# Patient Record
Sex: FEMALE | Race: WHITE | NOT HISPANIC OR LATINO | Employment: OTHER | URBAN - METROPOLITAN AREA
[De-identification: names, ages, dates, MRNs, and addresses within clinical notes are randomized per-mention and may not be internally consistent; named-entity substitution may affect disease eponyms.]

---

## 2012-09-27 LAB — HBA1C MFR BLD: 5.7 %

## 2017-07-24 ENCOUNTER — TELEPHONE (OUTPATIENT)
Dept: PAIN MEDICINE | Facility: CLINIC | Age: 62
End: 2017-07-24

## 2017-07-24 NOTE — TELEPHONE ENCOUNTER
----- Message from Ryanne Tucker sent at 7/24/2017 11:38 AM CDT -----  _x  1st Request  _  2nd Request  _  3rd Request        Who: zafar    Why: pt. Would like to speak with  nurse regarding appt.    What Number to Call Back:[phone]    When to Expect a call back: (With in 24 hours)

## 2017-07-24 NOTE — TELEPHONE ENCOUNTER
Contacted and spoke to patient regarding message, she explained to staff that her pain started on Thursday and she required a appointment today.     She was informed that Dr. Leyva is not in clinic on Monday's and her pain is considered acute since this is the first time she has experienced it and haven't had any previous treatment and our providers treat chronic pain.     She should contact her pcp as she has already been to the ER with no relief in [place], [place]

## 2017-07-26 ENCOUNTER — TELEPHONE (OUTPATIENT)
Dept: PAIN MEDICINE | Facility: CLINIC | Age: 62
End: 2017-07-26

## 2017-07-26 NOTE — TELEPHONE ENCOUNTER
Patient was contacted and informed that no one form our office contacted her she spoke with Melia on Monday.

## 2017-07-26 NOTE — TELEPHONE ENCOUNTER
----- Message from Sandee Quiroz sent at 7/26/2017  2:15 PM CDT -----  _X  1st Request  _  2nd Request  _  3rd Request        Who: IVAN DINERO [[id number]]    Why: Pt believes she received a call from the office today. Please call back.    What Number to Call Back:[phone]  When to Expect a call back: (With in 24 hours)

## 2017-08-07 ENCOUNTER — TELEPHONE (OUTPATIENT)
Dept: PAIN MEDICINE | Facility: CLINIC | Age: 62
End: 2017-08-07

## 2017-08-07 NOTE — TELEPHONE ENCOUNTER
Returned call to [phone] to advise patient friend Latha that we can not release any of the patient's information without the patient's consent, no answer, left voicemail message

## 2017-08-08 ENCOUNTER — TELEPHONE (OUTPATIENT)
Dept: PAIN MEDICINE | Facility: CLINIC | Age: 62
End: 2017-08-08

## 2017-08-08 NOTE — TELEPHONE ENCOUNTER
----- Message from Carolee Melvin sent at 8/8/2017  9:36 AM CDT -----  x 1st Request  _ 2nd Request  _ 3rd Request    Who: Dorothy    Why: New pt requesting to speak to nurse in regards to her pain. Pt upset wants to see if she can be seen sooner. Place appt on waitlist. Please call and advise.    What Number to Call Back: [phone]     When to Expect a call back: (Before the end of the day)  -- if call after 3:00 call back will be tomorrow.       Spoke with patient regarding intense low back pain for days, patient stated she is having excruciating low back pain that is worsening,advise patient that Dr Stafford is not in the office at this time and that she should follow up with the urgent care or her PCP due to the nature of her pain

## 2017-08-08 NOTE — TELEPHONE ENCOUNTER
Called pt back to inform her that Dr. Mazariegos's next available is 8/15/17.  She currently has appt with Dr. Stafford on 8/18/17.  Pt stated she is hurting in her lower back area, went to ED, however, nothing has helped thus far.  Advised pt that she could be moved to the 15th, pt declined.  She will stay on the waiting list for the next cancellation. I advised her that if someone were to cancel, then she would be notified to RS the appt.  Pt verbalized understanding.

## 2017-08-13 ENCOUNTER — NURSE TRIAGE (OUTPATIENT)
Dept: ADMINISTRATIVE | Facility: CLINIC | Age: 62
End: 2017-08-13

## 2017-08-14 ENCOUNTER — TELEPHONE (OUTPATIENT)
Dept: PAIN MEDICINE | Facility: CLINIC | Age: 62
End: 2017-08-14

## 2017-08-14 NOTE — TELEPHONE ENCOUNTER
----- Message from Carolee Melvin sent at 8/14/2017  9:05 AM CDT -----  x 1st Request  _ 2nd Request  _ 3rd Request    Who: Dorothy     Why: New pt is requesting to speak to staff in regards to being seen sooner then 8/18/17.  Please call and advise.     What Number to Call Back: [phone]     When to Expect a call back: (Before the end of the day)  -- if call after 3:00 call back will be tomorrow.

## 2017-08-14 NOTE — TELEPHONE ENCOUNTER
Reason for Disposition   Question about upcoming scheduled test, no triage required and triager able to answer question    Protocols used: ST INFORMATION ONLY CALL-A-AH    Patient called to confirm the appointment on tomorrow. Information provided.

## 2017-08-18 ENCOUNTER — OFFICE VISIT (OUTPATIENT)
Dept: PAIN MEDICINE | Facility: CLINIC | Age: 62
End: 2017-08-18
Attending: ANESTHESIOLOGY
Payer: MEDICARE

## 2017-08-18 VITALS
TEMPERATURE: 97 F | HEART RATE: 98 BPM | BODY MASS INDEX: 34.89 KG/M2 | DIASTOLIC BLOOD PRESSURE: 79 MMHG | SYSTOLIC BLOOD PRESSURE: 110 MMHG | HEIGHT: 65 IN | WEIGHT: 209.44 LBS

## 2017-08-18 DIAGNOSIS — M53.3 SACROILIAC JOINT PAIN: ICD-10-CM

## 2017-08-18 PROCEDURE — 99213 OFFICE O/P EST LOW 20 MIN: CPT | Mod: PBBFAC | Performed by: ANESTHESIOLOGY

## 2017-08-18 PROCEDURE — 99999 PR PBB SHADOW E&M-EST. PATIENT-LVL III: CPT | Mod: PBBFAC,,, | Performed by: ANESTHESIOLOGY

## 2017-08-18 PROCEDURE — 3008F BODY MASS INDEX DOCD: CPT | Mod: S$GLB,,, | Performed by: ANESTHESIOLOGY

## 2017-08-18 PROCEDURE — 99204 OFFICE O/P NEW MOD 45 MIN: CPT | Mod: S$GLB,,, | Performed by: ANESTHESIOLOGY

## 2017-08-18 RX ORDER — OMEPRAZOLE 20 MG/1
20 CAPSULE, DELAYED RELEASE ORAL DAILY
Status: ON HOLD | COMMUNITY
Start: 2017-07-25 | End: 2020-04-21 | Stop reason: SDUPTHER

## 2017-08-18 RX ORDER — GABAPENTIN 300 MG/1
CAPSULE ORAL
COMMUNITY
Start: 2017-08-15 | End: 2017-12-04

## 2017-08-18 RX ORDER — BETAMETHASONE DIPROPIONATE 0.5 MG/G
CREAM TOPICAL
Status: ON HOLD | COMMUNITY
Start: 2017-06-26 | End: 2020-04-20

## 2017-08-18 RX ORDER — MELOXICAM 7.5 MG/1
TABLET ORAL
COMMUNITY
Start: 2017-06-29 | End: 2017-12-04

## 2017-08-18 RX ORDER — LEVOTHYROXINE SODIUM 25 UG/1
37.5 TABLET ORAL
COMMUNITY
Start: 2017-08-02 | End: 2021-03-24

## 2017-08-18 RX ORDER — METHOCARBAMOL 500 MG/1
TABLET, FILM COATED ORAL
Refills: 0 | Status: ON HOLD | COMMUNITY
Start: 2017-07-22 | End: 2020-04-20

## 2017-08-18 RX ORDER — ACYCLOVIR 400 MG/1
400 TABLET ORAL 3 TIMES DAILY PRN
COMMUNITY
Start: 2017-06-20 | End: 2020-06-25 | Stop reason: SDUPTHER

## 2017-08-18 RX ORDER — NYSTATIN 100000 U/G
CREAM TOPICAL
Status: ON HOLD | COMMUNITY
Start: 2017-05-23 | End: 2020-04-20

## 2017-08-18 RX ORDER — METHYLPREDNISOLONE 4 MG/1
TABLET ORAL
Refills: 0 | COMMUNITY
Start: 2017-07-25 | End: 2017-12-04 | Stop reason: ALTCHOICE

## 2017-08-18 RX ORDER — HYDROCODONE BITARTRATE AND ACETAMINOPHEN 5; 325 MG/1; MG/1
TABLET ORAL
COMMUNITY
Start: 2017-08-08 | End: 2017-12-04

## 2017-08-18 RX ORDER — MELOXICAM 15 MG/1
TABLET ORAL
Refills: 0 | COMMUNITY
Start: 2017-07-25 | End: 2017-12-04

## 2017-08-18 NOTE — PROGRESS NOTES
Chronic Pain - New Consult    Referring Physician: Praneeth, Lucianorefmiles    Chief Complaint:   Chief Complaint   Patient presents with    Hip Pain        SUBJECTIVE: Disclaimer: This note has been generated using voice-recognition software. There may be typographical errors that have been missed during proof-reading    Initial encounter:    Dorothy Burt presents to the clinic for the evaluation of left hip pain. The pain started 07/21/17 ago and symptoms have been worsening.    Brief history:    Pain Description:    The pain is located in the left hip area and radiates to the buttock and left thigh.      At BEST  6/10     At WORST  6/10 on the WORST day.      On average pain is rated as 6/10.     Today the pain is rated as 6/10    The pain is described as burning, sharp, shooting, stabbing, throbbing, tingling and aching      Symptoms interfere with daily activity, sleeping, work and .     Exacerbating factors: Sitting, Standing, Laying, Bending, Touching, Coughing/Sneezing, Walking, Extension, Flexing, Lifting, Getting out of bed/chair and.      Mitigating factors heat, ice, medications and .     Patient denies night fever/night sweats, urinary incontinence, bowel incontinence, significant weight loss, significant motor weakness, loss of sensations .  Patient denies any suicidal or homicidal ideations    Pain Medications:  Current:  meloxicam 15 mg without relief  Gabapentin 600mg BID  Flexeril 10mg -causes sedation without relief  duexis without relief    Tried in Past:  NSAIDs -yes  TCA -Never  SNRI -Never  Anti-convulsants -Never  Muscle Relaxants -robaxin  Opioids-Never    Physical Therapy/Home Exercise: no       report:  Reviewed and consistent with medication use as prescribed.    Pain Procedures: none    Chiropractor -never  Acupuncture - never  TENS unit -never  Spinal decompression -never  Joint replacement -never    Imaging:   MRI of lumbar spine DDD with stenosis at L3/4, L2/3    Past Medical History:    Diagnosis Date    Arthritis     Asthma     Back pain     Knee pain     Thyroid disease      Past Surgical History:   Procedure Laterality Date    CHOLECYSTECTOMY      VASCULAR SURGERY       Social History     Social History    Marital status:      Spouse name: N/A    Number of children: N/A    Years of education: N/A     Occupational History    Not on file.     Social History Main Topics    Smoking status: Never Smoker    Smokeless tobacco: Not on file    Alcohol use Yes    Drug use: No    Sexual activity: Not on file     Other Topics Concern    Not on file     Social History Narrative    No narrative on file     Family History   Problem Relation Age of Onset    Stroke Mother        Review of patient's allergies indicates:  No Known Allergies    Current Outpatient Prescriptions   Medication Sig    acyclovir (ZOVIRAX) 400 MG tablet     betamethasone dipropionate (DIPROLENE) 0.05 % cream     cyclobenzaprine (FLEXERIL) 10 MG tablet Take 1 tablet (10 mg total) by mouth 3 (three) times daily as needed.    esomeprazole (NEXIUM) 40 MG capsule Take 40 mg by mouth before breakfast.    gabapentin (NEURONTIN) 300 MG capsule     hydrocodone-acetaminophen 5-325mg (NORCO) 5-325 mg per tablet     levothyroxine (SYNTHROID) 125 MCG tablet Take 125 mcg by mouth once daily.    levothyroxine (SYNTHROID) 25 MCG tablet     meloxicam (MOBIC) 15 MG tablet TAKE 1 TABLET BY MOUTH DAILY (TO BE TAKEN WITH FOOD)    meloxicam (MOBIC) 7.5 MG tablet     methocarbamol (ROBAXIN) 500 MG Tab TAKE 2 TABLETS 4 TIMES DAILY    methylPREDNISolone (MEDROL DOSEPACK) 4 mg tablet TAKE 6 TABLETS ON DAY 1 AS DIRECTED ON PACKAGE AND DECREASE BY 1 TAB EACH DAY FOR A TOTAL OF 6 DAYS    naproxen (NAPROSYN) 500 MG tablet Take 1 tablet (500 mg total) by mouth 2 (two) times daily as needed.    nystatin (MYCOSTATIN) cream     omeprazole (PRILOSEC) 20 MG capsule      No current facility-administered medications for this visit.   "      REVIEW OF SYSTEMS:    GENERAL:  No weight loss, malaise or fevers.  HEENT:   No recent changes in vision or hearing  NECK:  Negative for lumps, no difficulty with swallowing.  RESPIRATORY:  Negative for cough, wheezing or shortness of breath, patient denies any recent URI.  CARDIOVASCULAR:  Negative for chest pain, leg swelling or palpitations.  GI:  Negative for abdominal discomfort, blood in stools or black stools or change in bowel habits. GERD controlled with omeprazole.  MUSCULOSKELETAL:  See HPI.  SKIN:  Negative for lesions, rash, and itching.  PSYCH:  No mood disorder or recent psychosocial stressors.  Patients sleep is  disturbed secondary to pain.  HEMATOLOGY/LYMPHOLOGY:  Negative for prolonged bleeding, bruising easily or swollen nodes.  Patient is not currently taking any anti-coagulants  ENDO: hypothyroidism on stable dose of synthroid  NEURO:   No history of headaches, syncope, paralysis, seizures or tremors.  All other reviewed and negative other than HPI.    OBJECTIVE:    /79   Pulse 98   Temp 97.4 °F (36.3 °C) (Oral)   Ht 5' 5" (1.651 m)   Wt 95 kg (209 lb 7 oz)   BMI 34.85 kg/m²     PHYSICAL EXAMINATION:    GENERAL: Well appearing, in no acute distress, alert and oriented x3.  PSYCH:  Mood and affect appropriate.  SKIN: Skin color, texture, turgor normal, no rashes or lesions.  HEAD/FACE:  Normocephalic, atraumatic. Cranial nerves grossly intact.  CV: RRR with palpation of the radial artery.  PULM: No evidence of respiratory difficulty, symmetric chest rise.  BACK: Straight leg raising in the sitting and supine positions is negative to radicular pain. There is pain with palpation over the facet joints of the lumbar spine bilaterally. There is decreased range of motion with extension to 15 degrees, and facet loading maneuvers cause reproducible pain.    EXTREMITIES: Peripheral joint ROM is full and pain free without obvious instability or laxity in all four extremities. No " deformities, edema, or skin discoloration. Good capillary refill.  MUSCULOSKELETAL: Hip, and knee provocative maneuvers are negative.  There is  pain with palpation over the sacroiliac joint on the left.  There is no pain to palpation over the greater trochanteric bursa bilaterally.  FABERs test is positive on the left.  FADIRs test is negative.   Bilateral lower extremity strength is normal and symmetric.  No atrophy or tone abnormalities are noted.  NEURO: Bilateral lower extremity coordination and muscle stretch reflexes are physiologic and symmetric.  Plantar response are downgoing. No clonus.  No loss of sensation is noted.  GAIT: Antalgic, ambulates without assistance         ASSESSMENT: 62 y.o. year old female with pain, consistent with     Encounter Diagnosis   Name Primary?    Sacroiliac joint pain        PLAN:     I will schedule patient for a left-sided sacroiliac joint injection    Follow-up 2 weeks after injection with APC    In the future the patient may benefit from PT, I provided the patient with a booklet of home exercises to perform.    The above plan and management options were discussed at length with patient. Patient is in agreement with the above and verbalized understanding. It will be communicated with the referring physician via electronic record, fax, or mail.    Gabriel Stafford  08/18/2017

## 2017-08-21 ENCOUNTER — TELEPHONE (OUTPATIENT)
Dept: PAIN MEDICINE | Facility: CLINIC | Age: 62
End: 2017-08-21

## 2017-08-21 NOTE — TELEPHONE ENCOUNTER
"----- Message from Aleisha Deluna sent at 8/21/2017 11:07 AM CDT -----  Contact: Patient herself  X 1st Request  _  2nd Request  _  3rd Request    Who: Dorothy Javed (mrn# [id number])    Why: Patient called and said, "her procedure has been approved and she would like to move the date -up."  Says, "she cannot wait until 08/29/2017 the pain is unbearable and she would kill herself!"  Please give a call back at your earliest convenience.       THANKS!    What Number to Call Back:  [phone]    When to Expect a call back: (With in 24 hours)      Spoke with patient stating to call center that she was going to kill herself if procedure was not moved up, patient stated that she did not mean that she would actually kill herself, stated 'go ahead and call the police Im not at home, I'm at my mom's house" patient refused to give me her mom's address when asked. Patient stated I will call the police myself and tell them "I am not suicidal" and hung up the phone                 "

## 2017-08-21 NOTE — TELEPHONE ENCOUNTER
----- Message from Crystal Vasiliy sent at 8/21/2017  9:07 AM CDT -----  Contact: IVAN DINERO [[id number]]  _x  1st Request  _  2nd Request  _  3rd Request        Who: IVAN DINERO [[id number]]    Why: Patient states she would like a call back in regards to her procedure that is scheduled for 8/29    What Number to Call Back: [phone]    When to Expect a call back: (Before the end of the day)   -- if call after 3:00 call back will be tomorrow.

## 2017-08-21 NOTE — TELEPHONE ENCOUNTER
----- Message from Carolee Melvin sent at 8/21/2017 10:07 AM CDT -----  x 1st Request  _ 2nd Request  _ 3rd Request    Who: Dorothy     Why: Dorothy states she is having intense lower back pain and is requesting to speak to nurse. Pt is also stating that procedure schedule doesn't need authorization. Please call and advise.     What Number to Call Back: [phone]     When to Expect a call back: (Before the end of the day)  -- if call after 3:00 call back will be tomorrow.       Spoke with pt regarding injection set for 08/29/2017, patient given number to [employer] regarding procedure authorization

## 2017-08-23 ENCOUNTER — SURGERY (OUTPATIENT)
Age: 62
End: 2017-08-23

## 2017-08-23 ENCOUNTER — HOSPITAL ENCOUNTER (OUTPATIENT)
Facility: OTHER | Age: 62
Discharge: HOME | End: 2017-08-23
Attending: ANESTHESIOLOGY | Admitting: ANESTHESIOLOGY
Payer: MEDICARE

## 2017-08-23 VITALS
HEIGHT: 65 IN | OXYGEN SATURATION: 100 % | WEIGHT: 209 LBS | DIASTOLIC BLOOD PRESSURE: 78 MMHG | TEMPERATURE: 99 F | BODY MASS INDEX: 34.82 KG/M2 | HEART RATE: 94 BPM | RESPIRATION RATE: 18 BRPM | SYSTOLIC BLOOD PRESSURE: 117 MMHG

## 2017-08-23 DIAGNOSIS — G89.29 CHRONIC PAIN: ICD-10-CM

## 2017-08-23 DIAGNOSIS — M53.3 SACROILIAC JOINT PAIN: ICD-10-CM

## 2017-08-23 DIAGNOSIS — M46.1 SACROILIITIS: Primary | ICD-10-CM

## 2017-08-23 PROCEDURE — 27096 INJECT SACROILIAC JOINT: CPT | Performed by: ANESTHESIOLOGY

## 2017-08-23 PROCEDURE — 63600175 PHARM REV CODE 636 W HCPCS: Performed by: ANESTHESIOLOGY

## 2017-08-23 PROCEDURE — 25000003 PHARM REV CODE 250: Performed by: ANESTHESIOLOGY

## 2017-08-23 PROCEDURE — 27096 INJECT SACROILIAC JOINT: CPT | Mod: LT,,, | Performed by: ANESTHESIOLOGY

## 2017-08-23 PROCEDURE — 25500020 PHARM REV CODE 255: Performed by: ANESTHESIOLOGY

## 2017-08-23 RX ORDER — METHYLPREDNISOLONE ACETATE 40 MG/ML
INJECTION, SUSPENSION INTRA-ARTICULAR; INTRALESIONAL; INTRAMUSCULAR; SOFT TISSUE
Status: DISCONTINUED | OUTPATIENT
Start: 2017-08-23 | End: 2017-08-23 | Stop reason: HOSPADM

## 2017-08-23 RX ORDER — ALPRAZOLAM 0.5 MG/1
0.5 TABLET, ORALLY DISINTEGRATING ORAL
Status: COMPLETED | OUTPATIENT
Start: 2017-08-23 | End: 2017-08-23

## 2017-08-23 RX ORDER — ALPRAZOLAM 0.5 MG/1
0.5 TABLET, ORALLY DISINTEGRATING ORAL
Status: DISCONTINUED | OUTPATIENT
Start: 2017-08-23 | End: 2017-08-23 | Stop reason: HOSPADM

## 2017-08-23 RX ORDER — BUPIVACAINE HYDROCHLORIDE 2.5 MG/ML
INJECTION, SOLUTION EPIDURAL; INFILTRATION; INTRACAUDAL
Status: DISCONTINUED | OUTPATIENT
Start: 2017-08-23 | End: 2017-08-23 | Stop reason: HOSPADM

## 2017-08-23 RX ORDER — LIDOCAINE HYDROCHLORIDE 10 MG/ML
INJECTION INFILTRATION; PERINEURAL
Status: DISCONTINUED | OUTPATIENT
Start: 2017-08-23 | End: 2017-08-23 | Stop reason: HOSPADM

## 2017-08-23 RX ADMIN — BUPIVACAINE HYDROCHLORIDE 10 ML: 2.5 INJECTION, SOLUTION EPIDURAL; INFILTRATION; INTRACAUDAL; PERINEURAL at 01:08

## 2017-08-23 RX ADMIN — METHYLPREDNISOLONE ACETATE 40 MG: 40 INJECTION, SUSPENSION INTRA-ARTICULAR; INTRALESIONAL; INTRAMUSCULAR; SOFT TISSUE at 01:08

## 2017-08-23 RX ADMIN — LIDOCAINE HYDROCHLORIDE 10 ML: 10 INJECTION, SOLUTION INFILTRATION; PERINEURAL at 01:08

## 2017-08-23 RX ADMIN — ALPRAZOLAM 0.5 MG: 0.5 TABLET, ORALLY DISINTEGRATING ORAL at 01:08

## 2017-08-23 RX ADMIN — IOHEXOL 3 ML: 300 INJECTION, SOLUTION INTRAVENOUS at 01:08

## 2017-08-23 NOTE — DISCHARGE INSTRUCTIONS

## 2017-08-23 NOTE — DISCHARGE SUMMARY
Discharge Note  Short Stay      SUMMARY     Admit Date: 8/23/2017    Attending Physician: Gabriel Stafford    Discharge Diagnosis: Sacroiliitis [M46.1]    Discharge Physician: Gabriel Stafford      Discharge Date: 8/23/2017 2:04 PM     PROCEDURE:  leftsacroiliac joint injection under fluoroscopy.    REASON FOR PROCEDURE: left Sacroiliitis [M46.1]    Disposition: Home or self care    Patient Instructions:   Current Discharge Medication List      CONTINUE these medications which have NOT CHANGED    Details   gabapentin (NEURONTIN) 300 MG capsule       !! levothyroxine (SYNTHROID) 125 MCG tablet Take 125 mcg by mouth once daily.      !! levothyroxine (SYNTHROID) 25 MCG tablet       acyclovir (ZOVIRAX) 400 MG tablet       betamethasone dipropionate (DIPROLENE) 0.05 % cream       cyclobenzaprine (FLEXERIL) 10 MG tablet Take 1 tablet (10 mg total) by mouth 3 (three) times daily as needed.  Qty: 90 tablet, Refills: 0      hydrocodone-acetaminophen 5-325mg (NORCO) 5-325 mg per tablet       !! meloxicam (MOBIC) 15 MG tablet TAKE 1 TABLET BY MOUTH DAILY (TO BE TAKEN WITH FOOD)  Refills: 0      !! meloxicam (MOBIC) 7.5 MG tablet       methocarbamol (ROBAXIN) 500 MG Tab TAKE 2 TABLETS 4 TIMES DAILY  Refills: 0      methylPREDNISolone (MEDROL DOSEPACK) 4 mg tablet TAKE 6 TABLETS ON DAY 1 AS DIRECTED ON PACKAGE AND DECREASE BY 1 TAB EACH DAY FOR A TOTAL OF 6 DAYS  Refills: 0      naproxen (NAPROSYN) 500 MG tablet Take 1 tablet (500 mg total) by mouth 2 (two) times daily as needed.  Qty: 60 tablet, Refills: 0      nystatin (MYCOSTATIN) cream       omeprazole (PRILOSEC) 20 MG capsule        !! - Potential duplicate medications found. Please discuss with provider.          Resume home diet and activity

## 2017-08-23 NOTE — OP NOTE
Sacroiliac Joint Injection under Fluoroscopy    Date of procedure 08/23/2017    Time-out taken to identify patient and procedure side prior to starting the procedure.                                                                 PROCEDURE:  leftsacroiliac joint injection under fluoroscopy.    REASON FOR PROCEDURE: left Sacroiliitis [M46.1]    PHYSICIAN: Gabriel Stafford MD    Assistants:  Juan Diego Crane MD PGY-3  I was present and supervising all critical portions of the procedure      MEDICATIONS INJECTED:  Depo-Medrol 80mg and 4 mL Bupivacaine 0.25%    LOCAL ANESTHETIC USED: Xylocaine 1% 5mL    SEDATION MEDICATIONS: None    ESTIMATED BLOOD LOSS:  None.    COMPLICATIONS:  None.    TECHNIQUE:   Laying in the prone position, the patient was prepped and draped in the usual sterile fashion using ChloraPrep and fenestrated drape.  The area was determined under fluoroscopy.  Local Xylocaine was injected by raising a wheel and going down to the periosteum using a 27-gauge hypodermic needle.  The 3.5 inch 22-gauge spinal needle was introduce into the left sacroiliac joint.  Negative pressure applied to confirm no intravascular placement.  Omnipaque was injected to confirm placement and to confirm that there was no vascular runoff.  The medication was then injected slowly.  The patient tolerated the procedure well.      The patient was monitored for approximately 30 minutes after the procedure.  Patient was given post procedure and discharge instructions to follow at home.  We will see the patient back in two weeks or the patient may call to inform of status. The patient was discharged in a stable condition

## 2017-08-29 ENCOUNTER — TELEPHONE (OUTPATIENT)
Dept: PAIN MEDICINE | Facility: CLINIC | Age: 62
End: 2017-08-29

## 2017-08-29 DIAGNOSIS — M46.1 SACROILIITIS: Primary | ICD-10-CM

## 2017-08-29 DIAGNOSIS — G89.4 CHRONIC PAIN SYNDROME: ICD-10-CM

## 2017-08-29 RX ORDER — IBUPROFEN AND FAMOTIDINE 26.6; 8 MG/1; MG/1
1 TABLET ORAL 3 TIMES DAILY
Qty: 90 TABLET | Refills: 1 | Status: SHIPPED | OUTPATIENT
Start: 2017-08-29 | End: 2017-09-28

## 2017-08-29 NOTE — TELEPHONE ENCOUNTER
"Spoke with pain about her pain. She continues to report low back pain. She reports some "electric" shock pain down her leg that is intermittent at this time.     We discussed that our injections can take 7-14 days to see the full effect. I encouraged her to continue taking her current medications and using heat and ice intermittently. Refill sent today of Duexis. She will keep her follow up to discuss future options.   "

## 2017-08-29 NOTE — TELEPHONE ENCOUNTER
"----- Message from Aleisha Deluna sent at 8/29/2017  8:09 AM CDT -----  Contact: Patient herself / Ph# [phone]  _  1st Request  _  2nd Request  X 3rd Request    Who: Dorothy Burt (mrn# [id number])    Why: Patient called requesting a call. Says, "she still experiencing extreme pain since her procedure."  Please give a call back at your earliest convenience.       THANKS!    What Number to Call Back:  [phone]    When to Expect a call back: (With in 24 hours)                      "

## 2017-09-14 ENCOUNTER — OFFICE VISIT (OUTPATIENT)
Dept: PAIN MEDICINE | Facility: CLINIC | Age: 62
End: 2017-09-14
Payer: MEDICARE

## 2017-09-14 VITALS
DIASTOLIC BLOOD PRESSURE: 86 MMHG | HEIGHT: 65 IN | BODY MASS INDEX: 34.16 KG/M2 | TEMPERATURE: 99 F | RESPIRATION RATE: 20 BRPM | SYSTOLIC BLOOD PRESSURE: 124 MMHG | HEART RATE: 97 BPM | WEIGHT: 205 LBS

## 2017-09-14 DIAGNOSIS — M53.3 SACROILIAC JOINT PAIN: ICD-10-CM

## 2017-09-14 DIAGNOSIS — M54.16 LUMBAR RADICULOPATHY: Primary | ICD-10-CM

## 2017-09-14 DIAGNOSIS — M51.36 DDD (DEGENERATIVE DISC DISEASE), LUMBAR: ICD-10-CM

## 2017-09-14 DIAGNOSIS — M70.62 GREATER TROCHANTERIC BURSITIS, LEFT: ICD-10-CM

## 2017-09-14 DIAGNOSIS — M79.18 MYOFASCIAL PAIN: ICD-10-CM

## 2017-09-14 DIAGNOSIS — M47.816 FACET ARTHRITIS OF LUMBAR REGION: ICD-10-CM

## 2017-09-14 PROCEDURE — 99999 PR PBB SHADOW E&M-EST. PATIENT-LVL III: CPT | Mod: PBBFAC,,, | Performed by: NURSE PRACTITIONER

## 2017-09-14 PROCEDURE — 3008F BODY MASS INDEX DOCD: CPT | Mod: S$GLB,,, | Performed by: NURSE PRACTITIONER

## 2017-09-14 PROCEDURE — 99213 OFFICE O/P EST LOW 20 MIN: CPT | Mod: S$GLB,,, | Performed by: NURSE PRACTITIONER

## 2017-09-14 NOTE — PROGRESS NOTES
Chronic Pain - Established Visit    Referring Physician: No ref. provider found    Chief Complaint:   Chief Complaint   Patient presents with    Hip Pain     Left hip pain         SUBJECTIVE: Disclaimer: This note has been generated using voice-recognition software. There may be typographical errors that have been missed during proof-reading    Interval History 9/14/2017:  The patient returns to clinic today for follow up. She is s/p left SI joint injection on 8/23/2017. She reports 95% relief of her left hip and buttock pain. She reports numbness to her groin and left upper thigh. She denies any radicular pain. She also reports some pain to her lateral left thigh. This pain is tolerable at this time. She has decreased her Gabapentin to 300 mg TID due to side effects. She continues to report benefit with Ibuprofen. She denies any other health changes. She denies any bowel or bladder incontinence. Her pain today is 1/10.    Initial encounter:    Dorothy Burt presents to the clinic for the evaluation of left hip pain. The pain started 07/21/17 ago and symptoms have been worsening.    Brief history:    Pain Description:    The pain is located in the left hip area and radiates to the buttock and left thigh.      At BEST  6/10     At WORST  6/10 on the WORST day.      On average pain is rated as 6/10.     Today the pain is rated as 6/10    The pain is described as burning, sharp, shooting, stabbing, throbbing, tingling and aching      Symptoms interfere with daily activity, sleeping, work and .     Exacerbating factors: Sitting, Standing, Laying, Bending, Touching, Coughing/Sneezing, Walking, Extension, Flexing, Lifting, Getting out of bed/chair and.      Mitigating factors heat, ice, medications and .     Patient denies night fever/night sweats, urinary incontinence, bowel incontinence, significant weight loss, significant motor weakness, loss of sensations .  Patient denies any suicidal or homicidal  ideations    Pain Medications:  Current:  Gabapentin 300mg TID  Flexeril 10mg -causes sedation without relief  duexis     Tried in Past:  NSAIDs -yes  TCA -Never  SNRI -Never  Anti-convulsants -Never  Muscle Relaxants -robaxin  Opioids-Never    Physical Therapy/Home Exercise: no       report:  Reviewed and consistent with medication use as prescribed.    Pain Procedures:   8/23/2017- Left SI joint injection    Chiropractor -never  Acupuncture - never  TENS unit -never  Spinal decompression -never  Joint replacement -never    Imaging:   MRI of lumbar spine DDD with stenosis at L3/4, L2/3    Past Medical History:   Diagnosis Date    Arthritis     Asthma     Back pain     Knee pain     Thyroid disease      Past Surgical History:   Procedure Laterality Date    CHOLECYSTECTOMY      VASCULAR SURGERY       Social History     Social History    Marital status:      Spouse name: N/A    Number of children: N/A    Years of education: N/A     Occupational History    Not on file.     Social History Main Topics    Smoking status: Never Smoker    Smokeless tobacco: Never Used    Alcohol use Yes    Drug use: No    Sexual activity: Not on file     Other Topics Concern    Not on file     Social History Narrative    No narrative on file     Family History   Problem Relation Age of Onset    Stroke Mother        Review of patient's allergies indicates:  No Known Allergies    Current Outpatient Prescriptions   Medication Sig    acyclovir (ZOVIRAX) 400 MG tablet     betamethasone dipropionate (DIPROLENE) 0.05 % cream     cyclobenzaprine (FLEXERIL) 10 MG tablet Take 1 tablet (10 mg total) by mouth 3 (three) times daily as needed.    gabapentin (NEURONTIN) 300 MG capsule     hydrocodone-acetaminophen 5-325mg (NORCO) 5-325 mg per tablet     ibuprofen-famotidine (DUEXIS) 800-26.6 mg Tab Take 1 tablet by mouth 3 (three) times daily.    levothyroxine (SYNTHROID) 125 MCG tablet Take 125 mcg by mouth once daily.  "   levothyroxine (SYNTHROID) 25 MCG tablet     meloxicam (MOBIC) 15 MG tablet TAKE 1 TABLET BY MOUTH DAILY (TO BE TAKEN WITH FOOD)    meloxicam (MOBIC) 7.5 MG tablet     methocarbamol (ROBAXIN) 500 MG Tab TAKE 2 TABLETS 4 TIMES DAILY    methylPREDNISolone (MEDROL DOSEPACK) 4 mg tablet TAKE 6 TABLETS ON DAY 1 AS DIRECTED ON PACKAGE AND DECREASE BY 1 TAB EACH DAY FOR A TOTAL OF 6 DAYS    naproxen (NAPROSYN) 500 MG tablet Take 1 tablet (500 mg total) by mouth 2 (two) times daily as needed.    nystatin (MYCOSTATIN) cream     omeprazole (PRILOSEC) 20 MG capsule      No current facility-administered medications for this visit.        REVIEW OF SYSTEMS:    GENERAL:  No weight loss, malaise or fevers.  HEENT:   No recent changes in vision or hearing  NECK:  Negative for lumps, no difficulty with swallowing.  RESPIRATORY:  Negative for cough, wheezing or shortness of breath, patient denies any recent URI.  CARDIOVASCULAR:  Negative for chest pain, leg swelling or palpitations.  GI:  Negative for abdominal discomfort, blood in stools or black stools or change in bowel habits. GERD controlled with omeprazole.  MUSCULOSKELETAL:  See HPI.  SKIN:  Negative for lesions, rash, and itching.  PSYCH:  No mood disorder or recent psychosocial stressors.  Patient's sleep is  disturbed secondary to pain.  HEMATOLOGY/LYMPHOLOGY:  Negative for prolonged bleeding, bruising easily or swollen nodes.  Patient is not currently taking any anti-coagulants  ENDO: hypothyroidism on stable dose of synthroid  NEURO:   No history of headaches, syncope, paralysis, seizures or tremors.  All other reviewed and negative other than HPI.    OBJECTIVE:    /86   Pulse 97   Temp 99.4 °F (37.4 °C) (Oral) Comment (Src): drank coffee  Resp 20   Ht 5' 5" (1.651 m)   Wt 93 kg (205 lb)   BMI 34.11 kg/m²     PHYSICAL EXAMINATION:    GENERAL: Well appearing, in no acute distress, alert and oriented x3.  PSYCH:  Mood and affect appropriate.  SKIN: " Skin color, texture, turgor normal, no rashes or lesions.  HEAD/FACE:  Normocephalic, atraumatic. Cranial nerves grossly intact.  CV: RRR with palpation of the radial artery.  PULM: No evidence of respiratory difficulty, symmetric chest rise.  BACK: Straight leg raising in the sitting and supine positions is negative to radicular pain. There is no pain with palpation over the facet joints of the lumbar spine bilaterally. Limited ROM with mild pain on extension. Positive facet loading bilaterally.    EXTREMITIES: Peripheral joint ROM is full and pain free without obvious instability or laxity in all four extremities. No deformities, edema, or skin discoloration. Good capillary refill.  MUSCULOSKELETAL: Hip, and knee provocative maneuvers are negative.  There is no pain with palpation over the sacroiliac joints bilaterally.  There is pain to palpation over the greater trochanteric bursa on the left.  FABERs test is negative. FADIRs test is negative.   Bilateral lower extremity strength is normal and symmetric.  No atrophy or tone abnormalities are noted.  NEURO: Bilateral lower extremity coordination and muscle stretch reflexes are physiologic and symmetric.  Plantar response are downgoing. No clonus.  No loss of sensation is noted.  GAIT: Antalgic, ambulates without assistance         ASSESSMENT: 62 y.o. year old female with pain, consistent with     Encounter Diagnoses   Name Primary?    Lumbar radiculopathy Yes    DDD (degenerative disc disease), lumbar     Sacroiliac joint pain     Facet arthritis of lumbar region     Greater trochanteric bursitis, left     Myofascial pain        PLAN:     - Previous imaging was reviewed and discussed with the patient today.    - She is s/p left SI joint injection with relief. We can repeat this in the future as needed.     - We can consider left GT bursa injection in the future. She would like to try conservative measures first.     - We can consider left L2 and L3 TF TRISTAN  in the future.     - Referral to physical therapy.     - Continue Gabapentin and Ibuprofen.     - RTC in 2 months or sooner if needed.     - Dr. Stafford was consulted on the patient and agrees with this plan.    The above plan and management options were discussed at length with patient. Patient is in agreement with the above and verbalized understanding.     Sylvia Beckwith NP  09/14/2017

## 2017-09-26 ENCOUNTER — CLINICAL SUPPORT (OUTPATIENT)
Dept: REHABILITATION | Facility: HOSPITAL | Age: 62
End: 2017-09-26
Attending: NURSE PRACTITIONER
Payer: MEDICARE

## 2017-09-26 DIAGNOSIS — Z78.9 DIFFICULTY NAVIGATING STAIRS: ICD-10-CM

## 2017-09-26 DIAGNOSIS — R29.898 WEAKNESS OF BOTH LOWER EXTREMITIES: ICD-10-CM

## 2017-09-26 PROCEDURE — 97110 THERAPEUTIC EXERCISES: CPT | Mod: PO

## 2017-09-26 PROCEDURE — 97162 PT EVAL MOD COMPLEX 30 MIN: CPT | Mod: PO

## 2017-09-26 PROCEDURE — G8979 MOBILITY GOAL STATUS: HCPCS | Mod: CK,PO

## 2017-09-26 PROCEDURE — G8978 MOBILITY CURRENT STATUS: HCPCS | Mod: CL,PO

## 2017-09-26 PROCEDURE — 97140 MANUAL THERAPY 1/> REGIONS: CPT | Mod: PO

## 2017-09-26 NOTE — PROGRESS NOTES
Name: Dorothy COOK [facility] Number: [id number]  09/26/2017.     Diagnosis: LBP   Physician: Sylvia Beckwith NP  Treatment Orders: PT Eval and Treat    Past Medical History:   Diagnosis Date    Arthritis     Asthma     Back pain     Knee pain     Thyroid disease      Current Outpatient Prescriptions   Medication Sig    acyclovir (ZOVIRAX) 400 MG tablet     betamethasone dipropionate (DIPROLENE) 0.05 % cream     cyclobenzaprine (FLEXERIL) 10 MG tablet Take 1 tablet (10 mg total) by mouth 3 (three) times daily as needed.    gabapentin (NEURONTIN) 300 MG capsule     hydrocodone-acetaminophen 5-325mg (NORCO) 5-325 mg per tablet     ibuprofen-famotidine (DUEXIS) 800-26.6 mg Tab Take 1 tablet by mouth 3 (three) times daily.    levothyroxine (SYNTHROID) 125 MCG tablet Take 125 mcg by mouth once daily.    levothyroxine (SYNTHROID) 25 MCG tablet     meloxicam (MOBIC) 15 MG tablet TAKE 1 TABLET BY MOUTH DAILY (TO BE TAKEN WITH FOOD)    meloxicam (MOBIC) 7.5 MG tablet     methocarbamol (ROBAXIN) 500 MG Tab TAKE 2 TABLETS 4 TIMES DAILY    methylPREDNISolone (MEDROL DOSEPACK) 4 mg tablet TAKE 6 TABLETS ON DAY 1 AS DIRECTED ON PACKAGE AND DECREASE BY 1 TAB EACH DAY FOR A TOTAL OF 6 DAYS    naproxen (NAPROSYN) 500 MG tablet Take 1 tablet (500 mg total) by mouth 2 (two) times daily as needed.    nystatin (MYCOSTATIN) cream     omeprazole (PRILOSEC) 20 MG capsule      No current facility-administered medications for this visit.      Review of patient's allergies indicates:  No Known Allergies  Precautions: universal       Subjective:    Previous PT: yes, a long time ago; for her knees, it did help her out when she was going   Work history: works in real estate;    Recreational activities/exercise program: use to do painting and working around the house; likes to go to ball games with grand kids, use to throw the ball with them;      Dorothy COOK is a 62 y.o. female referred to PT for back and hip pain. The severe pain was  from the hip. Sxs started in July 20th, 2017. The 19th, she noticed an ache in the low back/hip on the L side after a 4 day real estate class. Then she took a 9 hour drive to [facility]. She noticed pain that night, and the next day she couldn't walk. She went to the ED, was given injections for inflammation. Wasn't given any pain medications. Had follow ups with various providers, including ortho, neurosurgery, and pain management. She had an injection 8/23, and pain decreased after that. The last month, she has been alternating heat/cool at the gym. She hasn't been exercising for fear of what she can do and not having the pain come back. She has taken herself of the medication. Current location of sxs are the lateral hip, and will have a shock down the leg a couple times a day. She states she is numb in the inner thigh, and when she stands at [employer] for > 15' she has pain down her leg. The inner thigh feels like a bruise. She also has trouble with stairs, and has trouble getting out of the car.      Pain is rated on a 0-10 scale with 0 being no pain and 10 being pain requiring emergency room visit.    Diagnostic Tests: n/a    Patient Goals for PT: improve standing tolerance, improve performance on stairs, learn what exercises to do.       Objective:    FOTO Low back Survey 69% limitations  Current -EZ Goal -LB (49%)     Category: Mobility     G-Code Modifiers  CH  0% Impaired, limited, or restricted    CI  19% - 1%  Impaired, limited, or restricted    CJ  20% - 39% Impaired, limited, or restricted    CK  40% - 59% Impaired, limited, or restricted    CL  60% - 79% Impaired, limited, or restricted    CM  80% - 99% Impaired, limited, or restricted    CN  100% Impaired, limited, or restricted        Visit # 1  Time In: 1000  Time Out: 1100  Treatment time: 75  Date of eval/re-eval: 9/26/2017    DTR R/L: Biceps C5/6 NL / NL Patella L4 NL / NL Achilles S2 ABS/ ABS DF NL / NL   NL 1+, 2+, 3+; ABS 0; HYP 4+; ANGELINA  "5+    Light touch R/L: Ant thigh L2 NL / DIM Med knee L3 NL / NL Med malleolus L4 NL / NL Med heel L5 NL / NL Lat foot S1 NL / NL Med foot S2 NL / NL    NL intact; DIM diminished     Alignment/appearance:   Thoracic spine:  --> nl --> inc   Lumbar spine:  --> nl --> inc  Pelvic tilt:  --> nl --> ant    Pelvic rotation:  nl     Strength R/L 3+ to 4- throughout LE     AROM tests R/L    Multi-segmental flexion -    Return from flexion -  Multi-segmental extension -    Multi-segmental rotation - / +     Trunk side-bend - / -   Single leg balance 5" / 5"    Deep squat nt  SL squat nt / nt        Findings R/L    Slump test - / -   SLR test nt / nt   NWB toe touch nt   ASLR/ Hamstring mobility nt / nt    JAREK - / -   FADIR - / -   Modified Prashant Test nt / nt   Hip ER  Supine 90E/90E  AROM wfl / wfl     Prone    AROM wfl / wfl   Hip IR   Supine 90E/90E  AROM wfl / wfl    Prone    AROM wfl / wfl  Hip ext ROM wfl / wfl Prone hip extension test - / -     Stair climbing test (clinic steps x 2) 19.83, moderately unsteady     PT Evaluation Complete? YES  Discussed Plan of Care with patient: YES    [1] Manual therapy for improved ROM and/or pain relief x 15'   Long axis oscillations extended/supine  Hip 90/90 oscillations   ER/IR/abd/add ext isometrics 5" x 3 each      [1] Therapeutic exercise for strengthening and/or improving fitness x 15'   SL clamshell 2 x 8/ 8   Supine hooklying forced exhale/glute sets 12x    Quadruped rock back 10x   STS cues to move to edge of surface 5x       Written HEP Provided: yes  Patient education: HEP; stenosis like pain pattern; improving fitness level; standing with a step stool/foot elevated   Dorothy A demo good understanding of the education provided. Patient demo good return demo of skill of exercises.    Problem List: muscle length impairments, decreased motor control and mobility, impaired ADLs, activity and participation restrictions.     Personal factors: hip and back; reported FMS, " "anxiety    CPT Code reference        Hx  Exam  Presentation   Code     Low     0   1-2    Stable    11791     Mod     1-2   3    Evolving    43834     High     3+   4+     Unstable    10499       Assessment:    Physical therapy diagnosis: lumbar rotation syndrome  Encounter Diagnoses   Name Primary?    Weakness of both lower extremities     Difficulty navigating stairs      Rehab potential: good    Dorothy COOK presents with the above listed impairments/tolerated tx without increase in sxs .  The pt is more flexible into lumbar rotation than hip rotation. Poor muscle performance of hips/LE, causing increased movement in low back. Only mild provocation of sxs with testing.    Dorothy COOK will benefit from skilled PT services to address these impairments and progress towards the below listed functional goals.  Dorothy COOK is in agreement with the goals that will be addressed in the treatment plan.Dorothy COOK demonstrates no additional cultural, spiritual or educational needs and currently has no barriers to learning.      Medical necessity: see problem list -  indicates  / mod /  complexity based on clinical presentation that is  / evolving based on reported pain syndromes and long standing conditions      Functional Goals  Time frame     1.   The pt will perform stair climbing tests (2x in clinic, no UE support) in > 16" for improved functional mobility with ADLs.   6-8 weeks     2.   The pt will tolerate standing > 20' in [employer] without increase in sxs for improved activity tolerance with ADLs.   6-8 weeks     3.   The pt will demonstrate > 4-/5 hip and knee strength in BLE for improved functional strength with ADLs.   6-8 weeks     4.   The pt will be independent in performance and progression of HEP.     2-3 visits            Plan:      Proceed/Continue with POC.     Pt will be treated by physical therapy 1-2x/week during the certification period. Treatment will consist of therapeutic exercise, manual therapy, neuromuscular " re-education, heat and cold modalities, electrical stimulation for pain relief and/or muscle activation, and any other types of treatment hat may be deemed medically necessary. Dorothy COOK may at times be seen by a PTA as part of the Rehab Team.       Physical Therapist: SHELIA Mejía, DPT, OCS, CSCS    I certify the need for these services furnished under this plan of treatment and while under my care.       ___________________________________  Physician/Referring Practitioner        _________________  Date of Signature

## 2017-10-09 ENCOUNTER — CLINICAL SUPPORT (OUTPATIENT)
Dept: REHABILITATION | Facility: HOSPITAL | Age: 62
End: 2017-10-09
Attending: NURSE PRACTITIONER
Payer: MEDICARE

## 2017-10-09 DIAGNOSIS — R29.898 WEAKNESS OF BOTH LOWER EXTREMITIES: ICD-10-CM

## 2017-10-09 DIAGNOSIS — M54.50 LOW BACK PAIN POTENTIALLY ASSOCIATED WITH RADICULOPATHY: Primary | ICD-10-CM

## 2017-10-09 DIAGNOSIS — Z78.9 DIFFICULTY NAVIGATING STAIRS: ICD-10-CM

## 2017-10-09 DIAGNOSIS — M25.552 PAIN OF LEFT HIP JOINT: ICD-10-CM

## 2017-10-09 PROCEDURE — 97110 THERAPEUTIC EXERCISES: CPT

## 2017-10-09 PROCEDURE — 97140 MANUAL THERAPY 1/> REGIONS: CPT

## 2017-10-09 NOTE — PROGRESS NOTES
"                                                    Physical Therapy Daily Note     Date: 10/09/2017  Name: Dorothy Burt  [facility] Number: [id number]  Diagnosis:   Encounter Diagnoses   Name Primary?    Weakness of both lower extremities     Difficulty navigating stairs     Pain of left hip joint     Low back pain potentially associated with radiculopathy Yes     Physician: Sylvia Beckwith NP    Evaluation Date: 9/26/17  Visit #: 2 of 12  Start Time:  701  Stop Time:  800  Total Treatment Time: 59    Precautions: standard    Visit # authorized: 12  Authorization period: 9/26/17-11/26/17  Plan of care Expiration: 11/26/17      Subjective     Patient reports she is sick of taking pain medication and travelling from doctor to doctor; she is excited to try Physical Therapy and decrease pain. Pt reports 2/10 low back pain and L anterior thigh numbness pre-treatment. Patient was evaluated at [facility] and prefers to come to this clinic for treatment. Patient reports she was given DKC/LTR for HEP. Patient inquired about feelings of dizziness and walking sideways this weekend; she wonders if it had anything to do with PT treatment on Friday. Patient reports the feelings of dizziness and spinning came on when she stood up and lasted about a minute; she did not feel dizzy this morning coming to clinic.    Objective     R posterior rotated inominate pre-treatment. Performed MET and inominates aligned. Positive Beeson Hallpike for R posterior canal involvement.     Patient received individual therapy to increase strength, endurance, ROM, flexibility and core stabilization with activities as follows:     Dorothy COOK received therapeutic exercises to develop strength, endurance, ROM, flexibility and core stabilization for 40 minutes including:      10/09/2017   rojelio 1.5# 2x10   Bridge with adduction 2x10   bridge 1x10   Supine lat pulls green TB 2x10   Abdominal bracing 5" x10   Hamstring stretch 3x30" B   Piriformis stretch " "3x30" L   Pelvic tilts 10x5"       Dorothy COOK received the following manual therapy techniques for 15 minutes: L hip long axis distractions grade 3, soft tissue mobilizations to L piriformis, lumbar spine rotation mobilizations with L side up, and MET for L posteriorly rotated inominate.    Written Home Exercises Provided: pelvic tilts, supine lat pulls, hamstring stretch, piriformis stretch.  Pt demo good understanding of the education provided. Dorothy COOK demonstrated good return demonstration of activities.       HEP2Go Code: [id number]    Education provided: Patient educated on BPPV and how it can be treated with physical therapy. Patient informed her dizziness and feelings of the room spinning are likely secondary to BPPV and not muscular. Patient educated on the role of transversus abdominus in LBP and low back stability. Patient educated on all new exercises and provided with handout for new HEP exercises (pelvic tilts, hamstring and piriformis stretches, and supine lat pull). Messaged Sylvia Beckwith who referred the patient to PT to discuss POC and management of vertigo.  Dorothy COOK verbalized good understanding of education provided.   No spiritual or educational barriers to learning identified.    Assessment   Patient tolerated treatment well and verbalized understanding of BPPV and the role of the transversus abdominus in low back pain. Patient will continue to benefit from skilled PT in order to decrease pain, increase strength, and increase functional mobility.       Functional Goals  Time frame     1.   The pt will perform stair climbing tests (2x in clinic, no UE support) in > 16" for improved functional mobility with ADLs.   6-8 weeks     2.   The pt will tolerate standing > 20' in [employer] without increase in sxs for improved activity tolerance with ADLs.   6-8 weeks     3.   The pt will demonstrate > 4-/5 hip and knee strength in BLE for improved functional strength with ADLs.   6-8 weeks     4.   The pt will be " independent in performance and progression of HEP.     2-3 visits          FOTO Low back Survey 69% limitations as of 9/26/17  Current -JJ Goal -DI (49%)     Category: Mobility   Plan   Progress core/LE strength and ROM as tolerated. Monitor feelings of dizziness and balance.    Therapist: Michelle Waldrop, PT, DPT

## 2017-10-17 ENCOUNTER — CLINICAL SUPPORT (OUTPATIENT)
Dept: REHABILITATION | Facility: HOSPITAL | Age: 62
End: 2017-10-17
Attending: NURSE PRACTITIONER
Payer: MEDICARE

## 2017-10-17 DIAGNOSIS — M54.50 LOW BACK PAIN POTENTIALLY ASSOCIATED WITH RADICULOPATHY: Primary | ICD-10-CM

## 2017-10-17 DIAGNOSIS — R29.898 WEAKNESS OF BOTH LOWER EXTREMITIES: ICD-10-CM

## 2017-10-17 DIAGNOSIS — M25.552 PAIN OF LEFT HIP JOINT: ICD-10-CM

## 2017-10-17 DIAGNOSIS — Z78.9 DIFFICULTY NAVIGATING STAIRS: ICD-10-CM

## 2017-10-17 PROCEDURE — 97140 MANUAL THERAPY 1/> REGIONS: CPT

## 2017-10-17 PROCEDURE — 97110 THERAPEUTIC EXERCISES: CPT

## 2017-10-17 NOTE — PROGRESS NOTES
"                                                    Physical Therapy Daily Note     Date: 10/17/2017  Name: Dorothy Burt  [facility] Number: [id number]  Diagnosis:   Encounter Diagnoses   Name Primary?    Pain of left hip joint     Low back pain potentially associated with radiculopathy Yes    Weakness of both lower extremities     Difficulty navigating stairs      Physician: Sylvia Beckwith NP    Evaluation Date: 9/26/17  Visit #: 3 of 12  Start Time:  1000  Stop Time:  1104  Total Treatment Time: 64    Precautions: standard    Visit # authorized: 12  Authorization period: 9/26/17-11/26/17  Plan of care Expiration: 11/26/17      Subjective   Patient reports 3/10 back pain and 0/10 hip pain pre-treatment. She reports that her upper back has been sore since completing new exercises. Patient reports that since last treatment she has not had any instances of vertigo/dizziness.  Objective     Hips in alignment pre-treatment.     Patient received individual therapy to increase strength, endurance, ROM, flexibility and core stabilization with activities as follows:     Dorothy COOK received therapeutic exercises to develop strength, endurance, ROM, flexibility and core stabilization for 45 minutes including:      10/17/2017   rojelio NP   Bridge with adduction 3x10   Wall squats 2x10   pallof press 2x10 orange TB   Supine lat pulls  2x10 blue TB   Abdominal bracing 5" x10   Hamstring stretch 3x30" B   Piriformis stretch 3x30" L   Pelvic tilts 10x5"       Dorothy COOK received the following manual therapy techniques for 15 minutes:  hip long axis distractions grade 3 B, lumbar spine rotation mobilizations B    Written Home Exercises Provided: pelvic tilts, supine lat pulls, hamstring stretch, piriformis stretch, wall squats  Pt demo good understanding of the education provided. Dorothy COOK demonstrated good return demonstration of activities.       HEP2Go Code: [id number]    Education provided: Patient educated on all new exercises provided " "in clinic and given new handout for HEP. Patient educated on the role of the ENT doctor on treating vertigo; told patient I would refer her to an ENT if she has more instances of vertigo.  Dorothy COOK verbalized good understanding of education provided.   No spiritual or educational barriers to learning identified.    Assessment   Patient tolerated treatment well and comprehended all clinic exercises with no issues. Patient has not had any issues with vertigo since last treatment but I will continue to monitor feelings of vertigo/dizziness and refer out to an ENT if they return as Sylvia Beckwith recommended. Patient will continue to benefit from skilled PT in order to decrease pain, increase strength, and increase functional mobility.       Functional Goals  Time frame     1.   The pt will perform stair climbing tests (2x in clinic, no UE support) in > 16" for improved functional mobility with ADLs.   6-8 weeks     2.   The pt will tolerate standing > 20' in Amish without increase in sxs for improved activity tolerance with ADLs.   6-8 weeks     3.   The pt will demonstrate > 4-/5 hip and knee strength in BLE for improved functional strength with ADLs.   6-8 weeks     4.   The pt will be independent in performance and progression of HEP.     2-3 visits          FOTO Low back Survey 69% limitations as of 9/26/17  Current -RW Goal -LZ (49%)     Category: Mobility   Plan   Progress core/LE strength and ROM as tolerated. Monitor feelings of dizziness and sense of being off-balance.    Therapist: Michelle Waldrop, PT, DPT      "

## 2017-10-19 ENCOUNTER — CLINICAL SUPPORT (OUTPATIENT)
Dept: REHABILITATION | Facility: HOSPITAL | Age: 62
End: 2017-10-19
Attending: NURSE PRACTITIONER
Payer: MEDICARE

## 2017-10-19 DIAGNOSIS — M25.552 PAIN OF LEFT HIP JOINT: ICD-10-CM

## 2017-10-19 DIAGNOSIS — R29.898 WEAKNESS OF BOTH LOWER EXTREMITIES: ICD-10-CM

## 2017-10-19 DIAGNOSIS — M54.50 LOW BACK PAIN POTENTIALLY ASSOCIATED WITH RADICULOPATHY: ICD-10-CM

## 2017-10-19 DIAGNOSIS — Z78.9 DIFFICULTY NAVIGATING STAIRS: ICD-10-CM

## 2017-10-19 PROCEDURE — 97110 THERAPEUTIC EXERCISES: CPT

## 2017-10-24 NOTE — PROGRESS NOTES
"                                                    Physical Therapy Daily Note     Date: 10/24/2017  Name: Dorothy Burt  [facility] Number: [id number]  Diagnosis:   Encounter Diagnoses   Name Primary?    Pain of left hip joint     Low back pain potentially associated with radiculopathy     Weakness of both lower extremities     Difficulty navigating stairs      Physician: Sylvia Beckwith NP    Evaluation Date: 9/26/17  Visit #: 4 of 12    Precautions: standard  Visit # authorized: 12  Authorization period: 9/26/17-11/26/17  Plan of care Expiration: 11/26/17      Subjective   Patient reports "hurting all over." Pt states feeling very sore after last PT visit.     Objective     Patient received individual therapy to increase strength, endurance, ROM, flexibility and core stabilization with activities as follows:     Dorothy COOK received therapeutic exercises to develop strength, endurance, ROM, flexibility and core stabilization for 45 minutes including:   MH on lower/upper back 10 minutes  LTR using theraball 2 minutes   HS stretch 2 x 30   Bridges 20 reps using ball between knees core engaged   SL clam shells and reverse clam shells 2 x 10 reps   Pt sitting on chair performing piriforms stretch 2 x 30 sec     Written Home Exercises Provided: pelvic tilts, supine lat pulls, hamstring stretch, piriformis stretch, wall squats  Pt demo good understanding of the education provided. Dorothy COOK demonstrated good return demonstration of activities.       HEP2G Code: [id number]    Education provided: Patient educated on all new exercises provided in clinic and given new handout for HEP. Patient educated on the role of the ENT doctor on treating vertigo; told patient I would refer her to an ENT if she has more instances of vertigo.  Dorothy COOK verbalized good understanding of education provided.   No spiritual or educational barriers to learning identified.    Assessment   Patient with good tolerance to PT until almost the end of session when " "Pt became no verbal and presents seizure "looks" sings for about 10 -12 secs. PTA called Pt's PCP and advise Pt to go to ER; Pt verbally understood. Patient will continue to benefit from skilled PT in order to decrease pain, increase strength, and increase functional mobility after being clear from her doctor to come back to therapy.       Functional Goals  Time frame     1.   The pt will perform stair climbing tests (2x in clinic, no UE support) in > 16" for improved functional mobility with ADLs.   6-8 weeks     2.   The pt will tolerate standing > 20' in L[employer]-[place] without increase in sxs for improved activity tolerance with ADLs.   6-8 weeks     3.   The pt will demonstrate > 4-/5 hip and knee strength in BLE for improved functional strength with ADLs.   6-8 weeks     4.   The pt will be independent in performance and progression of HEP.     2-3 visits          FOTO Low back Survey 69% limitations as of 9/26/17  Current -RM Goal -UE (49%)     Category: Mobility   Plan   PT/PTA face-to-face;discussed Pt's POC and progress towards established PT goals and Pt going to ER. PTA will follow with Pt at the end of the day.   Yvonne Barker, PTA  Michelle Waldrop, PT, DPT  "

## 2017-11-01 ENCOUNTER — CLINICAL SUPPORT (OUTPATIENT)
Dept: REHABILITATION | Facility: HOSPITAL | Age: 62
End: 2017-11-01
Attending: NURSE PRACTITIONER
Payer: MEDICARE

## 2017-11-01 DIAGNOSIS — M54.50 LOW BACK PAIN POTENTIALLY ASSOCIATED WITH RADICULOPATHY: Primary | ICD-10-CM

## 2017-11-01 DIAGNOSIS — Z78.9 DIFFICULTY NAVIGATING STAIRS: ICD-10-CM

## 2017-11-01 DIAGNOSIS — R29.898 WEAKNESS OF BOTH LOWER EXTREMITIES: ICD-10-CM

## 2017-11-01 DIAGNOSIS — M25.552 PAIN OF LEFT HIP JOINT: ICD-10-CM

## 2017-11-01 PROCEDURE — 97110 THERAPEUTIC EXERCISES: CPT

## 2017-11-01 NOTE — PROGRESS NOTES
"                                                    Physical Therapy Daily Note     Date: 2017  Name: Dorothy Burt  [facility] Number: [id number]  Diagnosis:   Encounter Diagnoses   Name Primary?    Pain of left hip joint     Low back pain potentially associated with radiculopathy Yes    Weakness of both lower extremities     Difficulty navigating stairs      Physician: Sylvia Beckwith NP    Evaluation Date: 17  Visit #: 5 of 12  Start Time:  1201  Stop Time:  1300  Total Treatment Time: 59    Precautions: standard    Visit # authorized: 12  Authorization period: 17-17  Plan of care Expiration: 17      Subjective   Patient reports she visited the ED after her visit with Paloma Barker PTA on 10/19/17. She reports her blood, urine, chest X-Ray, and CT of her head all came back negative. She then went to her PCP who referred her to receive an EEG, which also came back negative. Patient reports she has been cleared to return to driving and PT by her doctors.  Patient reports 2-3/10 back pain going midline to the R and into R hip with anterior groin and lateral thigh numbness.  Objective   Reassessment after change in status    LE Strength   Hip flexion 4/5 B   Knee flexion/extension and ankle DF 5/5 B   Hip ROM WNL   Positive Scour R hip   Minimal relief with long axis distraction R hip   Piriformis not tender to palpation B   Neural tension R femoral nerve   Hamstring length 30 degrees from 90/90 B      Patient received individual therapy to increase strength, endurance, ROM, flexibility and core stabilization with activities as follows:     Dorothy COOK received therapeutic exercises to develop strength, endurance, ROM, flexibility and core stabilization for 55 minutes includin/01/2017   Pelvic tilts 10x5"   Bridge with adduction 3x10   pallof press 2x10 green TB   Supine lat pulls  NP   Abdominal bracing 5" x10   Hamstring stretch 3x30" B   Piriformis stretch 3x30" L   clamshells 3x10 B " "orange TB   DKTC 3x30"   Wall squats NP       Dorothy COOK received the following manual therapy techniques for 4 minutes: lumbar spine rotation mobilizations B    Written Home Exercises Provided: no new exercises provided this treatment  Pt demo good understanding of the education provided. Dorothy COOK demonstrated good return demonstration of activities.       HEP2Go Code: [id number]    Education provided: Patient educated to return to regular HEP since she has been cleared by doctors. Patient reeducated on the role of the transversus abdominus in low back pain and stabilization.  Dorothy COOK verbalized good understanding of education provided.   No spiritual or educational barriers to learning identified.    PT reviewed FOTO scores for Dorothy COOK Stant on 11/1/17  FOTO score: 52    Functional Limitations Reports - G Codes  Category: mobility  Tool: FOTO      Current ():  CK  Goal (): CJ      Assessment   During reassessment after change in status, patient presents with similar complaints and no real changes from a PT standpoint. Since all tests came back negative, we will resume treatment as previously planned with new goals as follows. Patient plans to return to pain doctor after she completes PT. Patient tolerated all treatment well this session and based on FOTO and clinical judgement, is between 40% and 60% limited in mobility at this time. Patient will continue to benefit from skilled PT in order to decrease pain, increase strength, and increase functional mobility.       New Goals  STGs to be met in the next 2 weeks.  1. Patient will be independent and compliant in HEP.  2. Decrease LBP at worst to </= 2/10.  3. Patient will be able to ascend/descend 4 steps without an increase in symptoms.    LTGs to be met in the next 4 weeks  1. Abolish all low back and hip pain.  2. Patient will tolerate standing >/= 20 minutes in Jewish without an increase in symptoms.  3. Patient will be able to climb a flight of stairs without an " increase in symptoms.  4. Patient will be between 20% and 40% (CJ) limited in mobility.    Plan   Progress core/LE strength and ROM as tolerated. Monitor feelings of dizziness and sense of being off-balance.    Therapist: Michelle Waldrop, PT, DPT

## 2017-11-06 ENCOUNTER — CLINICAL SUPPORT (OUTPATIENT)
Dept: REHABILITATION | Facility: HOSPITAL | Age: 62
End: 2017-11-06
Attending: NURSE PRACTITIONER
Payer: MEDICARE

## 2017-11-06 DIAGNOSIS — Z78.9 DIFFICULTY NAVIGATING STAIRS: ICD-10-CM

## 2017-11-06 DIAGNOSIS — M54.50 LOW BACK PAIN POTENTIALLY ASSOCIATED WITH RADICULOPATHY: Primary | ICD-10-CM

## 2017-11-06 DIAGNOSIS — R29.898 WEAKNESS OF BOTH LOWER EXTREMITIES: ICD-10-CM

## 2017-11-06 DIAGNOSIS — M25.552 PAIN OF LEFT HIP JOINT: ICD-10-CM

## 2017-11-06 PROCEDURE — 97110 THERAPEUTIC EXERCISES: CPT

## 2017-11-06 NOTE — PROGRESS NOTES
"                                                    Physical Therapy Daily Note     Date: 2017  Name: Dorothy Burt  Clinic Number: [id number]  Diagnosis:   Encounter Diagnoses   Name Primary?    Pain of left hip joint     Low back pain potentially associated with radiculopathy     Weakness of both lower extremities     Difficulty navigating stairs      Physician: Sylvia Beckwith NP    Evaluation Date: 17  Visit #: 6 of 12  Start Time:  900  Stop Time:  1000  Total Treatment Time: 60    Precautions: standard    Visit # authorized: 12  Authorization period: 17-17  Plan of care Expiration: 17      Subjective   Patient reports 2/10 low back pain pre-treatment. Patient reports that she would like me to message her referring pain doctor as she would like to delay her return appointment.     Objective     LE Strength (as of 17)   Hip flexion 4/5 B   Knee flexion/extension and ankle DF 5/5 B   Hip ROM WNL   Positive Scour L hip   Minimal relief with long axis distraction L hip   Piriformis not tender to palpation B   Neural tension L femoral nerve   Hamstring length 30 degrees from 90/90 B      Patient received individual therapy to increase strength, endurance, ROM, flexibility and core stabilization with activities as follows:     Dorothy COOK received therapeutic exercises to develop strength, endurance, ROM, flexibility and core stabilization for 34 minutes includin/06/2017   Supine TA marches 2x10   Pelvic tilts 15x5"   Bridge with adduction 3x10   pallof press 2x15 green TB   Calf raises 3x10   Supine lat pulls  NP   Abdominal bracing 5" x10   Hamstring stretch 3x30" B   Piriformis stretch 3x30" L   clamshells 3x10 B green TB   DKTC 3x30"   Wall squats 3x10       Dorothy COOK received the following manual therapy techniques for 4 minutes: LLE long axis hip distraction grade 3    Written Home Exercises Provided: no new exercises provided this treatment  Pt demo good understanding of the " education provided. Dorothy COOK demonstrated good return demonstration of activities.       HEP2Go Code: [id number]    Education provided: Patient educated to return to regular HEP since she has been cleared by doctors. Patient reeducated on the role of the transversus abdominus in low back pain and stabilization.  Dorothy COOK verbalized good understanding of education provided.   No spiritual or educational barriers to learning identified.    PT reviewed FOTO scores for Dorothy COOK Stant on 11/1/17  FOTO score: 52    Functional Limitations Reports - G Codes  Category: mobility  Tool: FOTO      Current ():  CK  Goal (): CJ      Assessment   Patient demos increased strength with ability to increase repetitions and progress core activation through TA marches. Patient demos improved ROM of hamstrings and piriformis- patient was able to change piriformis stretch modification to a figure-4 stretch. Patient tolerated all treatment well and is making fair progress toward goals. Progress was halted due to issues with seizure activity found during PTA treatment and resultant testing from various doctors. Patient will continue to benefit from skilled PT in order to decrease pain, increase strength, and increase functional mobility.       New Goals  STGs to be met in the next 2 weeks.  1. Patient will be independent and compliant in HEP. *GOAL MET 11/6/17  2. Decrease LBP at worst to </= 2/10.  3. Patient will be able to ascend/descend 4 steps without an increase in symptoms.    LTGs to be met in the next 4 weeks  1. Abolish all low back and hip pain.  2. Patient will tolerate standing >/= 20 minutes in Mandaeism without an increase in symptoms.  3. Patient will be able to climb a flight of stairs without an increase in symptoms.  4. Patient will be between 20% and 40% (CJ) limited in mobility.    Plan   Progress core/LE strength and ROM as tolerated. Monitor feelings of dizziness and sense of being off-balance.    Therapist: Michelle  Benjie, PT, DPT

## 2017-11-13 ENCOUNTER — CLINICAL SUPPORT (OUTPATIENT)
Dept: REHABILITATION | Facility: HOSPITAL | Age: 62
End: 2017-11-13
Attending: NURSE PRACTITIONER
Payer: MEDICARE

## 2017-11-13 DIAGNOSIS — Z78.9 DIFFICULTY NAVIGATING STAIRS: ICD-10-CM

## 2017-11-13 DIAGNOSIS — M54.50 LOW BACK PAIN POTENTIALLY ASSOCIATED WITH RADICULOPATHY: Primary | ICD-10-CM

## 2017-11-13 DIAGNOSIS — R29.898 WEAKNESS OF BOTH LOWER EXTREMITIES: ICD-10-CM

## 2017-11-13 DIAGNOSIS — M25.552 PAIN OF LEFT HIP JOINT: ICD-10-CM

## 2017-11-13 PROCEDURE — 97110 THERAPEUTIC EXERCISES: CPT

## 2017-11-13 PROCEDURE — 97140 MANUAL THERAPY 1/> REGIONS: CPT

## 2017-11-13 NOTE — PROGRESS NOTES
"                                                    Physical Therapy Daily Note     Date: 2017  Name: Dorothy Burt  Clinic Number: [id number]  Diagnosis:   Encounter Diagnoses   Name Primary?    Pain of left hip joint     Low back pain potentially associated with radiculopathy Yes    Weakness of both lower extremities     Difficulty navigating stairs      Physician: Sylvia Beckwith NP    Evaluation Date: 17  Visit #: 7 of 12  Start Time:  810  Stop Time:  903  Total Treatment Time: 60    Precautions: standard    Visit # authorized: 12  Authorization period: 17-17  Plan of care Expiration: 17      Subjective   Patient reports 3/10 L hip pain pre-treatment-notes no LBP. She reports increased L thoracic back pain today. Pt reports she is going to work out in the pool after tx.    Objective     LE Strength (as of 17)   Hip flexion 4/5 B   Knee flexion/extension and ankle DF 5/5 B   Hip ROM WNL   Positive Scour L hip   Minimal relief with long axis distraction L hip   Piriformis not tender to palpation B   Neural tension L femoral nerve   Hamstring length 30 degrees from 90/90 B      Patient received individual therapy to increase strength, endurance, ROM, flexibility and core stabilization with activities as follows:     Dorothy COOK received therapeutic exercises to develop strength, endurance, ROM, flexibility and core stabilization for 22 minutes includin/13/2017   Supine TA marches 2x20   Pelvic tilts 15x5"   Bridge with adduction 3x10   pallof press 2x15 blue TB   Calf raises NP   Supine lat pulls  NP   Abdominal bracing 5" x10   Hamstring stretch 3x30" B   Piriformis stretch 3x30" L   clamshells NP   DKTC NP   Wall squats 3x10 with abduction TB       Dorothy COOK received the following manual therapy techniques for 8 minutes: LLE lateral hip distraction grade 3, MET for L anterior rotated inominate and L outflare    Written Home Exercises Provided: no new exercises provided this " treatment  Pt demo good understanding of the education provided. Dorothy COOK demonstrated good return demonstration of activities.       HEP2Go Code: [id number]    Education provided: Patient educated to return to regular HEP since she has been cleared by doctors. Patient reeducated on the role of the transversus abdominus in low back pain and stabilization.  Dorothy COOK verbalized good understanding of education provided.   No spiritual or educational barriers to learning identified.    PT reviewed FOTO scores for Dorothy Boscht on 11/1/17  FOTO score: 52    Functional Limitations Reports - G Codes  Category: mobility  Tool: FOTO      Current ():  CK  Goal (): CJ      Assessment   Patient demos decreased LBP this session but still has L hip pain. Pt demos improved activation of transversus abdominus but still requires verbal and tactile cueing initially to perform pelvic tilt. Patient will continue to benefit from skilled PT in order to decrease pain, increase strength, and increase functional mobility.       New Goals  STGs to be met in the next 2 weeks.  1. Patient will be independent and compliant in HEP. *GOAL MET 11/6/17  2. Decrease LBP at worst to </= 2/10.  3. Patient will be able to ascend/descend 4 steps without an increase in symptoms.    LTGs to be met in the next 4 weeks  1. Abolish all low back and hip pain.  2. Patient will tolerate standing >/= 20 minutes in Restorationism without an increase in symptoms.  3. Patient will be able to climb a flight of stairs without an increase in symptoms.  4. Patient will be between 20% and 40% (CJ) limited in mobility.    Plan   Progress core/LE strength and ROM as tolerated. Monitor feelings of dizziness and sense of being off-balance.    Therapist: Michelle Waldrop, PT, DPT

## 2017-11-15 ENCOUNTER — CLINICAL SUPPORT (OUTPATIENT)
Dept: REHABILITATION | Facility: HOSPITAL | Age: 62
End: 2017-11-15
Attending: NURSE PRACTITIONER
Payer: MEDICARE

## 2017-11-15 DIAGNOSIS — M25.552 PAIN OF LEFT HIP JOINT: ICD-10-CM

## 2017-11-15 DIAGNOSIS — Z78.9 DIFFICULTY NAVIGATING STAIRS: ICD-10-CM

## 2017-11-15 DIAGNOSIS — M54.50 LOW BACK PAIN POTENTIALLY ASSOCIATED WITH RADICULOPATHY: Primary | ICD-10-CM

## 2017-11-15 DIAGNOSIS — R29.898 WEAKNESS OF BOTH LOWER EXTREMITIES: ICD-10-CM

## 2017-11-15 PROCEDURE — 97110 THERAPEUTIC EXERCISES: CPT

## 2017-11-15 PROCEDURE — 97140 MANUAL THERAPY 1/> REGIONS: CPT

## 2017-11-15 NOTE — PROGRESS NOTES
"                                                    Physical Therapy Daily Note     Date: 11/15/2017  Name: Dorothy Burt  [facility] Number: [id number]  Diagnosis:   Encounter Diagnoses   Name Primary?    Pain of left hip joint     Low back pain potentially associated with radiculopathy Yes    Weakness of both lower extremities     Difficulty navigating stairs      Physician: Sylvia Beckwith NP    Evaluation Date: 17  Visit #: 8 of 12  Start Time:  800  Stop Time:  900  Total Treatment Time: 60    Precautions: standard    Visit # authorized: 12  Authorization period: 17-17  Plan of care Expiration: 17      Subjective   Patient reports 7-8/10 R sided LBP and R thoracic extensor pain pre-treatment. Patient reports she was walking extremely hunched over last night due to pain- it was a 10/10. Pt reports she kept waking up last night due to pain; she could just not find a comfortable position.    Post-treatment: Patient reports 3-4/10 back pain.    Objective     LE Strength (as of 17)   Hip flexion 4/5 B   Knee flexion/extension and ankle DF 5/5 B   Hip ROM WNL   Positive Scour L hip   Minimal relief with long axis distraction L hip   Piriformis not tender to palpation B   Neural tension L femoral nerve   Hamstring length 30 degrees from 90/90 B    *noticed patient does not flex R knee in stance as much as she does on the L. Measured for leg length discrepancy   R ASIS to medial malleolus: 87.5 cm   L ASIS to medial malleolus: 90 cm    Patient received individual therapy to increase strength, endurance, ROM, flexibility and core stabilization with activities as follows:     Dorothy COOK received therapeutic exercises to develop strength, endurance, ROM, flexibility and core stabilization for 14 minutes includin/15/2017   Supine TA marches NP   90/90 TA holds 4x10"   Pelvic tilts 15x5"   Bridge with adduction 3x10   pallof press NP   Calf raises 3x10   Supine lat pulls  NP   Abdominal bracing " "10x5"   Hamstring stretch NP   Piriformis stretch    clamshells NP   DKTC w/SB x20   Wall squats 3x10        Dorothy COOK received the following manual therapy techniques for 20 minutes: soft tissue mobilization to R glut max and piriformis; cupping to R spinal extensors   Vacuum/cupping: STM was performed to R spinal extensors to decrease muscle tightness, increase circulation and promote healing process for 10 min. The pt's skin was monitored for redness adjusting pressure as needed. The pt was instructed in possible side effects of bruising and/or soreness.       Written Home Exercises Provided: no new exercises provided this treatment  Pt demo good understanding of the education provided. Dorothy COOK demonstrated good return demonstration of activities.       HEP2Go Code: [id number]    Education provided: Patient educated to return to regular HEP since she has been cleared by doctors. Patient reeducated on the role of the transversus abdominus in low back pain and stabilization.  Dorothy COOK verbalized good understanding of education provided.   No spiritual or educational barriers to learning identified.    PT reviewed FOTO scores for Dorothy Boscht on 11/1/17  FOTO score: 52    Functional Limitations Reports - G Codes  Category: mobility  Tool: FOTO      Current ():  CK  Goal (): CJ      Assessment   Patient came into treatment in greater than usual pain. After soft tissue mobilization and cupping, patient reports marked relief of pain and was able to tolerate core strengthening exercises. Measured leg length for potential discrepancy due to gait deviations and found 2.5 cm discrepancy, R shorter than the L. Will have to assess further options such as heel lift. Patient will continue to benefit from skilled PT in order to decrease pain, increase strength, and increase functional mobility.       New Goals  STGs to be met in the next 2 weeks.  1. Patient will be independent and compliant in HEP. *GOAL MET 11/6/17  2. " Decrease LBP at worst to </= 2/10.  3. Patient will be able to ascend/descend 4 steps without an increase in symptoms.    LTGs to be met in the next 4 weeks  1. Abolish all low back and hip pain.  2. Patient will tolerate standing >/= 20 minutes in Restoration without an increase in symptoms.  3. Patient will be able to climb a flight of stairs without an increase in symptoms.  4. Patient will be between 20% and 40% (CJ) limited in mobility.    Plan   Progress core/LE strength and ROM as tolerated. Assess need for heel lift/orthotic and refer pt appropriately.    Therapist: Michelle Waldrop, PT, DPT

## 2017-11-20 ENCOUNTER — CLINICAL SUPPORT (OUTPATIENT)
Dept: REHABILITATION | Facility: HOSPITAL | Age: 62
End: 2017-11-20
Attending: NURSE PRACTITIONER
Payer: MEDICARE

## 2017-11-20 DIAGNOSIS — R29.898 WEAKNESS OF BOTH LOWER EXTREMITIES: ICD-10-CM

## 2017-11-20 DIAGNOSIS — M54.50 LOW BACK PAIN POTENTIALLY ASSOCIATED WITH RADICULOPATHY: Primary | ICD-10-CM

## 2017-11-20 DIAGNOSIS — Z78.9 DIFFICULTY NAVIGATING STAIRS: ICD-10-CM

## 2017-11-20 DIAGNOSIS — M25.552 PAIN OF LEFT HIP JOINT: ICD-10-CM

## 2017-11-20 PROCEDURE — 97110 THERAPEUTIC EXERCISES: CPT

## 2017-11-20 NOTE — PROGRESS NOTES
"                                                    Physical Therapy Daily Note     Date: 2017  Name: Dorothy Burt  [facility] Number: [id number]  Diagnosis:   Encounter Diagnoses   Name Primary?    Pain of left hip joint     Low back pain potentially associated with radiculopathy Yes    Weakness of both lower extremities     Difficulty navigating stairs      Physician: Sylvia Beckwith NP    Evaluation Date: 17  Visit #:   Start Time:  815  Stop Time:  915  Total Treatment Time: 60    Precautions: standard    Visit # authorized: 12  Authorization period: 17-17  Plan of care Expiration: 17      Subjective   Patient reports 3-4/10 R sided LBP. Pt reports after last session she felt much better but her R side has begun to tighten up again; she thinks it could be due to how she is walking.    Post-treatment: Pt reports relief in mid back tightness      Objective     *noticed patient does not flex R knee in stance as much as she does on the L. Measured for leg length discrepancy 11/15/17   R ASIS to medial malleolus: 87.5 cm   L ASIS to medial malleolus: 90 cm    Patient received individual therapy to increase strength, endurance, ROM, flexibility and core stabilization with activities as follows:     Dorothy COOK received therapeutic exercises to develop strength, endurance, ROM, flexibility and core stabilization for 23 minutes includin/20/2017   90/90 TA holds 4x15"   Pelvic tilts 30x5"   Bridge with adduction 3x10   pallof press Blue TB 2x15   Calf raises 3x10   Abdominal bracing 10x5"   Hamstring stretch 3x30"   Piriformis stretch 3x30"   clamshells NP   DKTC w/SB NP   Wall squats 3x10        Dorothy COOK received the following manual therapy techniques for 7 minutes: soft tissue mobilization to R glut max and R spinal extensors with trigger point release in spinal extensors      Written Home Exercises Provided: no new exercises provided this treatment  Pt demo good understanding of " the education provided. Dorothy COOK demonstrated good return demonstration of activities.       HEP2Go Code: [id number]    Education provided: Patient educated to return to regular HEP since she has been cleared by doctors. Patient reeducated on the role of the transversus abdominus in low back pain and stabilization.  Dorothy COOK verbalized good understanding of education provided.   No spiritual or educational barriers to learning identified.    PT reviewed FOTO scores for Dorothy COOK Stant on 11/1/17  FOTO score: 52    Functional Limitations Reports - G Codes  Category: mobility  Tool: FOTO      Current ():  CK  Goal (): CJ      Assessment   Patient tolerated all treatment well with no increases in symptoms. Pt able to progress core stabilization exercises and duration of TA contraction. Added postural stabilization exercises to decrease midback pain and improve overall posture. Patient requires verbal cues for proper execution of pallof press, TA 90/90 holds, and piriformis stretch. Patient will continue to benefit from skilled PT in order to decrease pain, increase strength, and increase functional mobility.       New Goals  STGs to be met in the next 2 weeks.  1. Patient will be independent and compliant in HEP. *GOAL MET 11/6/17  2. Decrease LBP at worst to </= 2/10.  3. Patient will be able to ascend/descend 4 steps without an increase in symptoms.    LTGs to be met in the next 4 weeks  1. Abolish all low back and hip pain.  2. Patient will tolerate standing >/= 20 minutes in [employer] without an increase in symptoms.  3. Patient will be able to climb a flight of stairs without an increase in symptoms.  4. Patient will be between 20% and 40% (CJ) limited in mobility.    Plan   Progress core/LE strength and ROM as tolerated. Assess need for heel lift/orthotic and refer pt appropriately.    Therapist: Michelle Waldrop, PT, DPT

## 2017-11-22 ENCOUNTER — CLINICAL SUPPORT (OUTPATIENT)
Dept: REHABILITATION | Facility: HOSPITAL | Age: 62
End: 2017-11-22
Attending: NURSE PRACTITIONER
Payer: MEDICARE

## 2017-11-22 DIAGNOSIS — Z78.9 DIFFICULTY NAVIGATING STAIRS: ICD-10-CM

## 2017-11-22 DIAGNOSIS — R29.898 WEAKNESS OF BOTH LOWER EXTREMITIES: ICD-10-CM

## 2017-11-22 DIAGNOSIS — M54.50 LOW BACK PAIN POTENTIALLY ASSOCIATED WITH RADICULOPATHY: Primary | ICD-10-CM

## 2017-11-22 DIAGNOSIS — M25.552 PAIN OF LEFT HIP JOINT: ICD-10-CM

## 2017-11-22 PROCEDURE — G8979 MOBILITY GOAL STATUS: HCPCS | Mod: CK

## 2017-11-22 PROCEDURE — 97110 THERAPEUTIC EXERCISES: CPT

## 2017-11-22 PROCEDURE — G8978 MOBILITY CURRENT STATUS: HCPCS | Mod: CK

## 2017-11-22 NOTE — PROGRESS NOTES
"                                                    Physical Therapy Daily Note     Date: 2017  Name: Dorothy Burt  [facility] Number: [id number]  Diagnosis:   Encounter Diagnoses   Name Primary?    Pain of left hip joint     Low back pain potentially associated with radiculopathy Yes    Weakness of both lower extremities     Difficulty navigating stairs      Physician: Sylvia Beckwith NP    Evaluation Date: 17  Visit #: 10 of 12  Start Time:  705  Stop Time:  805  Total Treatment Time: 55    Precautions: standard    Visit # authorized: 12  Authorization period: 17-17  Plan of care Expiration: 17      Subjective   Patient reports 2/10 LBP pre-treatment. Pt reports she is "not sure what you did last time, but my back feels much better."      Objective     *noticed patient does not flex R knee in stance as much as she does on the L. Measured for leg length discrepancy 11/15/17   R ASIS to medial malleolus: 87.5 cm   L ASIS to medial malleolus: 90 cm    Assessment for Progress Note   MMT: 4+/5 B hip flexion, 3/5 R knee extension due to clicking and pain, 5/5 L knee extension/B knee flexion/B ankle dorsiflexion, 3/5 L plantarflexion, 4/5 R plantarflexion   Pt able to squat on wall with 70 degrees knee flexion   Positive R femoral nerve tension     Patient received individual therapy to increase strength, endurance, ROM, flexibility and core stabilization with activities as follows:     Dorothy COOK received therapeutic exercises to develop strength, endurance, ROM, flexibility and core stabilization for 25 minutes includin/22/2017   90/90 TA holds 3x15"   Supine TA marches 2x10   Pelvic tilts 30x5"   Bridge with adduction 3x10   pallof press NP   Calf raises 3x10   Abdominal bracing 10x5"   Hamstring stretch 3x30"   Piriformis stretch 3x30"   clamshells NP   DKTC w/SB NP   Step downs 4" lateral 2x10   Wall squats 3x10        Dorothy COOK received the following manual therapy techniques for 5 " "minutes: R femoral nerve glides, manual prone quad stretch 3x30" R,       Written Home Exercises Provided: no new exercises provided this treatment  Pt demo good understanding of the education provided. Dorothy COOK demonstrated good return demonstration of activities.       HEP2Go Code: [id number]    Education provided: Patient educated to return to regular HEP since she has been cleared by doctors. Patient reeducated on the role of the transversus abdominus in low back pain and stabilization.  Dorothy COOK verbalized good understanding of education provided.   No spiritual or educational barriers to learning identified.    PT reviewed FOTO scores for Dorothy COOK Stant on 11/22/17  FOTO score: 53    Functional Limitations Reports - G Codes  Category: mobility  Tool: FOTO      Current ():  CK  Goal (): CK      Assessment   Patient demos decreased pain and improved mobility through subjective reports and movement within the clinic. PT had increased back pain with TA 90/90 holds, so exercise was regressed to supine TA marches to ensure proper muscular activation. After verbal and tactile cues, patient able to perform exercises correctly for the first time. Pt had a major setback early in tx affecting plan of care, so plan of care must be addressed and extended to fully address low back and hip pain. Patient will continue to benefit from skilled PT in order to decrease pain, increase strength, and increase functional mobility.       New Goals  STGs to be met in the next 2 weeks.  1. Patient will be independent and compliant in HEP. *GOAL MET 11/6/17  2. Decrease LBP at worst to </= 2/10.  3. Patient will be able to ascend/descend 4 steps without an increase in symptoms.    LTGs to be met in the next 4 weeks  1. Abolish all low back and hip pain.  2. Patient will tolerate standing >/= 20 minutes in [employer] without an increase in symptoms.  3. Patient will be able to climb a flight of stairs without an increase in symptoms.  4. " Patient will be between 20% and 40% (CJ) limited in mobility.    Plan   Pt will be seen 2x/week for 6 weeks to progress core strengthening and address leg length discrepancy-give pt a heel lift to try.    Therapist: Michelle Waldrop, PT, DPT

## 2017-11-27 DIAGNOSIS — M79.18 MYOFASCIAL PAIN: ICD-10-CM

## 2017-11-27 DIAGNOSIS — M54.16 LUMBAR RADICULOPATHY: Primary | ICD-10-CM

## 2017-11-27 DIAGNOSIS — M53.3 SACROILIAC JOINT PAIN: ICD-10-CM

## 2017-11-27 DIAGNOSIS — G89.4 CHRONIC PAIN SYNDROME: ICD-10-CM

## 2017-11-27 DIAGNOSIS — M51.36 DDD (DEGENERATIVE DISC DISEASE), LUMBAR: ICD-10-CM

## 2017-11-27 DIAGNOSIS — M47.816 FACET ARTHRITIS OF LUMBAR REGION: ICD-10-CM

## 2017-11-29 NOTE — PLAN OF CARE
"                                                    Physical Therapy Daily Note     Date: 2017  Name: Dorothy Burt  [facility] Number: [id number]  Diagnosis:   Encounter Diagnoses   Name Primary?    Pain of left hip joint     Low back pain potentially associated with radiculopathy Yes    Weakness of both lower extremities     Difficulty navigating stairs      Physician: Sylvia Beckwith NP    Evaluation Date: 17  Visit #: 10 of 12  Start Time:  705  Stop Time:  805  Total Treatment Time: 55    Precautions: standard    Visit # authorized: 12  Authorization period: 17-17  Plan of care Expiration: 17      Subjective   Patient reports 2/10 LBP pre-treatment. Pt reports she is "not sure what you did last time, but my back feels much better."      Objective     *noticed patient does not flex R knee in stance as much as she does on the L. Measured for leg length discrepancy 11/15/17   R ASIS to medial malleolus: 87.5 cm   L ASIS to medial malleolus: 90 cm    Assessment for Progress Note   MMT: 4+/5 B hip flexion, 3/5 R knee extension due to clicking and pain, 5/5 L knee extension/B knee flexion/B ankle dorsiflexion, 3/5 L plantarflexion, 4/5 R plantarflexion   Pt able to squat on wall with 70 degrees knee flexion   Positive R femoral nerve tension     Patient received individual therapy to increase strength, endurance, ROM, flexibility and core stabilization with activities as follows:     Dorothy COOK received therapeutic exercises to develop strength, endurance, ROM, flexibility and core stabilization for 25 minutes includin/22/2017   90/90 TA holds 3x15"   Supine TA marches 2x10   Pelvic tilts 30x5"   Bridge with adduction 3x10   pallof press NP   Calf raises 3x10   Abdominal bracing 10x5"   Hamstring stretch 3x30"   Piriformis stretch 3x30"   clamshells NP   DKTC w/SB NP   Step downs 4" lateral 2x10   Wall squats 3x10        Dorothy COOK received the following manual therapy techniques for 5 " "minutes: R femoral nerve glides, manual prone quad stretch 3x30" R,       Written Home Exercises Provided: no new exercises provided this treatment  Pt demo good understanding of the education provided. Dorothy COOK demonstrated good return demonstration of activities.       HEP2Go Code: [id number]    Education provided: Patient educated to return to regular HEP since she has been cleared by doctors. Patient reeducated on the role of the transversus abdominus in low back pain and stabilization.  Dorothy COOK verbalized good understanding of education provided.   No spiritual or educational barriers to learning identified.    PT reviewed FOTO scores for Dorothy COOK Stant on 11/22/17  FOTO score: 53    Functional Limitations Reports - G Codes  Category: mobility  Tool: FOTO      Current ():  CK  Goal (): CK      Assessment   Patient demos decreased pain and improved mobility through subjective reports and movement within the clinic. PT had increased back pain with TA 90/90 holds, so exercise was regressed to supine TA marches to ensure proper muscular activation. After verbal and tactile cues, patient able to perform exercises correctly for the first time. Pt had a major setback early in tx affecting plan of care, so plan of care must be addressed and extended to fully address low back and hip pain. Patient will continue to benefit from skilled PT in order to decrease pain, increase strength, and increase functional mobility.       New Goals  STGs to be met in the next 2 weeks.  1. Patient will be independent and compliant in HEP. *GOAL MET 11/6/17  2. Decrease LBP at worst to </= 2/10.  3. Patient will be able to ascend/descend 4 steps without an increase in symptoms.    LTGs to be met in the next 4 weeks  1. Abolish all low back and hip pain.  2. Patient will tolerate standing >/= 20 minutes in [employer] without an increase in symptoms.  3. Patient will be able to climb a flight of stairs without an increase in symptoms.  4. " Patient will be between 20% and 40% (CJ) limited in mobility.    Plan   Pt will be seen 2x/week for 6 weeks to progress core strengthening and address leg length discrepancy-give pt a heel lift to try.    I agree with the above plan while the patient is under my care.    ____________________ Referring Physician's Signature    Therapist: Michelle Waldrop, PT, DPT

## 2017-12-04 ENCOUNTER — OFFICE VISIT (OUTPATIENT)
Dept: PAIN MEDICINE | Facility: CLINIC | Age: 62
End: 2017-12-04
Payer: MEDICARE

## 2017-12-04 VITALS
DIASTOLIC BLOOD PRESSURE: 67 MMHG | HEIGHT: 65 IN | WEIGHT: 212.94 LBS | BODY MASS INDEX: 35.48 KG/M2 | HEART RATE: 83 BPM | TEMPERATURE: 98 F | SYSTOLIC BLOOD PRESSURE: 106 MMHG

## 2017-12-04 DIAGNOSIS — M47.816 FACET ARTHRITIS OF LUMBAR REGION: ICD-10-CM

## 2017-12-04 DIAGNOSIS — M51.36 DDD (DEGENERATIVE DISC DISEASE), LUMBAR: ICD-10-CM

## 2017-12-04 DIAGNOSIS — M46.1 SACROILIITIS: ICD-10-CM

## 2017-12-04 DIAGNOSIS — M53.3 SACROILIAC JOINT PAIN: Primary | ICD-10-CM

## 2017-12-04 DIAGNOSIS — M47.816 LUMBAR SPONDYLOSIS: ICD-10-CM

## 2017-12-04 DIAGNOSIS — M79.18 MYOFASCIAL PAIN: ICD-10-CM

## 2017-12-04 PROCEDURE — 99999 PR PBB SHADOW E&M-EST. PATIENT-LVL III: CPT | Mod: PBBFAC,,, | Performed by: NURSE PRACTITIONER

## 2017-12-04 PROCEDURE — 99213 OFFICE O/P EST LOW 20 MIN: CPT | Mod: S$GLB,,, | Performed by: NURSE PRACTITIONER

## 2017-12-04 RX ORDER — CETIRIZINE HYDROCHLORIDE 10 MG/1
10 TABLET ORAL DAILY PRN
Refills: 1 | COMMUNITY
Start: 2017-09-29 | End: 2021-11-02

## 2017-12-04 RX ORDER — FAMOTIDINE 20 MG/1
20 TABLET, FILM COATED ORAL DAILY PRN
Status: ON HOLD | COMMUNITY
Start: 2017-08-29 | End: 2020-04-21 | Stop reason: HOSPADM

## 2017-12-04 RX ORDER — TIZANIDINE 2 MG/1
TABLET ORAL
COMMUNITY
Start: 2017-09-14 | End: 2018-02-01

## 2017-12-04 RX ORDER — CYCLOBENZAPRINE HCL 10 MG
10 TABLET ORAL 3 TIMES DAILY PRN
Qty: 90 TABLET | Refills: 2 | Status: SHIPPED | OUTPATIENT
Start: 2017-12-04 | End: 2018-01-03

## 2017-12-04 NOTE — PROGRESS NOTES
Chronic Pain - Established Visit    Referring Physician: No ref. provider found    Chief Complaint:   Chief Complaint   Patient presents with    Low-back Pain        SUBJECTIVE: Disclaimer: This note has been generated using voice-recognition software. There may be typographical errors that have been missed during proof-reading    Interval History 12/4/2017:  The patient returns to clinic today for follow up. She continues to report benefit of her left hip and buttock pain from previous injection. She reports low back pain that is tolerable at this time. She denies any radicular pain. She continues to participate in physical therapy with benefit. She reports intermittent muscle spasms, particularly after mopping at her home. She is no longer taking Gabapentin as she did not like the way it made her feel. She was previously given Tramadol which she takes sparingly. Her last prescription was in August. She denies any other health changes. She denies any bowel or bladder incontinence. Her pain today is 3/10.    Interval History 9/14/2017:  The patient returns to clinic today for follow up. She is s/p left SI joint injection on 8/23/2017. She reports 95% relief of her left hip and buttock pain. She reports numbness to her groin and left upper thigh. She denies any radicular pain. She also reports some pain to her lateral left thigh. This pain is tolerable at this time. She has decreased her Gabapentin to 300 mg TID due to side effects. She continues to report benefit with Ibuprofen. She denies any other health changes. She denies any bowel or bladder incontinence. Her pain today is 1/10.    Initial encounter:    Dorothy Burt presents to the clinic for the evaluation of left hip pain. The pain started 07/21/17 ago and symptoms have been worsening.    Brief history:    Pain Description:    The pain is located in the left hip area and radiates to the buttock and left thigh.      At BEST  6/10     At WORST  6/10 on the  WORST day.      On average pain is rated as 6/10.     Today the pain is rated as 6/10    The pain is described as burning, sharp, shooting, stabbing, throbbing, tingling and aching      Symptoms interfere with daily activity, sleeping, work and .     Exacerbating factors: Sitting, Standing, Laying, Bending, Touching, Coughing/Sneezing, Walking, Extension, Flexing, Lifting, Getting out of bed/chair and.      Mitigating factors heat, ice, medications.    Patient denies night fever/night sweats, urinary incontinence, bowel incontinence, significant weight loss, significant motor weakness, loss of sensations .  Patient denies any suicidal or homicidal ideations    Pain Medications:  Current:  Flexeril 10mg   duexis     Tried in Past:  NSAIDs -yes  TCA -Never  SNRI -Never  Anti-convulsants -gabapentin  Muscle Relaxants -robaxin  Opioids-Never    Physical Therapy/Home Exercise: no       report:  Reviewed and consistent with medication use as prescribed.    Pain Procedures:   8/23/2017- Left SI joint injection    Chiropractor -never  Acupuncture - never  TENS unit -never  Spinal decompression -never  Joint replacement -never    Imaging:   MRI of lumbar spine DDD with stenosis at L3/4, L2/3    Past Medical History:   Diagnosis Date    Arthritis     Asthma     Back pain     Knee pain     Thyroid disease      Past Surgical History:   Procedure Laterality Date    CHOLECYSTECTOMY      VASCULAR SURGERY       Social History     Social History    Marital status:      Spouse name: N/A    Number of children: N/A    Years of education: N/A     Occupational History    Not on file.     Social History Main Topics    Smoking status: Never Smoker    Smokeless tobacco: Never Used    Alcohol use Yes    Drug use: No    Sexual activity: Not on file     Other Topics Concern    Not on file     Social History Narrative    No narrative on file     Family History   Problem Relation Age of Onset    Stroke Mother         Review of patient's allergies indicates:  No Known Allergies    Current Outpatient Prescriptions   Medication Sig    acyclovir (ZOVIRAX) 400 MG tablet     betamethasone dipropionate (DIPROLENE) 0.05 % cream     cyclobenzaprine (FLEXERIL) 10 MG tablet Take 1 tablet (10 mg total) by mouth 3 (three) times daily as needed.    gabapentin (NEURONTIN) 300 MG capsule     hydrocodone-acetaminophen 5-325mg (NORCO) 5-325 mg per tablet     levothyroxine (SYNTHROID) 125 MCG tablet Take 125 mcg by mouth once daily.    levothyroxine (SYNTHROID) 25 MCG tablet     meloxicam (MOBIC) 15 MG tablet TAKE 1 TABLET BY MOUTH DAILY (TO BE TAKEN WITH FOOD)    meloxicam (MOBIC) 7.5 MG tablet     methocarbamol (ROBAXIN) 500 MG Tab TAKE 2 TABLETS 4 TIMES DAILY    methylPREDNISolone (MEDROL DOSEPACK) 4 mg tablet TAKE 6 TABLETS ON DAY 1 AS DIRECTED ON PACKAGE AND DECREASE BY 1 TAB EACH DAY FOR A TOTAL OF 6 DAYS    naproxen (NAPROSYN) 500 MG tablet Take 1 tablet (500 mg total) by mouth 2 (two) times daily as needed.    nystatin (MYCOSTATIN) cream     omeprazole (PRILOSEC) 20 MG capsule      No current facility-administered medications for this visit.        REVIEW OF SYSTEMS:    GENERAL:  No weight loss, malaise or fevers.  HEENT:   No recent changes in vision or hearing  NECK:  Negative for lumps, no difficulty with swallowing.  RESPIRATORY:  Negative for cough, wheezing or shortness of breath, patient denies any recent URI.  CARDIOVASCULAR:  Negative for chest pain, leg swelling or palpitations.  GI:  Negative for abdominal discomfort, blood in stools or black stools or change in bowel habits. GERD controlled with omeprazole.  MUSCULOSKELETAL:  See HPI.  SKIN:  Negative for lesions, rash, and itching.  PSYCH:  No mood disorder or recent psychosocial stressors.  Patient's sleep is  disturbed secondary to pain.  HEMATOLOGY/LYMPHOLOGY:  Negative for prolonged bleeding, bruising easily or swollen nodes.  Patient is not currently  "taking any anti-coagulants  ENDO: hypothyroidism on stable dose of synthroid  NEURO:   No history of headaches, syncope, paralysis, seizures or tremors.  All other reviewed and negative other than HPI.    OBJECTIVE:    /67   Pulse 83   Temp 98.3 °F (36.8 °C)   Ht 5' 5" (1.651 m)   Wt 96.6 kg (212 lb 15.4 oz)   BMI 35.44 kg/m²     PHYSICAL EXAMINATION:    GENERAL: Well appearing, in no acute distress, alert and oriented x3.  PSYCH:  Mood and affect appropriate.  SKIN: Skin color, texture, turgor normal, no rashes or lesions.  HEAD/FACE:  Normocephalic, atraumatic. Cranial nerves grossly intact.  CV: RRR with palpation of the radial artery.  PULM: No evidence of respiratory difficulty, symmetric chest rise.  BACK: Straight leg raising in the sitting and supine positions is negative to radicular pain. There is no pain with palpation over the facet joints of the lumbar spine bilaterally. Limited ROM with mild pain on extension. Positive facet loading bilaterally.    EXTREMITIES: Peripheral joint ROM is full and pain free without obvious instability or laxity in all four extremities. No deformities, edema, or skin discoloration. Good capillary refill.  MUSCULOSKELETAL: Hip, and knee provocative maneuvers are negative.  There is pain with palpation over the sacroiliac joints on the left. There is pain to palpation over the greater trochanteric bursa on the left.  FABERs test is negative. FADIRs test is negative.   Bilateral lower extremity strength is normal and symmetric.  No atrophy or tone abnormalities are noted.  NEURO: Bilateral lower extremity coordination and muscle stretch reflexes are physiologic and symmetric.  Plantar response are downgoing. No clonus.  No loss of sensation is noted.  GAIT: Antalgic, ambulates without assistance         ASSESSMENT: 62 y.o. year old female with pain, consistent with     Encounter Diagnoses   Name Primary?    Sacroiliac joint pain Yes    Sacroiliitis     Facet " arthritis of lumbar region     Lumbar spondylosis     DDD (degenerative disc disease), lumbar     Myofascial pain        PLAN:     - Previous imaging was reviewed and discussed with the patient today.    - She is s/p left SI joint injection with relief. We can repeat this in the future as needed.     - We can consider left GT bursa injection in the future.     - Continue physical therapy and home exercise program.     - Continue Ibuprofen.     - Trial Flexeril 10 mg TID PRN muscle pain.     - Tramadol 50 mg every 12 hours PRN pain, #60, 0 refills. Medication management provided by Dr. Stafford.     - RTC in 3 months or sooner if needed.     - Dr. Stafford was consulted on the patient and agrees with this plan.    The above plan and management options were discussed at length with patient. Patient is in agreement with the above and verbalized understanding.     Sylvia Beckwith NP  12/04/2017

## 2017-12-07 ENCOUNTER — CLINICAL SUPPORT (OUTPATIENT)
Dept: REHABILITATION | Facility: HOSPITAL | Age: 62
End: 2017-12-07
Attending: NURSE PRACTITIONER
Payer: MEDICARE

## 2017-12-07 DIAGNOSIS — M54.50 LOW BACK PAIN POTENTIALLY ASSOCIATED WITH RADICULOPATHY: Primary | ICD-10-CM

## 2017-12-07 DIAGNOSIS — R29.898 WEAKNESS OF BOTH LOWER EXTREMITIES: ICD-10-CM

## 2017-12-07 DIAGNOSIS — M25.552 PAIN OF LEFT HIP JOINT: ICD-10-CM

## 2017-12-07 DIAGNOSIS — Z78.9 DIFFICULTY NAVIGATING STAIRS: ICD-10-CM

## 2017-12-07 PROCEDURE — 97140 MANUAL THERAPY 1/> REGIONS: CPT

## 2017-12-07 NOTE — PROGRESS NOTES
"                                                    Physical Therapy Daily Note     Date: 12/07/2017  Name: Dorothy Burt  [facility] Number: [id number]  Diagnosis:   Encounter Diagnoses   Name Primary?    Pain of left hip joint     Low back pain potentially associated with radiculopathy Yes    Weakness of both lower extremities     Difficulty navigating stairs      Physician: Sylvia Beckwith NP    Evaluation Date: 9/26/17  Visit #: 2 of 12  Start Time:  1000  Stop Time:  1105  Total Treatment Time: 60    Precautions: standard    Visit # authorized: 12  Authorization period: 9/26/17-11/26/17  Plan of care Expiration: 12/31/17      Subjective   Pt reports she has a terrible night last night and feels terrible this morning-reports she would have gone to the hospital if she wasn't coming here this morning. Pt reports she feels like she cannot take full breaths and has to stay still to not feel excruciating pain. Pt wants to wait to try a heel lift until she is feeling better. Patient reports she was fine when she went to the doctor the other day, but she had to move her spine in all different directions and felt sore after.      Objective     *noticed patient does not flex R knee in stance as much as she does on the L. Measured for leg length discrepancy 11/15/17   R ASIS to medial malleolus: 87.5 cm   L ASIS to medial malleolus: 90 cm      Patient received individual therapy to increase strength, endurance, ROM, flexibility and core stabilization with activities as follows:     Dorothy COOK received therapeutic exercises to develop strength, endurance, ROM, flexibility and core stabilization for 0 minutes including: NP     12/07/2017   90/90 TA holds 3x15"   Supine TA marches 2x10   Pelvic tilts 30x5"   Bridge with adduction 3x10   pallof press NP   Calf raises 3x10   Abdominal bracing 10x5"   Hamstring stretch 3x30"   Piriformis stretch 3x30"   clamshells NP   DKTC w/SB NP   Step downs 4" lateral 2x10   Wall squats 3x10  "       Dorothy COOK received the following manual therapy techniques for 30 minutes:    Vacuum/cupping: STM was performed to B spinal extensors to decrease muscle tightness, increase circulation and promote healing process for 25 min. The pt's skin was monitored for redness adjusting pressure as needed. The pt was instructed in possible side effects of bruising and/or soreness.    Trigger point release over  Spinal extensors      Written Home Exercises Provided: no new exercises provided this treatment  Pt demo good understanding of the education provided. Dorothy COOK demonstrated good return demonstration of activities.       HEP2Go Code: [id number]    Education provided: Patient educated that is pain continues to increase and/or she feels like she cannot breathe, she should go to the doctor immediately. Pt educated on the importance of proper posture in preventing muscle imbalances  Dorothy COOK verbalized good understanding of education provided.   No spiritual or educational barriers to learning identified.    PT reviewed FOTO scores for Dorothy COOK [facility] on 11/22/17  FOTO score: 53    Functional Limitations Reports - G Codes  Category: mobility  Tool: FOTO      Current ():  CK  Goal (): CK      Assessment   Pt tolerated manual therapy fairly, but was unable to tolerate exercises due to pain and difficulty with transitional movements. Unable to assess progress towards goals this session as patient is much worse off today than in previous sessions- this could be because patient has not been in tx for a couple weeks and has not been performing exercises as regularly. Patient will continue to benefit from skilled PT in order to decrease pain, increase strength, and increase functional mobility.       New Goals  STGs to be met in the next 2 weeks.  1. Patient will be independent and compliant in HEP. *GOAL MET 11/6/17  2. Decrease LBP at worst to </= 2/10.  3. Patient will be able to ascend/descend 4 steps without an increase in  symptoms.    LTGs to be met in the next 4 weeks  1. Abolish all low back and hip pain.  2. Patient will tolerate standing >/= 20 minutes in Pentecostal without an increase in symptoms.  3. Patient will be able to climb a flight of stairs without an increase in symptoms.  4. Patient will be between 20% and 40% (CJ) limited in mobility.    Plan   Assess tolerance to exercise next session and try heel lift in shoe if patient is feeling better.    Therapist: Michelle Waldrop, PT, DPT

## 2017-12-11 ENCOUNTER — CLINICAL SUPPORT (OUTPATIENT)
Dept: REHABILITATION | Facility: HOSPITAL | Age: 62
End: 2017-12-11
Attending: NURSE PRACTITIONER
Payer: MEDICARE

## 2017-12-11 DIAGNOSIS — M54.50 LOW BACK PAIN POTENTIALLY ASSOCIATED WITH RADICULOPATHY: Primary | ICD-10-CM

## 2017-12-11 DIAGNOSIS — R29.898 WEAKNESS OF BOTH LOWER EXTREMITIES: ICD-10-CM

## 2017-12-11 DIAGNOSIS — M25.552 PAIN OF LEFT HIP JOINT: ICD-10-CM

## 2017-12-11 DIAGNOSIS — Z78.9 DIFFICULTY NAVIGATING STAIRS: ICD-10-CM

## 2017-12-11 PROCEDURE — 97140 MANUAL THERAPY 1/> REGIONS: CPT

## 2017-12-11 PROCEDURE — 97110 THERAPEUTIC EXERCISES: CPT

## 2017-12-11 NOTE — PROGRESS NOTES
"                                                    Physical Therapy Daily Note     Date: 2017  Name: Dorothy Burt  [facility] Number: [id number]  Diagnosis:   Encounter Diagnoses   Name Primary?    Pain of left hip joint     Low back pain potentially associated with radiculopathy Yes    Weakness of both lower extremities     Difficulty navigating stairs      Physician: Sylvia Beckwith NP    Evaluation Date: 17  Visit #: 3 of 12  Start Time:  700  Stop Time:  800  Total Treatment Time: 60    Precautions: standard    Visit # authorized: 12  Authorization period: 17-17  Plan of care Expiration: 17      Subjective   Pt reports she had "the worst weekend in her life." She reports she had to take her muscle relaxers and mostly slept all weekend. Pt reports improved sx this morning with increased R hip pain but decreased R thoracic spine pain since last session.    Objective     Patient received individual therapy to increase strength, endurance, ROM, flexibility and core stabilization with activities as follows:     Dorothy COOK received therapeutic exercises to develop strength, endurance, ROM, flexibility and core stabilization for 30 minutes includin/11/2017   Supine TA marches NP   Pelvic tilts 20x5"   Bridge with adduction 3x10   pallof press NP   Calf raises NP   Abdominal bracing NP   Hamstring stretch NP   Standing shoulder extension  Green x30   Prashant stretch x1 min B   Piriformis stretch NP   clamshells NP   DKTC w/SB NP   Step downs NP   Wall squats 3x10      Pt received gait training with heel lift in L shoe.    Dorothy COOK received the following manual therapy techniques for 25 minutes:    BLE manual traction in supine grade 3 with oscillations   B rotation mobilizations in s/l      Written Home Exercises Provided: no new exercises provided this treatment  Pt demo good understanding of the education provided. Dorothy COOK demonstrated good return demonstration of activities.       HEP2Go " Code: [id number]    Education provided: Patient educated on the use of the heel lift and the importance of sturdy sneakers in preventing instability. Pt encouraged to try out the heel lift, but to take it out should pain increase in her foot/ankle/hip or low back. Pt educated on log roll technique for getting into/out of bed to decrease pain.  Dorothy COOK verbalized good understanding of education provided.   No spiritual or educational barriers to learning identified.    PT reviewed FOTO scores for Dorothy Burt on 11/22/17  FOTO score: 53    Functional Limitations Reports - G Codes  Category: mobility  Tool: FOTO      Current ():  CK  Goal (): CK      Assessment   Pt tolerated manual therapy well with decreased low back/R hip symptoms after mobilizations. Pt able to tolerate some exercise this session and demos decreased spinal extensor tone. Pt continues to have thoracic and low back pain particularly with quick rotational movements. Pt demoed improve gait pattern with improved R knee flexion and decreased lateral trunk lean with heel lift in-will have to reassess pt response next session. Patient will continue to benefit from skilled PT in order to decrease pain, increase strength, and increase functional mobility.       New Goals  STGs to be met in the next 2 weeks.  1. Patient will be independent and compliant in HEP. *GOAL MET 11/6/17  2. Decrease LBP at worst to </= 2/10.  3. Patient will be able to ascend/descend 4 steps without an increase in symptoms.    LTGs to be met in the next 4 weeks  1. Abolish all low back and hip pain.  2. Patient will tolerate standing >/= 20 minutes in Rastafarian without an increase in symptoms.  3. Patient will be able to climb a flight of stairs without an increase in symptoms.  4. Patient will be between 20% and 40% (CJ) limited in mobility.    Plan   Assess tolerance to exercise and heel lift.    Therapist: Michelle Waldrop, PT, DPT

## 2017-12-13 ENCOUNTER — TELEPHONE (OUTPATIENT)
Dept: REHABILITATION | Facility: HOSPITAL | Age: 62
End: 2017-12-13

## 2017-12-13 NOTE — TELEPHONE ENCOUNTER
Spoke with pt regarding missed appointment this morning. Pt reports she had the appointment written down for tomorrow. Spoke with pt to reschedule appointment for 10am tomorrow, 12/14/17.    Michelle Waldrop, PT, DPT

## 2017-12-14 ENCOUNTER — CLINICAL SUPPORT (OUTPATIENT)
Dept: REHABILITATION | Facility: HOSPITAL | Age: 62
End: 2017-12-14
Attending: NURSE PRACTITIONER
Payer: MEDICARE

## 2017-12-14 DIAGNOSIS — R29.898 WEAKNESS OF BOTH LOWER EXTREMITIES: ICD-10-CM

## 2017-12-14 DIAGNOSIS — Z78.9 DIFFICULTY NAVIGATING STAIRS: ICD-10-CM

## 2017-12-14 DIAGNOSIS — M54.50 LOW BACK PAIN POTENTIALLY ASSOCIATED WITH RADICULOPATHY: ICD-10-CM

## 2017-12-14 DIAGNOSIS — M25.552 PAIN OF LEFT HIP JOINT: Primary | ICD-10-CM

## 2017-12-14 PROCEDURE — 97110 THERAPEUTIC EXERCISES: CPT

## 2017-12-14 NOTE — PROGRESS NOTES
"                                                    Physical Therapy Daily Note     Date: 2017  Name: Dorothy Burt  [facility] Number: [id number]  Diagnosis:   Encounter Diagnoses   Name Primary?    Pain of left hip joint Yes    Low back pain potentially associated with radiculopathy     Weakness of both lower extremities     Difficulty navigating stairs      Physician: Sylvia Beckwith NP    Evaluation Date: 17  Visit #:13 (3 of 12 new auth)  Start Time:  1000  Stop Time:  1120  Total Treatment Time: 60    Precautions: standard    Visit # authorized: 12  Authorization period: 18  Plan of care Expiration: 17      Subjective   Pt reports no LBP or hip pain pre-tx. Reports minimal pain over her R thoracic extensors but says it is much better than it has been. Pt reports wearing the heel lift since last tx and feels like it has been helping. Pt purchased new shoes she wants me to look at and approve of.    Objective     Patient received individual therapy to increase strength, endurance, ROM, flexibility and core stabilization with activities as follows:     Dorothy COOK received therapeutic exercises to develop strength, endurance, ROM, flexibility and core stabilization for 45 minutes includin/14/2017   Supine TA marches 2x10   Pelvic tilts 20x5"   Bridge with adduction 3x10   pallof press x20 green TB   gastroc stretch standing x1 min B   Calf raises 3x10   Hamstring stretch 3x30" B   Standing shoulder extension  Green x30   Prashant stretch x1.5 min B   Piriformis stretch x1min B   clamshells NP   Step downs NP   Wall squats 2x10 with ball squeeze       Dorothy COOK received the following manual therapy techniques for 5 minutes:    R thoracic extensor STM      Written Home Exercises Provided: no new exercises provided this treatment  Pt demo good understanding of the education provided. Dorothy COOK demonstrated good return demonstration of activities.       HEP2Go Code: [id number]    Education provided: " Patient educated on the use of the heel lift and the importance of sturdy sneakers in preventing instability. Pt encouraged to try out the heel lift, but to take it out should pain increase in her foot/ankle/hip or low back.   Dorothy COOK verbalized good understanding of education provided.   No spiritual or educational barriers to learning identified.    PT reviewed FOTO scores for Dorothy Burt on 11/22/17  FOTO score: 53    Functional Limitations Reports - G Codes  Category: mobility  Tool: FOTO      Current ():  CK  Goal (): CK      Assessment   Pt with much improved tolerance to exercise today. Pt with decreased tone over R thoracic extensors and decreased pain this session. Gait mechanics with new shoes and heel lift show no gross abnormalities; lateral trunk lean and decreased knee flexion during loading response have diminished. Pt demos improved form with wall squats when using ball to squeeze between her knees; with heel lift, pt has decreased L weight shift. Pt requires cueing for TA activation during supine TA marches on heel lowering phase of the exercise.  Patient will continue to benefit from skilled PT in order to decrease pain, increase strength, and increase functional mobility.       New Goals  STGs to be met in the next 2 weeks.  1. Patient will be independent and compliant in HEP. *GOAL MET 11/6/17  2. Decrease LBP at worst to </= 2/10.  3. Patient will be able to ascend/descend 4 steps without an increase in symptoms.    LTGs to be met in the next 4 weeks  1. Abolish all low back and hip pain.  2. Patient will tolerate standing >/= 20 minutes in Religious without an increase in symptoms.  3. Patient will be able to climb a flight of stairs without an increase in symptoms.  4. Patient will be between 20% and 40% (CJ) limited in mobility.    Plan   Progress core and LE strength/endurance as tolerated.    Therapist: Michelle Waldrop, PT, DPT

## 2017-12-18 ENCOUNTER — CLINICAL SUPPORT (OUTPATIENT)
Dept: REHABILITATION | Facility: HOSPITAL | Age: 62
End: 2017-12-18
Attending: NURSE PRACTITIONER
Payer: MEDICARE

## 2017-12-18 DIAGNOSIS — R29.898 WEAKNESS OF BOTH LOWER EXTREMITIES: ICD-10-CM

## 2017-12-18 DIAGNOSIS — Z78.9 DIFFICULTY NAVIGATING STAIRS: ICD-10-CM

## 2017-12-18 DIAGNOSIS — M25.552 PAIN OF LEFT HIP JOINT: ICD-10-CM

## 2017-12-18 DIAGNOSIS — M54.50 LOW BACK PAIN POTENTIALLY ASSOCIATED WITH RADICULOPATHY: Primary | ICD-10-CM

## 2017-12-18 PROCEDURE — 97110 THERAPEUTIC EXERCISES: CPT

## 2017-12-18 NOTE — PROGRESS NOTES
"                                                    Physical Therapy Daily Note     Date: 2017  Name: Dorothy COOK [facility] Number: [id number]  Diagnosis:   Encounter Diagnoses   Name Primary?    Pain of left hip joint     Low back pain potentially associated with radiculopathy Yes    Weakness of both lower extremities     Difficulty navigating stairs      Physician: Sylvia Beckwith NP    Evaluation Date: 17  Visit #:14 (4 of 12 new auth)  Start Time:  800  Stop Time:  900  Total Treatment Time: 60    Precautions: standard    Authorization period: 18  Plan of care Expiration: 17      Subjective   Pt minor low back and thoracic extensor soreness pre-tx. Pt reports her soreness is nothing like it was last week.    Objective     Patient received individual therapy to increase strength, endurance, ROM, flexibility and core stabilization with activities as follows:     Dorothy COOK received therapeutic exercises to develop strength, endurance, ROM, flexibility and core stabilization for 25 minutes includin/18/2017   Supine TA marches 2x10   Pelvic tilts 20x5"   Bridge with adduction 3x10   pallof press NP   gastroc stretch standing NP   Calf raises 3x10   Hamstring stretch 3x30" B   Standing shoulder extension  Green x30   Prashant stretch NP   Piriformis stretch x1min B   clamshells NP   Step downs NP   Hip abduction machine 3x10 50#   Hip adduction machine 3x10 50#   Wall squats 3x10 with ball squeeze       Dorothy COOK received the following manual therapy techniques for 5 minutes:    R thoracic extensor STM      Written Home Exercises Provided: no new exercises provided this treatment  Pt demo good understanding of the education provided. Dorothy COOK demonstrated good return demonstration of activities.     Pt received ice to low back for 10 minutes post tx.    HEP2Go Code: [id number]    Education provided: Patient educated on the use of the heel lift and the importance of sturdy sneakers in preventing " instability. Pt encouraged to try out the heel lift, but to take it out should pain increase in her foot/ankle/hip or low back.   Dorothy COOK verbalized good understanding of education provided.   No spiritual or educational barriers to learning identified.    PT reviewed FOTO scores for Dorothy Burt on 11/22/17  FOTO score: 53    Functional Limitations Reports - G Codes  Category: mobility  Tool: FOTO      Current ():  CK  Goal (): CK      Assessment   Pt tolerated all tx well with no reports of pain. Added hip abduction an hip adduction machines to program to increase hip strength and stability. Pt with decreased pain overall since beginning use of heel lift in L shoe; gait mechanics have improved with addition of heel lift as well. Patient will continue to benefit from skilled PT in order to decrease pain, increase strength, and increase functional mobility.       New Goals  STGs to be met in the next 2 weeks.  1. Patient will be independent and compliant in HEP. *GOAL MET 11/6/17  2. Decrease LBP at worst to </= 2/10.  3. Patient will be able to ascend/descend 4 steps without an increase in symptoms. *GOAL MET 12/18/17    LTGs to be met in the next 4 weeks  1. Abolish all low back and hip pain.  2. Patient will tolerate standing >/= 20 minutes in Hinduism without an increase in symptoms.  3. Patient will be able to climb a flight of stairs without an increase in symptoms.  4. Patient will be between 20% and 40% (CJ) limited in mobility.    Plan   Progress core and LE strength/endurance as tolerated.    Therapist: Michelle Waldrop, PT, DPT

## 2017-12-20 ENCOUNTER — CLINICAL SUPPORT (OUTPATIENT)
Dept: REHABILITATION | Facility: HOSPITAL | Age: 62
End: 2017-12-20
Attending: NURSE PRACTITIONER
Payer: MEDICARE

## 2017-12-20 DIAGNOSIS — R29.898 WEAKNESS OF BOTH LOWER EXTREMITIES: ICD-10-CM

## 2017-12-20 DIAGNOSIS — M25.552 PAIN OF LEFT HIP JOINT: ICD-10-CM

## 2017-12-20 DIAGNOSIS — M54.50 LOW BACK PAIN POTENTIALLY ASSOCIATED WITH RADICULOPATHY: Primary | ICD-10-CM

## 2017-12-20 DIAGNOSIS — Z78.9 DIFFICULTY NAVIGATING STAIRS: ICD-10-CM

## 2017-12-20 PROCEDURE — 97140 MANUAL THERAPY 1/> REGIONS: CPT
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
## 2017-12-27 ENCOUNTER — CLINICAL SUPPORT (OUTPATIENT)
Dept: REHABILITATION | Facility: HOSPITAL | Age: 62
End: 2017-12-27
Attending: NURSE PRACTITIONER
Payer: MEDICARE

## 2017-12-27 DIAGNOSIS — Z78.9 DIFFICULTY NAVIGATING STAIRS: ICD-10-CM

## 2017-12-27 DIAGNOSIS — M54.50 LOW BACK PAIN POTENTIALLY ASSOCIATED WITH RADICULOPATHY: Primary | ICD-10-CM

## 2017-12-27 DIAGNOSIS — M25.552 PAIN OF LEFT HIP JOINT: ICD-10-CM

## 2017-12-27 DIAGNOSIS — R29.898 WEAKNESS OF BOTH LOWER EXTREMITIES: ICD-10-CM

## 2017-12-27 PROCEDURE — 97140 MANUAL THERAPY 1/> REGIONS: CPT

## 2017-12-27 PROCEDURE — 97110 THERAPEUTIC EXERCISES: CPT

## 2017-12-27 NOTE — PROGRESS NOTES
"                                                    Physical Therapy Daily Note     Date: 2017  Name: Dorothy Burt  [facility] Number: [id number]  Diagnosis:   Encounter Diagnoses   Name Primary?    Pain of left hip joint     Low back pain potentially associated with radiculopathy Yes    Weakness of both lower extremities     Difficulty navigating stairs      Physician: Sylvia Beckwith NP    Evaluation Date: 17  Visit #:16 (6 of 12 new auth)  Start Time:  809  Stop Time:  900  Total Treatment Time: 60    Precautions: standard    Authorization period: 18  Plan of care Expiration: 17      Subjective   Pt reports increased upper trap tightness but no issues in her low back or hip pre-tx. She feels like the cold may be increasing tightness. Pt reports she has been working on her posture and trying to keep her shoulders down and back.    Objective   Patient received individual therapy to increase strength, endurance, ROM, flexibility and core stabilization with activities as follows:     Dorothy COOK received therapeutic exercises to develop strength, endurance, ROM, flexibility and core stabilization for 16 minutes includin/27/2017   Abdominal bracing 15x5"   Supine TA marches 2x10   Pelvic tilts 20x5"   Bridge with adduction 3x10   pallof press GTB 2x20 B   gastroc stretch standing x1 min   Calf raises 3x10   Hamstring stretch NP   Standing shoulder extension  NP   Prashant stretch NP   Piriformis stretch NP   clamshells NP   Step downs NP   Supine pec minor stretch x3 min   Hip abduction machine NP   Hip adduction machine NP   Wall squats NP       Dorothy COOK received the following manual therapy techniques for 10 minutes:    Vacuum/cupping: STM was performed to L upper trap to decrease muscle tightness, increase circulation and promote healing process. The pt's skin was monitored for redness adjusting pressure as needed. The pt was instructed in possible side effects of bruising and/or soreness. "       Written Home Exercises Provided: no new exercises provided this treatment  Pt demo good understanding of the education provided. Dorothy COOK demonstrated good return demonstration of activities.     Pt received ice to low back for 10 minutes post tx.    HEP2Go Code: [id number]    Education provided: Patient educated on the use of the heel lift and the importance of sturdy sneakers in preventing instability. Pt encouraged to try out the heel lift, but to take it out should pain increase in her foot/ankle/hip or low back.   Dorothy COOK verbalized good understanding of education provided.   No spiritual or educational barriers to learning identified.    PT reviewed FOTO scores for Dorothy Boscht on 11/22/17  FOTO score: 53    Functional Limitations Reports - G Codes  Category: mobility  Tool: FOTO      Current ():  CK  Goal (): CK      Assessment   Pt tolerated all tx well with no increase in symptoms. After cupping, tissue with increased extensibility and pt reports of decreased tightness. Pt requires cueing to keep shoulders down during pallof press. Pt demos improved comprehension of TA marches with decreased need for cueing this session. Patient will continue to benefit from skilled PT in order to decrease pain, increase strength, and increase functional mobility.       New Goals  STGs to be met in the next 2 weeks.  1. Patient will be independent and compliant in HEP. *GOAL MET 11/6/17  2. Decrease LBP at worst to </= 2/10. *GOAL MET 12/20/17  3. Patient will be able to ascend/descend 4 steps without an increase in symptoms. *GOAL MET 12/18/17    LTGs to be met in the next 4 weeks  1. Abolish all low back and hip pain.  2. Patient will tolerate standing >/= 20 minutes in Cheondoism without an increase in symptoms.  3. Patient will be able to climb a flight of stairs without an increase in symptoms.  4. Patient will be between 20% and 40% (CJ) limited in mobility.    Plan   Progress core and LE strength/endurance as  tolerated.    Therapist: Michelle Waldrop, PT, DPT

## 2018-01-03 ENCOUNTER — CLINICAL SUPPORT (OUTPATIENT)
Dept: REHABILITATION | Facility: HOSPITAL | Age: 63
End: 2018-01-03
Attending: NURSE PRACTITIONER
Payer: MEDICARE

## 2018-01-03 DIAGNOSIS — Z78.9 DIFFICULTY NAVIGATING STAIRS: ICD-10-CM

## 2018-01-03 DIAGNOSIS — R29.898 WEAKNESS OF BOTH LOWER EXTREMITIES: ICD-10-CM

## 2018-01-03 DIAGNOSIS — M54.50 LOW BACK PAIN POTENTIALLY ASSOCIATED WITH RADICULOPATHY: Primary | ICD-10-CM

## 2018-01-03 DIAGNOSIS — M25.552 PAIN OF LEFT HIP JOINT: ICD-10-CM

## 2018-01-03 PROCEDURE — 97110 THERAPEUTIC EXERCISES: CPT

## 2018-01-03 NOTE — PLAN OF CARE
"                                                    Physical Therapy Daily Note     Date: 2018  Name: Dorothy Burt  [facility] Number: [id number]  Diagnosis:   Encounter Diagnoses   Name Primary?    Pain of left hip joint     Low back pain potentially associated with radiculopathy Yes    Weakness of both lower extremities     Difficulty navigating stairs      Physician: Sylvia Beckwith NP    Evaluation Date: 17  Visit #:17 (7 of 12 new auth)  Start Time:  708  Stop Time:  810  Total Treatment Time: 62    Precautions: standard    Authorization period: 18  Plan of care Expiration: 2/3/18      Subjective   Pt reports some soreness by R posterior superior iliac spine. Reports her upper back feels okay. She thinks she has increased soreness due to all of the cold weather we have been having.    Objective   Patient received individual therapy to increase strength, endurance, ROM, flexibility and core stabilization with activities as follows:     Dorothy COOK received therapeutic exercises to develop strength, endurance, ROM, flexibility and core stabilization for 55 minutes includin/03/2018   Abdominal bracing 15x5" NP   Supine TA marches 1x10   Supine 90/90 heel taps 4x1B   Supine lat pull Maroon TB x30   Pelvic tilts 20x5"   Bridge with adduction 3x10   pallof press GTB 2x15 B   gastroc stretch standing x1 min   Calf raises 3x10   Hamstring stretch NP   Standing shoulder extension  OTB x30   Prashant stretch x1 min B   Piriformis stretch NP   clamshells NP   Prone quad stretch x1 min B   Supine pec minor stretch x3 min   Hip abduction machine NP   Hip adduction machine NP   Wall squats 3x10       Dorothy COOK received the following manual therapy techniques for 5 minutes:    S/l rotation mobilizations R side up grade 2       Written Home Exercises Provided: no new exercises provided this treatment  Pt demo good understanding of the education provided. Dorothy COOK demonstrated good return demonstration of activities. "     Pt received ice to low back for 10 minutes post tx.    HEP2Go Code: [id number]    Education provided: Patient educated on the use of the heel lift and the importance of sturdy sneakers in preventing instability. Pt encouraged to try out the heel lift, but to take it out should pain increase in her foot/ankle/hip or low back.   Dorothy COOK verbalized good understanding of education provided.   No spiritual or educational barriers to learning identified.    PT reviewed FOTO scores for Dorothy Burt on 11/22/17  FOTO score: 53    Functional Limitations Reports - G Codes  Category: mobility  Tool: FOTO      Current ():  CK  Goal (): CK      Assessment   Pt demos improved TA activation with addition of supine 90/90 heel taps, however still requires minimal cues during supine TA marches to keep TA activated upon lowering. Pt has been slow to progress toward long term goals because she has had many flare ups in her thoracic spine; however, with addition of postural stability exercises and heel lift, pt reports decreased pain. Pt requires max verbal and tactile cues to keep shoulders away from ears and has difficulty activating lats, lower traps, and rhomboids. Pt will continue to benefit from skilled PT in order to decrease pain, increase core and postural stability, increase LE strength, and improve functional mobility.      New Goals  STGs to be met in the next 2 weeks.  1. Patient will be independent and compliant in HEP. *GOAL MET 11/6/17  2. Decrease LBP at worst to </= 2/10. *GOAL MET 12/20/17  3. Patient will be able to ascend/descend 4 steps without an increase in symptoms. *GOAL MET 12/18/17    LTGs to be met in the next 4 weeks  1. Abolish all low back and hip pain.  2. Patient will tolerate standing >/= 20 minutes in Sikhism without an increase in symptoms.  3. Patient will be able to climb a flight of stairs without an increase in symptoms.  4. Patient will be between 20% and 40% (CJ) limited in  mobility.    Plan   Pt to be seen 1-2x/week for 4 more weeks to progress core and postural stability exercises.    I certify the need for these services furnished under this plan of treatment and while under my care.       ___________________________________  Physician/Referring Practitioner    Therapist: Michelle Waldrop, PT, DPT

## 2018-01-03 NOTE — PROGRESS NOTES
"                                                    Physical Therapy Daily Note     Date: 2018  Name: Dorothy Burt  [facility] Number: [id number]  Diagnosis:   Encounter Diagnoses   Name Primary?    Pain of left hip joint     Low back pain potentially associated with radiculopathy Yes    Weakness of both lower extremities     Difficulty navigating stairs      Physician: Sylvia Beckwith NP    Evaluation Date: 17  Visit #:17 (7 of 12 new auth)  Start Time:  708  Stop Time:  810  Total Treatment Time: 62    Precautions: standard    Authorization period: 18  Plan of care Expiration: 2/3/18      Subjective   Pt reports some soreness by R posterior superior iliac spine. Reports her upper back feels okay. She thinks she has increased soreness due to all of the cold weather we have been having.    Objective   Patient received individual therapy to increase strength, endurance, ROM, flexibility and core stabilization with activities as follows:     Dorothy COOK received therapeutic exercises to develop strength, endurance, ROM, flexibility and core stabilization for 55 minutes includin/03/2018   Abdominal bracing 15x5" NP   Supine TA marches 1x10   Supine 90/90 heel taps 4x1B   Supine lat pull Maroon TB x30   Pelvic tilts 20x5"   Bridge with adduction 3x10   pallof press GTB 2x15 B   gastroc stretch standing x1 min   Calf raises 3x10   Hamstring stretch NP   Standing shoulder extension  OTB x30   Prashant stretch x1 min B   Piriformis stretch NP   clamshells NP   Prone quad stretch x1 min B   Supine pec minor stretch x3 min   Hip abduction machine NP   Hip adduction machine NP   Wall squats 3x10       Dorothy COOK received the following manual therapy techniques for 5 minutes:    S/l rotation mobilizations R side up grade 2       Written Home Exercises Provided: no new exercises provided this treatment  Pt demo good understanding of the education provided. Dorothy COOK demonstrated good return demonstration of activities. "     Pt received ice to low back for 10 minutes post tx.    HEP2Go Code: [id number]    Education provided: Patient educated on the use of the heel lift and the importance of sturdy sneakers in preventing instability. Pt encouraged to try out the heel lift, but to take it out should pain increase in her foot/ankle/hip or low back.   Dorothy COOK verbalized good understanding of education provided.   No spiritual or educational barriers to learning identified.    PT reviewed FOTO scores for Dorothy Burt on 11/22/17  FOTO score: 53    Functional Limitations Reports - G Codes  Category: mobility  Tool: FOTO      Current ():  CK  Goal (): CK      Assessment   Pt demos improved TA activation with addition of supine 90/90 heel taps, however still requires minimal cues during supine TA marches to keep TA activated upon lowering. Pt has been slow to progress toward long term goals because she has had many flare ups in her thoracic spine; however, with addition of postural stability exercises and heel lift, pt reports decreased pain. Pt requires max verbal and tactile cues to keep shoulders away from ears and has difficulty activating lats, lower traps, and rhomboids. Pt will continue to benefit from skilled PT in order to decrease pain, increase core and postural stability, increase LE strength, and improve functional mobility.      New Goals  STGs to be met in the next 2 weeks.  1. Patient will be independent and compliant in HEP. *GOAL MET 11/6/17  2. Decrease LBP at worst to </= 2/10. *GOAL MET 12/20/17  3. Patient will be able to ascend/descend 4 steps without an increase in symptoms. *GOAL MET 12/18/17    LTGs to be met in the next 4 weeks  1. Abolish all low back and hip pain.  2. Patient will tolerate standing >/= 20 minutes in Oriental orthodox without an increase in symptoms.  3. Patient will be able to climb a flight of stairs without an increase in symptoms.  4. Patient will be between 20% and 40% (CJ) limited in  mobility.    Plan   Pt to be seen 1-2x/week for 4 more weeks to progress core and postural stability exercises.    I certify the need for these services furnished under this plan of treatment and while under my care.       ___________________________________  Physician/Referring Practitioner    Therapist: Michelle Waldrop, PT, DPT

## 2018-01-12 ENCOUNTER — CLINICAL SUPPORT (OUTPATIENT)
Dept: REHABILITATION | Facility: HOSPITAL | Age: 63
End: 2018-01-12
Attending: NURSE PRACTITIONER
Payer: MEDICARE

## 2018-01-12 DIAGNOSIS — R29.898 WEAKNESS OF BOTH LOWER EXTREMITIES: ICD-10-CM

## 2018-01-12 DIAGNOSIS — Z78.9 DIFFICULTY NAVIGATING STAIRS: ICD-10-CM

## 2018-01-12 DIAGNOSIS — M25.552 PAIN OF LEFT HIP JOINT: ICD-10-CM

## 2018-01-12 DIAGNOSIS — M54.50 LOW BACK PAIN POTENTIALLY ASSOCIATED WITH RADICULOPATHY: Primary | ICD-10-CM

## 2018-01-12 PROCEDURE — 97110 THERAPEUTIC EXERCISES: CPT

## 2018-01-12 NOTE — PROGRESS NOTES
"                                                    Physical Therapy Daily Note     Date: 2018  Name: Dorothy COOK [facility] Number: [id number]  Diagnosis:   Encounter Diagnoses   Name Primary?    Pain of left hip joint     Low back pain potentially associated with radiculopathy Yes    Weakness of both lower extremities     Difficulty navigating stairs      Physician: Sylvia Beckwith NP    Evaluation Date: 17  Visit #:18 (8 of 12 new auth)  Start Time:  700  Stop Time:  800  Total Treatment Time: 60    Precautions: standard    Authorization period: 18  Plan of care Expiration: 2/3/18      Subjective   Pt reports 0/10 LBP or hip pain pre treatment. Pt reports that the heel lift has been helping, but it has flattened out. Pt reports no instances of upper back pain in past couple weeks.     Objective   Patient received individual therapy to increase strength, endurance, ROM, flexibility and core stabilization with activities as follows:     Dorothy COOK received therapeutic exercises to develop strength, endurance, ROM, flexibility and core stabilization for 30 minutes includin/12/2018   Abdominal bracing 15x5" NP   Supine TA marches 2x10   Supine 90/90 heel taps 4x1B NP   Supine lat pull Maroon TB x30   Pelvic tilts 20x5"   Bridge with adduction 3x10   pallof press GTB 2x15 B   gastroc stretch standing x1 min   Calf raises 3x10   Hamstring stretch NP   Standing shoulder extension  OTB x30 np   Prashant stretch x1 min B np   Piriformis stretch NP   clamshells BTB x30 B   Prone quad stretch x1 min B np   Supine pec minor stretch x3 min np   Hip abduction machine NP   Hip adduction machine NP   Wall squats 3x10           Written Home Exercises Provided: no new exercises provided this treatment  Pt demo good understanding of the education provided. Dorothy COOK demonstrated good return demonstration of activities.       HEP2Go Code: [id number]    Education provided: Patient given name of a foot store to buy an " adjustable heel lift. Pt educated that she does not need a doctor's prescription for a heel lift <1inch.  Dorothy COOK verbalized good understanding of education provided.   No spiritual or educational barriers to learning identified.    PT reviewed FOTO scores for Dorothy Burt on 11/22/17  FOTO score: 53    Functional Limitations Reports - G Codes  Category: mobility  Tool: FOTO      Current ():  CK  Goal (): CK      Assessment   Patient tolerated all treatment well with no complaints of pain. Overall patient demos improved strength and mobility as well as decreased pain. Pt has been tolerating heel lift well and feels like her upper back pain has decreased since addressing her leg length discrepancy. Patient is to buy an adjustable heel lift over the weekend and bring it into treatment next session to have a more permanent lift in her shoe. Pt required verbal cueing during wall squats to press evenly into both feet and hold R thigh in. Pt will continue to benefit from skilled PT in order to decrease pain, increase core and postural stability, increase LE strength, and improve functional mobility.      New Goals  STGs to be met in the next 2 weeks.  1. Patient will be independent and compliant in HEP. *GOAL MET 11/6/17  2. Decrease LBP at worst to </= 2/10. *GOAL MET 12/20/17  3. Patient will be able to ascend/descend 4 steps without an increase in symptoms. *GOAL MET 12/18/17    LTGs to be met in the next 4 weeks  1. Abolish all low back and hip pain.  2. Patient will tolerate standing >/= 20 minutes in Evangel[employer] without an increase in symptoms.  3. Patient will be able to climb a flight of stairs without an increase in symptoms.  4. Patient will be between 20% and 40% (CJ) limited in mobility.    Plan   Progress core and LE strength as tolerated. Check and adjust patient's heel lift.    Therapist: Michelle Waldrop, PT, DPT

## 2018-01-26 ENCOUNTER — CLINICAL SUPPORT (OUTPATIENT)
Dept: REHABILITATION | Facility: HOSPITAL | Age: 63
End: 2018-01-26
Attending: NURSE PRACTITIONER
Payer: MEDICARE

## 2018-01-26 DIAGNOSIS — Z78.9 DIFFICULTY NAVIGATING STAIRS: ICD-10-CM

## 2018-01-26 DIAGNOSIS — R29.898 WEAKNESS OF BOTH LOWER EXTREMITIES: ICD-10-CM

## 2018-01-26 DIAGNOSIS — M54.50 LOW BACK PAIN POTENTIALLY ASSOCIATED WITH RADICULOPATHY: Primary | ICD-10-CM

## 2018-01-26 DIAGNOSIS — M25.552 PAIN OF LEFT HIP JOINT: ICD-10-CM

## 2018-01-26 PROCEDURE — 97110 THERAPEUTIC EXERCISES: CPT

## 2018-01-26 PROCEDURE — 97140 MANUAL THERAPY 1/> REGIONS: CPT

## 2018-01-26 NOTE — PROGRESS NOTES
"                                                    Physical Therapy Daily Note     Date: 2018  Name: Dorothy Burt  [facility] Number: [id number]  Diagnosis:   Encounter Diagnoses   Name Primary?    Pain of left hip joint     Low back pain potentially associated with radiculopathy Yes    Weakness of both lower extremities     Difficulty navigating stairs      Physician: Sylvia Beckwith NP    Evaluation Date: 17  Visit #:18 (8 of 12 new auth)  Start Time:  800  Stop Time:  900  Total Treatment Time: 60    Precautions: standard    Authorization period: 18  Plan of care Expiration: 2/3/18      Subjective   Pt reports minimal R back hip pain (pointed to R PSIS). Reports some increased tightness in her upper back and shoulders but that she has been really stressed with the cold weather freezing her pipes. Pt reports she is able to stand about 10 minutes before hip/low back pain, but she is unable to do stairs without pain.    Objective   Patient received individual therapy to increase strength, endurance, ROM, flexibility and core stabilization with activities as follows:     Dorothy COOK received therapeutic exercises to develop strength, endurance, ROM, flexibility and core stabilization for 45 minutes includin/26/2018   Abdominal bracing 15x5" NP   Supine TA marches 3x10   Supine 90/90 heel taps 3x10 NP   Supine lat pull Maroon TB x30 np   Pelvic tilts 20x5"   Bridge with adduction 3x10   pallof press GTB 2x20 B   gastroc stretch standing x1 min   Calf raises 3x10   Hamstring stretch NP   Standing shoulder extension  OTB x30 np   Prashant stretch x1 min B np   Piriformis stretch NP   clamshells BTB x30 B NP   Prone quad stretch x1 min B np   Supine pec minor stretch x3 min np   Hip abduction machine 50# 3x10   Hip adduction machine 50# 3x10   Step ups 4" 3x10   Wall squats 3x10       Pt received the following manual therapy techniques for 10 minutes   STM with trigger point release in L upper trap   L " "sidelying rotation mobilizations    Written Home Exercises Provided: no new exercises provided this treatment  Pt demo good understanding of the education provided. Dorothy COOK demonstrated good return demonstration of activities.       HEP2Go Code: [id number]    Education provided: Patient given name of a foot store to buy an adjustable heel lift. Pt educated that she does not need a doctor's prescription for a heel lift <1inch.  Dorothy COOK verbalized good understanding of education provided.   No spiritual or educational barriers to learning identified.    PT reviewed FOTO scores for Dorothy Burt on 11/22/17  FOTO score: 53    Functional Limitations Reports - G Codes  Category: mobility  Tool: FOTO      Current ():  CK  Goal (): CK      Assessment   Patient tolerated all treatment well with no complaints of pain. Added step ups to program because pt reports being unable to perform stairs without increased hip pain. During step ups, noticed L hip drop when patient going up 4" step with RLE. Patient demos decreased hip abductor strength during step ups; hip drop and shortening of trunk during this action could be contributing to R PSIS pain. Pt required verbal cueing during wall squats to press evenly into both feet and hold R thigh in. Pt will continue to benefit from skilled PT in order to decrease pain, increase core and postural stability, increase LE strength, and improve functional mobility.      New Goals  STGs to be met in the next 2 weeks.  1. Patient will be independent and compliant in HEP. *GOAL MET 11/6/17  2. Decrease LBP at worst to </= 2/10. *GOAL MET 12/20/17  3. Patient will be able to ascend/descend 4 steps without an increase in symptoms. *GOAL MET 12/18/17    LTGs to be met in the next 4 weeks  1. Abolish all low back and hip pain.  2. Patient will tolerate standing >/= 20 minutes in Jainism without an increase in symptoms.  3. Patient will be able to climb a flight of stairs without an increase in " symptoms.  4. Patient will be between 20% and 40% (CJ) limited in mobility.    Plan   Progress core and LE strength as tolerated. Check and adjust patient's heel lift.    Therapist: Michelle Waldrop, PT, DPT

## 2018-01-31 ENCOUNTER — CLINICAL SUPPORT (OUTPATIENT)
Dept: REHABILITATION | Facility: HOSPITAL | Age: 63
End: 2018-01-31
Attending: NURSE PRACTITIONER
Payer: MEDICARE

## 2018-01-31 ENCOUNTER — TELEPHONE (OUTPATIENT)
Dept: PAIN MEDICINE | Facility: CLINIC | Age: 63
End: 2018-01-31

## 2018-01-31 DIAGNOSIS — R29.898 WEAKNESS OF BOTH LOWER EXTREMITIES: ICD-10-CM

## 2018-01-31 DIAGNOSIS — Z78.9 DIFFICULTY NAVIGATING STAIRS: ICD-10-CM

## 2018-01-31 DIAGNOSIS — M25.552 PAIN OF LEFT HIP JOINT: ICD-10-CM

## 2018-01-31 DIAGNOSIS — M54.50 LOW BACK PAIN POTENTIALLY ASSOCIATED WITH RADICULOPATHY: Primary | ICD-10-CM

## 2018-01-31 PROCEDURE — 97110 THERAPEUTIC EXERCISES: CPT

## 2018-01-31 PROCEDURE — 97140 MANUAL THERAPY 1/> REGIONS: CPT

## 2018-01-31 NOTE — TELEPHONE ENCOUNTER
Patient had an hip break out  while she was in physical therapy and she has been in pain ever since. Patient would like to be given something stronger for pain or would like to come in to be seen for a possible injection.    Staff offered the patient an appointment to be seen tomorrow 2/1/18 with Sylvia Beckwith NP at 8:20 am.    Patient accepted the appointment and verbalized understanding and expressed thanks.
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
## 2018-01-31 NOTE — TELEPHONE ENCOUNTER
----- Message from Ryanne Tucker sent at 1/31/2018 12:24 PM CST -----  x_  1st Request  _  2nd Request  _  3rd Request        Who: zafar    Why: pt. Would like to speak with ori regarding outbreak. Please call to discuss    What Number to Call Back:[phone]    When to Expect a call back: (Before the end of the day)   -- if the call is after 12:00, the call back will be tomorrow.

## 2018-02-01 ENCOUNTER — OFFICE VISIT (OUTPATIENT)
Dept: PAIN MEDICINE | Facility: CLINIC | Age: 63
End: 2018-02-01
Payer: MEDICARE

## 2018-02-01 VITALS
WEIGHT: 217.81 LBS | DIASTOLIC BLOOD PRESSURE: 99 MMHG | SYSTOLIC BLOOD PRESSURE: 148 MMHG | HEART RATE: 91 BPM | HEIGHT: 65 IN | BODY MASS INDEX: 36.29 KG/M2 | TEMPERATURE: 97 F

## 2018-02-01 DIAGNOSIS — G89.4 CHRONIC PAIN SYNDROME: ICD-10-CM

## 2018-02-01 DIAGNOSIS — M70.62 GREATER TROCHANTERIC BURSITIS, LEFT: ICD-10-CM

## 2018-02-01 DIAGNOSIS — M79.18 MYOFASCIAL PAIN: Primary | ICD-10-CM

## 2018-02-01 DIAGNOSIS — M47.816 FACET ARTHRITIS OF LUMBAR REGION: ICD-10-CM

## 2018-02-01 DIAGNOSIS — M54.16 LUMBAR RADICULOPATHY: ICD-10-CM

## 2018-02-01 DIAGNOSIS — M53.3 SACROILIAC JOINT PAIN: ICD-10-CM

## 2018-02-01 DIAGNOSIS — M47.816 LUMBAR SPONDYLOSIS: ICD-10-CM

## 2018-02-01 DIAGNOSIS — M51.36 DDD (DEGENERATIVE DISC DISEASE), LUMBAR: ICD-10-CM

## 2018-02-01 PROCEDURE — 3008F BODY MASS INDEX DOCD: CPT | Mod: S$GLB,,, | Performed by: NURSE PRACTITIONER

## 2018-02-01 PROCEDURE — 99213 OFFICE O/P EST LOW 20 MIN: CPT | Mod: 25,S$GLB,, | Performed by: NURSE PRACTITIONER

## 2018-02-01 PROCEDURE — 99999 PR PBB SHADOW E&M-EST. PATIENT-LVL III: CPT | Mod: PBBFAC,,, | Performed by: NURSE PRACTITIONER

## 2018-02-01 PROCEDURE — 20553 NJX 1/MLT TRIGGER POINTS 3/>: CPT | Mod: S$GLB,,, | Performed by: NURSE PRACTITIONER

## 2018-02-01 RX ORDER — METHYLPREDNISOLONE ACETATE 40 MG/ML
40 INJECTION, SUSPENSION INTRA-ARTICULAR; INTRALESIONAL; INTRAMUSCULAR; SOFT TISSUE ONCE
Status: COMPLETED | OUTPATIENT
Start: 2018-02-01 | End: 2018-02-01

## 2018-02-01 RX ORDER — OXYCODONE AND ACETAMINOPHEN 5; 325 MG/1; MG/1
1 TABLET ORAL EVERY 12 HOURS PRN
Qty: 10 TABLET | Refills: 0 | Status: SHIPPED | OUTPATIENT
Start: 2018-02-01 | End: 2018-02-06

## 2018-02-01 RX ORDER — METHOCARBAMOL 500 MG/1
500 TABLET, FILM COATED ORAL 3 TIMES DAILY PRN
Qty: 30 TABLET | Refills: 2 | Status: SHIPPED | OUTPATIENT
Start: 2018-02-01 | End: 2018-02-11

## 2018-02-01 RX ORDER — OXYCODONE AND ACETAMINOPHEN 5; 325 MG/1; MG/1
1 TABLET ORAL EVERY 12 HOURS PRN
Qty: 10 TABLET | Refills: 0 | Status: SHIPPED | OUTPATIENT
Start: 2018-02-01 | End: 2018-02-01 | Stop reason: SDUPTHER

## 2018-02-01 RX ORDER — BUPIVACAINE HYDROCHLORIDE 5 MG/ML
9 INJECTION, SOLUTION EPIDURAL; INTRACAUDAL
Status: COMPLETED | OUTPATIENT
Start: 2018-02-01 | End: 2018-02-01

## 2018-02-01 RX ADMIN — BUPIVACAINE HYDROCHLORIDE 45 MG: 5 INJECTION, SOLUTION EPIDURAL; INTRACAUDAL at 09:02

## 2018-02-01 RX ADMIN — METHYLPREDNISOLONE ACETATE 40 MG: 40 INJECTION, SUSPENSION INTRA-ARTICULAR; INTRALESIONAL; INTRAMUSCULAR; SOFT TISSUE at 09:02

## 2018-02-01 NOTE — PROGRESS NOTES
Chronic Pain - Established Visit    Referring Physician: No ref. provider found    Chief Complaint:   Chief Complaint   Patient presents with    Hip Pain     Left side        SUBJECTIVE: Disclaimer: This note has been generated using voice-recognition software. There may be typographical errors that have been missed during proof-reading    Interval History 2/1/2018:  The patient returns to clinic today for follow up. She reports increased left hip pain for the last week after moving some boxes. She reports that the pain is in her left hip and buttock pain that is sharp, constant, aching, and stabbing in nature. She denies any radiating leg pain. She reports intermittent muscle spasms. She has tried Zanaflex with limited benefit. She did take Tramadol yesterday with no benefit. She continues to participate in physical therapy with benefit. She denies any other health changes. She denies any bowel or bladder incontinence. Her pain today is 8/10.    Interval History 12/4/2017:  The patient returns to clinic today for follow up. She continues to report benefit of her left hip and buttock pain from previous injection. She reports low back pain that is tolerable at this time. She denies any radicular pain. She continues to participate in physical therapy with benefit. She reports intermittent muscle spasms, particularly after mopping at her home. She is no longer taking Gabapentin as she did not like the way it made her feel. She was previously given Tramadol which she takes sparingly. Her last prescription was in August. She denies any other health changes. She denies any bowel or bladder incontinence. Her pain today is 3/10.    Interval History 9/14/2017:  The patient returns to clinic today for follow up. She is s/p left SI joint injection on 8/23/2017. She reports 95% relief of her left hip and buttock pain. She reports numbness to her groin and left upper thigh. She denies any radicular pain. She also reports some  pain to her lateral left thigh. This pain is tolerable at this time. She has decreased her Gabapentin to 300 mg TID due to side effects. She continues to report benefit with Ibuprofen. She denies any other health changes. She denies any bowel or bladder incontinence. Her pain today is 1/10.    Initial encounter:    Dorothy Burt presents to the clinic for the evaluation of left hip pain. The pain started 07/21/17 ago and symptoms have been worsening.    Brief history:    Pain Description:    The pain is located in the left hip area and radiates to the buttock and left thigh.      At BEST  6/10     At WORST  6/10 on the WORST day.      On average pain is rated as 6/10.     Today the pain is rated as 6/10    The pain is described as burning, sharp, shooting, stabbing, throbbing, tingling and aching      Symptoms interfere with daily activity, sleeping, work and .     Exacerbating factors: Sitting, Standing, Laying, Bending, Touching, Coughing/Sneezing, Walking, Extension, Flexing, Lifting, Getting out of bed/chair and.      Mitigating factors heat, ice, medications.    Patient denies night fever/night sweats, urinary incontinence, bowel incontinence, significant weight loss, significant motor weakness, loss of sensations .  Patient denies any suicidal or homicidal ideations    Pain Medications:  Current:  Zanaflex  Tramadol  duexis     Tried in Past:  NSAIDs -yes  TCA -Never  SNRI -Never  Anti-convulsants -gabapentin  Muscle Relaxants -robaxin  Opioids-Never    Physical Therapy/Home Exercise: no       report:  Reviewed and consistent with medication use as prescribed.    Pain Procedures:   8/23/2017- Left SI joint injection    Chiropractor -never  Acupuncture - never  TENS unit -never  Spinal decompression -never  Joint replacement -never    Imaging:   MRI of lumbar spine DDD with stenosis at L3/4, L2/3    Past Medical History:   Diagnosis Date    Arthritis     Asthma     Back pain     Knee pain     Thyroid  disease      Past Surgical History:   Procedure Laterality Date    CHOLECYSTECTOMY      VASCULAR SURGERY       Social History     Social History    Marital status:      Spouse name: N/A    Number of children: N/A    Years of education: N/A     Occupational History    Not on file.     Social History Main Topics    Smoking status: Never Smoker    Smokeless tobacco: Never Used    Alcohol use Yes    Drug use: No    Sexual activity: Not on file     Other Topics Concern    Not on file     Social History Narrative    No narrative on file     Family History   Problem Relation Age of Onset    Stroke Mother        Review of patient's allergies indicates:  No Known Allergies    Current Outpatient Prescriptions   Medication Sig    acyclovir (ZOVIRAX) 400 MG tablet     betamethasone dipropionate (DIPROLENE) 0.05 % cream     cetirizine (ZYRTEC) 10 MG tablet Take 10 mg by mouth once daily.    famotidine (PEPCID) 20 MG tablet     levothyroxine (SYNTHROID) 125 MCG tablet Take 125 mcg by mouth once daily.    levothyroxine (SYNTHROID) 25 MCG tablet     methocarbamol (ROBAXIN) 500 MG Tab TAKE 2 TABLETS 4 TIMES DAILY    naproxen (NAPROSYN) 500 MG tablet Take 1 tablet (500 mg total) by mouth 2 (two) times daily as needed.    nystatin (MYCOSTATIN) cream     omeprazole (PRILOSEC) 20 MG capsule     tiZANidine (ZANAFLEX) 2 MG tablet      No current facility-administered medications for this visit.        REVIEW OF SYSTEMS:    GENERAL:  No weight loss, malaise or fevers.  HEENT:   No recent changes in vision or hearing  NECK:  Negative for lumps, no difficulty with swallowing.  RESPIRATORY:  Negative for cough, wheezing or shortness of breath, patient denies any recent URI.  CARDIOVASCULAR:  Negative for chest pain, leg swelling or palpitations.  GI:  Negative for abdominal discomfort, blood in stools or black stools or change in bowel habits. GERD controlled with omeprazole.  MUSCULOSKELETAL:  See HPI.  SKIN:   "Negative for lesions, rash, and itching.  PSYCH:  No mood disorder or recent psychosocial stressors.  Patient's sleep is  disturbed secondary to pain.  HEMATOLOGY/LYMPHOLOGY:  Negative for prolonged bleeding, bruising easily or swollen nodes.  Patient is not currently taking any anti-coagulants  ENDO: hypothyroidism on stable dose of synthroid  NEURO:   No history of headaches, syncope, paralysis, seizures or tremors.  All other reviewed and negative other than HPI.    OBJECTIVE:    BP (!) 148/99   Pulse 91   Temp 97.1 °F (36.2 °C)   Ht 5' 5" (1.651 m)   Wt 98.8 kg (217 lb 13 oz)   BMI 36.25 kg/m²     PHYSICAL EXAMINATION:    GENERAL: Well appearing, in no acute distress, alert and oriented x3.  PSYCH:  Mood and affect appropriate.  SKIN: Skin color, texture, turgor normal, no rashes or lesions.  HEAD/FACE:  Normocephalic, atraumatic. Cranial nerves grossly intact.  CV: RRR with palpation of the radial artery.  PULM: No evidence of respiratory difficulty, symmetric chest rise.  BACK: Straight leg raising in the sitting and supine positions is negative to radicular pain. There is pain with palpation over left lumbar paraspinals and gluteus. There is mild pain with palpation over lumbar facet joints. Limited ROM with mild pain on extension. Positive facet loading bilaterally.    EXTREMITIES: Peripheral joint ROM is full and pain free without obvious instability or laxity in all four extremities. No deformities, edema, or skin discoloration. Good capillary refill.  MUSCULOSKELETAL: Hip, and knee provocative maneuvers are negative.  There is pain with palpation over the sacroiliac joints on the left. There is pain to palpation over the greater trochanteric bursa on the left.  FABERs test is negative. FADIRs test is negative.   Bilateral lower extremity strength is normal and symmetric.  No atrophy or tone abnormalities are noted.  NEURO: Bilateral lower extremity coordination and muscle stretch reflexes are " physiologic and symmetric.  Plantar response are downgoing. No clonus.  No loss of sensation is noted.  GAIT: Antalgic, ambulates without assistance         ASSESSMENT: 63 y.o. year old female with pain, consistent with     Encounter Diagnoses   Name Primary?    Myofascial pain Yes    Sacroiliac joint pain     Facet arthritis of lumbar region     Lumbar spondylosis     DDD (degenerative disc disease), lumbar     Lumbar radiculopathy     Greater trochanteric bursitis, left     Chronic pain syndrome        PLAN:     - Previous imaging was reviewed and discussed with the patient today.    - Trigger point injections as per below.     - If limited relief with above, we can consider left SI joint injection.     - Continue physical therapy and home exercise program.     - Continue Ibuprofen.     - Robaxin 500 mg TID PRN muscle pain.     - Percocet 5/325 mg every 12 hours PRN, #10, 0 refills. Medication management provided by Dr. Stafford.     - RTC in 2 weeks.     - Dr. Stafford was consulted on the patient and agrees with this plan.    The above plan and management options were discussed at length with patient. Patient is in agreement with the above and verbalized understanding.     Sylvia Beckwith NP  02/01/2018     Trigger Point Injection:   The procedure was discussed with the patient including complications of nerve damage,  bleeding, infection, and failure of pain relief.   Trigger points were identified by palpation and marked. Alcohol prep of sites done. A mixture of 9mL 0.5% bupivacaine +40mg Depo-Medrol was prepared (10 mL total).   A 27-gauge needle was advanced to the point of maximal tenderness, and  1 mL  was injected after negative aspiration. All sites done in the same manner. Patient tolerated the procedure well and without complications. Sites injected included: lumbar paraspinal x3 on the left, gluteus x3 on the left. (6 total)

## 2018-02-02 ENCOUNTER — CLINICAL SUPPORT (OUTPATIENT)
Dept: REHABILITATION | Facility: HOSPITAL | Age: 63
End: 2018-02-02
Attending: NURSE PRACTITIONER
Payer: MEDICARE

## 2018-02-02 DIAGNOSIS — M25.552 PAIN OF LEFT HIP JOINT: ICD-10-CM

## 2018-02-02 DIAGNOSIS — M54.50 LOW BACK PAIN POTENTIALLY ASSOCIATED WITH RADICULOPATHY: Primary | ICD-10-CM

## 2018-02-02 DIAGNOSIS — R29.898 WEAKNESS OF BOTH LOWER EXTREMITIES: ICD-10-CM

## 2018-02-02 DIAGNOSIS — Z78.9 DIFFICULTY NAVIGATING STAIRS: ICD-10-CM

## 2018-02-02 PROCEDURE — 97140 MANUAL THERAPY 1/> REGIONS: CPT

## 2018-02-02 PROCEDURE — G8979 MOBILITY GOAL STATUS: HCPCS | Mod: CK

## 2018-02-02 PROCEDURE — 97110 THERAPEUTIC EXERCISES: CPT

## 2018-02-02 PROCEDURE — G8978 MOBILITY CURRENT STATUS: HCPCS | Mod: CK

## 2018-02-02 NOTE — PLAN OF CARE
"                                                    Physical Therapy Daily Note     Date: 2018  Name: Dorothy Burt  [facility] Number: [id number]  Diagnosis:   Encounter Diagnoses   Name Primary?    Pain of left hip joint     Low back pain potentially associated with radiculopathy Yes    Weakness of both lower extremities     Difficulty navigating stairs      Physician: Sylvia Beckwith NP    Evaluation Date: 17  Visit #:20 (4 of 5 new auth)  Start Time:  810  Stop Time:  900  Total Treatment Time: 50    Precautions: standard    Authorization period: 18  Plan of care Expiration: 18      Subjective   Pt reports increased L sided low back pain (points to L piriformis). Pt does not verbalize a number for her pain. Pt reports that she saw her NP yesterday and she received various steroid shots in her painful area because she was feeling even worse than when she came in on Wednesday. Pt reports she is going to the [place] at the gym right after treatment.    Objective   L posteriorly rotated inominate pre-tx.    Patient received individual therapy to increase strength, endurance, ROM, flexibility and core stabilization with activities as follows:     Dorothy COOK received therapeutic exercises to develop strength, endurance, ROM, flexibility and core stabilization for 10 minutes includin/02/2018   Abdominal bracing 15x5" NP   Supine TA marches 1x10 *increased pain   Supine 90/90 heel taps 3x10 NP   Supine lat pull Maroon TB x30 np   Pelvic tilts 30x5"   Bridge with adduction 3x10    pallof press GTB 2x20 B NP   gastroc stretch standing x1 min NP   Calf raises 3x10 NP   Hamstring stretch 3x30" B   Standing shoulder extension  OTB x30 NP   Prashant stretch x1 min B    Piriformis stretch 3x30" B   clamshells BTB x30 B NP   Prone quad stretch x1 min B np   Supine pec minor stretch x3 min np   Hip abduction machine 55# 3x10 np   Hip adduction machine 50# 3x10 NP   Step ups 4" 3x10 B   Wall squats 3x10 NP "       Pt received the following manual therapy techniques for 15 minutes   L sidelying rotation mobilizations   MET for L posteriorly rotated inominate   STM to L piriformis      Written Home Exercises Provided: no new exercises provided this treatment  Pt demo good understanding of the education provided. Dorothy COOK demonstrated good return demonstration of activities.       HEP2Go Code: [id number]    Education provided: Patient educated on the importance of proper lifting mechanics. Pt educated to test out heel lift and if pain increases to lower the heel lift.  Dorothy COOK verbalized good understanding of education provided.   No spiritual or educational barriers to learning identified.    PT reviewed FOTO scores for Dorothy COOK Stant on 11/22/17  FOTO score: 53    Functional Limitations Reports - G Codes  Category: mobility  Tool: FOTO      Current ():  CK  Goal (): CK      Assessment   Patient with increased pain this session that was not relieved with manual therapy. Pt tolerated all manual therapy well with minimal decreases in pain, however, was aggravated by all strengthening exercises. Pt encouraged to keep stretching her piriformis throughout the day and to keep moving. Pt may be more aggravated today after receiving steroid injections yesterday. As patient has had a complicated path of therapy with multiple complications throughout including interruptions of therapy and medical complications, patient has not made the progress initially expected; I recommend continuation of therapy at this time. Pt will continue to benefit from skilled PT in order to decrease pain, increase core and postural stability, increase LE strength, and improve functional mobility.      New Goals  STGs to be met in the next 2 weeks.  1. Patient will be independent and compliant in HEP. *GOAL MET 11/6/17  2. Decrease LBP at worst to </= 2/10. *GOAL MET 12/20/17  3. Patient will be able to ascend/descend 4 steps without an increase in  symptoms. *GOAL MET 12/18/17    LTGs to be met in the next 4 weeks  1. Abolish all low back and hip pain.  2. Patient will tolerate standing >/= 20 minutes in Bahai without an increase in symptoms.  3. Patient will be able to climb a flight of stairs without an increase in symptoms.  4. Patient will be between 20% and 40% (CJ) limited in mobility.    Plan   Progress core and LE strength as tolerated. Ptt will be seen 2x/week for 8 weeks for therex, manual therapy, neuromuscular reeducation, HEP, and modalities as indicated.    Therapist: Michelle Waldrop, PT, DPT

## 2018-02-02 NOTE — PROGRESS NOTES
"                                                    Physical Therapy Daily Note     Date: 2018  Name: Dorothy Burt  [facility] Number: [id number]  Diagnosis:   Encounter Diagnoses   Name Primary?    Pain of left hip joint     Low back pain potentially associated with radiculopathy Yes    Weakness of both lower extremities     Difficulty navigating stairs      Physician: Syvlia Beckwith NP    Evaluation Date: 17  Visit #:20 (4 of 5 new auth)  Start Time:  810  Stop Time:  900  Total Treatment Time: 50    Precautions: standard    Authorization period: 18  Plan of care Expiration: 18      Subjective   Pt reports increased L sided low back pain (points to L piriformis). Pt does not verbalize a number for her pain. Pt reports that she saw her NP yesterday and she received various steroid shots in her painful area because she was feeling even worse than when she came in on Wednesday. Pt reports she is going to the [employer] at the gym right after treatment.    Objective   L posteriorly rotated inominate pre-tx.    Patient received individual therapy to increase strength, endurance, ROM, flexibility and core stabilization with activities as follows:     Dorothy COOK received therapeutic exercises to develop strength, endurance, ROM, flexibility and core stabilization for 10 minutes includin/02/2018   Abdominal bracing 15x5" NP   Supine TA marches 1x10 *increased pain   Supine 90/90 heel taps 3x10 NP   Supine lat pull Maroon TB x30 np   Pelvic tilts 30x5"   Bridge with adduction 3x10    pallof press GTB 2x20 B NP   gastroc stretch standing x1 min NP   Calf raises 3x10 NP   Hamstring stretch 3x30" B   Standing shoulder extension  OTB x30 NP   Prashant stretch x1 min B    Piriformis stretch 3x30" B   clamshells BTB x30 B NP   Prone quad stretch x1 min B np   Supine pec minor stretch x3 min np   Hip abduction machine 55# 3x10 np   Hip adduction machine 50# 3x10 NP   Step ups 4" 3x10 B   Wall squats 3x10 NP "       Pt received the following manual therapy techniques for 15 minutes   L sidelying rotation mobilizations   MET for L posteriorly rotated inominate   STM to L piriformis      Written Home Exercises Provided: no new exercises provided this treatment  Pt demo good understanding of the education provided. Dorothy COOK demonstrated good return demonstration of activities.       HEP2Go Code: [id number]    Education provided: Patient educated on the importance of proper lifting mechanics. Pt educated to test out heel lift and if pain increases to lower the heel lift.  Dorothy COOK verbalized good understanding of education provided.   No spiritual or educational barriers to learning identified.    PT reviewed FOTO scores for Dorothy COOK Stant on 11/22/17  FOTO score: 53    Functional Limitations Reports - G Codes  Category: mobility  Tool: FOTO      Current ():  CK  Goal (): CK      Assessment   Patient with increased pain this session that was not relieved with manual therapy. Pt tolerated all manual therapy well with minimal decreases in pain, however, was aggravated by all strengthening exercises. Pt encouraged to keep stretching her piriformis throughout the day and to keep moving. Pt may be more aggravated today after receiving steroid injections yesterday. As patient has had a complicated path of therapy with multiple complications throughout including interruptions of therapy and medical complications, patient has not made the progress initially expected; I recommend continuation of therapy at this time. Pt will continue to benefit from skilled PT in order to decrease pain, increase core and postural stability, increase LE strength, and improve functional mobility.      New Goals  STGs to be met in the next 2 weeks.  1. Patient will be independent and compliant in HEP. *GOAL MET 11/6/17  2. Decrease LBP at worst to </= 2/10. *GOAL MET 12/20/17  3. Patient will be able to ascend/descend 4 steps without an increase in  symptoms. *GOAL MET 12/18/17    LTGs to be met in the next 4 weeks  1. Abolish all low back and hip pain.  2. Patient will tolerate standing >/= 20 minutes in [place] without an increase in symptoms.  3. Patient will be able to climb a flight of stairs without an increase in symptoms.  4. Patient will be between 20% and 40% (CJ) limited in mobility.    Plan   Progress core and LE strength as tolerated. Ptt will be seen 2x/week for 8 weeks for therex, manual therapy, neuromuscular reeducation, HEP, and modalities as indicated.    Therapist: Michelle Waldrop, PT, DPT

## 2018-02-07 ENCOUNTER — CLINICAL SUPPORT (OUTPATIENT)
Dept: REHABILITATION | Facility: HOSPITAL | Age: 63
End: 2018-02-07
Attending: NURSE PRACTITIONER
Payer: MEDICARE

## 2018-02-07 DIAGNOSIS — Z78.9 DIFFICULTY NAVIGATING STAIRS: ICD-10-CM

## 2018-02-07 DIAGNOSIS — M25.552 PAIN OF LEFT HIP JOINT: ICD-10-CM

## 2018-02-07 DIAGNOSIS — R29.898 WEAKNESS OF BOTH LOWER EXTREMITIES: ICD-10-CM

## 2018-02-07 DIAGNOSIS — M54.50 LOW BACK PAIN POTENTIALLY ASSOCIATED WITH RADICULOPATHY: Primary | ICD-10-CM

## 2018-02-07 PROCEDURE — G8979 MOBILITY GOAL STATUS: HCPCS | Mod: CK

## 2018-02-07 PROCEDURE — G8978 MOBILITY CURRENT STATUS: HCPCS | Mod: CK

## 2018-02-07 PROCEDURE — 97110 THERAPEUTIC EXERCISES: CPT

## 2018-02-07 NOTE — PROGRESS NOTES
"                                                    Physical Therapy Daily Note     Date: 2018  Name: Dorothy Burt  [facility] Number: [id number]  Diagnosis:   Encounter Diagnoses   Name Primary?    Pain of left hip joint     Low back pain potentially associated with radiculopathy Yes    Weakness of both lower extremities     Difficulty navigating stairs      Physician: Sylvia Beckwith NP    Evaluation Date: 17  Visit #:20 (2 of 10)  Start Time:  808  Stop Time:  900  Total Treatment Time: 50    Precautions: standard    Authorization period: 3/5/18  Plan of care Expiration: 18      Subjective   Pt reports increased L sided low back pain (points to L sacral base). Pt does not verbalize a number for her pain. Pt reports she just ran out of Percocet and is in pain but she is feeling better than she was last session.    Post-tx: pt reports she no longer feels L low back pain and is feeling much better post tx.    Objective   Positive standing forward bend R  postive Gillets R    Patient received individual therapy to increase strength, endurance, ROM, flexibility and core stabilization with activities as follows:     Dorothy COOK received therapeutic exercises to develop strength, endurance, ROM, flexibility and core stabilization for 20 minutes includin/07/2018   Abdominal bracing 15x5" NP   Supine TA marches 2x10   Supine 90/90 heel taps 3x10 NP   Supine lat pull Maroon TB x30 np   Pelvic tilts 30x5"   Bridge with adduction 3x10    pallof press GTB 2x20 B    gastroc stretch standing x1 min NP   Calf raises 3x10 NP   Hamstring stretch 3x30" B np   Standing shoulder extension  GTB x30    Prashant stretch x1 min B np   Piriformis stretch 3x30" B   clamshells BTB x30 B NP   Prone quad stretch x1 min B np   Supine pec minor stretch x3 min np   Hip abduction machine 55# 3x10    Hip adduction machine 50# 3x10    Step ups 4" 3x10 B np   Wall squats 3x10 NP       Pt received the following manual therapy " techniques for 5 minutes   R sacral base PA mobilizations grade 2-3      Pt received ice to low back for 10 minutes post tx.    Written Home Exercises Provided: no new exercises provided this treatment  Pt demo good understanding of the education provided. Dorothy COOK demonstrated good return demonstration of activities.       HEP2Go Code: [id number]    Education provided: Patient educated on the importance of proper lifting mechanics. Pt educated to test out heel lift and if pain increases to lower the heel lift.  Dorothy COOK verbalized good understanding of education provided.   No spiritual or educational barriers to learning identified.    PT reviewed FOTO scores for Dorothy COOK St[place] on 2/7/18  FOTO score: 47    Functional Limitations Reports - G Codes  Category: mobility  Tool: FOTO      Current ():  CK  Goal (): CK        Assessment   Pt entered tx with increased pain in L sacral base again this session. Testing revealed decreased R sacral mobility, so R sacral base was mobilized; pt reported decreased pain after mobilizations. Pt tolerated the rest of treatment session well and left session without pain. According to FOTO, pt is between 40% and 60% limited in mobility and her score has not improved since initial evaluation. Pt answered the questionnaire prior to treatment when she had increased pain, so this may skew her answers. Pt will continue to benefit from skilled PT in order to decrease pain, increase core and postural stability, increase LE strength, and improve functional mobility.      New Goals  STGs to be met in the next 2 weeks.  1. Patient will be independent and compliant in HEP. *GOAL MET 11/6/17  2. Decrease LBP at worst to </= 2/10. *GOAL MET 12/20/17  3. Patient will be able to ascend/descend 4 steps without an increase in symptoms. *GOAL MET 12/18/17    LTGs to be met in the next 4 weeks  1. Abolish all low back and hip pain.  2. Patient will tolerate standing >/= 20 minutes in [employer] without an  increase in symptoms.  3. Patient will be able to climb a flight of stairs without an increase in symptoms.  4. Patient will be between 20% and 40% (CJ) limited in mobility.    Plan   Progress core and LE strength as tolerated. Ptt will be seen 2x/week for 8 weeks for therex, manual therapy, neuromuscular reeducation, HEP, and modalities as indicated.    Therapist: Michelle Waldrop, PT, DPT

## 2018-02-14 ENCOUNTER — CLINICAL SUPPORT (OUTPATIENT)
Dept: REHABILITATION | Facility: HOSPITAL | Age: 63
End: 2018-02-14
Attending: NURSE PRACTITIONER
Payer: MEDICARE

## 2018-02-14 DIAGNOSIS — Z78.9 DIFFICULTY NAVIGATING STAIRS: ICD-10-CM

## 2018-02-14 DIAGNOSIS — M54.50 LOW BACK PAIN POTENTIALLY ASSOCIATED WITH RADICULOPATHY: Primary | ICD-10-CM

## 2018-02-14 DIAGNOSIS — M25.552 PAIN OF LEFT HIP JOINT: ICD-10-CM

## 2018-02-14 DIAGNOSIS — R29.898 WEAKNESS OF BOTH LOWER EXTREMITIES: ICD-10-CM

## 2018-02-14 PROCEDURE — 97110 THERAPEUTIC EXERCISES: CPT

## 2018-02-14 NOTE — PROGRESS NOTES
"                                                    Physical Therapy Daily Note     Date: 2018  Name: Dorothy Burt  [facility] Number: [id number]  Diagnosis:   Encounter Diagnoses   Name Primary?    Pain of left hip joint     Low back pain potentially associated with radiculopathy Yes    Weakness of both lower extremities     Difficulty navigating stairs      Physician: Sylvia Beckwith NP    Evaluation Date: 17  Visit #:21 (3 of 10)  Start Time:  800  Stop Time:  900  Total Treatment Time: 60    Precautions: standard    Authorization period: 3/5/18  Plan of care Expiration: 18      Subjective   Pt reports minimal soreness across midline low back back. Did not verbalize number. Pt reports she felt so much better in L side of low back after mobilizations last treatment session.    Objective   Negative standing forward flexion test  postive Gillets R    Patient received individual therapy to increase strength, endurance, ROM, flexibility and core stabilization with activities as follows:     Dorothy COOK received therapeutic exercises to develop strength, endurance, ROM, flexibility and core stabilization for 25 minutes includin/14/2018   Abdominal bracing 15x5" NP   Supine TA marches 3x10   Supine 90/90 TA holds 5x10"   Supine lat pull blue TB x30    Pelvic tilts 20x5"   Bridge with adduction 3x10    pallof press GTB 2x20 B np    gastroc stretch standing x1 min NP   Calf raises 3x10 NP   Hamstring stretch 3x30" B    Standing shoulder extension  GTB x30 np   Prashant stretch x1 min B np   Piriformis stretch 3x30" B   clamshells BTB x30 B NP   Prone quad stretch x1 min B np   Supine pec minor stretch x3 min np   Hip abduction machine 55# 3x10 np    Hip adduction machine 50# 3x10 np   Step ups 4" 3x10 B np   Wall squats 3x10 with add squeeze       Pt received the following manual therapy techniques for 5 minutes   R sacral base PA mobilizations grade 2-3      Pt received ice to low back for 10 minutes post " tx.    Written Home Exercises Provided: no new exercises provided this treatment  Pt demo good understanding of the education provided. Dorothy COOK demonstrated good return demonstration of activities.       HEP2Go Code: [id number]    Education provided: Patient educated on the importance of proper lifting mechanics. Pt educated to test out heel lift and if pain increases to lower the heel lift.  Dorothy COOK verbalized good understanding of education provided.   No spiritual or educational barriers to learning identified.    PT reviewed FOTO scores for Dorothy Boscht on 2/7/18  FOTO score: 47    Functional Limitations Reports - G Codes  Category: mobility  Tool: FOTO      Current ():  CK  Goal (): CK        Assessment   Pt presented to treatment with decreased pain and negative standing forward flexion test. Pt with positive Gillet's marching test for R sacral involvement pre-tx. Pt with improved tolerance to exercise this session and able to perform all exercises without complaints of pain . Pt will continue to benefit from skilled PT in order to decrease pain, increase core and postural stability, increase LE strength, and improve functional mobility.      New Goals  STGs to be met in the next 2 weeks.  1. Patient will be independent and compliant in HEP. *GOAL MET 11/6/17  2. Decrease LBP at worst to </= 2/10. *GOAL MET 12/20/17  3. Patient will be able to ascend/descend 4 steps without an increase in symptoms. *GOAL MET 12/18/17    LTGs to be met in the next 4 weeks  1. Abolish all low back and hip pain.  2. Patient will tolerate standing >/= 20 minutes in [place]ars[place] without an increase in symptoms.  3. Patient will be able to climb a flight of stairs without an increase in symptoms.  4. Patient will be between 20% and 40% (CJ) limited in mobility.    Plan   Progress core and LE strength as tolerated. Ptt will be seen 2x/week for 8 weeks for therex, manual therapy, neuromuscular reeducation, HEP, and modalities as  indicated.    Therapist: Michelle Waldrop, PT, DPT

## 2018-02-21 ENCOUNTER — CLINICAL SUPPORT (OUTPATIENT)
Dept: REHABILITATION | Facility: HOSPITAL | Age: 63
End: 2018-02-21
Attending: NURSE PRACTITIONER
Payer: MEDICARE

## 2018-02-21 DIAGNOSIS — M25.552 PAIN OF LEFT HIP JOINT: ICD-10-CM

## 2018-02-21 DIAGNOSIS — M54.50 LOW BACK PAIN POTENTIALLY ASSOCIATED WITH RADICULOPATHY: Primary | ICD-10-CM

## 2018-02-21 DIAGNOSIS — R29.898 WEAKNESS OF BOTH LOWER EXTREMITIES: ICD-10-CM

## 2018-02-21 DIAGNOSIS — Z78.9 DIFFICULTY NAVIGATING STAIRS: ICD-10-CM

## 2018-02-21 PROCEDURE — 97110 THERAPEUTIC EXERCISES: CPT

## 2018-02-21 NOTE — PROGRESS NOTES
"                                                    Physical Therapy Daily Note     Date: 2018  Name: Dorothy Burt  [facility] Number: [id number]  Diagnosis:   Encounter Diagnoses   Name Primary?    Pain of left hip joint     Low back pain potentially associated with radiculopathy Yes    Weakness of both lower extremities     Difficulty navigating stairs      Physician: Sylvia Beckwith NP    Evaluation Date: 17  Visit #:22 (4 of 10)  Start Time:  700  Stop Time:  800  Total Treatment Time: 60    Precautions: standard    Authorization period: 3/5/18  Plan of care Expiration: 18      Subjective   Pt reports increased pain over L sided low back and at times a shooting pain down her buttock not past her knee (pt points to R sacrum). Pt does not verbalize number for pain.    Objective   Negative seated forward flexion test  Positive L posterior rotated inominate    Patient received individual therapy to increase strength, endurance, ROM, flexibility and core stabilization with activities as follows:     Dorothy COOK received therapeutic exercises to develop strength, endurance, ROM, flexibility and core stabilization for 25 minutes includin/21/2018   Abdominal bracing 15x5" NP   Supine TA marches 3x10   Supine 90/90 TA holds 5x10" np   Supine lat pull blue TB x30    Pelvic tilts 20x5"   Bridge with adduction 3x10    pallof press GTB 2x20 B np    gastroc stretch standing x1 min NP   Calf raises 3x10 NP   Hamstring stretch 3x30" B    Standing shoulder extension  GTB x30 np   Prashant stretch x1 min B np   Piriformis stretch 3x30" B   clamshells BTB x30 B NP   Prone quad stretch x1 min B np   Supine pec minor stretch x3 min np   Hip abduction machine 55# 3x10     Hip adduction machine 50# 3x10    Step ups 4" 3x10 B np   Sciatic nerve glides L 10x2"   Wall squats 3x10 with add squeeze       Pt received the following manual therapy techniques for 5 minutes   Correction for L posteriorly rotated inominate: " MET      Pt received ice to low back for 10 minutes post tx.    Written Home Exercises Provided: MET for L posteriorly rotated inominate, sciatic nerve glides  Pt demo good understanding of the education provided. Dorothy COOK demonstrated good return demonstration of activities.       Education provided: Patient educated on the importance of proper lifting mechanics. Pt educated to test out heel lift and if pain increases to lower the heel lift.  Dorothy JOYCE verbalized good understanding of education provided.   No spiritual or educational barriers to learning identified.    PT reviewed FOTO scores for Dorothy Burt on 2/7/18  FOTO score: 47    Functional Limitations Reports - G Codes  Category: mobility  Tool: FOTO      Current ():  CK  Goal (): CK        Assessment   Pt presented to treatment with L sacral pain, positive neural tension for L sciatic nerve, and L posteriorly rotated inominate. Added sciatic nerve glides and MET to program to improve sacral dysfunction and decrease neural tension. Pt required verbal cues for proper technique during supine TA marches. Pt will continue to benefit from skilled PT in order to decrease pain, increase core and postural stability, increase LE strength, and improve functional mobility.      New Goals  STGs to be met in the next 2 weeks.  1. Patient will be independent and compliant in HEP. *GOAL MET 11/6/17  2. Decrease LBP at worst to </= 2/10. *GOAL MET 12/20/17  3. Patient will be able to ascend/descend 4 steps without an increase in symptoms. *GOAL MET 12/18/17    LTGs to be met in the next 4 weeks  1. Abolish all low back and hip pain.  2. Patient will tolerate standing >/= 20 minutes in [place]oroast[employer] without an increase in symptoms.  3. Patient will be able to climb a flight of stairs without an increase in symptoms.  4. Patient will be between 20% and 40% (CJ) limited in mobility.    Plan   Progress core and LE strength as tolerated. Ptt will be seen 2x/week for 8 weeks for  therex, manual therapy, neuromuscular reeducation, HEP, and modalities as indicated.    Therapist: Michelle Waldrop, PT, DPT

## 2018-02-26 ENCOUNTER — CLINICAL SUPPORT (OUTPATIENT)
Dept: REHABILITATION | Facility: HOSPITAL | Age: 63
End: 2018-02-26
Attending: NURSE PRACTITIONER
Payer: MEDICARE

## 2018-02-26 DIAGNOSIS — Z78.9 DIFFICULTY NAVIGATING STAIRS: ICD-10-CM

## 2018-02-26 DIAGNOSIS — M54.50 LOW BACK PAIN POTENTIALLY ASSOCIATED WITH RADICULOPATHY: Primary | ICD-10-CM

## 2018-02-26 DIAGNOSIS — M25.552 PAIN OF LEFT HIP JOINT: ICD-10-CM

## 2018-02-26 DIAGNOSIS — R29.898 WEAKNESS OF BOTH LOWER EXTREMITIES: ICD-10-CM

## 2018-02-26 PROCEDURE — 97110 THERAPEUTIC EXERCISES: CPT

## 2018-02-28 ENCOUNTER — CLINICAL SUPPORT (OUTPATIENT)
Dept: REHABILITATION | Facility: HOSPITAL | Age: 63
End: 2018-02-28
Attending: NURSE PRACTITIONER
Payer: MEDICARE

## 2018-02-28 DIAGNOSIS — Z78.9 DIFFICULTY NAVIGATING STAIRS: ICD-10-CM

## 2018-02-28 DIAGNOSIS — R29.898 WEAKNESS OF BOTH LOWER EXTREMITIES: ICD-10-CM

## 2018-02-28 DIAGNOSIS — M54.50 LOW BACK PAIN POTENTIALLY ASSOCIATED WITH RADICULOPATHY: Primary | ICD-10-CM

## 2018-02-28 DIAGNOSIS — M25.552 PAIN OF LEFT HIP JOINT: ICD-10-CM

## 2018-02-28 PROCEDURE — 97140 MANUAL THERAPY 1/> REGIONS: CPT

## 2018-02-28 PROCEDURE — 97110 THERAPEUTIC EXERCISES: CPT

## 2018-02-28 NOTE — PROGRESS NOTES
"                                                    Physical Therapy Daily Note     Date: 2018  Name: Dorothy Burt  [facility] Number: [id number]  Diagnosis:   Encounter Diagnoses   Name Primary?    Pain of left hip joint     Low back pain potentially associated with radiculopathy Yes    Weakness of both lower extremities     Difficulty navigating stairs      Physician: Sylvia Beckwith NP    Evaluation Date: 17  Visit #:24 (6 of 10)  Start Time:  803  Stop Time:  900  Total Treatment Time: 60    Precautions: standard    Authorization period: 3/15/18  Plan of care Expiration: 18      Subjective   Pt reports her pain is slightly improved today but she is still in a lot of pain; did not verbalize a number. Pt reports yesterday all she did was sleep because she took Flexaril and a muscle relaxer. Pt reports that because of this fact she has not tried the MET at home.    Objective   Pre-tx :positive standing forward flexion test L   Negative Gillets R   L posteriorly rotated inominate   Increased tenderness to palpation L piriformis   Positive sciatic nerve neural tension test L    Patient received individual therapy to increase strength, endurance, ROM, flexibility and core stabilization with activities as follows:     Dorothy COOK received therapeutic exercises to develop strength, endurance, ROM, flexibility and core stabilization for 19 minutes includin/28/2018   Abdominal bracing 15x5" NP   Supine TA marches 3x10 np   Supine 90/90 TA holds 5x10" np   Supine lat pull maroon TB x30     Pelvic tilts 20x5"   Bridge with adduction 3x10    pallof press GTB 2x20 B    gastroc stretch standing x1 min NP   Calf raises 3x10 NP   Hamstring stretch 3x30" B     Standing shoulder extension  GTB x30    Prashant stretch x1 min B np   Piriformis stretch 3x30" L   clamshells BTB x30 B NP   Prone quad stretch x1 min B np   Supine pec minor stretch x3 min towel roll   Hip abduction machine 55# 3x10 np    Hip adduction " "machine 50# 3x10 np   Step ups 4" 3x10 B np   Wall squats 3x10 with add squeeze np   MET for L posteriorly rotated inominate 3x3" - performed 3x during treatment at various times    Pt received the following manual therapy techniques for 8 minutes   R piriformis STM      Pt received heat to low back for 10 minutes post tx.    Written Home Exercises Provided: no new exercises provided this treatment  Pt demo good understanding of the education provided. Dorothy COOK demonstrated good return demonstration of activities.       HEP2Go Code: [id number]    Education provided: Pt educated on sciatic nerve and piriformis anatomy and sciatica presentation.  Dorothy COOK verbalized good understanding of education provided.   No spiritual or educational barriers to learning identified.    PT reviewed FOTO scores for Dorothy Boscht on 2/7/18  FOTO score: 47    Functional Limitations Reports - G Codes  Category: mobility  Tool: FOTO      Current ():  CK  Goal (): CK        Assessment   Pt presented to treatment in sacral dysfunction. Pt realigned 3x during treatment and required verbal and tactile cues to perform self MET correctly. Pt hypersensitive to weight bearing on LLE as well as sitting on L buttocks. Treatment today focused on SIJ joint alignment and stabilization once in alignment. Pt will continue to benefit from skilled PT in order to decrease pain, increase core and postural stability, increase LE strength, and improve functional mobility.      New Goals  STGs to be met in the next 2 weeks.  1. Patient will be independent and compliant in HEP. *GOAL MET 11/6/17  2. Decrease LBP at worst to </= 2/10. *GOAL MET 12/20/17  3. Patient will be able to ascend/descend 4 steps without an increase in symptoms. *GOAL MET 12/18/17    LTGs to be met in the next 4 weeks  1. Abolish all low back and hip pain.  2. Patient will tolerate standing >/= 20 minutes in [employer] without an increase in symptoms.  3. Patient will be able to climb a " flight of stairs without an increase in symptoms.  4. Patient will be between 20% and 40% (CJ) limited in mobility.    Plan   Progress core and LE strength as tolerated. Ptt will be seen 2x/week for 8 weeks for therex, manual therapy, neuromuscular reeducation, HEP, and modalities as indicated.    Therapist: Michelle Waldrop, PT, DPT

## 2018-03-06 ENCOUNTER — TELEPHONE (OUTPATIENT)
Dept: PAIN MEDICINE | Facility: CLINIC | Age: 63
End: 2018-03-06

## 2018-03-22 ENCOUNTER — DOCUMENTATION ONLY (OUTPATIENT)
Dept: REHABILITATION | Facility: HOSPITAL | Age: 63
End: 2018-03-22

## 2018-03-22 PROBLEM — M25.552 PAIN OF LEFT HIP JOINT: Status: RESOLVED | Noted: 2017-10-09 | Resolved: 2018-03-22

## 2018-03-22 PROBLEM — R29.898 WEAKNESS OF BOTH LOWER EXTREMITIES: Status: RESOLVED | Noted: 2017-09-26 | Resolved: 2018-03-22

## 2018-03-22 PROBLEM — M54.50 LOW BACK PAIN POTENTIALLY ASSOCIATED WITH RADICULOPATHY: Status: RESOLVED | Noted: 2017-10-09 | Resolved: 2018-03-22

## 2018-03-22 PROBLEM — Z78.9 DIFFICULTY NAVIGATING STAIRS: Status: RESOLVED | Noted: 2017-09-26 | Resolved: 2018-03-22

## 2018-03-22 NOTE — PROGRESS NOTES
Name: Dorothy Burt  Referring Provider: Sylvia Beckwith NP  PT Order: PT evaluate and treat  Clinical #: [id number]  Discharge Summary Date: 3/22/2018  Diagnosis: LBP with radiculopathy    Patient was seen for 24 OP PT visits from 9/26/17 to 2/28/18.  Treatment included: evaluation, HEP, pt education, joint mobilizations, ther ex, and stretching. PT unable to fully assess goal achievement as pt did not return for follow up sessions due to insurance not authorizing more sessions. Pt had a long course of therapy due to unrelated medical issues, increased time between sessions due to these medical issues, and an evolving musculoskeletal presentation. Unable to assess pain and functional goals as symptoms have migrated from low back and hip to SIJ. This patient is discharged from [facility] for LBP and hip pain at this time.    Michelle Waldrop, PT, DPT

## 2019-03-11 ENCOUNTER — PATIENT OUTREACH (OUTPATIENT)
Dept: ADMINISTRATIVE | Facility: HOSPITAL | Age: 64
End: 2019-03-11

## 2019-03-21 ENCOUNTER — TELEPHONE (OUTPATIENT)
Dept: FAMILY MEDICINE | Facility: CLINIC | Age: 64
End: 2019-03-21

## 2019-03-21 NOTE — TELEPHONE ENCOUNTER
----- Message from Dana Melo sent at 3/20/2019  4:51 PM CDT -----  Contact: [phone]  Pt is calling to requesting registration information to be sent to her house to fill out for appt.      Please      Thank you!

## 2019-03-21 NOTE — TELEPHONE ENCOUNTER
Returned call, No answer. Mailbox full. Patient will have to wait to be seen to fill out registration papers at time of visit. This can not be mailed to her.

## 2019-04-05 ENCOUNTER — PATIENT OUTREACH (OUTPATIENT)
Dept: ADMINISTRATIVE | Facility: HOSPITAL | Age: 64
End: 2019-04-05

## 2019-04-05 ENCOUNTER — TELEPHONE (OUTPATIENT)
Dept: ADMINISTRATIVE | Facility: HOSPITAL | Age: 64
End: 2019-04-05

## 2019-04-05 DIAGNOSIS — Z12.31 ENCOUNTER FOR SCREENING MAMMOGRAM FOR MALIGNANT NEOPLASM OF BREAST: ICD-10-CM

## 2019-04-05 DIAGNOSIS — Z13.220 SCREENING FOR LIPOID DISORDERS: ICD-10-CM

## 2019-04-05 DIAGNOSIS — Z13.220 SCREENING FOR HYPERLIPIDEMIA: Primary | ICD-10-CM

## 2019-04-05 DIAGNOSIS — M89.9 DISORDER OF BONE AND CARTILAGE: ICD-10-CM

## 2019-04-05 DIAGNOSIS — M94.9 DISORDER OF BONE AND CARTILAGE: ICD-10-CM

## 2019-08-08 ENCOUNTER — OFFICE VISIT (OUTPATIENT)
Dept: URGENT CARE | Facility: CLINIC | Age: 64
End: 2019-08-08
Payer: MEDICARE

## 2019-08-08 VITALS
RESPIRATION RATE: 14 BRPM | WEIGHT: 217 LBS | HEART RATE: 100 BPM | OXYGEN SATURATION: 98 % | HEIGHT: 65 IN | SYSTOLIC BLOOD PRESSURE: 129 MMHG | BODY MASS INDEX: 36.15 KG/M2 | DIASTOLIC BLOOD PRESSURE: 83 MMHG | TEMPERATURE: 98 F

## 2019-08-08 DIAGNOSIS — S60.412A ABRASION OF RIGHT MIDDLE FINGER, INITIAL ENCOUNTER: Primary | ICD-10-CM

## 2019-08-08 DIAGNOSIS — M76.71 PERONEAL TENDINITIS OF RIGHT LOWER EXTREMITY: ICD-10-CM

## 2019-08-08 PROCEDURE — 99214 PR OFFICE/OUTPT VISIT, EST, LEVL IV, 30-39 MIN: ICD-10-PCS | Mod: S$GLB,,, | Performed by: PHYSICIAN ASSISTANT

## 2019-08-08 PROCEDURE — 99214 OFFICE O/P EST MOD 30 MIN: CPT | Mod: S$GLB,,, | Performed by: PHYSICIAN ASSISTANT

## 2019-08-08 RX ORDER — MUPIROCIN 20 MG/G
OINTMENT TOPICAL 3 TIMES DAILY
Qty: 22 G | Refills: 0 | Status: ON HOLD | OUTPATIENT
Start: 2019-08-08 | End: 2020-04-21 | Stop reason: HOSPADM

## 2019-08-08 RX ORDER — MELOXICAM 7.5 MG/1
7.5 TABLET ORAL DAILY
Qty: 15 TABLET | Refills: 0 | Status: SHIPPED | OUTPATIENT
Start: 2019-08-08 | End: 2019-08-23

## 2019-08-08 NOTE — PROGRESS NOTES
"Subjective:       Patient ID: Dorothy Burt is a 64 y.o. female.    Vitals:  height is 5' 5" (1.651 m) and weight is 98.4 kg (217 lb). Her oral temperature is 98.4 °F (36.9 °C). Her blood pressure is 129/83 and her pulse is 100. Her respiration is 14 and oxygen saturation is 98%.     Chief Complaint: Foot Pain (left foot ) and Hand Pain (right 3rd finger )    Pt c/o left foot pain; pt states no know injury, x 3 months. States that she's seen an orthopedist who told her she has tendonitis of her foot. States that she had a foot injection which provided 1 day of relief.  Admits to being on her feet for extended amounts of time because she's currently having construction done around the house.  Pt also c/o redness, swelling and pain in right 3rd finger x 3 wks. States that she hit her hand on something around the construction area and cut her finger. States that she has been constantly hitting her hand on things so the cut keeps opening up.    Foot Pain   This is a new problem. The current episode started more than 1 year ago. The problem occurs constantly. The problem has been gradually improving. Associated symptoms include arthralgias (left foot pain ). Pertinent negatives include no chest pain, chills, congestion, coughing, fatigue, fever, headaches, joint swelling, myalgias, nausea, rash, sore throat, vertigo, vomiting or weakness. Nothing aggravates the symptoms.       Constitution: Negative for chills, fatigue and fever.   HENT: Negative for congestion and sore throat.    Neck: Negative for painful lymph nodes.   Cardiovascular: Negative for chest pain and leg swelling.   Eyes: Negative for double vision and blurred vision.   Respiratory: Negative for cough and shortness of breath.    Gastrointestinal: Negative for nausea, vomiting and diarrhea.   Genitourinary: Negative for dysuria, frequency, urgency and history of kidney stones.   Musculoskeletal: Positive for joint pain (left foot pain ). Negative for joint " swelling, muscle cramps and muscle ache.   Skin: Positive for color change. Negative for pale, rash and bruising.   Allergic/Immunologic: Negative for seasonal allergies.   Neurological: Negative for dizziness, history of vertigo, light-headedness, passing out and headaches.   Hematologic/Lymphatic: Negative for swollen lymph nodes.   Psychiatric/Behavioral: Negative for nervous/anxious, sleep disturbance and depression. The patient is not nervous/anxious.        Objective:      Physical Exam   Constitutional: She is oriented to person, place, and time. She appears well-developed and well-nourished. She is cooperative.  Non-toxic appearance. She does not appear ill. No distress.   HENT:   Head: Normocephalic and atraumatic.   Right Ear: Hearing, tympanic membrane, external ear and ear canal normal.   Left Ear: Hearing, tympanic membrane, external ear and ear canal normal.   Nose: Nose normal. No mucosal edema, rhinorrhea or nasal deformity. No epistaxis. Right sinus exhibits no maxillary sinus tenderness and no frontal sinus tenderness. Left sinus exhibits no maxillary sinus tenderness and no frontal sinus tenderness.   Mouth/Throat: Uvula is midline, oropharynx is clear and moist and mucous membranes are normal. No trismus in the jaw. Normal dentition. No uvula swelling. No posterior oropharyngeal erythema.   Eyes: Conjunctivae and lids are normal. Right eye exhibits no discharge. Left eye exhibits no discharge. No scleral icterus.   Sclera clear bilat   Neck: Trachea normal, normal range of motion, full passive range of motion without pain and phonation normal. Neck supple.   Cardiovascular: Normal rate, regular rhythm, normal heart sounds, intact distal pulses and normal pulses.   Pulmonary/Chest: Effort normal and breath sounds normal. No respiratory distress.   Abdominal: Soft. Normal appearance and bowel sounds are normal. She exhibits no distension, no pulsatile midline mass and no mass. There is no  tenderness.   Musculoskeletal: Normal range of motion. She exhibits no edema or deformity.        Right hand: She exhibits laceration (tip of the 3rd digit with mild surrounding erythema). She exhibits normal range of motion, no tenderness and no swelling.        Hands:       Right foot: There is tenderness. There is normal range of motion, no bony tenderness and no swelling.        Feet:    Neurological: She is alert and oriented to person, place, and time. She exhibits normal muscle tone. Coordination normal.   Skin: Skin is warm, dry and intact. She is not diaphoretic. No pallor.   Psychiatric: She has a normal mood and affect. Her speech is normal and behavior is normal. Judgment and thought content normal. Cognition and memory are normal.   Nursing note and vitals reviewed.      Assessment:       1. Abrasion of right middle finger, initial encounter    2. Peroneal tendinitis of right lower extremity        Plan:         Abrasion of right middle finger, initial encounter  -     mupirocin (BACTROBAN) 2 % ointment; Apply topically 3 (three) times daily.  Dispense: 22 g; Refill: 0  -     SPLINT FOR HOME USE    Peroneal tendinitis of right lower extremity  -     meloxicam (MOBIC) 7.5 MG tablet; Take 1 tablet (7.5 mg total) by mouth once daily. for 15 days  Dispense: 15 tablet; Refill: 0      Patient Instructions     General Discharge Instructions   If you were prescribed a narcotic or controlled medication, do not drive or operate heavy equipment or machinery while taking these medications.  If you were prescribed antibiotics, please take them to completion.  You must understand that you've received an Urgent Care treatment only and that you may be released before all your medical problems are known or treated. You, the patient, will arrange for follow up care as instructed.  Follow up with your PCP or specialty clinic as directed in the next 1-2 weeks if not improved or as needed.  You can call [phone] to  schedule an appointment with the appropriate provider.  If your condition worsens we recommend that you receive another evaluation at the emergency room immediately or contact your primary medical clinics after hours call service to discuss your concerns.  Please return here or go to the Emergency Department for any concerns or worsening of condition.      Abrasions  Abrasions are skin scrapes. Their treatment depends on how large and deep the abrasion is.  Home care  You may be prescribed an antibiotic cream or ointment to apply to the wound. This helps prevent infection. Follow instructions when using this medicine.  General care  · To care for the abrasion, do the following each day for as long as directed by your healthcare provider.  ¨ If you were given a bandage, change it once a day. If your bandage sticks to the wound, soak it in warm water until it loosens.  ¨ Wash the area with soap and warm water. You may do this in a sink or under a tub faucet or shower. Rinse off the soap. Then pat the area dry with a clean towel.  ¨ If antibiotic ointment or cream was prescribed, reapply it to the wound as directed. Cover the wound with a fresh nonstick bandage. If the bandage becomes wet or dirty, change it as soon as possible.  ¨ Some antibiotic ointments or cream can cause an allergic reaction or dermatitis. This may cause redness, itching and or hives. If this occurs, stop using the ointment immediately and wash off any remaining ointment. You may need to take some allergy medicine to relieve symptoms.  · You may use acetaminophen or ibuprofen to control pain unless another pain medicine was prescribed. Talk with your healthcare provider before using these medicines if you have chronic liver or kidney disease or ever had a stomach ulcer or GI bleeding. Dont use ibuprofen in children younger than six months old.  · Most skin wounds heal within 10 days. But an infection may occur even with treatment. So its  important to watch the wound for signs of infection as listed below.  Follow-up care  Follow up with your healthcare provider, or as advised.  When to seek medical advice  Call your healthcare provider right away if any of these occur:  · Fever of 100.4ºF (38ºC) or higher, or as directed by your healthcare provider  · Increasing pain, redness, swelling, or drainage from the wound  · Bleeding from the wound that does not stop after a few minutes of steady, firm pressure  · Decreased ability to move any body part near the wound  Date Last Reviewed: 3/3/2017  © 8660-1491 [employer]. [address] [place] [address]. All rights reserved. This information is not intended as a substitute for professional medical care. Always follow your healthcare professional's instructions.            Treating Tendonitis of the Foot  Your healthcare provider's first concern is to reduce your symptoms. Using ice and heat, taking medicines, and limiting activity help control pain and swelling. Follow all of your healthcare provider's instructions. Returning to activity too soon may cause your symptoms to come back.    Ice and heat  Ice helps prevent swelling and reduce pain. Place ice on the painful area for 10 to 15 minutes. Repeat the icing several times a day. If ?you have had the problem for a while, using heat may help. Apply a heating pad or hot towels to the tendon for 20 to 30 minutes 2 or 3 times a day.  Medicines  Your healthcare provider may tell you to take ibuprofen or other anti-inflammatory medicines. These reduce pain and swelling. Take them as directed. Dont wait until you feel pain. In more severe cases, cortisone may be injected to relieve pain.  Limiting activities  Rest allows the tissues in your foot to heal. Stay off your feet for a few days, then slowly work back into activity. If you do high-impact activities, such as running or aerobics, try other activities that place less strain on your  foot. Cycling and swimming are good choices.  Date Last Reviewed: 9/21/2015  © 3941-9509 [employer]. [address] [place] [address], [place] [address]. All rights reserved. This information is not intended as a substitute for professional medical care. Always follow your healthcare professional's instructions.      What Is Tendonitis of the Foot?  When you use a set of muscles too much, youre likely to strain the tendons (soft tissues) that connect those muscles to your bones. At first, pain or swelling may come and go quickly. But if you do too much too soon, your muscles may overtire again. The strain may cause a tendons outer covering to swell or small fibers in a tendon to pull apart. If you keep pushing your muscles, damage to the tendons adds up, and tendonitis develops. Over time, pain and swelling may limit your activities. But with your doctors help, tendonitis can be controlled. Both your symptoms and your risk of future problems including tendon rupture can be reduced.       The back of your foot  The Achilles tendon connects the calf muscle to the heel bone. If tendonitis occurs here, you may feel pain when your foot touches down or when your heel lifts off the ground.   The front of your foot  The anterior tibial tendon helps control the front of your foot when it meets the ground. If this tendon is strained, you may feel pain when you go down stairs or walk or run on hills.     The inside of your foot  The posterior tibial tendon runs along the inside of the ankle and foot. If this tendon is strained, your foot may hurt when it moves forward to push off the ground. Or you may feel pain when your heel shifts from side to side.   The outside of your foot  The peroneal tendon wraps across the bottom of your foot, from the outside to the inside. Tendonitis here may cause pain when you stand or push off the ground and when walking on uneven surfaces.   Date Last Reviewed: 9/21/2015 © 2000-2017  [employer] [facility]. [address] [place] [address], [place] [address]. All rights reserved. This information is not intended as a substitute for professional medical care. Always follow your healthcare professional's instructions.

## 2019-08-08 NOTE — PATIENT INSTRUCTIONS
General Discharge Instructions   If you were prescribed a narcotic or controlled medication, do not drive or operate heavy equipment or machinery while taking these medications.  If you were prescribed antibiotics, please take them to completion.  You must understand that you've received an Urgent Care treatment only and that you may be released before all your medical problems are known or treated. You, the patient, will arrange for follow up care as instructed.  Follow up with your PCP or specialty clinic as directed in the next 1-2 weeks if not improved or as needed.  You can call [phone] to schedule an appointment with the appropriate provider.  If your condition worsens we recommend that you receive another evaluation at the emergency room immediately or contact your primary medical clinics after hours call service to discuss your concerns.  Please return here or go to the Emergency Department for any concerns or worsening of condition.      Abrasions  Abrasions are skin scrapes. Their treatment depends on how large and deep the abrasion is.  Home care  You may be prescribed an antibiotic cream or ointment to apply to the wound. This helps prevent infection. Follow instructions when using this medicine.  General care  · To care for the abrasion, do the following each day for as long as directed by your healthcare provider.  ¨ If you were given a bandage, change it once a day. If your bandage sticks to the wound, soak it in warm water until it loosens.  ¨ Wash the area with soap and warm water. You may do this in a sink or under a tub faucet or shower. Rinse off the soap. Then pat the area dry with a clean towel.  ¨ If antibiotic ointment or cream was prescribed, reapply it to the wound as directed. Cover the wound with a fresh nonstick bandage. If the bandage becomes wet or dirty, change it as soon as possible.  ¨ Some antibiotic ointments or cream can cause an allergic reaction or dermatitis. This may  cause redness, itching and or hives. If this occurs, stop using the ointment immediately and wash off any remaining ointment. You may need to take some allergy medicine to relieve symptoms.  · You may use acetaminophen or ibuprofen to control pain unless another pain medicine was prescribed. Talk with your healthcare provider before using these medicines if you have chronic liver or kidney disease or ever had a stomach ulcer or GI bleeding. Dont use ibuprofen in children younger than six months old.  · Most skin wounds heal within 10 days. But an infection may occur even with treatment. So its important to watch the wound for signs of infection as listed below.  Follow-up care  Follow up with your healthcare provider, or as advised.  When to seek medical advice  Call your healthcare provider right away if any of these occur:  · Fever of 100.4ºF (38ºC) or higher, or as directed by your healthcare provider  · Increasing pain, redness, swelling, or drainage from the wound  · Bleeding from the wound that does not stop after a few minutes of steady, firm pressure  · Decreased ability to move any body part near the wound  Date Last Reviewed: 3/3/2017  © 5136-6289 [employer]. [address] [place] [address]. All rights reserved. This information is not intended as a substitute for professional medical care. Always follow your healthcare professional's instructions.            Treating Tendonitis of the Foot  Your healthcare provider's first concern is to reduce your symptoms. Using ice and heat, taking medicines, and limiting activity help control pain and swelling. Follow all of your healthcare provider's instructions. Returning to activity too soon may cause your symptoms to come back.    Ice and heat  Ice helps prevent swelling and reduce pain. Place ice on the painful area for 10 to 15 minutes. Repeat the icing several times a day. If ?you have had the problem for a while, using heat may  help. Apply a heating pad or hot towels to the tendon for 20 to 30 minutes 2 or 3 times a day.  Medicines  Your healthcare provider may tell you to take ibuprofen or other anti-inflammatory medicines. These reduce pain and swelling. Take them as directed. Dont wait until you feel pain. In more severe cases, cortisone may be injected to relieve pain.  Limiting activities  Rest allows the tissues in your foot to heal. Stay off your feet for a few days, then slowly work back into activity. If you do high-impact activities, such as running or aerobics, try other activities that place less strain on your foot. Cycling and swimming are good choices.  Date Last Reviewed: 9/21/2015  © 6220-4745 [employer]. [address] [place] [address], [place] [address]. All rights reserved. This information is not intended as a substitute for professional medical care. Always follow your healthcare professional's instructions.      What Is Tendonitis of the Foot?  When you use a set of muscles too much, youre likely to strain the tendons (soft tissues) that connect those muscles to your bones. At first, pain or swelling may come and go quickly. But if you do too much too soon, your muscles may overtire again. The strain may cause a tendons outer covering to swell or small fibers in a tendon to pull apart. If you keep pushing your muscles, damage to the tendons adds up, and tendonitis develops. Over time, pain and swelling may limit your activities. But with your doctors help, tendonitis can be controlled. Both your symptoms and your risk of future problems including tendon rupture can be reduced.       The back of your foot  The Achilles tendon connects the calf muscle to the heel bone. If tendonitis occurs here, you may feel pain when your foot touches down or when your heel lifts off the ground.   The front of your foot  The anterior tibial tendon helps control the front of your foot when it meets the ground. If this tendon  is strained, you may feel pain when you go down stairs or walk or run on hills.     The inside of your foot  The posterior tibial tendon runs along the inside of the ankle and foot. If this tendon is strained, your foot may hurt when it moves forward to push off the ground. Or you may feel pain when your heel shifts from side to side.   The outside of your foot  The peroneal tendon wraps across the bottom of your foot, from the outside to the inside. Tendonitis here may cause pain when you stand or push off the ground and when walking on uneven surfaces.   Date Last Reviewed: 9/21/2015  © 2934-6341 [employer]. [address] [place] [address], [place] [address]. All rights reserved. This information is not intended as a substitute for professional medical care. Always follow your healthcare professional's instructions.

## 2019-08-27 DIAGNOSIS — M76.71 PERONEAL TENDINITIS OF RIGHT LOWER EXTREMITY: ICD-10-CM

## 2019-08-28 RX ORDER — MELOXICAM 7.5 MG/1
7.5 TABLET ORAL DAILY
Qty: 15 TABLET | Refills: 0 | OUTPATIENT
Start: 2019-08-28 | End: 2019-09-12

## 2019-09-22 ENCOUNTER — OFFICE VISIT (OUTPATIENT)
Dept: URGENT CARE | Facility: CLINIC | Age: 64
End: 2019-09-22
Payer: MEDICARE

## 2019-09-22 VITALS
TEMPERATURE: 98 F | BODY MASS INDEX: 33.32 KG/M2 | DIASTOLIC BLOOD PRESSURE: 76 MMHG | SYSTOLIC BLOOD PRESSURE: 114 MMHG | WEIGHT: 200 LBS | OXYGEN SATURATION: 98 % | HEIGHT: 65 IN | HEART RATE: 88 BPM

## 2019-09-22 DIAGNOSIS — M25.571 ACUTE RIGHT ANKLE PAIN: ICD-10-CM

## 2019-09-22 DIAGNOSIS — M79.671 RIGHT FOOT PAIN: Primary | ICD-10-CM

## 2019-09-22 PROCEDURE — 99214 OFFICE O/P EST MOD 30 MIN: CPT | Mod: S$GLB,,, | Performed by: PHYSICIAN ASSISTANT

## 2019-09-22 PROCEDURE — 73630 XR FOOT COMPLETE 3 VIEW RIGHT: ICD-10-PCS | Mod: FY,RT,S$GLB, | Performed by: RADIOLOGY

## 2019-09-22 PROCEDURE — 73630 X-RAY EXAM OF FOOT: CPT | Mod: FY,RT,S$GLB, | Performed by: RADIOLOGY

## 2019-09-22 PROCEDURE — 99214 PR OFFICE/OUTPT VISIT, EST, LEVL IV, 30-39 MIN: ICD-10-PCS | Mod: S$GLB,,, | Performed by: PHYSICIAN ASSISTANT

## 2019-09-22 PROCEDURE — 73610 XR ANKLE COMPLETE 3 VIEW RIGHT: ICD-10-PCS | Mod: FY,RT,S$GLB, | Performed by: RADIOLOGY

## 2019-09-22 PROCEDURE — 73610 X-RAY EXAM OF ANKLE: CPT | Mod: FY,RT,S$GLB, | Performed by: RADIOLOGY

## 2019-09-22 RX ORDER — HYDROCODONE BITARTRATE AND ACETAMINOPHEN 5; 325 MG/1; MG/1
1 TABLET ORAL EVERY 6 HOURS PRN
Qty: 12 TABLET | Refills: 0 | Status: SHIPPED | OUTPATIENT
Start: 2019-09-22 | End: 2020-05-19 | Stop reason: ALTCHOICE

## 2019-09-22 NOTE — PATIENT INSTRUCTIONS
If you were prescribed a narcotic or controlled medication, do not drive or operate heavy equipment or machinery while taking these medications.  You must understand that you've received an Urgent Care treatment only and that you may be released before all your medical problems are known or treated. You, the patient, will arrange for follow up care as instructed.  Follow up with your PCP or specialty clinic as directed if not improved or as needed. You can call [phone] to schedule an appointment with the appropriate provider.  If your condition worsens we recommend that you receive another evaluation at the Emergency Department for any concerns or worsening of condition.  Patient aware and verbalized understanding.    Discussed XRAY results with patient.  Patient aware and verbalized understanding.    Rest, Ice, Compression and Elevation as discussed.  ACE Wrap during the day for better support/comfort.  Stop wearing walking boot at this time.  OTC Compression Stockings/Socks as discussed to help reduce swelling/inflammation.  You may do gentle stretching as tolerable.  Wear supportive shoes such as tennis shoes for better support/comfort.  Follow-up with PCP for further evaluation as needed.  Follow-up with Ortho for further evaluation if still experiencing pain as discussed.  Strict ER precautions given to patient.  Patient aware and verbalized understanding.    R.I.C.E.    R.I.C.E. stands for Rest, Ice, Compression, and Elevation. Doing these things helps limit pain and swelling after an injury. R.I.C.E. also helps injuries heal faster. Use R.I.C.E. for sprains, strains, and severe bruises or bumps. Follow the tips on this handout and begin R.I.C.E. as soon as possible after an injury.  ? Rest  Pain is your bodys way of telling you to rest an injured area. Whether you have hurt an elbow, hand, foot, or knee, limiting its use will prevent further injury and help you heal.  ? Ice  Applying ice right after  an injury helps prevent swelling and reduce pain. Dont place ice directly on your skin.  · Wrap a cold pack or bag of ice in a thin cloth. Place it over the injured area.  · Ice for 10 minutes every 3 hours. Dont ice for more than 20 minutes at a time.  ? Compression  Putting pressure (compression) on an injury helps prevent swelling and provides support.  · Wrap the injured area firmly with an elastic bandage. If your hand or foot tingles, becomes discolored, or feels cold to the touch, the bandage may be too tight. Rewrap it more loosely.  · If your bandage becomes too loose, rewrap it.  · Do not wear an elastic bandage overnight.  ? Elevation  Keeping an injury elevated helps reduce swelling, pain, and throbbing. Elevation is most effective when the injury is kept elevated higher than the heart.     Call your healthcare provider if you notice any of the following:  · Fingers or toes feel numb, are cold to the touch, or change color  · Skin looks shiny or tight  · Pain, swelling, or bruising worsens and is not improved with elevation   Date Last Reviewed: 9/3/2015  © 3776-7051 [employer]. [address] [place] [address], [place] [address]. All rights reserved. This information is not intended as a substitute for professional medical care. Always follow your healthcare professional's instructions.

## 2019-09-22 NOTE — PROGRESS NOTES
"Subjective:       Patient ID: Dorothy Burt is a 64 y.o. female.    Vitals:  height is 5' 4.5" (1.638 m) and weight is 90.7 kg (200 lb). Her oral temperature is 98.2 °F (36.8 °C). Her blood pressure is 114/76 and her pulse is 88. Her oxygen saturation is 98%.     Chief Complaint: Foot Swelling (3 weeks)    Pain   This is a new problem. The current episode started 1 to 4 weeks ago. The problem occurs constantly. The problem has been unchanged. Associated symptoms include arthralgias and joint swelling. Pertinent negatives include no abdominal pain, anorexia, change in bowel habit, chest pain, chills, congestion, coughing, diaphoresis, fatigue, fever, headaches, myalgias, nausea, neck pain, numbness, rash, sore throat, swollen glands, urinary symptoms, visual change, vomiting or weakness. The symptoms are aggravated by walking and standing (walking boot). She has tried immobilization, NSAIDs and acetaminophen for the symptoms. The treatment provided moderate relief.       Constitution: Negative for chills, sweating, fatigue and fever.   HENT: Negative for ear pain, drooling, congestion, sore throat, trouble swallowing and voice change.    Neck: Negative for neck pain, neck stiffness, painful lymph nodes and neck swelling.   Cardiovascular: Negative for chest pain, leg swelling, palpitations, sob on exertion and passing out.   Eyes: Negative for eye pain, eye redness, photophobia, double vision and blurred vision.   Respiratory: Negative for chest tightness, cough, sputum production, bloody sputum, shortness of breath, stridor and wheezing.    Gastrointestinal: Negative for abdominal pain, abdominal bloating, nausea, vomiting, constipation, diarrhea and heartburn.   Genitourinary: Negative for dysuria, frequency, urgency, flank pain, bladder incontinence, hematuria and pelvic pain.   Musculoskeletal: Positive for pain, joint pain and joint swelling. Negative for trauma, abnormal ROM of joint, back pain, muscle cramps " and muscle ache.   Skin: Negative for color change, pale, rash, bruising and hives.   Allergic/Immunologic: Negative for seasonal allergies, hives, itching and sneezing.   Neurological: Negative for dizziness, light-headedness, passing out, loss of balance, headaches, altered mental status, loss of consciousness, numbness, tingling and seizures.   Hematologic/Lymphatic: Negative for swollen lymph nodes.   Psychiatric/Behavioral: Negative for altered mental status, nervous/anxious, sleep disturbance and depression. The patient is not nervous/anxious.        Objective:      Physical Exam   Constitutional: She is oriented to person, place, and time. Vital signs are normal. She appears well-developed and well-nourished. She is active and cooperative.  Non-toxic appearance. She does not appear ill. No distress.   HENT:   Head: Normocephalic and atraumatic.   Right Ear: External ear normal.   Left Ear: External ear normal.   Nose: Nose normal.   Mouth/Throat: Uvula is midline, oropharynx is clear and moist and mucous membranes are normal. No posterior oropharyngeal erythema.   Eyes: Pupils are equal, round, and reactive to light. Conjunctivae, EOM and lids are normal.   Neck: Trachea normal, normal range of motion, full passive range of motion without pain and phonation normal. Neck supple.   Cardiovascular: Normal rate, regular rhythm, normal heart sounds, intact distal pulses and normal pulses.   Pulmonary/Chest: Effort normal and breath sounds normal.   Abdominal: Soft. Normal appearance and bowel sounds are normal. She exhibits no abdominal bruit, no pulsatile midline mass and no mass.   Musculoskeletal: She exhibits no edema or deformity.        Right ankle: She exhibits swelling. She exhibits normal range of motion, no ecchymosis, no deformity, no laceration and normal pulse. Tenderness. Lateral malleolus and medial malleolus tenderness found. No AITFL, no CF ligament, no posterior TFL, no head of 5th metatarsal and  no proximal fibula tenderness found. Achilles tendon normal.        Left ankle: Normal.        Right foot: There is tenderness, bony tenderness and swelling. There is normal range of motion, normal capillary refill, no crepitus, no deformity and no laceration.        Left foot: Normal.   FROM to upper and lower extremities bilateral. 5/5 strength and full sensation bilateral. TTP with mild amount of swelling over dorsal aspect of R foot and lateral/medial malleolus of R ankle. 2+ DP pulses bilateral. No pitting edema to bilateral lower extremities. No numbness or tingling. Able to ambulate without difficulty.   Lymphadenopathy:     She has no cervical adenopathy.   Neurological: She is alert and oriented to person, place, and time. She has normal strength and normal reflexes. No cranial nerve deficit or sensory deficit. Gait normal.   Skin: Skin is warm, dry and intact. Capillary refill takes less than 2 seconds. No rash noted. She is not diaphoretic.   Psychiatric: She has a normal mood and affect. Her speech is normal and behavior is normal. Judgment and thought content normal. Cognition and memory are normal.   Nursing note and vitals reviewed.      X-ray Ankle Complete Right  Result Date: 9/22/2019  EXAMINATION: XR ANKLE COMPLETE 3 VIEW RIGHT CLINICAL HISTORY: Pain in right foot TECHNIQUE: AP, lateral, and oblique images of the right ankle were performed. COMPARISON: None FINDINGS: On frontal view only, there is a linear lucency traversing the medial malleolus which is most likely related to an overlying skin fold.  Nondisplaced fracture at this site is unlikely but not excluded if there is a history of trauma.  No significant soft tissue swelling.  The ankle mortise is intact.  The talar dome has a normal contour.   As above. Electronically signed by: Anila Kmaara MD Date:    09/22/2019 Time:    13:02    X-ray Foot Complete Right  Result Date: 9/22/2019  EXAMINATION: XR FOOT COMPLETE 3 VIEW RIGHT CLINICAL  HISTORY: . Pain in right foot TECHNIQUE: AP, lateral, and oblique views of the right foot were performed. COMPARISON: None. FINDINGS: Normal alignment.  Normal mineralization.  No fracture.  No osseous lesions.  Mild joint space narrowing at the 1st metatarsophalangeal joint.  The soft tissues appear normal.   No acute abnormality seen. Electronically signed by: Justina Dixon MD Date:    09/22/2019 Time:    13:09    Assessment:       1. Right foot pain    2. Acute right ankle pain        Plan:      reviewed and is appropriate.    Right foot pain  -     X-Ray Foot Complete Right; Future; Expected date: 09/22/2019    Acute right ankle pain  -     X-Ray Ankle Complete Right; Future; Expected date: 09/22/2019    Other orders  -     HYDROcodone-acetaminophen (NORCO) 5-325 mg per tablet; Take 1 tablet by mouth every 6 (six) hours as needed for Pain.  Dispense: 12 tablet; Refill: 0      Patient Instructions     If you were prescribed a narcotic or controlled medication, do not drive or operate heavy equipment or machinery while taking these medications.  You must understand that you've received an Urgent Care treatment only and that you may be released before all your medical problems are known or treated. You, the patient, will arrange for follow up care as instructed.  Follow up with your PCP or specialty clinic as directed if not improved or as needed. You can call [phone] to schedule an appointment with the appropriate provider.  If your condition worsens we recommend that you receive another evaluation at the Emergency Department for any concerns or worsening of condition.  Patient aware and verbalized understanding.    Discussed XRAY results with patient.  Patient aware and verbalized understanding.    Rest, Ice, Compression and Elevation as discussed.  ACE Wrap during the day for better support/comfort.  Stop wearing walking boot at this time.  OTC Compression Stockings/Socks as discussed to help  reduce swelling/inflammation.  You may do gentle stretching as tolerable.  Wear supportive shoes such as tennis shoes for better support/comfort.  Follow-up with PCP for further evaluation as needed.  Follow-up with Ortho for further evaluation if still experiencing pain as discussed.  Strict ER precautions given to patient.  Patient aware and verbalized understanding.    R.I.C.E.    R.I.C.E. stands for Rest, Ice, Compression, and Elevation. Doing these things helps limit pain and swelling after an injury. R.I.C.E. also helps injuries heal faster. Use R.I.C.E. for sprains, strains, and severe bruises or bumps. Follow the tips on this handout and begin R.I.C.E. as soon as possible after an injury.  ? Rest  Pain is your bodys way of telling you to rest an injured area. Whether you have hurt an elbow, hand, foot, or knee, limiting its use will prevent further injury and help you heal.  ? Ice  Applying ice right after an injury helps prevent swelling and reduce pain. Dont place ice directly on your skin.  · Wrap a cold pack or bag of ice in a thin cloth. Place it over the injured area.  · Ice for 10 minutes every 3 hours. Dont ice for more than 20 minutes at a time.  ? Compression  Putting pressure (compression) on an injury helps prevent swelling and provides support.  · Wrap the injured area firmly with an elastic bandage. If your hand or foot tingles, becomes discolored, or feels cold to the touch, the bandage may be too tight. Rewrap it more loosely.  · If your bandage becomes too loose, rewrap it.  · Do not wear an elastic bandage overnight.  ? Elevation  Keeping an injury elevated helps reduce swelling, pain, and throbbing. Elevation is most effective when the injury is kept elevated higher than the heart.     Call your healthcare provider if you notice any of the following:  · Fingers or toes feel numb, are cold to the touch, or change color  · Skin looks shiny or tight  · Pain, swelling, or bruising worsens  and is not improved with elevation   Date Last Reviewed: 9/3/2015  © 1226-2485 [employer]. [address] [place] [address], [place] [address]. All rights reserved. This information is not intended as a substitute for professional medical care. Always follow your healthcare professional's instructions.

## 2019-10-14 ENCOUNTER — OFFICE VISIT (OUTPATIENT)
Dept: PODIATRY | Facility: CLINIC | Age: 64
End: 2019-10-14
Payer: MEDICARE

## 2019-10-14 VITALS
SYSTOLIC BLOOD PRESSURE: 134 MMHG | WEIGHT: 200 LBS | DIASTOLIC BLOOD PRESSURE: 92 MMHG | HEIGHT: 64 IN | HEART RATE: 84 BPM | BODY MASS INDEX: 34.15 KG/M2

## 2019-10-14 DIAGNOSIS — M76.71 PERONEAL TENDONITIS, RIGHT: Primary | ICD-10-CM

## 2019-10-14 DIAGNOSIS — M79.671 CHRONIC FOOT PAIN, RIGHT: ICD-10-CM

## 2019-10-14 DIAGNOSIS — G89.29 CHRONIC FOOT PAIN, RIGHT: ICD-10-CM

## 2019-10-14 PROCEDURE — 1101F PT FALLS ASSESS-DOCD LE1/YR: CPT | Mod: CPTII,S$GLB,, | Performed by: PODIATRIST

## 2019-10-14 PROCEDURE — 99203 OFFICE O/P NEW LOW 30 MIN: CPT | Mod: S$GLB,,, | Performed by: PODIATRIST

## 2019-10-14 PROCEDURE — 99203 PR OFFICE/OUTPT VISIT, NEW, LEVL III, 30-44 MIN: ICD-10-PCS | Mod: S$GLB,,, | Performed by: PODIATRIST

## 2019-10-14 PROCEDURE — 1101F PR PT FALLS ASSESS DOC 0-1 FALLS W/OUT INJ PAST YR: ICD-10-PCS | Mod: CPTII,S$GLB,, | Performed by: PODIATRIST

## 2019-10-14 PROCEDURE — 99999 PR PBB SHADOW E&M-EST. PATIENT-LVL III: ICD-10-PCS | Mod: PBBFAC,,, | Performed by: PODIATRIST

## 2019-10-14 PROCEDURE — 99999 PR PBB SHADOW E&M-EST. PATIENT-LVL III: CPT | Mod: PBBFAC,,, | Performed by: PODIATRIST

## 2019-10-14 RX ORDER — MELOXICAM 15 MG/1
15 TABLET ORAL DAILY
Status: ON HOLD | COMMUNITY
End: 2020-04-21 | Stop reason: HOSPADM

## 2019-10-14 RX ORDER — DICLOFENAC SODIUM 10 MG/G
2 GEL TOPICAL 2 TIMES DAILY PRN
Refills: 0 | COMMUNITY
Start: 2019-09-19 | End: 2020-05-19 | Stop reason: SDUPTHER

## 2019-10-14 NOTE — PROGRESS NOTES
"Subjective:      Patient ID: Dorothy Burt is a 64 y.o. female.    Chief Complaint: Foot Pain (right foot 5th toe )    presents with complaint of chronic right foot pain since before July 2019.  Previously treated per  wound performed a series of 3 steroid injections around the lateral border of the right foot and some kind of laser procedure. He also placed the patient in a tall Cam boot however she did not tolerate it well and removed it.  She has also been very active then went on a cruise since the pain started and relates to a period of standing walking for greater than 1 hr.  Pain is alleviated by rest.  Pain is rated as 5/10 when standing or walking.  She purchased a new pair of new balance tennis shoes which has helped somewhat.  Relates previous x-ray taken did not show any broken bone or dislocation.  Also relates she took meloxicam of this gave her GI upset and so she was prescribed Voltaren which seems also help somewhat.    Vitals:    10/14/19 0808   BP: (!) 134/92   Pulse: 84   Weight: 90.7 kg (200 lb)   Height: 5' 4" (1.626 m)   PainSc:   5      Past Medical History:   Diagnosis Date    Arthritis     Asthma     Back pain     Knee pain     Thyroid disease        Past Surgical History:   Procedure Laterality Date    CHOLECYSTECTOMY      VASCULAR SURGERY         Family History   Problem Relation Age of Onset    Stroke Mother        Social History     Socioeconomic History    Marital status:      Spouse name: Not on file    Number of children: Not on file    Years of education: Not on file    Highest education level: Not on file   Occupational History    Not on file   Social Needs    Financial resource strain: Not on file    Food insecurity:     Worry: Not on file     Inability: Not on file    Transportation needs:     Medical: Not on file     Non-medical: Not on file   Tobacco Use    Smoking status: Never Smoker    Smokeless tobacco: Never Used   Substance and Sexual " Activity    Alcohol use: Yes    Drug use: No    Sexual activity: Not on file   Lifestyle    Physical activity:     Days per week: Not on file     Minutes per session: Not on file    Stress: Not on file   Relationships    Social connections:     Talks on phone: Not on file     Gets together: Not on file     Attends [employer] service: Not on file     Active member of club or organization: Not on file     Attends meetings of clubs or organizations: Not on file     Relationship status: Not on file   Other Topics Concern    Not on file   Social History Narrative    Not on file       Current Outpatient Medications   Medication Sig Dispense Refill    acyclovir (ZOVIRAX) 400 MG tablet       betamethasone dipropionate (DIPROLENE) 0.05 % cream       cetirizine (ZYRTEC) 10 MG tablet Take 10 mg by mouth once daily.  1    diclofenac sodium (VOLTAREN) 1 % Gel APPLY 2 GRAMS 4 TIMES A DAY  0    famotidine (PEPCID) 20 MG tablet       HYDROcodone-acetaminophen (NORCO) 5-325 mg per tablet Take 1 tablet by mouth every 6 (six) hours as needed for Pain. 12 tablet 0    levothyroxine (SYNTHROID) 25 MCG tablet       meloxicam (MOBIC) 15 MG tablet Take 15 mg by mouth once daily.      methocarbamol (ROBAXIN) 500 MG Tab TAKE 2 TABLETS 4 TIMES DAILY  0    nystatin (MYCOSTATIN) cream       omeprazole (PRILOSEC) 20 MG capsule       levothyroxine (SYNTHROID) 125 MCG tablet Take 125 mcg by mouth once daily.      mupirocin (BACTROBAN) 2 % ointment Apply topically 3 (three) times daily. (Patient not taking: Reported on 10/14/2019) 22 g 0    naproxen (NAPROSYN) 500 MG tablet Take 1 tablet (500 mg total) by mouth 2 (two) times daily as needed. (Patient not taking: Reported on 10/14/2019) 60 tablet 0     No current facility-administered medications for this visit.        Review of patient's allergies indicates:  No Known Allergies      Review of Systems   Constitution: Negative for chills, fever and malaise/fatigue.   HENT:  Negative for congestion and hearing loss.    Cardiovascular: Negative for chest pain, claudication and leg swelling.   Respiratory: Negative for cough and shortness of breath.    Skin: Negative for color change, itching and poor wound healing.   Musculoskeletal: Negative for back pain, joint pain, muscle cramps and muscle weakness.   Gastrointestinal: Negative for nausea and vomiting.   Neurological: Negative for numbness, paresthesias and weakness.   Psychiatric/Behavioral: Negative for altered mental status.           Objective:      Physical Exam   Constitutional: She is oriented to person, place, and time. She appears well-developed and well-nourished. No distress.   Cardiovascular: Intact distal pulses.   Pulses:       Dorsalis pedis pulses are 2+ on the right side, and 2+ on the left side.        Posterior tibial pulses are 2+ on the right side, and 2+ on the left side.   Musculoskeletal:   Moderate localized pain edema at the distal attachment of the peroneal tendons proximal to the styloid process of the 5th metatarsal right foot.  There is pain on palpation along the plantar lateral right midfoot overlying the course of the peroneus longus.  Moderate pain with active resistive eversion plantar flexion of the right foot. Mild pain with eversion of the right foot.    Semi rigid cavus foot structure right foot and left foot has mild depression of the medial longitudinal arch in comparison.   Neurological: She is alert and oriented to person, place, and time. She has normal strength. No sensory deficit. Gait normal.   Skin: Skin is warm, dry and intact. Capillary refill takes less than 2 seconds. No ecchymosis and no rash noted. She is not diaphoretic. No cyanosis or erythema. No pallor. Nails show no clubbing.             Assessment:       Encounter Diagnoses   Name Primary?    Peroneal tendonitis, right Yes    Chronic foot pain, right          Plan:       Dorothy COOK was seen today for foot pain.    Diagnoses  and all orders for this visit:    Peroneal tendonitis, right  -     MRI Foot (Midfoot) Right Without Contrast; Future    Chronic foot pain, right  -     MRI Foot (Midfoot) Right Without Contrast; Future      I counseled the patient on her conditions, their implications and medical management.    MRI to assess for peroneus longus/brevis tendinopathy or tear.  May require surgical intervention.    Advised the patient to resume wearing her tall orthopedic boot and discussed how to appropriately fitted.  She is to treat this as a cast and only remove to drive and or perform other ADLs at home.    Rest, ice and elevate p.r.n. as discussed.    Continue Voltaren gel as needed.    RTC 2 weeks or p.r.n. as discussed.    A portion of this note was generated by voice recognition software and may contain topical graphical errors.      .

## 2019-10-16 DIAGNOSIS — Z12.31 VISIT FOR SCREENING MAMMOGRAM: Primary | ICD-10-CM

## 2019-10-18 ENCOUNTER — HOSPITAL ENCOUNTER (OUTPATIENT)
Dept: RADIOLOGY | Facility: HOSPITAL | Age: 64
Discharge: HOME | End: 2019-10-18
Attending: PODIATRIST
Payer: MEDICARE

## 2019-10-18 DIAGNOSIS — G89.29 CHRONIC FOOT PAIN, RIGHT: ICD-10-CM

## 2019-10-18 DIAGNOSIS — M76.71 PERONEAL TENDONITIS, RIGHT: ICD-10-CM

## 2019-10-18 DIAGNOSIS — M79.671 CHRONIC FOOT PAIN, RIGHT: ICD-10-CM

## 2019-10-18 PROCEDURE — 73718 MRI FOOT (MIDFOOT) RIGHT WITHOUT CONTRAST: ICD-10-PCS | Mod: 26,RT,, | Performed by: RADIOLOGY

## 2019-10-18 PROCEDURE — 73718 MRI LOWER EXTREMITY W/O DYE: CPT | Mod: TC,RT

## 2019-10-18 PROCEDURE — 73718 MRI LOWER EXTREMITY W/O DYE: CPT | Mod: 26,RT,, | Performed by: RADIOLOGY

## 2019-10-25 ENCOUNTER — HOSPITAL ENCOUNTER (OUTPATIENT)
Dept: RADIOLOGY | Facility: HOSPITAL | Age: 64
Discharge: HOME | End: 2019-10-25
Attending: FAMILY MEDICINE
Payer: MEDICARE

## 2019-10-25 ENCOUNTER — TELEPHONE (OUTPATIENT)
Dept: PODIATRY | Facility: CLINIC | Age: 64
End: 2019-10-25

## 2019-10-25 DIAGNOSIS — Z12.31 VISIT FOR SCREENING MAMMOGRAM: ICD-10-CM

## 2019-10-25 PROCEDURE — 77063 MAMMO DIGITAL SCREENING BILAT WITH TOMOSYNTHESIS_CAD: ICD-10-PCS | Mod: 26,,, | Performed by: RADIOLOGY

## 2019-10-25 PROCEDURE — 77063 BREAST TOMOSYNTHESIS BI: CPT | Mod: 26,,, | Performed by: RADIOLOGY

## 2019-10-25 PROCEDURE — 77067 MAMMO DIGITAL SCREENING BILAT WITH TOMOSYNTHESIS_CAD: ICD-10-PCS | Mod: 26,,, | Performed by: RADIOLOGY

## 2019-10-25 PROCEDURE — 77067 SCR MAMMO BI INCL CAD: CPT | Mod: TC

## 2019-10-25 PROCEDURE — 77067 SCR MAMMO BI INCL CAD: CPT | Mod: 26,,, | Performed by: RADIOLOGY

## 2019-10-25 NOTE — TELEPHONE ENCOUNTER
----- Message from Jeanine Sommer sent at 10/25/2019 12:16 PM CDT -----  No. [phone]   Please call patient with MRI results from 10/18/19.

## 2019-10-28 ENCOUNTER — TELEPHONE (OUTPATIENT)
Dept: PODIATRY | Facility: CLINIC | Age: 64
End: 2019-10-28

## 2019-12-29 ENCOUNTER — OFFICE VISIT (OUTPATIENT)
Dept: URGENT CARE | Facility: CLINIC | Age: 64
End: 2019-12-29
Payer: MEDICARE

## 2019-12-29 VITALS
BODY MASS INDEX: 33.46 KG/M2 | SYSTOLIC BLOOD PRESSURE: 124 MMHG | TEMPERATURE: 98 F | OXYGEN SATURATION: 99 % | DIASTOLIC BLOOD PRESSURE: 82 MMHG | HEIGHT: 64 IN | RESPIRATION RATE: 18 BRPM | WEIGHT: 196 LBS | HEART RATE: 80 BPM

## 2019-12-29 DIAGNOSIS — S06.0X9A CONCUSSION WITH LOSS OF CONSCIOUSNESS, INITIAL ENCOUNTER: Primary | ICD-10-CM
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
## 2019-12-29 DIAGNOSIS — S16.1XXA STRAIN OF NECK MUSCLE, INITIAL ENCOUNTER: ICD-10-CM

## 2019-12-29 DIAGNOSIS — S09.90XA INJURY OF HEAD, INITIAL ENCOUNTER: ICD-10-CM

## 2019-12-29 PROCEDURE — 99214 PR OFFICE/OUTPT VISIT, EST, LEVL IV, 30-39 MIN: ICD-10-PCS | Mod: S$GLB,,, | Performed by: PHYSICIAN ASSISTANT

## 2019-12-29 PROCEDURE — 99214 OFFICE O/P EST MOD 30 MIN: CPT | Mod: S$GLB,,, | Performed by: PHYSICIAN ASSISTANT

## 2019-12-29 PROCEDURE — 70260 X-RAY EXAM OF SKULL: CPT | Mod: FY,S$GLB,, | Performed by: RADIOLOGY

## 2019-12-29 PROCEDURE — 70260 XR SKULL COMPLETE MIN 4 VIEWS: ICD-10-PCS | Mod: FY,S$GLB,, | Performed by: RADIOLOGY

## 2019-12-29 RX ORDER — MONTELUKAST SODIUM 10 MG/1
10 TABLET ORAL NIGHTLY
COMMUNITY
End: 2020-06-24 | Stop reason: SDUPTHER

## 2019-12-29 RX ORDER — ACETAMINOPHEN 325 MG/1
650 TABLET ORAL
Status: COMPLETED | OUTPATIENT
Start: 2019-12-29 | End: 2019-12-29

## 2019-12-29 RX ADMIN — ACETAMINOPHEN 650 MG: 325 TABLET ORAL at 01:12

## 2019-12-29 NOTE — PATIENT INSTRUCTIONS
Head Injury (Adult)    You have a head injury. It does not appear serious at this time. But symptoms of a more serious problem, such as a mild brain injury (concussion) or bruising or bleeding in the brain, may appear later. For this reason, you or someone caring for you will need to watch for the symptoms listed below. Once youre home, also be sure to follow any care instructions youre given.  Home care  Watch for the following symptoms  Seek emergency medical care if you have any of these symptoms over the next hours to days:   · Headache  · Nausea or vomiting  · Dizziness  · Sensitivity to light or noise  · Unusual sleepiness or grogginess  · Trouble falling asleep  · Personality changes  · Vision changes  · Memory loss  · Confusion  · Trouble walking or clumsiness  · Loss of consciousness (even for a short time)  · Inability to be awakened  · Stiff neck  · Weakness or numbness in any part of the body  · Seizures  General care  · If you were prescribed medicines for pain, use them as directed. Note: Dont take other medicines for pain without talking to your provider first.  · To help reduce swelling and pain, apply a cold source to the injured area for up to 20 minutes at a time. Do this as often as directed. Use a cold pack or bag of ice wrapped in a thin towel. Never apply a cold source directly to the skin.  · If you have cuts or scrapes as a result of your head injury, care for them as directed.  · For the next 24 hours (or longer, if instructed):  ¨ Dont drink alcohol or use sedatives or other medicines that make you sleepy.  ¨ Dont drive or operate machinery.  ¨ Dont do anything strenuous, such as heavy lifting or straining.  ¨ Limit tasks that require concentration. This includes reading, using a smartphone or computer, watching TV, and playing video games.  ¨ Dont return to sports or other activities that could result in another head injury.  Follow-up care  Follow up with your healthcare  "provider, or as directed. If imaging tests were done, they will be reviewed by a doctor. You will be told the results and any new findings that may affect your care.  When to seek medical advice  Call your healthcare provider right away if any of these occur:  · Pain doesnt get better or worsens  · New or increased swelling or bruising  · Fever of 100.4°F (38°C) or higher, or as directed by your provider  · Increased redness, warmth, drainage, or bleeding from the injured area  · Fluid drainage or bleeding from the nose or ears  · Any depression or bony abnormality in the injured area  Date Last Reviewed: 9/26/2015  © 7639-6510 [employer]. [address] [place] [address], [place] [address]. All rights reserved. This information is not intended as a substitute for professional medical care. Always follow your healthcare professional's instructions.        Concussion    A concussion can be caused by a direct blow to the head, neck, face, or somewhere else on the body with the force being transmitted to the head. This may cause you to lose consciousness - be "knocked out" - but not always. Depending on the severity of the blow, it will take from a few hours up to a few days to get better. Sometimes symptoms may last a few months or longer. This is called post-concussion syndrome.  At first, you may have a headache, nausea, vomiting, or dizziness. You may also have problems concentrating or remembering things. This is normal.  Symptoms should get better as the hours and days go by. Symptoms that get worse could be a sign of a more serious injury. This might be a bruise or bleeding in the brain. Thats why its important to watch for the warning signs listed below.  Home care  If your injury is mild and there are no serious signs or symptoms, your healthcare provider may recommend that you be monitored at home. If there is evidence that the injury is more serious, you will be monitored in the hospital. Follow " these tips to help care for yourself at home:  · After a concussion, your healthcare provider may recommend that a family member or friend monitor you for 12 to 24 hours. They may be told to wake you every few hours during sleep to check for the signs below.  · If your face or scalp swells, apply an ice pack for 20 minutes every 1 to 2 hours. Do this until the swelling starts to go down. You can make an ice pack by putting ice cubes in a plastic bag and wrapping the bag in a towel.  · You may use acetaminophen to control pain, unless another pain medicine was prescribed. Do not use aspirin or ibuprofen after a head injury. If you have chronic liver or kidney disease, talk with your doctor before using these medicines. Also talk with your doctor if you ever had a stomach ulcer or gastrointestinal bleeding.  · For the next 24 hours:  ¨ Dont drink alcohol or take sedatives or medicines that make you sleepy.  ¨ Dont drive or operate machinery.  ¨ Avoid doing anything strenuous. Dont lift or strain.  · Dont return to sports or any activity that could cause you to hit your head until all symptoms are gone and you have been cleared by your doctor. A second head injury before fully recovering from the first one can lead to serious brain injury.  · Avoid doing activities that require a lot of concentration or a lot of attention. This will allow your brain to rest and heal quicker.  Follow-up care  Follow up with your doctor in 1 week, or as directed.  Note: A radiologist will review any X-rays or CT scans that were taken. You will be told of any new findings that may affect your care.  When to seek medical advice  Call your healthcare provider right away if any of these occur:  · Repeated vomiting  · Headache or dizziness that is severe or gets worse  · Loss of consciousness  · Unusual drowsiness, or unable to wake up as usual  · Weakness or decreased ability to walk or move any limb  · Confusion, agitation, or change in  behavior or speech, or memory loss  · Blurred vision  · Convulsion (seizure)  · Swelling on the scalp or face that gets worse  · Changes in pupil size (the black part of the eye)  · Redness, warmth, or pus from the swollen area  · Fluid draining from or bleeding from the nose or ears     Date Last Reviewed: 8/14/2015 © 2000-2017 [employer]. [address] [place] [address]. All rights reserved. This information is not intended as a substitute for professional medical care. Always follow your healthcare professional's instructions.        Understanding Cervical Strain    There are 7 bones (vertebrae) in the neck that are part of the spine. These are called the cervical spine. Cervical strain is a medical term for neck pain. The neck has several layers of muscles. These are connected with tendons to the cervical spine and other bones. Neck pain is often the result of injury to these muscles and tendons.  Causes of cervical strain  Different types of stress on the neck can damage muscles and tendons (soft tissues) and cause cervical strain. Cervical tissues can be damaged by:  · The neck being forced past its normal range of motion, such as in a car accident or sports injury  · Constant, low-level stress, such as from poor posture or a poorly set-up workspace  Symptoms of cervical strain  These may include:  · Neck pain or stiffness  · Pain in the shoulders or upper back  · Muscle spasms  · Headache, often starting at the base of the neck  · Irritability, difficulty concentrating, or sleeplessness  Treatment for cervical strain  This problem often gets better on its own. Treatments aim to reduce pain and inflammation and increase the range of motion of the neck. Possible treatments include:  · Over-the-counter or prescription pain medicine. These help relieve pain and inflammation.  · Stretching exercises to decrease neck stiffness.  · Massage to decrease neck stiffness.  · Cold or heat pack.  These help reduce pain and swelling.  Call 911  Call emergency services right away if you have any of these:  · Face drooping or numbness  · Numbness or weakness, especially in the arms or on one side  · Slurred speech or difficulty speaking  · Blurred vision   When to call your healthcare provider  Call your healthcare provider right away if you have any of these:  · Fever of 100.4°F (38°C) or higher, or as directed  · Pain or stiffness that gets worse  · Symptoms that dont get better, or get worse  · Numbness, tingling, weakness or shooting pains into the arms or legs  · New symptoms  Date Last Reviewed: 3/10/2016  © 7314-6426 [employer]. [address] [place] [address], [place] [address]. All rights reserved. This information is not intended as a substitute for professional medical care. Always follow your healthcare professional's instructions.      Please follow up with your Primary care provider within 2-5 days if your signs and symptoms have not resolved or worsen.     If your condition worsens or fails to improve we recommend that you receive another evaluation at the emergency room immediately or contact your primary medical clinic to discuss your concerns.   You must understand that you have received an Urgent Care treatment only and that you may be released before all of your medical problems are known or treated. You, the patient, will arrange for follow up care as instructed.     RED FLAGS/WARNING SYMPTOMS DISCUSSED WITH PATIENT THAT WOULD WARRANT EMERGENT MEDICAL ATTENTION. PATIENT VERBALIZED UNDERSTANDING.

## 2019-12-29 NOTE — PROGRESS NOTES
"Subjective:       Patient ID: Dorothy Burt is a 64 y.o. female.    Vitals:  height is 5' 4" (1.626 m) and weight is 88.9 kg (196 lb). Her oral temperature is 97.8 °F (36.6 °C). Her blood pressure is 124/82 and her pulse is 80. Her respiration is 18 and oxygen saturation is 99%.     Chief Complaint: Fall (head pain, neck pain and lt shoulder pain)    On 12/27/2019 patient was out at the [employer].  On the morning of 12/28/2019 around 4:00 a.m. she is tripped in fell and hit her left shoulder and of her head on an elevator shaft fell back and hit right posterior occipital region of her head.  She states she loss consciousness and 9 nature of duration.  She states her friend that was with her stated the security Team came to pick her up immediately and she was alert.  She states she remember them eating immediately afterwards.  She denies any nausea or vomiting.  She states that after they ate her friends brought her home where she fell asleep the couch.  She denies any numbness tingling or weakness of her arms or legs.  She denies any bowel or bladder incontinence.  She denies saddle anesthesia.  She denies any visual disturbances.  She denies slurred speech or disorientation.  She does report headache with scalp tenderness and cervical pain.    Fall   The accident occurred 12 to 24 hours ago. The fall occurred while standing. The point of impact was the head and left shoulder. The pain is present in the head, neck and left shoulder. The pain is at a severity of 8/10. The pain is moderate. The symptoms are aggravated by movement. Associated symptoms include headaches and a loss of consciousness. Pertinent negatives include no abdominal pain or hematuria.       Constitution: Negative for fatigue.   HENT: Negative for facial swelling and facial trauma.    Neck: Positive for neck pain. Negative for neck stiffness.   Cardiovascular: Negative for chest trauma.   Eyes: Negative for eye trauma, photophobia, double " vision and blurred vision.   Gastrointestinal: Negative for abdominal trauma, abdominal pain and rectal bleeding.   Genitourinary: Negative for hematuria, missed menses, genital trauma and pelvic pain.   Musculoskeletal: Positive for pain, trauma and joint pain. Negative for joint swelling and abnormal ROM of joint.   Skin: Negative for color change, wound, abrasion, laceration and bruising.   Neurological: Positive for headaches and loss of consciousness. Negative for dizziness, history of vertigo, light-headedness, coordination disturbances and altered mental status.   Hematologic/Lymphatic: Negative for history of bleeding disorder.   Psychiatric/Behavioral: Negative for altered mental status.       Objective:      Physical Exam   Constitutional: She is oriented to person, place, and time. She appears well-developed and well-nourished. She is cooperative.  Non-toxic appearance. She does not appear ill. No distress.   HENT:   Head: Normocephalic. Head is with contusion. Head is without raccoon's eyes, without Matthew's sign, without abrasion and without laceration.       Right Ear: Hearing, tympanic membrane, external ear and ear canal normal. No hemotympanum.   Left Ear: Hearing, tympanic membrane, external ear and ear canal normal. No hemotympanum.   Nose: Nose normal. No mucosal edema, rhinorrhea or nasal deformity. No epistaxis. Right sinus exhibits no maxillary sinus tenderness and no frontal sinus tenderness. Left sinus exhibits no maxillary sinus tenderness and no frontal sinus tenderness.   Mouth/Throat: Uvula is midline, oropharynx is clear and moist and mucous membranes are normal. No trismus in the jaw. Normal dentition. No uvula swelling. No posterior oropharyngeal erythema.   Eyes: Pupils are equal, round, and reactive to light. Conjunctivae, EOM and lids are normal. Right eye exhibits no discharge. Left eye exhibits no discharge. No scleral icterus.   Neck: Trachea normal, normal range of motion, full  passive range of motion without pain and phonation normal. Neck supple. No spinous process tenderness and no muscular tenderness present. No neck rigidity. No tracheal deviation present.   Cardiovascular: Normal rate, regular rhythm, normal heart sounds, intact distal pulses and normal pulses.   Pulmonary/Chest: Effort normal and breath sounds normal. No respiratory distress.   Abdominal: Soft. Normal appearance and bowel sounds are normal. She exhibits no distension, no pulsatile midline mass and no mass. There is no tenderness.   Musculoskeletal: Normal range of motion. She exhibits no edema or deformity.   Neurological: She is alert and oriented to person, place, and time. She has normal strength. No cranial nerve deficit or sensory deficit. She exhibits normal muscle tone. She displays no seizure activity. Coordination normal. GCS eye subscore is 4. GCS verbal subscore is 5. GCS motor subscore is 6.   Reflex Scores:       Bicep reflexes are 2+ on the right side and 2+ on the left side.       Patellar reflexes are 2+ on the right side and 2+ on the left side.  Alert, oriented x 3. EOMI, PERRLA. Cranial nerves intact: facial expressions (smile, raising eyebrows, shutting eyes, pursed lips) symmetric. Shoulder shrug strength 5/5; sternocleidomastoid muscle strength 5/5 bilaterally. Jaw is midline without deviation. Tongue protrudes at midline without fasciculations. Sensation to face in distribution of CN V1, V2, and V3 intact. Sensation to upper and lower extremities intact. Finger to nose, rapid rhythmic alternating movements, and heel to shin test are intact and smooth bilaterally. Patient ambulates unassisted without rigidity or ataxia. Romberg negative. Voice quality, comprehension, articulation, coherence assessed as appropriate.      Skin: Skin is warm, dry, intact, not diaphoretic and not pale. Capillary refill takes less than 2 seconds. abrasion, burn, bruising and ecchymosis  Psychiatric: She has a normal  mood and affect. Her speech is normal and behavior is normal. Judgment and thought content normal. Cognition and memory are normal.   Nursing note and vitals reviewed.      XRAY skull: The osseous structures are normal without fracture or osseous destructive process.  The pneumatized aspects of the skull and skull base are well aerated.  The nasal septum is midline.  The visualized cervical spine is normal.    Assessment:       1. Concussion with loss of consciousness, initial encounter    2. Injury of head, initial encounter    3. Strain of neck muscle, initial encounter    4. Fall, initial encounter        Plan:         Concussion with loss of consciousness, initial encounter  -     Ambulatory referral to Neurology    Injury of head, initial encounter  -     X-Ray Skull Complete Min 4 Views; Future; Expected date: 12/29/2019  -     acetaminophen tablet 650 mg  -     Ambulatory referral to Neurology    Strain of neck muscle, initial encounter    Fall, initial encounter          Head Injury (Adult)    You have a head injury. It does not appear serious at this time. But symptoms of a more serious problem, such as a mild brain injury (concussion) or bruising or bleeding in the brain, may appear later. For this reason, you or someone caring for you will need to watch for the symptoms listed below. Once youre home, also be sure to follow any care instructions youre given.  Home care  Watch for the following symptoms  Seek emergency medical care if you have any of these symptoms over the next hours to days:   · Headache  · Nausea or vomiting  · Dizziness  · Sensitivity to light or noise  · Unusual sleepiness or grogginess  · Trouble falling asleep  · Personality changes  · Vision changes  · Memory loss  · Confusion  · Trouble walking or clumsiness  · Loss of consciousness (even for a short time)  · Inability to be awakened  · Stiff neck  · Weakness or numbness in any part of the body  · Seizures  General care  · If you  were prescribed medicines for pain, use them as directed. Note: Dont take other medicines for pain without talking to your provider first.  · To help reduce swelling and pain, apply a cold source to the injured area for up to 20 minutes at a time. Do this as often as directed. Use a cold pack or bag of ice wrapped in a thin towel. Never apply a cold source directly to the skin.  · If you have cuts or scrapes as a result of your head injury, care for them as directed.  · For the next 24 hours (or longer, if instructed):  ¨ Dont drink alcohol or use sedatives or other medicines that make you sleepy.  ¨ Dont drive or operate machinery.  ¨ Dont do anything strenuous, such as heavy lifting or straining.  ¨ Limit tasks that require concentration. This includes reading, using a smartphone or computer, watching TV, and playing video games.  ¨ Dont return to sports or other activities that could result in another head injury.  Follow-up care  Follow up with your healthcare provider, or as directed. If imaging tests were done, they will be reviewed by a doctor. You will be told the results and any new findings that may affect your care.  When to seek medical advice  Call your healthcare provider right away if any of these occur:  · Pain doesnt get better or worsens  · New or increased swelling or bruising  · Fever of 100.4°F (38°C) or higher, or as directed by your provider  · Increased redness, warmth, drainage, or bleeding from the injured area  · Fluid drainage or bleeding from the nose or ears  · Any depression or bony abnormality in the injured area  Date Last Reviewed: 9/26/2015 © 2000-2017 [employer]. [address] [place] [address], [place] [address]. All rights reserved. This information is not intended as a substitute for professional medical care. Always follow your healthcare professional's instructions.        Concussion    A concussion can be caused by a direct blow to the head, neck, face, or  "somewhere else on the body with the force being transmitted to the head. This may cause you to lose consciousness - be "knocked out" - but not always. Depending on the severity of the blow, it will take from a few hours up to a few days to get better. Sometimes symptoms may last a few months or longer. This is called post-concussion syndrome.  At first, you may have a headache, nausea, vomiting, or dizziness. You may also have problems concentrating or remembering things. This is normal.  Symptoms should get better as the hours and days go by. Symptoms that get worse could be a sign of a more serious injury. This might be a bruise or bleeding in the brain. Thats why its important to watch for the warning signs listed below.  Home care  If your injury is mild and there are no serious signs or symptoms, your healthcare provider may recommend that you be monitored at home. If there is evidence that the injury is more serious, you will be monitored in the hospital. Follow these tips to help care for yourself at home:  · After a concussion, your healthcare provider may recommend that a family member or friend monitor you for 12 to 24 hours. They may be told to wake you every few hours during sleep to check for the signs below.  · If your face or scalp swells, apply an ice pack for 20 minutes every 1 to 2 hours. Do this until the swelling starts to go down. You can make an ice pack by putting ice cubes in a plastic bag and wrapping the bag in a towel.  · You may use acetaminophen to control pain, unless another pain medicine was prescribed. Do not use aspirin or ibuprofen after a head injury. If you have chronic liver or kidney disease, talk with your doctor before using these medicines. Also talk with your doctor if you ever had a stomach ulcer or gastrointestinal bleeding.  · For the next 24 hours:  ¨ Dont drink alcohol or take sedatives or medicines that make you sleepy.  ¨ Dont drive or operate machinery.  ¨ Avoid " doing anything strenuous. Dont lift or strain.  · Dont return to sports or any activity that could cause you to hit your head until all symptoms are gone and you have been cleared by your doctor. A second head injury before fully recovering from the first one can lead to serious brain injury.  · Avoid doing activities that require a lot of concentration or a lot of attention. This will allow your brain to rest and heal quicker.  Follow-up care  Follow up with your doctor in 1 week, or as directed.  Note: A radiologist will review any X-rays or CT scans that were taken. You will be told of any new findings that may affect your care.  When to seek medical advice  Call your healthcare provider right away if any of these occur:  · Repeated vomiting  · Headache or dizziness that is severe or gets worse  · Loss of consciousness  · Unusual drowsiness, or unable to wake up as usual  · Weakness or decreased ability to walk or move any limb  · Confusion, agitation, or change in behavior or speech, or memory loss  · Blurred vision  · Convulsion (seizure)  · Swelling on the scalp or face that gets worse  · Changes in pupil size (the black part of the eye)  · Redness, warmth, or pus from the swollen area  · Fluid draining from or bleeding from the nose or ears     Date Last Reviewed: 8/14/2015  © 8563-7846 [employer]. [address] [place] [address]. All rights reserved. This information is not intended as a substitute for professional medical care. Always follow your healthcare professional's instructions.        Understanding Cervical Strain    There are 7 bones (vertebrae) in the neck that are part of the spine. These are called the cervical spine. Cervical strain is a medical term for neck pain. The neck has several layers of muscles. These are connected with tendons to the cervical spine and other bones. Neck pain is often the result of injury to these muscles and tendons.  Causes of cervical  strain  Different types of stress on the neck can damage muscles and tendons (soft tissues) and cause cervical strain. Cervical tissues can be damaged by:  · The neck being forced past its normal range of motion, such as in a car accident or sports injury  · Constant, low-level stress, such as from poor posture or a poorly set-up workspace  Symptoms of cervical strain  These may include:  · Neck pain or stiffness  · Pain in the shoulders or upper back  · Muscle spasms  · Headache, often starting at the base of the neck  · Irritability, difficulty concentrating, or sleeplessness  Treatment for cervical strain  This problem often gets better on its own. Treatments aim to reduce pain and inflammation and increase the range of motion of the neck. Possible treatments include:  · Over-the-counter or prescription pain medicine. These help relieve pain and inflammation.  · Stretching exercises to decrease neck stiffness.  · Massage to decrease neck stiffness.  · Cold or heat pack. These help reduce pain and swelling.  Call 911  Call emergency services right away if you have any of these:  · Face drooping or numbness  · Numbness or weakness, especially in the arms or on one side  · Slurred speech or difficulty speaking  · Blurred vision   When to call your healthcare provider  Call your healthcare provider right away if you have any of these:  · Fever of 100.4°F (38°C) or higher, or as directed  · Pain or stiffness that gets worse  · Symptoms that dont get better, or get worse  · Numbness, tingling, weakness or shooting pains into the arms or legs  · New symptoms  Date Last Reviewed: 3/10/2016  © 1296-6740 [employer]. [address] [place] [address], [place] [address]. All rights reserved. This information is not intended as a substitute for professional medical care. Always follow your healthcare professional's instructions.      Please follow up with your Primary care provider within 2-5 days if your signs and  symptoms have not resolved or worsen.     If your condition worsens or fails to improve we recommend that you receive another evaluation at the emergency room immediately or contact your primary medical clinic to discuss your concerns.   You must understand that you have received an Urgent Care treatment only and that you may be released before all of your medical problems are known or treated. You, the patient, will arrange for follow up care as instructed.     RED FLAGS/WARNING SYMPTOMS DISCUSSED WITH PATIENT THAT WOULD WARRANT EMERGENT MEDICAL ATTENTION. PATIENT VERBALIZED UNDERSTANDING.

## 2020-01-06 ENCOUNTER — HOSPITAL ENCOUNTER (OUTPATIENT)
Dept: RADIOLOGY | Facility: HOSPITAL | Age: 65
Discharge: HOME | End: 2020-01-06
Attending: FAMILY MEDICINE
Payer: MEDICARE

## 2020-01-06 DIAGNOSIS — S09.90XA HEAD TRAUMA: ICD-10-CM

## 2020-01-06 DIAGNOSIS — R40.20 LOSS OF CONSCIOUSNESS: ICD-10-CM

## 2020-01-06 PROCEDURE — 70450 CT HEAD WITHOUT CONTRAST: ICD-10-PCS | Mod: 26,,, | Performed by: RADIOLOGY

## 2020-01-06 PROCEDURE — 70450 CT HEAD/BRAIN W/O DYE: CPT | Mod: 26,,, | Performed by: RADIOLOGY

## 2020-01-06 PROCEDURE — 70450 CT HEAD/BRAIN W/O DYE: CPT | Mod: TC

## 2020-04-19 ENCOUNTER — HOSPITAL ENCOUNTER (INPATIENT)
Facility: HOSPITAL | Age: 65
LOS: 2 days | Discharge: HOME | DRG: 287 | End: 2020-04-21
Attending: EMERGENCY MEDICINE | Admitting: INTERNAL MEDICINE
Payer: MEDICARE

## 2020-04-19 DIAGNOSIS — I21.3 ST ELEVATION MYOCARDIAL INFARCTION (STEMI), UNSPECIFIED ARTERY: Primary | ICD-10-CM

## 2020-04-19 DIAGNOSIS — R07.9 CHEST PAIN: ICD-10-CM

## 2020-04-19 DIAGNOSIS — I30.0 ACUTE IDIOPATHIC PERICARDITIS: ICD-10-CM

## 2020-04-19 DIAGNOSIS — I31.9 PERICARDITIS: ICD-10-CM

## 2020-04-19 DIAGNOSIS — I31.39 PERICARDIAL EFFUSION: ICD-10-CM

## 2020-04-19 PROBLEM — E03.9 HYPOTHYROIDISM: Status: ACTIVE | Noted: 2020-04-19

## 2020-04-19 PROBLEM — R79.89 LFT ELEVATION: Status: ACTIVE | Noted: 2020-04-19

## 2020-04-19 PROBLEM — Z02.9 DISCHARGE PLANNING ISSUES: Status: ACTIVE | Noted: 2020-04-19

## 2020-04-19 PROBLEM — E66.9 OBESITY (BMI 30.0-34.9): Status: ACTIVE | Noted: 2020-04-19

## 2020-04-19 PROBLEM — I30.9 ACUTE PERICARDITIS: Status: ACTIVE | Noted: 2020-04-19

## 2020-04-19 PROBLEM — E66.811 OBESITY (BMI 30.0-34.9): Status: ACTIVE | Noted: 2020-04-19

## 2020-04-19 PROBLEM — Z75.8 DISCHARGE PLANNING ISSUES: Status: ACTIVE | Noted: 2020-04-19

## 2020-04-19 LAB
ABO + RH BLD: NORMAL
ALBUMIN SERPL BCP-MCNC: 3.6 G/DL (ref 3.5–5.2)
ALP SERPL-CCNC: 73 U/L (ref 55–135)
ALT SERPL W/O P-5'-P-CCNC: 57 U/L (ref 10–44)
AMMONIA PLAS-SCNC: 31 UMOL/L (ref 10–50)
ANION GAP SERPL CALC-SCNC: 12 MMOL/L (ref 8–16)
ASCENDING AORTA: 3.07 CM
AST SERPL-CCNC: 64 U/L (ref 10–40)
AV INDEX (PROSTH): 0.84
AV MEAN GRADIENT: 3 MMHG
AV PEAK GRADIENT: 4 MMHG
AV VALVE AREA: 2.9 CM2
AV VELOCITY RATIO: 0.73
BASOPHILS # BLD AUTO: 0.02 K/UL (ref 0–0.2)
BASOPHILS NFR BLD: 0.2 % (ref 0–1.9)
BILIRUB SERPL-MCNC: 0.7 MG/DL (ref 0.1–1)
BILIRUB UR QL STRIP: NEGATIVE
BLD GP AB SCN CELLS X3 SERPL QL: NORMAL
BNP SERPL-MCNC: 17 PG/ML (ref 0–99)
BSA FOR ECHO PROCEDURE: 1.99 M2
BUN SERPL-MCNC: 14 MG/DL (ref 8–23)
C3 SERPL-MCNC: 116 MG/DL (ref 50–180)
C4 SERPL-MCNC: 30 MG/DL (ref 11–44)
CALCIUM SERPL-MCNC: 9.1 MG/DL (ref 8.7–10.5)
CCP AB SER IA-ACNC: <0.5 U/ML
CHLORIDE SERPL-SCNC: 97 MMOL/L (ref 95–110)
CK MB SERPL-MCNC: 0.4 NG/ML (ref 0.1–6.5)
CK MB SERPL-RTO: 1 % (ref 0–5)
CK SERPL-CCNC: 42 U/L (ref 20–180)
CLARITY UR REFRACT.AUTO: CLEAR
CO2 SERPL-SCNC: 23 MMOL/L (ref 23–29)
COLOR UR AUTO: YELLOW
CREAT SERPL-MCNC: 0.9 MG/DL (ref 0.5–1.4)
CRP SERPL-MCNC: 95.1 MG/L (ref 0–8.2)
CV ECHO LV RWT: 0.52 CM
DIFFERENTIAL METHOD: ABNORMAL
DOP CALC AO PEAK VEL: 0.98 M/S
DOP CALC AO VTI: 13.22 CM
DOP CALC LVOT AREA: 3.5 CM2
DOP CALC LVOT DIAMETER: 2.1 CM
DOP CALC LVOT PEAK VEL: 0.72 M/S
DOP CALC LVOT STROKE VOLUME: 38.29 CM3
DOP CALCLVOT PEAK VEL VTI: 11.06 CM
E WAVE DECELERATION TIME: 179.5 MSEC
E/A RATIO: 1.02
E/E' RATIO: 7.29 M/S
ECHO LV POSTERIOR WALL: 0.83 CM (ref 0.6–1.1)
EOSINOPHIL # BLD AUTO: 0 K/UL (ref 0–0.5)
EOSINOPHIL NFR BLD: 0.2 % (ref 0–8)
ERYTHROCYTE [DISTWIDTH] IN BLOOD BY AUTOMATED COUNT: 11.9 % (ref 11.5–14.5)
ERYTHROCYTE [SEDIMENTATION RATE] IN BLOOD BY WESTERGREN METHOD: 41 MM/HR (ref 0–36)
EST. GFR  (AFRICAN AMERICAN): >60 ML/MIN/1.73 M^2
EST. GFR  (NON AFRICAN AMERICAN): >60 ML/MIN/1.73 M^2
ESTIMATED AVG GLUCOSE: 108 MG/DL (ref 68–131)
FERRITIN SERPL-MCNC: 308 NG/ML (ref 20–300)
FRACTIONAL SHORTENING: 44 % (ref 28–44)
GLUCOSE SERPL-MCNC: 132 MG/DL (ref 70–110)
GLUCOSE UR QL STRIP: NEGATIVE
HBA1C MFR BLD HPLC: 5.4 % (ref 4–5.6)
HCT VFR BLD AUTO: 37.3 % (ref 37–48.5)
HGB BLD-MCNC: 12 G/DL (ref 12–16)
HGB UR QL STRIP: ABNORMAL
IMM GRANULOCYTES # BLD AUTO: 0.04 K/UL (ref 0–0.04)
IMM GRANULOCYTES NFR BLD AUTO: 0.4 % (ref 0–0.5)
INR PPP: 1 (ref 0.8–1.2)
INTERVENTRICULAR SEPTUM: 0.85 CM (ref 0.6–1.1)
IVRT: 74.22 MSEC
KETONES UR QL STRIP: NEGATIVE
LA MAJOR: 4.77 CM
LA MINOR: 5.34 CM
LA WIDTH: 3.08 CM
LACTATE SERPL-SCNC: 2.2 MMOL/L (ref 0.5–2.2)
LEFT ATRIUM SIZE: 3.08 CM
LEFT ATRIUM VOLUME INDEX: 21.1 ML/M2
LEFT ATRIUM VOLUME: 40.63 CM3
LEFT INTERNAL DIMENSION IN SYSTOLE: 1.79 CM (ref 2.1–4)
LEFT VENTRICLE DIASTOLIC VOLUME INDEX: 21.6 ML/M2
LEFT VENTRICLE DIASTOLIC VOLUME: 41.62 ML
LEFT VENTRICLE MASS INDEX: 37 G/M2
LEFT VENTRICLE SYSTOLIC VOLUME INDEX: 5 ML/M2
LEFT VENTRICLE SYSTOLIC VOLUME: 9.61 ML
LEFT VENTRICULAR INTERNAL DIMENSION IN DIASTOLE: 3.22 CM (ref 3.5–6)
LEFT VENTRICULAR MASS: 70.71 G
LEUKOCYTE ESTERASE UR QL STRIP: NEGATIVE
LV LATERAL E/E' RATIO: 7.75 M/S
LV SEPTAL E/E' RATIO: 6.89 M/S
LYMPHOCYTES # BLD AUTO: 1.3 K/UL (ref 1–4.8)
LYMPHOCYTES NFR BLD: 13 % (ref 18–48)
MCH RBC QN AUTO: 29.9 PG (ref 27–31)
MCHC RBC AUTO-ENTMCNC: 32.2 G/DL (ref 32–36)
MCV RBC AUTO: 93 FL (ref 82–98)
MICROSCOPIC COMMENT: ABNORMAL
MONOCYTES # BLD AUTO: 1.1 K/UL (ref 0.3–1)
MONOCYTES NFR BLD: 10.4 % (ref 4–15)
MV PEAK A VEL: 0.61 M/S
MV PEAK E VEL: 0.62 M/S
NEUTROPHILS # BLD AUTO: 7.7 K/UL (ref 1.8–7.7)
NEUTROPHILS NFR BLD: 75.8 % (ref 38–73)
NITRITE UR QL STRIP: NEGATIVE
NRBC BLD-RTO: 0 /100 WBC
PH UR STRIP: 8 [PH] (ref 5–8)
PISA TR MAX VEL: 1.79 M/S
PLATELET # BLD AUTO: 232 K/UL (ref 150–350)
PMV BLD AUTO: 10.4 FL (ref 9.2–12.9)
POTASSIUM SERPL-SCNC: 4.7 MMOL/L (ref 3.5–5.1)
PROCALCITONIN SERPL IA-MCNC: 0.05 NG/ML
PROT SERPL-MCNC: 7.7 G/DL (ref 6–8.4)
PROT UR QL STRIP: NEGATIVE
PROTHROMBIN TIME: 10.7 SEC (ref 9–12.5)
RA MAJOR: 4.05 CM
RA PRESSURE: 15 MMHG
RA WIDTH: 3.76 CM
RBC # BLD AUTO: 4.02 M/UL (ref 4–5.4)
RBC #/AREA URNS AUTO: 8 /HPF (ref 0–4)
RIGHT VENTRICULAR END-DIASTOLIC DIMENSION: 3.13 CM
RV TISSUE DOPPLER FREE WALL SYSTOLIC VELOCITY 1 (APICAL 4 CHAMBER VIEW): 10.69 CM/S
SARS-COV-2 RDRP RESP QL NAA+PROBE: NEGATIVE
SINUS: 3.21 CM
SODIUM SERPL-SCNC: 132 MMOL/L (ref 136–145)
SP GR UR STRIP: 1.03 (ref 1–1.03)
SQUAMOUS #/AREA URNS AUTO: 1 /HPF
STJ: 2.62 CM
TDI LATERAL: 0.08 M/S
TDI SEPTAL: 0.09 M/S
TDI: 0.09 M/S
TR MAX PG: 13 MMHG
TRICUSPID ANNULAR PLANE SYSTOLIC EXCURSION: 1.9 CM
TROPONIN I SERPL DL<=0.01 NG/ML-MCNC: <0.006 NG/ML (ref 0–0.03)
TSH SERPL DL<=0.005 MIU/L-ACNC: 1.01 UIU/ML (ref 0.4–4)
TV REST PULMONARY ARTERY PRESSURE: 28 MMHG
URN SPEC COLLECT METH UR: ABNORMAL
WBC # BLD AUTO: 10.12 K/UL (ref 3.9–12.7)
WBC #/AREA URNS AUTO: 1 /HPF (ref 0–5)

## 2020-04-19 PROCEDURE — 85652 RBC SED RATE AUTOMATED: CPT

## 2020-04-19 PROCEDURE — 82728 ASSAY OF FERRITIN: CPT

## 2020-04-19 PROCEDURE — 99153 MOD SED SAME PHYS/QHP EA: CPT | Performed by: INTERNAL MEDICINE

## 2020-04-19 PROCEDURE — U0002 COVID-19 LAB TEST NON-CDC: HCPCS

## 2020-04-19 PROCEDURE — 92978 PR IVUS, CORONARY, 1ST VESSEL: ICD-10-PCS | Mod: 26,LD,, | Performed by: INTERNAL MEDICINE

## 2020-04-19 PROCEDURE — 87040 BLOOD CULTURE FOR BACTERIA: CPT

## 2020-04-19 PROCEDURE — 99291 CRITICAL CARE FIRST HOUR: CPT

## 2020-04-19 PROCEDURE — 63600175 PHARM REV CODE 636 W HCPCS: Performed by: INTERNAL MEDICINE

## 2020-04-19 PROCEDURE — 86617 LYME DISEASE ANTIBODY: CPT

## 2020-04-19 PROCEDURE — 83036 HEMOGLOBIN GLYCOSYLATED A1C: CPT

## 2020-04-19 PROCEDURE — 86160 COMPLEMENT ANTIGEN: CPT | Mod: 59

## 2020-04-19 PROCEDURE — 92978 ENDOLUMINL IVUS OCT C 1ST: CPT | Mod: LD | Performed by: INTERNAL MEDICINE

## 2020-04-19 PROCEDURE — 82140 ASSAY OF AMMONIA: CPT

## 2020-04-19 PROCEDURE — 86200 CCP ANTIBODY: CPT

## 2020-04-19 PROCEDURE — C1753 CATH, INTRAVAS ULTRASOUND: HCPCS | Performed by: INTERNAL MEDICINE

## 2020-04-19 PROCEDURE — 99223 1ST HOSP IP/OBS HIGH 75: CPT | Mod: AI,GC,, | Performed by: INTERNAL MEDICINE

## 2020-04-19 PROCEDURE — 25500020 PHARM REV CODE 255: Performed by: INTERNAL MEDICINE

## 2020-04-19 PROCEDURE — 82550 ASSAY OF CK (CPK): CPT

## 2020-04-19 PROCEDURE — 86638 Q FEVER ANTIBODY: CPT

## 2020-04-19 PROCEDURE — 86580 TB INTRADERMAL TEST: CPT | Performed by: STUDENT IN AN ORGANIZED HEALTH CARE EDUCATION/TRAINING PROGRAM

## 2020-04-19 PROCEDURE — 86038 ANTINUCLEAR ANTIBODIES: CPT

## 2020-04-19 PROCEDURE — C1894 INTRO/SHEATH, NON-LASER: HCPCS | Performed by: INTERNAL MEDICINE

## 2020-04-19 PROCEDURE — 93458 PR CATH PLACE/CORON ANGIO, IMG SUPER/INTERP,W LEFT HEART VENTRICULOGRAPHY: ICD-10-PCS | Mod: 26,,, | Performed by: INTERNAL MEDICINE

## 2020-04-19 PROCEDURE — 86901 BLOOD TYPING SEROLOGIC RH(D): CPT

## 2020-04-19 PROCEDURE — 86592 SYPHILIS TEST NON-TREP QUAL: CPT

## 2020-04-19 PROCEDURE — 25000003 PHARM REV CODE 250: Performed by: INTERNAL MEDICINE

## 2020-04-19 PROCEDURE — 25000003 PHARM REV CODE 250: Performed by: EMERGENCY MEDICINE

## 2020-04-19 PROCEDURE — 86225 DNA ANTIBODY NATIVE: CPT

## 2020-04-19 PROCEDURE — C1769 GUIDE WIRE: HCPCS | Performed by: INTERNAL MEDICINE

## 2020-04-19 PROCEDURE — 99291 PR CRITICAL CARE, E/M 30-74 MINUTES: ICD-10-PCS | Mod: ,,, | Performed by: EMERGENCY MEDICINE

## 2020-04-19 PROCEDURE — 92978 ENDOLUMINL IVUS OCT C 1ST: CPT | Mod: 26,LD,, | Performed by: INTERNAL MEDICINE

## 2020-04-19 PROCEDURE — 83880 ASSAY OF NATRIURETIC PEPTIDE: CPT

## 2020-04-19 PROCEDURE — 87798 DETECT AGENT NOS DNA AMP: CPT | Mod: 59

## 2020-04-19 PROCEDURE — 86255 FLUORESCENT ANTIBODY SCREEN: CPT

## 2020-04-19 PROCEDURE — 99152 MOD SED SAME PHYS/QHP 5/>YRS: CPT | Performed by: INTERNAL MEDICINE

## 2020-04-19 PROCEDURE — 93458 L HRT ARTERY/VENTRICLE ANGIO: CPT | Performed by: INTERNAL MEDICINE

## 2020-04-19 PROCEDURE — C1887 CATHETER, GUIDING: HCPCS | Performed by: INTERNAL MEDICINE

## 2020-04-19 PROCEDURE — 83605 ASSAY OF LACTIC ACID: CPT

## 2020-04-19 PROCEDURE — 86235 NUCLEAR ANTIGEN ANTIBODY: CPT

## 2020-04-19 PROCEDURE — 63700000 PHARM REV CODE 250 ALT 637 W/O HCPCS: Performed by: EMERGENCY MEDICINE

## 2020-04-19 PROCEDURE — 84484 ASSAY OF TROPONIN QUANT: CPT

## 2020-04-19 PROCEDURE — 86160 COMPLEMENT ANTIGEN: CPT

## 2020-04-19 PROCEDURE — 80053 COMPREHEN METABOLIC PANEL: CPT

## 2020-04-19 PROCEDURE — 84443 ASSAY THYROID STIM HORMONE: CPT

## 2020-04-19 PROCEDURE — 86140 C-REACTIVE PROTEIN: CPT

## 2020-04-19 PROCEDURE — 84145 PROCALCITONIN (PCT): CPT

## 2020-04-19 PROCEDURE — 30200315 PPD INTRADERMAL TEST REV CODE 302: Performed by: STUDENT IN AN ORGANIZED HEALTH CARE EDUCATION/TRAINING PROGRAM

## 2020-04-19 PROCEDURE — 80074 ACUTE HEPATITIS PANEL: CPT

## 2020-04-19 PROCEDURE — 25000003 PHARM REV CODE 250: Performed by: STUDENT IN AN ORGANIZED HEALTH CARE EDUCATION/TRAINING PROGRAM

## 2020-04-19 PROCEDURE — 93458 L HRT ARTERY/VENTRICLE ANGIO: CPT | Mod: 26,,, | Performed by: INTERNAL MEDICINE

## 2020-04-19 PROCEDURE — 99291 CRITICAL CARE FIRST HOUR: CPT | Mod: ,,, | Performed by: EMERGENCY MEDICINE

## 2020-04-19 PROCEDURE — 20000000 HC ICU ROOM

## 2020-04-19 PROCEDURE — 86618 LYME DISEASE ANTIBODY: CPT

## 2020-04-19 PROCEDURE — 81001 URINALYSIS AUTO W/SCOPE: CPT

## 2020-04-19 PROCEDURE — 86235 NUCLEAR ANTIGEN ANTIBODY: CPT | Mod: 59

## 2020-04-19 PROCEDURE — 99152 PR MOD CONSCIOUS SEDATION, SAME PHYS, 5+ YRS, FIRST 15 MIN: ICD-10-PCS | Mod: ,,, | Performed by: INTERNAL MEDICINE

## 2020-04-19 PROCEDURE — 99152 MOD SED SAME PHYS/QHP 5/>YRS: CPT | Mod: ,,, | Performed by: INTERNAL MEDICINE

## 2020-04-19 PROCEDURE — 82553 CREATINE MB FRACTION: CPT

## 2020-04-19 PROCEDURE — 85610 PROTHROMBIN TIME: CPT

## 2020-04-19 PROCEDURE — 85025 COMPLETE CBC W/AUTO DIFF WBC: CPT

## 2020-04-19 PROCEDURE — 99223 PR INITIAL HOSPITAL CARE,LEVL III: ICD-10-PCS | Mod: AI,GC,, | Performed by: INTERNAL MEDICINE

## 2020-04-19 PROCEDURE — 86703 HIV-1/HIV-2 1 RESULT ANTBDY: CPT

## 2020-04-19 RX ORDER — HYDROCODONE BITARTRATE AND ACETAMINOPHEN 5; 325 MG/1; MG/1
1 TABLET ORAL EVERY 8 HOURS PRN
Status: DISCONTINUED | OUTPATIENT
Start: 2020-04-19 | End: 2020-04-21 | Stop reason: HOSPADM

## 2020-04-19 RX ORDER — NITROGLYCERIN 5 MG/ML
INJECTION, SOLUTION INTRAVENOUS
Status: DISCONTINUED | OUTPATIENT
Start: 2020-04-19 | End: 2020-04-19 | Stop reason: HOSPADM

## 2020-04-19 RX ORDER — NITROGLYCERIN 0.4 MG/1
TABLET SUBLINGUAL
Status: DISPENSED
Start: 2020-04-19 | End: 2020-04-19

## 2020-04-19 RX ORDER — DIPHENHYDRAMINE HCL 50 MG
50 CAPSULE ORAL
Status: COMPLETED | OUTPATIENT
Start: 2020-04-19 | End: 2020-04-19

## 2020-04-19 RX ORDER — NITROGLYCERIN 0.4 MG/1
0.4 TABLET SUBLINGUAL
Status: COMPLETED | OUTPATIENT
Start: 2020-04-19 | End: 2020-04-19

## 2020-04-19 RX ORDER — PROMETHAZINE HYDROCHLORIDE 12.5 MG/1
25 TABLET ORAL EVERY 6 HOURS PRN
Status: DISCONTINUED | OUTPATIENT
Start: 2020-04-19 | End: 2020-04-21 | Stop reason: HOSPADM

## 2020-04-19 RX ORDER — IPRATROPIUM BROMIDE AND ALBUTEROL SULFATE 2.5; .5 MG/3ML; MG/3ML
3 SOLUTION RESPIRATORY (INHALATION) EVERY 4 HOURS PRN
Status: DISCONTINUED | OUTPATIENT
Start: 2020-04-19 | End: 2020-04-21 | Stop reason: HOSPADM

## 2020-04-19 RX ORDER — MONTELUKAST SODIUM 10 MG/1
10 TABLET ORAL NIGHTLY
Status: DISCONTINUED | OUTPATIENT
Start: 2020-04-19 | End: 2020-04-21 | Stop reason: HOSPADM

## 2020-04-19 RX ORDER — IBUPROFEN 200 MG
16 TABLET ORAL
Status: DISCONTINUED | OUTPATIENT
Start: 2020-04-19 | End: 2020-04-21 | Stop reason: HOSPADM

## 2020-04-19 RX ORDER — HEPARIN SODIUM 200 [USP'U]/100ML
INJECTION, SOLUTION INTRAVENOUS
Status: DISCONTINUED | OUTPATIENT
Start: 2020-04-19 | End: 2020-04-21 | Stop reason: HOSPADM

## 2020-04-19 RX ORDER — ACETAMINOPHEN 325 MG/1
650 TABLET ORAL EVERY 8 HOURS PRN
Status: DISCONTINUED | OUTPATIENT
Start: 2020-04-19 | End: 2020-04-21 | Stop reason: HOSPADM

## 2020-04-19 RX ORDER — ASPIRIN 325 MG
325 TABLET ORAL
Status: DISPENSED | OUTPATIENT
Start: 2020-04-19 | End: 2020-04-19

## 2020-04-19 RX ORDER — COLCHICINE 0.6 MG/1
0.6 TABLET, FILM COATED ORAL 2 TIMES DAILY
Status: DISCONTINUED | OUTPATIENT
Start: 2020-04-20 | End: 2020-04-21 | Stop reason: HOSPADM

## 2020-04-19 RX ORDER — ONDANSETRON 2 MG/ML
4 INJECTION INTRAMUSCULAR; INTRAVENOUS EVERY 8 HOURS PRN
Status: DISCONTINUED | OUTPATIENT
Start: 2020-04-19 | End: 2020-04-21 | Stop reason: HOSPADM

## 2020-04-19 RX ORDER — COLCHICINE 0.6 MG/1
1.2 TABLET, FILM COATED ORAL 2 TIMES DAILY
Status: COMPLETED | OUTPATIENT
Start: 2020-04-19 | End: 2020-04-19

## 2020-04-19 RX ORDER — DIPHENHYDRAMINE HCL 50 MG
50 CAPSULE ORAL ONCE
Status: CANCELLED | OUTPATIENT
Start: 2020-04-19 | End: 2020-04-19

## 2020-04-19 RX ORDER — GLUCAGON 1 MG
1 KIT INJECTION
Status: DISCONTINUED | OUTPATIENT
Start: 2020-04-19 | End: 2020-04-21 | Stop reason: HOSPADM

## 2020-04-19 RX ORDER — SODIUM CHLORIDE 0.9 % (FLUSH) 0.9 %
10 SYRINGE (ML) INJECTION
Status: DISCONTINUED | OUTPATIENT
Start: 2020-04-19 | End: 2020-04-21 | Stop reason: HOSPADM

## 2020-04-19 RX ORDER — AMOXICILLIN 250 MG
1 CAPSULE ORAL DAILY PRN
Status: DISCONTINUED | OUTPATIENT
Start: 2020-04-19 | End: 2020-04-21 | Stop reason: HOSPADM

## 2020-04-19 RX ORDER — IBUPROFEN 200 MG
24 TABLET ORAL
Status: DISCONTINUED | OUTPATIENT
Start: 2020-04-19 | End: 2020-04-21 | Stop reason: HOSPADM

## 2020-04-19 RX ORDER — SODIUM CHLORIDE 9 MG/ML
INJECTION, SOLUTION INTRAVENOUS
Status: DISCONTINUED | OUTPATIENT
Start: 2020-04-19 | End: 2020-04-21 | Stop reason: HOSPADM

## 2020-04-19 RX ORDER — PANTOPRAZOLE SODIUM 40 MG/1
40 TABLET, DELAYED RELEASE ORAL DAILY
Status: DISCONTINUED | OUTPATIENT
Start: 2020-04-19 | End: 2020-04-21 | Stop reason: HOSPADM

## 2020-04-19 RX ORDER — MIDAZOLAM HYDROCHLORIDE 1 MG/ML
INJECTION, SOLUTION INTRAMUSCULAR; INTRAVENOUS
Status: DISCONTINUED | OUTPATIENT
Start: 2020-04-19 | End: 2020-04-19 | Stop reason: HOSPADM

## 2020-04-19 RX ORDER — FENTANYL CITRATE 50 UG/ML
INJECTION, SOLUTION INTRAMUSCULAR; INTRAVENOUS
Status: DISCONTINUED | OUTPATIENT
Start: 2020-04-19 | End: 2020-04-19 | Stop reason: HOSPADM

## 2020-04-19 RX ORDER — HEPARIN SODIUM 1000 [USP'U]/ML
INJECTION, SOLUTION INTRAVENOUS; SUBCUTANEOUS
Status: DISCONTINUED | OUTPATIENT
Start: 2020-04-19 | End: 2020-04-19 | Stop reason: HOSPADM

## 2020-04-19 RX ORDER — LIDOCAINE HYDROCHLORIDE 20 MG/ML
INJECTION, SOLUTION INFILTRATION; PERINEURAL
Status: DISCONTINUED | OUTPATIENT
Start: 2020-04-19 | End: 2020-04-19 | Stop reason: HOSPADM

## 2020-04-19 RX ORDER — SODIUM CHLORIDE 9 MG/ML
INJECTION, SOLUTION INTRAVENOUS CONTINUOUS
Status: ACTIVE | OUTPATIENT
Start: 2020-04-19 | End: 2020-04-19

## 2020-04-19 RX ORDER — SODIUM CHLORIDE 9 MG/ML
3 INJECTION, SOLUTION INTRAVENOUS CONTINUOUS
Status: CANCELLED | OUTPATIENT
Start: 2020-04-19 | End: 2020-04-19

## 2020-04-19 RX ORDER — LEVOTHYROXINE SODIUM 125 UG/1
125 TABLET ORAL DAILY
Status: DISCONTINUED | OUTPATIENT
Start: 2020-04-19 | End: 2020-04-19

## 2020-04-19 RX ADMIN — COLCHICINE 1.2 MG: 0.6 TABLET, FILM COATED ORAL at 02:04

## 2020-04-19 RX ADMIN — MONTELUKAST 10 MG: 10 TABLET, FILM COATED ORAL at 08:04

## 2020-04-19 RX ADMIN — HYDROCODONE BITARTRATE AND ACETAMINOPHEN 1 TABLET: 5; 325 TABLET ORAL at 11:04

## 2020-04-19 RX ADMIN — COLCHICINE 1.2 MG: 0.6 TABLET, FILM COATED ORAL at 08:04

## 2020-04-19 RX ADMIN — DIPHENHYDRAMINE HYDROCHLORIDE 50 MG: 50 CAPSULE ORAL at 10:04

## 2020-04-19 RX ADMIN — PANTOPRAZOLE SODIUM 40 MG: 40 TABLET, DELAYED RELEASE ORAL at 01:04

## 2020-04-19 RX ADMIN — SODIUM CHLORIDE: 0.9 INJECTION, SOLUTION INTRAVENOUS at 02:04

## 2020-04-19 RX ADMIN — PROMETHAZINE HYDROCHLORIDE 25 MG: 12.5 TABLET ORAL at 08:04

## 2020-04-19 RX ADMIN — TUBERCULIN PURIFIED PROTEIN DERIVATIVE 5 UNITS: 5 INJECTION, SOLUTION INTRADERMAL at 02:04

## 2020-04-19 RX ADMIN — NITROGLYCERIN 0.4 MG: 0.4 TABLET SUBLINGUAL at 10:04

## 2020-04-19 RX ADMIN — TICAGRELOR 180 MG: 90 TABLET ORAL at 10:04

## 2020-04-19 NOTE — PLAN OF CARE
VSS. MIVF @ 250mL/hr infusing. Pt afebrile. NSR to ST throughout shift. RA. R radial cath site without hematoma. Pt able to void spontaneously per bedpan. Pt continues to complain of mild chest pain near her clavicle. No other events. Plan of care reviewed with patient. Questions and concerns addressed. Will continue to monitor.

## 2020-04-19 NOTE — BRIEF OP NOTE
Brief Operative Note:    : Zachariah Goldman MD     Referring Physician: Self,Aaareferral     All Operators: Surgeon(s):  MD Flaco Alegria MD Vinod A. Chainani, MD     Preoperative Diagnosis: Chest pain [R07.9]ST elevation myocardial infarction (STEMI), unspecified artery [I21.3]     Postop Diagnosis: Non-obstructive CAD    Treatments/Procedures: Procedure(s) (LRB):  Left heart cath (Right)  IVUS, Coronary  ANGIOGRAM, CORONARY ARTERY (N/A)  Ventriculogram, Left    Findings:Mild coronary disease is present. See catheterization report for full details.    Estimated Blood loss: 10 cc    Specimens removed: Belem Ferrer

## 2020-04-19 NOTE — ASSESSMENT & PLAN NOTE
66 yo female with asthma and hypothyroidism presents due to central chest pain started the night of presentation. substernal/bilateral shoulder chest pressure radiating to jaw, pleuritic,  increases by moving. Relieved by bending froward. Non tender, no similar previous episodes. Denies fever, headaches, cough abdominal pain and leg swelling.  Patient is non-smoker and drinks alcohol socially  Family history:   Mother: Multiple events of heart attacks started on her 60s dies from heart attach on her 80s  Father: ESRD  Grandmother and aunt: DM     EKG on presentation with IL st elevations. Troponin negative. Rapid Covid test negative. Pain improved with nitro.  Bedside echo shows trace pericardial effusion without WMA and preserved EF. Taken to cath lab for STEMI on 4/19; Patient has non-obstructive CAD angiography and IVUS with normal LVEF by LV gram.     Plan:   - Workup for pericarditis: Infectious and autoimmune workup  - Inflammatory markers: ESR and CRP  - Start Colchicine  - PRN pain medicines

## 2020-04-19 NOTE — PLAN OF CARE
Patient states that she is getting levothyroxine 37.5 mcg at home and she received her dose for today.     Michelle Laguna  PGY-2 Internal Medicine

## 2020-04-19 NOTE — ED TRIAGE NOTES
Doorthy Burt, a 65 y.o. female presents to the ED w/ complaint of chest heaviness that started yesterday at rest that has not gone away.  Patient states she has now a pain in her jaw and right under her collar bone.      Triage note:  No chief complaint on file.    Review of patient's allergies indicates:  No Known Allergies  Past Medical History:   Diagnosis Date    Arthritis     Asthma     Back pain     Knee pain     Thyroid disease      LOC: Patient name and date of birth verified. The patient is awake, alert and aware of environment with an appropriate affect, the patient is oriented x 3 and speaking appropriately.   APPEARANCE: Patient resting comfortably, patient is clean and well groomed, patient's clothing is properly fastened.  SKIN: The skin is warm and dry, color consistent with ethnicity, patient has normal skin turgor and moist mucus membranes, skin intact, no breakdown or bruising noted.  MUSCULOSKELETAL: Patient moving all extremities well, no obvious swelling or deformities noted.   RESPIRATORY: Respirations are spontaneous, patient has a normal effort and rate, no accessory muscle use noted.  CARDIAC: Patient has a normal rate and rhythm, no periphreal edema noted, capillary refill < 3 seconds. Reports chest heaviness with lying flat or movement with right sided jaw pain.  ABDOMEN: Soft and non tender to palpation, no distention noted. Bowel sounds present in all four quadrants.  NEUROLOGIC: Eyes open spontaneously, behavior appropriate to situation, follows commands, facial expression symmetrical, bilateral hand grasp equal and even, purposeful motor response noted, normal sensation in all extremities when touched with a finger.

## 2020-04-19 NOTE — ASSESSMENT & PLAN NOTE
[place] [facility] [place] lab delayed due to covid testing  R radial  6Fr slender  Asa/brilinta given

## 2020-04-19 NOTE — HPI
66 yo female with asthma and hypothyroidism presents due to central chest pain started the night of presentation. substernal/bilateral shoulder chest pressure radiating to jaw, pleuritic,  increases by moving. Relieved by bending froward. Non tender, no similar previous episodes. Denies fever, headaches, cough abdominal pain and leg swelling.  Patient is non-smoker and drinks alcohol socially  Family history:   Mother: Multiple events of heart attacks started on her 60s dies from heart attach on her 80s  Father: ESRD  Grandmother and aunt: DM     EKG on presentation with IL st elevations. Troponin negative. Rapid Covid test negative. Pain improved with nitro.  Bedside echo shows trace pericardial effusion without WMA and preserved EF. Taken to cath lab for STEMI on 4/19; Patient has non-obstructive CAD angiography and IVUS with normal LVEF by LV gram.

## 2020-04-19 NOTE — ED NOTES
Patient undressed and placed in hospital gown, ED cardiac monitor, O2 at 2L NC, pulse oximeter, and BP cuff. patient placed on CATH lab pads and wires. Patient ready for CATH lab, patient placed on transport monitor.

## 2020-04-19 NOTE — ED NOTES
Spoke to Dat in lab regarding covid test, states is negative and posting result now. Cardiology noted. Patient transferred to cath lab.

## 2020-04-19 NOTE — Clinical Note
Catheter is inserted into the left anterior descending.  IVUS PERFORMED OF DISTAL LAD, MID LAD, AND PROXIMAL LAD.

## 2020-04-19 NOTE — H&P
[facility]  Cardiology  History and Physical     Patient Name: Dorothy Shultz  MRN: [id number]  Admission Date: 4/19/2020  Code Status: Full Code   Attending Provider: Pedro Pablo Plaza MD   Primary Care Physician: Montserrat Roblero DO  Principal Problem:Acute pericarditis    Patient information was obtained from patient, past medical records and ER records.     Subjective:     Chief Complaint:  Chest Pain     HPI:  64 yo female with asthma and hypothyroidism presents due to central chest pain started the night of presentation. substernal/bilateral shoulder chest pressure radiating to jaw, pleuritic,  increases by moving. Relieved by bending froward. Non tender, no similar previous episodes. Denies fever, headaches, cough abdominal pain and leg swelling.  Patient is non-smoker and drinks alcohol socially  Family history:   Mother: Multiple events of heart attacks started on her 60s dies from heart attach on her 80s  Father: ESRD  Grandmother and aunt: DM     EKG on presentation with IL st elevations. Troponin negative. Rapid Covid test negative. Pain improved with nitro.  Bedside echo shows trace pericardial effusion without WMA and preserved EF. Taken to cath lab for STEMI on 4/19; Patient has non-obstructive CAD angiography and IVUS with normal LVEF by LV gram.      Past Medical History:   Diagnosis Date    Arthritis     Asthma     Back pain     Knee pain     Thyroid disease        Past Surgical History:   Procedure Laterality Date    CHOLECYSTECTOMY      VASCULAR SURGERY         Review of patient's allergies indicates:  No Known Allergies    No current facility-administered medications on file prior to encounter.      Current Outpatient Medications on File Prior to Encounter   Medication Sig    acyclovir (ZOVIRAX) 400 MG tablet     betamethasone dipropionate (DIPROLENE) 0.05 % cream     cetirizine (ZYRTEC) 10 MG tablet Take 10 mg by mouth once daily.    diclofenac sodium (VOLTAREN) 1 %  Gel APPLY 2 GRAMS 4 TIMES A DAY    famotidine (PEPCID) 20 MG tablet     HYDROcodone-acetaminophen (NORCO) 5-325 mg per tablet Take 1 tablet by mouth every 6 (six) hours as needed for Pain.    levothyroxine (SYNTHROID) 125 MCG tablet Take 125 mcg by mouth once daily.    levothyroxine (SYNTHROID) 25 MCG tablet     meloxicam (MOBIC) 15 MG tablet Take 15 mg by mouth once daily.    methocarbamol (ROBAXIN) 500 MG Tab TAKE 2 TABLETS 4 TIMES DAILY    montelukast (SINGULAIR) 10 mg tablet Take 10 mg by mouth every evening.    mupirocin (BACTROBAN) 2 % ointment Apply topically 3 (three) times daily. (Patient not taking: Reported on 10/14/2019)    naproxen (NAPROSYN) 500 MG tablet Take 1 tablet (500 mg total) by mouth 2 (two) times daily as needed.    nystatin (MYCOSTATIN) cream     omeprazole (PRILOSEC) 20 MG capsule      Family History     Problem Relation (Age of Onset)    Stroke Mother        Tobacco Use    Smoking status: Never Smoker    Smokeless tobacco: Never Used   Substance and Sexual Activity    Alcohol use: Yes    Drug use: No    Sexual activity: Not on file     Review of Systems   Constitution: Negative. Negative for fever.   HENT: Negative.    Eyes: Negative.    Cardiovascular: Positive for chest pain. Negative for leg swelling.   Respiratory: Negative.  Negative for cough.    Endocrine: Negative.    Skin: Negative.    Musculoskeletal: Negative.    Gastrointestinal: Negative.  Negative for abdominal pain.   Genitourinary: Negative.    Neurological: Positive for headaches (After taking nitro).     Objective:     Vital Signs (Most Recent):  Pulse: 97 (04/19/20 1154)  Resp: (!) 25 (04/19/20 1154)  BP: 102/65 (04/19/20 1154)  SpO2: 100 % (04/19/20 1154) Vital Signs (24h Range):  Pulse:  [] 97  Resp:  [18-25] 25  SpO2:  [96 %-100 %] 100 %  BP: (102-130)/(57-87) 102/65     Weight: 87.5 kg (193 lb)  Body mass index is 33.13 kg/m².    SpO2: 100 %  O2 Device (Oxygen Therapy): room air    No intake or  output data in the 24 hours ending 04/19/20 1254    Lines/Drains/Airways     Peripheral Intravenous Line                 Peripheral IV - Single Lumen 04/19/20 18 G Left Hand less than 1 day         Peripheral IV - Single Lumen 04/19/20 18 G Right Wrist less than 1 day                Physical Exam   Constitutional: She is oriented to person, place, and time. She appears well-developed and well-nourished.   HENT:   Head: Normocephalic and atraumatic.   Eyes: Pupils are equal, round, and reactive to light. Conjunctivae and EOM are normal.   Neck: Normal range of motion. Neck supple. No JVD present.   Cardiovascular: Normal rate, regular rhythm and normal heart sounds. Exam reveals no gallop and no friction rub.   No murmur heard.  Pulmonary/Chest: Effort normal and breath sounds normal. No respiratory distress. She has no wheezes. She has no rales. She exhibits no tenderness.   Abdominal: Soft. Bowel sounds are normal. She exhibits no distension. There is no tenderness.   Musculoskeletal: Normal range of motion. She exhibits no edema or tenderness.   Neurological: She is alert and oriented to person, place, and time.   Skin: Skin is warm and dry. No erythema. No pallor.       Significant Labs:   Recent Lab Results       04/19/20  1002   04/19/20  0955        Albumin 3.6       Alkaline Phosphatase 73       ALT 57       Anion Gap 12       AST 64  Comment:  *Result may be interfered by visible hemolysis       Baso # 0.02       Basophil% 0.2       BILIRUBIN TOTAL 0.7  Comment:  For infants and newborns, interpretation of results should be based  on gestational age, weight and in agreement with clinical  observations.  Premature Infant recommended reference ranges:  Up to 24 hours.............<8.0 mg/dL  Up to 48 hours............<12.0 mg/dL  3-5 days..................<15.0 mg/dL  6-29 days.................<15.0 mg/dL         BUN, Bld 14       Calcium 9.1       Chloride 97       CO2 23       CPK 42       CPK MB 0.4        "Creatinine 0.9       Differential Method Automated       eGFR if  >60.0       eGFR if non  >60.0  Comment:  Calculation used to obtain the estimated glomerular filtration  rate (eGFR) is the CKD-EPI equation.          Eos # 0.0       Eosinophil% 0.2       Glucose 132       Gran # (ANC) 7.7       Gran% 75.8       Group & Rh A NEG       Hematocrit 37.3       Hemoglobin 12.0       Immature Grans (Abs) 0.04  Comment:  Mild elevation in immature granulocytes is non specific and   can be seen in a variety of conditions including stress response,   acute inflammation, trauma and pregnancy. Correlation with other   laboratory and clinical findings is essential.         Immature Granulocytes 0.4       INDIRECT JET NEG       INR 1.0  Comment:  Coumadin Therapy:  2.0 - 3.0 for INR for all indicators except mechanical heart valves  and antiphospholipid syndromes which should use 2.5 - 3.5.         Lymph # 1.3       Lymph% 13.0       MB% 1.0  Comment:  To be positive, the MB% must be greater than 5% AND the CK-MB  greater than 6.5 ng/mL. Values not in the reference interval,   but not qualifying as positive, should be considered   "trace".         MCH 29.9       MCHC 32.2       MCV 93       Mono # 1.1       Mono% 10.4       MPV 10.4       nRBC 0       Platelets 232       Potassium 4.7  Comment:  *Result may be interfered by visible hemolysis       PROTEIN TOTAL 7.7  Comment:  *Result may be interfered by visible hemolysis       Protime 10.7       RBC 4.02       RDW 11.9       SARS-CoV-2 RNA, Amplification, Qual   Negative  Comment:  This test utilizes isothermal nucleic acid amplification   technology to detect the SARS-CoV-2 RdRp nucleic acid segment.   The analytical sensitivity (limit of detection) is 125 genome   equivalents/mL.   A POSITIVE result implies infection with the SARS-CoV-2 virus;  the patient is presumed to be contagious.    A NEGATIVE result means that SARS-CoV-2 nucleic acids " are not  present above the limit of detection. It does not rule out the   possibility of COVID-19 and should not be the sole basis for   treatment decisions. If COVID-19 is strongly suspected based on  clinical and exposure history, re-testing should be considered.   This test is only for use under the Food and Drug   Administration s Emergency Use Authorization (EUA).   Commercial kits are provided by [employer].   Performance characteristics of the EUA have been independently  verified by [facility] Department of  Pathology and Laboratory Medicine.   _________________________________________________________________  The ID NOW COVID-19 Letter of Authorization, along with the   authorized Fact Sheet for Healthcare Providers, the authorized Fact  Sheet for Patients, and authorized labeling are available on the FDA   website:  www.fda.gov/MedicalDevices/Safety/EmergencySituations/udz178188.htm       Sodium 132       Troponin I <0.006  Comment:  The reference interval for Troponin I represents the 99th percentile   cutoff   for our facility and is consistent with 3rd generation assay   performance.         WBC 10.12             Significant Imaging: EKG: sinus tach with IL st elevations    Assessment and Plan:     * Acute pericarditis  66 yo female with asthma and hypothyroidism presents due to central chest pain started the night of presentation. substernal/bilateral shoulder chest pressure radiating to jaw, pleuritic,  increases by moving. Relieved by bending froward. Non tender, no similar previous episodes. Denies fever, headaches, cough abdominal pain and leg swelling.  Patient is non-smoker and drinks alcohol socially  Family history:   Mother: Multiple events of heart attacks started on her 60s dies from heart attach on her 80s  Father: ESRD  Grandmother and aunt: DM     EKG on presentation with IL st elevations. Troponin negative. Rapid Covid test negative. Pain improved with nitro.  Bedside  echo shows trace pericardial effusion without WMA and preserved EF. Taken to cath lab for STEMI on 4/19; Patient has non-obstructive CAD angiography and IVUS with normal LVEF by LV gram.     Plan:   - Workup for pericarditis: Infectious and autoimmune workup  - Inflammatory markers: ESR and CRP  - Start Colchicine  - PRN pain medicines    Discharge planning issues  PT/OT    LFT elevation  Patient with normal liver functions previously. Her LFTs this admission: ALP: 73, AST: 64 and ALT: 57; a mixed pattern of liver injury.    Plan:  - Hepatitis workup  - Liver US    Obesity (BMI 30.0-34.9)  Encourage and  for weight reduction  - Metabolic syndrome workup    Hypothyroidism  Continue home medication Levothyroxine   Check TSH level    Asthma  Continue home medication: Montelukast.   PRN Duo neubs    Arthritis  PRN pain medicines  PT/OT        VTE Risk Mitigation (From admission, onward)         Ordered     heparin infusion 1,000 units/500 ml in 0.9% NaCl (pressure line flush)  Intra-op continuous PRN      04/19/20 1348                Michelle Laguna MD  Cardiology   [facility]

## 2020-04-19 NOTE — SUBJECTIVE & OBJECTIVE
Past Medical History:   Diagnosis Date    Arthritis     Asthma     Back pain     Knee pain     Thyroid disease        Past Surgical History:   Procedure Laterality Date    CHOLECYSTECTOMY      VASCULAR SURGERY         Review of patient's allergies indicates:  No Known Allergies    No current facility-administered medications on file prior to encounter.      Current Outpatient Medications on File Prior to Encounter   Medication Sig    acyclovir (ZOVIRAX) 400 MG tablet     betamethasone dipropionate (DIPROLENE) 0.05 % cream     cetirizine (ZYRTEC) 10 MG tablet Take 10 mg by mouth once daily.    diclofenac sodium (VOLTAREN) 1 % Gel APPLY 2 GRAMS 4 TIMES A DAY    famotidine (PEPCID) 20 MG tablet     HYDROcodone-acetaminophen (NORCO) 5-325 mg per tablet Take 1 tablet by mouth every 6 (six) hours as needed for Pain.    levothyroxine (SYNTHROID) 125 MCG tablet Take 125 mcg by mouth once daily.    levothyroxine (SYNTHROID) 25 MCG tablet     meloxicam (MOBIC) 15 MG tablet Take 15 mg by mouth once daily.    methocarbamol (ROBAXIN) 500 MG Tab TAKE 2 TABLETS 4 TIMES DAILY    montelukast (SINGULAIR) 10 mg tablet Take 10 mg by mouth every evening.    mupirocin (BACTROBAN) 2 % ointment Apply topically 3 (three) times daily. (Patient not taking: Reported on 10/14/2019)    naproxen (NAPROSYN) 500 MG tablet Take 1 tablet (500 mg total) by mouth 2 (two) times daily as needed.    nystatin (MYCOSTATIN) cream     omeprazole (PRILOSEC) 20 MG capsule      Family History     Problem Relation (Age of Onset)    Stroke Mother        Tobacco Use    Smoking status: Never Smoker    Smokeless tobacco: Never Used   Substance and Sexual Activity    Alcohol use: Yes    Drug use: No    Sexual activity: Not on file     Review of Systems   Constitution: Negative.   HENT: Negative.    Eyes: Negative.    Cardiovascular: Positive for chest pain.   Respiratory: Negative.    Endocrine: Negative.    Skin: Negative.     Musculoskeletal: Negative.    Gastrointestinal: Negative.    Genitourinary: Negative.    Neurological: Negative.      Objective:     Vital Signs (Most Recent):  Pulse: 101 (04/19/20 1027)  Resp: 18 (04/19/20 0957)  BP: 120/70 (04/19/20 1027)  SpO2: 98 % (04/19/20 1027) Vital Signs (24h Range):  Pulse:  [] 101  Resp:  [18] 18  SpO2:  [96 %-100 %] 98 %  BP: (106-130)/(57-87) 120/70     Weight: 87.5 kg (193 lb)  Body mass index is 33.13 kg/m².    SpO2: 98 %  O2 Device (Oxygen Therapy): room air    No intake or output data in the 24 hours ending 04/19/20 1042    Lines/Drains/Airways     Peripheral Intravenous Line                 Peripheral IV - Single Lumen 04/19/20 18 G Left Hand less than 1 day         Peripheral IV - Single Lumen 04/19/20 18 G Right Wrist less than 1 day                Physical Exam   Constitutional: She is oriented to person, place, and time. She appears well-developed and well-nourished.   HENT:   Head: Normocephalic and atraumatic.   Eyes: Pupils are equal, round, and reactive to light. Conjunctivae and EOM are normal.   Neck: Normal range of motion. Neck supple. No JVD present.   Cardiovascular: Normal rate, regular rhythm and normal heart sounds. Exam reveals no gallop and no friction rub.   No murmur heard.  Pulmonary/Chest: Effort normal and breath sounds normal. No respiratory distress. She has no wheezes. She has no rales. She exhibits no tenderness.   Abdominal: Soft. Bowel sounds are normal. She exhibits no distension. There is no tenderness.   Musculoskeletal: Normal range of motion. She exhibits no edema or tenderness.   Neurological: She is alert and oriented to person, place, and time.   Skin: Skin is warm and dry. No erythema. No pallor.       Significant Labs:   Recent Lab Results       04/19/20  1002   04/19/20  0955        Baso # 0.02       Basophil% 0.2       Differential Method Automated       Eos # 0.0       Eosinophil% 0.2       Gran # (ANC) 7.7       Gran% 75.8        Hematocrit 37.3       Hemoglobin 12.0       Immature Grans (Abs) 0.04  Comment:  Mild elevation in immature granulocytes is non specific and   can be seen in a variety of conditions including stress response,   acute inflammation, trauma and pregnancy. Correlation with other   laboratory and clinical findings is essential.         Immature Granulocytes 0.4       INR 1.0  Comment:  Coumadin Therapy:  2.0 - 3.0 for INR for all indicators except mechanical heart valves  and antiphospholipid syndromes which should use 2.5 - 3.5.         Lymph # 1.3       Lymph% 13.0       MCH 29.9       MCHC 32.2       MCV 93       Mono # 1.1       Mono% 10.4       MPV 10.4       nRBC 0       Platelets 232       Protime 10.7       RBC 4.02       RDW 11.9       SARS-CoV-2 RNA, Amplification, Qual   Negative  Comment:  This test utilizes isothermal nucleic acid amplification   technology to detect the SARS-CoV-2 RdRp nucleic acid segment.   The analytical sensitivity (limit of detection) is 125 genome   equivalents/mL.   A POSITIVE result implies infection with the SARS-CoV-2 virus;  the patient is presumed to be contagious.    A NEGATIVE result means that SARS-CoV-2 nucleic acids are not  present above the limit of detection. It does not rule out the   possibility of COVID-19 and should not be the sole basis for   treatment decisions. If COVID-19 is strongly suspected based on  clinical and exposure history, re-testing should be considered.   This test is only for use under the Food and Drug   Administration s Emergency Use Authorization (EUA).   Commercial kits are provided by [employer].   Performance characteristics of the EUA have been independently  verified by [facility] Department of  Pathology and Laboratory Medicine.   _________________________________________________________________  The ID NOW COVID-19 Letter of Authorization, along with the   authorized Fact Sheet for Healthcare Providers, the authorized  Fact  Sheet for Patients, and authorized labeling are available on the FDA   website:  www.fda.gov/MedicalDevices/Safety/EmergencySituations/wkg701994.htm       WBC 10.12             Significant Imaging: EKG: sinus tach with IL st elevations

## 2020-04-19 NOTE — ASSESSMENT & PLAN NOTE
Patient with normal liver functions previously. Her LFTs this admission: ALP: 73, AST: 64 and ALT: 57; a mixed pattern of liver injury.    Plan:  - Hepatitis workup  - Liver US

## 2020-04-19 NOTE — CONSULTS
[facility]  Cardiology  Consult Note    Patient Name: Dorothy Shultz  MRN: [id number]  Admission Date: 4/19/2020  Hospital Length of Stay: 0 days  Code Status: No Order   Attending Provider: No att. providers found   Consulting Provider: Petr Ferrer MD  Primary Care Physician: Montserrat Roblero DO  Principal Problem:<principal problem not specified>    Patient information was obtained from patient and ER records.     Consults  Subjective:     Chief Complaint:  Chest pain       HPI:   64 yo female presenting with IL st elevations and substernal/bilateral shoulder chest pressure radiating to jaw.  No trops or other bloodwork has resulted.  Pain improved with nitro.  Bedside echo shows trace pericardial effusion without WMA and preserved EF.  Taken to [facility] lab for STEMI.  Raoid Covid test negative.    Past Medical History:   Diagnosis Date    Arthritis     Asthma     Back pain     Knee pain     Thyroid disease        Past Surgical History:   Procedure Laterality Date    CHOLECYSTECTOMY      VASCULAR SURGERY         Review of patient's allergies indicates:  No Known Allergies    No current facility-administered medications on file prior to encounter.      Current Outpatient Medications on File Prior to Encounter   Medication Sig    acyclovir (ZOVIRAX) 400 MG tablet     betamethasone dipropionate (DIPROLENE) 0.05 % cream     cetirizine (ZYRTEC) 10 MG tablet Take 10 mg by mouth once daily.    diclofenac sodium (VOLTAREN) 1 % Gel APPLY 2 GRAMS 4 TIMES A DAY    famotidine (PEPCID) 20 MG tablet     HYDROcodone-acetaminophen (NORCO) 5-325 mg per tablet Take 1 tablet by mouth every 6 (six) hours as needed for Pain.    levothyroxine (SYNTHROID) 125 MCG tablet Take 125 mcg by mouth once daily.    levothyroxine (SYNTHROID) 25 MCG tablet     meloxicam (MOBIC) 15 MG tablet Take 15 mg by mouth once daily.    methocarbamol (ROBAXIN) 500 MG Tab TAKE 2 TABLETS 4 TIMES DAILY    montelukast  (SINGULAIR) 10 mg tablet Take 10 mg by mouth every evening.    mupirocin (BACTROBAN) 2 % ointment Apply topically 3 (three) times daily. (Patient not taking: Reported on 10/14/2019)    naproxen (NAPROSYN) 500 MG tablet Take 1 tablet (500 mg total) by mouth 2 (two) times daily as needed.    nystatin (MYCOSTATIN) cream     omeprazole (PRILOSEC) 20 MG capsule      Family History     Problem Relation (Age of Onset)    Stroke Mother        Tobacco Use    Smoking status: Never Smoker    Smokeless tobacco: Never Used   Substance and Sexual Activity    Alcohol use: Yes    Drug use: No    Sexual activity: Not on file     Review of Systems   Constitution: Negative.   HENT: Negative.    Eyes: Negative.    Cardiovascular: Positive for chest pain.   Respiratory: Negative.    Endocrine: Negative.    Skin: Negative.    Musculoskeletal: Negative.    Gastrointestinal: Negative.    Genitourinary: Negative.    Neurological: Negative.      Objective:     Vital Signs (Most Recent):  Pulse: 101 (04/19/20 1027)  Resp: 18 (04/19/20 0957)  BP: 120/70 (04/19/20 1027)  SpO2: 98 % (04/19/20 1027) Vital Signs (24h Range):  Pulse:  [] 101  Resp:  [18] 18  SpO2:  [96 %-100 %] 98 %  BP: (106-130)/(57-87) 120/70     Weight: 87.5 kg (193 lb)  Body mass index is 33.13 kg/m².    SpO2: 98 %  O2 Device (Oxygen Therapy): room air    No intake or output data in the 24 hours ending 04/19/20 1042    Lines/Drains/Airways     Peripheral Intravenous Line                 Peripheral IV - Single Lumen 04/19/20 18 G Left Hand less than 1 day         Peripheral IV - Single Lumen 04/19/20 18 G Right Wrist less than 1 day                Physical Exam   Constitutional: She is oriented to person, place, and time. She appears well-developed and well-nourished.   HENT:   Head: Normocephalic and atraumatic.   Eyes: Pupils are equal, round, and reactive to light. Conjunctivae and EOM are normal.   Neck: Normal range of motion. Neck supple. No JVD present.    Cardiovascular: Normal rate, regular rhythm and normal heart sounds. Exam reveals no gallop and no friction rub.   No murmur heard.  Pulmonary/Chest: Effort normal and breath sounds normal. No respiratory distress. She has no wheezes. She has no rales. She exhibits no tenderness.   Abdominal: Soft. Bowel sounds are normal. She exhibits no distension. There is no tenderness.   Musculoskeletal: Normal range of motion. She exhibits no edema or tenderness.   Neurological: She is alert and oriented to person, place, and time.   Skin: Skin is warm and dry. No erythema. No pallor.       Significant Labs:   Recent Lab Results       04/19/20  1002   04/19/20  0955        Baso # 0.02       Basophil% 0.2       Differential Method Automated       Eos # 0.0       Eosinophil% 0.2       Gran # (ANC) 7.7       Gran% 75.8       Hematocrit 37.3       Hemoglobin 12.0       Immature Grans (Abs) 0.04  Comment:  Mild elevation in immature granulocytes is non specific and   can be seen in a variety of conditions including stress response,   acute inflammation, trauma and pregnancy. Correlation with other   laboratory and clinical findings is essential.         Immature Granulocytes 0.4       INR 1.0  Comment:  Coumadin Therapy:  2.0 - 3.0 for INR for all indicators except mechanical heart valves  and antiphospholipid syndromes which should use 2.5 - 3.5.         Lymph # 1.3       Lymph% 13.0       MCH 29.9       MCHC 32.2       MCV 93       Mono # 1.1       Mono% 10.4       MPV 10.4       nRBC 0       Platelets 232       Protime 10.7       RBC 4.02       RDW 11.9       SARS-CoV-2 RNA, Amplification, Qual   Negative  Comment:  This test utilizes isothermal nucleic acid amplification   technology to detect the SARS-CoV-2 RdRp nucleic acid segment.   The analytical sensitivity (limit of detection) is 125 genome   equivalents/mL.   A POSITIVE result implies infection with the SARS-CoV-2 virus;  the patient is presumed to be contagious.    A  NEGATIVE result means that SARS-CoV-2 nucleic acids are not  present above the limit of detection. It does not rule out the   possibility of COVID-19 and should not be the sole basis for   treatment decisions. If COVID-19 is strongly suspected based on  clinical and exposure history, re-testing should be considered.   This test is only for use under the Food and Drug   Administration s Emergency Use Authorization (EUA).   Commercial kits are provided by [employer].   Performance characteristics of the EUA have been independently  verified by [facility] Department of  Pathology and Laboratory Medicine.   _________________________________________________________________  The ID NOW COVID-19 Letter of Authorization, along with the   authorized Fact Sheet for Healthcare Providers, the authorized Fact  Sheet for Patients, and authorized labeling are available on the FDA   website:  www.fda.gov/MedicalDevices/Safety/EmergencySituations/wcd143981.htm       WBC 10.12             Significant Imaging: EKG: sinus tach with IL st elevations    Assessment and Plan:     STEMI (ST elevation myocardial infarction)  Per current [facility] policy, transfer to cath lab delayed due to covid testing  R radial  6Fr slender  Asa/brilinta given        VTE Risk Mitigation (From admission, onward)    None          Thank you for your consult. I will follow-up with patient. Please contact us if you have any additional questions.    Petr Ferrer MD  Cardiology   [facility]

## 2020-04-19 NOTE — SUBJECTIVE & OBJECTIVE
Past Medical History:   Diagnosis Date    Arthritis     Asthma     Back pain     Knee pain     Thyroid disease        Past Surgical History:   Procedure Laterality Date    CHOLECYSTECTOMY      VASCULAR SURGERY         Review of patient's allergies indicates:  No Known Allergies    No current facility-administered medications on file prior to encounter.      Current Outpatient Medications on File Prior to Encounter   Medication Sig    acyclovir (ZOVIRAX) 400 MG tablet     betamethasone dipropionate (DIPROLENE) 0.05 % cream     cetirizine (ZYRTEC) 10 MG tablet Take 10 mg by mouth once daily.    diclofenac sodium (VOLTAREN) 1 % Gel APPLY 2 GRAMS 4 TIMES A DAY    famotidine (PEPCID) 20 MG tablet     HYDROcodone-acetaminophen (NORCO) 5-325 mg per tablet Take 1 tablet by mouth every 6 (six) hours as needed for Pain.    levothyroxine (SYNTHROID) 125 MCG tablet Take 125 mcg by mouth once daily.    levothyroxine (SYNTHROID) 25 MCG tablet     meloxicam (MOBIC) 15 MG tablet Take 15 mg by mouth once daily.    methocarbamol (ROBAXIN) 500 MG Tab TAKE 2 TABLETS 4 TIMES DAILY    montelukast (SINGULAIR) 10 mg tablet Take 10 mg by mouth every evening.    mupirocin (BACTROBAN) 2 % ointment Apply topically 3 (three) times daily. (Patient not taking: Reported on 10/14/2019)    naproxen (NAPROSYN) 500 MG tablet Take 1 tablet (500 mg total) by mouth 2 (two) times daily as needed.    nystatin (MYCOSTATIN) cream     omeprazole (PRILOSEC) 20 MG capsule      Family History     Problem Relation (Age of Onset)    Stroke Mother        Tobacco Use    Smoking status: Never Smoker    Smokeless tobacco: Never Used   Substance and Sexual Activity    Alcohol use: Yes    Drug use: No    Sexual activity: Not on file     Review of Systems   Constitution: Negative. Negative for fever.   HENT: Negative.    Eyes: Negative.    Cardiovascular: Positive for chest pain. Negative for leg swelling.   Respiratory: Negative.  Negative for  cough.    Endocrine: Negative.    Skin: Negative.    Musculoskeletal: Negative.    Gastrointestinal: Negative.  Negative for abdominal pain.   Genitourinary: Negative.    Neurological: Positive for headaches (After taking nitro).     Objective:     Vital Signs (Most Recent):  Pulse: 97 (04/19/20 1154)  Resp: (!) 25 (04/19/20 1154)  BP: 102/65 (04/19/20 1154)  SpO2: 100 % (04/19/20 1154) Vital Signs (24h Range):  Pulse:  [] 97  Resp:  [18-25] 25  SpO2:  [96 %-100 %] 100 %  BP: (102-130)/(57-87) 102/65     Weight: 87.5 kg (193 lb)  Body mass index is 33.13 kg/m².    SpO2: 100 %  O2 Device (Oxygen Therapy): room air    No intake or output data in the 24 hours ending 04/19/20 1254    Lines/Drains/Airways     Peripheral Intravenous Line                 Peripheral IV - Single Lumen 04/19/20 18 G Left Hand less than 1 day         Peripheral IV - Single Lumen 04/19/20 18 G Right Wrist less than 1 day                Physical Exam   Constitutional: She is oriented to person, place, and time. She appears well-developed and well-nourished.   HENT:   Head: Normocephalic and atraumatic.   Eyes: Pupils are equal, round, and reactive to light. Conjunctivae and EOM are normal.   Neck: Normal range of motion. Neck supple. No JVD present.   Cardiovascular: Normal rate, regular rhythm and normal heart sounds. Exam reveals no gallop and no friction rub.   No murmur heard.  Pulmonary/Chest: Effort normal and breath sounds normal. No respiratory distress. She has no wheezes. She has no rales. She exhibits no tenderness.   Abdominal: Soft. Bowel sounds are normal. She exhibits no distension. There is no tenderness.   Musculoskeletal: Normal range of motion. She exhibits no edema or tenderness.   Neurological: She is alert and oriented to person, place, and time.   Skin: Skin is warm and dry. No erythema. No pallor.       Significant Labs:   Recent Lab Results       04/19/20  1002   04/19/20  0955        Albumin 3.6       Alkaline  "Phosphatase 73       ALT 57       Anion Gap 12       AST 64  Comment:  *Result may be interfered by visible hemolysis       Baso # 0.02       Basophil% 0.2       BILIRUBIN TOTAL 0.7  Comment:  For infants and newborns, interpretation of results should be based  on gestational age, weight and in agreement with clinical  observations.  Premature Infant recommended reference ranges:  Up to 24 hours.............<8.0 mg/dL  Up to 48 hours............<12.0 mg/dL  3-5 days..................<15.0 mg/dL  6-29 days.................<15.0 mg/dL         BUN, Bld 14       Calcium 9.1       Chloride 97       CO2 23       CPK 42       CPK MB 0.4       Creatinine 0.9       Differential Method Automated       eGFR if  >60.0       eGFR if non  >60.0  Comment:  Calculation used to obtain the estimated glomerular filtration  rate (eGFR) is the CKD-EPI equation.          Eos # 0.0       Eosinophil% 0.2       Glucose 132       Gran # (ANC) 7.7       Gran% 75.8       Group & Rh A NEG       Hematocrit 37.3       Hemoglobin 12.0       Immature Grans (Abs) 0.04  Comment:  Mild elevation in immature granulocytes is non specific and   can be seen in a variety of conditions including stress response,   acute inflammation, trauma and pregnancy. Correlation with other   laboratory and clinical findings is essential.         Immature Granulocytes 0.4       INDIRECT JET NEG       INR 1.0  Comment:  Coumadin Therapy:  2.0 - 3.0 for INR for all indicators except mechanical heart valves  and antiphospholipid syndromes which should use 2.5 - 3.5.         Lymph # 1.3       Lymph% 13.0       MB% 1.0  Comment:  To be positive, the MB% must be greater than 5% AND the CK-MB  greater than 6.5 ng/mL. Values not in the reference interval,   but not qualifying as positive, should be considered   "trace".         MCH 29.9       MCHC 32.2       MCV 93       Mono # 1.1       Mono% 10.4       MPV 10.4       nRBC 0       Platelets " 232       Potassium 4.7  Comment:  *Result may be interfered by visible hemolysis       PROTEIN TOTAL 7.7  Comment:  *Result may be interfered by visible hemolysis       Protime 10.7       RBC 4.02       RDW 11.9       SARS-CoV-2 RNA, Amplification, Qual   Negative  Comment:  This test utilizes isothermal nucleic acid amplification   technology to detect the SARS-CoV-2 RdRp nucleic acid segment.   The analytical sensitivity (limit of detection) is 125 genome   equivalents/mL.   A POSITIVE result implies infection with the SARS-CoV-2 virus;  the patient is presumed to be contagious.    A NEGATIVE result means that SARS-CoV-2 nucleic acids are not  present above the limit of detection. It does not rule out the   possibility of COVID-19 and should not be the sole basis for   treatment decisions. If COVID-19 is strongly suspected based on  clinical and exposure history, re-testing should be considered.   This test is only for use under the Food and Drug   Administration s Emergency Use Authorization (EUA).   Commercial kits are provided by [employer].   Performance characteristics of the EUA have been independently  verified by [facility] Department of  Pathology and Laboratory Medicine.   _________________________________________________________________  The ID NOW COVID-19 Letter of Authorization, along with the   authorized Fact Sheet for Healthcare Providers, the authorized Fact  Sheet for Patients, and authorized labeling are available on the FDA   website:  www.fda.gov/MedicalDevices/Safety/EmergencySituations/prr599189.htm       Sodium 132       Troponin I <0.006  Comment:  The reference interval for Troponin I represents the 99th percentile   cutoff   for our facility and is consistent with 3rd generation assay   performance.         WBC 10.12             Significant Imaging: EKG: sinus tach with IL st elevations

## 2020-04-20 LAB
ALBUMIN SERPL BCP-MCNC: 3.2 G/DL (ref 3.5–5.2)
ALP SERPL-CCNC: 119 U/L (ref 55–135)
ALT SERPL W/O P-5'-P-CCNC: 200 U/L (ref 10–44)
ANION GAP SERPL CALC-SCNC: 9 MMOL/L (ref 8–16)
AST SERPL-CCNC: 161 U/L (ref 10–40)
BASOPHILS # BLD AUTO: 0.05 K/UL (ref 0–0.2)
BASOPHILS NFR BLD: 0.6 % (ref 0–1.9)
BILIRUB SERPL-MCNC: 1.2 MG/DL (ref 0.1–1)
BUN SERPL-MCNC: 10 MG/DL (ref 8–23)
CALCIUM SERPL-MCNC: 9 MG/DL (ref 8.7–10.5)
CHLORIDE SERPL-SCNC: 103 MMOL/L (ref 95–110)
CHOLEST SERPL-MCNC: 177 MG/DL (ref 120–199)
CHOLEST/HDLC SERPL: 3.2 {RATIO} (ref 2–5)
CO2 SERPL-SCNC: 23 MMOL/L (ref 23–29)
CREAT SERPL-MCNC: 0.8 MG/DL (ref 0.5–1.4)
DIFFERENTIAL METHOD: ABNORMAL
DSDNA AB SER-ACNC: NORMAL [IU]/ML
EOSINOPHIL # BLD AUTO: 0.1 K/UL (ref 0–0.5)
EOSINOPHIL NFR BLD: 1.3 % (ref 0–8)
ERYTHROCYTE [DISTWIDTH] IN BLOOD BY AUTOMATED COUNT: 12.1 % (ref 11.5–14.5)
EST. GFR  (AFRICAN AMERICAN): >60 ML/MIN/1.73 M^2
EST. GFR  (NON AFRICAN AMERICAN): >60 ML/MIN/1.73 M^2
GLUCOSE SERPL-MCNC: 110 MG/DL (ref 70–110)
HAV IGM SERPL QL IA: NEGATIVE
HBV CORE IGM SERPL QL IA: NEGATIVE
HBV SURFACE AG SERPL QL IA: NEGATIVE
HCT VFR BLD AUTO: 37.1 % (ref 37–48.5)
HCV AB SERPL QL IA: NEGATIVE
HDLC SERPL-MCNC: 56 MG/DL (ref 40–75)
HDLC SERPL: 31.6 % (ref 20–50)
HGB BLD-MCNC: 11.5 G/DL (ref 12–16)
HIV 1+2 AB+HIV1 P24 AG SERPL QL IA: NEGATIVE
IMM GRANULOCYTES # BLD AUTO: 0.03 K/UL (ref 0–0.04)
IMM GRANULOCYTES NFR BLD AUTO: 0.4 % (ref 0–0.5)
LDLC SERPL CALC-MCNC: 105.2 MG/DL (ref 63–159)
LYMPHOCYTES # BLD AUTO: 2.3 K/UL (ref 1–4.8)
LYMPHOCYTES NFR BLD: 27.1 % (ref 18–48)
MAGNESIUM SERPL-MCNC: 2 MG/DL (ref 1.6–2.6)
MCH RBC QN AUTO: 29.3 PG (ref 27–31)
MCHC RBC AUTO-ENTMCNC: 31 G/DL (ref 32–36)
MCV RBC AUTO: 94 FL (ref 82–98)
MONOCYTES # BLD AUTO: 1.2 K/UL (ref 0.3–1)
MONOCYTES NFR BLD: 14.8 % (ref 4–15)
NEUTROPHILS # BLD AUTO: 4.6 K/UL (ref 1.8–7.7)
NEUTROPHILS NFR BLD: 55.8 % (ref 38–73)
NONHDLC SERPL-MCNC: 121 MG/DL
NRBC BLD-RTO: 0 /100 WBC
PHOSPHATE SERPL-MCNC: 3.6 MG/DL (ref 2.7–4.5)
PLATELET # BLD AUTO: 213 K/UL (ref 150–350)
PMV BLD AUTO: 10.4 FL (ref 9.2–12.9)
POTASSIUM SERPL-SCNC: 3.9 MMOL/L (ref 3.5–5.1)
PROT SERPL-MCNC: 7.3 G/DL (ref 6–8.4)
RBC # BLD AUTO: 3.93 M/UL (ref 4–5.4)
RPR SER QL: NORMAL
SODIUM SERPL-SCNC: 135 MMOL/L (ref 136–145)
TRIGL SERPL-MCNC: 79 MG/DL (ref 30–150)
WBC # BLD AUTO: 8.31 K/UL (ref 3.9–12.7)

## 2020-04-20 PROCEDURE — 80053 COMPREHEN METABOLIC PANEL: CPT

## 2020-04-20 PROCEDURE — 85025 COMPLETE CBC W/AUTO DIFF WBC: CPT

## 2020-04-20 PROCEDURE — 25000003 PHARM REV CODE 250: Performed by: INTERNAL MEDICINE

## 2020-04-20 PROCEDURE — 94761 N-INVAS EAR/PLS OXIMETRY MLT: CPT

## 2020-04-20 PROCEDURE — 99231 SBSQ HOSP IP/OBS SF/LOW 25: CPT | Mod: GC,,, | Performed by: INTERNAL MEDICINE

## 2020-04-20 PROCEDURE — 25000003 PHARM REV CODE 250: Performed by: STUDENT IN AN ORGANIZED HEALTH CARE EDUCATION/TRAINING PROGRAM

## 2020-04-20 PROCEDURE — 99231 PR SUBSEQUENT HOSPITAL CARE,LEVL I: ICD-10-PCS | Mod: GC,,, | Performed by: INTERNAL MEDICINE

## 2020-04-20 PROCEDURE — 80061 LIPID PANEL: CPT

## 2020-04-20 PROCEDURE — 84100 ASSAY OF PHOSPHORUS: CPT

## 2020-04-20 PROCEDURE — 83735 ASSAY OF MAGNESIUM: CPT

## 2020-04-20 PROCEDURE — 20000000 HC ICU ROOM

## 2020-04-20 PROCEDURE — 97165 OT EVAL LOW COMPLEX 30 MIN: CPT

## 2020-04-20 PROCEDURE — 97161 PT EVAL LOW COMPLEX 20 MIN: CPT

## 2020-04-20 PROCEDURE — 97116 GAIT TRAINING THERAPY: CPT

## 2020-04-20 PROCEDURE — 97530 THERAPEUTIC ACTIVITIES: CPT

## 2020-04-20 RX ORDER — IBUPROFEN 400 MG/1
800 TABLET ORAL 3 TIMES DAILY PRN
Status: DISCONTINUED | OUTPATIENT
Start: 2020-04-20 | End: 2020-04-21

## 2020-04-20 RX ORDER — BUDESONIDE AND FORMOTEROL FUMARATE DIHYDRATE 160; 4.5 UG/1; UG/1
2 AEROSOL RESPIRATORY (INHALATION) EVERY 12 HOURS
COMMUNITY
End: 2020-08-07

## 2020-04-20 RX ORDER — IBUPROFEN 400 MG/1
800 TABLET ORAL 3 TIMES DAILY
Status: DISCONTINUED | OUTPATIENT
Start: 2020-04-20 | End: 2020-04-20

## 2020-04-20 RX ADMIN — COLCHICINE 0.6 MG: 0.6 TABLET, FILM COATED ORAL at 09:04

## 2020-04-20 RX ADMIN — IBUPROFEN 400 MG: 400 TABLET ORAL at 09:04

## 2020-04-20 RX ADMIN — MONTELUKAST 10 MG: 10 TABLET, FILM COATED ORAL at 09:04

## 2020-04-20 RX ADMIN — IBUPROFEN 800 MG: 400 TABLET ORAL at 03:04

## 2020-04-20 RX ADMIN — HYDROCODONE BITARTRATE AND ACETAMINOPHEN 1 TABLET: 5; 325 TABLET ORAL at 06:04

## 2020-04-20 RX ADMIN — HYDROCODONE BITARTRATE AND ACETAMINOPHEN 1 TABLET: 5; 325 TABLET ORAL at 01:04

## 2020-04-20 RX ADMIN — HYDROCODONE BITARTRATE AND ACETAMINOPHEN 1 TABLET: 5; 325 TABLET ORAL at 11:04

## 2020-04-20 RX ADMIN — SENNOSIDES AND DOCUSATE SODIUM 1 TABLET: 8.6; 5 TABLET ORAL at 11:04

## 2020-04-20 RX ADMIN — PANTOPRAZOLE SODIUM 40 MG: 40 TABLET, DELAYED RELEASE ORAL at 09:04

## 2020-04-20 RX ADMIN — PROMETHAZINE HYDROCHLORIDE 25 MG: 12.5 TABLET ORAL at 11:04

## 2020-04-20 RX ADMIN — LEVOTHYROXINE SODIUM 37.5 MCG: 25 TABLET ORAL at 05:04

## 2020-04-20 NOTE — PT/OT/SLP EVAL
Physical Therapy Evaluation and treatment    Patient Name:  Dorothy Shultz   MRN:  [id number]    Recommendations:     Discharge Recommendations:  (home no needs)   Discharge Equipment Recommendations: none   Barriers to discharge: None    Assessment:     Dorothy Shultz is a 65 y.o. female admitted with a medical diagnosis of Acute pericarditis.  She presents with the following impairments/functional limitations:  impaired functional mobilty, gait instability, impaired endurance. pt yocasta treatment well and will benefit from a few more sessions of PT , if not discharged home, to cont gait training. Pt will be able to discharge home with no needs when medically stable. Pt came to ED with c/o chest pain.     Rehab Prognosis: Good; patient would benefit from acute skilled PT services to address these deficits and reach maximum level of function.    Recent Surgery: Procedure(s) (LRB):  Left heart cath (Right)  IVUS, Coronary  ANGIOGRAM, CORONARY ARTERY (N/A)  Ventriculogram, Left 1 Day Post-Op    Plan:     During this hospitalization, patient to be seen 2 x/week to address the identified rehab impairments via gait training, therapeutic activities and progress toward the following goals:    · Plan of Care Expires:  05/18/20    Subjective     Chief Complaint: pt c/o pain during treatment.   Patient/Family Comments/goals:  To get better and go home.   Pain/Comfort:  · Pain Rating 1: 6/10(R shoulder)  · Pain Rating Post-Intervention 1: 6/10    Patients cultural, spiritual, Anabaptism conflicts given the current situation: no    Living Environment:  Pt lives alone in 1 story [facility]. Pt is retired.   Prior to admission, patients level of function was Indepedent.  Equipment used at home: none.  DME owned (not currently used): none.  Upon discharge, patient will have assistance from unknown. .    Objective:     Communicated with nurse prior to session.  Patient found supine with telemetry, pulse ox (continuous), blood pressure cuff(hep  lock IV)  upon PT entry to room.    General Precautions: Standard, fall   Orthopedic Precautions:    Braces:       Exams:  · Cognitive Exam:  Patient is oriented to Person, Place, Time and Situation  · RLE ROM: WFL  · RLE Strength: WFL  · LLE ROM: WFL  · LLE Strength: WFL    Functional Mobility:  · Bed Mobility:   Pt needed verbal cues for hand placement and sequencing for functional mobility.   · Rolling Right: stand by assistance  · Supine to Sit: stand by assistance  ·   · Transfers:     · Sit to Stand:  stand by assistance with hand-held assist. Pt was SBA standing at sink performing ADLS with OT.   ·   · Gait: pt received gait training ~ 374 ft with SBA. pt had 1 standing rest period 2nd to SOB. RN was present with portable monitor      Therapeutic Activities and Exercises:   pt received verbal instruction in role of PT and POC. Pt verbally expressed understanding of such.     AM-PAC 6 CLICK MOBILITY  Total Score:18     Patient left up in chair with all lines intact and call button in reach.    GOALS:   Multidisciplinary Problems     Physical Therapy Goals        Problem: Physical Therapy Goal    Goal Priority Disciplines Outcome Goal Variances Interventions   Physical Therapy Goal     PT, PT/OT Ongoing, Progressing     Description:  Goals to be met by: 20    Patient will increase functional independence with mobility by performin. Supine to sit with Modified [place] -not met  2. Sit to stand transfer with Modified [place] -not met  3. Gait  x 350 feet with [place]  -not met                    History:     Past Medical History:   Diagnosis Date    Arthritis     Asthma     Back pain     Knee pain     Thyroid disease        Past Surgical History:   Procedure Laterality Date    CHOLECYSTECTOMY      CORONARY ANGIOGRAPHY N/A 2020    Procedure: ANGIOGRAM, CORONARY ARTERY;  Surgeon: Zachariah Goldman MD;  Location: [facility] LAB;  Service: Cardiology;  Laterality: N/A;     LEFT HEART CATHETERIZATION Right 4/19/2020    Procedure: Left heart cath;  Surgeon: Zachariah Goldman MD;  Location[facility] LAB;  Service: Cardiology;  Laterality: Right;    VASCULAR SURGERY         Time Tracking:     PT Received On: 04/20/20  PT Start Time: 0951     PT Stop Time: 1018  PT Total Time (min): 27 min     Billable Minutes: Evaluation 8 min and Gait Training 19 min      Delfina Romo, PT  04/20/2020

## 2020-04-20 NOTE — PLAN OF CARE
A&Ox4. VSS. Pt ambulated in [place] and to bathroom multiple times today; voids spontaneously with out difficulty; no BM but passing flatus. No gtts infusing. Cardiac diet in place.  Pain becomes intense with movement; hydrocodone-acetaminophen PRN for pain/ibuprofen scheduled for pain/inflammation. Nausea present once with pain today, PRN phenergan administered. POC reviewed with pt and daughter via phone. WCTM.     Skin note: Skin remains intact, no breakdown noted. Pt ambulates and moves well. Waffle mattress inflated, bed plugged in and working properly. WCTM.       Problem: Adult Inpatient Plan of Care  Goal: Patient-Specific Goal (Individualization)  Description  Dx: NSTEMI workup; pericarditis  PMH: asthma, thyroid disease, arthritis, ellie    4/19: Presented to the ED with chest pain that radiated to her right jaw, L heart cath found nothing significant (CAD but no blockages), possible pericarditis  4/20: OOBTC     Nursing:   HR < 120  SBP < 160  Ambulates to bathroom with standby assist   Outcome: Ongoing, Progressing

## 2020-04-20 NOTE — PLAN OF CARE
SW is following this Pt for DC planning needs. There are no identified needs at this time.    CM communicated that patient is expected to discharge tomorrow, 4/21/20. SW will follow-up to ensure patient has transportation at the time of discharge.        04/20/20 7718   Post-Acute Status   Post-Acute Authorization Other   Other Status No Post-Acute Service Needs   Discharge Plan   Discharge Plan A Home       Heather Calhoun LMSW   - Case Management

## 2020-04-20 NOTE — PLAN OF CARE
Evaluation completed, plan of care established.   Patient participated well with therapy and is safe to discharge home with no needs when deemed medically appropriate.    Problem: Occupational Therapy Goal  Goal: Occupational Therapy Goal  Description  Pt will complete UB dressing independently.  Pt will complete LB dressing independently.  Pt will complete GH tasks standing at sink independently.  Pt will complete toilet transfer independently.  Pt will complete toileting independently.  Pt will complete ADL's and t/f's while self-pacing independently.     Outcome: Ongoing, Progressing

## 2020-04-20 NOTE — ASSESSMENT & PLAN NOTE
Patient with normal liver functions previously. Her LFTs this admission: ALP: 73, AST: 64 and ALT: 57; a mixed pattern of liver injury.    Plan:  - Hepatitis workup pending  - Liver US shown hepatomegaly.

## 2020-04-20 NOTE — PT/OT/SLP EVAL
"Occupational Therapy   Evaluation    Name: Dorothy Shultz  MRN: [id number]  Admitting Diagnosis:  Acute pericarditis 1 Day Post-Op    Recommendations:     Discharge Recommendations: home  Discharge Equipment Recommendations:  none  Barriers to discharge:  None    Assessment:     Dorothy Shultz is a 65 y.o. female with a medical diagnosis of Acute pericarditis.  She presents with the following performance deficits affecting function: impaired functional mobilty, impaired endurance, pain.      Rehab Prognosis: Good; patient would benefit from acute skilled OT services to address these deficits and reach maximum level of function.       Plan:     Patient to be seen 3 x/week to address the above listed problems via self-care/home management, therapeutic activities, therapeutic exercises  · Plan of Care Expires: 05/20/20  · Plan of Care Reviewed with: patient    Subjective     Chief Complaint: "Well I am having some pain in this spot still."  Patient/Family Comments/goals: Return home    Occupational Profile:  Living Environment: Pt lives alone in [facility], [facility], t/s combo.  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  Patient found supine with telemetry, pulse ox (continuous), blood pressure cuff upon OT entry to room.    General Precautions: Standard, fall   Orthopedic Precautions:N/A   Braces: N/A     Occupational Performance:      Functional Mobility/Transfers:  · Patient completed Sit <> Stand " Transfer with stand by assistance  with  no assistive device   · Patient completed Bed <> Chair Transfer using Step Transfer technique with stand by assistance with no assistive device  · Functional Mobility: >HH distance x 2 with stand by assistance with no assistive device    Activities of Daily Living:  · Grooming: stand by assistance standing at sink  · Upper Body Dressing: stand by assistance seated edge of bed  · Lower Body Dressing: stand by assistance seated edge of bed    Cognitive/Visual Perceptual:  Cognitive/Psychosocial Skills:     -       Oriented to: Person, Place, Time and Situation   -       Safety awareness/insight to disability: intact   -       Mood/Affect/Coping skills/emotional control: Appropriate to situation and Cooperative    Physical Exam:  Postural examination/scapula alignment:    -       No postural abnormalities identified  Upper Extremity Range of Motion:     -       Right Upper Extremity: WFL  -       Left Upper Extremity: WFL  Upper Extremity Strength:    -       Right Upper Extremity: WFL  -       Left Upper Extremity: WFL   Strength:    -       Right Upper Extremity: WFL  -       Left Upper Extremity: WFL    AMPAC 6 Click ADL:  AMPAC Total Score: 22    Treatment & Education:  -Evaluation completed, plan of care established.  -Patient and family educated on roles/goals of OT and POC.  -White board updated.  -Therapist provided time for questions and answered within scope of practice.  -Patient educated on importance of EOB/OOB activity to maximize recovery.  Education:    Patient left supine with all lines intact and call button in reach    GOALS:   Multidisciplinary Problems     Occupational Therapy Goals        Problem: Occupational Therapy Goal    Goal Priority Disciplines Outcome Interventions   Occupational Therapy Goal     OT, PT/OT Ongoing, Progressing    Description:  Pt will complete UB dressing independently.  Pt will complete LB dressing independently.  Pt will  complete GH tasks standing at sink independently.  Pt will complete toilet transfer independently.  Pt will complete toileting independently.  Pt will complete ADL's and t/f's while self-pacing independently.                    History:     Past Medical History:   Diagnosis Date    Arthritis     Asthma     Back pain     Knee pain     Thyroid disease        Past Surgical History:   Procedure Laterality Date    CHOLECYSTECTOMY      CORONARY ANGIOGRAPHY N/A 4/19/2020    Procedure: ANGIOGRAM, CORONARY ARTERY;  Surgeon: Zachariah Goldman MD;  Location: [facility] CATH LAB;  Service: Cardiology;  Laterality: N/A;    LEFT HEART CATHETERIZATION Right 4/19/2020    Procedure: Left heart cath;  Surgeon: Zachariah Goldman MD;  Location: [facility] CATH LAB;  Service: Cardiology;  Laterality: Right;    VASCULAR SURGERY         Time Tracking:     OT Date of Treatment: 04/20/20  OT Start Time: 0951  OT Stop Time: 1018  OT Total Time (min): 27 min    Billable Minutes:Evaluation 10  Therapeutic Activity 17    Yesenia Perez OT  4/20/2020

## 2020-04-20 NOTE — PROGRESS NOTES
[facility]  Cardiology  Progress Note    Patient Name: Dorothy Shultz  MRN: [id number]  Admission Date: 4/19/2020  Hospital Length of Stay: 1 days  Code Status: Full Code   Attending Physician: Pedro Pablo Plaaz MD   Primary Care Physician: Montserrat Roblero DO  Expected Discharge Date: 4/21/2020  Principal Problem:Acute pericarditis    Subjective:     Hospital Course:   Ms Shultz admitted for unknown etiology for pericarditis. Patient started on Colchicine which will continue for 3 months. 800mg taper Motrin for two weeks. Patient will need to meet with cardiology in two weeks for follow up of pericarditis and repeat ECHO.      Interval History: Patient feeling better today but continue to experience chest pain that is about 5/10. Patient started on Colchicine and Motrin tapering schedule. Patient feeling pain with movement the most. Likely discharge tomorrow.       Review of Systems   Constitution: Positive for chills. Negative for fever and malaise/fatigue.   HENT: Negative for congestion.    Eyes: Negative for double vision and visual disturbance.   Cardiovascular: Positive for chest pain. Negative for leg swelling.   Respiratory: Negative.  Negative for cough and shortness of breath.    Skin: Negative.    Musculoskeletal: Negative.    Gastrointestinal: Negative for abdominal pain, diarrhea, nausea and vomiting.   Neurological: Positive for headaches (After taking nitro).     Objective:     Vital Signs (Most Recent):  Temp: 98.6 °F (37 °C) (04/20/20 1500)  Pulse: 99 (04/20/20 1600)  Resp: (!) 33 (04/20/20 1600)  BP: 109/68 (04/20/20 1600)  SpO2: 97 % (04/20/20 1600) Vital Signs (24h Range):  Temp:  [97.9 °F (36.6 °C)-99.1 °F (37.3 °C)] 98.6 °F (37 °C)  Pulse:  [] 99  Resp:  [14-41] 33  SpO2:  [94 %-100 %] 97 %  BP: ()/(56-88) 109/68     Weight: 87.5 kg (193 lb)  Body mass index is 33.13 kg/m².     SpO2: 97 %  O2 Device (Oxygen Therapy): room air      Intake/Output Summary (Last 24  hours) at 4/20/2020 1618  Last data filed at 4/19/2020 2100  Gross per 24 hour   Intake 45 ml   Output --   Net 45 ml       Lines/Drains/Airways     Peripheral Intravenous Line                 Peripheral IV - Single Lumen 04/19/20 18 G Left Hand 1 day         Peripheral IV - Single Lumen 04/19/20 18 G Right Wrist 1 day                Physical Exam   Constitutional: She is oriented to person, place, and time. She appears well-developed and well-nourished.   HENT:   Head: Normocephalic and atraumatic.   Eyes: Pupils are equal, round, and reactive to light. Conjunctivae and EOM are normal.   Neck: Normal range of motion. Neck supple. No JVD present.   Cardiovascular: Normal rate, regular rhythm and normal heart sounds. Exam reveals no gallop and no friction rub.   No murmur heard.  Pulmonary/Chest: Effort normal and breath sounds normal. No respiratory distress. She has no wheezes. She has no rales. She exhibits no tenderness.   Abdominal: Soft. Bowel sounds are normal. She exhibits no distension. There is no tenderness.   Musculoskeletal: Normal range of motion. She exhibits no edema or tenderness.   Neurological: She is alert and oriented to person, place, and time.   Skin: Skin is warm and dry. No erythema. No pallor.       Significant Labs:   CMP   Recent Labs   Lab 04/19/20  1002 04/20/20  0616   * 135*   K 4.7 3.9   CL 97 103   CO2 23 23   * 110   BUN 14 10   CREATININE 0.9 0.8   CALCIUM 9.1 9.0   PROT 7.7 7.3   ALBUMIN 3.6 3.2*   BILITOT 0.7 1.2*   ALKPHOS 73 119   AST 64* 161*   ALT 57* 200*   ANIONGAP 12 9   ESTGFRAFRICA >60.0 >60.0   EGFRNONAA >60.0 >60.0   , CBC   Recent Labs   Lab 04/19/20  1002 04/20/20  0616   WBC 10.12 8.31   HGB 12.0 11.5*   HCT 37.3 37.1    213    and All pertinent lab results from the last 24 hours have been reviewed.    Significant Imaging: Echocardiogram:   Transthoracic echo (TTE) complete (Cupid Only):   Results for orders placed or performed during the  hospital encounter of 04/19/20   Echo Color Flow Doppler? Yes   Result Value Ref Range    Ascending aorta 3.07 cm    STJ 2.62 cm    AV mean gradient 3 mmHg    Ao peak shemar 0.98 m/s    Ao VTI 13.22 cm    IVRT 74.22 msec    IVS 0.85 0.6 - 1.1 cm    LA size 3.08 cm    Left Atrium Major Axis 4.77 cm    Left Atrium Minor Axis 5.34 cm    LVIDD 3.22 (A) 3.5 - 6.0 cm    LVIDS 1.79 (A) 2.1 - 4.0 cm    LVOT diameter 2.10 cm    LVOT peak VTI 11.06 cm    PW 0.83 0.6 - 1.1 cm    MV Peak A Shemar 0.61 m/s    E wave decelartion time 179.50 msec    MV Peak E Shemar 0.62 m/s    RA Major Axis 4.05 cm    RA Width 3.76 cm    RVDD 3.13 cm    Sinus 3.21 cm    TAPSE 1.90 cm    TR Max Shemar 1.79 m/s    TDI LATERAL 0.08 m/s    TDI SEPTAL 0.09 m/s    LA WIDTH 3.08 cm    LV Diastolic Volume 41.62 mL    LV Systolic Volume 9.61 mL    RV S' 10.69 cm/s    LVOT peak shemar 0.72 m/s    LV LATERAL E/E' RATIO 7.75 m/s    LV SEPTAL E/E' RATIO 6.89 m/s    FS 44 %    LA volume 40.63 cm3    LV mass 70.71 g    Left Ventricle Relative Wall Thickness 0.52 cm    AV valve area 2.90 cm2    AV Velocity Ratio 0.73     AV index (prosthetic) 0.84     E/A ratio 1.02     Mean e' 0.09 m/s    LVOT area 3.5 cm2    LVOT stroke volume 38.29 cm3    AV peak gradient 4 mmHg    E/E' ratio 7.29 m/s    LV Systolic Volume Index 5.0 mL/m2    LV Diastolic Volume Index 21.60 mL/m2    LA Volume Index 21.1 mL/m2    LV Mass Index 37 g/m2    Triscuspid Valve Regurgitation Peak Gradient 13 mmHg    BSA 1.99 m2    Right Atrial Pressure (from IVC) 15 mmHg    TV rest pulmonary artery pressure 28 mmHg    Narrative    · Normal left ventricular systolic function. The estimated ejection   fraction is 55%.  · Concentric left ventricular remodeling.  · Normal LV diastolic function.  · Normal right ventricular systolic function.  · Mild right ventricular enlargement.  · Elevated central venous pressure (15 mmHg).  · The estimated PA systolic pressure is 28 mmHg.  · Small anterior, inferior, and apical  pericardial effusion, measuring 8mm   in diastole in parasternal long axis. Possible fibrinous material along   epicardium.        Assessment and Plan:     Brief HPI:  64 yo female with asthma and hypothyroidism presents due to central chest pain started the night of presentation. substernal/bilateral shoulder chest pressure radiating to jaw, pleuritic,  increases by moving. Relieved by bending froward. Non tender, no similar previous episodes. Denies fever, headaches, cough abdominal pain and leg swelling.  EKG on presentation with IL st elevations. Troponin negative. Rapid Covid test negative. Pain improved with nitro.  Bedside echo shows trace pericardial effusion without WMA and preserved EF. Taken to cath lab for STEMI on 4/19; Patient has non-obstructive CAD angiography and IVUS with normal LVEF by LV gram.      * Acute pericarditis  64 yo female with asthma and hypothyroidism presents due to central chest pain started the night of presentation. substernal/bilateral shoulder chest pressure radiating to jaw, pleuritic,  increases by moving. Relieved by bending froward. Non tender, no similar previous episodes. Denies fever, headaches, cough abdominal pain and leg swelling.  Patient is non-smoker and drinks alcohol socially  Family history:   Mother: Multiple events of heart attacks started on her 60s dies from heart attach on her 80s  Father: ESRD  Grandmother and aunt: DM     EKG on presentation with IL st elevations. Troponin negative. Rapid Covid test negative. Pain improved with nitro.  Bedside echo shows trace pericardial effusion without WMA and preserved EF. Taken to cath lab for STEMI on 4/19; Patient has non-obstructive CAD angiography and IVUS with normal LVEF by LV gram.     Plan:   - Workup for pericarditis: Infectious and autoimmune workup follow up.   - Inflammatory markers: ESR and CRP  - Start Colchicine and will need for 3 months  - Start Motrin 800 TID for 1-2 weeks and taper  - Up on  discharge patient will need appointment with cardiology and repeat ECHO. CRP lab need to be drawn to see trending down of CRP to wean down the Ibuprofien   - PRN pain medicines    Discharge planning issues  PT/OT - home with no need    LFT elevation  Patient with normal liver functions previously. Her LFTs this admission: ALP: 73, AST: 64 and ALT: 57; a mixed pattern of liver injury.    Plan:  - Hepatitis workup pending  - Liver US shown hepatomegaly.     Obesity (BMI 30.0-34.9)  Encourage and  for weight reduction      Hypothyroidism  Continue home medication Levothyroxine   Check TSH level     Asthma  Continue home medication: Montelukast.   PRN Duo neubs    Arthritis  PRN pain medicines  PT/OT  - Scheduled 800MG TID Motrin         VTE Risk Mitigation (From admission, onward)         Ordered     heparin infusion 1,000 units/500 ml in 0.9% NaCl (pressure line flush)  Intra-op continuous PRN      04/19/20 1048              C. Harman Echevarria MD  Cardiology  [facility]

## 2020-04-20 NOTE — PLAN OF CARE
CM placed call to daughterJody, to obtain discharge planning information.  No discharge needs are noted at this time.  CM will continue to monitor for needs.      Montserrat Roblero DO  [address] [facility] / *      [id number] - [place] - [address]  [address]  [place] [address]  Phone: [phone] Fax: [phone]      Payor: [employer] MEDICARE / Plan: [facility] 63 / Product Type: Medicare Advantage /     Extended Emergency Contact Information  Primary Emergency Contact: Jody Cueto   [facility]  Home Phone: [phone]  Relation: Daughter    No future appointments.       04/20/20 0736   Discharge Assessment   Assessment Type Discharge Planning Assessment   Assessment information obtained from? Caregiver;Medical Record   Communicated expected length of stay with patient/caregiver yes   Prior to hospitilization cognitive status: Alert/Oriented   Prior to hospitalization functional status: Independent   Current cognitive status:   (unable to assess)   Current Functional Status:   (unable to assess)   Lives With alone   Able to Return to Prior Arrangements other (see comments)  (TBD)   Is patient able to care for self after discharge? Unable to determine at this time (comments)   Who are your caregiver(s) and their phone number(s)? Jody Cueto (daughter)- [phone]   Readmission Within the Last 30 Days no previous admission in last 30 days   Patient currently being followed by outpatient case management? No   Patient currently receives any other outside agency services? No   Equipment Currently Used at Home none   Do you have any problems affording any of your prescribed medications? No   Is the patient taking medications as prescribed? yes   Does the patient have transportation home? Yes   Transportation Anticipated family or friend will provide   Dialysis Name and Scheduled days N/A   Does  the patient receive services at the [facility]? No   Discharge Plan A Home Health   Discharge Plan B Home with family   DME Needed Upon Discharge  other (see comments)  (TBD)   Patient/Family in Agreement with Plan yes       Lora Nolasco MPH, RN, CM  Ext. [phone]

## 2020-04-20 NOTE — PLAN OF CARE
Problem: Physical Therapy Goal  Goal: Physical Therapy Goal  Description  Goals to be met by: 20    Patient will increase functional independence with mobility by performin. Supine to sit with Modified [place] -not met  2. Sit to stand transfer with Modified [place] -not met  3. Gait  x 350 feet with [place]  -not met   Outcome: Ongoing, Progressing   Evaluation completed and goals appropriate. Delfina Romo, PT  2020

## 2020-04-20 NOTE — ASSESSMENT & PLAN NOTE
64 yo female with asthma and hypothyroidism presents due to central chest pain started the night of presentation. substernal/bilateral shoulder chest pressure radiating to jaw, pleuritic,  increases by moving. Relieved by bending froward. Non tender, no similar previous episodes. Denies fever, headaches, cough abdominal pain and leg swelling.  Patient is non-smoker and drinks alcohol socially  Family history:   Mother: Multiple events of heart attacks started on her 60s dies from heart attach on her 80s  Father: ESRD  Grandmother and aunt: DM     EKG on presentation with IL st elevations. Troponin negative. Rapid Covid test negative. Pain improved with nitro.  Bedside echo shows trace pericardial effusion without WMA and preserved EF. Taken to cath lab for STEMI on 4/19; Patient has non-obstructive CAD angiography and IVUS with normal LVEF by LV gram.     Plan:   - Workup for pericarditis: Infectious and autoimmune workup follow up.   - Inflammatory markers: ESR and CRP  - Start Colchicine and will need for 3 months  - Start Motrin 800 TID for 1-2 weeks and taper  - Up on discharge patient will need appointment with cardiology and repeat ECHO. CRP lab need to be drawn to see trending down of CRP to wean down the Ibuprofien   - PRN pain medicines

## 2020-04-20 NOTE — SUBJECTIVE & OBJECTIVE
Interval History: Patient feeling better today but continue to experience chest pain that is about 5/10. Patient started on Colchicine and Motrin tapering schedule. Patient feeling pain with movement the most. Likely discharge tomorrow.       Review of Systems   Constitution: Positive for chills. Negative for fever and malaise/fatigue.   HENT: Negative for congestion.    Eyes: Negative for double vision and visual disturbance.   Cardiovascular: Positive for chest pain. Negative for leg swelling.   Respiratory: Negative.  Negative for cough and shortness of breath.    Skin: Negative.    Musculoskeletal: Negative.    Gastrointestinal: Negative for abdominal pain, diarrhea, nausea and vomiting.   Neurological: Positive for headaches (After taking nitro).     Objective:     Vital Signs (Most Recent):  Temp: 98.6 °F (37 °C) (04/20/20 1500)  Pulse: 99 (04/20/20 1600)  Resp: (!) 33 (04/20/20 1600)  BP: 109/68 (04/20/20 1600)  SpO2: 97 % (04/20/20 1600) Vital Signs (24h Range):  Temp:  [97.9 °F (36.6 °C)-99.1 °F (37.3 °C)] 98.6 °F (37 °C)  Pulse:  [] 99  Resp:  [14-41] 33  SpO2:  [94 %-100 %] 97 %  BP: ()/(56-88) 109/68     Weight: 87.5 kg (193 lb)  Body mass index is 33.13 kg/m².     SpO2: 97 %  O2 Device (Oxygen Therapy): room air      Intake/Output Summary (Last 24 hours) at 4/20/2020 1618  Last data filed at 4/19/2020 2100  Gross per 24 hour   Intake 45 ml   Output --   Net 45 ml       Lines/Drains/Airways     Peripheral Intravenous Line                 Peripheral IV - Single Lumen 04/19/20 18 G Left Hand 1 day         Peripheral IV - Single Lumen 04/19/20 18 G Right Wrist 1 day                Physical Exam   Constitutional: She is oriented to person, place, and time. She appears well-developed and well-nourished.   HENT:   Head: Normocephalic and atraumatic.   Eyes: Pupils are equal, round, and reactive to light. Conjunctivae and EOM are normal.   Neck: Normal range of motion. Neck supple. No JVD present.    Cardiovascular: Normal rate, regular rhythm and normal heart sounds. Exam reveals no gallop and no friction rub.   No murmur heard.  Pulmonary/Chest: Effort normal and breath sounds normal. No respiratory distress. She has no wheezes. She has no rales. She exhibits no tenderness.   Abdominal: Soft. Bowel sounds are normal. She exhibits no distension. There is no tenderness.   Musculoskeletal: Normal range of motion. She exhibits no edema or tenderness.   Neurological: She is alert and oriented to person, place, and time.   Skin: Skin is warm and dry. No erythema. No pallor.       Significant Labs:   CMP   Recent Labs   Lab 04/19/20  1002 04/20/20  0616   * 135*   K 4.7 3.9   CL 97 103   CO2 23 23   * 110   BUN 14 10   CREATININE 0.9 0.8   CALCIUM 9.1 9.0   PROT 7.7 7.3   ALBUMIN 3.6 3.2*   BILITOT 0.7 1.2*   ALKPHOS 73 119   AST 64* 161*   ALT 57* 200*   ANIONGAP 12 9   ESTGFRAFRICA >60.0 >60.0   EGFRNONAA >60.0 >60.0   , CBC   Recent Labs   Lab 04/19/20  1002 04/20/20  0616   WBC 10.12 8.31   HGB 12.0 11.5*   HCT 37.3 37.1    213    and All pertinent lab results from the last 24 hours have been reviewed.    Significant Imaging: Echocardiogram:   Transthoracic echo (TTE) complete (Cupid Only):   Results for orders placed or performed during the hospital encounter of 04/19/20   Echo Color Flow Doppler? Yes   Result Value Ref Range    Ascending aorta 3.07 cm    STJ 2.62 cm    AV mean gradient 3 mmHg    Ao peak shemar 0.98 m/s    Ao VTI 13.22 cm    IVRT 74.22 msec    IVS 0.85 0.6 - 1.1 cm    LA size 3.08 cm    Left Atrium Major Axis 4.77 cm    Left Atrium Minor Axis 5.34 cm    LVIDD 3.22 (A) 3.5 - 6.0 cm    LVIDS 1.79 (A) 2.1 - 4.0 cm    LVOT diameter 2.10 cm    LVOT peak VTI 11.06 cm    PW 0.83 0.6 - 1.1 cm    MV Peak A Shemar 0.61 m/s    E wave decelartion time 179.50 msec    MV Peak E Shemar 0.62 m/s    RA Major Axis 4.05 cm    RA Width 3.76 cm    RVDD 3.13 cm    Sinus 3.21 cm    TAPSE 1.90 cm    TR Max  Shemar 1.79 m/s    TDI LATERAL 0.08 m/s    TDI SEPTAL 0.09 m/s    LA WIDTH 3.08 cm    LV Diastolic Volume 41.62 mL    LV Systolic Volume 9.61 mL    RV S' 10.69 cm/s    LVOT peak shemar 0.72 m/s    LV LATERAL E/E' RATIO 7.75 m/s    LV SEPTAL E/E' RATIO 6.89 m/s    FS 44 %    LA volume 40.63 cm3    LV mass 70.71 g    Left Ventricle Relative Wall Thickness 0.52 cm    AV valve area 2.90 cm2    AV Velocity Ratio 0.73     AV index (prosthetic) 0.84     E/A ratio 1.02     Mean e' 0.09 m/s    LVOT area 3.5 cm2    LVOT stroke volume 38.29 cm3    AV peak gradient 4 mmHg    E/E' ratio 7.29 m/s    LV Systolic Volume Index 5.0 mL/m2    LV Diastolic Volume Index 21.60 mL/m2    LA Volume Index 21.1 mL/m2    LV Mass Index 37 g/m2    Triscuspid Valve Regurgitation Peak Gradient 13 mmHg    BSA 1.99 m2    Right Atrial Pressure (from IVC) 15 mmHg    TV rest pulmonary artery pressure 28 mmHg    Narrative    · Normal left ventricular systolic function. The estimated ejection   fraction is 55%.  · Concentric left ventricular remodeling.  · Normal LV diastolic function.  · Normal right ventricular systolic function.  · Mild right ventricular enlargement.  · Elevated central venous pressure (15 mmHg).  · The estimated PA systolic pressure is 28 mmHg.  · Small anterior, inferior, and apical pericardial effusion, measuring 8mm   in diastole in parasternal long axis. Possible fibrinous material along   epicardium.

## 2020-04-21 VITALS
TEMPERATURE: 98 F | RESPIRATION RATE: 13 BRPM | DIASTOLIC BLOOD PRESSURE: 76 MMHG | WEIGHT: 193 LBS | HEART RATE: 88 BPM | BODY MASS INDEX: 32.95 KG/M2 | SYSTOLIC BLOOD PRESSURE: 111 MMHG | HEIGHT: 64 IN | OXYGEN SATURATION: 98 %

## 2020-04-21 LAB
ALBUMIN SERPL BCP-MCNC: 2.8 G/DL (ref 3.5–5.2)
ALP SERPL-CCNC: 188 U/L (ref 55–135)
ALT SERPL W/O P-5'-P-CCNC: 231 U/L (ref 10–44)
ANA SER QL IF: NORMAL
ANION GAP SERPL CALC-SCNC: 8 MMOL/L (ref 8–16)
ANTI SM ANTIBODY: 0.1 RATIO (ref 0–0.99)
ANTI-SM INTERPRETATION: NEGATIVE
ANTI-SSB ANTIBODY: 0.07 RATIO (ref 0–0.99)
ANTI-SSB INTERPRETATION: NEGATIVE
AST SERPL-CCNC: 143 U/L (ref 10–40)
BASOPHILS # BLD AUTO: 0.03 K/UL (ref 0–0.2)
BASOPHILS NFR BLD: 0.5 % (ref 0–1.9)
BILIRUB SERPL-MCNC: 0.7 MG/DL (ref 0.1–1)
BUN SERPL-MCNC: 12 MG/DL (ref 8–23)
CALCIUM SERPL-MCNC: 8.3 MG/DL (ref 8.7–10.5)
CHLORIDE SERPL-SCNC: 104 MMOL/L (ref 95–110)
CO2 SERPL-SCNC: 23 MMOL/L (ref 23–29)
CREAT SERPL-MCNC: 0.8 MG/DL (ref 0.5–1.4)
DIFFERENTIAL METHOD: ABNORMAL
EOSINOPHIL # BLD AUTO: 0.2 K/UL (ref 0–0.5)
EOSINOPHIL NFR BLD: 2.8 % (ref 0–8)
ERYTHROCYTE [DISTWIDTH] IN BLOOD BY AUTOMATED COUNT: 12.2 % (ref 11.5–14.5)
EST. GFR  (AFRICAN AMERICAN): >60 ML/MIN/1.73 M^2
EST. GFR  (NON AFRICAN AMERICAN): >60 ML/MIN/1.73 M^2
GLUCOSE SERPL-MCNC: 112 MG/DL (ref 70–110)
HCT VFR BLD AUTO: 31.5 % (ref 37–48.5)
HGB BLD-MCNC: 9.8 G/DL (ref 12–16)
IMM GRANULOCYTES # BLD AUTO: 0.02 K/UL (ref 0–0.04)
IMM GRANULOCYTES NFR BLD AUTO: 0.3 % (ref 0–0.5)
LYMPHOCYTES # BLD AUTO: 1.7 K/UL (ref 1–4.8)
LYMPHOCYTES NFR BLD: 26.1 % (ref 18–48)
MAGNESIUM SERPL-MCNC: 1.9 MG/DL (ref 1.6–2.6)
MCH RBC QN AUTO: 29.4 PG (ref 27–31)
MCHC RBC AUTO-ENTMCNC: 31.1 G/DL (ref 32–36)
MCV RBC AUTO: 95 FL (ref 82–98)
MONOCYTES # BLD AUTO: 1 K/UL (ref 0.3–1)
MONOCYTES NFR BLD: 15.5 % (ref 4–15)
NEUTROPHILS # BLD AUTO: 3.6 K/UL (ref 1.8–7.7)
NEUTROPHILS NFR BLD: 54.8 % (ref 38–73)
NRBC BLD-RTO: 0 /100 WBC
PHOSPHATE SERPL-MCNC: 3.6 MG/DL (ref 2.7–4.5)
PLATELET # BLD AUTO: 190 K/UL (ref 150–350)
PMV BLD AUTO: 10.3 FL (ref 9.2–12.9)
POTASSIUM SERPL-SCNC: 3.7 MMOL/L (ref 3.5–5.1)
PROT SERPL-MCNC: 6.4 G/DL (ref 6–8.4)
RBC # BLD AUTO: 3.33 M/UL (ref 4–5.4)
SODIUM SERPL-SCNC: 135 MMOL/L (ref 136–145)
WBC # BLD AUTO: 6.52 K/UL (ref 3.9–12.7)

## 2020-04-21 PROCEDURE — 25000003 PHARM REV CODE 250: Performed by: STUDENT IN AN ORGANIZED HEALTH CARE EDUCATION/TRAINING PROGRAM

## 2020-04-21 PROCEDURE — 27000190 HC CPAP FULL FACE MASK W/VALVE

## 2020-04-21 PROCEDURE — 83735 ASSAY OF MAGNESIUM: CPT

## 2020-04-21 PROCEDURE — 80053 COMPREHEN METABOLIC PANEL: CPT

## 2020-04-21 PROCEDURE — 99900035 HC TECH TIME PER 15 MIN (STAT)

## 2020-04-21 PROCEDURE — 94761 N-INVAS EAR/PLS OXIMETRY MLT: CPT

## 2020-04-21 PROCEDURE — 94660 CPAP INITIATION&MGMT: CPT

## 2020-04-21 PROCEDURE — 25000003 PHARM REV CODE 250: Performed by: INTERNAL MEDICINE

## 2020-04-21 PROCEDURE — 99239 PR HOSPITAL DISCHARGE DAY,>30 MIN: ICD-10-PCS | Mod: GC,,, | Performed by: INTERNAL MEDICINE

## 2020-04-21 PROCEDURE — 84100 ASSAY OF PHOSPHORUS: CPT

## 2020-04-21 PROCEDURE — 99239 HOSP IP/OBS DSCHRG MGMT >30: CPT | Mod: GC,,, | Performed by: INTERNAL MEDICINE

## 2020-04-21 PROCEDURE — 85025 COMPLETE CBC W/AUTO DIFF WBC: CPT

## 2020-04-21 RX ORDER — IBUPROFEN 800 MG/1
800 TABLET ORAL 3 TIMES DAILY
Qty: 42 TABLET | Refills: 0 | Status: SHIPPED | OUTPATIENT
Start: 2020-04-21 | End: 2020-04-21

## 2020-04-21 RX ORDER — IBUPROFEN 800 MG/1
800 TABLET ORAL 3 TIMES DAILY
Qty: 60 TABLET | Refills: 0 | Status: SHIPPED | OUTPATIENT
Start: 2020-04-21 | End: 2020-04-21

## 2020-04-21 RX ORDER — IBUPROFEN 400 MG/1
800 TABLET ORAL 3 TIMES DAILY
Status: DISCONTINUED | OUTPATIENT
Start: 2020-04-21 | End: 2020-04-21 | Stop reason: HOSPADM

## 2020-04-21 RX ORDER — COLCHICINE 0.6 MG/1
0.6 TABLET ORAL 2 TIMES DAILY
Qty: 180 TABLET | Refills: 0 | Status: SHIPPED | OUTPATIENT
Start: 2020-04-21 | End: 2020-05-07

## 2020-04-21 RX ORDER — OMEPRAZOLE 40 MG/1
40 CAPSULE, DELAYED RELEASE ORAL
Qty: 30 CAPSULE | Refills: 3 | Status: SHIPPED | OUTPATIENT
Start: 2020-04-21 | End: 2020-06-24 | Stop reason: SDUPTHER

## 2020-04-21 RX ORDER — IBUPROFEN 600 MG/1
600 TABLET ORAL EVERY 8 HOURS
Qty: 60 TABLET | Refills: 0 | Status: CANCELLED | OUTPATIENT
Start: 2020-04-21

## 2020-04-21 RX ORDER — IBUPROFEN 600 MG/1
600 TABLET ORAL 3 TIMES DAILY
Qty: 54 TABLET | Refills: 0 | Status: SHIPPED | OUTPATIENT
Start: 2020-04-21 | End: 2020-05-09

## 2020-04-21 RX ADMIN — PANTOPRAZOLE SODIUM 40 MG: 40 TABLET, DELAYED RELEASE ORAL at 09:04

## 2020-04-21 RX ADMIN — COLCHICINE 0.6 MG: 0.6 TABLET, FILM COATED ORAL at 09:04

## 2020-04-21 RX ADMIN — IBUPROFEN 800 MG: 400 TABLET ORAL at 09:04

## 2020-04-21 RX ADMIN — LEVOTHYROXINE SODIUM 37.5 MCG: 25 TABLET ORAL at 07:04

## 2020-04-21 NOTE — ASSESSMENT & PLAN NOTE
66 yo female with asthma and hypothyroidism presents due to central chest pain started the night of presentation. substernal/bilateral shoulder chest pressure radiating to jaw, pleuritic,  increases by moving. Relieved by bending froward. Non tender, no similar previous episodes. Denies fever, headaches, cough abdominal pain and leg swelling.  Patient is non-smoker and drinks alcohol socially  Family history:   Mother: Multiple events of heart attacks started on her 60s dies from heart attach on her 80s  Father: ESRD  Grandmother and aunt: DM     EKG on presentation with IL st elevations. Troponin negative. Rapid Covid test negative. Pain improved with nitro.  Bedside echo shows trace pericardial effusion without WMA and preserved EF. Taken to cath lab for STEMI on 4/19; Patient has non-obstructive CAD angiography and IVUS with normal LVEF by LV gram.     Plan:   - Workup for pericarditis: Infectious and autoimmune workup follow up.   - Inflammatory markers: ESR and CRP  - Start Colchicine and will need for 3 months  - Start Motrin 600 TID for 1-2 weeks and taper when CRP down to WNL  - Up on discharge patient will need appointment with cardiology and repeat ECHO. CRP lab need to be drawn to see trending down of CRP to wean down the Ibuprofien   - PRN pain medicines

## 2020-04-21 NOTE — PLAN OF CARE
CM notified by SICU team that patient will discharge home today; no needs.  Appointment scheduled for ECHO 2/2/20 at 1:00PM.  CM remains available for questions/concerns.      Montserrat Roblero,   [address] [facility] / *      [id number] - [place] - [address]  [address]  [place] [address]  Phone: [phone] Fax: [phone]      Payor: [employer] MEDICARE / Plan: [employer] 65 / Product Type: Medicare Advantage /     Extended Emergency Contact Information  Primary Emergency Contact: Jody Cueto   [facility]  Home Phone: [phone]  Relation: Daughter    Future Appointments   Date Time Provider Department Center   5/5/2020  1:00 PM [facility]          04/21/20 1457   Final Note   Assessment Type Final Discharge Note   Anticipated Discharge Disposition Home   Right Care Referral Info   Post Acute Recommendation No Care   Post-Acute Status   Discharge Delays None known at this time       Lora Nolasco MPH, RN, CM  Ext. [phone]

## 2020-04-21 NOTE — PLAN OF CARE
SW ensured patient had transportation home. Patient was able to secure a ride through family and left around 13:30.     Heather Calhoun LMSW   - Case Management

## 2020-04-21 NOTE — DISCHARGE INSTRUCTIONS
- Cardiology hospital followup- call [phone] to set up online portal; then [phone] to schedule cardiology appointment;  - Obtain blood work CRP labs prior to seeing Cardiology telemedicine                               - May need to continue ibuprofen for more than 2 weeks based on pain level, please discuss weaning ibuprofen with your cardiologist

## 2020-04-21 NOTE — NURSING
Pt seen by attending MD Mela. Discharge to home orders in place. Pt disconnected from wall monitor, peripheral IV x 2 removed with catheter intact. Discharge instructions reviewed with pt, verbalized understanding. Pt transferred to vehicle via wheelchair, all personal belongings taken with patient, tolerated transfer well.

## 2020-04-21 NOTE — DISCHARGE SUMMARY
[facility]  Cardiology  Discharge Summary      Patient Name: Dorothy Shultz  MRN: [id number]  Admission Date: 4/19/2020  Hospital Length of Stay: 2 days  Discharge Date and Time:  04/21/2020 1:21 PM  Attending Physician: Pedro Pablo Plaza MD    Discharging Provider: Sabina Echevarria MD  Primary Care Physician: Montserrat Roblero DO    HPI:   66 yo female with asthma and hypothyroidism presents due to central chest pain started the night of presentation. substernal/bilateral shoulder chest pressure radiating to jaw, pleuritic,  increases by moving. Relieved by bending froward. Non tender, no similar previous episodes. Denies fever, headaches, cough abdominal pain and leg swelling.  Patient is non-smoker and drinks alcohol socially  Family history:   Mother: Multiple events of heart attacks started on her 60s dies from heart attach on her 80s  Father: ESRD  Grandmother and aunt: DM     EKG on presentation with IL st elevations. Troponin negative. Rapid Covid test negative. Pain improved with nitro.  Bedside echo shows trace pericardial effusion without WMA and preserved EF. Taken to cath lab for STEMI on 4/19; Patient has non-obstructive CAD angiography and IVUS with normal LVEF by LV gram.      Procedure(s) (LRB):  Left heart cath (Right)  IVUS, Coronary  ANGIOGRAM, CORONARY ARTERY (N/A)  Ventriculogram, Left     Indwelling Lines/Drains at time of discharge:  Lines/Drains/Airways     None                 Hospital Course:  Ms Shultz admitted for unknown etiology for pericarditis. Patient started on Colchicine which will continue for 3 months. 600mg taper Motrin for two weeks. Patient will need to meet with cardiology in two weeks via telemedicine for follow up of pericarditis and repeat ECHO in two weeks.       Review of Systems   Constitution: Positive for chills. Negative for fever and malaise/fatigue.   HENT: Negative for congestion.    Eyes: Negative for double vision and visual disturbance.    Cardiovascular: Positive for chest pain. Negative for leg swelling.   Respiratory: Negative.  Negative for cough and shortness of breath.    Skin: Negative.    Musculoskeletal: Negative.    Gastrointestinal: Negative for abdominal pain, diarrhea, nausea and vomiting.   Neurological: Positive for headaches.     Vitals:    04/21/20 0900 04/21/20 1000 04/21/20 1100 04/21/20 1200   BP: (!) 152/79 (!) 145/79 121/68 111/76   BP Location: Right arm Right arm Right arm Right arm   Patient Position: Lying Lying Lying Lying   Pulse: 93 89 89 88   Resp:       Temp:       TempSrc:       SpO2: 99% 98% 99% 98%   Weight:       Height:         Physical Exam   Constitutional: She is oriented to person, place, and time. She appears well-developed and well-nourished.   HENT:   Head: Normocephalic and atraumatic.   Eyes: Pupils are equal, round, and reactive to light. Conjunctivae and EOM are normal.   Neck: Normal range of motion. Neck supple. No JVD present.   Cardiovascular: Normal rate, regular rhythm and normal heart sounds. Exam reveals no gallop and no friction rub.   No murmur heard.  Pulmonary/Chest: Effort normal and breath sounds normal. No respiratory distress. She has no wheezes. She has no rales. She exhibits no tenderness.   Abdominal: Soft. Bowel sounds are normal. She exhibits no distension. There is no tenderness.   Musculoskeletal: Normal range of motion. She exhibits no edema or tenderness.   Neurological: She is alert and oriented to person, place, and time.   Skin: Skin is warm and dry. No erythema. No pallor.     Consults:     Significant Diagnostic Studies: Labs:   CMP   Recent Labs   Lab 04/20/20  0616 04/21/20  0400   * 135*   K 3.9 3.7    104   CO2 23 23    112*   BUN 10 12   CREATININE 0.8 0.8   CALCIUM 9.0 8.3*   PROT 7.3 6.4   ALBUMIN 3.2* 2.8*   BILITOT 1.2* 0.7   ALKPHOS 119 188*   * 143*   * 231*   ANIONGAP 9 8   ESTGFRAFRICA >60.0 >60.0   EGFRNONAA >60.0 >60.0   , CBC    Recent Labs   Lab 04/20/20  0616 04/21/20  0400   WBC 8.31 6.52   HGB 11.5* 9.8*   HCT 37.1 31.5*    190    and All labs within the past 24 hours have been reviewed  Cardiac Graphics: Echocardiogram:   Transthoracic echo (TTE) complete (Cupid Only):   Results for orders placed or performed during the hospital encounter of 04/19/20   Echo Color Flow Doppler? Yes   Result Value Ref Range    Ascending aorta 3.07 cm    STJ 2.62 cm    AV mean gradient 3 mmHg    Ao peak shemar 0.98 m/s    Ao VTI 13.22 cm    IVRT 74.22 msec    IVS 0.85 0.6 - 1.1 cm    LA size 3.08 cm    Left Atrium Major Axis 4.77 cm    Left Atrium Minor Axis 5.34 cm    LVIDD 3.22 (A) 3.5 - 6.0 cm    LVIDS 1.79 (A) 2.1 - 4.0 cm    LVOT diameter 2.10 cm    LVOT peak VTI 11.06 cm    PW 0.83 0.6 - 1.1 cm    MV Peak A Shemar 0.61 m/s    E wave decelartion time 179.50 msec    MV Peak E Shemar 0.62 m/s    RA Major Axis 4.05 cm    RA Width 3.76 cm    RVDD 3.13 cm    Sinus 3.21 cm    TAPSE 1.90 cm    TR Max Shemar 1.79 m/s    TDI LATERAL 0.08 m/s    TDI SEPTAL 0.09 m/s    LA WIDTH 3.08 cm    LV Diastolic Volume 41.62 mL    LV Systolic Volume 9.61 mL    RV S' 10.69 cm/s    LVOT peak shemar 0.72 m/s    LV LATERAL E/E' RATIO 7.75 m/s    LV SEPTAL E/E' RATIO 6.89 m/s    FS 44 %    LA volume 40.63 cm3    LV mass 70.71 g    Left Ventricle Relative Wall Thickness 0.52 cm    AV valve area 2.90 cm2    AV Velocity Ratio 0.73     AV index (prosthetic) 0.84     E/A ratio 1.02     Mean e' 0.09 m/s    LVOT area 3.5 cm2    LVOT stroke volume 38.29 cm3    AV peak gradient 4 mmHg    E/E' ratio 7.29 m/s    LV Systolic Volume Index 5.0 mL/m2    LV Diastolic Volume Index 21.60 mL/m2    LA Volume Index 21.1 mL/m2    LV Mass Index 37 g/m2    Triscuspid Valve Regurgitation Peak Gradient 13 mmHg    BSA 1.99 m2    Right Atrial Pressure (from IVC) 15 mmHg    TV rest pulmonary artery pressure 28 mmHg    Narrative    · Normal left ventricular systolic function. The estimated ejection   fraction is  55%.  · Concentric left ventricular remodeling.  · Normal LV diastolic function.  · Normal right ventricular systolic function.  · Mild right ventricular enlargement.  · Elevated central venous pressure (15 mmHg).  · The estimated PA systolic pressure is 28 mmHg.  · Small anterior, inferior, and apical pericardial effusion, measuring 8mm   in diastole in parasternal long axis. Possible fibrinous material along   epicardium.          Pending Diagnostic Studies:     Procedure Component Value Units Date/Time    Anti-Palomino antibody [[id number]] Collected:  04/19/20 1400    Order Status:  Sent Lab Status:  In process Updated:  04/19/20 1436    Specimen:  Blood     Anti-neutrophilic cytoplasmic antibody [[id number]] Collected:  04/19/20 1400    Order Status:  Sent Lab Status:  In process Updated:  04/19/20 1524    Specimen:  Blood     B. burgdorferi Abs (Lyme Disease) [[id number]] Collected:  04/19/20 1400    Order Status:  Sent Lab Status:  In process Updated:  04/19/20 1524    Specimen:  Blood     Lyme Disease Western Blot [[id number]] Collected:  04/19/20 1400    Order Status:  Sent Lab Status:  In process Updated:  04/19/20 1524    Specimen:  Blood     Lyme disease DNA probe, direct [[id number]] Collected:  04/19/20 1400    Order Status:  Sent Lab Status:  In process Updated:  04/19/20 1455    Specimen:  Blood     Q fever antibody [[id number]] Collected:  04/19/20 1400    Order Status:  Sent Lab Status:  In process Updated:  04/19/20 1447    Specimen:  Blood     Sjogrens syndrome-B extractable nuclear antibody [[id number]] Collected:  04/19/20 1400    Order Status:  Sent Lab Status:  In process Updated:  04/19/20 1436    Specimen:  Blood           Final Active Diagnoses:    Diagnosis Date Noted POA    PRINCIPAL PROBLEM:  Acute pericarditis [I30.9] 04/19/2020 Yes    Hypothyroidism [E03.9] 04/19/2020 Yes    Obesity (BMI 30.0-34.9) [E66.9] 04/19/2020 Yes    LFT elevation [R94.5] 04/19/2020 Yes    Discharge planning issues  [Z02.9] 04/19/2020 Not Applicable    Arthritis [M19.90]  Yes      Problems Resolved During this Admission:     * Acute pericarditis  64 yo female with asthma and hypothyroidism presents due to central chest pain started the night of presentation. substernal/bilateral shoulder chest pressure radiating to jaw, pleuritic,  increases by moving. Relieved by bending froward. Non tender, no similar previous episodes. Denies fever, headaches, cough abdominal pain and leg swelling.  Patient is non-smoker and drinks alcohol socially  Family history:   Mother: Multiple events of heart attacks started on her 60s dies from heart attach on her 80s  Father: ESRD  Grandmother and aunt: DM     EKG on presentation with IL st elevations. Troponin negative. Rapid Covid test negative. Pain improved with nitro.  Bedside echo shows trace pericardial effusion without WMA and preserved EF. Taken to cath lab for STEMI on 4/19; Patient has non-obstructive CAD angiography and IVUS with normal LVEF by LV gram.     Plan:   - Workup for pericarditis: Infectious and autoimmune workup follow up.   - Inflammatory markers: ESR and CRP  - Start Colchicine and will need for 3 months  - Start Motrin 600 TID for 1-2 weeks and taper when CRP down to WNL  - Up on discharge patient will need appointment with cardiology and repeat ECHO. CRP lab need to be drawn to see trending down of CRP to wean down the Ibuprofien   - PRN pain medicines    Hypothyroidism  Continue home medication Levothyroxine   Check TSH level      Arthritis  PRN pain medicines  PT/OT  - Scheduled 600MG TID Motrin         Discharged Condition: good    Disposition: Home or Self Care    Follow Up:  Follow-up Information     [facility]. Call in 2 weeks.    Why:  hospital followup- call [phone] to set up online portal; then [phone] to schedule cardiology appointment; labs prior.                                     Contact information:  [address]  [place]  [place] [address]               Patient Instructions:      C-REACTIVE PROTEIN   Standing Status: Future Standing Exp. Date: 06/19/21     Echo Color Flow Doppler? Yes   Standing Status: Future Standing Exp. Date: 04/21/21   Order Comments: Repeat ECHO for pericardial fluid check     Order Specific Question Answer Comments   Color Flow Doppler? Yes      Medications:  Reconciled Home Medications:      Medication List      START taking these medications    colchicine 0.6 mg tablet  Commonly known as:  COLCRYS  Take 1 tablet (0.6 mg total) by mouth 2 (two) times daily.     ibuprofen 600 MG tablet  Commonly known as:  ADVIL,MOTRIN  Take 1 tablet (600 mg total) by mouth 3 (three) times daily. Discuss with cardiology clinic visit in 2 weeks whether need to continue at this dose for 18 days        CHANGE how you take these medications    omeprazole 40 MG capsule  Commonly known as:  PRILOSEC  Take 1 capsule (40 mg total) by mouth before breakfast.  What changed:    · medication strength  · how much to take  · when to take this        CONTINUE taking these medications    acyclovir 400 MG tablet  Commonly known as:  ZOVIRAX  Take 400 mg by mouth 3 (three) times daily as needed (for fever blisters).     budesonide-formoterol 160-4.5 mcg 160-4.5 mcg/actuation Hfaa  Commonly known as:  SYMBICORT  Inhale 2 puffs into the lungs every 12 (twelve) hours. Controller     cetirizine 10 MG tablet  Commonly known as:  ZYRTEC  Take 10 mg by mouth daily as needed.     diclofenac sodium 1 % Gel  Commonly known as:  VOLTAREN  Apply 2 g topically 2 (two) times daily as needed (to knees).     HYDROcodone-acetaminophen 5-325 mg per tablet  Commonly known as:  NORCO  Take 1 tablet by mouth every 6 (six) hours as needed for Pain.     levothyroxine 25 MCG tablet  Commonly known as:  SYNTHROID  Take 37.5 mcg by mouth before breakfast.     montelukast 10 mg tablet  Commonly known as:  SINGULAIR  Take 10 mg by mouth every evening.        STOP taking  these medications    famotidine 20 MG tablet  Commonly known as:  PEPCID     meloxicam 15 MG tablet  Commonly known as:  MOBIC     mupirocin 2 % ointment  Commonly known as:  BACTROBAN     naproxen 500 MG tablet  Commonly known as:  NAPROSYN            Time spent on the discharge of patient: 35 minutes    KENYA Echevarria MD  Cardiology  [facility]

## 2020-04-22 LAB
ANCA AB TITR SER IF: NORMAL TITER
B BURGDOR AB SER IA-ACNC: 0.19 INDEX VALUE
B BURGDOR DNA BLD QL NAA+PROBE: NEGATIVE
B. GARINII/B. AFZELII PCR, BLOOD: NEGATIVE
B. MAYONII PCR, BLOOD: NEGATIVE
MICROBIOLOGIST REVIEW: NORMAL
P-ANCA TITR SER IF: NORMAL TITER

## 2020-04-23 ENCOUNTER — PATIENT OUTREACH (OUTPATIENT)
Dept: ADMINISTRATIVE | Facility: CLINIC | Age: 65
End: 2020-04-23

## 2020-04-23 DIAGNOSIS — I30.9 ACUTE PERICARDITIS, UNSPECIFIED TYPE: Primary | ICD-10-CM

## 2020-04-23 LAB — BACTERIA BLD CULT: NORMAL

## 2020-04-23 NOTE — PATIENT INSTRUCTIONS
Pericarditis     With pericarditis, the inflamed layers of the pericardium rub against the heart. This results in pain and other symptoms.     The pericardium is the thin, sac-like tissue that surrounds the heart. Inflammation of this tissue is called pericarditis. The condition may be mild and last only a few days. Or it may be more severe and lead to serious complications.  What is pericarditis?  The pericardium helps protect the heart from infection. It consists of 2 thin layers of tissue with a small amount of fluid between them. When these layers become inflamed, they can rub against the heart. This causes chest pain. Pericarditis most often happens after a respiratory infection. It occurs in people of all ages, but is more common in men aged 20 to 50 years.  What are the symptoms of pericarditis?  Pericarditis can be acute or chronic. The acute type occurs suddenly and typically lasts days or up to 3 weeks. The chronic type develops over time and lasts for more than 3 months. Symptoms can vary between the types.  · Symptoms of acute pericarditis may include:  ¨ Sharp pain in the center or left side of the chest  ¨ Fever  ¨ Weakness  ¨ Trouble breathing  ¨ Palpitations  ¨ Coughing  · Symptoms of chronic pericarditis may include:  ¨ Tiredness  ¨ Coughing  ¨ Shortness of breath  ¨ Swelling of the stomach and legs (in severe cases)  ¨ Low blood pressure (in severe cases  ¨ Chest pain may worsen by lying down or with deep breathing and improved sitting up and leaning forward  What causes pericarditis?  In many cases, the cause of this condition is unknown (called idiopathic pericarditis).The following are possible causes:  · Viral infection (common, particularly after a viral respiratory infection)  · Bacterial or fungal infection  · Autoimmune diseases, such as scleroderma and lupus  · Recovery from heart attack  · Injury or surgery to the chest, esophagus, or heart  · Radiation treatment to the chest  · Certain  types of cancer  · HIV/AIDS  · Medicines that suppress the immune system  · Kidney failure  How is pericarditis diagnosed?  The doctor will take your medical history and ask you to describe your symptoms. Youll have a physical exam. Your doctor will listen to your heart to see if it makes certain sounds (pericardial rub) and certain tests may be done. These include:  · Electrocardiogram (ECG). This test records the electrical activity of your heart. During an ECG, small sticky pads (electrodes) are placed on your chest, arms, and legs. Wires connect the pads to a machine, which records your heart's electrical signals.  · Lab tests. Samples of blood or pericardial fluid may be taken and tested in a lab. These tests can help determine the cause of pericarditis.  · Imaging tests of the heart or chest. These may include X-ray, MRI, and CT. They create pictures of the heart or the inside of the chest. An MRI (magnetic resonance imaging) scan uses magnets and radio waves. A CT (computed tomography) scan uses X-rays and a computer.  · Echocardiogram (echo). This is usually the main imaging test used for diagnosis. This test creates a moving picture of the heart. During an echo, a probe moved over the chest sends out harmless sound waves. These create a picture that shows the size and shape of the heart. It shows how well the heart is working. It also shows whether fluid has built up around the heart.  · Radionuclide scanning (also called nuclear medicine scanning). This test creates a picture showing the structure of the heart. It also shows how well the heart is functioning. A low-level radioactive substance is injected into a vein or taken by mouth. The substance collects in the heart. There, it gives off gamma rays (similar to X-rays). A special camera takes pictures of the gamma rays.  How is pericarditis treated?  Treatment depends on how severe the condition is. Treatment can address the symptoms or the cause of  pericarditis. Or it can address complications the condition may cause.  · Treatment of symptoms. For minor symptoms, rest may be the only treatment needed. To relieve pain and inflammation, medicine may be prescribed. These include aspirin and ibuprofen. If pain is severe, a strong anti-inflammatory called colchicine may be prescribed.  · Treatment of the cause, if known. For instance, antibiotics may be prescribed. This is done if the cause is a bacterial infection.  · Treatment of complications. Serious but uncommon complications can result from both acute and chronic pericarditis. These include:  ¨ Cardiac tamponade. Fluid builds up within the layers of the pericardium. This can keep the heart from working properly. To treat this condition, a needle is inserted into the chest wall and between the layers of the pericardium. It removes the excess fluid. Sometimes surgery may be done to remove a portion of the pericardium.  ¨ Constrictive pericarditis. Over time, scar-like tissue forms in the pericardium. The tissue prevents the heart from expanding enough when it beats. This keeps the heart from working right. Surgery to cut or remove the pericardium is the only treatment.     When should I call my healthcare provider?  Be sure to call your healthcare provider right away if you have any symptoms of pericarditis. This is especially important if you have chest pain. Without treatment, this condition can be life-threatening.   Date Last Reviewed: 5/1/2016 © 2000-2020 [employer]. [address] [place] [address]. All rights reserved. This information is not intended as a substitute for professional medical care. Always follow your healthcare professional's instructions.        Pericarditis    Pericarditis is inflammation of the pericardium, the thin sac (membrane) that surrounds the heart.  The pericardium holds the heart in place and helps it work properly. There is a small amount of fluid  between the inner and outer layers of the pericardium. This fluid keeps the layers from rubbing as the heart moves to pump blood. Pericarditis may last for 2 to 6 weeks.  Home care  Follow these guidelines when caring for yourself at home:  · Its important to rest until you feel better. Dont do any strenuous activity during this time.  · You may use ibuprofen or naproxen to control pain, unless another medicine was prescribed. If you have chronic liver or kidney disease, talk with your healthcare provider before using these medicines. Also talk with your provider if youve had a stomach ulcer or gastrointestinal bleeding. Acetaminophen may not help this type of pain as much as ibuprofen or naproxen.  Follow-up care  Follow up with your healthcare provider, or as advised. Also call your provider if your symptoms dont get better in 1 week, or if they last more than 2 weeks.  When to seek medical advice  Call your healthcare provider right away if any of these occur:  · A change in the type of pain. This means if it feels different, becomes more severe, lasts longer, or begins to spread into your shoulder, arm, neck, jaw, or back.  · Shortness of breath or more pain with breathing  · Weakness, dizziness, or fainting  · Cough with dark-colored phlegm (sputum) or blood  · Fever of 100.4°F (38ºC) or higher, or as directed by your healthcare provider  · Swelling in a leg  · Rapid pulse rate that does not go away with rest  Date Last Reviewed: 6/1/2016  © 9334-4473 [employer]. [address] [place] [address], [place] [address]. All rights reserved. This information is not intended as a substitute for professional medical care. Always follow your healthcare professional's instructions.

## 2020-04-26 ENCOUNTER — NURSE TRIAGE (OUTPATIENT)
Dept: ADMINISTRATIVE | Facility: CLINIC | Age: 65
End: 2020-04-26

## 2020-04-27 LAB
C BURNET PH1 IGG SER QL: NEGATIVE TITER
C BURNET PH1 IGM SER QL: NEGATIVE TITER
C BURNET PH2 IGG SER QL: NEGATIVE TITER
C BURNET PH2 IGM SER QL: NEGATIVE TITER

## 2020-04-27 NOTE — TELEPHONE ENCOUNTER
Reason for Disposition   General information question, no triage required and triager able to answer question    Protocols used: INFORMATION ONLY CALL-A-AH    Patient asked questions about weight gain and retaining fluid. Explained to patient that she should weigh herself in the morning after she urinates and she should record the weight daily. She should report weight gain of 5lbs for the week to her cardiologist. Also, patient encouraged to indicate in her daily wt records if she has a bowel movement or has been constipated because impacted stool can increase her body weight. Patient verbalized understanding.

## 2020-04-28 ENCOUNTER — CARE AT HOME (OUTPATIENT)
Dept: HOME HEALTH SERVICES | Facility: CLINIC | Age: 65
End: 2020-04-28
Payer: MEDICARE

## 2020-04-28 VITALS
BODY MASS INDEX: 33.01 KG/M2 | RESPIRATION RATE: 18 BRPM | HEIGHT: 65 IN | OXYGEN SATURATION: 97 % | DIASTOLIC BLOOD PRESSURE: 63 MMHG | HEART RATE: 74 BPM | SYSTOLIC BLOOD PRESSURE: 132 MMHG | WEIGHT: 198.13 LBS | TEMPERATURE: 97 F

## 2020-04-28 DIAGNOSIS — Z09 FOLLOW UP: Primary | ICD-10-CM

## 2020-04-28 DIAGNOSIS — I30.9 ACUTE PERICARDITIS, UNSPECIFIED TYPE: ICD-10-CM

## 2020-04-28 LAB
B BURGDOR IGG SER QL IB: NEGATIVE
B BURGDOR IGM SER QL IB: NEGATIVE

## 2020-04-28 PROCEDURE — 99496 TRANSJ CARE MGMT HIGH F2F 7D: CPT | Mod: S$GLB,,, | Performed by: NURSE PRACTITIONER

## 2020-04-28 PROCEDURE — 99496 TRANSITIONAL CARE MANAGE SERVICE 7 DAY DISCHARGE: ICD-10-PCS | Mod: S$GLB,,, | Performed by: NURSE PRACTITIONER

## 2020-04-28 NOTE — PROGRESS NOTES
[facility]  Transition of Care Home Visit    Visit Date: 4/28/2020  Encounter Provider: Thaddeus Cabrera NP  PCP:  Montserrat Roblero DO    PRESENTING HISTORY      Patient ID: Dorothy Shultz 65 y.o. female.    Consult Requested By:  Dr. Montserrat Roblero  Reason for Consult:  Transitional Care Coordination    Chief Complaint: Transitional Care     HPI:   64 yo female with asthma and hypothyroidism presents due to central chest pain started the night of presentation. substernal/bilateral shoulder chest pressure radiating to jaw, pleuritic,  increases by moving. Relieved by bending froward. Non tender, no similar previous episodes. Denies fever, headaches, cough abdominal pain and leg swelling.  Patient is non-smoker and drinks alcohol socially  Family history:   Mother: Multiple events of heart attacks started on her 60s dies from heart attach on her 80s  Father: ESRD  Grandmother and aunt: DM     EKG on presentation with IL st elevations. Troponin negative. Rapid Covid test negative. Pain improved with nitro.  Bedside echo shows trace pericardial effusion without WMA and preserved EF. Taken to cath lab for STEMI on 4/19; Patient has non-obstructive CAD angiography and IVUS with normal LVEF by LV gram.       Procedure(s) (LRB):  Left heart cath (Right)  IVUS, Coronary  ANGIOGRAM, CORONARY ARTERY (N/A)  Ventriculogram, Left      Indwelling Lines/Drains at time of discharge:      Lines/Drains/Airways      None                      Hospital Course:  Ms Shultz admitted for unknown etiology for pericarditis. Patient started on Colchicine which will continue for 3 months. 600mg taper Motrin for two weeks. Patient will need to meet with cardiology in two weeks via telemedicine for follow up of pericarditis and repeat ECHO in two weeks.     Admit Date: 04/19/20  Discharge Date: 04/21/20  TCC Referral Date: 04/23/20  _________________________________________________________________    Today:  Ms. Dorothy Shultz is a 65  y.o. female is being seen and examined at home today for transitional care visit to the home environment post-discharge from inpatient hospitalization encounter described above. Dorothy COOK presents at baseline state of health as reported by patient. VSS. Denies any acute issues, concerns or complaints to address on today's visit. Denies CP/SOB, N/V/D, no palipitations,  Reports taking all medications as prescribed. Explained mechanism of action of cochicine and ibuprofen. Explained EKG changes related to her diagnosis and differences betwen pericarditis and myocardial infarction and also echocardiography.  Is aware of her upcoming appointments. No other needs identified at this time. Risks of environmental exposure to coronavirus discussed including: social distancing, hand hygiene, and limiting departures from the home for necessities only.  Reports understanding and willingness to comply.      ROS:  Review of Systems   Constitutional: Negative for chills and fever.   HENT: Negative.    Eyes: Negative.    Respiratory: Negative.  Negative for apnea, cough, chest tightness and shortness of breath.    Cardiovascular: Negative.  Negative for chest pain and palpitations.   Gastrointestinal: Negative.    Endocrine: Negative.    Genitourinary: Negative.    Musculoskeletal: Negative.    Skin: Negative.    Neurological: Negative.    Hematological: Negative.    Psychiatric/Behavioral: Negative.      Assessments:  · Environmental: single story apartment in the rear of her mother's home, no steps to enter, adequate lighting and temeprature control  · Functional Status: Independent with ADL's/IADL's, ambulates independently, continent of bowel and bladder  · Safety: Fall Precautions, COVID Precautions  · Nutritional: Adequate  · Home Health: NA  · DME/Supplies: none    PAST HISTORY:     Past Medical History:   Diagnosis Date    Arthritis     Asthma     Back pain     Knee pain     Thyroid disease        Past Surgical History:    Procedure Laterality Date    CHOLECYSTECTOMY      CORONARY ANGIOGRAPHY N/A 4/19/2020    Procedure: ANGIOGRAM, CORONARY ARTERY;  Surgeon: Zachariah Goldman MD;  Location: [facility] CATH LAB;  Service: Cardiology;  Laterality: N/A;    LEFT HEART CATHETERIZATION Right 4/19/2020    Procedure: Left heart cath;  Surgeon: Zachariah Goldman MD;  Location: [facility] CATH LAB;  Service: Cardiology;  Laterality: Right;    VASCULAR SURGERY         Family History   Problem Relation Age of Onset    Stroke Mother        Social History     Socioeconomic History    Marital status:      Spouse name: Not on file    Number of children: Not on file    Years of education: Not on file    Highest education level: Not on file   Occupational History    Not on file   Social Needs    Financial resource strain: Not on file    Food insecurity:     Worry: Not on file     Inability: Not on file    Transportation needs:     Medical: Not on file     Non-medical: Not on file   Tobacco Use    Smoking status: Never Smoker    Smokeless tobacco: Never Used   Substance and Sexual Activity    Alcohol use: Yes    Drug use: No    Sexual activity: Not on file   Lifestyle    Physical activity:     Days per week: Not on file     Minutes per session: Not on file    Stress: Not on file   Relationships    Social connections:     Talks on phone: Not on file     Gets together: Not on file     Attends Evangelical service: Not on file     Active member of club or organization: Not on file     Attends meetings of clubs or organizations: Not on file     Relationship status: Not on file   Other Topics Concern    Not on file   Social History Narrative    Not on file       MEDICATIONS & ALLERGIES:     Current Outpatient Medications on File Prior to Visit   Medication Sig Dispense Refill    acyclovir (ZOVIRAX) 400 MG tablet Take 400 mg by mouth 3 (three) times daily as needed (for fever blisters).       budesonide-formoterol 160-4.5 mcg  (SYMBICORT) 160-4.5 mcg/actuation HFAA Inhale 2 puffs into the lungs every 12 (twelve) hours. Controller      cetirizine (ZYRTEC) 10 MG tablet Take 10 mg by mouth daily as needed.   1    diclofenac sodium (VOLTAREN) 1 % Gel Apply 2 g topically 2 (two) times daily as needed (to knees).   0    HYDROcodone-acetaminophen (NORCO) 5-325 mg per tablet Take 1 tablet by mouth every 6 (six) hours as needed for Pain. 12 tablet 0    ibuprofen (ADVIL,MOTRIN) 600 MG tablet Take 1 tablet (600 mg total) by mouth 3 (three) times daily. Discuss with cardiology clinic visit in 2 weeks whether need to continue at this dose for 18 days 54 tablet 0    levothyroxine (SYNTHROID) 25 MCG tablet Take 37.5 mcg by mouth before breakfast.       montelukast (SINGULAIR) 10 mg tablet Take 10 mg by mouth every evening.      omeprazole (PRILOSEC) 40 MG capsule Take 1 capsule (40 mg total) by mouth before breakfast. 30 capsule 3    colchicine (COLCRYS) 0.6 mg tablet Take 1 tablet (0.6 mg total) by mouth 2 (two) times daily. 180 tablet 0     No current facility-administered medications on file prior to visit.         Review of patient's allergies indicates:  No Known Allergies    OBJECTIVE:     Vital Signs:  Vitals:    04/28/20 1120   BP: 132/63   Pulse: 74   Resp: 18   Temp: 97.4 °F (36.3 °C)     Body mass index is 33.48 kg/m².     Physical Exam:  Physical Exam   Constitutional: She is oriented to person, place, and time. She appears well-developed and well-nourished.   HENT:   Head: Normocephalic and atraumatic.   Eyes: Pupils are equal, round, and reactive to light. EOM are normal.   Neck: Normal range of motion. Neck supple.   Cardiovascular: Normal rate and regular rhythm.   Pulmonary/Chest: Effort normal and breath sounds normal.   Abdominal: Soft. Bowel sounds are normal.   Neurological: She is alert and oriented to person, place, and time.   Skin: Skin is warm and dry.   Psychiatric: She has a normal mood and affect. Her behavior is  normal. Thought content normal.   Vitals reviewed.      Laboratory  Lab Results   Component Value Date    WBC 6.52 04/21/2020    HGB 9.8 (L) 04/21/2020    HCT 31.5 (L) 04/21/2020    MCV 95 04/21/2020     04/21/2020     Lab Results   Component Value Date    INR 1.0 04/19/2020     Lab Results   Component Value Date    HGBA1C 5.4 04/19/2020     No results for input(s): POCTGLUCOSE in the last 72 hours.      TRANSITION OF CARE:     Family and/or Caretaker present at visit?  No.  Diagnostic tests reviewed/disposition: Echocardiogram ordered in 3 weeks.  Disease/illness education: Importance of Compliance with all prescribed medications and treatments, follow-up appointments  Home health/community services discussion/referrals: Patient does not have home health established from hospital visit.  They do not need home health.  If needed, we will set up home health for the patient.   Establishment or re-establishment of referral orders for community resources: No other necessary community resources.   Discussion with other health care providers: No discussion with other health care providers necessary.     Transition of Care Visit:  I have reviewed and updated the history and problem list. I have reconciled the medication list. I have discussed the hospitalization and current medical issues, prognosis and plans with the patient/family.     I spent more than 50% of time discussing the care with the patient/family. Total Face-to-Face Encounter: 60 minutes.    Medications Reconciliation:   I have reconciled the patient's home medications and discharge medications with the patient/family. I have updated all changes. Refer to After-Visit Medication List.    Discharge plans, follow-up instructions, future appointments, provider contact information, indicators to seek emergency treatment and encouragement to call for any questions, concerns or clarification of the patient's plan of care explained to patient and/or  caregiver(s), whom confirm understanding of provided information and endorse willingness to comply.     ASSESSMENT & PLAN:     HIGH RISK CONDITION(S):  KYLER COOK was seen today for transitional care.  Diagnoses and all orders for this visit:    Acute pericarditis, unspecified type  - colcihcine and ibuprofen as prescribed by cardiology  - Echocardiogram in 3 weeks  - f/u with cardiology    Encounter for Medical Follow-Up and Medication Review   - [facility] Nurse Practitioner to schedule home visit with patient PRN    Were controlled substances prescribed?  No    Instructions for the patient:  Scheduled Follow-up :  Future Appointments   Date Time Provider Department Center   4/30/2020 10:00 AM LAB, [place] [facility] [place]   5/5/2020  1:00 PM ECHO, [facility] [place]   5/6/2020  8:00 AM Christophe Schmidt MD [facility] CARD[facility] [place]       After Visit Medication List :     Medication List           Accurate as of April 28, 2020 11:29 AM. If you have any questions, ask your nurse or doctor.               CONTINUE taking these medications    acyclovir 400 MG tablet  Commonly known as:  ZOVIRAX     budesonide-formoterol 160-4.5 mcg 160-4.5 mcg/actuation Hfaa  Commonly known as:  SYMBICORT     cetirizine 10 MG tablet  Commonly known as:  ZYRTEC     colchicine 0.6 mg tablet  Commonly known as:  COLCRYS  Take 1 tablet (0.6 mg total) by mouth 2 (two) times daily.     diclofenac sodium 1 % Gel  Commonly known as:  VOLTAREN     HYDROcodone-acetaminophen 5-325 mg per tablet  Commonly known as:  NORCO  Take 1 tablet by mouth every 6 (six) hours as needed for Pain.     ibuprofen 600 MG tablet  Commonly known as:  ADVIL,MOTRIN  Take 1 tablet (600 mg total) by mouth 3 (three) times daily. Discuss with cardiology clinic visit in 2 weeks whether need to continue at this dose for 18 days     levothyroxine 25 MCG tablet  Commonly known as:  SYNTHROID     montelukast 10 mg tablet  Commonly known as:   SINGULAIR     omeprazole 40 MG capsule  Commonly known as:  PRILOSEC  Take 1 capsule (40 mg total) by mouth before breakfast.            Patient consent was obtained prior to treatment on this visit.    Signature:      Thaddeus Cabrera, MSN, APRN, FNP-C  [facility]    Attestation: Screening criteria to assess the level of the patient's risk for infection with COVID-19 as recommended by the CDC at the time of the above documented home visit concluded appropriateness to proceed. Universal precautions were maintained at all times, including provider use of >60% alcohol gel hand  immediately prior to entry and upon departing the patient's home as well as cleaning of equipment used in home visit with antibacterial/germicidal disposable wipes.

## 2020-05-03 NOTE — PATIENT INSTRUCTIONS
- [facility] Nurse Practitioner to schedule home follow-up visit with as needed.  - Continue all medications, treatments and therapies as ordered.   - Follow all instructions, recommendations as discussed.  - Maintain Safety Precautions at all times.  - Attend all medical appointments as scheduled.  - For worsening symptoms: call Primary Care Physician or Nurse Practitioner.  - For emergencies, call 911 or immediately report to the nearest emergency room.  - Limit Risks of environmental exposure to coronavirus as discussed including: social distancing, hand hygiene, and limiting departures from the home for necessities only.

## 2020-05-04 ENCOUNTER — LAB VISIT (OUTPATIENT)
Dept: LAB | Facility: HOSPITAL | Age: 65
End: 2020-05-04
Attending: STUDENT IN AN ORGANIZED HEALTH CARE EDUCATION/TRAINING PROGRAM
Payer: MEDICARE

## 2020-05-04 DIAGNOSIS — I30.0 ACUTE IDIOPATHIC PERICARDITIS: ICD-10-CM

## 2020-05-04 LAB — CRP SERPL-MCNC: 122.3 MG/L (ref 0–8.2)

## 2020-05-04 PROCEDURE — 36415 COLL VENOUS BLD VENIPUNCTURE: CPT | Mod: PO

## 2020-05-04 PROCEDURE — 86140 C-REACTIVE PROTEIN: CPT

## 2020-05-05 ENCOUNTER — HOSPITAL ENCOUNTER (OUTPATIENT)
Dept: CARDIOLOGY | Facility: CLINIC | Age: 65
Discharge: HOME | End: 2020-05-05
Payer: MEDICARE

## 2020-05-05 VITALS
HEART RATE: 70 BPM | HEIGHT: 65 IN | BODY MASS INDEX: 32.99 KG/M2 | SYSTOLIC BLOOD PRESSURE: 124 MMHG | DIASTOLIC BLOOD PRESSURE: 80 MMHG | WEIGHT: 198 LBS

## 2020-05-05 DIAGNOSIS — I30.0 ACUTE IDIOPATHIC PERICARDITIS: ICD-10-CM

## 2020-05-05 LAB
ASCENDING AORTA: 3.3 CM
AV INDEX (PROSTH): 0.92
AV MEAN GRADIENT: 2 MMHG
AV PEAK GRADIENT: 5 MMHG
AV VELOCITY RATIO: 0.87
BSA FOR ECHO PROCEDURE: 2.02 M2
CV ECHO LV RWT: 0.47 CM
DOP CALC AO PEAK VEL: 1.1 M/S
DOP CALC AO VTI: 20.1 CM
DOP CALC LVOT PEAK VEL: 0.96 M/S
DOP CALCLVOT PEAK VEL VTI: 18.55 CM
ECHO LV POSTERIOR WALL: 0.89 CM (ref 0.6–1.1)
FRACTIONAL SHORTENING: 21 % (ref 28–44)
INTERVENTRICULAR SEPTUM: 0.86 CM (ref 0.6–1.1)
LEFT INTERNAL DIMENSION IN SYSTOLE: 3 CM (ref 2.1–4)
LEFT VENTRICLE DIASTOLIC VOLUME INDEX: 29.27 ML/M2
LEFT VENTRICLE DIASTOLIC VOLUME: 57.33 ML
LEFT VENTRICLE MASS INDEX: 50 G/M2
LEFT VENTRICLE SYSTOLIC VOLUME INDEX: 11.9 ML/M2
LEFT VENTRICLE SYSTOLIC VOLUME: 23.27 ML
LEFT VENTRICULAR INTERNAL DIMENSION IN DIASTOLE: 3.8 CM (ref 3.5–6)
LEFT VENTRICULAR MASS: 97.18 G
RA PRESSURE: 8 MMHG
SINUS: 3.3 CM
STJ: 3.2 CM

## 2020-05-05 PROCEDURE — 93306 TTE W/DOPPLER COMPLETE: CPT | Mod: S$GLB,,, | Performed by: INTERNAL MEDICINE

## 2020-05-05 PROCEDURE — 93306 ECHO (CUPID ONLY): ICD-10-PCS | Mod: S$GLB,,, | Performed by: INTERNAL MEDICINE

## 2020-05-07 ENCOUNTER — OFFICE VISIT (OUTPATIENT)
Dept: CARDIOLOGY | Facility: CLINIC | Age: 65
End: 2020-05-07
Payer: MEDICARE

## 2020-05-07 ENCOUNTER — TELEPHONE (OUTPATIENT)
Dept: CARDIOLOGY | Facility: CLINIC | Age: 65
End: 2020-05-07

## 2020-05-07 DIAGNOSIS — I30.0 ACUTE IDIOPATHIC PERICARDITIS: Primary | ICD-10-CM

## 2020-05-07 DIAGNOSIS — E66.9 OBESITY (BMI 30.0-34.9): ICD-10-CM

## 2020-05-07 DIAGNOSIS — K29.60 GASTRITIS DUE TO NONSTEROIDAL ANTI-INFLAMMATORY DRUG: ICD-10-CM

## 2020-05-07 DIAGNOSIS — T39.395A GASTRITIS DUE TO NONSTEROIDAL ANTI-INFLAMMATORY DRUG: ICD-10-CM

## 2020-05-07 PROBLEM — I30.9 ACUTE PERICARDITIS: Status: RESOLVED | Noted: 2020-04-19 | Resolved: 2020-05-07

## 2020-05-07 PROCEDURE — 1101F PR PT FALLS ASSESS DOC 0-1 FALLS W/OUT INJ PAST YR: ICD-10-PCS | Mod: CPTII,95,, | Performed by: INTERNAL MEDICINE

## 2020-05-07 PROCEDURE — 99442 PR PHYSICIAN TELEPHONE EVALUATION 11-20 MIN: ICD-10-PCS | Mod: 95,,, | Performed by: INTERNAL MEDICINE

## 2020-05-07 PROCEDURE — 1101F PT FALLS ASSESS-DOCD LE1/YR: CPT | Mod: CPTII,95,, | Performed by: INTERNAL MEDICINE

## 2020-05-07 PROCEDURE — 1159F PR MEDICATION LIST DOCUMENTED IN MEDICAL RECORD: ICD-10-PCS | Mod: 95,,, | Performed by: INTERNAL MEDICINE

## 2020-05-07 PROCEDURE — 99442 PR PHYSICIAN TELEPHONE EVALUATION 11-20 MIN: CPT | Mod: 95,,, | Performed by: INTERNAL MEDICINE

## 2020-05-07 PROCEDURE — 1159F MED LIST DOCD IN RCRD: CPT | Mod: 95,,, | Performed by: INTERNAL MEDICINE

## 2020-05-07 RX ORDER — COLCHICINE 0.6 MG/1
0.6 TABLET ORAL DAILY
Qty: 90 TABLET | Refills: 0 | Status: SHIPPED | OUTPATIENT
Start: 2020-05-07 | End: 2020-05-22

## 2020-05-07 NOTE — TELEPHONE ENCOUNTER
----- Message from Edwina Echavarria MA sent at 5/7/2020 12:25 PM CDT -----  Contact: Pt  Please call pt she would like to talk to you regarding heart medication to [facility] .Thank you [phone]

## 2020-05-07 NOTE — PROGRESS NOTES
Established Patient - Audio Only Telehealth Visit     The patient location is: home  The chief complaint leading to consultation is: pericarditis hospital follow up  Visit type: Virtual visit with audio only (telephone)  Total time spent with patient: 20 minutes       The reason for the audio only service rather than synchronous audio and video virtual visit was related to technical difficulties or patient preference/necessity.     Each patient to whom I provide medical services by telemedicine is:  (1) informed of the relationship between the physician and patient and the respective role of any other health care provider with respect to management of the patient; and (2) notified that they may decline to receive medical services by telemedicine and may withdraw from such care at any time. Patient verbally consented to receive this service via voice-only telephone call.       Subjective:    Patient ID:  Dorothy Shultz is a 65 y.o. female who presents for follow-up of pericarditis    HPIAudio encounter 20 minutes    The patient is a 65 year old female admitted 4/19/20 with central chest pain started the night of presentation. substernal/bilateral shoulder chest pressure radiating to jaw, pleuritic,  increases by moving. Relieved by bending froward. .EKG revealed pericarditis and echo a small pericardial effusion. Sed rate and C reactive protein were elevated. LHC revealed non obstructive disease with minimal plaque in LAD. She was discharged on motrin and colchicine. Follow up echo 5/4 revealed a small pericardial effusion persist.She is now having epigastric discomfort different than the pericarditis. She continues taking ibuprofen. She is taking prilosec and she was advised to stop ibuprofen and reduce colchicine to once daily for 6 months to prevent recurrence.  Lab Results   Component Value Date     (L) 04/21/2020    K 3.7 04/21/2020     04/21/2020    CO2 23 04/21/2020    BUN 12 04/21/2020    CREATININE  0.8 04/21/2020     (H) 04/21/2020    HGBA1C 5.4 04/19/2020    HGBA1C 5.7 (H) 09/27/2012    MG 1.9 04/21/2020     (H) 04/21/2020     (H) 04/21/2020    ALBUMIN 2.8 (L) 04/21/2020    PROT 6.4 04/21/2020    BILITOT 0.7 04/21/2020    WBC 6.52 04/21/2020    HGB 9.8 (L) 04/21/2020    HCT 31.5 (L) 04/21/2020    MCV 95 04/21/2020     04/21/2020    INR 1.0 04/19/2020    TSH 1.010 04/19/2020         Lab Results   Component Value Date    CHOL 177 04/20/2020    HDL 56 04/20/2020    TRIG 79 04/20/2020       Lab Results   Component Value Date    LDLCALC 105.2 04/20/2020       Past Medical History:   Diagnosis Date    Arthritis     Asthma     Back pain     Knee pain     Thyroid disease        Current Outpatient Medications:     acyclovir (ZOVIRAX) 400 MG tablet, Take 400 mg by mouth 3 (three) times daily as needed (for fever blisters). , Disp: , Rfl:     budesonide-formoterol 160-4.5 mcg (SYMBICORT) 160-4.5 mcg/actuation HFAA, Inhale 2 puffs into the lungs every 12 (twelve) hours. Controller, Disp: , Rfl:     cetirizine (ZYRTEC) 10 MG tablet, Take 10 mg by mouth daily as needed. , Disp: , Rfl: 1    colchicine (COLCRYS) 0.6 mg tablet, Take 1 tablet (0.6 mg total) by mouth once daily., Disp: 90 tablet, Rfl: 0    diclofenac sodium (VOLTAREN) 1 % Gel, Apply 2 g topically 2 (two) times daily as needed (to knees). , Disp: , Rfl: 0    HYDROcodone-acetaminophen (NORCO) 5-325 mg per tablet, Take 1 tablet by mouth every 6 (six) hours as needed for Pain., Disp: 12 tablet, Rfl: 0    ibuprofen (ADVIL,MOTRIN) 600 MG tablet, Take 1 tablet (600 mg total) by mouth 3 (three) times daily. Discuss with cardiology clinic visit in 2 weeks whether need to continue at this dose for 18 days, Disp: 54 tablet, Rfl: 0    levothyroxine (SYNTHROID) 25 MCG tablet, Take 37.5 mcg by mouth before breakfast. , Disp: , Rfl:     montelukast (SINGULAIR) 10 mg tablet, Take 10 mg by mouth every evening., Disp: , Rfl:      omeprazole (PRILOSEC) 40 MG capsule, Take 1 capsule (40 mg total) by mouth before breakfast., Disp: 30 capsule, Rfl: 3          Review of Systems   Constitution: Negative for decreased appetite, diaphoresis, fever, malaise/fatigue, weight gain and weight loss.   HENT: Negative for congestion, ear discharge, ear pain and nosebleeds.    Eyes: Negative for blurred vision, double vision and visual disturbance.   Cardiovascular: Negative for chest pain, claudication, cyanosis, dyspnea on exertion, irregular heartbeat, leg swelling, near-syncope, orthopnea, palpitations, paroxysmal nocturnal dyspnea and syncope.   Respiratory: Negative for cough, hemoptysis, shortness of breath, sleep disturbances due to breathing, snoring, sputum production and wheezing.    Endocrine: Negative for polydipsia, polyphagia and polyuria.   Hematologic/Lymphatic: Negative for adenopathy and bleeding problem. Does not bruise/bleed easily.   Skin: Negative for color change, nail changes, poor wound healing and rash.   Musculoskeletal: Negative for muscle cramps and muscle weakness.   Gastrointestinal: Positive for abdominal pain and heartburn. Negative for anorexia, change in bowel habit, hematochezia, nausea and vomiting.   Genitourinary: Negative for dysuria, frequency and hematuria.   Neurological: Positive for headaches. Negative for brief paralysis, difficulty with concentration, excessive daytime sleepiness, dizziness, focal weakness, light-headedness, seizures, vertigo and weakness.   Psychiatric/Behavioral: Negative for altered mental status and depression. The patient has insomnia.    Allergic/Immunologic: Negative for persistent infections.        Objective:There were no vitals taken for this visit.            Physical Exam      Assessment:       1. Acute idiopathic pericarditis    2. Obesity (BMI 30.0-34.9)    3. Gastritis due to nonsteroidal anti-inflammatory drug         Plan:       Diagnoses and all orders for this visit:    Acute  idiopathic pericarditis    Obesity (BMI 30.0-34.9)    Gastritis due to nonsteroidal anti-inflammatory drug    Other orders  -     colchicine (COLCRYS) 0.6 mg tablet; Take 1 tablet (0.6 mg total) by mouth once daily.                                      This service was not originating from a related E/M service provided within the previous 7 days nor will  to an E/M service or procedure within the next 24 hours or my soonest available appointment.  Prevailing standard of care was able to be met in this audio-only visit.

## 2020-05-07 NOTE — TELEPHONE ENCOUNTER
Patient states is going to get assistance with medication from  for colchicine and is going to drop of paper work for MD to sign and for office to fax.

## 2020-05-19 ENCOUNTER — CARE AT HOME (OUTPATIENT)
Dept: HOME HEALTH SERVICES | Facility: CLINIC | Age: 65
End: 2020-05-19
Payer: MEDICARE

## 2020-05-19 VITALS
TEMPERATURE: 97 F | SYSTOLIC BLOOD PRESSURE: 136 MMHG | DIASTOLIC BLOOD PRESSURE: 84 MMHG | RESPIRATION RATE: 16 BRPM | OXYGEN SATURATION: 99 % | HEART RATE: 97 BPM

## 2020-05-19 DIAGNOSIS — Z09 FOLLOW UP: Primary | ICD-10-CM

## 2020-05-19 PROCEDURE — 99348 PR HOME VISIT,ESTAB PATIENT,LEVEL II: ICD-10-PCS | Mod: ,,, | Performed by: NURSE PRACTITIONER

## 2020-05-19 PROCEDURE — 1159F PR MEDICATION LIST DOCUMENTED IN MEDICAL RECORD: ICD-10-PCS | Mod: S$GLB,,, | Performed by: NURSE PRACTITIONER

## 2020-05-19 PROCEDURE — 1125F PR PAIN SEVERITY QUANTIFIED, PAIN PRESENT: ICD-10-PCS | Mod: S$GLB,,, | Performed by: NURSE PRACTITIONER

## 2020-05-19 PROCEDURE — 1101F PR PT FALLS ASSESS DOC 0-1 FALLS W/OUT INJ PAST YR: ICD-10-PCS | Mod: CPTII,S$GLB,, | Performed by: NURSE PRACTITIONER

## 2020-05-19 PROCEDURE — 1125F AMNT PAIN NOTED PAIN PRSNT: CPT | Mod: S$GLB,,, | Performed by: NURSE PRACTITIONER

## 2020-05-19 PROCEDURE — 99348 HOME/RES VST EST LOW MDM 30: CPT | Mod: ,,, | Performed by: NURSE PRACTITIONER

## 2020-05-19 PROCEDURE — 1101F PT FALLS ASSESS-DOCD LE1/YR: CPT | Mod: CPTII,S$GLB,, | Performed by: NURSE PRACTITIONER

## 2020-05-19 PROCEDURE — 1159F MED LIST DOCD IN RCRD: CPT | Mod: S$GLB,,, | Performed by: NURSE PRACTITIONER

## 2020-05-19 RX ORDER — DICLOFENAC SODIUM 10 MG/G
2 GEL TOPICAL 2 TIMES DAILY PRN
Qty: 100 G | Refills: 2 | Status: SHIPPED | OUTPATIENT
Start: 2020-05-19

## 2020-05-22 ENCOUNTER — OFFICE VISIT (OUTPATIENT)
Dept: CARDIOLOGY | Facility: CLINIC | Age: 65
End: 2020-05-22
Payer: MEDICARE

## 2020-05-22 VITALS
BODY MASS INDEX: 30.75 KG/M2 | SYSTOLIC BLOOD PRESSURE: 123 MMHG | HEART RATE: 105 BPM | DIASTOLIC BLOOD PRESSURE: 72 MMHG | WEIGHT: 180.13 LBS | HEIGHT: 64 IN

## 2020-05-22 DIAGNOSIS — I30.0 ACUTE IDIOPATHIC PERICARDITIS: Primary | ICD-10-CM

## 2020-05-22 PROCEDURE — 99214 OFFICE O/P EST MOD 30 MIN: CPT | Mod: S$GLB,,, | Performed by: NURSE PRACTITIONER

## 2020-05-22 PROCEDURE — 1159F MED LIST DOCD IN RCRD: CPT | Mod: S$GLB,,, | Performed by: NURSE PRACTITIONER

## 2020-05-22 PROCEDURE — 99999 PR PBB SHADOW E&M-EST. PATIENT-LVL III: CPT | Mod: PBBFAC,,, | Performed by: NURSE PRACTITIONER

## 2020-05-22 PROCEDURE — 1126F AMNT PAIN NOTED NONE PRSNT: CPT | Mod: S$GLB,,, | Performed by: NURSE PRACTITIONER

## 2020-05-22 PROCEDURE — 1159F PR MEDICATION LIST DOCUMENTED IN MEDICAL RECORD: ICD-10-PCS | Mod: S$GLB,,, | Performed by: NURSE PRACTITIONER

## 2020-05-22 PROCEDURE — 99999 PR PBB SHADOW E&M-EST. PATIENT-LVL III: ICD-10-PCS | Mod: PBBFAC,,, | Performed by: NURSE PRACTITIONER

## 2020-05-22 PROCEDURE — 1126F PR PAIN SEVERITY QUANTIFIED, NO PAIN PRESENT: ICD-10-PCS | Mod: S$GLB,,, | Performed by: NURSE PRACTITIONER

## 2020-05-22 PROCEDURE — 99214 PR OFFICE/OUTPT VISIT, EST, LEVL IV, 30-39 MIN: ICD-10-PCS | Mod: S$GLB,,, | Performed by: NURSE PRACTITIONER

## 2020-05-22 PROCEDURE — 1101F PT FALLS ASSESS-DOCD LE1/YR: CPT | Mod: CPTII,S$GLB,, | Performed by: NURSE PRACTITIONER

## 2020-05-22 PROCEDURE — 1101F PR PT FALLS ASSESS DOC 0-1 FALLS W/OUT INJ PAST YR: ICD-10-PCS | Mod: CPTII,S$GLB,, | Performed by: NURSE PRACTITIONER

## 2020-05-22 RX ORDER — COLCHICINE 0.6 MG/1
0.6 TABLET ORAL 2 TIMES DAILY
Qty: 180 TABLET | Refills: 0 | Status: SHIPPED | OUTPATIENT
Start: 2020-05-22 | End: 2020-08-07

## 2020-05-22 NOTE — PROGRESS NOTES
Cardiology Consults Clinic Note    Subjective:   Chief Complaint: Hospital Follow Up  Last Clinic Visit: 05/07/2020 with Dr. Schmidt    Problems:  Acute idiopathic pericarditis   Non-obstructive CAD  Gastritis  Obesity   Asthma   Hypothyroidism     History of Present Illness: Dorothy Shultz is a 65 y.o. female who presents for hospital follow up following admission for pericarditis on 4/19-4/21/20.  EKG with IL lead ST elevations, echo with small pericardial effusion without WMA and preserved EF.  Cath lab for STEMI with findings consistent with non-obstructive CAD. Viral and rheum work-up negative. She was discharged with instructions to continue colchicine for 3 months and Motrin for 2 weeks.   She subsequently developed gastritis, likely due to NSAIDs and this was stopped in early May.  She was also instructed to decrease colchicine to 0.6 mg once daily due to GI symptoms, although she denies any diarrhea. Repeat  Echo in May with stable small pericardial effusion, LVEF normal. CRP remains elevated at 122.     Today, Ms. Shultz reports feeling better. She denies any chest pressure, heaviness, or pain. She denies SOB, BOWLING, palpitations, presyncope, or syncope.  She denies indigestion or abdominal pain.   Review of recent labs revealing of normal kidney function. Lipids normal without statin treatment.     Review of Systems   Constitution: Negative for decreased appetite, fever, malaise/fatigue and weight gain.   HENT: Negative for congestion, hearing loss and nosebleeds.    Eyes: Negative for visual disturbance.   Cardiovascular: Negative for chest pain, dyspnea on exertion, irregular heartbeat, leg swelling, palpitations and syncope.   Respiratory: Negative for cough, shortness of breath and sleep disturbances due to breathing.    Endocrine: Negative for cold intolerance and heat intolerance.   Hematologic/Lymphatic: Negative for bleeding problem. Does not bruise/bleed easily.   Gastrointestinal: Negative for  "bloating, abdominal pain, change in bowel habit and heartburn.   Neurological: Negative for dizziness, loss of balance and numbness.   Psychiatric/Behavioral: Negative for altered mental status. The patient is not nervous/anxious.        Medications:  Patient's Medications   New Prescriptions    No medications on file   Previous Medications    ACYCLOVIR (ZOVIRAX) 400 MG TABLET    Take 400 mg by mouth 3 (three) times daily as needed (for fever blisters).     BUDESONIDE-FORMOTEROL 160-4.5 MCG (SYMBICORT) 160-4.5 MCG/ACTUATION HFAA    Inhale 2 puffs into the lungs every 12 (twelve) hours. Controller    CETIRIZINE (ZYRTEC) 10 MG TABLET    Take 10 mg by mouth daily as needed.     DICLOFENAC SODIUM (VOLTAREN) 1 % GEL    Apply 2 g topically 2 (two) times daily as needed (to knees).    LEVOTHYROXINE (SYNTHROID) 25 MCG TABLET    Take 37.5 mcg by mouth before breakfast.     MONTELUKAST (SINGULAIR) 10 MG TABLET    Take 10 mg by mouth every evening.    OMEPRAZOLE (PRILOSEC) 40 MG CAPSULE    Take 1 capsule (40 mg total) by mouth before breakfast.   Modified Medications    Modified Medication Previous Medication    COLCHICINE (COLCRYS) 0.6 MG TABLET colchicine (COLCRYS) 0.6 mg tablet       Take 1 tablet (0.6 mg total) by mouth 2 (two) times daily.    Take 1 tablet (0.6 mg total) by mouth once daily.   Discontinued Medications    No medications on file       Family History:  Dorothy COOK's family history includes Stroke in her mother.    Social History:  Dorothy COOK reports that she has never smoked. She has never used smokeless tobacco. She reports that she drinks alcohol. She reports that she does not use drugs.    Objective:   /72   Pulse 105   Ht 5' 4" (1.626 m)   Wt 81.7 kg (180 lb 1.9 oz)   BMI 30.92 kg/m²     Physical Exam   Constitutional: She is oriented to person, place, and time. Vital signs are normal. She appears well-developed and well-nourished.   HENT:   Head: Normocephalic.   Eyes: Pupils are equal, round, and " reactive to light.   Neck: Normal range of motion. Neck supple. No JVD present. Carotid bruit is not present.   Cardiovascular: Normal rate, regular rhythm, S1 normal, S2 normal, normal heart sounds and intact distal pulses. PMI is not displaced. Exam reveals no gallop and no friction rub.   No murmur heard.  Pulses:       Carotid pulses are 2+ on the right side, and 2+ on the left side.       Radial pulses are 2+ on the right side, and 2+ on the left side.        Dorsalis pedis pulses are 2+ on the right side, and 2+ on the left side.        Posterior tibial pulses are 1+ on the right side, and 1+ on the left side.   No pericardial friction rub    Pulmonary/Chest: Effort normal and breath sounds normal. No accessory muscle usage. She has no decreased breath sounds. She has no wheezes.   Abdominal: Soft. Normal appearance and bowel sounds are normal. She exhibits no distension, no fluid wave and no ascites. There is no hepatosplenomegaly. There is no tenderness.   Musculoskeletal: Normal range of motion. She exhibits no edema.   Neurological: She is alert and oriented to person, place, and time. She has normal strength.   Skin: Skin is warm, dry and intact. No ecchymosis and no rash noted. No erythema.   Psychiatric: She has a normal mood and affect. Her speech is normal and behavior is normal. Judgment and thought content normal. Cognition and memory are normal.   Vitals reviewed.    Lipids:  Recent Labs   Lab 04/20/20  0616   LDL Cholesterol 105.2   HDL 56   Cholesterol 177      Renal:  Recent Labs   Lab 04/21/20  0400   Creatinine 0.8   Potassium 3.7   CO2 23   BUN, Bld 12     Liver:  Recent Labs   Lab 04/21/20  0400    H    H       Assessment:     1. Acute idiopathic pericarditis; symptoms improving      Plan:     Dorothy Shultz was seen in clinic today for pericarditis, hospital follow up    Diagnoses and associated orders include:      Acute idiopathic pericarditis  Comments:  Resume colchicine 0.6  mg twice daily   Plan for repeat CRP and echo in 3 months    Orders:  -     colchicine (COLCRYS) 0.6 mg tablet; Take 1 tablet (0.6 mg total) by mouth 2 (two) times daily.  Dispense: 180 tablet; Refill: 0  -     C-REACTIVE PROTEIN; Future; Expected date: 08/03/2020  -     Echo Color Flow Doppler? Yes; Future; Expected date: 08/03/2020  -     Sedimentation rate; Future; Expected date: 08/03/2020      Follow up in 3 months    This patient was seen and discussed with Dr. Eulalia HONEYCUTT, EUGENE  05/22/2020  4:08 PM      Follow-up:     No future appointments.

## 2020-05-22 NOTE — PATIENT INSTRUCTIONS
Thank you for coming to your cardiology appointment today.     Points of care discussed at today's appointment:   Start taking colchicine 0.6 twice daily for 3 months    Repeat labs and echo (heart Ultrasound) on or around 8/3/2020   Follow up with Dr. Esteban or myself after tests on or around 8/10/2020    If you have any questions or concerns related to your cardiology care, please call [phone].      Thank you,  Randi HONEYCUTT, YANG  05/22/2020          Pericarditis     With pericarditis, the inflamed layers of the pericardium rub against the heart. This results in pain and other symptoms.     The pericardium is the thin, sac-like tissue that surrounds the heart. Inflammation of this tissue is called pericarditis. The condition may be mild and last only a few days. Or it may be more severe and lead to serious complications.  What is pericarditis?  The pericardium helps protect the heart from infection. It consists of 2 thin layers of tissue with a small amount of fluid between them. When these layers become inflamed, they can rub against the heart. This causes chest pain. Pericarditis most often happens after a respiratory infection. It occurs in people of all ages, but is more common in men aged 20 to 50 years.  What are the symptoms of pericarditis?  Pericarditis can be acute or chronic. The acute type occurs suddenly and typically lasts days or up to 3 weeks. The chronic type develops over time and lasts for more than 3 months. Symptoms can vary between the types.  · Symptoms of acute pericarditis may include:  ¨ Sharp pain in the center or left side of the chest  ¨ Fever  ¨ Weakness  ¨ Trouble breathing  ¨ Palpitations  ¨ Coughing  · Symptoms of chronic pericarditis may include:  ¨ Tiredness  ¨ Coughing  ¨ Shortness of breath  ¨ Swelling of the stomach and legs (in severe cases)  ¨ Low blood pressure (in severe cases  ¨ Chest pain may worsen by lying down or with deep breathing and improved sitting up  and leaning forward  What causes pericarditis?  In many cases, the cause of this condition is unknown (called idiopathic pericarditis).The following are possible causes:  · Viral infection (common, particularly after a viral respiratory infection)  · Bacterial or fungal infection  · Autoimmune diseases, such as scleroderma and lupus  · Recovery from heart attack  · Injury or surgery to the chest, esophagus, or heart  · Radiation treatment to the chest  · Certain types of cancer  · HIV/AIDS  · Medicines that suppress the immune system  · Kidney failure  How is pericarditis diagnosed?  The doctor will take your medical history and ask you to describe your symptoms. Youll have a physical exam. Your doctor will listen to your heart to see if it makes certain sounds (pericardial rub) and certain tests may be done. These include:  · Electrocardiogram (ECG). This test records the electrical activity of your heart. During an ECG, small sticky pads (electrodes) are placed on your chest, arms, and legs. Wires connect the pads to a machine, which records your heart's electrical signals.  · Lab tests. Samples of blood or pericardial fluid may be taken and tested in a lab. These tests can help determine the cause of pericarditis.  · Imaging tests of the heart or chest. These may include X-ray, MRI, and CT. They create pictures of the heart or the inside of the chest. An MRI (magnetic resonance imaging) scan uses magnets and radio waves. A CT (computed tomography) scan uses X-rays and a computer.  · Echocardiogram (echo). This is usually the main imaging test used for diagnosis. This test creates a moving picture of the heart. During an echo, a probe moved over the chest sends out harmless sound waves. These create a picture that shows the size and shape of the heart. It shows how well the heart is working. It also shows whether fluid has built up around the heart.  · Radionuclide scanning (also called nuclear medicine  scanning). This test creates a picture showing the structure of the heart. It also shows how well the heart is functioning. A low-level radioactive substance is injected into a vein or taken by mouth. The substance collects in the heart. There, it gives off gamma rays (similar to X-rays). A special camera takes pictures of the gamma rays.  How is pericarditis treated?  Treatment depends on how severe the condition is. Treatment can address the symptoms or the cause of pericarditis. Or it can address complications the condition may cause.  · Treatment of symptoms. For minor symptoms, rest may be the only treatment needed. To relieve pain and inflammation, medicine may be prescribed. These include aspirin and ibuprofen. If pain is severe, a strong anti-inflammatory called colchicine may be prescribed.  · Treatment of the cause, if known. For instance, antibiotics may be prescribed. This is done if the cause is a bacterial infection.  · Treatment of complications. Serious but uncommon complications can result from both acute and chronic pericarditis. These include:  ¨ Cardiac tamponade. Fluid builds up within the layers of the pericardium. This can keep the heart from working properly. To treat this condition, a needle is inserted into the chest wall and between the layers of the pericardium. It removes the excess fluid. Sometimes surgery may be done to remove a portion of the pericardium.  ¨ Constrictive pericarditis. Over time, scar-like tissue forms in the pericardium. The tissue prevents the heart from expanding enough when it beats. This keeps the heart from working right. Surgery to cut or remove the pericardium is the only treatment.     When should I call my healthcare provider?  Be sure to call your healthcare provider right away if you have any symptoms of pericarditis. This is especially important if you have chest pain. Without treatment, this condition can be life-threatening.   Date Last Reviewed:  5/1/2016  © 0926-9116 [employer]. [address] [place] [address], [place] [address]. All rights reserved. This information is not intended as a substitute for professional medical care. Always follow your healthcare professional's instructions.

## 2020-05-23 NOTE — PATIENT INSTRUCTIONS
- [facility] Nurse Practitioner to schedule home follow-up visit with patient as needed.  - Continue all medications, treatments and therapies as ordered.   - Follow all instructions, recommendations as discussed.  - Maintain Safety Precautions at all times.  - Attend all medical appointments as scheduled.  - For worsening symptoms: call Primary Care Physician or Nurse Practitioner.  - For emergencies, call 911 or immediately report to the nearest emergency room.  - Limit Risks of environmental exposure to coronavirus as discussed including: social distancing, hand hygiene, and limiting departures from the home for necessities only.

## 2020-05-23 NOTE — PROGRESS NOTES
[facility]  Transition of Care Home Visit    Visit Date: 5/23/2020  Encounter Provider: Thaddeus Cabrera NP  PCP:  Justen Jernigan MD    PRESENTING HISTORY      Patient ID: Dorothy Shultz 65 y.o. female.    Consult Requested By:  No ref. provider found  Reason for Consult:  Transitional Care Coordination    Chief Complaint: Transitional Care     HPI:   64 yo female with asthma and hypothyroidism presents due to central chest pain started the night of presentation. substernal/bilateral shoulder chest pressure radiating to jaw, pleuritic,  increases by moving. Relieved by bending froward. Non tender, no similar previous episodes. Denies fever, headaches, cough abdominal pain and leg swelling.  Patient is non-smoker and drinks alcohol socially  Family history:   Mother: Multiple events of heart attacks started on her 60s dies from heart attach on her 80s  Father: ESRD  Grandmother and aunt: DM     EKG on presentation with IL st elevations. Troponin negative. Rapid Covid test negative. Pain improved with nitro.  Bedside echo shows trace pericardial effusion without WMA and preserved EF. Taken to cath lab for STEMI on 4/19; Patient has non-obstructive CAD angiography and IVUS with normal LVEF by LV gram.       Procedure(s) (LRB):  Left heart cath (Right)  IVUS, Coronary  ANGIOGRAM, CORONARY ARTERY (N/A)  Ventriculogram, Left      Indwelling Lines/Drains at time of discharge:      Lines/Drains/Airways      None                      Hospital Course:  Ms Shultz admitted for unknown etiology for pericarditis. Patient started on Colchicine which will continue for 3 months. 600mg taper Motrin for two weeks. Patient will need to meet with cardiology in two weeks via telemedicine for follow up of pericarditis and repeat ECHO in two weeks.     Admit Date: 04/19/20  Discharge Date: 04/21/20  TCC Referral Date: 04/23/20  _________________________________________________________________    Today:  Ms. Dorothy Shultz  is a 65 y.o. female is being seen and examined at home today for follow up to her transitional care visit to the home environment post-discharge from inpatient hospitalization encounter described above. Dorothy COOK presents at baseline state of health as reported by patient. VSS. Denies any acute issues, concerns or complaints to address on today's visit. Denies CP/SOB, N/V/D, no papitations. She reports pain to her shoulder for which she is being seen by orthopedics. She attributes her shoulder pain to her inpatient stay f, surmising it was possibly injured during her procedure in the cath lab. Reports taking all medications as prescribed. She has esatblished care with an alternate cardologist, Dr. Cyndie Esteban and is aware of her upcoming appointments. No other needs identified at this time. Risks of environmental exposure to coronavirus discussed including: social distancing, hand hygiene, and limiting departures from the home for necessities only.  Reports understanding and willingness to comply.      ROS:  Review of Systems   Constitutional: Negative for chills and fever.   HENT: Negative.    Eyes: Negative.    Respiratory: Negative.  Negative for apnea, cough, chest tightness and shortness of breath.    Cardiovascular: Negative.  Negative for chest pain and palpitations.   Gastrointestinal: Negative.    Endocrine: Negative.    Genitourinary: Negative.    Musculoskeletal: Positive for arthralgias.        Right shoulder   Skin: Negative.    Neurological: Negative.    Hematological: Negative.    Psychiatric/Behavioral: Negative.      Assessments:  · Environmental: single story apartment in the rear of her mother's home, no steps to enter, adequate lighting and temeprature control  · Functional Status: Independent with ADL's/IADL's, ambulates independently, continent of bowel and bladder  · Safety: Fall Precautions, COVID Precautions  · Nutritional: Adequate  · Home Health: NA  · DME/Supplies: none    PAST HISTORY:      Past Medical History:   Diagnosis Date    Arthritis     Asthma     Back pain     Knee pain     Thyroid disease        Past Surgical History:   Procedure Laterality Date    CHOLECYSTECTOMY      CORONARY ANGIOGRAPHY N/A 4/19/2020    Procedure: ANGIOGRAM, CORONARY ARTERY;  Surgeon: Zachariah Goldman MD;  Location: [facility] CATH LAB;  Service: Cardiology;  Laterality: N/A;    LEFT HEART CATHETERIZATION Right 4/19/2020    Procedure: Left heart cath;  Surgeon: Zachariah Goldman MD;  Location: [facility] CATH LAB;  Service: Cardiology;  Laterality: Right;    VASCULAR SURGERY         Family History   Problem Relation Age of Onset    Stroke Mother        Social History     Socioeconomic History    Marital status:      Spouse name: Not on file    Number of children: Not on file    Years of education: Not on file    Highest education level: Not on file   Occupational History    Not on file   Social Needs    Financial resource strain: Not on file    Food insecurity:     Worry: Not on file     Inability: Not on file    Transportation needs:     Medical: Not on file     Non-medical: Not on file   Tobacco Use    Smoking status: Never Smoker    Smokeless tobacco: Never Used   Substance and Sexual Activity    Alcohol use: Yes    Drug use: No    Sexual activity: Not on file   Lifestyle    Physical activity:     Days per week: Not on file     Minutes per session: Not on file    Stress: Not on file   Relationships    Social connections:     Talks on phone: Not on file     Gets together: Not on file     Attends Taoism service: Not on file     Active member of club or organization: Not on file     Attends meetings of clubs or organizations: Not on file     Relationship status: Not on file   Other Topics Concern    Not on file   Social History Narrative    Not on file       MEDICATIONS & ALLERGIES:     Current Outpatient Medications on File Prior to Visit   Medication Sig Dispense Refill    acyclovir  (ZOVIRAX) 400 MG tablet Take 400 mg by mouth 3 (three) times daily as needed (for fever blisters).       cetirizine (ZYRTEC) 10 MG tablet Take 10 mg by mouth daily as needed.   1    levothyroxine (SYNTHROID) 25 MCG tablet Take 37.5 mcg by mouth before breakfast.       montelukast (SINGULAIR) 10 mg tablet Take 10 mg by mouth every evening.      omeprazole (PRILOSEC) 40 MG capsule Take 1 capsule (40 mg total) by mouth before breakfast. 30 capsule 3    budesonide-formoterol 160-4.5 mcg (SYMBICORT) 160-4.5 mcg/actuation HFAA Inhale 2 puffs into the lungs every 12 (twelve) hours. Controller       No current facility-administered medications on file prior to visit.         Review of patient's allergies indicates:  No Known Allergies    OBJECTIVE:     Vital Signs:  Vitals:    05/19/20 1521   BP: 136/84   Pulse: 97   Resp: 16   Temp: 96.5 °F (35.8 °C)     There is no height or weight on file to calculate BMI.     Physical Exam:  Physical Exam   Constitutional: She is oriented to person, place, and time. She appears well-developed and well-nourished.   HENT:   Head: Normocephalic and atraumatic.   Eyes: Pupils are equal, round, and reactive to light. EOM are normal.   Neck: Normal range of motion. Neck supple.   Cardiovascular: Normal rate and regular rhythm.   Pulmonary/Chest: Effort normal and breath sounds normal.   Abdominal: Soft. Bowel sounds are normal.   Musculoskeletal:        Right shoulder: She exhibits decreased range of motion, swelling and pain.   Neurological: She is alert and oriented to person, place, and time.   Skin: Skin is warm and dry.   Psychiatric: She has a normal mood and affect. Her behavior is normal. Thought content normal.   Vitals reviewed.      Laboratory  Lab Results   Component Value Date    WBC 6.52 04/21/2020    HGB 9.8 (L) 04/21/2020    HCT 31.5 (L) 04/21/2020    MCV 95 04/21/2020     04/21/2020     Lab Results   Component Value Date    INR 1.0 04/19/2020     Lab Results    Component Value Date    HGBA1C 5.4 04/19/2020     No results for input(s): POCTGLUCOSE in the last 72 hours.           ASSESSMENT & PLAN:     HIGH RISK CONDITION(S):  KYLER COOK was seen today for follow-up transitional care.  Diagnoses and all orders for this visit:    Encounter for Medical Follow-Up and Medication Review   - [facility] Nurse Practitioner to schedule home visit with patient PRN  - patient to follow up with cardiology and ortho as scheduled    Were controlled substances prescribed?  No    Instructions for the patient:  Scheduled Follow-up :  No future appointments.    After Visit Medication List :     Medication List           Accurate as of May 19, 2020 11:59 PM. If you have any questions, ask your nurse or doctor.               CONTINUE taking these medications    acyclovir 400 MG tablet  Commonly known as:  ZOVIRAX     budesonide-formoterol 160-4.5 mcg 160-4.5 mcg/actuation Hfaa  Commonly known as:  SYMBICORT     cetirizine 10 MG tablet  Commonly known as:  ZYRTEC     colchicine 0.6 mg tablet  Commonly known as:  COLCRYS  Take 1 tablet (0.6 mg total) by mouth once daily.     diclofenac sodium 1 % Gel  Commonly known as:  VOLTAREN  Apply 2 g topically 2 (two) times daily as needed (to knees).     levothyroxine 25 MCG tablet  Commonly known as:  SYNTHROID     montelukast 10 mg tablet  Commonly known as:  SINGULAIR     omeprazole 40 MG capsule  Commonly known as:  PRILOSEC  Take 1 capsule (40 mg total) by mouth before breakfast.        STOP taking these medications    HYDROcodone-acetaminophen 5-325 mg per tablet  Commonly known as:  NORCO  Stopped by:  Thaddeus Rodriguez NP           Where to Get Your Medications      These medications were sent to [facility]/pharmacy #[id number] - [place] - [address]  [address] [facility], [place] [address]    Phone:  [phone]   · diclofenac sodium 1 % Gel         Patient consent was obtained prior to treatment on this visit.    Signature:       Thaddeus Cabrera, MSN, APRN, FNP-C  [facility]    Attestation: Screening criteria to assess the level of the patient's risk for infection with COVID-19 as recommended by the CDC at the time of the above documented home visit concluded appropriateness to proceed. Universal precautions were maintained at all times, including provider use of >60% alcohol gel hand  immediately prior to entry and upon departing the patient's home as well as cleaning of equipment used in home visit with antibacterial/germicidal disposable wipes.

## 2020-06-25 PROBLEM — B00.1 RECURRENT COLD SORES: Status: ACTIVE | Noted: 2020-06-25

## 2020-07-29 ENCOUNTER — HOSPITAL ENCOUNTER (OUTPATIENT)
Dept: CARDIOLOGY | Facility: HOSPITAL | Age: 65
Discharge: HOME | End: 2020-07-29
Attending: NURSE PRACTITIONER
Payer: MEDICARE

## 2020-07-29 VITALS
SYSTOLIC BLOOD PRESSURE: 118 MMHG | HEART RATE: 94 BPM | BODY MASS INDEX: 30.73 KG/M2 | WEIGHT: 180 LBS | HEIGHT: 64 IN | DIASTOLIC BLOOD PRESSURE: 68 MMHG

## 2020-07-29 DIAGNOSIS — I30.0 ACUTE IDIOPATHIC PERICARDITIS: ICD-10-CM

## 2020-07-29 LAB
ASCENDING AORTA: 3.24 CM
AV INDEX (PROSTH): 0.92
AV MEAN GRADIENT: 3 MMHG
AV PEAK GRADIENT: 4 MMHG
AV VALVE AREA: 2.64 CM2
AV VELOCITY RATIO: 0.93
BSA FOR ECHO PROCEDURE: 1.92 M2
CV ECHO LV RWT: 0.37 CM
DOP CALC AO PEAK VEL: 1.05 M/S
DOP CALC AO VTI: 21.23 CM
DOP CALC LVOT AREA: 2.9 CM2
DOP CALC LVOT DIAMETER: 1.91 CM
DOP CALC LVOT PEAK VEL: 0.98 M/S
DOP CALC LVOT STROKE VOLUME: 55.99 CM3
DOP CALCLVOT PEAK VEL VTI: 19.55 CM
E/E' RATIO: 10.21 M/S
ECHO LV POSTERIOR WALL: 0.8 CM (ref 0.6–1.1)
FRACTIONAL SHORTENING: 47 % (ref 28–44)
INTERVENTRICULAR SEPTUM: 0.83 CM (ref 0.6–1.1)
IVRT: 68.51 MSEC
LA MAJOR: 4.2 CM
LA MINOR: 4.42 CM
LA WIDTH: 3.57 CM
LEFT ATRIUM SIZE: 3.52 CM
LEFT ATRIUM VOLUME INDEX: 24.6 ML/M2
LEFT ATRIUM VOLUME: 46.01 CM3
LEFT INTERNAL DIMENSION IN SYSTOLE: 2.3 CM (ref 2.1–4)
LEFT VENTRICLE DIASTOLIC VOLUME INDEX: 30.45 ML/M2
LEFT VENTRICLE DIASTOLIC VOLUME: 56.96 ML
LEFT VENTRICLE MASS INDEX: 58 G/M2
LEFT VENTRICLE SYSTOLIC VOLUME INDEX: 4.3 ML/M2
LEFT VENTRICLE SYSTOLIC VOLUME: 8.08 ML
LEFT VENTRICULAR INTERNAL DIMENSION IN DIASTOLE: 4.3 CM (ref 3.5–6)
LEFT VENTRICULAR MASS: 107.95 G
LV LATERAL E/E' RATIO: 8.08 M/S
LV SEPTAL E/E' RATIO: 13.86 M/S
MV PEAK E VEL: 0.97 M/S
PISA TR MAX VEL: 1.97 M/S
RA MAJOR: 4.14 CM
RA PRESSURE: 3 MMHG
RA WIDTH: 3.85 CM
RIGHT VENTRICULAR END-DIASTOLIC DIMENSION: 3.85 CM
RV TISSUE DOPPLER FREE WALL SYSTOLIC VELOCITY 1 (APICAL 4 CHAMBER VIEW): 9.26 CM/S
SINUS: 3.28 CM
STJ: 2.7 CM
TDI LATERAL: 0.12 M/S
TDI SEPTAL: 0.07 M/S
TDI: 0.1 M/S
TR MAX PG: 16 MMHG
TRICUSPID ANNULAR PLANE SYSTOLIC EXCURSION: 2.03 CM
TV REST PULMONARY ARTERY PRESSURE: 19 MMHG

## 2020-07-29 PROCEDURE — 93306 TTE W/DOPPLER COMPLETE: CPT | Mod: 26,,, | Performed by: INTERNAL MEDICINE

## 2020-07-29 PROCEDURE — 93306 ECHO (CUPID ONLY): ICD-10-PCS | Mod: 26,,, | Performed by: INTERNAL MEDICINE

## 2020-07-29 PROCEDURE — 93306 TTE W/DOPPLER COMPLETE: CPT

## 2020-08-06 PROBLEM — Z86.79 HISTORY OF PERICARDITIS: Status: ACTIVE | Noted: 2020-08-06

## 2020-08-06 NOTE — PROGRESS NOTES
General Cardiology Clinic Note  Reason for Visit: Three month follow up for pericarditis  Last Clinic Visit: Randi Sanchez/Dr. Renetta Esteban on 5/22/20    HPI:   Dorothy Shultz([facility]) is a 65 y.o. female with PMHx of idiopathic pericarditis and hypothyroidism who presents for three month follow up.     History of Present Illness:   Ms. Shultz was initially referred after an admission for pericarditis on 4/19-4/21/20.  EKG with IL lead ST elevations, echo with small pericardial effusion without WMA and preserved EF.  Cath lab for STEMI with findings consistent with non-obstructive CAD. Viral and rheum work-up negative. She was discharged with instructions to continue colchicine for 3 months and Motrin for 2 weeks.   She subsequently developed gastritis, likely due to NSAIDs and this was stopped in early May.  She was also instructed to decrease colchicine to 0.6 mg once daily due to GI symptoms, although she denies any diarrhea. Repeat  Echo in May with stable small pericardial effusion, LVEF normal. CRP remains elevated at 122.     Interim History:  At her last clinic visit, she was instructed to resume colchicine at 0.6 mg bid. She underwent TTE last week which showed a trivial anterior pericardial effusion, otherwise it was normal. Her CRP and ESR are now within normal range. She denies any recurrence of her initial symptoms, which was a severe diffuse chest heaviness radiating to her jaw. She has some chest wall tenderness today after doing a lot of house chores yesterday. She denies shortness of breath, PND, and orthopnea. She denies palpitations, lightheadedness, and syncope.     On a different note, she complains of chronic lower extremity varicose veins. They are associated with swelling and an aching pain in her ankles and behind her knees that worsens over the course of the day. She had a procedure in the distant past on her left leg and had a vein removed. She has tried compression socks before, but not  recently. She states her mother also had varicose veins.     ROS:    Constitution: Negative for decreased appetite, diaphoresis, fever, malaise/fatigue, weight gain and weight loss.   HENT: Negative for congestion, nosebleeds and sore throat.    Eyes: Negative for blurred vision, vision loss in left eye, vision loss in right eye and visual disturbance.  Cardiovascular: Negative for chest pain, claudication, dyspnea on exertion, near-syncope, orthopnea, palpitations, paroxysmal nocturnal dyspnea and syncope. Positive for leg swelling and varicose veins.   Respiratory: Negative for cough, hemoptysis, snoring, shortness of breath and wheezing..    Hematologic/Lymphatic: Negative for bleeding problem. Does not bruise/bleed easily.   Endocrine: Negative for polyuria  Musculoskeletal: Negative for muscle cramps and myalgias.   Gastrointestinal: Negative for abdominal pain, change in bowel habit, diarrhea, heartburn, hematemesis, hematochezia, melena, nausea and vomiting.   Neurological: Negative for extremity weakness or numbness, dizziness, headaches and light-headedness.   Psychiatric/Behavioral: Negative for depression.   Allergic/Immunologic: Negative for hives.   PMH:     Past Medical History:   Diagnosis Date    Arthritis     Asthma     Back pain     Knee pain     Thyroid disease      Past Surgical History:   Procedure Laterality Date    CHOLECYSTECTOMY      CORONARY ANGIOGRAPHY N/A 4/19/2020    Procedure: ANGIOGRAM, CORONARY ARTERY;  Surgeon: Zachariah Goldman MD;  Location: [facility] CATH LAB;  Service: Cardiology;  Laterality: N/A;    LEFT HEART CATHETERIZATION Right 4/19/2020    Procedure: Left heart cath;  Surgeon: Zachariah Goldman MD;  Location: [facility] CATH LAB;  Service: Cardiology;  Laterality: Right;    VASCULAR SURGERY       Allergies:   Review of patient's allergies indicates:  No Known Allergies  Medications:     Current Outpatient Medications on File Prior to Visit   Medication Sig Dispense  "Refill    acyclovir (ZOVIRAX) 400 MG tablet Take 1 tablet (400 mg total) by mouth 2 (two) times daily. 180 tablet 3    budesonide-formoterol 160-4.5 mcg (SYMBICORT) 160-4.5 mcg/actuation HFAA Inhale 2 puffs into the lungs every 12 (twelve) hours. Controller      cetirizine (ZYRTEC) 10 MG tablet Take 10 mg by mouth daily as needed.   1    colchicine (COLCRYS) 0.6 mg tablet Take 1 tablet (0.6 mg total) by mouth 2 (two) times daily. 180 tablet 0    diclofenac sodium (VOLTAREN) 1 % Gel Apply 2 g topically 2 (two) times daily as needed (to knees). 100 g 2    levothyroxine (SYNTHROID) 25 MCG tablet Take 37.5 mcg by mouth before breakfast.       montelukast (SINGULAIR) 10 mg tablet Take 1 tablet (10 mg total) by mouth every evening. 90 tablet 3    omeprazole (PRILOSEC) 40 MG capsule Take 1 capsule (40 mg total) by mouth before breakfast. 30 capsule 3     No current facility-administered medications on file prior to visit.      Social History:     Social History     Tobacco Use    Smoking status: Never Smoker    Smokeless tobacco: Never Used   Substance Use Topics    Alcohol use: Yes     Family History:     Family History   Problem Relation Age of Onset    Stroke Mother      Physical Exam:   BP (!) 129/59 (BP Location: Left arm, Patient Position: Sitting, BP Method: X-Large (Automatic))   Pulse 80   Ht 5' 4.5" (1.638 m)   Wt 83.7 kg (184 lb 8.4 oz)   BMI 31.18 kg/m²      Physical Exam   Constitutional: She is oriented to person, place, and time. Vital signs are normal. She appears well-developed and well-nourished.   HENT:   Head: Normocephalic.   Eyes: Pupils are equal, round, and reactive to light.   Neck: Normal range of motion. Neck supple. No JVD present. Carotid bruit is not present.   Cardiovascular: Normal rate, regular rhythm, S1 normal, S2 normal, normal heart sounds and intact distal pulses. PMI is not displaced. Exam reveals no gallop and no friction rub. Lower extremity varicosities bilaterally "   No murmur heard.  Pulses:       Carotid pulses are 2+ on the right side, and 2+ on the left side.       Radial pulses are 2+ on the right side, and 2+ on the left side.        Dorsalis pedis pulses are 2+ on the right side, and 2+ on the left side.        Posterior tibial pulses are 2+ on the right side, and 2+ on the left side.   No pericardial friction rub    Pulmonary/Chest: Effort normal and breath sounds normal. No accessory muscle usage. She has no decreased breath sounds. She has no wheezes.   Abdominal: Soft. Normal appearance and bowel sounds are normal. She exhibits no distension, no fluid wave and no ascites. There is no hepatosplenomegaly. There is no tenderness.   Musculoskeletal: Normal range of motion. Trace lower extremity edema bilaterally.   Neurological: She is alert and oriented to person, place, and time. She has normal strength.   Skin: Skin is warm, dry and intact. No ecchymosis and no rash noted. No erythema.   Psychiatric: She has a normal mood and affect. Her speech is normal and behavior is normal. Judgment and thought content normal. Cognition and memory are normal.   Vitals reviewed.    Labs:     Lab Results   Component Value Date     (L) 04/21/2020    K 3.7 04/21/2020     04/21/2020    CO2 23 04/21/2020    BUN 12 04/21/2020    CREATININE 0.8 04/21/2020    ANIONGAP 8 04/21/2020     Lab Results   Component Value Date    HGBA1C 5.4 04/19/2020    HGBA1C 5.7 (H) 09/27/2012     Lab Results   Component Value Date    BNP 17 04/19/2020    Lab Results   Component Value Date    WBC 6.52 04/21/2020    HGB 9.8 (L) 04/21/2020    HCT 31.5 (L) 04/21/2020     04/21/2020    GRAN 3.6 04/21/2020    GRAN 54.8 04/21/2020     Lab Results   Component Value Date    CHOL 177 04/20/2020    HDL 56 04/20/2020    LDLCALC 105.2 04/20/2020    TRIG 79 04/20/2020          Imaging:    TTE 7/29/20  · Normal left ventricular systolic function. The estimated ejection fraction is 65%.  · Normal LV  diastolic function.  · No wall motion abnormalities.  · Normal right ventricular systolic function.  · Mild tricuspid regurgitation.  · The estimated PA systolic pressure is 19 mmHg.  · Normal central venous pressure (3 mmHg).  · Trivial anterior pericardial effusion.    Coronary angiogram 4/19/20  · LVEDP (Pre): 17  · LVEF=65%  · Non-obstructive CAD. Minimal plaque in LAD by angiography and IVUS  · Estimated blood loss: <50 mL      Assessment:     1. History of pericarditis    2. Obesity (BMI 30.0-34.9)    3. Hypothyroidism, unspecified type    4. Varicose veins of both lower extremities with pain    5. Skin lesion    6. LFT elevation      Plan:     History of acute idiopathic pericarditis  - Recent TTE shows trivial anterior pericardial effusion, improved from prior, and inflammatory markers are now within normal range.   - Discontinue Colchicine     Symptomatic varicose veins  - Multiple obvious varicosities on exam throughout bilateral lower extremities with trace edema   - Wear compressions stockings daily   - Elevated legs when at rest and increase physical activity   - Low sodium diet   - Check venous insufficiency ultrasound.   - If ultrasound shows significant lower extremity venous reflux, and if still symptomatic after three months of medical therapy, will refer to Dr. Guillory in interventional cardiology for possible sclerotherapy/radiofrequency ablation.     Skin lesions  - Patient requesting referral to derm for evaluation of multiple red lesions on arms     LFT Elevation  - Follow up with PCP. Had liver US as inpatient showing fatty liver.     Follow up PRN. This patient was discussed with Dr. Esteban.    Signed:  Racheal Beasley PA-C  General Cardiology   [id number]  8/6/2020 2:03 PM

## 2020-08-07 ENCOUNTER — OFFICE VISIT (OUTPATIENT)
Dept: CARDIOLOGY | Facility: CLINIC | Age: 65
End: 2020-08-07
Payer: MEDICARE

## 2020-08-07 VITALS
HEART RATE: 80 BPM | SYSTOLIC BLOOD PRESSURE: 129 MMHG | BODY MASS INDEX: 30.74 KG/M2 | DIASTOLIC BLOOD PRESSURE: 59 MMHG | WEIGHT: 184.5 LBS | HEIGHT: 65 IN

## 2020-08-07 DIAGNOSIS — Z86.79 HISTORY OF PERICARDITIS: Primary | ICD-10-CM

## 2020-08-07 DIAGNOSIS — I83.813 VARICOSE VEINS OF BOTH LOWER EXTREMITIES WITH PAIN: ICD-10-CM

## 2020-08-07 DIAGNOSIS — R79.89 LFT ELEVATION: ICD-10-CM

## 2020-08-07 DIAGNOSIS — L98.9 SKIN LESION: ICD-10-CM

## 2020-08-07 DIAGNOSIS — E03.9 HYPOTHYROIDISM, UNSPECIFIED TYPE: ICD-10-CM

## 2020-08-07 DIAGNOSIS — E66.9 OBESITY (BMI 30.0-34.9): ICD-10-CM

## 2020-08-07 PROCEDURE — 1159F MED LIST DOCD IN RCRD: CPT | Mod: S$GLB,,, | Performed by: PHYSICIAN ASSISTANT

## 2020-08-07 PROCEDURE — 99999 PR PBB SHADOW E&M-EST. PATIENT-LVL IV: CPT | Mod: PBBFAC,,, | Performed by: PHYSICIAN ASSISTANT

## 2020-08-07 PROCEDURE — 99213 PR OFFICE/OUTPT VISIT, EST, LEVL III, 20-29 MIN: ICD-10-PCS | Mod: S$GLB,,, | Performed by: PHYSICIAN ASSISTANT

## 2020-08-07 PROCEDURE — 99999 PR PBB SHADOW E&M-EST. PATIENT-LVL IV: ICD-10-PCS | Mod: PBBFAC,,, | Performed by: PHYSICIAN ASSISTANT

## 2020-08-07 PROCEDURE — 99213 OFFICE O/P EST LOW 20 MIN: CPT | Mod: S$GLB,,, | Performed by: PHYSICIAN ASSISTANT

## 2020-08-07 PROCEDURE — 1159F PR MEDICATION LIST DOCUMENTED IN MEDICAL RECORD: ICD-10-PCS | Mod: S$GLB,,, | Performed by: PHYSICIAN ASSISTANT

## 2020-08-07 PROCEDURE — 1125F PR PAIN SEVERITY QUANTIFIED, PAIN PRESENT: ICD-10-PCS | Mod: S$GLB,,, | Performed by: PHYSICIAN ASSISTANT

## 2020-08-07 PROCEDURE — 3008F BODY MASS INDEX DOCD: CPT | Mod: CPTII,S$GLB,, | Performed by: PHYSICIAN ASSISTANT

## 2020-08-07 PROCEDURE — 1101F PT FALLS ASSESS-DOCD LE1/YR: CPT | Mod: CPTII,S$GLB,, | Performed by: PHYSICIAN ASSISTANT

## 2020-08-07 PROCEDURE — 1101F PR PT FALLS ASSESS DOC 0-1 FALLS W/OUT INJ PAST YR: ICD-10-PCS | Mod: CPTII,S$GLB,, | Performed by: PHYSICIAN ASSISTANT

## 2020-08-07 PROCEDURE — 3008F PR BODY MASS INDEX (BMI) DOCUMENTED: ICD-10-PCS | Mod: CPTII,S$GLB,, | Performed by: PHYSICIAN ASSISTANT

## 2020-08-07 PROCEDURE — 1125F AMNT PAIN NOTED PAIN PRSNT: CPT | Mod: S$GLB,,, | Performed by: PHYSICIAN ASSISTANT

## 2020-08-13 ENCOUNTER — HOSPITAL ENCOUNTER (OUTPATIENT)
Dept: CARDIOLOGY | Facility: HOSPITAL | Age: 65
Discharge: HOME | End: 2020-08-13
Attending: PHYSICIAN ASSISTANT
Payer: MEDICARE

## 2020-08-13 DIAGNOSIS — I83.813 VARICOSE VEINS OF BOTH LOWER EXTREMITIES WITH PAIN: ICD-10-CM

## 2020-08-13 DIAGNOSIS — I83.813 VARICOSE VEINS OF BOTH LOWER EXTREMITIES WITH PAIN: Primary | ICD-10-CM

## 2020-08-13 LAB
LEFT GIAC DIA: 0.16 MM
LEFT GREAT SAPHENOUS DISTAL THIGH DIA: 0.6 CM
LEFT GREAT SAPHENOUS DISTAL THIGH REFLUX: 2243 MS
LEFT GREAT SAPHENOUS JUNCTION DIA: 0.71 CM
LEFT GREAT SAPHENOUS JUNCTION REFLUX: 1056 MS
LEFT GREAT SAPHENOUS KNEE DIA: 0.53 CM
LEFT GREAT SAPHENOUS MIDDLE THIGH DIA: 0.43 CM
LEFT GREAT SAPHENOUS PROXIMAL CALF DIA: 0.27 CM
RIGHT GIAC DIA: 0.33 MM
RIGHT GREAT SAPHENOUS DISTAL THIGH DIA: 0.34 CM
RIGHT GREAT SAPHENOUS JUNCTION DIA: 0.65 CM
RIGHT GREAT SAPHENOUS KNEE DIA: 0.47 CM
RIGHT GREAT SAPHENOUS KNEE REFLUX: 1261 MS
RIGHT GREAT SAPHENOUS MIDDLE THIGH DIA: 0.52 CM
RIGHT GREAT SAPHENOUS MIDDLE THIGH REFLUX: 2637 MS
RIGHT GREAT SAPHENOUS PROXIMAL CALF DIA: 0.21 CM

## 2020-08-13 PROCEDURE — 93970 CV US LOWER VENOUS INSUFFICIENCY BILATERAL (CUPID ONLY): ICD-10-PCS | Mod: 26,,, | Performed by: INTERNAL MEDICINE

## 2020-08-13 PROCEDURE — 93970 EXTREMITY STUDY: CPT | Mod: TC

## 2020-08-13 PROCEDURE — 93970 EXTREMITY STUDY: CPT | Mod: 26,,, | Performed by: INTERNAL MEDICINE

## 2020-08-24 NOTE — PROGRESS NOTES
Interventional Cardiology Clinic Note  Reason for Visit: Varicose veins  Referring physician:Racheal Beasley/ Cyndie Esteban    HPI:   Ms. Dorothy Burt is a 65 y.o. woman with history of  idiopathic pericarditis and hypothyroidism complains of chronic lower extremity varicose veins. They are associated with swelling and an aching pain in her ankles and behind her knees that worsens over the course of the day. She had a procedure in the distant past (she was in her 20s) on her left leg and had a vein removed. She has tried compression socks daily for 3 months with no relief. She also elevates her legs at rest and abides by low sodium diet. She states her mother also had varicose veins.     ROS:    Review of Systems   Constitution: Negative for diaphoresis and malaise/fatigue.   HENT: Negative for nosebleeds.    Eyes: Negative for double vision and visual disturbance.   Cardiovascular: Positive for leg swelling. Negative for chest pain, claudication, dyspnea on exertion, irregular heartbeat, near-syncope, orthopnea, palpitations, paroxysmal nocturnal dyspnea and syncope.   Respiratory: Negative for shortness of breath, snoring and wheezing.    Hematologic/Lymphatic: Negative for bleeding problem. Does not bruise/bleed easily.   Skin: Negative for poor wound healing and rash.   Musculoskeletal: Negative for muscle cramps and myalgias.   Gastrointestinal: Negative for bloating, abdominal pain, nausea and vomiting.   Genitourinary: Negative for hematuria.   Neurological: Negative for dizziness, headaches and light-headedness.   Psychiatric/Behavioral: Negative for altered mental status.     PMH:     Past Medical History:   Diagnosis Date    Arthritis     Asthma     Back pain     Knee pain     Thyroid disease      Past Surgical History:   Procedure Laterality Date    CHOLECYSTECTOMY      CORONARY ANGIOGRAPHY N/A 4/19/2020    Procedure: ANGIOGRAM, CORONARY ARTERY;  Surgeon: Zachariah Goldman MD;   "Location: [facility] CATH LAB;  Service: Cardiology;  Laterality: N/A;    LEFT HEART CATHETERIZATION Right 4/19/2020    Procedure: Left heart cath;  Surgeon: Zachariah Goldman MD;  Location: [facility] CATH LAB;  Service: Cardiology;  Laterality: Right;    VASCULAR SURGERY       Allergies:   Review of patient's allergies indicates:  No Known Allergies  Medications:     Current Outpatient Medications on File Prior to Visit   Medication Sig Dispense Refill    acyclovir (ZOVIRAX) 400 MG tablet Take 1 tablet (400 mg total) by mouth 2 (two) times daily. (Patient taking differently: Take 400 mg by mouth 2 (two) times daily as needed. ) 180 tablet 3    cetirizine (ZYRTEC) 10 MG tablet Take 10 mg by mouth daily as needed.   1    diclofenac sodium (VOLTAREN) 1 % Gel Apply 2 g topically 2 (two) times daily as needed (to knees). 100 g 2    levothyroxine (SYNTHROID) 25 MCG tablet Take 37.5 mcg by mouth before breakfast.       montelukast (SINGULAIR) 10 mg tablet Take 1 tablet (10 mg total) by mouth every evening. 90 tablet 3    omeprazole (PRILOSEC) 40 MG capsule Take 1 capsule (40 mg total) by mouth before breakfast. 30 capsule 3     No current facility-administered medications on file prior to visit.      Social History:     Social History     Tobacco Use    Smoking status: Never Smoker    Smokeless tobacco: Never Used   Substance Use Topics    Alcohol use: Yes     Family History:     Family History   Problem Relation Age of Onset    Stroke Mother      Physical Exam:   BP 90/61 (BP Location: Right arm, Patient Position: Sitting, BP Method: Large (Automatic))   Pulse 101   Ht 5' 4.5" (1.638 m)   Wt 80 kg (176 lb 5.9 oz)   SpO2 98%   BMI 29.81 kg/m²      Constitutional: She is oriented to person, place, and time. Vital signs are normal. She appears well-developed and well-nourished.   HENT:   Head: Normocephalic.   Eyes: Pupils are equal, round, and reactive to light.   Neck: Normal range of motion. Neck supple. No " JVD present. Carotid bruit is not present.   Cardiovascular: Normal rate, regular rhythm, S1 normal, S2 normal, normal heart sounds and intact distal pulses. PMI is not displaced. Exam reveals no gallop and no friction rub. Lower extremity varicosities bilaterally   No murmur heard.  Pulses:       Carotid pulses are 2+ on the right side, and 2+ on the left side.       Radial pulses are 2+ on the right side, and 2+ on the left side.        Dorsalis pedis pulses are 2+ on the right side, and 2+ on the left side.        Posterior tibial pulses are 2+ on the right side, and 2+ on the left side.   No pericardial friction rub    Pulmonary/Chest: Effort normal and breath sounds normal. No accessory muscle usage. She has no decreased breath sounds. She has no wheezes.   Abdominal: Soft. Normal appearance and bowel sounds are normal. She exhibits no distension, no fluid wave and no ascites. There is no hepatosplenomegaly. There is no tenderness.   Musculoskeletal: Normal range of motion. Trace lower extremity edema bilaterally.   Neurological: She is alert and oriented to person, place, and time. She has normal strength.   Skin: Skin is warm, dry and intact. No ecchymosis and no rash noted. No erythema.   Psychiatric: She has a normal mood and affect. Her speech is normal and behavior is normal. Judgment and thought content normal. Cognition and memory are normal.   Vitals reviewed.    Labs:     Lab Results   Component Value Date     (L) 04/21/2020    K 3.7 04/21/2020     04/21/2020    CO2 23 04/21/2020    BUN 12 04/21/2020    CREATININE 0.8 04/21/2020    ANIONGAP 8 04/21/2020     Lab Results   Component Value Date    HGBA1C 5.4 04/19/2020    HGBA1C 5.7 (H) 09/27/2012     Lab Results   Component Value Date    BNP 17 04/19/2020    Lab Results   Component Value Date    WBC 6.52 04/21/2020    HGB 9.8 (L) 04/21/2020    HCT 31.5 (L) 04/21/2020     04/21/2020    GRAN 3.6 04/21/2020    GRAN 54.8 04/21/2020      Lab Results   Component Value Date    CHOL 177 04/20/2020    HDL 56 04/20/2020    LDLCALC 105.2 04/20/2020    TRIG 79 04/20/2020          Imaging:     Right Venous Insufficiency Duplex The right common femoral vein has no reflux.   The right great saphenous vein has reflux.   The right giacomini vein has no reflux.   Rt PTV accessory/ vein at the distal leg measures 0.5cm with reflux >500ms.  Rt GSV superficial accessory veins just proximal to the knee measure 0.75cm and 0.40cm with reflux >500ms.  Right mid calf superficial varices measuring up to 0.60cm with reflux >500ms.     Left Venous Insufficiency Duplex The left common femoral vein has reflux.   The left great saphenous vein has reflux.   The left giacomini vein has no reflux.   Lt GSV anterior accessory vein is dilated and tortuous as it courses anterior and distally from proximal thigh to knee. It demonstrates reflux >500ms and measures as follows: prox thigh=1.13cm, mid thigh = 0.65cm then becomes tortuous.  Lt medial knee superficial varices with reflux >500ms and measure up to 0.82cm.         Assessment:     1. Varicose veins of both lower extremities with pain    2. History of pericarditis    3. Hypothyroidism, unspecified type    4. Obesity (BMI 30.0-34.9)    5. Gastritis due to nonsteroidal anti-inflammatory drug        Plan:     Varicose veins of both lower extremities with pain  - Symptomatic varicose veins bilaterally despite compliance with daily compression stockings > 3 months. Venous insufficiency ultrasound shows significant b/l GSV reflux.   - Will schedule for GSV laser ablation   - The risk, benefits, and alternatives of laser ablation/sclerotherapy have been discussed in detail with the patient. All questions have been answered, the patient understands, and informed consent has been obtained and signed.      Discussed with Dr. Guillory.    Signed:  Racheal Beasley PA-C  Interventional Cardiology   8/25/2020 9:11  AM    Interventional Cardiology Staff  I have personally taken the history and examined this patient. I have discussed and agree with the resident's findings and plan as documented in the resident's note.  Plan EVLT bilateral LE's.   Abhishek Guillory

## 2020-08-25 ENCOUNTER — INITIAL CONSULT (OUTPATIENT)
Dept: CARDIOLOGY | Facility: CLINIC | Age: 65
End: 2020-08-25
Payer: MEDICARE

## 2020-08-25 VITALS
SYSTOLIC BLOOD PRESSURE: 90 MMHG | BODY MASS INDEX: 29.38 KG/M2 | HEIGHT: 65 IN | DIASTOLIC BLOOD PRESSURE: 61 MMHG | WEIGHT: 176.38 LBS | HEART RATE: 101 BPM | OXYGEN SATURATION: 98 %

## 2020-08-25 DIAGNOSIS — I83.893 VARICOSE VEINS OF BILATERAL LOWER EXTREMITIES WITH OTHER COMPLICATIONS: ICD-10-CM

## 2020-08-25 DIAGNOSIS — T39.395A GASTRITIS DUE TO NONSTEROIDAL ANTI-INFLAMMATORY DRUG: ICD-10-CM

## 2020-08-25 DIAGNOSIS — I83.813 VARICOSE VEINS OF BOTH LOWER EXTREMITIES WITH PAIN: Primary | ICD-10-CM

## 2020-08-25 DIAGNOSIS — Z86.79 HISTORY OF PERICARDITIS: ICD-10-CM

## 2020-08-25 DIAGNOSIS — E66.9 OBESITY (BMI 30.0-34.9): ICD-10-CM

## 2020-08-25 DIAGNOSIS — E03.9 HYPOTHYROIDISM, UNSPECIFIED TYPE: ICD-10-CM

## 2020-08-25 DIAGNOSIS — K29.60 GASTRITIS DUE TO NONSTEROIDAL ANTI-INFLAMMATORY DRUG: ICD-10-CM

## 2020-08-25 PROCEDURE — 99999 PR PBB SHADOW E&M-EST. PATIENT-LVL III: CPT | Mod: PBBFAC,,, | Performed by: INTERNAL MEDICINE

## 2020-08-25 PROCEDURE — 99999 PR PBB SHADOW E&M-EST. PATIENT-LVL III: ICD-10-PCS | Mod: PBBFAC,,, | Performed by: INTERNAL MEDICINE

## 2020-08-25 PROCEDURE — 1126F PR PAIN SEVERITY QUANTIFIED, NO PAIN PRESENT: ICD-10-PCS | Mod: S$GLB,,, | Performed by: INTERNAL MEDICINE

## 2020-08-25 PROCEDURE — 3008F BODY MASS INDEX DOCD: CPT | Mod: CPTII,S$GLB,, | Performed by: INTERNAL MEDICINE

## 2020-08-25 PROCEDURE — 1159F MED LIST DOCD IN RCRD: CPT | Mod: S$GLB,,, | Performed by: INTERNAL MEDICINE

## 2020-08-25 PROCEDURE — 1159F PR MEDICATION LIST DOCUMENTED IN MEDICAL RECORD: ICD-10-PCS | Mod: S$GLB,,, | Performed by: INTERNAL MEDICINE

## 2020-08-25 PROCEDURE — 99205 OFFICE O/P NEW HI 60 MIN: CPT | Mod: S$GLB,,, | Performed by: INTERNAL MEDICINE

## 2020-08-25 PROCEDURE — 1101F PR PT FALLS ASSESS DOC 0-1 FALLS W/OUT INJ PAST YR: ICD-10-PCS | Mod: CPTII,S$GLB,, | Performed by: INTERNAL MEDICINE

## 2020-08-25 PROCEDURE — 1101F PT FALLS ASSESS-DOCD LE1/YR: CPT | Mod: CPTII,S$GLB,, | Performed by: INTERNAL MEDICINE

## 2020-08-25 PROCEDURE — 3008F PR BODY MASS INDEX (BMI) DOCUMENTED: ICD-10-PCS | Mod: CPTII,S$GLB,, | Performed by: INTERNAL MEDICINE

## 2020-08-25 PROCEDURE — 99205 PR OFFICE/OUTPT VISIT, NEW, LEVL V, 60-74 MIN: ICD-10-PCS | Mod: S$GLB,,, | Performed by: INTERNAL MEDICINE

## 2020-08-25 PROCEDURE — 1126F AMNT PAIN NOTED NONE PRSNT: CPT | Mod: S$GLB,,, | Performed by: INTERNAL MEDICINE

## 2020-08-25 NOTE — LETTER
August 25, 2020      Racheal Beasley PA-C  [address]  [facility] [address]           [facility] [address]  [address]  [facility] [address]  Phone: [phone]          Patient: Dorothy Burt   MR Number: [id number]   YOB: 1955   Date of Visit: 8/25/2020       Dear Racheal Beasley:    Thank you for referring Dorothy Burt to me for evaluation. Attached you will find relevant portions of my assessment and plan of care.    If you have questions, please do not hesitate to call me. I look forward to following Dorothy Burt along with you.    Sincerely,    Abhishek Guillory MD    Enclosure  CC:  No Recipients    If you would like to receive this communication electronically, please contact [email or web address] or [phone] to request more information on [employer] Link access.    For providers and/or their staff who would like to refer a patient to [facility], please contact us through our one-stop-shop provider referral line, [facility], at [phone].    If you feel you have received this communication in error or would no longer like to receive these types of communications, please e-mail [email or web address]

## 2020-09-18 ENCOUNTER — OFFICE VISIT (OUTPATIENT)
Dept: DERMATOLOGY | Facility: CLINIC | Age: 65
End: 2020-09-18
Payer: MEDICARE

## 2020-09-18 VITALS — BODY MASS INDEX: 29.74 KG/M2 | WEIGHT: 176 LBS

## 2020-09-18 DIAGNOSIS — L81.4 LENTIGINES: ICD-10-CM

## 2020-09-18 DIAGNOSIS — D18.01 CHERRY ANGIOMA: ICD-10-CM

## 2020-09-18 DIAGNOSIS — Z12.83 SKIN EXAM, SCREENING FOR CANCER: ICD-10-CM

## 2020-09-18 DIAGNOSIS — L98.9 SKIN LESION: ICD-10-CM

## 2020-09-18 DIAGNOSIS — L82.1 SEBORRHEIC KERATOSES: Primary | ICD-10-CM

## 2020-09-18 PROCEDURE — 1101F PT FALLS ASSESS-DOCD LE1/YR: CPT | Mod: CPTII,S$GLB,, | Performed by: DERMATOLOGY

## 2020-09-18 PROCEDURE — 99999 PR PBB SHADOW E&M-EST. PATIENT-LVL III: CPT | Mod: PBBFAC,,, | Performed by: DERMATOLOGY

## 2020-09-18 PROCEDURE — 3008F PR BODY MASS INDEX (BMI) DOCUMENTED: ICD-10-PCS | Mod: CPTII,S$GLB,, | Performed by: DERMATOLOGY

## 2020-09-18 PROCEDURE — 1159F MED LIST DOCD IN RCRD: CPT | Mod: S$GLB,,, | Performed by: DERMATOLOGY

## 2020-09-18 PROCEDURE — 1101F PR PT FALLS ASSESS DOC 0-1 FALLS W/OUT INJ PAST YR: ICD-10-PCS | Mod: CPTII,S$GLB,, | Performed by: DERMATOLOGY

## 2020-09-18 PROCEDURE — 1126F AMNT PAIN NOTED NONE PRSNT: CPT | Mod: S$GLB,,, | Performed by: DERMATOLOGY

## 2020-09-18 PROCEDURE — 1126F PR PAIN SEVERITY QUANTIFIED, NO PAIN PRESENT: ICD-10-PCS | Mod: S$GLB,,, | Performed by: DERMATOLOGY

## 2020-09-18 PROCEDURE — 99999 PR PBB SHADOW E&M-EST. PATIENT-LVL III: ICD-10-PCS | Mod: PBBFAC,,, | Performed by: DERMATOLOGY

## 2020-09-18 PROCEDURE — 1159F PR MEDICATION LIST DOCUMENTED IN MEDICAL RECORD: ICD-10-PCS | Mod: S$GLB,,, | Performed by: DERMATOLOGY

## 2020-09-18 PROCEDURE — 99202 PR OFFICE/OUTPT VISIT, NEW, LEVL II, 15-29 MIN: ICD-10-PCS | Mod: S$GLB,,, | Performed by: DERMATOLOGY

## 2020-09-18 PROCEDURE — 3008F BODY MASS INDEX DOCD: CPT | Mod: CPTII,S$GLB,, | Performed by: DERMATOLOGY

## 2020-09-18 PROCEDURE — 99202 OFFICE O/P NEW SF 15 MIN: CPT | Mod: S$GLB,,, | Performed by: DERMATOLOGY

## 2020-09-18 NOTE — PROGRESS NOTES
Subjective:       Patient ID:  Dorothy Burt is a 65 y.o. female who presents for   Chief Complaint   Patient presents with    Skin Check     UBSE     Would like skin check, has few new lesions on arms and back not painful no tx.     Mole - Initial  Affected locations: face, right arm, left arm, chest, torso, back and abdomen  Signs / symptoms: asymptomatic  Aggravated by: nothing        Review of Systems   Constitutional: Negative for fever, chills, weight loss, weight gain, fatigue, night sweats and malaise.   Skin: Negative for daily sunscreen use, activity-related sunscreen use and wears hat.   Hematologic/Lymphatic: Bruises/bleeds easily.        Objective:    Physical Exam   Constitutional: She appears well-developed and well-nourished. No distress.   Neurological: She is alert and oriented to person, place, and time. She is not disoriented.   Psychiatric: She has a normal mood and affect.   Skin:   Areas Examined (abnormalities noted in diagram):   Head / Face Inspection Performed  Neck Inspection Performed  Chest / Axilla Inspection Performed  Abdomen Inspection Performed  Back Inspection Performed  RUE Inspected  LUE Inspection Performed  RLE Inspected  LLE Inspection Performed  Nails and Digits Inspection Performed              Diagram Legend     Erythematous scaling macule/papule c/w actinic keratosis       Vascular papule c/w angioma      Pigmented verrucoid papule/plaque c/w seborrheic keratosis      Yellow umbilicated papule c/w sebaceous hyperplasia      Irregularly shaped tan macule c/w lentigo     1-2 mm smooth white papules consistent with Milia      Movable subcutaneous cyst with punctum c/w epidermal inclusion cyst      Subcutaneous movable cyst c/w pilar cyst      Firm pink to brown papule c/w dermatofibroma      Pedunculated fleshy papule(s) c/w skin tag(s)      Evenly pigmented macule c/w junctional nevus     Mildly variegated pigmented, slightly irregular-bordered macule c/w mildly  "atypical nevus      Flesh colored to evenly pigmented papule c/w intradermal nevus       Pink pearly papule/plaque c/w basal cell carcinoma      Erythematous hyperkeratotic cursted plaque c/w SCC      Surgical scar with no sign of skin cancer recurrence      Open and closed comedones      Inflammatory papules and pustules      Verrucoid papule consistent consistent with wart     Erythematous eczematous patches and plaques     Dystrophic onycholytic nail with subungual debris c/w onychomycosis     Umbilicated papule    Erythematous-base heme-crusted tan verrucoid plaque consistent with inflamed seborrheic keratosis     Erythematous Silvery Scaling Plaque c/w Psoriasis     See annotation      Assessment / Plan:        Seborrheic keratoses  reassurance  Brochure provided      Skin lesion  -     Ambulatory referral/consult to Dermatology    Lentigines  The "ABCD" rules to observe pigmented lesions were reviewed.  suncreen  Cherry angiomas  reassurance      Skin exam, screening for cancer    Upper body skin examination performed today including at least 6 points as noted in physical examination. No lesions suspicious for malignancy noted.               Follow up in about 1 year (around 9/18/2021).  "

## 2020-09-18 NOTE — LETTER
September 18, 2020      Racheal Beasley PA-C  [address] [facility] [address]           [place] [facility]  [address] [facility]  [place] [address]  Phone: [phone]  Fax: [phone]          Patient: Dorothy Burt   MR Number: [id number]   YOB: 1955   Date of Visit: 9/18/2020       Dear Racheal Beasley:    Thank you for referring Dorothy Burt to me for evaluation. Attached you will find relevant portions of my assessment and plan of care.    If you have questions, please do not hesitate to call me. I look forward to following Dorothy Burt along with you.    Sincerely,    Rosa Lu MD    Enclosure  CC:  No Recipients    If you would like to receive this communication electronically, please contact [email or web address] or [phone] to request more information on [employer] Link access.    For providers and/or their staff who would like to refer a patient to [facility], please contact us through our one-stop-shop provider referral line, [facility], at [phone].    If you feel you have received this communication in error or would no longer like to receive these types of communications, please e-mail [email or web address]

## 2020-09-21 ENCOUNTER — PATIENT MESSAGE (OUTPATIENT)
Dept: CARDIOLOGY | Facility: CLINIC | Age: 65
End: 2020-09-21

## 2020-10-07 ENCOUNTER — PATIENT MESSAGE (OUTPATIENT)
Dept: CARDIOLOGY | Facility: CLINIC | Age: 65
End: 2020-10-07

## 2020-10-20 PROBLEM — M17.9 OSTEOARTHRITIS OF KNEE: Status: ACTIVE | Noted: 2020-10-20

## 2020-10-20 PROBLEM — E03.9 HYPOTHYROIDISM: Chronic | Status: ACTIVE | Noted: 2020-04-19

## 2020-10-20 PROBLEM — S83.249A TEAR OF MEDIAL MENISCUS OF KNEE: Status: ACTIVE | Noted: 2020-10-20

## 2020-10-20 PROBLEM — B00.1 RECURRENT COLD SORES: Chronic | Status: ACTIVE | Noted: 2020-06-25

## 2020-10-20 PROBLEM — E66.9 OBESITY (BMI 30.0-34.9): Chronic | Status: ACTIVE | Noted: 2020-04-19

## 2020-10-20 PROBLEM — M22.40 CHONDROMALACIA PATELLAE: Status: ACTIVE | Noted: 2020-10-20

## 2020-10-20 PROBLEM — E66.811 OBESITY (BMI 30.0-34.9): Chronic | Status: ACTIVE | Noted: 2020-04-19

## 2020-10-24 PROBLEM — M46.96 UNSPECIFIED INFLAMMATORY SPONDYLOPATHY, LUMBAR REGION: Status: ACTIVE | Noted: 2020-10-24

## 2020-11-19 ENCOUNTER — HOSPITAL ENCOUNTER (OUTPATIENT)
Dept: CARDIOLOGY | Facility: HOSPITAL | Age: 65
Discharge: HOME | End: 2020-11-19
Attending: PHYSICIAN ASSISTANT
Payer: MEDICARE

## 2020-11-19 DIAGNOSIS — I83.893 VARICOSE VEINS OF BILATERAL LOWER EXTREMITIES WITH OTHER COMPLICATIONS: ICD-10-CM

## 2020-11-19 DIAGNOSIS — I83.813 VARICOSE VEINS OF BOTH LOWER EXTREMITIES WITH PAIN: Primary | ICD-10-CM

## 2020-11-19 DIAGNOSIS — I83.813 VARICOSE VEINS OF BOTH LOWER EXTREMITIES WITH PAIN: ICD-10-CM

## 2020-11-19 PROCEDURE — 76970 CV US LASER VEIN ABLATION 1ST VEIN WITH IMAGING: ICD-10-PCS | Mod: 26,,, | Performed by: INTERNAL MEDICINE

## 2020-11-19 PROCEDURE — 76970 CV US LASER VEIN ABLATION 1ST VEIN WITH IMAGING: CPT | Mod: 26,,, | Performed by: INTERNAL MEDICINE

## 2020-11-19 PROCEDURE — 36479 ENDOVENOUS LASER VEIN ADDON: CPT

## 2020-11-19 PROCEDURE — C1885 CATH, TRANSLUMIN ANGIO LASER: HCPCS

## 2020-11-19 PROCEDURE — 36478 ENDOVENOUS LASER 1ST VEIN: CPT

## 2020-12-03 ENCOUNTER — HOSPITAL ENCOUNTER (EMERGENCY)
Facility: HOSPITAL | Age: 65
Discharge: HOME | End: 2020-12-03
Attending: EMERGENCY MEDICINE
Payer: MEDICARE

## 2020-12-03 VITALS
TEMPERATURE: 98 F | BODY MASS INDEX: 30.9 KG/M2 | RESPIRATION RATE: 16 BRPM | OXYGEN SATURATION: 99 % | SYSTOLIC BLOOD PRESSURE: 123 MMHG | HEIGHT: 64 IN | WEIGHT: 181 LBS | DIASTOLIC BLOOD PRESSURE: 58 MMHG | HEART RATE: 102 BPM

## 2020-12-03 DIAGNOSIS — S20.211A RIB CONTUSION, RIGHT, INITIAL ENCOUNTER: Primary | ICD-10-CM

## 2020-12-03 DIAGNOSIS — S09.90XA CLOSED HEAD INJURY, INITIAL ENCOUNTER: ICD-10-CM
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
## 2020-12-03 PROCEDURE — 93010 EKG 12-LEAD: ICD-10-PCS | Mod: ,,, | Performed by: INTERNAL MEDICINE

## 2020-12-03 PROCEDURE — 99285 EMERGENCY DEPT VISIT HI MDM: CPT | Mod: 25

## 2020-12-03 PROCEDURE — 63600175 PHARM REV CODE 636 W HCPCS: Performed by: EMERGENCY MEDICINE

## 2020-12-03 PROCEDURE — 99284 PR EMERGENCY DEPT VISIT,LEVEL IV: ICD-10-PCS | Mod: ,,, | Performed by: EMERGENCY MEDICINE

## 2020-12-03 PROCEDURE — 99284 EMERGENCY DEPT VISIT MOD MDM: CPT | Mod: ,,, | Performed by: EMERGENCY MEDICINE

## 2020-12-03 PROCEDURE — 93005 ELECTROCARDIOGRAM TRACING: CPT

## 2020-12-03 PROCEDURE — 25000003 PHARM REV CODE 250: Performed by: EMERGENCY MEDICINE

## 2020-12-03 PROCEDURE — 93010 ELECTROCARDIOGRAM REPORT: CPT | Mod: ,,, | Performed by: INTERNAL MEDICINE

## 2020-12-03 RX ORDER — ONDANSETRON 2 MG/ML
4 INJECTION INTRAMUSCULAR; INTRAVENOUS
Status: DISCONTINUED | OUTPATIENT
Start: 2020-12-03 | End: 2020-12-03

## 2020-12-03 RX ORDER — MORPHINE SULFATE 15 MG/1
15 TABLET ORAL EVERY 4 HOURS PRN
Qty: 18 TABLET | Refills: 0 | Status: SHIPPED | OUTPATIENT
Start: 2020-12-03 | End: 2020-12-04

## 2020-12-03 RX ORDER — HYDROCODONE BITARTRATE AND ACETAMINOPHEN 5; 325 MG/1; MG/1
1 TABLET ORAL
Status: COMPLETED | OUTPATIENT
Start: 2020-12-03 | End: 2020-12-03

## 2020-12-03 RX ORDER — NAPROXEN 500 MG/1
500 TABLET ORAL 2 TIMES DAILY WITH MEALS
Qty: 14 TABLET | Refills: 0 | Status: SHIPPED | OUTPATIENT
Start: 2020-12-03 | End: 2020-12-04 | Stop reason: SDUPTHER

## 2020-12-03 RX ORDER — ONDANSETRON 4 MG/1
4 TABLET, ORALLY DISINTEGRATING ORAL
Status: COMPLETED | OUTPATIENT
Start: 2020-12-03 | End: 2020-12-03

## 2020-12-03 RX ORDER — METHOCARBAMOL 750 MG/1
1500 TABLET, FILM COATED ORAL 3 TIMES DAILY
Qty: 30 TABLET | Refills: 0 | Status: SHIPPED | OUTPATIENT
Start: 2020-12-03 | End: 2020-12-04

## 2020-12-03 RX ADMIN — ONDANSETRON 4 MG: 4 TABLET, ORALLY DISINTEGRATING ORAL at 05:12

## 2020-12-03 RX ADMIN — HYDROCODONE BITARTRATE AND ACETAMINOPHEN 1 TABLET: 5; 325 TABLET ORAL at 03:12

## 2020-12-03 NOTE — ED PROVIDER NOTES
Encounter Date: 12/3/2020    SCRIBE #1 NOTE: INayely am scribing for, and in the presence of,  Carolann Moya MD. I have scribed the following portions of the note - the EKG reading. Other sections scribed: HPI ROS PE.       History     Chief Complaint   Patient presents with    Fall     Pt states she fell this am-hit right rib area on bar stool.   Pt states she hit her head-unknown what she hit it on.       Time patient was seen by the provider: 2:40 PM      The patient is a 65 y.o. female with past medical history of asthma and hypothyroid who presents to the ED with a complaint of head injury and right rib pain s/p trip and fall this morning. The patient reports of waking up around 5:30 am and walking to her kitchen when she fell on her side hitting a bar stool. Positive loss of consciousness, unsure for how long. When she woke up she was staring at the ceiling. She noticed have some pain to the right side of her abdomen but went to sleep on the couch. She is unsure if she felt light-headed or dizzy prior to the fall. She reports at around 8:00 am she was on the phone with her friend when she had a second syncopal episode and fell on the ground hitting her head. She woke up a few minutes later and went to use the restroom. She developed nausea soon after and did spit out some mucus but no vomiting. States when she touched the left side of her head near the left ear she had significant pain. No bleeding present. Pt is not on any blood thinners. Denies abdominal pain, chest pain, dysuria. She has not taken anything for the pain.     The history is provided by the patient and medical records. No  was used.     Review of patient's allergies indicates:  No Known Allergies  Past Medical History:   Diagnosis Date    Arthritis     Asthma     Back pain     Knee pain     Thyroid disease      Past Surgical History:   Procedure Laterality Date    CHOLECYSTECTOMY      CORONARY  ANGIOGRAPHY N/A 4/19/2020    Procedure: ANGIOGRAM, CORONARY ARTERY;  Surgeon: Zachariah Goldman MD;  Location: [facility] CATH [facility];  Service: Cardiology;  Laterality: N/A;    LEFT HEART CATHETERIZATION Right 4/19/2020    Procedure: Left heart cath;  Surgeon: Zachariah Goldman MD;  Location: [facility] CATH LAB;  Service: Cardiology;  Laterality: Right;    VASCULAR SURGERY       Family History   Problem Relation Age of Onset    Stroke Mother      Social History     Tobacco Use    Smoking status: Never Smoker    Smokeless tobacco: Never Used   Substance Use Topics    Alcohol use: Yes     Alcohol/week: 1.0 standard drinks     Types: 1 Shots of liquor per week    Drug use: No     Review of Systems   Constitutional: Negative for fever.   HENT: Negative for sore throat.    Respiratory: Negative for shortness of breath.    Cardiovascular: Negative for chest pain and leg swelling.   Gastrointestinal: Positive for nausea. Negative for abdominal pain and vomiting.   Genitourinary: Negative for dysuria.   Musculoskeletal: Negative for back pain.        +Right sided rib pain   Skin: Negative for rash.   Neurological: Positive for headaches. Negative for dizziness and light-headedness.   Hematological: Does not bruise/bleed easily.       Physical Exam     Initial Vitals [12/03/20 1332]   BP Pulse Resp Temp SpO2   (!) 138/95 (!) 117 20 98 °F (36.7 °C) 99 %      MAP       --         Physical Exam    Nursing note and vitals reviewed.  Constitutional: She appears well-developed and well-nourished. No distress.   HENT:   Head: Normocephalic.   Nose: Nose normal.   Tenderness to palpation to the left lateral scalp. No visible hematoma, no laceration   Eyes: EOM are normal. Pupils are equal, round, and reactive to light.   Neck: Normal range of motion. Neck supple.   Cardiovascular: Normal rate, regular rhythm, normal heart sounds and intact distal pulses.   Pulmonary/Chest: Breath sounds normal. No respiratory distress. She has no  wheezes. She has no rhonchi. She has no rales. She exhibits tenderness.   Tenderness to palpation over the right anterior ribs (ribs 7,8,9) no ecchymosis, no deformity   Abdominal: Soft. Bowel sounds are normal. She exhibits no distension. There is no abdominal tenderness. There is no rebound and no guarding.   Musculoskeletal: Normal range of motion. No tenderness or edema.      Comments: No midline cervical, thoracic and lumbar ttp   Neurological: She is alert and oriented to person, place, and time. She has normal strength. No cranial nerve deficit. GCS score is 15. GCS eye subscore is 4. GCS verbal subscore is 5. GCS motor subscore is 6.   Skin: Skin is warm and dry.         ED Course   Procedures  Labs Reviewed - No data to display  EKG Readings: (Independently Interpreted)   Initial Reading: No STEMI. Rhythm: Normal Sinus Rhythm. Heart Rate: 99.   No acute ischemic changes     ECG Results          EKG 12-lead (In process)  Result time 12/03/20 16:39:05    In process by Interface, Lab In [facility] (12/03/20 16:39:05)                 Narrative:    Test Reason : W19.XXXA,    Vent. Rate : 099 BPM     Atrial Rate : 099 BPM     P-R Int : 182 ms          QRS Dur : 088 ms      QT Int : 378 ms       P-R-T Axes : 073 038 064 degrees     QTc Int : 485 ms    Normal sinus rhythm  Possible Left atrial enlargement  Anterior infarct (cited on or before 03-DEC-2020)  Abnormal ECG  When compared with ECG of 19-APR-2020 09:57,  Serial changes of evolving Anterior infarct Present    Referred By: [email or web address]   SELF           Confirmed By:                             Imaging Results          X-Ray Chest PA And Lateral (Final result)  Result time 12/03/20 15:18:56    Final result by Fidel Louise III, MD (12/03/20 15:18:56)                 Impression:      No acute process seen.      Electronically signed by: Fidel Louise MD  Date:    12/03/2020  Time:    15:18             Narrative:    EXAMINATION:  XR CHEST PA AND  LATERAL    CLINICAL HISTORY:  Unspecified fall, initial encounter    FINDINGS:  Two views: Heart size is normal.  Lungs are clear.  The bones showed DJD.                              X-Rays:   Independently Interpreted Readings:   Chest X-Ray: No pneumothorax. No hemothorax     Medical Decision Making:   History:   Old Medical Records: I decided to obtain old medical records.  Initial Assessment:   64 y/o F known asthma and hypothyroid presents after mechanical fall this morning subsequently significant pain to rt ribs and brief syncopal episode. ecg without ischemia, normal intervals.  Ddx: ptx, hemothorax, possible rib fracture although no gross deformity on exam  Will order CXR  POC fast- no free fluid  Ddx: ICH, SDH  CTc    Independently Interpreted Test(s):   I have ordered and independently interpreted X-rays - see prior notes.  I have ordered and independently interpreted EKG Reading(s) - see prior notes  Clinical Tests:   Radiological Study: Ordered and Reviewed  Medical Tests: Ordered and Reviewed  ED Management:  5:20 PM  CXR no e/o hemothorax or ptx. Informed pt high likelihood of rib fracture. Will treat pain and provide with IS. Informed of risk of subsequent pneumonia. Pt signed out to on-coming staff at shift change. At the time of sign out awaiting CT head. Anticipate discharge home.            Scribe Attestation:   Scribe #1: I performed the above scribed service and the documentation accurately describes the services I performed. I attest to the accuracy of the note.                      Clinical Impression:     ICD-10-CM ICD-9-CM   1. Fall  W19.XXXA E888.9   2. Fall  W19.XXXA E888.9                                               Carolann Moya MD  12/03/20 5555

## 2020-12-03 NOTE — ED NOTES
"Patient identifiers verified and correct for Ms Burt  C/C: fall with possible head injury SEE NN  APPEARANCE: awake and alert in NAD.  SKIN: warm, dry small hematoma to above left earlobe,   MUSCULOSKELETAL: Patient moving all extremities spontaneously, no obvious swelling or deformities noted. Ambulates independently.  RESPIRATORY: Denies shortness of breath.Respirations unlabored.   CARDIAC: Denies CP, 2+ distal pulses; no peripheral edema  ABDOMEN:ABdominal pain to right upper abdomen, positive nausea, no emesis  : voids spontaneously, denies difficulty  Neurologic: AAO x 4; follows commands equal strength in all extremities; denies numbness/tingling. Denies dizziness Positive gen weakness, all over pain to head, states "spit up" reports some blurred vision,. BUE strength equal, smile symmetrical, negative drift, BLE strength equal, facial sensation equal    "

## 2020-12-03 NOTE — PROVIDER PROGRESS NOTES - EMERGENCY DEPT.
Encounter Date: 12/3/2020    ED Physician Progress Notes           ED Attending Sign-out Progress Note:  Patient signed out to me at shift change by Dr. Moya to f/u CT head and d/c home.    CT head negative for ICH or other emergent findings.  Patient with mild improvement of pain after meds in ED.  However she does have some pleuritic pains on the right.    Discussed with RT who came and talked patient how to use incentive spirometer.    Clinically with nondisplaced rib fracture versus rib contusion.  CXR without evidence of effusion, infiltrate/contusion, or pneumothorax on my read.    Will discharge on scheduled naproxen, p.r.n. MSIR, and Robaxin.    Stable for discharge at this time.  Return precautions discussed.      ED Clinical Impression:    ICD-10-CM ICD-9-CM   1. Rib contusion, right, initial encounter  S20.211A 922.1   2. Fall  W19.XXXA E888.9   3. Closed head injury, initial encounter  S09.90XA 959.01

## 2020-12-03 NOTE — ED TRIAGE NOTES
"Patient state  she tripped and fell this am at 0500, states  she hit a barstool, states she "must have " positive LOC. States when she opened her eyes she was looking at the ceiling. Currently with concerns of pain to right abdomen, states she had a second syncopal episode when she woke up looking at the ceiling in the chair, per friend on phone, patient went 5 minutes without answering.  States nausea and feeling cold, hematoma to left head.   "

## 2020-12-07 ENCOUNTER — LAB VISIT (OUTPATIENT)
Dept: LAB | Facility: HOSPITAL | Age: 65
End: 2020-12-07
Attending: FAMILY MEDICINE
Payer: MEDICARE

## 2020-12-07 DIAGNOSIS — R55 SYNCOPE, UNSPECIFIED SYNCOPE TYPE: ICD-10-CM

## 2020-12-07 LAB
ANION GAP SERPL CALC-SCNC: 10 MMOL/L (ref 8–16)
BASOPHILS # BLD AUTO: 0.06 K/UL (ref 0–0.2)
BASOPHILS NFR BLD: 1 % (ref 0–1.9)
BUN SERPL-MCNC: 11 MG/DL (ref 8–23)
CALCIUM SERPL-MCNC: 9.1 MG/DL (ref 8.7–10.5)
CHLORIDE SERPL-SCNC: 103 MMOL/L (ref 95–110)
CO2 SERPL-SCNC: 26 MMOL/L (ref 23–29)
CREAT SERPL-MCNC: 0.8 MG/DL (ref 0.5–1.4)
DIFFERENTIAL METHOD: NORMAL
EOSINOPHIL # BLD AUTO: 0.3 K/UL (ref 0–0.5)
EOSINOPHIL NFR BLD: 4.7 % (ref 0–8)
ERYTHROCYTE [DISTWIDTH] IN BLOOD BY AUTOMATED COUNT: 12 % (ref 11.5–14.5)
EST. GFR  (AFRICAN AMERICAN): >60 ML/MIN/1.73 M^2
EST. GFR  (NON AFRICAN AMERICAN): >60 ML/MIN/1.73 M^2
GLUCOSE SERPL-MCNC: 96 MG/DL (ref 70–110)
HCT VFR BLD AUTO: 37.8 % (ref 37–48.5)
HGB BLD-MCNC: 12.4 G/DL (ref 12–16)
IMM GRANULOCYTES # BLD AUTO: 0.01 K/UL (ref 0–0.04)
IMM GRANULOCYTES NFR BLD AUTO: 0.2 % (ref 0–0.5)
LYMPHOCYTES # BLD AUTO: 1.6 K/UL (ref 1–4.8)
LYMPHOCYTES NFR BLD: 27.1 % (ref 18–48)
MCH RBC QN AUTO: 30.4 PG (ref 27–31)
MCHC RBC AUTO-ENTMCNC: 32.8 G/DL (ref 32–36)
MCV RBC AUTO: 93 FL (ref 82–98)
MONOCYTES # BLD AUTO: 0.6 K/UL (ref 0.3–1)
MONOCYTES NFR BLD: 9.6 % (ref 4–15)
NEUTROPHILS # BLD AUTO: 3.4 K/UL (ref 1.8–7.7)
NEUTROPHILS NFR BLD: 57.4 % (ref 38–73)
NRBC BLD-RTO: 0 /100 WBC
PLATELET # BLD AUTO: 285 K/UL (ref 150–350)
PMV BLD AUTO: 10 FL (ref 9.2–12.9)
POTASSIUM SERPL-SCNC: 4.7 MMOL/L (ref 3.5–5.1)
RBC # BLD AUTO: 4.08 M/UL (ref 4–5.4)
SODIUM SERPL-SCNC: 139 MMOL/L (ref 136–145)
WBC # BLD AUTO: 5.91 K/UL (ref 3.9–12.7)

## 2020-12-07 PROCEDURE — 36415 COLL VENOUS BLD VENIPUNCTURE: CPT

## 2020-12-07 PROCEDURE — 85025 COMPLETE CBC W/AUTO DIFF WBC: CPT

## 2020-12-07 PROCEDURE — 80048 BASIC METABOLIC PNL TOTAL CA: CPT

## 2020-12-14 NOTE — ED PROVIDER NOTES
Encounter Date: 4/19/2020       History     Chief Complaint   Patient presents with    Chest Pain     CP x 1 day radiating to jaw and clavicle. +SOB. Denies N/V.      65-year-old female with history asthma and hypothyroidism presents with chest pain.  Reports onset of symptoms yesterday while sitting at the table.  She describes it as a heaviness to her chest.  She reports that since yesterday it has progressed to severe pain.  She currently describes the pain as 8/10.  She says it radiates into her jaw.  It is worse when she lays flat.  It is not associated with shortness of breath or cough.  Not associated with fever.  She states that she tried her albuterol inhaler, but that this did not feel like her usual asthma symptoms and that the inhaler did not help.  She states that today the pain in her jaw area was severe.  She contacted a friend who is a paramedic who prompted her to call 911.  She states that she has never had pain like this before.  She denies any recent sick symptoms.  She has been self isolating for the last 3 weeks.  No contact with anyone with known Coronavirus.  EMS was concerned for posterior MI, 325 mg of aspirin given prior to arrival.    The history is provided by the patient and the EMS personnel. The history is limited by the condition of the patient.     Review of patient's allergies indicates:  No Known Allergies  Past Medical History:   Diagnosis Date    Arthritis     Asthma     Back pain     Knee pain     Thyroid disease      Past Surgical History:   Procedure Laterality Date    CHOLECYSTECTOMY      VASCULAR SURGERY       Family History   Problem Relation Age of Onset    Stroke Mother      Social History     Tobacco Use    Smoking status: Never Smoker    Smokeless tobacco: Never Used   Substance Use Topics    Alcohol use: Yes    Drug use: No     Review of Systems   Constitutional: Negative for fever.   HENT: Negative for sore throat.    Respiratory: Negative for shortness  of breath.    Cardiovascular: Positive for chest pain.   Gastrointestinal: Negative for nausea.   Genitourinary: Negative for dysuria.   Musculoskeletal: Negative for back pain.   Skin: Negative for rash.   Neurological: Negative for weakness.   Hematological: Does not bruise/bleed easily.   All other systems reviewed and are negative.      Physical Exam     Initial Vitals [04/19/20 0957]   BP Pulse Resp Temp SpO2   130/80 92 18 -- 100 %      MAP       --         Physical Exam    Nursing note and vitals reviewed.  Constitutional: Vital signs are normal. She appears well-developed and well-nourished.   HENT:   Head: Normocephalic and atraumatic.   Eyes: Conjunctivae are normal.   Neck: Trachea normal. Neck supple.   Cardiovascular: Normal rate and normal pulses.   Pulmonary/Chest: No tachypnea. No respiratory distress.   Abdominal: Normal appearance.   Musculoskeletal: Normal range of motion. She exhibits no edema.   Neurological: She is alert and oriented to person, place, and time.   Skin: Skin is warm and dry.         ED Course   Procedures  Labs Reviewed   CBC W/ AUTO DIFFERENTIAL - Abnormal; Notable for the following components:       Result Value    Mono # 1.1 (*)     Gran% 75.8 (*)     Lymph% 13.0 (*)     All other components within normal limits   SARS-COV-2 RNA AMPLIFICATION, QUAL   PROTIME-INR   CK-MB   COMPREHENSIVE METABOLIC PANEL   TROPONIN I   URINALYSIS, REFLEX TO URINE CULTURE   TYPE & SCREEN     EKG Readings: (Independently Interpreted)   Initial Reading: STEMI. Ectopy: No Ectopy. ST Segment Elevation: III and AVF.       Imaging Results    None          Medical Decision Making:   History:   I obtained history from: EMS provider.  Old Medical Records: I decided to obtain old medical records.  Initial Assessment:   Emergent evaluation of chest pain  Differential Diagnosis:   STEMI, dissection, pericarditis  Independently Interpreted Test(s):   I have ordered and independently interpreted X-rays - see  prior notes.  I have ordered and independently interpreted EKG Reading(s) - see prior notes  Clinical Tests:   Lab Tests: Ordered and Reviewed  Radiological Study: Ordered and Reviewed  Medical Tests: Ordered and Reviewed  ED Management:  All current hospital protocols and procedures are being followed and updated daily to minimize spread of the infection. My personal examination limited due to patient's stability to date, risk of virus transmission, and critical PPE shortage.    Patient presented with nonexertional chest pain.  No significant risk factors for coronary artery disease.  EKG concerning for inferior ST elevation MI.  Code STEMI called on patient's arrival.  COVID test sent for screening for possible hospital admission.  Patient loaded with Brilinta.  Prepared for the cath lab.  She was given nitroglycerin which improved her chest pain.  She did not have significant drop in her blood pressure.  Pericarditis considered with her positional symptoms, ultrasound performed by Cardiology fellow, no significant pericardial effusion.    Other:   I have discussed this case with another health care provider.              Attending Attestation:         Attending Critical Care:   Critical Care Times:   Direct Patient Care (initial evaluation, reassessments, and time considering the case)................................................................15 minutes.   Additional History from reviewing old medical records or taking additional history from the family, EMS, PCP, etc.......................8 minutes.   Ordering, Reviewing, and Interpreting Diagnostic Studies...............................................................................................................5 minutes.   Documentation..................................................................................................................................................................................5 minutes.   Consultation with other  Physicians. .................................................................................................................................................8 minutes.   Consultation with the patient's family directly relating to the patient's condition, care, and DNR status (when patient unable)......5 minutes.   ==============================================================  · Total Critical Care Time - exclusive of procedural time: 46 minutes.  ==============================================================  Critical care was necessary to treat or prevent imminent or life-threatening deterioration of the following conditions: cardiac arrhythmia and myocardial infarction.       Attending ED Notes:   10:34 AM  Patient being taking emergently to cath lab.                         Clinical Impression:       ICD-10-CM ICD-9-CM   1. ST elevation myocardial infarction (STEMI), unspecified artery I21.3 410.90   2. Chest pain R07.9 786.50         Disposition:   Disposition: Admitted  Condition: Serious     ED Disposition Condition    Admit                           Ambrosio Gresham MD  04/19/20 9799     No

## 2020-12-22 ENCOUNTER — OFFICE VISIT (OUTPATIENT)
Dept: CARDIOLOGY | Facility: CLINIC | Age: 65
End: 2020-12-22
Payer: MEDICARE

## 2020-12-22 ENCOUNTER — HOSPITAL ENCOUNTER (OUTPATIENT)
Dept: CARDIOLOGY | Facility: HOSPITAL | Age: 65
Discharge: HOME | End: 2020-12-22
Attending: INTERNAL MEDICINE
Payer: MEDICARE

## 2020-12-22 VITALS
HEART RATE: 75 BPM | DIASTOLIC BLOOD PRESSURE: 71 MMHG | OXYGEN SATURATION: 98 % | WEIGHT: 186.06 LBS | HEIGHT: 64 IN | SYSTOLIC BLOOD PRESSURE: 117 MMHG | BODY MASS INDEX: 31.77 KG/M2

## 2020-12-22 DIAGNOSIS — E66.9 OBESITY (BMI 30.0-34.9): Chronic | ICD-10-CM

## 2020-12-22 DIAGNOSIS — Z86.79 HISTORY OF PERICARDITIS: ICD-10-CM

## 2020-12-22 DIAGNOSIS — I83.813 VARICOSE VEINS OF BOTH LOWER EXTREMITIES WITH PAIN: ICD-10-CM

## 2020-12-22 DIAGNOSIS — E03.9 HYPOTHYROIDISM, UNSPECIFIED TYPE: Chronic | ICD-10-CM

## 2020-12-22 DIAGNOSIS — I83.892 VARICOSE VEINS OF LEFT LOWER EXTREMITY WITH OTHER COMPLICATIONS: ICD-10-CM

## 2020-12-22 DIAGNOSIS — I83.813 VARICOSE VEINS OF BOTH LOWER EXTREMITIES WITH PAIN: Primary | ICD-10-CM

## 2020-12-22 LAB
LEFT GIAC DIA: 0.14 MM
LEFT GREAT SAPHENOUS DISTAL THIGH DIA: 0.42 CM
LEFT GREAT SAPHENOUS JUNCTION DIA: 0.62 CM
LEFT GREAT SAPHENOUS JUNCTION REFLUX: 1044 MS
LEFT GREAT SAPHENOUS KNEE DIA: 0.46 CM
LEFT GREAT SAPHENOUS MIDDLE THIGH DIA: 0.39 CM
LEFT GREAT SAPHENOUS MIDDLE THIGH REFLUX: 1838 MS
LEFT GREAT SAPHENOUS PROXIMAL CALF DIA: 0.42 CM

## 2020-12-22 PROCEDURE — 99214 OFFICE O/P EST MOD 30 MIN: CPT | Mod: S$GLB,,, | Performed by: PHYSICIAN ASSISTANT

## 2020-12-22 PROCEDURE — 93971 EXTREMITY STUDY: CPT | Mod: 26,LT,, | Performed by: INTERNAL MEDICINE

## 2020-12-22 PROCEDURE — 93971 CV US LOWER VENOUS INSUFFICIENCY LEFT (CUPID ONLY): ICD-10-PCS | Mod: 26,LT,, | Performed by: INTERNAL MEDICINE

## 2020-12-22 PROCEDURE — 3008F BODY MASS INDEX DOCD: CPT | Mod: CPTII,S$GLB,, | Performed by: PHYSICIAN ASSISTANT

## 2020-12-22 PROCEDURE — 93971 EXTREMITY STUDY: CPT | Mod: TC,LT

## 2020-12-22 PROCEDURE — 1159F PR MEDICATION LIST DOCUMENTED IN MEDICAL RECORD: ICD-10-PCS | Mod: S$GLB,,, | Performed by: PHYSICIAN ASSISTANT

## 2020-12-22 PROCEDURE — 99214 PR OFFICE/OUTPT VISIT, EST, LEVL IV, 30-39 MIN: ICD-10-PCS | Mod: S$GLB,,, | Performed by: PHYSICIAN ASSISTANT

## 2020-12-22 PROCEDURE — 1125F AMNT PAIN NOTED PAIN PRSNT: CPT | Mod: S$GLB,,, | Performed by: PHYSICIAN ASSISTANT

## 2020-12-22 PROCEDURE — 3008F PR BODY MASS INDEX (BMI) DOCUMENTED: ICD-10-PCS | Mod: CPTII,S$GLB,, | Performed by: PHYSICIAN ASSISTANT

## 2020-12-22 PROCEDURE — 99999 PR PBB SHADOW E&M-EST. PATIENT-LVL III: ICD-10-PCS | Mod: PBBFAC,,, | Performed by: PHYSICIAN ASSISTANT

## 2020-12-22 PROCEDURE — 1125F PR PAIN SEVERITY QUANTIFIED, PAIN PRESENT: ICD-10-PCS | Mod: S$GLB,,, | Performed by: PHYSICIAN ASSISTANT

## 2020-12-22 PROCEDURE — 1159F MED LIST DOCD IN RCRD: CPT | Mod: S$GLB,,, | Performed by: PHYSICIAN ASSISTANT

## 2020-12-22 PROCEDURE — 99999 PR PBB SHADOW E&M-EST. PATIENT-LVL III: CPT | Mod: PBBFAC,,, | Performed by: PHYSICIAN ASSISTANT

## 2020-12-22 NOTE — PROGRESS NOTES
Interventional Cardiology Clinic Note  Reason for Visit: Venous insufficiency   Last Clinic Visit: 8/25/2020    HPI:   Dorothy Burt is a 65 y.o. female who presents for venous insufficiency s/p .    Ms. Dorothy Burt is a 65 y.o. woman with history of  idiopathic pericarditis and hypothyroidism complains of chronic lower extremity varicose veins. They are associated with swelling and an aching pain in her ankles and behind her knees that worsens over the course of the day. She had a procedure in the distant past (she was in her 20s) on her left leg and had a vein removed. She has tried compression socks daily for 3 months with no relief. She also elevates her legs at rest and abides by low sodium diet. She states her mother also had varicose veins.     On 12/22/20 she underwent EVLT of her L saphenous vein. She wore her compression stockings as instructed after the procedure. She states her L calf is less tender to the touch but still has obvious varicosities and pain around her ankles. She is not sure if she is having swelling because she rarely takes her compression socks off.     Her ultrasound this morning shows the left anterior accessory saphenous vein is thrombosed from prox to distal thigh. She still has significant L GSV reflux.       ROS:      Review of Systems   Constitution: Negative for diaphoresis, malaise/fatigue, weight gain and weight loss.   HENT: Negative for nosebleeds.    Eyes: Negative for vision loss in left eye, vision loss in right eye and visual disturbance.   Cardiovascular: Positive for leg swelling. Negative for chest pain, claudication, dyspnea on exertion, irregular heartbeat, near-syncope, orthopnea, palpitations, paroxysmal nocturnal dyspnea and syncope.   Respiratory: Negative for cough, shortness of breath, sleep disturbances due to breathing, snoring and wheezing.    Hematologic/Lymphatic: Negative for bleeding problem. Does not bruise/bleed easily.   Skin: Negative  for poor wound healing and rash.   Musculoskeletal: Negative for muscle cramps and myalgias.   Gastrointestinal: Negative for bloating, abdominal pain, diarrhea, heartburn, melena, nausea and vomiting.   Genitourinary: Negative for hematuria and nocturia.   Neurological: Negative for brief paralysis, dizziness, headaches, light-headedness, numbness and weakness.   Psychiatric/Behavioral: Negative for depression.   Allergic/Immunologic: Negative for hives.       PMH:     Past Medical History:   Diagnosis Date    Arthritis     Asthma     Back pain     Knee pain     Thyroid disease      Past Surgical History:   Procedure Laterality Date    CHOLECYSTECTOMY      CORONARY ANGIOGRAPHY N/A 4/19/2020    Procedure: ANGIOGRAM, CORONARY ARTERY;  Surgeon: Zachariah Goldman MD;  Location: [facility] CATH LAB;  Service: Cardiology;  Laterality: N/A;    LEFT HEART CATHETERIZATION Right 4/19/2020    Procedure: Left heart cath;  Surgeon: Zachariah Goldman MD;  Location: [facility] CATH LAB;  Service: Cardiology;  Laterality: Right;    VASCULAR SURGERY       Allergies:   Review of patient's allergies indicates:  No Known Allergies  Medications:     Current Outpatient Medications on File Prior to Visit   Medication Sig Dispense Refill    acyclovir (ZOVIRAX) 400 MG tablet Take 1 tablet (400 mg total) by mouth 2 (two) times daily. 180 tablet 3    albuterol (PROAIR HFA) 90 mcg/actuation inhaler Inhale 2 puffs into the lungs every 6 (six) hours as needed for Wheezing or Shortness of Breath. Rescue 18 g 11    cetirizine (ZYRTEC) 10 MG tablet Take 10 mg by mouth daily as needed.   1    diclofenac sodium (VOLTAREN) 1 % Gel Apply 2 g topically 2 (two) times daily as needed (to knees). 100 g 2    levothyroxine (SYNTHROID) 25 MCG tablet Take 37.5 mcg by mouth before breakfast.       naproxen (NAPROSYN) 500 MG tablet Take 1 tablet (500 mg total) by mouth 2 (two) times daily as needed. 60 tablet 0    omeprazole (PRILOSEC) 20 MG capsule  "Take 20 mg by mouth once daily.       No current facility-administered medications on file prior to visit.      Social History:     Social History     Tobacco Use    Smoking status: Never Smoker    Smokeless tobacco: Never Used   Substance Use Topics    Alcohol use: Yes     Alcohol/week: 1.0 standard drinks     Types: 1 Shots of liquor per week     Comment: occ     Family History:     Family History   Problem Relation Age of Onset    Stroke Mother      Physical Exam:   /71 (BP Location: Right arm, Patient Position: Sitting, BP Method: Medium (Automatic))   Pulse 75   Ht 5' 4" (1.626 m)   Wt 84.4 kg (186 lb 1.1 oz)   SpO2 98%   BMI 31.94 kg/m²      Constitutional: She is oriented to person, place, and time. Vital signs are normal. She appears well-developed and well-nourished.   HENT:   Head: Normocephalic.   Eyes: Pupils are equal, round, and reactive to light.   Neck: Normal range of motion. Neck supple. No JVD present. Carotid bruit is not present.   Cardiovascular: Normal rate, regular rhythm, S1 normal, S2 normal, normal heart sounds and intact distal pulses. PMI is not displaced. Exam reveals no gallop and no friction rub. Lower extremity varicosities bilaterally   No murmur heard.  Pulses:       Carotid pulses are 2+ on the right side, and 2+ on the left side.       Radial pulses are 2+ on the right side, and 2+ on the left side.        Dorsalis pedis pulses are 2+ on the right side, and 2+ on the left side.        Posterior tibial pulses are 2+ on the right side, and 2+ on the left side.   No pericardial friction rub    Pulmonary/Chest: Effort normal and breath sounds normal. No accessory muscle usage. She has no decreased breath sounds. She has no wheezes.   Abdominal: Soft. Normal appearance and bowel sounds are normal. She exhibits no distension, no fluid wave and no ascites. There is no hepatosplenomegaly. There is no tenderness.   Musculoskeletal: Normal range of motion. Trace lower " extremity edema bilaterally.   Neurological: She is alert and oriented to person, place, and time. She has normal strength.   Skin: Skin is warm, dry and intact. No ecchymosis and no rash noted. No erythema.   Psychiatric: She has a normal mood and affect. Her speech is normal and behavior is normal. Judgment and thought content normal. Cognition and memory are normal.   Vitals reviewed.    Labs:     Lab Results   Component Value Date     12/07/2020    K 4.7 12/07/2020     12/07/2020    CO2 26 12/07/2020    BUN 11 12/07/2020    CREATININE 0.8 12/07/2020    ANIONGAP 10 12/07/2020     Lab Results   Component Value Date    HGBA1C 5.4 04/19/2020    HGBA1C 5.7 (H) 09/27/2012     Lab Results   Component Value Date    BNP 17 04/19/2020    Lab Results   Component Value Date    WBC 5.91 12/07/2020    HGB 12.4 12/07/2020    HCT 37.8 12/07/2020     12/07/2020    GRAN 3.4 12/07/2020    GRAN 57.4 12/07/2020     Lab Results   Component Value Date    CHOL 177 04/20/2020    HDL 56 04/20/2020    LDLCALC 105.2 04/20/2020    TRIG 79 04/20/2020          Imaging:   Venous insufficiency ultrasound 12/22/20 - Images reviewed by Dr Guillory  · The contralateral (right) common femoral vein is patent.  · No evidence of left lower extremity DVT.  · Left external femoral vein reflux is present.  · Left greater saphenous vein reflux is present.  · Left anterior accessory saphenous vein appears thrombosed s/p EVLT from prox to distal thigh.    Assessment:     1. Varicose veins of both lower extremities with pain    2. Varicose veins of left lower extremity with other complications     3. Obesity (BMI 30.0-34.9)    4. Hypothyroidism, unspecified type    5. History of pericarditis      Plan:     Varicose veins of both lower extremities with pain  S/p EVLT of left anterior accessory saphenous vein  - Pt still with symptomatic LLE varicosities and significant GSV reflux on ultrasound. She will be scheduled for GSV laser ablation of  MAURY.   - The risk, benefits, and alternatives of laser ablation/sclerotherapy have been discussed in detail with the patient. All questions have been answered, the patient understands, and informed consent has been obtained and signed.    Obesity   Increase aerobic exercise with a goal of at least 30 minutes, 5 days a week.    This patient was discussed with Dr. Guillory.    Signed:  Racheal Beasley PA-C  Cardiology   [phone] - Interventional  [phone] - [facility]  12/22/2020 10:29 AM

## 2021-03-04 ENCOUNTER — IMMUNIZATION (OUTPATIENT)
Dept: PHARMACY | Facility: CLINIC | Age: 66
End: 2021-03-04
Payer: MEDICARE

## 2021-03-04 DIAGNOSIS — Z23 NEED FOR VACCINATION: Primary | ICD-10-CM

## 2021-04-01 ENCOUNTER — PATIENT MESSAGE (OUTPATIENT)
Dept: CARDIOLOGY | Facility: CLINIC | Age: 66
End: 2021-04-01

## 2021-04-01 ENCOUNTER — IMMUNIZATION (OUTPATIENT)
Dept: PHARMACY | Facility: CLINIC | Age: 66
End: 2021-04-01
Payer: MEDICARE

## 2021-04-01 DIAGNOSIS — Z23 NEED FOR VACCINATION: Primary | ICD-10-CM

## 2021-04-01 DIAGNOSIS — I83.893 VARICOSE VEINS OF BILATERAL LOWER EXTREMITIES WITH OTHER COMPLICATIONS: ICD-10-CM

## 2021-04-01 DIAGNOSIS — I83.892 VARICOSE VEINS OF LEFT LOWER EXTREMITY WITH OTHER COMPLICATIONS: Primary | ICD-10-CM

## 2021-04-14 ENCOUNTER — TELEPHONE (OUTPATIENT)
Dept: CARDIOLOGY | Facility: CLINIC | Age: 66
End: 2021-04-14

## 2021-04-15 ENCOUNTER — OFFICE VISIT (OUTPATIENT)
Dept: PODIATRY | Facility: CLINIC | Age: 66
End: 2021-04-15
Payer: MEDICARE

## 2021-04-15 VITALS
HEIGHT: 64 IN | HEART RATE: 76 BPM | BODY MASS INDEX: 32.6 KG/M2 | DIASTOLIC BLOOD PRESSURE: 79 MMHG | WEIGHT: 190.94 LBS | SYSTOLIC BLOOD PRESSURE: 127 MMHG

## 2021-04-15 DIAGNOSIS — M76.72 PERONEAL TENDONITIS, LEFT: ICD-10-CM

## 2021-04-15 DIAGNOSIS — M76.822 POSTERIOR TIBIAL TENDONITIS, LEFT: Primary | ICD-10-CM

## 2021-04-15 PROCEDURE — 3288F FALL RISK ASSESSMENT DOCD: CPT | Mod: CPTII,S$GLB,, | Performed by: PODIATRIST

## 2021-04-15 PROCEDURE — 1125F AMNT PAIN NOTED PAIN PRSNT: CPT | Mod: S$GLB,,, | Performed by: PODIATRIST

## 2021-04-15 PROCEDURE — 99999 PR PBB SHADOW E&M-EST. PATIENT-LVL III: ICD-10-PCS | Mod: PBBFAC,,, | Performed by: PODIATRIST

## 2021-04-15 PROCEDURE — 3008F BODY MASS INDEX DOCD: CPT | Mod: CPTII,S$GLB,, | Performed by: PODIATRIST

## 2021-04-15 PROCEDURE — 3008F PR BODY MASS INDEX (BMI) DOCUMENTED: ICD-10-PCS | Mod: CPTII,S$GLB,, | Performed by: PODIATRIST

## 2021-04-15 PROCEDURE — 1101F PR PT FALLS ASSESS DOC 0-1 FALLS W/OUT INJ PAST YR: ICD-10-PCS | Mod: CPTII,S$GLB,, | Performed by: PODIATRIST

## 2021-04-15 PROCEDURE — 99999 PR PBB SHADOW E&M-EST. PATIENT-LVL III: CPT | Mod: PBBFAC,,, | Performed by: PODIATRIST

## 2021-04-15 PROCEDURE — 99213 PR OFFICE/OUTPT VISIT, EST, LEVL III, 20-29 MIN: ICD-10-PCS | Mod: S$GLB,,, | Performed by: PODIATRIST

## 2021-04-15 PROCEDURE — 1101F PT FALLS ASSESS-DOCD LE1/YR: CPT | Mod: CPTII,S$GLB,, | Performed by: PODIATRIST

## 2021-04-15 PROCEDURE — 99213 OFFICE O/P EST LOW 20 MIN: CPT | Mod: S$GLB,,, | Performed by: PODIATRIST

## 2021-04-15 PROCEDURE — 1125F PR PAIN SEVERITY QUANTIFIED, PAIN PRESENT: ICD-10-PCS | Mod: S$GLB,,, | Performed by: PODIATRIST

## 2021-04-15 PROCEDURE — 3288F PR FALLS RISK ASSESSMENT DOCUMENTED: ICD-10-PCS | Mod: CPTII,S$GLB,, | Performed by: PODIATRIST

## 2021-04-15 PROCEDURE — 1159F PR MEDICATION LIST DOCUMENTED IN MEDICAL RECORD: ICD-10-PCS | Mod: S$GLB,,, | Performed by: PODIATRIST

## 2021-04-15 PROCEDURE — 1159F MED LIST DOCD IN RCRD: CPT | Mod: S$GLB,,, | Performed by: PODIATRIST

## 2021-04-20 ENCOUNTER — TELEPHONE (OUTPATIENT)
Dept: CARDIOLOGY | Facility: CLINIC | Age: 66
End: 2021-04-20

## 2021-04-20 PROBLEM — I87.2 VENOUS INSUFFICIENCY: Status: ACTIVE | Noted: 2021-04-20

## 2021-04-29 PROBLEM — E66.811 OBESITY (BMI 30.0-34.9): Chronic | Status: RESOLVED | Noted: 2020-04-19 | Resolved: 2021-04-29

## 2021-04-29 PROBLEM — E66.9 OBESITY (BMI 30.0-34.9): Chronic | Status: RESOLVED | Noted: 2020-04-19 | Resolved: 2021-04-29

## 2021-04-29 PROBLEM — R79.89 LFT ELEVATION: Status: RESOLVED | Noted: 2020-04-19 | Resolved: 2021-04-29

## 2021-05-18 ENCOUNTER — TELEPHONE (OUTPATIENT)
Dept: CARDIOLOGY | Facility: CLINIC | Age: 66
End: 2021-05-18

## 2021-09-10 ENCOUNTER — OFFICE VISIT (OUTPATIENT)
Dept: URGENT CARE | Facility: CLINIC | Age: 66
End: 2021-09-10
Payer: MEDICARE

## 2021-09-10 VITALS
RESPIRATION RATE: 18 BRPM | TEMPERATURE: 98 F | WEIGHT: 196 LBS | HEART RATE: 88 BPM | DIASTOLIC BLOOD PRESSURE: 84 MMHG | OXYGEN SATURATION: 98 % | HEIGHT: 64 IN | BODY MASS INDEX: 33.46 KG/M2 | SYSTOLIC BLOOD PRESSURE: 136 MMHG

## 2021-09-10 DIAGNOSIS — L03.316 CELLULITIS OF UMBILICUS: Primary | ICD-10-CM

## 2021-09-10 DIAGNOSIS — R06.2 WHEEZING: ICD-10-CM

## 2021-09-10 DIAGNOSIS — M54.50 ACUTE LOW BACK PAIN WITHOUT SCIATICA, UNSPECIFIED BACK PAIN LATERALITY: ICD-10-CM

## 2021-09-10 DIAGNOSIS — J02.9 SORE THROAT: ICD-10-CM

## 2021-09-10 DIAGNOSIS — R05.9 COUGH: ICD-10-CM

## 2021-09-10 LAB
CTP QC/QA: YES
SARS-COV-2 RDRP RESP QL NAA+PROBE: NEGATIVE

## 2021-09-10 PROCEDURE — 99214 OFFICE O/P EST MOD 30 MIN: CPT | Mod: S$GLB,CS,, | Performed by: PHYSICIAN ASSISTANT

## 2021-09-10 PROCEDURE — U0002 COVID-19 LAB TEST NON-CDC: HCPCS | Mod: QW,S$GLB,, | Performed by: PHYSICIAN ASSISTANT

## 2021-09-10 PROCEDURE — 3008F BODY MASS INDEX DOCD: CPT | Mod: CPTII,S$GLB,, | Performed by: PHYSICIAN ASSISTANT

## 2021-09-10 PROCEDURE — 1160F PR REVIEW ALL MEDS BY PRESCRIBER/CLIN PHARMACIST DOCUMENTED: ICD-10-PCS | Mod: CPTII,S$GLB,, | Performed by: PHYSICIAN ASSISTANT

## 2021-09-10 PROCEDURE — 3075F PR MOST RECENT SYSTOLIC BLOOD PRESS GE 130-139MM HG: ICD-10-PCS | Mod: CPTII,S$GLB,, | Performed by: PHYSICIAN ASSISTANT

## 2021-09-10 PROCEDURE — 3079F DIAST BP 80-89 MM HG: CPT | Mod: CPTII,S$GLB,, | Performed by: PHYSICIAN ASSISTANT

## 2021-09-10 PROCEDURE — 99214 PR OFFICE/OUTPT VISIT, EST, LEVL IV, 30-39 MIN: ICD-10-PCS | Mod: S$GLB,CS,, | Performed by: PHYSICIAN ASSISTANT

## 2021-09-10 PROCEDURE — 3079F PR MOST RECENT DIASTOLIC BLOOD PRESSURE 80-89 MM HG: ICD-10-PCS | Mod: CPTII,S$GLB,, | Performed by: PHYSICIAN ASSISTANT

## 2021-09-10 PROCEDURE — U0002: ICD-10-PCS | Mod: QW,S$GLB,, | Performed by: PHYSICIAN ASSISTANT

## 2021-09-10 PROCEDURE — 1159F PR MEDICATION LIST DOCUMENTED IN MEDICAL RECORD: ICD-10-PCS | Mod: CPTII,S$GLB,, | Performed by: PHYSICIAN ASSISTANT

## 2021-09-10 PROCEDURE — 1159F MED LIST DOCD IN RCRD: CPT | Mod: CPTII,S$GLB,, | Performed by: PHYSICIAN ASSISTANT

## 2021-09-10 PROCEDURE — 1160F RVW MEDS BY RX/DR IN RCRD: CPT | Mod: CPTII,S$GLB,, | Performed by: PHYSICIAN ASSISTANT

## 2021-09-10 PROCEDURE — 3008F PR BODY MASS INDEX (BMI) DOCUMENTED: ICD-10-PCS | Mod: CPTII,S$GLB,, | Performed by: PHYSICIAN ASSISTANT

## 2021-09-10 PROCEDURE — 3075F SYST BP GE 130 - 139MM HG: CPT | Mod: CPTII,S$GLB,, | Performed by: PHYSICIAN ASSISTANT

## 2021-09-10 RX ORDER — ALBUTEROL SULFATE 90 UG/1
2 AEROSOL, METERED RESPIRATORY (INHALATION) EVERY 6 HOURS PRN
Qty: 18 G | Refills: 1 | Status: SHIPPED | OUTPATIENT
Start: 2021-09-10 | End: 2021-11-02 | Stop reason: SDUPTHER

## 2021-09-10 RX ORDER — FAMOTIDINE 20 MG/1
20 TABLET, FILM COATED ORAL 2 TIMES DAILY
Qty: 30 TABLET | Refills: 0 | Status: SHIPPED | OUTPATIENT
Start: 2021-09-10 | End: 2022-03-21

## 2021-09-10 RX ORDER — IBUPROFEN 800 MG/1
800 TABLET ORAL EVERY 6 HOURS PRN
COMMUNITY
End: 2021-09-14 | Stop reason: SDUPTHER

## 2021-09-10 RX ORDER — BENZONATATE 200 MG/1
200 CAPSULE ORAL 3 TIMES DAILY PRN
Qty: 30 CAPSULE | Refills: 0 | Status: SHIPPED | OUTPATIENT
Start: 2021-09-10 | End: 2021-09-20

## 2021-09-10 RX ORDER — FAMOTIDINE 20 MG/1
20 TABLET, FILM COATED ORAL 2 TIMES DAILY
COMMUNITY
End: 2021-09-14

## 2021-09-10 RX ORDER — SULFAMETHOXAZOLE AND TRIMETHOPRIM 800; 160 MG/1; MG/1
1 TABLET ORAL 2 TIMES DAILY
Qty: 14 TABLET | Refills: 0 | Status: SHIPPED | OUTPATIENT
Start: 2021-09-10 | End: 2021-09-17

## 2021-09-10 RX ORDER — IBUPROFEN 800 MG/1
800 TABLET ORAL 3 TIMES DAILY PRN
Qty: 30 TABLET | Refills: 0 | Status: SHIPPED | OUTPATIENT
Start: 2021-09-10 | End: 2021-09-14 | Stop reason: SINTOL

## 2021-09-10 RX ORDER — MUPIROCIN 20 MG/G
OINTMENT TOPICAL 3 TIMES DAILY
Qty: 22 G | Refills: 0 | Status: SHIPPED | OUTPATIENT
Start: 2021-09-10 | End: 2021-09-14 | Stop reason: ALTCHOICE

## 2021-10-12 ENCOUNTER — TELEPHONE (OUTPATIENT)
Dept: PODIATRY | Facility: CLINIC | Age: 66
End: 2021-10-12

## 2021-10-19 ENCOUNTER — OFFICE VISIT (OUTPATIENT)
Dept: PODIATRY | Facility: CLINIC | Age: 66
End: 2021-10-19
Payer: MEDICARE

## 2021-10-19 ENCOUNTER — HOSPITAL ENCOUNTER (OUTPATIENT)
Dept: RADIOLOGY | Facility: HOSPITAL | Age: 66
Discharge: HOME | End: 2021-10-19
Attending: STUDENT IN AN ORGANIZED HEALTH CARE EDUCATION/TRAINING PROGRAM
Payer: MEDICARE

## 2021-10-19 VITALS — WEIGHT: 199.06 LBS | HEIGHT: 64 IN | BODY MASS INDEX: 33.98 KG/M2

## 2021-10-19 DIAGNOSIS — M79.672 ACUTE PAIN OF LEFT FOOT: ICD-10-CM

## 2021-10-19 DIAGNOSIS — M79.672 ACUTE PAIN OF LEFT FOOT: Primary | ICD-10-CM

## 2021-10-19 DIAGNOSIS — S93.409A SPRAIN OF ANKLE, UNSPECIFIED LATERALITY, UNSPECIFIED LIGAMENT, INITIAL ENCOUNTER: ICD-10-CM

## 2021-10-19 DIAGNOSIS — M76.72 PERONEAL TENDONITIS, LEFT: ICD-10-CM

## 2021-10-19 PROCEDURE — 73610 X-RAY EXAM OF ANKLE: CPT | Mod: TC,PN,LT

## 2021-10-19 PROCEDURE — 99213 OFFICE O/P EST LOW 20 MIN: CPT | Mod: S$GLB,,, | Performed by: STUDENT IN AN ORGANIZED HEALTH CARE EDUCATION/TRAINING PROGRAM

## 2021-10-19 PROCEDURE — 1125F PR PAIN SEVERITY QUANTIFIED, PAIN PRESENT: ICD-10-PCS | Mod: CPTII,S$GLB,, | Performed by: STUDENT IN AN ORGANIZED HEALTH CARE EDUCATION/TRAINING PROGRAM

## 2021-10-19 PROCEDURE — 73610 X-RAY EXAM OF ANKLE: CPT | Mod: 26,LT,, | Performed by: RADIOLOGY

## 2021-10-19 PROCEDURE — 73630 X-RAY EXAM OF FOOT: CPT | Mod: 26,LT,, | Performed by: RADIOLOGY

## 2021-10-19 PROCEDURE — 1159F MED LIST DOCD IN RCRD: CPT | Mod: CPTII,S$GLB,, | Performed by: STUDENT IN AN ORGANIZED HEALTH CARE EDUCATION/TRAINING PROGRAM

## 2021-10-19 PROCEDURE — 1101F PT FALLS ASSESS-DOCD LE1/YR: CPT | Mod: CPTII,S$GLB,, | Performed by: STUDENT IN AN ORGANIZED HEALTH CARE EDUCATION/TRAINING PROGRAM

## 2021-10-19 PROCEDURE — 3008F BODY MASS INDEX DOCD: CPT | Mod: CPTII,S$GLB,, | Performed by: STUDENT IN AN ORGANIZED HEALTH CARE EDUCATION/TRAINING PROGRAM

## 2021-10-19 PROCEDURE — 73630 X-RAY EXAM OF FOOT: CPT | Mod: TC,PN,LT

## 2021-10-19 PROCEDURE — 1125F AMNT PAIN NOTED PAIN PRSNT: CPT | Mod: CPTII,S$GLB,, | Performed by: STUDENT IN AN ORGANIZED HEALTH CARE EDUCATION/TRAINING PROGRAM

## 2021-10-19 PROCEDURE — 73630 XR FOOT COMPLETE 3 VIEW LEFT: ICD-10-PCS | Mod: 26,LT,, | Performed by: RADIOLOGY

## 2021-10-19 PROCEDURE — 1101F PR PT FALLS ASSESS DOC 0-1 FALLS W/OUT INJ PAST YR: ICD-10-PCS | Mod: CPTII,S$GLB,, | Performed by: STUDENT IN AN ORGANIZED HEALTH CARE EDUCATION/TRAINING PROGRAM

## 2021-10-19 PROCEDURE — 73610 XR ANKLE COMPLETE 3 VIEW LEFT: ICD-10-PCS | Mod: 26,LT,, | Performed by: RADIOLOGY

## 2021-10-19 PROCEDURE — 1159F PR MEDICATION LIST DOCUMENTED IN MEDICAL RECORD: ICD-10-PCS | Mod: CPTII,S$GLB,, | Performed by: STUDENT IN AN ORGANIZED HEALTH CARE EDUCATION/TRAINING PROGRAM

## 2021-10-19 PROCEDURE — 3008F PR BODY MASS INDEX (BMI) DOCUMENTED: ICD-10-PCS | Mod: CPTII,S$GLB,, | Performed by: STUDENT IN AN ORGANIZED HEALTH CARE EDUCATION/TRAINING PROGRAM

## 2021-10-19 PROCEDURE — 3288F PR FALLS RISK ASSESSMENT DOCUMENTED: ICD-10-PCS | Mod: CPTII,S$GLB,, | Performed by: STUDENT IN AN ORGANIZED HEALTH CARE EDUCATION/TRAINING PROGRAM

## 2021-10-19 PROCEDURE — 99999 PR PBB SHADOW E&M-EST. PATIENT-LVL III: CPT | Mod: PBBFAC,,, | Performed by: STUDENT IN AN ORGANIZED HEALTH CARE EDUCATION/TRAINING PROGRAM

## 2021-10-19 PROCEDURE — 99999 PR PBB SHADOW E&M-EST. PATIENT-LVL III: ICD-10-PCS | Mod: PBBFAC,,, | Performed by: STUDENT IN AN ORGANIZED HEALTH CARE EDUCATION/TRAINING PROGRAM

## 2021-10-19 PROCEDURE — 99213 PR OFFICE/OUTPT VISIT, EST, LEVL III, 20-29 MIN: ICD-10-PCS | Mod: S$GLB,,, | Performed by: STUDENT IN AN ORGANIZED HEALTH CARE EDUCATION/TRAINING PROGRAM

## 2021-10-19 PROCEDURE — 3288F FALL RISK ASSESSMENT DOCD: CPT | Mod: CPTII,S$GLB,, | Performed by: STUDENT IN AN ORGANIZED HEALTH CARE EDUCATION/TRAINING PROGRAM

## 2021-10-19 RX ORDER — METHYLPREDNISOLONE 4 MG/1
TABLET ORAL
Qty: 21 PACKAGE | Refills: 0 | Status: SHIPPED | OUTPATIENT
Start: 2021-10-19 | End: 2021-11-02 | Stop reason: ALTCHOICE

## 2021-10-27 ENCOUNTER — PATIENT MESSAGE (OUTPATIENT)
Dept: PODIATRY | Facility: CLINIC | Age: 66
End: 2021-10-27
Payer: MEDICARE

## 2021-11-11 ENCOUNTER — PATIENT MESSAGE (OUTPATIENT)
Dept: PODIATRY | Facility: CLINIC | Age: 66
End: 2021-11-11
Payer: MEDICARE

## 2021-11-12 ENCOUNTER — TELEPHONE (OUTPATIENT)
Dept: PODIATRY | Facility: CLINIC | Age: 66
End: 2021-11-12
Payer: MEDICARE

## 2021-11-13 ENCOUNTER — OFFICE VISIT (OUTPATIENT)
Dept: URGENT CARE | Facility: CLINIC | Age: 66
End: 2021-11-13
Payer: MEDICARE

## 2021-11-13 VITALS
OXYGEN SATURATION: 98 % | RESPIRATION RATE: 19 BRPM | TEMPERATURE: 99 F | SYSTOLIC BLOOD PRESSURE: 134 MMHG | HEART RATE: 84 BPM | HEIGHT: 64 IN | BODY MASS INDEX: 28.51 KG/M2 | WEIGHT: 167 LBS | DIASTOLIC BLOOD PRESSURE: 86 MMHG

## 2021-11-13 DIAGNOSIS — J02.9 SORE THROAT: Primary | ICD-10-CM

## 2021-11-13 DIAGNOSIS — S93.602A SPRAIN OF LEFT FOOT, INITIAL ENCOUNTER: ICD-10-CM

## 2021-11-13 DIAGNOSIS — J06.9 URI, ACUTE: ICD-10-CM

## 2021-11-13 LAB
CTP QC/QA: YES
CTP QC/QA: YES
MOLECULAR STREP A: NEGATIVE
SARS-COV-2 RDRP RESP QL NAA+PROBE: NEGATIVE

## 2021-11-13 PROCEDURE — 3075F SYST BP GE 130 - 139MM HG: CPT | Mod: CPTII,S$GLB,, | Performed by: FAMILY MEDICINE

## 2021-11-13 PROCEDURE — 1159F PR MEDICATION LIST DOCUMENTED IN MEDICAL RECORD: ICD-10-PCS | Mod: CPTII,S$GLB,, | Performed by: FAMILY MEDICINE

## 2021-11-13 PROCEDURE — 1125F AMNT PAIN NOTED PAIN PRSNT: CPT | Mod: CPTII,S$GLB,, | Performed by: FAMILY MEDICINE

## 2021-11-13 PROCEDURE — U0002 COVID-19 LAB TEST NON-CDC: HCPCS | Mod: QW,S$GLB,, | Performed by: FAMILY MEDICINE

## 2021-11-13 PROCEDURE — 1125F PR PAIN SEVERITY QUANTIFIED, PAIN PRESENT: ICD-10-PCS | Mod: CPTII,S$GLB,, | Performed by: FAMILY MEDICINE

## 2021-11-13 PROCEDURE — 3075F PR MOST RECENT SYSTOLIC BLOOD PRESS GE 130-139MM HG: ICD-10-PCS | Mod: CPTII,S$GLB,, | Performed by: FAMILY MEDICINE

## 2021-11-13 PROCEDURE — 87651 POCT STREP A MOLECULAR: ICD-10-PCS | Mod: QW,S$GLB,, | Performed by: FAMILY MEDICINE

## 2021-11-13 PROCEDURE — 99214 PR OFFICE/OUTPT VISIT, EST, LEVL IV, 30-39 MIN: ICD-10-PCS | Mod: S$GLB,,, | Performed by: FAMILY MEDICINE

## 2021-11-13 PROCEDURE — 3008F BODY MASS INDEX DOCD: CPT | Mod: CPTII,S$GLB,, | Performed by: FAMILY MEDICINE

## 2021-11-13 PROCEDURE — 1159F MED LIST DOCD IN RCRD: CPT | Mod: CPTII,S$GLB,, | Performed by: FAMILY MEDICINE

## 2021-11-13 PROCEDURE — 3079F DIAST BP 80-89 MM HG: CPT | Mod: CPTII,S$GLB,, | Performed by: FAMILY MEDICINE

## 2021-11-13 PROCEDURE — 3008F PR BODY MASS INDEX (BMI) DOCUMENTED: ICD-10-PCS | Mod: CPTII,S$GLB,, | Performed by: FAMILY MEDICINE

## 2021-11-13 PROCEDURE — 99214 OFFICE O/P EST MOD 30 MIN: CPT | Mod: S$GLB,,, | Performed by: FAMILY MEDICINE

## 2021-11-13 PROCEDURE — U0002: ICD-10-PCS | Mod: QW,S$GLB,, | Performed by: FAMILY MEDICINE

## 2021-11-13 PROCEDURE — 87651 STREP A DNA AMP PROBE: CPT | Mod: QW,S$GLB,, | Performed by: FAMILY MEDICINE

## 2021-11-13 PROCEDURE — 3079F PR MOST RECENT DIASTOLIC BLOOD PRESSURE 80-89 MM HG: ICD-10-PCS | Mod: CPTII,S$GLB,, | Performed by: FAMILY MEDICINE

## 2021-11-13 RX ORDER — LORATADINE 10 MG/1
10 TABLET ORAL DAILY
Qty: 30 TABLET | Refills: 2 | Status: SHIPPED | OUTPATIENT
Start: 2021-11-13 | End: 2022-02-02

## 2021-11-13 RX ORDER — PROMETHAZINE HYDROCHLORIDE AND DEXTROMETHORPHAN HYDROBROMIDE 6.25; 15 MG/5ML; MG/5ML
SYRUP ORAL
Qty: 180 ML | Refills: 0 | Status: SHIPPED | OUTPATIENT
Start: 2021-11-13 | End: 2022-05-03

## 2021-11-13 RX ORDER — IBUPROFEN 600 MG/1
600 TABLET ORAL EVERY 6 HOURS PRN
Qty: 30 TABLET | Refills: 1 | Status: SHIPPED | OUTPATIENT
Start: 2021-11-13 | End: 2023-01-17 | Stop reason: ALTCHOICE

## 2021-11-15 ENCOUNTER — OFFICE VISIT (OUTPATIENT)
Dept: PODIATRY | Facility: CLINIC | Age: 66
End: 2021-11-15
Payer: MEDICARE

## 2021-11-15 VITALS
BODY MASS INDEX: 28.67 KG/M2 | HEIGHT: 64 IN | SYSTOLIC BLOOD PRESSURE: 122 MMHG | HEART RATE: 87 BPM | DIASTOLIC BLOOD PRESSURE: 79 MMHG

## 2021-11-15 DIAGNOSIS — M79.672 ACUTE PAIN OF LEFT FOOT: ICD-10-CM

## 2021-11-15 DIAGNOSIS — M76.72 PERONEAL TENDONITIS, LEFT: Primary | ICD-10-CM

## 2021-11-15 PROCEDURE — 99214 PR OFFICE/OUTPT VISIT, EST, LEVL IV, 30-39 MIN: ICD-10-PCS | Mod: S$GLB,,, | Performed by: STUDENT IN AN ORGANIZED HEALTH CARE EDUCATION/TRAINING PROGRAM

## 2021-11-15 PROCEDURE — 99999 PR PBB SHADOW E&M-EST. PATIENT-LVL III: CPT | Mod: PBBFAC,,, | Performed by: STUDENT IN AN ORGANIZED HEALTH CARE EDUCATION/TRAINING PROGRAM

## 2021-11-15 PROCEDURE — 99999 PR PBB SHADOW E&M-EST. PATIENT-LVL III: ICD-10-PCS | Mod: PBBFAC,,, | Performed by: STUDENT IN AN ORGANIZED HEALTH CARE EDUCATION/TRAINING PROGRAM

## 2021-11-15 PROCEDURE — 99214 OFFICE O/P EST MOD 30 MIN: CPT | Mod: S$GLB,,, | Performed by: STUDENT IN AN ORGANIZED HEALTH CARE EDUCATION/TRAINING PROGRAM

## 2021-11-16 ENCOUNTER — HOSPITAL ENCOUNTER (OUTPATIENT)
Dept: RADIOLOGY | Facility: HOSPITAL | Age: 66
Discharge: HOME | End: 2021-11-16
Attending: STUDENT IN AN ORGANIZED HEALTH CARE EDUCATION/TRAINING PROGRAM
Payer: MEDICARE

## 2021-11-16 DIAGNOSIS — M76.72 PERONEAL TENDONITIS, LEFT: ICD-10-CM

## 2021-11-16 DIAGNOSIS — M79.672 ACUTE PAIN OF LEFT FOOT: ICD-10-CM

## 2021-11-16 PROCEDURE — 25500020 PHARM REV CODE 255: Performed by: STUDENT IN AN ORGANIZED HEALTH CARE EDUCATION/TRAINING PROGRAM

## 2021-11-16 PROCEDURE — A9585 GADOBUTROL INJECTION: HCPCS | Performed by: STUDENT IN AN ORGANIZED HEALTH CARE EDUCATION/TRAINING PROGRAM

## 2021-11-16 PROCEDURE — 73720 MRI FOOT (HINDFOOT) LEFT W W/O CONTRAST: ICD-10-PCS | Mod: 26,LT,, | Performed by: RADIOLOGY

## 2021-11-16 PROCEDURE — 73720 MRI LWR EXTREMITY W/O&W/DYE: CPT | Mod: 26,LT,, | Performed by: RADIOLOGY

## 2021-11-16 PROCEDURE — 73720 MRI LWR EXTREMITY W/O&W/DYE: CPT | Mod: TC,LT

## 2021-11-16 RX ORDER — GADOBUTROL 604.72 MG/ML
10 INJECTION INTRAVENOUS
Status: COMPLETED | OUTPATIENT
Start: 2021-11-16 | End: 2021-11-16

## 2021-11-16 RX ADMIN — GADOBUTROL 10 ML: 604.72 INJECTION INTRAVENOUS at 03:11

## 2021-11-17 ENCOUNTER — PATIENT MESSAGE (OUTPATIENT)
Dept: PODIATRY | Facility: CLINIC | Age: 66
End: 2021-11-17
Payer: MEDICARE

## 2021-11-17 ENCOUNTER — HOSPITAL ENCOUNTER (OUTPATIENT)
Dept: RADIOLOGY | Facility: HOSPITAL | Age: 66
Discharge: HOME | End: 2021-11-17
Attending: FAMILY MEDICINE
Payer: MEDICARE

## 2021-11-17 DIAGNOSIS — M76.72 PERONEAL TENDONITIS, LEFT: Primary | ICD-10-CM

## 2021-11-17 DIAGNOSIS — Z12.31 ENCOUNTER FOR SCREENING MAMMOGRAM FOR BREAST CANCER: ICD-10-CM

## 2021-11-17 PROCEDURE — 77067 MAMMO DIGITAL SCREENING BILAT WITH TOMO: ICD-10-PCS | Mod: 26,,, | Performed by: RADIOLOGY

## 2021-11-17 PROCEDURE — 77063 MAMMO DIGITAL SCREENING BILAT WITH TOMO: ICD-10-PCS | Mod: 26,,, | Performed by: RADIOLOGY

## 2021-11-17 PROCEDURE — 77067 SCR MAMMO BI INCL CAD: CPT | Mod: TC

## 2021-11-17 PROCEDURE — 77063 BREAST TOMOSYNTHESIS BI: CPT | Mod: 26,,, | Performed by: RADIOLOGY

## 2021-11-17 PROCEDURE — 77067 SCR MAMMO BI INCL CAD: CPT | Mod: 26,,, | Performed by: RADIOLOGY

## 2021-11-19 ENCOUNTER — TELEPHONE (OUTPATIENT)
Dept: PODIATRY | Facility: CLINIC | Age: 66
End: 2021-11-19
Payer: MEDICARE

## 2021-11-19 ENCOUNTER — PATIENT MESSAGE (OUTPATIENT)
Dept: PODIATRY | Facility: CLINIC | Age: 66
End: 2021-11-19
Payer: MEDICARE

## 2021-11-23 ENCOUNTER — TELEPHONE (OUTPATIENT)
Dept: PODIATRY | Facility: CLINIC | Age: 66
End: 2021-11-23
Payer: MEDICARE

## 2021-12-10 ENCOUNTER — CLINICAL SUPPORT (OUTPATIENT)
Dept: REHABILITATION | Facility: HOSPITAL | Age: 66
End: 2021-12-10
Attending: STUDENT IN AN ORGANIZED HEALTH CARE EDUCATION/TRAINING PROGRAM
Payer: MEDICARE

## 2021-12-10 DIAGNOSIS — M76.72 PERONEAL TENDONITIS, LEFT: ICD-10-CM

## 2021-12-10 DIAGNOSIS — M25.572 CHRONIC PAIN OF LEFT ANKLE: ICD-10-CM

## 2021-12-10 DIAGNOSIS — G89.29 CHRONIC PAIN OF LEFT ANKLE: ICD-10-CM

## 2021-12-10 DIAGNOSIS — M25.672 DECREASED RANGE OF MOTION OF LEFT ANKLE: ICD-10-CM

## 2021-12-10 DIAGNOSIS — R53.1 DECREASED STRENGTH: ICD-10-CM

## 2021-12-10 PROCEDURE — 97110 THERAPEUTIC EXERCISES: CPT | Mod: PN

## 2021-12-10 PROCEDURE — 97161 PT EVAL LOW COMPLEX 20 MIN: CPT | Mod: PN

## 2021-12-15 ENCOUNTER — TELEPHONE (OUTPATIENT)
Dept: REHABILITATION | Facility: HOSPITAL | Age: 66
End: 2021-12-15
Payer: MEDICARE

## 2021-12-15 PROBLEM — M25.572 CHRONIC PAIN OF LEFT ANKLE: Status: ACTIVE | Noted: 2021-12-15

## 2021-12-15 PROBLEM — M25.672 DECREASED RANGE OF MOTION OF LEFT ANKLE: Status: ACTIVE | Noted: 2021-12-15

## 2021-12-15 PROBLEM — R53.1 DECREASED STRENGTH: Status: ACTIVE | Noted: 2021-12-15

## 2021-12-15 PROBLEM — M76.72 PERONEAL TENDONITIS, LEFT: Status: ACTIVE | Noted: 2021-12-15

## 2021-12-15 PROBLEM — G89.29 CHRONIC PAIN OF LEFT ANKLE: Status: ACTIVE | Noted: 2021-12-15

## 2021-12-17 ENCOUNTER — CLINICAL SUPPORT (OUTPATIENT)
Dept: REHABILITATION | Facility: HOSPITAL | Age: 66
End: 2021-12-17
Payer: MEDICARE

## 2021-12-17 DIAGNOSIS — G89.29 CHRONIC PAIN OF LEFT ANKLE: ICD-10-CM

## 2021-12-17 DIAGNOSIS — M25.572 CHRONIC PAIN OF LEFT ANKLE: ICD-10-CM

## 2021-12-17 DIAGNOSIS — M25.672 DECREASED RANGE OF MOTION OF LEFT ANKLE: ICD-10-CM

## 2021-12-17 DIAGNOSIS — R53.1 DECREASED STRENGTH: ICD-10-CM

## 2021-12-17 PROCEDURE — 97110 THERAPEUTIC EXERCISES: CPT | Mod: PN,CQ

## 2021-12-17 PROCEDURE — 97140 MANUAL THERAPY 1/> REGIONS: CPT | Mod: PN,CQ

## 2021-12-21 ENCOUNTER — CLINICAL SUPPORT (OUTPATIENT)
Dept: REHABILITATION | Facility: HOSPITAL | Age: 66
End: 2021-12-21
Payer: MEDICARE

## 2021-12-21 DIAGNOSIS — G89.29 CHRONIC PAIN OF LEFT ANKLE: ICD-10-CM

## 2021-12-21 DIAGNOSIS — M25.672 DECREASED RANGE OF MOTION OF LEFT ANKLE: ICD-10-CM

## 2021-12-21 DIAGNOSIS — R53.1 DECREASED STRENGTH: ICD-10-CM

## 2021-12-21 DIAGNOSIS — M25.572 CHRONIC PAIN OF LEFT ANKLE: ICD-10-CM

## 2021-12-21 PROCEDURE — 97140 MANUAL THERAPY 1/> REGIONS: CPT | Mod: PN | Performed by: PHYSICAL THERAPIST

## 2021-12-21 PROCEDURE — 97110 THERAPEUTIC EXERCISES: CPT | Mod: PN | Performed by: PHYSICAL THERAPIST

## 2021-12-28 ENCOUNTER — CLINICAL SUPPORT (OUTPATIENT)
Dept: REHABILITATION | Facility: HOSPITAL | Age: 66
End: 2021-12-28
Payer: MEDICARE

## 2021-12-28 DIAGNOSIS — M25.672 DECREASED RANGE OF MOTION OF LEFT ANKLE: ICD-10-CM

## 2021-12-28 DIAGNOSIS — G89.29 CHRONIC PAIN OF LEFT ANKLE: ICD-10-CM

## 2021-12-28 DIAGNOSIS — M25.572 CHRONIC PAIN OF LEFT ANKLE: ICD-10-CM

## 2021-12-28 DIAGNOSIS — R53.1 DECREASED STRENGTH: ICD-10-CM

## 2021-12-28 PROCEDURE — 97110 THERAPEUTIC EXERCISES: CPT | Mod: PN | Performed by: PHYSICAL THERAPIST

## 2021-12-28 PROCEDURE — 97140 MANUAL THERAPY 1/> REGIONS: CPT | Mod: PN | Performed by: PHYSICAL THERAPIST

## 2022-01-03 ENCOUNTER — CLINICAL SUPPORT (OUTPATIENT)
Dept: REHABILITATION | Facility: HOSPITAL | Age: 67
End: 2022-01-03
Payer: MEDICARE

## 2022-01-03 DIAGNOSIS — M25.572 CHRONIC PAIN OF LEFT ANKLE: ICD-10-CM

## 2022-01-03 DIAGNOSIS — R53.1 DECREASED STRENGTH: ICD-10-CM

## 2022-01-03 DIAGNOSIS — G89.29 CHRONIC PAIN OF LEFT ANKLE: ICD-10-CM

## 2022-01-03 DIAGNOSIS — M25.672 DECREASED RANGE OF MOTION OF LEFT ANKLE: ICD-10-CM

## 2022-01-03 PROCEDURE — 97110 THERAPEUTIC EXERCISES: CPT | Mod: PN | Performed by: PHYSICAL THERAPIST

## 2022-01-03 PROCEDURE — 97140 MANUAL THERAPY 1/> REGIONS: CPT | Mod: PN | Performed by: PHYSICAL THERAPIST

## 2022-01-03 NOTE — PROGRESS NOTES
"[facility]   Physical Therapy Treatment Note     Name: Dorothy Burt  Clinic Number: [id number]    Therapy Diagnosis:   Encounter Diagnoses   Name Primary?    Decreased strength     Decreased range of motion of left ankle     Chronic pain of left ankle      Physician: Self, Aaarefmiles    Visit Date: 1/3/2022    Reason for Visit:: left ankle pain and limited mobility/ADLs  Evaluation Date: 12/10/2021  Authorization Period Expiration: 01/19/2022  Plan of Care Expiration: 2/4/2022  Visit # / Visits authorized: 5/12  FOTO Visit #:  5/5 Performed 1/3/22  PTA Visit #: 0/5      Time In: 2:15 PM  Time Out: 3:10 PM  Total Billable Time: 54 minutes (TE-3, MT-1)     Precautions: Standard and asthma   MD orders on 11/15/2021: Recommend CAM boot or brace for 4-6 weeks    SUBJECTIVE     Pt reports: the bottom of her foot (tendinous insertion) has been hurting more, but she hasn't been on it too much.    She was compliant with home exercise program.  Response to previous treatment: EV isometric causes pain.  Functional change: no real back pain over the weekend    Pain: 7/10  Location: left ankle/foot and right hip/glut     OBJECTIVE     Objective Measures updated at progress report unless specified.     Treatment     Dorothy received the treatments listed below:      therapeutic exercises to develop strength, endurance, ROM, flexibility and posture for 32 minutes including FOTO:    Ankle circles: 1 min each direction L  Ankle alphabet: x1  Isometric ankle inversion: 12x5'' L  Isometric ankle eversion: 12x5'' L  Isometric ankle PF: 12x5" L  Isometric ankle DF: 12x5" L  Seated toe splaying: x15 L  Seated towel crunches:  2 min L  Foot arching: x15 L    SLR: 3x10 L  Side lying hip abd: 2x10 L  Clamshells: 20x5" L    Standing exercises performed in shoe: - not today d/t pt forgetting shoes  Weight shifts (frontal and sagital planes): 1 min each (3 directions)       Not today:  Piriformis stretch: " "5x10"  TA's: 5"x10  TA with marches: 2x30"   Bridges w/TA's: 10x DL    manual therapy techniques: Joint mobilizations, Manual traction and Soft tissue Mobilization were applied for 18 minutes, including:    IASTM to L peroneals to improve blood flow, soft tissue mobility and pain  PROM to L foot/ankle  L grade I-II subtalar and talocrural mobs  LAD to R      Patient Education and Home Exercises     Home Exercises Provided and Patient Education Provided     Education provided:   - keep all exercises in a pain free range as much as possible.     Written Home Exercises Provided: yes. Exercises were reviewed and Dorothy was able to demonstrate them prior to the end of the session.  Dorothy demonstrated good  understanding of the education provided. See EMR under Patient Instructions for exercises provided during therapy sessions    ASSESSMENT     More pain present at osteo tendinous junction of peroneus brevis today. L hip flexor weakness noted w/ inc'd fatigue with SLR addition today. She forgot her new shoes, so none of her WB exercises were able to be performed.     Dorothy Is progressing well towards her goals.   Pt prognosis is Good.     Pt will continue to benefit from skilled outpatient physical therapy to address the deficits listed in the problem list box on initial evaluation, provide pt/family education and to maximize pt's level of independence in the home and community environment.     Pt's spiritual, cultural and educational needs considered and pt agreeable to plan of care and goals.     Anticipated barriers to physical therapy: co-morbidities.    Goals:   Short Term Goals:  4 weeks  1. Report decreased L ankle pain </= 2/10 with non-weight bearing mobility to increase tolerance for ADLs. PROGRESSING; NOT MET  2. Increase L ankle AROM by 5 degrees for inversion and eversion in order to walk with min to no compensation. PROGRESSING; NOT MET  3. Pt will demo good sitting and standing posture for improved spine and " joint alignment for improved biomechanics. PROGRESSING; NOT MET  4. Pt to tolerate HEP to improve ROM and independence with ADL's. PROGRESSING; NOT MET     Long Term Goals: 8 weeks  1. Report decreased L ankle pain </= 2/10 in standing/walking for short distances without CAM boot donned to increase tolerance for increased QoL and improved ADLs. PROGRESSING; NOT MET  2. Patient goal: to have less pain and avoid surgery. PROGRESSING; NOT MET  3. Increase strength to >/= 4+/5 for BLE to increase tolerance for ADL and work activities. PROGRESSING; NOT MET  4. Pt will report at </= 45% impaired on ANKLE FOTO score to demo increased functional mobility.  PROGRESSING; NOT MET  5. Pt will be able to ambulate community distances and negotiate stairs with minimal ankle pain for increased functional mobility and QoL. PROGRESSING; NOT MET    PLAN     Continue with Plan Of Care and progress toward PT goals.    Louie Tobar, PT

## 2022-01-07 ENCOUNTER — IMMUNIZATION (OUTPATIENT)
Dept: INTERNAL MEDICINE | Facility: CLINIC | Age: 67
End: 2022-01-07
Payer: MEDICARE

## 2022-01-07 DIAGNOSIS — Z23 NEED FOR VACCINATION: Primary | ICD-10-CM

## 2022-01-07 PROCEDURE — 0064A COVID-19, MRNA, LNP-S, PF, 100 MCG/0.25 ML DOSE VACCINE (MODERNA BOOSTER): CPT | Mod: CV19,PBBFAC | Performed by: INTERNAL MEDICINE

## 2022-01-07 PROCEDURE — 91306 COVID-19, MRNA, LNP-S, PF, 100 MCG/0.25 ML DOSE VACCINE (MODERNA BOOSTER): CPT | Mod: PBBFAC | Performed by: INTERNAL MEDICINE

## 2022-01-10 ENCOUNTER — CLINICAL SUPPORT (OUTPATIENT)
Dept: REHABILITATION | Facility: HOSPITAL | Age: 67
End: 2022-01-10
Payer: MEDICARE

## 2022-01-10 DIAGNOSIS — M25.572 CHRONIC PAIN OF LEFT ANKLE: ICD-10-CM

## 2022-01-10 DIAGNOSIS — G89.29 CHRONIC PAIN OF LEFT ANKLE: ICD-10-CM

## 2022-01-10 DIAGNOSIS — M25.672 DECREASED RANGE OF MOTION OF LEFT ANKLE: ICD-10-CM

## 2022-01-10 DIAGNOSIS — R53.1 DECREASED STRENGTH: ICD-10-CM

## 2022-01-10 PROCEDURE — 97110 THERAPEUTIC EXERCISES: CPT | Mod: PN | Performed by: PHYSICAL THERAPIST

## 2022-01-10 PROCEDURE — 97140 MANUAL THERAPY 1/> REGIONS: CPT | Mod: PN | Performed by: PHYSICAL THERAPIST
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
## 2022-01-12 ENCOUNTER — CLINICAL SUPPORT (OUTPATIENT)
Dept: REHABILITATION | Facility: HOSPITAL | Age: 67
End: 2022-01-12
Payer: MEDICARE

## 2022-01-12 DIAGNOSIS — G89.29 CHRONIC PAIN OF LEFT ANKLE: ICD-10-CM

## 2022-01-12 DIAGNOSIS — M25.672 DECREASED RANGE OF MOTION OF LEFT ANKLE: ICD-10-CM

## 2022-01-12 DIAGNOSIS — M25.572 CHRONIC PAIN OF LEFT ANKLE: ICD-10-CM

## 2022-01-12 DIAGNOSIS — R53.1 DECREASED STRENGTH: ICD-10-CM

## 2022-01-12 PROCEDURE — 97140 MANUAL THERAPY 1/> REGIONS: CPT | Mod: PN,CQ

## 2022-01-12 PROCEDURE — 97110 THERAPEUTIC EXERCISES: CPT | Mod: PN,CQ

## 2022-01-12 NOTE — PROGRESS NOTES
"[facility]   Physical Therapy Treatment Note     Name: Dorothy Burt  Clinic Number: [id number]    Therapy Diagnosis:   Encounter Diagnoses   Name Primary?    Decreased strength     Decreased range of motion of left ankle     Chronic pain of left ankle      Physician: Self, Aaarefmiles    Visit Date: 1/12/2022    Reason for Visit:: left ankle pain and limited mobility/ADLs  Evaluation Date: 12/10/2021  Authorization Period Expiration: 01/19/2022  Plan of Care Expiration: 2/4/2022  Visit # / Visits authorized: 7/12  FOTO Visit #:  2/5 Performed 1/3/22  PTA Visit #: 1/5      Time In: 10:15 AM  Time Out: 11:25 AM  Total Billable Time: 70 minutes (TE-4, MT-1)     Precautions: Standard and asthma   MD orders on 11/15/2021: Recommend CAM boot or brace for 4-6 weeks    SUBJECTIVE     Pt reports: her foot hurts on the medial side but her hips are hurting more than usual today.     She was compliant with home exercise program.  Response to previous treatment: feels better  Functional change: no real back pain over the weekend    Pain: 5/10  Location: left ankle/foot and right hip/glut     OBJECTIVE     Objective Measures updated at progress report unless specified.     Treatment     Dorothy received the treatments listed below:      therapeutic exercises to develop strength, endurance, ROM, flexibility and posture for 50 minutes including:    Ankle circles: 1 min each direction L  Ankle alphabet: x1  Seated toe splaying: x15 L  Seated towel crunches:  2 min L  Foot arching: x15 L    SLR: 3x10 L  Piriformis stretch: 10x10"  TA's: 10"x10  TA with marches: 1 min  Bridges w/TA's: 10x DL  Side lying hip abd: 2x10 L  Clamshells: 30x5" L  Side lying eversion: 15x L    Standing exercises performed in shoe:   Weight shifts (frontal and sagital planes): 1 min each (3 directions)  HR: 2x10  Alt DF: 20x each  Fitter gastroc stretch: 3x20" DL    Not today:  Isometric ankle inversion: 12x5'' L  Isometric ankle " "eversion: 12x5'' L  Isometric ankle PF: 12x5" L  Isometric ankle DF: 12x5" L    manual therapy techniques: Joint mobilizations, Manual traction and Soft tissue Mobilization were applied for 20 minutes, including:    IASTM to L peroneals and ant tib to improve blood flow, soft tissue mobility and pain  PROM to L foot/ankle  L grade I-II subtalar and talocrural mobs  LAD to R    Patient Education and Home Exercises     Home Exercises Provided and Patient Education Provided     Education provided:   - keep all exercises in a pain free range as much as possible.     Written Home Exercises Provided: yes. Exercises were reviewed and Dorothy was able to demonstrate them prior to the end of the session.  Dorothy demonstrated good  understanding of the education provided. See EMR under Patient Instructions for exercises provided during therapy sessions    ASSESSMENT     Patient required two very short rest breaks today due to fatigue and pain in her anterior hips.  Continued hip weakness present.  Patient completed her therapy along with today's progressions, noted in bold, with no increase in symptoms prior to leaving the clinic.     Dorothy Is progressing well towards her goals.   Pt prognosis is Good.     Pt will continue to benefit from skilled outpatient physical therapy to address the deficits listed in the problem list box on initial evaluation, provide pt/family education and to maximize pt's level of independence in the home and community environment.     Pt's spiritual, cultural and educational needs considered and pt agreeable to plan of care and goals.     Anticipated barriers to physical therapy: co-morbidities.    Goals:   Short Term Goals:  4 weeks  1. Report decreased L ankle pain </= 2/10 with non-weight bearing mobility to increase tolerance for ADLs. PROGRESSING; NOT MET  2. Increase L ankle AROM by 5 degrees for inversion and eversion in order to walk with min to no compensation. PROGRESSING; NOT MET  3. Pt will " demo good sitting and standing posture for improved spine and joint alignment for improved biomechanics. PROGRESSING; NOT MET  4. Pt to tolerate HEP to improve ROM and independence with ADL's. MET     Long Term Goals: 8 weeks  1. Report decreased L ankle pain </= 2/10 in standing/walking for short distances without CAM boot donned to increase tolerance for increased QoL and improved ADLs. PROGRESSING; NOT MET  2. Patient goal: to have less pain and avoid surgery. PROGRESSING; NOT MET  3. Increase strength to >/= 4+/5 for BLE to increase tolerance for ADL and work activities. PROGRESSING; NOT MET  4. Pt will report at </= 45% impaired on ANKLE FOTO score to demo increased functional mobility.  PROGRESSING; NOT MET  5. Pt will be able to ambulate community distances and negotiate stairs with minimal ankle pain for increased functional mobility and QoL. PROGRESSING; NOT MET    PLAN     Continue with Plan Of Care and progress toward PT goals.  Assess response to inc'd WB and progress as able    Ant Cohen, PTA

## 2022-01-19 ENCOUNTER — CLINICAL SUPPORT (OUTPATIENT)
Dept: REHABILITATION | Facility: HOSPITAL | Age: 67
End: 2022-01-19
Payer: MEDICARE

## 2022-01-19 DIAGNOSIS — G89.29 CHRONIC PAIN OF LEFT ANKLE: ICD-10-CM

## 2022-01-19 DIAGNOSIS — R53.1 DECREASED STRENGTH: ICD-10-CM

## 2022-01-19 DIAGNOSIS — M25.572 CHRONIC PAIN OF LEFT ANKLE: ICD-10-CM

## 2022-01-19 DIAGNOSIS — M25.672 DECREASED RANGE OF MOTION OF LEFT ANKLE: ICD-10-CM

## 2022-01-19 PROCEDURE — 97140 MANUAL THERAPY 1/> REGIONS: CPT | Mod: PN,CQ

## 2022-01-19 PROCEDURE — 97110 THERAPEUTIC EXERCISES: CPT | Mod: PN,CQ

## 2022-01-19 NOTE — PROGRESS NOTES
"[facility]   Physical Therapy Treatment Note     Name: Dorothy Burt  Clinic Number: [id number]    Therapy Diagnosis:   Encounter Diagnoses   Name Primary?    Decreased strength     Decreased range of motion of left ankle     Chronic pain of left ankle      Physician: Self, Aaareferral    Visit Date: 1/19/2022    Reason for Visit:: left ankle pain and limited mobility/ADLs  Evaluation Date: 12/10/2021  Authorization Period Expiration: 01/19/2022  Plan of Care Expiration: 2/4/2022  Visit # / Visits authorized: 8/12  FOTO Visit #:  3/5 Performed 1/3/22  PTA Visit #: 2/5      Time In: 8:15 AM  Time Out: 9:12 AM  Total Billable Time: 57 minutes (TE-3, MT-1)      Precautions: Standard and asthma   MD orders on 11/15/2021: Recommend CAM boot or brace for 4-6 weeks    SUBJECTIVE     Pt reports: her foot and back pain was severe yesterday and feels it was possibly from standing a lot and the cold weather. Patient reports today her pain is better, 5/10.    She was compliant with home exercise program.  Response to previous treatment: feels better  Functional change: no real back pain over the weekend    Pain: 5/10  Location: left ankle/foot and right hip/glut     OBJECTIVE     Objective Measures updated at progress report unless specified.     Treatment     Dorothy received the treatments listed below:      therapeutic exercises to develop strength, endurance, ROM, flexibility and posture for 47 minutes including:    Ankle circles: 1 min each direction L  Ankle alphabet: x1  SLR: 3x10 L  Piriformis stretch: 3x20"  TA's: 10"x10  TA with marches: 1 min  Bridges w/TA's: 15x DL  Side lying hip abd: 2x10 L  Clamshells: 30x5" L  Side lying eversion: 2x10 L    Seated toe splaying: x15 L  Seated towel crunches:  2 min L  Foot arching: x15 L    Standing exercises performed in shoe:   Weight shifts (frontal and sagital planes): 1 min each (3 directions)  HR: 2x10  Alt DF: 20x each  Fitter gastroc stretch: " "3x20" DL    Not today:  Isometric ankle inversion: 12x5'' L  Isometric ankle eversion: 12x5'' L  Isometric ankle PF: 12x5" L  Isometric ankle DF: 12x5" L    manual therapy techniques: Joint mobilizations, Manual traction and Soft tissue Mobilization were applied for 10 minutes, including:    IASTM to L peroneals and ant tib to improve blood flow, soft tissue mobility and pain - NT  PROM to L foot/ankle  L grade I-II subtalar and talocrural mobs - NT  LAD to R  MET for L anterior rotation     Patient Education and Home Exercises     Home Exercises Provided and Patient Education Provided     Education provided:   - keep all exercises in a pain free range as much as possible.     Written Home Exercises Provided: yes. Exercises were reviewed and Dorothy was able to demonstrate them prior to the end of the session.  Dorothy demonstrated good  understanding of the education provided. See EMR under Patient Instructions for exercises provided during therapy sessions    ASSESSMENT     Patient presented with a L anterior innominate, performed MET to correct.  Patient was able to complete her therapy with no rest breaks today.  Continued hip weakness present.  Patient completed her therapy along with today's progressions, noted in bold, with no increase in symptoms prior to leaving the clinic.     Dorothy Is progressing well towards her goals.   Pt prognosis is Good.     Pt will continue to benefit from skilled outpatient physical therapy to address the deficits listed in the problem list box on initial evaluation, provide pt/family education and to maximize pt's level of independence in the home and community environment.     Pt's spiritual, cultural and educational needs considered and pt agreeable to plan of care and goals.     Anticipated barriers to physical therapy: co-morbidities.    Goals:   Short Term Goals:  4 weeks  1. Report decreased L ankle pain </= 2/10 with non-weight bearing mobility to increase tolerance for ADLs. " PROGRESSING; NOT MET  2. Increase L ankle AROM by 5 degrees for inversion and eversion in order to walk with min to no compensation. PROGRESSING; NOT MET  3. Pt will demo good sitting and standing posture for improved spine and joint alignment for improved biomechanics. PROGRESSING; NOT MET  4. Pt to tolerate HEP to improve ROM and independence with ADL's. MET     Long Term Goals: 8 weeks  1. Report decreased L ankle pain </= 2/10 in standing/walking for short distances without CAM boot donned to increase tolerance for increased QoL and improved ADLs. PROGRESSING; NOT MET  2. Patient goal: to have less pain and avoid surgery. PROGRESSING; NOT MET  3. Increase strength to >/= 4+/5 for BLE to increase tolerance for ADL and work activities. PROGRESSING; NOT MET  4. Pt will report at </= 45% impaired on ANKLE FOTO score to demo increased functional mobility.  PROGRESSING; NOT MET  5. Pt will be able to ambulate community distances and negotiate stairs with minimal ankle pain for increased functional mobility and QoL. PROGRESSING; NOT MET    PLAN     Continue with Plan Of Care and progress toward PT goals.  Assess response to inc'd WB and progress as able    Ant Cohen, PTA

## 2022-01-21 ENCOUNTER — CLINICAL SUPPORT (OUTPATIENT)
Dept: REHABILITATION | Facility: HOSPITAL | Age: 67
End: 2022-01-21
Payer: MEDICARE

## 2022-01-21 DIAGNOSIS — M25.672 DECREASED RANGE OF MOTION OF LEFT ANKLE: ICD-10-CM

## 2022-01-21 DIAGNOSIS — R53.1 DECREASED STRENGTH: ICD-10-CM

## 2022-01-21 DIAGNOSIS — M25.572 CHRONIC PAIN OF LEFT ANKLE: ICD-10-CM

## 2022-01-21 DIAGNOSIS — G89.29 CHRONIC PAIN OF LEFT ANKLE: ICD-10-CM

## 2022-01-21 PROCEDURE — 97110 THERAPEUTIC EXERCISES: CPT | Mod: PN,CQ

## 2022-01-21 PROCEDURE — 97140 MANUAL THERAPY 1/> REGIONS: CPT | Mod: PN,CQ

## 2022-01-21 NOTE — PROGRESS NOTES
"[facility]   Physical Therapy Treatment Note     Name: Dorothy Burt  Clinic Number: [id number]    Therapy Diagnosis:   Encounter Diagnoses   Name Primary?    Decreased strength     Decreased range of motion of left ankle     Chronic pain of left ankle      Physician: No ref. provider found    Visit Date: 1/21/2022    Reason for Visit:: left ankle pain and limited mobility/ADLs  Evaluation Date: 12/10/2021  Authorization Period Expiration: 12/13/2022  Plan of Care Expiration: 2/4/2022  Visit # / Visits authorized: 9/12  FOTO Visit #:  4/5 Performed 1/3/22  PTA Visit #: 3/5      Time In: 8:05 AM  Time Out: 9:05 AM  Total Billable Time: 60 minutes (TE-3, MT-1)      Precautions: Standard and asthma   MD orders on 11/15/2021: Recommend CAM boot or brace for 4-6 weeks    SUBJECTIVE     Pt reports: her foot is feeling better but her back pain is severe today. She thinks the cold weather is making it worse.     She was compliant with home exercise program.  Response to previous treatment: feels better  Functional change: no real back pain over the weekend    Pain: 3/10, L ankle, 8/10 B low back and right hip/glut  Location: left ankle/foot and right hip/glut     OBJECTIVE     Objective Measures updated at progress report unless specified.     Treatment     Dorothy received the treatments listed below:      therapeutic exercises to develop strength, endurance, ROM, flexibility and posture for 50 minutes including:    Ankle circles: 1 min each direction L  Ankle alphabet: x1  SLR: 3x10 B  Piriformis stretch: 3x30"  TA's: 10"x10  TA with marches: 1 min  Bridges w/TA's: 2x10 DL  Side lying hip abd: 3x10 L  Clamshells: 30x5" L  Side lying eversion: 2x10 L    Seated toe splaying: x15 L  Seated towel crunches:  2 min L  Foot arching: x15 L    Standing exercises performed in shoe:   Weight shifts (frontal and sagital planes): 1 min each (3 directions)  HR: 2x10 DL tennis ball between  Alt DF: 20x " "each  Fitter gastroc stretch: 3x30" DL    Not today:  Isometric ankle inversion: 12x5'' L  Isometric ankle eversion: 12x5'' L  Isometric ankle PF: 12x5" L  Isometric ankle DF: 12x5" L    manual therapy techniques: Joint mobilizations, Manual traction and Soft tissue Mobilization were applied for 10 minutes, including:    IASTM to L peroneals and ant tib to improve blood flow, soft tissue mobility and pain - NT  PROM to L foot/ankle  L grade I-II subtalar and talocrural mobs - NT  LAD to R  MET for L anterior rotation     Patient Education and Home Exercises     Home Exercises Provided and Patient Education Provided     Education provided:   - keep all exercises in a pain free range as much as possible.   - Pt educated on wearing schedule to begin weaning out of boot    Written Home Exercises Provided: yes. Exercises were reviewed and Dorothy was able to demonstrate them prior to the end of the session.  Dorothy demonstrated good  understanding of the education provided. See EMR under Patient Instructions for exercises provided during therapy sessions    ASSESSMENT     Patient presented with a L anterior innominate, performed MET to correct.  Patient was able to complete her therapy with no rest breaks today.  Able to tolerate increased activity as bolded with reports of decreased pain after treatment. Rates back pain at "3-4/10" at end of session.  States "left ankle "just a little sore" Demonstrated good technique with gait in tennis shoe today.  Patient educated to begin weaning out of boot and into tennis shoe to tolerance.  Dorothy Is progressing well towards her goals.   Pt prognosis is Good.     Pt will continue to benefit from skilled outpatient physical therapy to address the deficits listed in the problem list box on initial evaluation, provide pt/family education and to maximize pt's level of independence in the home and community environment.     Pt's spiritual, cultural and educational needs considered and pt " agreeable to plan of care and goals.     Anticipated barriers to physical therapy: co-morbidities.    Goals:   Short Term Goals:  4 weeks  1. Report decreased L ankle pain </= 2/10 with non-weight bearing mobility to increase tolerance for ADLs. PROGRESSING; NOT MET  2. Increase L ankle AROM by 5 degrees for inversion and eversion in order to walk with min to no compensation. PROGRESSING; NOT MET  3. Pt will demo good sitting and standing posture for improved spine and joint alignment for improved biomechanics. PROGRESSING; NOT MET  4. Pt to tolerate HEP to improve ROM and independence with ADL's. MET     Long Term Goals: 8 weeks  1. Report decreased L ankle pain </= 2/10 in standing/walking for short distances without CAM boot donned to increase tolerance for increased QoL and improved ADLs. PROGRESSING; NOT MET  2. Patient goal: to have less pain and avoid surgery. PROGRESSING; NOT MET  3. Increase strength to >/= 4+/5 for BLE to increase tolerance for ADL and work activities. PROGRESSING; NOT MET  4. Pt will report at </= 45% impaired on ANKLE FOTO score to demo increased functional mobility.  PROGRESSING; NOT MET  5. Pt will be able to ambulate community distances and negotiate stairs with minimal ankle pain for increased functional mobility and QoL. PROGRESSING; NOT MET    PLAN     Continue with Plan Of Care and progress toward PT goals.  Assess response to inc'd WB and progress as able  Monitor response to weaning out of boot.    Nicolle Chakraborty, PTA

## 2022-01-24 ENCOUNTER — CLINICAL SUPPORT (OUTPATIENT)
Dept: REHABILITATION | Facility: HOSPITAL | Age: 67
End: 2022-01-24
Payer: MEDICARE

## 2022-01-24 DIAGNOSIS — M25.672 DECREASED RANGE OF MOTION OF LEFT ANKLE: ICD-10-CM

## 2022-01-24 DIAGNOSIS — M25.572 CHRONIC PAIN OF LEFT ANKLE: ICD-10-CM

## 2022-01-24 DIAGNOSIS — R53.1 DECREASED STRENGTH: ICD-10-CM

## 2022-01-24 DIAGNOSIS — G89.29 CHRONIC PAIN OF LEFT ANKLE: ICD-10-CM

## 2022-01-24 PROCEDURE — 97110 THERAPEUTIC EXERCISES: CPT | Mod: PN,CQ

## 2022-01-24 PROCEDURE — 97140 MANUAL THERAPY 1/> REGIONS: CPT | Mod: PN,CQ

## 2022-01-24 NOTE — PROGRESS NOTES
"[facility]   Physical Therapy Treatment Note     Name: Dorothy Burt  Clinic Number: [id number]    Therapy Diagnosis:   Encounter Diagnoses   Name Primary?    Decreased strength     Decreased range of motion of left ankle     Chronic pain of left ankle      Physician: Self, Aaareferral    Visit Date: 1/24/2022    Reason for Visit:: left ankle pain and limited mobility/ADLs  Evaluation Date: 12/10/2021  Authorization Period Expiration: 12/13/2022  Plan of Care Expiration: 2/4/2022  Visit # / Visits authorized: 10/12  FOTO Visit #:  5/5 Performed 1/3/22  PTA Visit #: 4/5      Time In: 11:15 AM  Time Out: 12:10 AM  Total Billable Time: 55 minutes (TE-3, MT-1)      Precautions: Standard and asthma   MD orders on 11/15/2021: Recommend CAM boot or brace for 4-6 weeks    SUBJECTIVE     Pt reports: she continues with more back pain than foot pain.  Patient reports she is weaning out of her boot, did not wear it a lot over the weekend.     She was compliant with home exercise program.  Response to previous treatment: feels better  Functional change: no real back pain over the weekend    Pain: 3/10, L ankle, 8/10 B low back and right hip/glut  Location: left ankle/foot and right hip/glut     OBJECTIVE     Objective Measures updated at progress report unless specified.     Treatment     Dorothy received the treatments listed below:      therapeutic exercises to develop strength, endurance, ROM, flexibility and posture for 45 minutes including:    Ankle circles: 1 min each direction L  Ankle alphabet: x1 set  TA's: 10"x10  TA with marches: 1 min  Piriformis stretch: 3x30"  Bridges w/TA's: 2x10 DL  SLR: 3x10 B   Side lying hip abd: 3x10 L   Clamshells: 30x5" L  Side lying eversion: 2x10 L    Seated toe splaying: 2x10 L  Seated towel crunches:  2 min L  Foot arching: x15 L    Standing exercises performed in shoe:   Weight shifts (frontal and sagital planes): 1 min each (3 directions)  HR: 2x10 DL " "tennis ball between  Alt DF: 20x each  Fitter gastroc stretch: 3x30" DL    Not today:  Isometric ankle inversion: 12x5'' L  Isometric ankle eversion: 12x5'' L  Isometric ankle PF: 12x5" L  Isometric ankle DF: 12x5" L    manual therapy techniques: Joint mobilizations, Manual traction and Soft tissue Mobilization were applied for 10 minutes, including:    IASTM to L peroneals and ant tib to improve blood flow, soft tissue mobility and pain - NT  PROM to L foot/ankle  L grade I-II subtalar and talocrural mobs - NT  LAD to R  MET for L anterior rotation x3    Patient Education and Home Exercises     Home Exercises Provided and Patient Education Provided     Education provided:   - keep all exercises in a pain free range as much as possible.   - Pt educated on wearing schedule to begin weaning out of boot    Written Home Exercises Provided: yes. Exercises were reviewed and Dorothy was able to demonstrate them prior to the end of the session.  Dorothy demonstrated good  understanding of the education provided. See EMR under Patient Instructions for exercises provided during therapy sessions    ASSESSMENT     Patient presented with a L anterior innominate, performed MET 2 rounds and the beginning and 2 rounds at the end of today's treatment to correct.  Demonstrated good technique with gait in tennis shoe today.  Patient instructed to continue weaning out of boot and into tennis shoe to tolerance.  Patient completed her therapy with no increase in symptoms prior to leaving the clinic.   Dorothy Is progressing well towards her goals.   Pt prognosis is Good.     Pt will continue to benefit from skilled outpatient physical therapy to address the deficits listed in the problem list box on initial evaluation, provide pt/family education and to maximize pt's level of independence in the home and community environment.     Pt's spiritual, cultural and educational needs considered and pt agreeable to plan of care and goals.     Anticipated " barriers to physical therapy: co-morbidities.    Goals:   Short Term Goals:  4 weeks  1. Report decreased L ankle pain </= 2/10 with non-weight bearing mobility to increase tolerance for ADLs. PROGRESSING; NOT MET  2. Increase L ankle AROM by 5 degrees for inversion and eversion in order to walk with min to no compensation. PROGRESSING; NOT MET  3. Pt will demo good sitting and standing posture for improved spine and joint alignment for improved biomechanics. PROGRESSING; NOT MET  4. Pt to tolerate HEP to improve ROM and independence with ADL's. MET     Long Term Goals: 8 weeks  1. Report decreased L ankle pain </= 2/10 in standing/walking for short distances without CAM boot donned to increase tolerance for increased QoL and improved ADLs. PROGRESSING; NOT MET  2. Patient goal: to have less pain and avoid surgery. PROGRESSING; NOT MET  3. Increase strength to >/= 4+/5 for BLE to increase tolerance for ADL and work activities. PROGRESSING; NOT MET  4. Pt will report at </= 45% impaired on ANKLE FOTO score to demo increased functional mobility.  PROGRESSING; NOT MET  5. Pt will be able to ambulate community distances and negotiate stairs with minimal ankle pain for increased functional mobility and QoL. PROGRESSING; NOT MET    PLAN     Continue with Plan Of Care and progress toward PT goals.  Assess response to inc'd WB and progress as able  Monitor response to weaning out of boot.    Ant Cohen, PTA

## 2022-01-28 ENCOUNTER — CLINICAL SUPPORT (OUTPATIENT)
Dept: REHABILITATION | Facility: HOSPITAL | Age: 67
End: 2022-01-28
Payer: MEDICARE

## 2022-01-28 DIAGNOSIS — M25.672 DECREASED RANGE OF MOTION OF LEFT ANKLE: ICD-10-CM

## 2022-01-28 DIAGNOSIS — M25.572 CHRONIC PAIN OF LEFT ANKLE: ICD-10-CM

## 2022-01-28 DIAGNOSIS — G89.29 CHRONIC PAIN OF LEFT ANKLE: ICD-10-CM

## 2022-01-28 DIAGNOSIS — R53.1 DECREASED STRENGTH: ICD-10-CM

## 2022-01-28 PROCEDURE — 97140 MANUAL THERAPY 1/> REGIONS: CPT | Mod: PN,CQ

## 2022-01-28 PROCEDURE — 97110 THERAPEUTIC EXERCISES: CPT | Mod: PN,CQ

## 2022-01-28 NOTE — PROGRESS NOTES
"[facility]   Physical Therapy Treatment Note     Name: Dorothy Burt  Clinic Number: [id number]    Therapy Diagnosis:   Encounter Diagnoses   Name Primary?    Decreased strength     Decreased range of motion of left ankle     Chronic pain of left ankle      Physician: Self, Aaarefmiles    Visit Date: 1/28/2022    Reason for Visit:: left ankle pain and limited mobility/ADLs  Evaluation Date: 12/10/2021  Authorization Period Expiration: 12/13/2022  Plan of Care Expiration: 2/4/2022  Visit # / Visits authorized: 11/12  FOTO Visit #:  1/5 Performed 1/3/22, 1/28/22  PTA Visit #: 5/5      Time In: 8:00 AM  Time Out: 8:58 AM  Total Billable Time: 58 minutes (TE-3, MT-1)      Precautions: Standard and asthma   MD orders on 11/15/2021: Recommend CAM boot or brace for 4-6 weeks    SUBJECTIVE     Pt reports: she continues with more back pain than foot pain.     She was compliant with home exercise program.  Response to previous treatment: feels better  Functional change: no real back pain over the weekend    Pain: 3/10, L ankle, 4/10 B low back and right hip/glut  Location: left ankle/foot and right hip/glut     OBJECTIVE     Objective Measures updated at progress report unless specified.     Treatment     Dorothy received the treatments listed below:      therapeutic exercises to develop strength, endurance, ROM, flexibility and posture for 50 minutes including:    Ankle circles: 1 min each direction L  Ankle alphabet: x1 set  TA's: 10"x10  TA with marches: 1 min  Piriformis stretch: 3x30"  Bridges w/TA's: 2x10 DL  SLR: 2x10, 1# B   Side lying hip abd: 3x10 L   Clamshells: 30x5" L  Side lying eversion: 2x10 L    Seated toe splaying: 3x10 L  Seated towel crunches:  2 min L  Foot arching: 2x10 L    Standing exercises performed in shoe:   Weight shifts (frontal and sagital planes): 1 min each (3 directions)  HR: 2x10 DL tennis ball between  Alt DF: 30x each  Fitter gastroc stretch: 3x30" DL    Not " "today:  Isometric ankle inversion: 12x5'' L  Isometric ankle eversion: 12x5'' L  Isometric ankle PF: 12x5" L  Isometric ankle DF: 12x5" L    manual therapy techniques: Joint mobilizations, Manual traction and Soft tissue Mobilization were applied for 8 minutes, including:    IASTM to L peroneals and ant tib to improve blood flow, soft tissue mobility and pain - NT  PROM to L foot/ankle  L grade I-II subtalar and talocrural mobs - NT  LAD to R  MET for L anterior rotation     Patient Education and Home Exercises     Home Exercises Provided and Patient Education Provided     Education provided:   - keep all exercises in a pain free range as much as possible.   - Pt educated on wearing schedule to begin weaning out of boot    Written Home Exercises Provided: yes. Exercises were reviewed and Dorothy was able to demonstrate them prior to the end of the session.  Dorothy demonstrated good  understanding of the education provided. See EMR under Patient Instructions for exercises provided during therapy sessions    ASSESSMENT     Patient presented with a L anterior innominate, performed MET to correct.  Demonstrated good technique with gait in tennis shoe today.  Patient was reminded to continue weaning out of boot and into tennis shoe to tolerance.  Patient completed her therapy along with today's progressions, noted in bold, with no increase in symptoms prior to leaving the clinic.   Dorothy Is progressing well towards her goals.   Pt prognosis is Good.     Pt will continue to benefit from skilled outpatient physical therapy to address the deficits listed in the problem list box on initial evaluation, provide pt/family education and to maximize pt's level of independence in the home and community environment.     Pt's spiritual, cultural and educational needs considered and pt agreeable to plan of care and goals.     Anticipated barriers to physical therapy: co-morbidities.    Goals:   Short Term Goals:  4 weeks  1. Report " decreased L ankle pain </= 2/10 with non-weight bearing mobility to increase tolerance for ADLs. PROGRESSING; NOT MET  2. Increase L ankle AROM by 5 degrees for inversion and eversion in order to walk with min to no compensation. PROGRESSING; NOT MET  3. Pt will demo good sitting and standing posture for improved spine and joint alignment for improved biomechanics. PROGRESSING; NOT MET  4. Pt to tolerate HEP to improve ROM and independence with ADL's. MET     Long Term Goals: 8 weeks  1. Report decreased L ankle pain </= 2/10 in standing/walking for short distances without CAM boot donned to increase tolerance for increased QoL and improved ADLs. PROGRESSING; NOT MET  2. Patient goal: to have less pain and avoid surgery. PROGRESSING; NOT MET  3. Increase strength to >/= 4+/5 for BLE to increase tolerance for ADL and work activities. PROGRESSING; NOT MET  4. Pt will report at </= 45% impaired on ANKLE FOTO score to demo increased functional mobility.  PROGRESSING; NOT MET  5. Pt will be able to ambulate community distances and negotiate stairs with minimal ankle pain for increased functional mobility and QoL. PROGRESSING; NOT MET    PLAN     Continue with Plan Of Care and progress toward PT goals.  Assess response to inc'd WB and progress as able  Monitor response to weaning out of boot.    Ant Cohen, PTA

## 2022-02-01 ENCOUNTER — CLINICAL SUPPORT (OUTPATIENT)
Dept: REHABILITATION | Facility: HOSPITAL | Age: 67
End: 2022-02-01
Payer: MEDICARE

## 2022-02-01 DIAGNOSIS — M25.672 DECREASED RANGE OF MOTION OF LEFT ANKLE: ICD-10-CM

## 2022-02-01 DIAGNOSIS — R53.1 DECREASED STRENGTH: ICD-10-CM

## 2022-02-01 DIAGNOSIS — M25.572 CHRONIC PAIN OF LEFT ANKLE: ICD-10-CM

## 2022-02-01 DIAGNOSIS — G89.29 CHRONIC PAIN OF LEFT ANKLE: ICD-10-CM

## 2022-02-01 PROCEDURE — 97110 THERAPEUTIC EXERCISES: CPT | Mod: PN

## 2022-02-01 PROCEDURE — 97140 MANUAL THERAPY 1/> REGIONS: CPT | Mod: PN

## 2022-02-01 NOTE — PROGRESS NOTES
"[facility]   Physical Therapy Treatment Note     Name: Dorothy Burt  Clinic Number: [id number]    Therapy Diagnosis:   Encounter Diagnoses   Name Primary?    Decreased strength     Decreased range of motion of left ankle     Chronic pain of left ankle      Physician: Self, Aaareferral    Visit Date: 2/1/2022    Reason for Visit:: left ankle pain and limited mobility/ADLs  Evaluation Date: 12/10/2021  Authorization Period Expiration: 12/13/2022  Plan of Care Expiration: 2/4/2022, 4/1/2022  Visit # / Visits authorized: 12/12  FOTO Visit #:  2/10  PTA Visit #: 0/5      Time In: 9:05 AM  Time Out: 10:00 AM  Total Billable Time: 55 minutes (TE-3, MT-1)      Precautions: Standard and asthma   MD orders on 11/15/2021: Recommend CAM boot or brace for 4-6 weeks    SUBJECTIVE     Pt reports: she continues with more back pain than foot pain.     She was compliant with home exercise program.  Response to previous treatment: feels better  Functional change: no real back pain over the weekend    Pain: 3/10, L ankle, 4/10 B low back and right hip/glut  Location: left ankle/foot and right hip/glut     OBJECTIVE     Objective Measures updated at progress report unless specified.     Treatment     Dorothy received the treatments listed below:      therapeutic exercises to develop strength, endurance, ROM, flexibility and posture for 45 minutes including:    Ankle circles: 1 min each direction L  Bridges w/TA's: 3x10 DL  SLR: 2x10, 1# B   Open/closed books: 8x3'' B  Clamshells: 30x5" L - not done today  Side lying eversion: 2x20 L    Lateral step downs: 2x10 B, 4 inch step  Heel raises: 2x12 DL  SLS: 4x15'' L  Standing hip abduction: 2x12 B  Leg press: next    Standing exercises performed in shoe:   Weight shifts (frontal and sagital planes): 1 min each (3 directions)  HR: 2x10 DL tennis ball between  Alt DF: 30x each  Fitter gastroc stretch: 3x30" DL    Not today:  Isometric ankle inversion: 12x5'' " "L  Isometric ankle eversion: 12x5'' L  Isometric ankle PF: 12x5" L  Isometric ankle DF: 12x5" L    manual therapy techniques: Joint mobilizations, Manual traction and Soft tissue Mobilization were applied for 10 minutes, including:  PROM to L foot/ankle  L grade I-II subtalar and talocrural mobs  LAD to R  MET for L anterior rotation     Patient Education and Home Exercises     Home Exercises Provided and Patient Education Provided   Education provided:   - keep all exercises in a pain free range as much as possible.   - Pt educated on wearing schedule to begin weaning out of boot    Written Home Exercises Provided: yes. Exercises were reviewed and Dorothy was able to demonstrate them prior to the end of the session.  Dorothy demonstrated good  understanding of the education provided. See EMR under Patient Instructions for exercises provided during therapy sessions    ASSESSMENT     Left sided low back pain is worse than her left ankle pain at this time. Increased left ankle weight bearing activities in tennis shoes. Goal to discharge CAM boot in 2 weeks, and progress single leg balance and tolerance of LLE. Pt did well today and good tolerance with initiation of these activities.   Dorothy Is progressing well towards her goals.   Pt prognosis is Good.     Pt will continue to benefit from skilled outpatient physical therapy to address the deficits listed in the problem list box on initial evaluation, provide pt/family education and to maximize pt's level of independence in the home and community environment.     Pt's spiritual, cultural and educational needs considered and pt agreeable to plan of care and goals.     Anticipated barriers to physical therapy: co-morbidities.    Goals:   Short Term Goals:  4 weeks  1. Report decreased L ankle pain </= 2/10 with non-weight bearing mobility to increase tolerance for ADLs. PROGRESSING; NOT MET  2. Increase L ankle AROM by 5 degrees for inversion and eversion in order to walk with " min to no compensation. PROGRESSING; NOT MET  3. Pt will demo good sitting and standing posture for improved spine and joint alignment for improved biomechanics. PROGRESSING; NOT MET  4. Pt to tolerate HEP to improve ROM and independence with ADL's. MET     Long Term Goals: 8 weeks  1. Report decreased L ankle pain </= 2/10 in standing/walking for short distances without CAM boot donned to increase tolerance for increased QoL and improved ADLs. PROGRESSING; NOT MET  2. Patient goal: to have less pain and avoid surgery. PROGRESSING; NOT MET  3. Increase strength to >/= 4+/5 for BLE to increase tolerance for ADL and work activities. PROGRESSING; NOT MET  4. Pt will report at </= 45% impaired on ANKLE FOTO score to demo increased functional mobility.  PROGRESSING; NOT MET  5. Pt will be able to ambulate community distances and negotiate stairs with minimal ankle pain for increased functional mobility and QoL. PROGRESSING; NOT MET    PLAN     Continue with Plan Of Care and progress toward PT goals.  Assess response to inc'd WB and progress as able  Monitor response to weaning out of boot.    Jose C Lutz, PT

## 2022-02-02 NOTE — PLAN OF CARE
Outpatient Therapy Updated Plan of Care     Visit Date: 2/1/2022  Name: Dorothy Burt  Clinic Number: [id number]    Therapy Diagnosis:   Encounter Diagnoses   Name Primary?    Decreased strength     Decreased range of motion of left ankle     Chronic pain of left ankle      Physician: Self, Aaareferral    Reason for Visit:: left ankle pain and limited mobility/ADLs  Evaluation Date: 12/10/2021  Authorization Period Expiration: 12/13/2022  Total Visits Received: 12    Current Certification Period:  12/10/2021 to 1/28/2022  Precautions:  Standard and asthma   MD orders on 11/15/2021: Recommend CAM boot or brace for 4-6 weeks  Visits from Evaluation Date:  11  Functional Level Prior to Evaluation:  Left ankle pain with A/PROM, WBAT in CAM boot    Subjective     Update: she continues with more back pain than foot pain. wearing CAM boot when leaving the house, but wearing her tennis shoes for a couple of hours at home now. Pain continues to decrease for her left ankle    Objective     Update:   Gait: walking with left foot CAM boot donned, mild limp with tennis shoes donned  Posture: increased forward lean and decreased LLE WB with CAM boot donned on L  Sensation: WNL  Palpation: +TTP distal to L ankle lateral malleolus along peroneus longus tendon and at peroneus brevis tendon insertion  Flexibility: limited by L ankle pain  Overhead Squat: not tested    A/PROM and MMT  * = L ankle pain with testing  NT = Not tested  Hip  Right   Left  Pain/Dysfunction with Movement    AROM PROM MMT AROM PROM MMT    Flexion WFL WFL 5/5 WFL WFL 5/5    Extension WFL WFL NT WFL WFL NT    Abduction WFL WFL 4/5 WFL WFL 4/5       Knee  Right   Left  Pain/Dysfunction with Movement    AROM PROM MMT AROM PROM MMT    Flexion (140 deg) WFL WFL 5/5 WFL WFL 5/5    Extension (0-5 deg) WFL WFL 5/5 WFL WFL 5/5      Ankle  Right   Left  Pain/Dysfunction with Movement    AROM PROM MMT AROM PROM MMT    Great toe (45/70 deg) -- -- 5/5 -- -- 5/5  Decreased L toes flexion/extension   Plantarflexion (50 deg 40 WNL 5/5 32 38* 4+/5    Dorsiflexion (20 deg) 5 WNL 5/5 2 4* 5/5    Inversion (20-30 deg) 25 WNL 5/5 21 22* 4/5    Eversion (5-10 deg) 20 WNL 5/5 18 19* 4/5      DL heel raise assessment = able to perform equally B  SL heel raise assessment = decreased height on L compared to R    Special tests:  Anterior Drawer Test = decreased mobility on L compared to R  Anterior Talofibular Ligament Stress test = soft end feel on L compared to firm end feel on R  Calcaneofibular ligament stress test = negative for L ankle pain  Interdigital Neuroma Test = negative B  Licona test = no tested   Talar Tilit Test = negative for pain on L  Navicular drop test = not tested   (Difference of >10 mm is considered significant excessive foot pronation.)  Tinel's Sign Test (tarsal tunnel syndrome) = not tested    Assessment     Update: Pt improving weight bearing tolerance outside of CAM boot, improving pain free left ankle A/PROM, decreased L ankle tenderness - only point tender at peroneals at base of left lateral malleolus, improving left calf strength, and fair tolerance with beginner single leg activities. Pt has been dealing with low back pain and priority will be discharging CAM boot as soon as possible, and ramp up process has already started. Pt progressing well and moderate caution with high level activities overall. Pt would benefit form continued skilled PT to improve balance, decrease fall risk, increase quality of life, and increase functional mobility.     Previous Short Term Goals Status:   Short Term Goals:  4 weeks  1. Report decreased L ankle pain </= 2/10 with non-weight bearing mobility to increase tolerance for ADLs. PROGRESSING; NOT MET  2. Increase L ankle AROM by 5 degrees for inversion and eversion in order to walk with min to no compensation. PROGRESSING; NOT MET  3. Pt will demo good sitting and standing posture for improved spine and joint alignment  for improved biomechanics. PROGRESSING; NOT MET  4. Pt to tolerate HEP to improve ROM and independence with ADL's. MET     Long Term Goals: 8 weeks  1. Report decreased L ankle pain </= 2/10 in standing/walking for short distances without CAM boot donned to increase tolerance for increased QoL and improved ADLs. PROGRESSING; NOT MET  2. Patient goal: to have less pain and avoid surgery. PROGRESSING; NOT MET  3. Increase strength to >/= 4+/5 for BLE to increase tolerance for ADL and work activities. PROGRESSING; NOT MET  4. Pt will report at </= 45% impaired on ANKLE FOTO score to demo increased functional mobility.  PROGRESSING; NOT MET  5. Pt will be able to ambulate community distances and negotiate stairs with minimal ankle pain for increased functional mobility and QoL. PROGRESSING; NOT MET  Long Term Goal Status:   continue per initial plan of care.  Reasons for Recertification of Therapy:   UPOC    Plan     Updated Certification Period: 2/1/2022 to 4/1/2022  Recommended Treatment Plan: 2 times per week for 8 weeks: Aquatic Therapy, Cervical/Lumbar Traction, Electrical Stimulation IFC/Russian, Gait Training, Manual Therapy, Moist Heat/ Ice, Neuromuscular Re-ed, Orthotic Management and Training, Patient Education, Self Care, Therapeutic Activities and Therapeutic Exercise  Other Recommendations: None    Jose C Lutz, PT  2/1/2022      I CERTIFY THE NEED FOR THESE SERVICES FURNISHED UNDER THIS PLAN OF TREATMENT AND WHILE UNDER MY CARE    Physician's comments:        Physician's Signature: ___________________________________________________

## 2022-02-03 ENCOUNTER — CLINICAL SUPPORT (OUTPATIENT)
Dept: REHABILITATION | Facility: HOSPITAL | Age: 67
End: 2022-02-03
Payer: MEDICARE

## 2022-02-03 DIAGNOSIS — R53.1 DECREASED STRENGTH: ICD-10-CM

## 2022-02-03 DIAGNOSIS — M25.572 CHRONIC PAIN OF LEFT ANKLE: ICD-10-CM

## 2022-02-03 DIAGNOSIS — G89.29 CHRONIC PAIN OF LEFT ANKLE: ICD-10-CM

## 2022-02-03 DIAGNOSIS — M25.672 DECREASED RANGE OF MOTION OF LEFT ANKLE: ICD-10-CM

## 2022-02-03 PROCEDURE — 97110 THERAPEUTIC EXERCISES: CPT | Mod: PN | Performed by: PHYSICAL THERAPIST

## 2022-02-03 PROCEDURE — 97140 MANUAL THERAPY 1/> REGIONS: CPT | Mod: PN | Performed by: PHYSICAL THERAPIST

## 2022-02-03 NOTE — PROGRESS NOTES
"[facility]   Physical Therapy Treatment Note     Name: Dorothy Burt  Clinic Number: [id number]    Therapy Diagnosis:   Encounter Diagnoses   Name Primary?    Decreased strength     Decreased range of motion of left ankle     Chronic pain of left ankle      Physician: Self, Aaareferral    Visit Date: 2/3/2022    Reason for Visit:: left ankle pain and limited mobility/ADLs  Evaluation Date: 12/10/2021  Authorization Period Expiration: 12/13/2022  Plan of Care Expiration: 4/1/2022  Visit # / Visits authorized: 13/24  FOTO Visit #:  2/10  PTA Visit #: 0/5      Time In: 10:00 AM  Time Out: 10:57 AM  Total Billable Time: 57 minutes (TE-3, MT-1)      Precautions: Standard and asthma   MD orders on 11/15/2021: Recommend CAM boot or brace for 4-6 weeks    SUBJECTIVE     Pt reports: yesterday morning, she felt really good. By noon, pain increased walking around. She put the boot on for 2-3 hrs and her leg was still hurting. She woke up feeling better. Her (medial) foot has started hurting now too. Her hip feels good and only a little LBP.    She was compliant with home exercise program.  Response to previous treatment: feels better  Functional change: out of shoe about 2 hrs morning and afternoon    Pain: 4/10, L ankle, 2/10 B low back and right hip/glut  Location: left ankle/foot and right hip/glut     OBJECTIVE     Objective Measures updated at progress report unless specified.     Treatment     Dorothy received the treatments listed below:      therapeutic exercises to develop strength, endurance, ROM, flexibility and posture for 49 minutes including:    Ankle circles: 1 min each direction L  Bridges w/TA's: 3x10 DL  SLR: 3x10, 1# B   Open/closed books: 8x3'' B  Clamshells: 30x5" L  Side lying eversion: 2x20 L  Isometric ankle eversion: 12x5'' L with manual resistance      Standing exercises performed in shoe:   Lateral step downs: 2x10 B, 4 inch step  Heel raises: 2x12 DL  SLS: 1x15'', 3x20" " "L  Standing hip abduction: 2x12 B  Leg press: seat 4, 5 plates 2x10 DL  Alt DF: 30x each  Fitter gastroc stretch: 3x30" DL    Not today:  Isometric ankle inversion: 12x5'' L  Isometric ankle PF: 12x5" L  Isometric ankle DF: 12x5" L    manual therapy techniques: Joint mobilizations, Manual traction and Soft tissue Mobilization were applied for 8 minutes, including:  PROM to L foot/ankle  L grade I-II subtalar and talocrural mobs    Not today  LAD to R  MET for L anterior rotation     Patient Education and Home Exercises     Home Exercises Provided and Patient Education Provided   Education provided:   - keep all exercises in a pain free range as much as possible.   - Pt educated on wearing schedule to begin weaning out of boot    Written Home Exercises Provided: yes. Exercises were reviewed and Dorothy was able to demonstrate them prior to the end of the session.  Dorothy demonstrated good  understanding of the education provided. See EMR under Patient Instructions for exercises provided during therapy sessions    ASSESSMENT     Pt reports inc'd LBP and L ankle pain yesterday, but pain improved today. Pain today more in region of ATFL and worse with inversion PROM and active eversion. She arrived today wearing her shoe and continues to progress weight bearing tolerance in the shoe. She reported dec'd pain upon completion of session.     Dorothy Is progressing well towards her goals.   Pt prognosis is Good.     Pt will continue to benefit from skilled outpatient physical therapy to address the deficits listed in the problem list box on initial evaluation, provide pt/family education and to maximize pt's level of independence in the home and community environment.     Pt's spiritual, cultural and educational needs considered and pt agreeable to plan of care and goals.     Anticipated barriers to physical therapy: co-morbidities.    Goals:   Short Term Goals:  4 weeks  1. Report decreased L ankle pain </= 2/10 with non-weight " bearing mobility to increase tolerance for ADLs. PROGRESSING; NOT MET  2. Increase L ankle AROM by 5 degrees for inversion and eversion in order to walk with min to no compensation. PROGRESSING; NOT MET  3. Pt will demo good sitting and standing posture for improved spine and joint alignment for improved biomechanics. PROGRESSING; NOT MET  4. Pt to tolerate HEP to improve ROM and independence with ADL's. MET     Long Term Goals: 8 weeks  1. Report decreased L ankle pain </= 2/10 in standing/walking for short distances without CAM boot donned to increase tolerance for increased QoL and improved ADLs. PROGRESSING; NOT MET  2. Patient goal: to have less pain and avoid surgery. PROGRESSING; NOT MET  3. Increase strength to >/= 4+/5 for BLE to increase tolerance for ADL and work activities. PROGRESSING; NOT MET  4. Pt will report at </= 45% impaired on ANKLE FOTO score to demo increased functional mobility.  PROGRESSING; NOT MET  5. Pt will be able to ambulate community distances and negotiate stairs with minimal ankle pain for increased functional mobility and QoL. PROGRESSING; NOT MET    PLAN     Continue with Plan Of Care and progress toward PT goals.  Assess response to inc'd WB and progress as able  Monitor response to weaning out of boot.    Louie Tobar, PT

## 2022-02-08 ENCOUNTER — CLINICAL SUPPORT (OUTPATIENT)
Dept: REHABILITATION | Facility: HOSPITAL | Age: 67
End: 2022-02-08
Payer: MEDICARE

## 2022-02-08 DIAGNOSIS — G89.29 CHRONIC PAIN OF LEFT ANKLE: ICD-10-CM

## 2022-02-08 DIAGNOSIS — R53.1 DECREASED STRENGTH: ICD-10-CM

## 2022-02-08 DIAGNOSIS — M25.672 DECREASED RANGE OF MOTION OF LEFT ANKLE: ICD-10-CM

## 2022-02-08 DIAGNOSIS — M25.572 CHRONIC PAIN OF LEFT ANKLE: ICD-10-CM

## 2022-02-08 PROCEDURE — 97110 THERAPEUTIC EXERCISES: CPT | Mod: PN,CQ

## 2022-02-08 NOTE — PROGRESS NOTES
"[facility]   Physical Therapy Treatment Note     Name: Dorothy Burt  Clinic Number: [id number]    Therapy Diagnosis:   Encounter Diagnoses   Name Primary?    Decreased strength     Decreased range of motion of left ankle     Chronic pain of left ankle      Physician: Self, Aaareferral    Visit Date: 2/8/2022    Reason for Visit:: left ankle pain and limited mobility/ADLs  Evaluation Date: 12/10/2021  Authorization Period Expiration: 12/13/2022  Plan of Care Expiration: 4/1/2022  Visit # / Visits authorized: 14/24  FOTO Visit #:  3/10  PTA Visit #: 1/5      Time In: 10:05 AM  Time Out: 11:00 AM  Total Billable Time: 55 minutes (TE-4)      Precautions: Standard and asthma   MD orders on 11/15/2021: Recommend CAM boot or brace for 4-6 weeks    SUBJECTIVE     Pt reports: having as much pain in her R hip as she is in her L ankle.    She was compliant with home exercise program.  Response to previous treatment: feels better  Functional change: out of shoe about 2 hrs morning and afternoon    Pain: 4/10  Location: left ankle/foot and right hip/glut     OBJECTIVE     Objective Measures updated at progress report unless specified.     Treatment     Dorothy received the treatments listed below:      therapeutic exercises to develop strength, endurance, ROM, flexibility and posture for 50 minutes including:    Ankle circles: 1 min each direction L  Bridges w/TA's: 3x10 DL  SLR: 3x10, 1# B   Open/closed books: 10x3'' B  Clamshells: 30x5" L  Side lying eversion: 2x20 L  Isometric ankle eversion: 15x5'' L with manual resistance    Standing exercises performed in shoe:   Lateral step downs: 2x10 B, 4 inch step  Heel raises: 2x12 DL  SLS: 4x20" L  Standing hip abduction: 2x12 B  Leg press: seat 4, 5 plates 2x10 DL  Alt DF: 30x each  Fitter gastroc stretch: 3x30" DL    Not today:  Isometric ankle inversion: 12x5'' L  Isometric ankle PF: 12x5" L  Isometric ankle DF: 12x5" L    manual therapy techniques: " Joint mobilizations, Manual traction and Soft tissue Mobilization were applied for 5 minutes, including:    MET for L anterior innominate.  PROM to L foot/ankle - NT  L grade I-II subtalar and talocrural mobs - NT    Not today  LAD to R    Patient Education and Home Exercises     Home Exercises Provided and Patient Education Provided   Education provided:   - keep all exercises in a pain free range as much as possible.   - Pt educated on wearing schedule to begin weaning out of boot    Written Home Exercises Provided: yes. Exercises were reviewed and Dorothy was able to demonstrate them prior to the end of the session.  Dorothy demonstrated good  understanding of the education provided. See EMR under Patient Instructions for exercises provided during therapy sessions    ASSESSMENT     Pt arrived today wearing her shoe and continues to progress weight bearing tolerance in the shoe.  Pt did not receive MT to her L ankle/foot due to extra time needed for today's progressions and performing MET.        Dorothy Is progressing well towards her goals.   Pt prognosis is Good.     Pt will continue to benefit from skilled outpatient physical therapy to address the deficits listed in the problem list box on initial evaluation, provide pt/family education and to maximize pt's level of independence in the home and community environment.     Pt's spiritual, cultural and educational needs considered and pt agreeable to plan of care and goals.     Anticipated barriers to physical therapy: co-morbidities.    Goals:   Short Term Goals:  4 weeks  1. Report decreased L ankle pain </= 2/10 with non-weight bearing mobility to increase tolerance for ADLs. PROGRESSING; NOT MET  2. Increase L ankle AROM by 5 degrees for inversion and eversion in order to walk with min to no compensation. PROGRESSING; NOT MET  3. Pt will demo good sitting and standing posture for improved spine and joint alignment for improved biomechanics. PROGRESSING; NOT MET  4.  Pt to tolerate HEP to improve ROM and independence with ADL's. MET     Long Term Goals: 8 weeks  1. Report decreased L ankle pain </= 2/10 in standing/walking for short distances without CAM boot donned to increase tolerance for increased QoL and improved ADLs. PROGRESSING; NOT MET  2. Patient goal: to have less pain and avoid surgery. PROGRESSING; NOT MET  3. Increase strength to >/= 4+/5 for BLE to increase tolerance for ADL and work activities. PROGRESSING; NOT MET  4. Pt will report at </= 45% impaired on ANKLE FOTO score to demo increased functional mobility.  PROGRESSING; NOT MET  5. Pt will be able to ambulate community distances and negotiate stairs with minimal ankle pain for increased functional mobility and QoL. PROGRESSING; NOT MET    PLAN     Continue with Plan Of Care and progress toward PT goals.  Assess response to inc'd WB and progress as able  Monitor response to weaning out of boot.    Ant Cohen, PTA

## 2022-02-14 ENCOUNTER — CLINICAL SUPPORT (OUTPATIENT)
Dept: REHABILITATION | Facility: HOSPITAL | Age: 67
End: 2022-02-14
Payer: MEDICARE

## 2022-02-14 DIAGNOSIS — M25.672 DECREASED RANGE OF MOTION OF LEFT ANKLE: ICD-10-CM

## 2022-02-14 DIAGNOSIS — M25.572 CHRONIC PAIN OF LEFT ANKLE: ICD-10-CM

## 2022-02-14 DIAGNOSIS — G89.29 CHRONIC PAIN OF LEFT ANKLE: ICD-10-CM

## 2022-02-14 DIAGNOSIS — R53.1 DECREASED STRENGTH: ICD-10-CM

## 2022-02-14 PROCEDURE — 97110 THERAPEUTIC EXERCISES: CPT | Mod: PN,CQ

## 2022-02-14 NOTE — PROGRESS NOTES
"[facility]   Physical Therapy Treatment Note     Name: Dorothy Burt  Clinic Number: [id number]    Therapy Diagnosis:   Encounter Diagnoses   Name Primary?    Decreased strength     Decreased range of motion of left ankle     Chronic pain of left ankle      Physician: Self, Aaareferral    Visit Date: 2/14/2022    Reason for Visit:: left ankle pain and limited mobility/ADLs  Evaluation Date: 12/10/2021  Authorization Period Expiration: 12/13/2022  Plan of Care Expiration: 4/1/2022  Visit # / Visits authorized: 15/24  FOTO Visit #:  4/10  PTA Visit #: 2/5      Time In: 9:20 AM  Time Out: 10:14 AM  Total Billable Time: 54 minutes (TE-4)      Precautions: Standard and asthma   MD orders on 11/15/2021: Recommend CAM boot or brace for 4-6 weeks    SUBJECTIVE     Pt reports: her R hip and ankle pain isn't too bad today but her L shoulder is hurting a lot.    She was compliant with home exercise program.  Response to previous treatment: feels better  Functional change: out of shoe about 2 hrs morning and afternoon    Pain: 3/10  Location: left ankle/foot and right hip/glut     OBJECTIVE     Objective Measures updated at progress report unless specified.     Treatment     Dorothy received the treatments listed below:      therapeutic exercises to develop strength, endurance, ROM, flexibility and posture for 54 minutes including:    Bridges w/TA's: 3x10 DL  SLR: 3x10, 1# B   Ankle circles: 1 min each direction L  Isometric ankle eversion: 15x5'' L with manual resistance  Open/closed books: 10x3'' B  Clamshells: 30x5" L  Side lying eversion: 2x20 L    Standing exercises performed in shoe:   Lateral step downs: 2x10 B, 4 inch step  Heel raises: 2x15 DL  SLS: 4x20" L  Standing hip abduction: 2x15 B   Leg press: seat 4, 5 plates 3x10 DL  Alt DF: 30x each  Fitter gastroc stretch: 3x30" DL    Not today:  Isometric ankle inversion: 12x5'' L  Isometric ankle PF: 12x5" L  Isometric ankle DF: 12x5" " L    manual therapy techniques: Joint mobilizations, Manual traction and Soft tissue Mobilization were applied for 0 minutes, including:    MET for L anterior innominate.  PROM to L foot/ankle - NT  L grade I-II subtalar and talocrural mobs - NT    Not today  LAD to R    Patient Education and Home Exercises     Home Exercises Provided and Patient Education Provided   Education provided:   - keep all exercises in a pain free range as much as possible.   - Pt educated on wearing schedule to begin weaning out of boot    Written Home Exercises Provided: yes. Exercises were reviewed and Dorothy was able to demonstrate them prior to the end of the session.  Dorothy demonstrated good  understanding of the education provided. See EMR under Patient Instructions for exercises provided during therapy sessions    ASSESSMENT     Pt arrived today wearing her shoe and continues to progress weight bearing tolerance in the shoe.  Patient competed her therapy and had no difficulty with today's progressions, noted in bold, with no increase in symptoms prior to leaving the clinic.         Dorothy Is progressing well towards her goals.   Pt prognosis is Good.     Pt will continue to benefit from skilled outpatient physical therapy to address the deficits listed in the problem list box on initial evaluation, provide pt/family education and to maximize pt's level of independence in the home and community environment.     Pt's spiritual, cultural and educational needs considered and pt agreeable to plan of care and goals.     Anticipated barriers to physical therapy: co-morbidities.    Goals:   Short Term Goals:  4 weeks  1. Report decreased L ankle pain </= 2/10 with non-weight bearing mobility to increase tolerance for ADLs. PROGRESSING; NOT MET  2. Increase L ankle AROM by 5 degrees for inversion and eversion in order to walk with min to no compensation. PROGRESSING; NOT MET  3. Pt will demo good sitting and standing posture for improved spine  and joint alignment for improved biomechanics. PROGRESSING; NOT MET  4. Pt to tolerate HEP to improve ROM and independence with ADL's. MET     Long Term Goals: 8 weeks  1. Report decreased L ankle pain </= 2/10 in standing/walking for short distances without CAM boot donned to increase tolerance for increased QoL and improved ADLs. PROGRESSING; NOT MET  2. Patient goal: to have less pain and avoid surgery. PROGRESSING; NOT MET  3. Increase strength to >/= 4+/5 for BLE to increase tolerance for ADL and work activities. PROGRESSING; NOT MET  4. Pt will report at </= 45% impaired on ANKLE FOTO score to demo increased functional mobility.  PROGRESSING; NOT MET  5. Pt will be able to ambulate community distances and negotiate stairs with minimal ankle pain for increased functional mobility and QoL. PROGRESSING; NOT MET    PLAN     Continue with Plan Of Care and progress toward PT goals.  Assess response to inc'd WB and progress as able  Monitor response to weaning out of boot.    Ant Cohen, PTA

## 2022-02-17 ENCOUNTER — CLINICAL SUPPORT (OUTPATIENT)
Dept: REHABILITATION | Facility: HOSPITAL | Age: 67
End: 2022-02-17
Payer: MEDICARE

## 2022-02-17 DIAGNOSIS — M25.572 CHRONIC PAIN OF LEFT ANKLE: ICD-10-CM

## 2022-02-17 DIAGNOSIS — R53.1 DECREASED STRENGTH: ICD-10-CM

## 2022-02-17 DIAGNOSIS — M25.672 DECREASED RANGE OF MOTION OF LEFT ANKLE: ICD-10-CM

## 2022-02-17 DIAGNOSIS — G89.29 CHRONIC PAIN OF LEFT ANKLE: ICD-10-CM

## 2022-02-17 PROCEDURE — 97140 MANUAL THERAPY 1/> REGIONS: CPT | Mod: PN

## 2022-02-17 PROCEDURE — 97110 THERAPEUTIC EXERCISES: CPT | Mod: PN

## 2022-02-17 NOTE — PROGRESS NOTES
"[facility]   Physical Therapy Treatment Note     Name: Dorothy Burt  Clinic Number: [id number]    Therapy Diagnosis:   Encounter Diagnoses   Name Primary?    Decreased strength     Decreased range of motion of left ankle     Chronic pain of left ankle      Physician: Self, Aaareferral    Visit Date: 2/17/2022    Reason for Visit:: left ankle pain and limited mobility/ADLs  Evaluation Date: 12/10/2021  Authorization Period Expiration: 12/13/2022  Plan of Care Expiration: 4/1/2022  Visit # / Visits authorized: 16/24  FOTO Visit #:  5/10  PTA Visit #: 2/5      Time In: 9:25 AM  Time Out: 10:05 AM  Total Billable Time: 40 minutes (TE-3)      Precautions: Standard and asthma   MD orders on 11/15/2021: Recommend CAM boot or brace for 4-6 weeks    SUBJECTIVE     Pt reports: her R hip and buttock pain is bad today with pain radiating down leg . Ankle doing fairly well today  She was compliant with home exercise program.  Response to previous treatment: feels better  Functional change: out of shoe about 2 hrs morning and afternoon    Pain: 10/10 back/buttock, 2-3/10  Location: left ankle/foot and right hip/glut     OBJECTIVE     Objective Measures updated at progress report unless specified.     Treatment     Dorothy received the treatments listed below:      therapeutic exercises to develop strength, endurance, ROM, flexibility and posture for 30 minutes including:  Sciatic nerve glides 15x  Bridges w/TA's: 3x10 DL- reduced to 2x10 today  SLR: 3x10, 1# B -no resistance today   Ankle circles: 1 min each direction L  Isometric ankle eversion: 15x5'' L with manual resistance  Open/closed books: 10x3'' B  Clamshells: 30x5" B  Side lying eversion: 2x20 L    Standing exercises performed in shoe:   Lateral step downs: 2x10 B, 4 inch step NT  Heel raises: 2x15 DL  SLS: 4x20" L NT  Standing hip abduction: 2x15 B   Leg press: seat 4, 5 plates 3x10 DL  Alt DF: 30x  NT  Fitter gastroc stretch: 3x30" DL " "NT  Sidestepping band at forefoot 15x RTB B    Not today:  Isometric ankle inversion: 12x5'' L  Isometric ankle PF: 12x5" L  Isometric ankle DF: 12x5" L    manual therapy techniques: Joint mobilizations, Manual traction and Soft tissue Mobilization were applied for 10 minutes, including:    MET for L anterior innominate.  PROM to L foot/ankle - NT  L grade I-II subtalar and talocrural mobs - NT  LAD to R    Patient Education and Home Exercises     Home Exercises Provided and Patient Education Provided   Education provided:   - keep all exercises in a pain free range as much as possible.   - Pt educated on wearing schedule to begin weaning out of boot    Written Home Exercises Provided: yes. Exercises were reviewed and Dorothy was able to demonstrate them prior to the end of the session.  Dorothy demonstrated good  understanding of the education provided. See EMR under Patient Instructions for exercises provided during therapy sessions    ASSESSMENT     Pt presented today in tennis shoe with antalgic gait pattern and increased R side low back pain. Upon pelvic assessment, noted anterior pelvic tilt. Performed pelvic correction with slight reduction in initial pain but with return of pain once standing with radiating pain in R LE. Added sciatic nerve glides into treatment plan. Continued with hip and ankle strengthening with modifications as needed today. Resume/progress as tolerated based on patient presentation.   Dorothy Is progressing well towards her goals.   Pt prognosis is Good.     Pt will continue to benefit from skilled outpatient physical therapy to address the deficits listed in the problem list box on initial evaluation, provide pt/family education and to maximize pt's level of independence in the home and community environment.     Pt's spiritual, cultural and educational needs considered and pt agreeable to plan of care and goals.     Anticipated barriers to physical therapy: co-morbidities.    Goals:   Short " Term Goals:  4 weeks  1. Report decreased L ankle pain </= 2/10 with non-weight bearing mobility to increase tolerance for ADLs. PROGRESSING; NOT MET  2. Increase L ankle AROM by 5 degrees for inversion and eversion in order to walk with min to no compensation. PROGRESSING; NOT MET  3. Pt will demo good sitting and standing posture for improved spine and joint alignment for improved biomechanics. PROGRESSING; NOT MET  4. Pt to tolerate HEP to improve ROM and independence with ADL's. MET     Long Term Goals: 8 weeks  1. Report decreased L ankle pain </= 2/10 in standing/walking for short distances without CAM boot donned to increase tolerance for increased QoL and improved ADLs. PROGRESSING; NOT MET  2. Patient goal: to have less pain and avoid surgery. PROGRESSING; NOT MET  3. Increase strength to >/= 4+/5 for BLE to increase tolerance for ADL and work activities. PROGRESSING; NOT MET  4. Pt will report at </= 45% impaired on ANKLE FOTO score to demo increased functional mobility.  PROGRESSING; NOT MET  5. Pt will be able to ambulate community distances and negotiate stairs with minimal ankle pain for increased functional mobility and QoL. PROGRESSING; NOT MET    PLAN     Continue with Plan Of Care and progress toward PT goals.  Assess response to inc'd WB and progress as able  Monitor response to weaning out of boot and back pain.    LAKEISHA GONZALEZ, PT

## 2022-02-22 ENCOUNTER — CLINICAL SUPPORT (OUTPATIENT)
Dept: REHABILITATION | Facility: HOSPITAL | Age: 67
End: 2022-02-22
Payer: MEDICARE

## 2022-02-22 DIAGNOSIS — G89.29 CHRONIC PAIN OF LEFT ANKLE: ICD-10-CM

## 2022-02-22 DIAGNOSIS — M25.572 CHRONIC PAIN OF LEFT ANKLE: ICD-10-CM

## 2022-02-22 DIAGNOSIS — M25.672 DECREASED RANGE OF MOTION OF LEFT ANKLE: ICD-10-CM

## 2022-02-22 DIAGNOSIS — R53.1 DECREASED STRENGTH: Primary | ICD-10-CM

## 2022-02-22 PROCEDURE — 97110 THERAPEUTIC EXERCISES: CPT | Mod: PN

## 2022-02-22 NOTE — PROGRESS NOTES
"[facility]   Physical Therapy Treatment Note     Name: Dorothy Burt  Clinic Number: [id number]    Therapy Diagnosis:   Encounter Diagnoses   Name Primary?    Decreased strength Yes    Decreased range of motion of left ankle     Chronic pain of left ankle      Physician: Self, Aaareferral    Visit Date: 2/22/2022    Reason for Visit:: left ankle pain and limited mobility/ADLs  Evaluation Date: 12/10/2021  Authorization Period Expiration: 12/13/2022  Plan of Care Expiration: 4/1/2022  Visit # / Visits authorized: 17/24  FOTO Visit #:  7/10  PTA Visit #: 0/5      Time In: 10:00 AM  Time Out: 11:00 AM  Total Billable Time: 30 minutes (TE-2)      Precautions: Standard and asthma   MD orders on 11/15/2021: Recommend CAM boot or brace for 4-6 weeks    SUBJECTIVE     Pt reports:hip and back pain not too bad today. Feels like her left foot is getting better overall.   She was compliant with home exercise program.  Response to previous treatment: decreased pain  Functional change: walking in tennis shoes with no brace    Pain: 2-3/10  Location: left ankle/foot    OBJECTIVE     Objective Measures updated at progress report unless specified.     Treatment     Dorothy received the treatments listed below:      therapeutic exercises to develop strength, endurance, ROM, flexibility and posture for 60 minutes including:  Side lying eversion: 2x20 left in plantarflexion  Ankle circles: 1 min each direction L  BAPs board: 30x CW/CCW, level 1    Standing exercises performed in shoe:   Lateral step downs: 2x10 B, 4 inch step  Heel raises: 2x15 DL  SLS: 4x20" left  Tandem walking: next  Feet apart on airex: next  Standing hip abduction: 2x15 B   Leg press: seat 4, 5 plates 3x10 DL  Fitter gastroc stretch: 3x30" DL  Sidestepping band at forefoot 15x RTB B  Leg press: next    manual therapy techniques: Joint mobilizations, Manual traction and Soft tissue Mobilization were applied for 00 minutes, " including:    MET for L anterior innominate.  PROM to L foot/ankle - NT  L grade I-II subtalar and talocrural mobs - NT  LAD to R    Patient Education and Home Exercises     Home Exercises Provided and Patient Education Provided   Education provided:   - keep all exercises in a pain free range as much as possible.   - Pt educated on wearing schedule to begin weaning out of boot    Written Home Exercises Provided: yes. Exercises were reviewed and Dorothy was able to demonstrate them prior to the end of the session.  Dorothy demonstrated good  understanding of the education provided. See EMR under Patient Instructions for exercises provided during therapy sessions    ASSESSMENT     Pt continues to progressing weight bearing tolerance and improving her dynamic balance as well. She is having minimal to no left ankle pain only at isolated end ranges. No sharp pain and she is walking outside her boot full time now. Will progress single leg balance activities and higher resistive activities. Caution with low back pain.   Dorothy Is progressing well towards her goals.   Pt prognosis is Good.     Pt will continue to benefit from skilled outpatient physical therapy to address the deficits listed in the problem list box on initial evaluation, provide pt/family education and to maximize pt's level of independence in the home and community environment.     Pt's spiritual, cultural and educational needs considered and pt agreeable to plan of care and goals.     Anticipated barriers to physical therapy: co-morbidities.    Goals:   Short Term Goals:  4 weeks  1. Report decreased L ankle pain </= 2/10 with non-weight bearing mobility to increase tolerance for ADLs. PROGRESSING; NOT MET  2. Increase L ankle AROM by 5 degrees for inversion and eversion in order to walk with min to no compensation. PROGRESSING; NOT MET  3. Pt will demo good sitting and standing posture for improved spine and joint alignment for improved biomechanics.  PROGRESSING; NOT MET  4. Pt to tolerate HEP to improve ROM and independence with ADL's. MET     Long Term Goals: 8 weeks  1. Report decreased L ankle pain </= 2/10 in standing/walking for short distances without CAM boot donned to increase tolerance for increased QoL and improved ADLs. PROGRESSING; NOT MET  2. Patient goal: to have less pain and avoid surgery. PROGRESSING; NOT MET  3. Increase strength to >/= 4+/5 for BLE to increase tolerance for ADL and work activities. PROGRESSING; NOT MET  4. Pt will report at </= 45% impaired on ANKLE FOTO score to demo increased functional mobility.  PROGRESSING; NOT MET  5. Pt will be able to ambulate community distances and negotiate stairs with minimal ankle pain for increased functional mobility and QoL. PROGRESSING; NOT MET    PLAN     Continue with Plan Of Care and progress toward PT goals.    Jose C Lutz, PT

## 2022-02-25 ENCOUNTER — TELEPHONE (OUTPATIENT)
Dept: REHABILITATION | Facility: HOSPITAL | Age: 67
End: 2022-02-25
Payer: MEDICARE

## 2022-02-25 NOTE — TELEPHONE ENCOUNTER
"Called regarding today's No Show, when patient was told that she missed her appointment today she said "no, my appointment is on Friday".  I let her know today was Friday and she was very apologetic.  I asked her if she wanted to come in later today and she said that she had workers at her house trying to finish up some repairs.  I reminded her that her next visit in on 3/2 @ 8:15.  Patient verbally acknowledged.    "

## 2022-03-02 ENCOUNTER — CLINICAL SUPPORT (OUTPATIENT)
Dept: REHABILITATION | Facility: HOSPITAL | Age: 67
End: 2022-03-02
Payer: MEDICARE

## 2022-03-02 DIAGNOSIS — R53.1 DECREASED STRENGTH: Primary | ICD-10-CM

## 2022-03-02 DIAGNOSIS — M25.672 DECREASED RANGE OF MOTION OF LEFT ANKLE: ICD-10-CM

## 2022-03-02 DIAGNOSIS — G89.29 CHRONIC PAIN OF LEFT ANKLE: ICD-10-CM

## 2022-03-02 DIAGNOSIS — M25.572 CHRONIC PAIN OF LEFT ANKLE: ICD-10-CM

## 2022-03-02 PROCEDURE — 97110 THERAPEUTIC EXERCISES: CPT | Mod: PN,CQ

## 2022-03-02 NOTE — PROGRESS NOTES
"[facility]   Physical Therapy Treatment Note     Name: Dorothy Burt  Clinic Number: [id number]    Therapy Diagnosis:   Encounter Diagnoses   Name Primary?    Decreased strength Yes    Decreased range of motion of left ankle     Chronic pain of left ankle      Physician: Self, Aaareferral    Visit Date: 3/2/2022    Reason for Visit:: left ankle pain and limited mobility/ADLs  Evaluation Date: 12/10/2021  Authorization Period Expiration: 2022  Plan of Care Expiration: 2022  Visit # / Visits authorized:   FOTO Visit #:  8/10  PTA Visit #:       Time In: 8:15 AM  Time Out: 9:10 AM  Total Billable Time: 55 minutes (TE-4)      Precautions: Standard and asthma   MD orders on 11/15/2021: Recommend CAM boot or brace for 4-6 weeks    SUBJECTIVE     Pt reports:hip and back are hurting a little more today.   She was compliant with home exercise program.  Response to previous treatment: decreased pain  Functional change: walking in tennis shoes with no brace    Pain: 4/10  Location: left ankle/foot    OBJECTIVE     Objective Measures updated at progress report unless specified.     Treatment     Dorothy received the treatments listed below:      therapeutic exercises to develop strength, endurance, ROM, flexibility and posture for 50 minutes including:  Side lying eversion: 2x20 left in plantarflexion - NOT TODAY  Ankle circles: 1 min each direction L  BAPs board: 30x CW/CCW, level 1    Standing exercises performed in shoe:   Lateral step downs: 2x10 B, 4 inch step  Heel raises: 2x15 DL  SLS: 4x20" left  Tandem walkin laps in //  Feet apart on airex: 3x20"  Feet together on airex: 3x20"   Standing hip abduction: 2x15 B   Leg press: seat 4, 5 plates 3x10 double leg  Leg press: seat 4, 3 plates 2x10 single leg left   Fitter gastroc stretch: 3x30" DL  Sidestepping band at forefoot 15 ft x2 RTB B    manual therapy techniques: Joint mobilizations, Manual traction and Soft tissue " Mobilization were applied for 5 minutes, including:    MET for L anterior innominate.  PROM to L foot/ankle - NT  L grade I-II subtalar and talocrural mobs - NT  LAD to R    Patient Education and Home Exercises     Home Exercises Provided and Patient Education Provided   Education provided:   - keep all exercises in a pain free range as much as possible.   - Pt educated on wearing schedule to begin weaning out of boot    Written Home Exercises Provided: yes. Exercises were reviewed and Dorothy was able to demonstrate them prior to the end of the session.  Dorothy demonstrated good  understanding of the education provided. See EMR under Patient Instructions for exercises provided during therapy sessions    ASSESSMENT     Pt continues to progressing weight bearing tolerance and improving her dynamic balance as well. She is having minimal to no left ankle pain only at isolated end ranges. No sharp pain and she is walking outside her boot full time now.  Patient presented to therapy with a left anterior innominate, MET and LAD was sued to level her pelvis.  Patient completed her therapy along with new exercises added with no increase in symptoms prior to leaving the clinic.  Will continue to progress single leg balance activities and higher resistive activities. Caution with low back pain.   Dorothy Is progressing well towards her goals.   Pt prognosis is Good.     Pt will continue to benefit from skilled outpatient physical therapy to address the deficits listed in the problem list box on initial evaluation, provide pt/family education and to maximize pt's level of independence in the home and community environment.     Pt's spiritual, cultural and educational needs considered and pt agreeable to plan of care and goals.     Anticipated barriers to physical therapy: co-morbidities.    Goals:   Short Term Goals:  4 weeks  1. Report decreased L ankle pain </= 2/10 with non-weight bearing mobility to increase tolerance for ADLs.  PROGRESSING; NOT MET  2. Increase L ankle AROM by 5 degrees for inversion and eversion in order to walk with min to no compensation. PROGRESSING; NOT MET  3. Pt will demo good sitting and standing posture for improved spine and joint alignment for improved biomechanics. PROGRESSING; NOT MET  4. Pt to tolerate HEP to improve ROM and independence with ADL's. MET     Long Term Goals: 8 weeks  1. Report decreased L ankle pain </= 2/10 in standing/walking for short distances without CAM boot donned to increase tolerance for increased QoL and improved ADLs. PROGRESSING; NOT MET  2. Patient goal: to have less pain and avoid surgery. PROGRESSING; NOT MET  3. Increase strength to >/= 4+/5 for BLE to increase tolerance for ADL and work activities. PROGRESSING; NOT MET  4. Pt will report at </= 45% impaired on ANKLE FOTO score to demo increased functional mobility.  PROGRESSING; NOT MET  5. Pt will be able to ambulate community distances and negotiate stairs with minimal ankle pain for increased functional mobility and QoL. PROGRESSING; NOT MET    PLAN     Continue with Plan Of Care and progress toward PT goals.    Ant Cohen, PTA

## 2022-03-04 ENCOUNTER — CLINICAL SUPPORT (OUTPATIENT)
Dept: REHABILITATION | Facility: HOSPITAL | Age: 67
End: 2022-03-04
Payer: MEDICARE

## 2022-03-04 DIAGNOSIS — M25.672 DECREASED RANGE OF MOTION OF LEFT ANKLE: ICD-10-CM

## 2022-03-04 DIAGNOSIS — R53.1 DECREASED STRENGTH: Primary | ICD-10-CM

## 2022-03-04 DIAGNOSIS — G89.29 CHRONIC PAIN OF LEFT ANKLE: ICD-10-CM

## 2022-03-04 DIAGNOSIS — M25.572 CHRONIC PAIN OF LEFT ANKLE: ICD-10-CM

## 2022-03-04 PROCEDURE — 97140 MANUAL THERAPY 1/> REGIONS: CPT | Mod: PN,CQ

## 2022-03-04 PROCEDURE — 97110 THERAPEUTIC EXERCISES: CPT | Mod: PN,CQ

## 2022-03-04 NOTE — PROGRESS NOTES
"[facility]   Physical Therapy Treatment Note     Name: Dorothy Burt  Clinic Number: [id number]    Therapy Diagnosis:   Encounter Diagnoses   Name Primary?    Decreased strength Yes    Decreased range of motion of left ankle     Chronic pain of left ankle      Physician: Self, Aaareferral    Visit Date: 3/4/2022    Reason for Visit:: left ankle pain and limited mobility/ADLs  Evaluation Date: 12/10/2021  Authorization Period Expiration: 12/13/2022  Plan of Care Expiration: 4/1/2022  Visit # / Visits authorized: 19/24  FOTO Visit #:  9/10  PTA Visit #: 2/5      Time In: 11:00 AM  Time Out: 11:55 AM  Total Billable Time: 55 minutes (TE-3, MT-1)      Precautions: Standard and asthma   MD orders on 11/15/2021: Recommend CAM boot or brace for 4-6 weeks    SUBJECTIVE     Pt reports: her ankle pain is not too bad, 2/10, her back pain is about 3/10.  Patient reports having a lot of pain and limited range of motion in her left shoulder and is going to talk to her doctor soon.  She was compliant with home exercise program.  Response to previous treatment: decreased pain  Functional change: walking in tennis shoes with no brace    Pain: 2/10  Location: left ankle/foot    OBJECTIVE     Objective Measures updated at progress report unless specified.     Treatment     Dorothy received the treatments listed below:      therapeutic exercises to develop strength, endurance, ROM, flexibility and posture for 47 minutes including:  Side lying eversion: 2x20 left in plantarflexion   Ankle circles: 1 min each direction L  BAPs board: 30x CW/CCW, level 1    Standing exercises performed in shoe:   Lateral step downs: 2x10 B, 4 inch step  Heel raises: 2x15 DL   SLS: 3x30" left  Tandem walking: 3 laps in //  Feet apart EC on airex: 3x20"  Feet together EO on airex: 3x20"   Standing hip abduction: 2x15 B   Leg press: seat 4, 5 plates 3x10 double leg  Leg press: seat 4, 3 plates 3x10 single leg left   Fitter " "gastroc stretch: 3x30" DL  Sidestepping band at forefoot 15 ft x3 RTB B    manual therapy techniques: Joint mobilizations, Manual traction and Soft tissue Mobilization were applied for 8 minutes, including:    MET for L anterior innominate - NT   PROM to L foot/ankle   L grade I-II subtalar and talocrural mobs   LAD to R    Patient Education and Home Exercises     Home Exercises Provided and Patient Education Provided   Education provided:   - keep all exercises in a pain free range as much as possible.   - Pt educated on wearing schedule to begin weaning out of boot    Written Home Exercises Provided: yes. Exercises were reviewed and Dorothy was able to demonstrate them prior to the end of the session.  Dorothy demonstrated good  understanding of the education provided. See EMR under Patient Instructions for exercises provided during therapy sessions    ASSESSMENT     Pt continues to progress with weight bearing tolerance and improving her dynamic balance as well. She is having minimal to no left ankle pain only at isolated end ranges. No sharp pain and she is walking outside her boot full time now.  Patient presented to therapy with no innominate today.  Patient completed her therapy and had no difficulty with today's progressions, noted in bold, with no appropriate fatigue reported.  Will continue to progress single leg balance activities and higher resistive activities. Caution with low back pain.   Dorothy Is progressing well towards her goals.   Pt prognosis is Good.     Pt will continue to benefit from skilled outpatient physical therapy to address the deficits listed in the problem list box on initial evaluation, provide pt/family education and to maximize pt's level of independence in the home and community environment.     Pt's spiritual, cultural and educational needs considered and pt agreeable to plan of care and goals.     Anticipated barriers to physical therapy: co-morbidities.    Goals:   Short Term Goals:  " 4 weeks  1. Report decreased L ankle pain </= 2/10 with non-weight bearing mobility to increase tolerance for ADLs. PROGRESSING; NOT MET  2. Increase L ankle AROM by 5 degrees for inversion and eversion in order to walk with min to no compensation. PROGRESSING; NOT MET  3. Pt will demo good sitting and standing posture for improved spine and joint alignment for improved biomechanics. PROGRESSING; NOT MET  4. Pt to tolerate HEP to improve ROM and independence with ADL's. MET     Long Term Goals: 8 weeks  1. Report decreased L ankle pain </= 2/10 in standing/walking for short distances without CAM boot donned to increase tolerance for increased QoL and improved ADLs. PROGRESSING; NOT MET  2. Patient goal: to have less pain and avoid surgery. PROGRESSING; NOT MET  3. Increase strength to >/= 4+/5 for BLE to increase tolerance for ADL and work activities. PROGRESSING; NOT MET  4. Pt will report at </= 45% impaired on ANKLE FOTO score to demo increased functional mobility.  PROGRESSING; NOT MET  5. Pt will be able to ambulate community distances and negotiate stairs with minimal ankle pain for increased functional mobility and QoL. PROGRESSING; NOT MET    PLAN     Continue with Plan Of Care and progress toward PT goals.    Ant Cohen, PTA

## 2022-03-04 NOTE — PATIENT INSTRUCTIONS
trengthening: Resisted External Rotation    Hold Red elastic band in left hand, elbow at side and hand in front of belly button. Rotate forearm out, keeping elbow close to side.  Repeat 10 times left side per set. Do 1 sets per session. Do 2 sessions per day.    Strengthening: Resisted Row    Face anchor at waist height, medium to wide stance. Thumbs up, pull arms back, squeezing shoulder blades together. Do not shrug up. Slowly return to start.  Repeat 10 times each side per set. Do 1 sets per session. Do 2 sessions per day.

## 2022-03-07 ENCOUNTER — DOCUMENTATION ONLY (OUTPATIENT)
Dept: REHABILITATION | Facility: HOSPITAL | Age: 67
End: 2022-03-07
Payer: MEDICARE

## 2022-03-07 NOTE — PROGRESS NOTES
Physical therapist and physical therapy assistant Christophe Cohen discussed patient's treatment plan and goals face to face. Pt will be seen by a physical therapist minimally every 6th visit or every 30 days.    Jose C Lutz, PT, DPT

## 2022-03-15 ENCOUNTER — CLINICAL SUPPORT (OUTPATIENT)
Dept: REHABILITATION | Facility: HOSPITAL | Age: 67
End: 2022-03-15
Payer: MEDICARE

## 2022-03-15 DIAGNOSIS — M25.572 CHRONIC PAIN OF LEFT ANKLE: ICD-10-CM

## 2022-03-15 DIAGNOSIS — R53.1 DECREASED STRENGTH: Primary | ICD-10-CM

## 2022-03-15 DIAGNOSIS — M25.672 DECREASED RANGE OF MOTION OF LEFT ANKLE: ICD-10-CM

## 2022-03-15 DIAGNOSIS — G89.29 CHRONIC PAIN OF LEFT ANKLE: ICD-10-CM

## 2022-03-15 PROCEDURE — 97110 THERAPEUTIC EXERCISES: CPT | Mod: PN,CQ

## 2022-03-15 PROCEDURE — 97140 MANUAL THERAPY 1/> REGIONS: CPT | Mod: PN,CQ

## 2022-03-15 NOTE — PROGRESS NOTES
"[facility]   Physical Therapy Treatment Note     Name: Dorothy Burt  Clinic Number: [id number]    Therapy Diagnosis:   Encounter Diagnoses   Name Primary?    Decreased strength Yes    Decreased range of motion of left ankle     Chronic pain of left ankle      Physician: Self, Aaarefmiles    Visit Date: 3/15/2022    Reason for Visit:: left ankle pain and limited mobility/ADLs  Evaluation Date: 12/10/2021  Authorization Period Expiration: 2022  Plan of Care Expiration: 2022  Visit # / Visits authorized:   FOTO Visit #:  10/10 NEXT VISIT  PTA Visit #: 3/5      Time In: 8:55 AM  Time Out: 9:55 AM  Total Billable Time: 60 minutes (TE-3, MT-1)      Precautions: Standard and asthma   MD orders on 11/15/2021: Recommend CAM boot or brace for 4-6 weeks    SUBJECTIVE     Pt reports: her ankle pain is 2/10, her back pain is still about 3/10.  Patient reports her left shoulder is hurting a lot today and plans on going to see a doctor for it.     She was compliant with home exercise program.  Response to previous treatment: decreased pain  Functional change: walking in tennis shoes with no brace    Pain: 2/10  Location: left ankle/foot    OBJECTIVE     Objective Measures updated at progress report unless specified.     Treatment     Dorothy received the treatments listed below:      therapeutic exercises to develop strength, endurance, ROM, flexibility and posture for 52 minutes including:  Side lying eversion: 2x20 left in plantarflexion   Ankle circles: 1 min each direction L  BAPs board: 30x CW/CCW, level 1    Standing exercises performed in shoe:   Lateral step downs: 2x10 B, 4 inch step  Heel raises: 2x15 DL   SLS: 3x30" left  Tandem walkin laps in //  Feet apart EC on airex: 3x30"  Feet together EO on airex: 3x30"   Standing hip abduction: 2x10 Bilateral yellow theraband     Leg press: seat 4, 5 plates 3x10 double leg  Leg press: seat 4, 3 plates 3x10 single leg left   Fitter " "gastroc stretch: 3x30" DL  Sidestepping band at forefoot 15 ft x3 RTB B    manual therapy techniques: Joint mobilizations, Manual traction and Soft tissue Mobilization were applied for 8 minutes, including:    MET for L anterior innominate - NT   PROM to L foot/ankle   L grade I-II subtalar and talocrural mobs   LAD to R    Patient Education and Home Exercises     Home Exercises Provided and Patient Education Provided   Education provided:   - keep all exercises in a pain free range as much as possible.   - Pt educated on wearing schedule to begin weaning out of boot    Written Home Exercises Provided: yes. Exercises were reviewed and Dorothy was able to demonstrate them prior to the end of the session.  Dorothy demonstrated good  understanding of the education provided. See EMR under Patient Instructions for exercises provided during therapy sessions    ASSESSMENT     Pt continues to progress with weight bearing tolerance and improving her dynamic balance as well. She is having minimal to no left ankle pain only at isolated end ranges. No sharp pain and she is walking outside her boot full time now.  Patient presented to therapy with no innominate today.  Patient completed her therapy and had no difficulty with today's progressions, noted in bold, with no appropriate fatigue reported.  Will continue to progress single leg balance activities and higher resistive activities. Patient continues with low back pain.  Patient had some difficulty transferring today due to left shoulder pain.  Dorothy Is progressing well towards her goals.   Pt prognosis is Good.     Pt will continue to benefit from skilled outpatient physical therapy to address the deficits listed in the problem list box on initial evaluation, provide pt/family education and to maximize pt's level of independence in the home and community environment.     Pt's spiritual, cultural and educational needs considered and pt agreeable to plan of care and goals.   "   Anticipated barriers to physical therapy: co-morbidities.    Goals:   Short Term Goals:  4 weeks  1. Report decreased L ankle pain </= 2/10 with non-weight bearing mobility to increase tolerance for ADLs. PROGRESSING; NOT MET  2. Increase L ankle AROM by 5 degrees for inversion and eversion in order to walk with min to no compensation. PROGRESSING; NOT MET  3. Pt will demo good sitting and standing posture for improved spine and joint alignment for improved biomechanics. PROGRESSING; NOT MET  4. Pt to tolerate HEP to improve ROM and independence with ADL's. MET     Long Term Goals: 8 weeks  1. Report decreased L ankle pain </= 2/10 in standing/walking for short distances without CAM boot donned to increase tolerance for increased QoL and improved ADLs. PROGRESSING; NOT MET  2. Patient goal: to have less pain and avoid surgery. PROGRESSING; NOT MET  3. Increase strength to >/= 4+/5 for BLE to increase tolerance for ADL and work activities. PROGRESSING; NOT MET  4. Pt will report at </= 45% impaired on ANKLE FOTO score to demo increased functional mobility.  PROGRESSING; NOT MET  5. Pt will be able to ambulate community distances and negotiate stairs with minimal ankle pain for increased functional mobility and QoL. PROGRESSING; NOT MET    PLAN     Continue with Plan Of Care and progress toward PT goals.    Ant Cohen, PTA

## 2022-03-18 ENCOUNTER — CLINICAL SUPPORT (OUTPATIENT)
Dept: REHABILITATION | Facility: HOSPITAL | Age: 67
End: 2022-03-18
Payer: MEDICARE

## 2022-03-18 DIAGNOSIS — M25.672 DECREASED RANGE OF MOTION OF LEFT ANKLE: ICD-10-CM

## 2022-03-18 DIAGNOSIS — R53.1 DECREASED STRENGTH: Primary | ICD-10-CM

## 2022-03-18 DIAGNOSIS — G89.29 CHRONIC PAIN OF LEFT ANKLE: ICD-10-CM

## 2022-03-18 DIAGNOSIS — M25.572 CHRONIC PAIN OF LEFT ANKLE: ICD-10-CM

## 2022-03-18 PROCEDURE — 97110 THERAPEUTIC EXERCISES: CPT | Mod: PN | Performed by: PHYSICAL THERAPIST

## 2022-03-18 PROCEDURE — 97112 NEUROMUSCULAR REEDUCATION: CPT | Mod: PN | Performed by: PHYSICAL THERAPIST

## 2022-03-18 NOTE — PROGRESS NOTES
"[facility]   Physical Therapy Treatment Note     Name: Dorothy Burt  Clinic Number: [id number]    Therapy Diagnosis:   Encounter Diagnoses   Name Primary?    Decreased strength Yes    Decreased range of motion of left ankle     Chronic pain of left ankle      Physician: Self, Aaarefmiles    Visit Date: 3/18/2022    Reason for Visit:: left ankle pain and limited mobility/ADLs  Evaluation Date: 12/10/2021  Authorization Period Expiration: 2022  Plan of Care Expiration: 2022  Visit # / Visits authorized:   FOTO Visit #:  10/10 Performed 3/18/22  PTA Visit #: 0/5      Time In: 10:30 AM  Time Out: 11:28 AM  Total Billable Time: 56 minutes (TE-2, NMR-2)      Precautions: Standard and asthma   MD orders on 11/15/2021: Recommend CAM boot or brace for 4-6 weeks    SUBJECTIVE     Pt reports: she's scheduled to see an ortho doctor Tuesday for her shoulder. Her foot and ankle are doing "pretty good" but she feels a little with eversion. She does have some pain if she stands too long  She was compliant with home exercise program.  Response to previous treatment: decreased pain  Functional change: walking in tennis shoes with no brace    Pain: 2/10  Location: left ankle/foot    OBJECTIVE     Objective Measures updated at progress report unless specified.     Treatment     Dorothy received the treatments listed below:      therapeutic exercises to develop strength, endurance, ROM, flexibility and posture for 30 minutes including FOTO:  Side lying eversion: 2x20 left in plantarflexion   Ankle circles: 1 min each direction L      Standing exercises performed in shoe:   Lateral step downs: 3x10 B, 4 inch step  Tandem walkin laps at edge of mat table  Standing hip abduction: 2x10 Bilateral yellow theraband     Leg press: seat 5, 5 plates 3x10 double leg  Leg press: seat 5, 3 plates 3x10 single leg left   Fitter gastroc stretch: 3x30" DL  Sidestepping band at forefoot 15 ft x3 RTB " "Bilateral - not today      neuromuscular re-education activities to improve: Balance, Coordination and Proprioception for 26 minutes. The following activities were included:  SLS: 3x30" left (max of 5" without touching with upper extremity)  Foam marches (slow motion): 2 minutes (mostly no upper extremity)  Heel raises: 30x double leg on foam   Feet together EC on airex: 3x30"  BOSU stepovers (round side up): 20x left   Side step to BOSU with pause (round side up): 20x to left         manual therapy techniques: Joint mobilizations, Manual traction and Soft tissue Mobilization were applied for 0 minutes, including:    MET for L anterior innominate - NT   PROM to L foot/ankle   L grade I-II subtalar and talocrural mobs   LAD to R    Patient Education and Home Exercises     Home Exercises Provided and Patient Education Provided   Education provided:   - keep all exercises in a pain free range as much as possible.   - Pt educated on wearing schedule to begin weaning out of boot    Written Home Exercises Provided: yes. Exercises were reviewed and Dorothy was able to demonstrate them prior to the end of the session.  Dorothy demonstrated good  understanding of the education provided. See EMR under Patient Instructions for exercises provided during therapy sessions    ASSESSMENT     Pt continues to report minimal pain. Progressed balance and stability exercises today. Improvements in particular with stability with eyes closed stance with feet together on foam. Unable to perform single leg stance without upper extremity balance support > 5 seconds due to loss of balance.      Dorothy Is progressing well towards her goals.   Pt prognosis is Good.     Pt will continue to benefit from skilled outpatient physical therapy to address the deficits listed in the problem list box on initial evaluation, provide pt/family education and to maximize pt's level of independence in the home and community environment.     Pt's spiritual, cultural " and educational needs considered and pt agreeable to plan of care and goals.     Anticipated barriers to physical therapy: co-morbidities.    Goals:   Short Term Goals:  4 weeks  1. Report decreased L ankle pain </= 2/10 with non-weight bearing mobility to increase tolerance for ADLs. MET  2. Increase L ankle AROM by 5 degrees for inversion and eversion in order to walk with min to no compensation. PROGRESSING; NOT MET  3. Pt will demo good sitting and standing posture for improved spine and joint alignment for improved biomechanics.  MET  4. Pt to tolerate HEP to improve ROM and independence with ADL's. MET     Long Term Goals: 8 weeks  1. Report decreased L ankle pain </= 2/10 in standing/walking for short distances without CAM boot donned to increase tolerance for increased QoL and improved ADLs. PROGRESSING; NOT MET  2. Patient goal: to have less pain and avoid surgery.  MET  3. Increase strength to >/= 4+/5 for BLE to increase tolerance for ADL and work activities. PROGRESSING; NOT MET  4. Pt will report at </= 45% impaired on ANKLE FOTO score to demo increased functional mobility.   MET  5. Pt will be able to ambulate community distances and negotiate stairs with minimal ankle pain for increased functional mobility and QoL. PROGRESSING; NOT MET    PLAN     Continue with Plan Of Care and progress toward PT goals.  Plan discharge on 3/28/22    Louie Tobar, PT

## 2022-03-22 ENCOUNTER — CLINICAL SUPPORT (OUTPATIENT)
Dept: REHABILITATION | Facility: HOSPITAL | Age: 67
End: 2022-03-22
Payer: MEDICARE

## 2022-03-22 DIAGNOSIS — M25.672 DECREASED RANGE OF MOTION OF LEFT ANKLE: ICD-10-CM

## 2022-03-22 DIAGNOSIS — M25.512 LEFT SHOULDER PAIN, UNSPECIFIED CHRONICITY: Primary | ICD-10-CM

## 2022-03-22 DIAGNOSIS — G89.29 CHRONIC PAIN OF LEFT ANKLE: ICD-10-CM

## 2022-03-22 DIAGNOSIS — M25.572 CHRONIC PAIN OF LEFT ANKLE: ICD-10-CM

## 2022-03-22 DIAGNOSIS — R53.1 DECREASED STRENGTH: Primary | ICD-10-CM

## 2022-03-22 PROCEDURE — 97112 NEUROMUSCULAR REEDUCATION: CPT | Mod: PN,CQ

## 2022-03-22 PROCEDURE — 97110 THERAPEUTIC EXERCISES: CPT | Mod: PN,CQ

## 2022-03-22 NOTE — PROGRESS NOTES
"[facility]   Physical Therapy Treatment Note     Name: Dorothy Burt  Clinic Number: [id number]    Therapy Diagnosis:   Encounter Diagnoses   Name Primary?    Decreased strength Yes    Decreased range of motion of left ankle     Chronic pain of left ankle      Physician: Self, Aaareferral    Visit Date: 3/22/2022    Reason for Visit:: left ankle pain and limited mobility/ADLs  Evaluation Date: 12/10/2021  Authorization Period Expiration: 2022  Plan of Care Expiration: 2022  Visit # / Visits authorized:   FOTO Visit #:  1/10 Performed 3/18/22  PTA Visit #:       Time In: 9:10 AM  Time Out: 9:59 AM  Total Billable Time: 49 minutes (TE-2, NMR-1)      Precautions: Standard and asthma   MD orders on 11/15/2021: Recommend CAM boot or brace for 4-6 weeks    SUBJECTIVE     Pt reports: her foot was hurting a lot last night, 4/10, after walking a lot yesterday but today her foot pain is a little better, 3/10.  She was compliant with home exercise program.  Response to previous treatment: decreased pain  Functional change: walking in tennis shoes with no brace    Pain: 3/10  Location: left ankle/foot    OBJECTIVE     Objective Measures updated at progress report unless specified.     Treatment     Dorothy received the treatments listed below:      therapeutic exercises to develop strength, endurance, ROM, flexibility and posture for 30 minutes including:  Side lying eversion: 2x20 left in plantarflexion   Ankle circles: 1 min each direction L    Standing exercises performed in shoe:   Lateral step downs: 3x10 B, 4 inch step  Tandem walkin laps at edge of mat table  Standing hip abduction: 3x10 Bilateral yellow theraband     Leg press: seat 5, 5 plates 3x10 double leg  Leg press: seat 5, 3 plates 3x10 single leg left   Fitter gastroc stretch: 3x30" DL  Sidestepping band at forefoot 15 ft x3 RTB Bilateral - not today    neuromuscular re-education activities to improve: Balance, " "Coordination and Proprioception for 19 minutes. The following activities were included:  SLS: 3x30" left (max of 5" without touching with upper extremity)  Foam marches (slow motion): 2 minutes (mostly no upper extremity)  Heel raises: 30x double leg on foam   Feet together EC on airex: 3x30"   BOSU stepovers (round side up): 20x left   Side step to BOSU with pause (round side up): 20x to left     manual therapy techniques: Joint mobilizations, Manual traction and Soft tissue Mobilization were applied for 0 minutes, including:    MET for L anterior innominate - NT   PROM to L foot/ankle   L grade I-II subtalar and talocrural mobs   LAD to R    Patient Education and Home Exercises     Home Exercises Provided and Patient Education Provided   Education provided:   - keep all exercises in a pain free range as much as possible.   - Pt educated on wearing schedule to begin weaning out of boot    Written Home Exercises Provided: yes. Exercises were reviewed and Dorothy was able to demonstrate them prior to the end of the session.  Dorothy demonstrated good  understanding of the education provided. See EMR under Patient Instructions for exercises provided during therapy sessions    ASSESSMENT     Pt continues to report minimal pain. Progressed balance and stability exercises today. Improvements in particular with stability with eyes closed stance with feet together on foam. Unable to perform single leg stance without upper extremity balance support > 5 seconds due to loss of balance.    Dorothy Is progressing well towards her goals.   Pt prognosis is Good.     Pt will continue to benefit from skilled outpatient physical therapy to address the deficits listed in the problem list box on initial evaluation, provide pt/family education and to maximize pt's level of independence in the home and community environment.     Pt's spiritual, cultural and educational needs considered and pt agreeable to plan of care and goals.     Anticipated " barriers to physical therapy: co-morbidities.    Goals:   Short Term Goals:  4 weeks  1. Report decreased L ankle pain </= 2/10 with non-weight bearing mobility to increase tolerance for ADLs. MET  2. Increase L ankle AROM by 5 degrees for inversion and eversion in order to walk with min to no compensation. PROGRESSING; NOT MET  3. Pt will demo good sitting and standing posture for improved spine and joint alignment for improved biomechanics.  MET  4. Pt to tolerate HEP to improve ROM and independence with ADL's. MET     Long Term Goals: 8 weeks  1. Report decreased L ankle pain </= 2/10 in standing/walking for short distances without CAM boot donned to increase tolerance for increased QoL and improved ADLs. PROGRESSING; NOT MET  2. Patient goal: to have less pain and avoid surgery.  MET  3. Increase strength to >/= 4+/5 for BLE to increase tolerance for ADL and work activities. PROGRESSING; NOT MET  4. Pt will report at </= 45% impaired on ANKLE FOTO score to demo increased functional mobility.   MET  5. Pt will be able to ambulate community distances and negotiate stairs with minimal ankle pain for increased functional mobility and QoL. PROGRESSING; NOT MET    PLAN     Continue with Plan Of Care and progress toward PT goals.  Plan discharge on 3/28/22    Ant Cohen, PTA

## 2022-03-24 NOTE — PROGRESS NOTES
Patient ID:   Dorothy Burt is a 67 y.o. female.    Chief Complaint:   Left shoulder pain    HPI:   The patient is a 67 year old female who is presenting with a >1 month history of left shoulder pain. She denies any known injury to the left shoulder. She reports 10/10 pain. The pain is worse with motion, especially elevation and abduction type motions. She denies any current relieving factors. Treatment has included OTC medications. She denies neck pain, numbness, or paresthesias.     Medications:    Current Outpatient Medications:     acyclovir (ZOVIRAX) 400 MG tablet, TAKE 1 BY MOUTH 5 TIMES DAILY FOR 5 DAYS, Disp: 25 tablet, Rfl: 2    albuterol (PROAIR HFA) 90 mcg/actuation inhaler, Inhale 2 puffs into the lungs every 6 (six) hours as needed for Wheezing or Shortness of Breath. Rescue, Disp: 18 g, Rfl: 11    diclofenac sodium (VOLTAREN) 1 % Gel, Apply 2 g topically 2 (two) times daily as needed (to knees)., Disp: 100 g, Rfl: 2    famotidine (PEPCID) 20 MG tablet, TAKE 1 TABLET BY MOUTH TWICE A DAY, Disp: 30 tablet, Rfl: 0    ibuprofen (ADVIL,MOTRIN) 600 MG tablet, Take 1 tablet (600 mg total) by mouth every 6 (six) hours as needed for Pain., Disp: 30 tablet, Rfl: 1    levocetirizine (XYZAL) 5 MG tablet, TAKE 1 TABLET BY MOUTH EVERY DAY AS NEEDED, Disp: 30 tablet, Rfl: 11    levothyroxine (SYNTHROID) 25 MCG tablet, TAKE 1.5 TABLET BY MOUTH DAILY, Disp: 135 tablet, Rfl: 3    loratadine (CLARITIN) 10 mg tablet, TAKE 1 TABLET BY MOUTH EVERY DAY, Disp: 90 tablet, Rfl: 1    naproxen (NAPROSYN) 500 MG tablet, TAKE 1 TABLET BY MOUTH TWICE A DAY AS NEEDED, Disp: 60 tablet, Rfl: 0    omeprazole (PRILOSEC) 20 MG capsule, Take 20 mg by mouth once daily., Disp: , Rfl:     ondansetron (ZOFRAN-ODT) 4 MG TbDL, Take 1 tablet (4 mg total) by mouth every 8 (eight) hours as needed (nausea)., Disp: 12 tablet, Rfl: 0    promethazine-dextromethorphan (PROMETHAZINE-DM) 6.25-15 mg/5 mL Syrp, Take one tsp po q 6 hrs prn  "cough, Disp: 180 mL, Rfl: 0    traMADoL (ULTRAM) 50 mg tablet, TAKE 2 TABLETS BY MOUTH EVERY 8 HOURS AS NEEDED FOR PAIN., Disp: 60 tablet, Rfl: 0    traZODone (DESYREL) 50 MG tablet, TAKE 1 TABLET BY MOUTH NIGHTLY AS NEEDED FOR INSOMNIA., Disp: 30 tablet, Rfl: 11    triamcinolone acetonide 0.1% (KENALOG) 0.1 % cream, Apply topically 2 (two) times daily., Disp: 15 g, Rfl: 2    ZANAFLEX 4 mg tablet, Take 4 mg by mouth every 8 (eight) hours as needed., Disp: , Rfl:     Allergies:  Review of patient's allergies indicates:  No Known Allergies    Past Medical History:  Past Medical History:   Diagnosis Date    Arthritis     Asthma     Back pain     Knee pain     Thyroid disease         Past Surgical History:  Past Surgical History:   Procedure Laterality Date    CHOLECYSTECTOMY      CORONARY ANGIOGRAPHY N/A 4/19/2020    Procedure: ANGIOGRAM, CORONARY ARTERY;  Surgeon: Zachariah Goldman MD;  Location: [facility] CATH LAB;  Service: Cardiology;  Laterality: N/A;    LEFT HEART CATHETERIZATION Right 4/19/2020    Procedure: Left heart cath;  Surgeon: Zachariah Goldman MD;  Location: [facility] CATH LAB;  Service: Cardiology;  Laterality: Right;    VASCULAR SURGERY         Social History:  Social History     Occupational History    Not on file   Tobacco Use    Smoking status: Never Smoker    Smokeless tobacco: Never Used   Substance and Sexual Activity    Alcohol use: Yes     Alcohol/week: 1.0 standard drink     Types: 1 Shots of liquor per week     Comment: occ    Drug use: No    Sexual activity: Not on file       Family History:  Family History   Problem Relation Age of Onset    Stroke Mother         ROS:  Review of Systems   Musculoskeletal: Positive for joint pain, muscle weakness and myalgias.   Neurological: Negative for numbness and paresthesias.   All other systems reviewed and are negative.      Vitals:  /75 (BP Location: Left arm, BP Method: Large (Automatic))   Pulse 88   Ht 5' 4" (1.626 m)   " Wt 88.2 kg (194 lb 5.4 oz)   LMP 08/15/2005   BMI 33.36 kg/m²     Physical Examination:  Comprehensive Orthopaedic Musculoskeletal Exam    General        Constitutional: appears stated age, mildly obese, well-developed and well-nourished    Scleral icterus: no    Labored breathing: no    Psychiatric: normal mood and affect and no acute distress    Neurological: alert and oriented x3    Skin: intact    Lymphadenopathy: none     Ortho Exam   Left shoulder exam:  No visible deformity.   Tenderness over the lateral arm.   No AC tenderness.   ROM: active elevation 130, passive elevation 170, ER at side 30, IR L4  RTC strength: 4+/5 elevation, 5/5 ER and IR.  Positive Neer. Positive Francis.   Negative Speeds.     Imaging:  I have ordered and independently reviewed the following imaging studies performed at [facility] today:    X-Ray Shoulder 2 or More Views Left  Narrative: EXAMINATION:  XR SHOULDER COMPLETE 2 OR MORE VIEWS LEFT    CLINICAL HISTORY:  Pain in left shoulder    TECHNIQUE:  Two or three views of the left shoulder were performed.    COMPARISON:  None    FINDINGS:  Mild DJD.  No fracture or dislocation.  No bone destruction identified.  Calcifications above the humeral head consistent with calcified tendinopathy  Impression: See    Electronically signed by: Paul Heck MD  Date:    03/25/2022  Time:    10:40    Assessment:  1. Nontraumatic complete tear of left rotator cuff      Plan:  I have reviewed the exam findings in detail which are consistent with a rotator cuff tear. We will try non-operative treatment consisting of formal therapy. In addition, she will undergo a subacromial injection of cortisone today. She will return after PT if no improvement in the shoulder.       Orders Placed This Encounter    Ambulatory referral/consult to Physical/Occupational Therapy     No follow-ups on file.

## 2022-03-25 ENCOUNTER — HOSPITAL ENCOUNTER (OUTPATIENT)
Dept: RADIOLOGY | Facility: HOSPITAL | Age: 67
Discharge: HOME | End: 2022-03-25
Attending: ORTHOPAEDIC SURGERY
Payer: MEDICARE

## 2022-03-25 ENCOUNTER — CLINICAL SUPPORT (OUTPATIENT)
Dept: REHABILITATION | Facility: HOSPITAL | Age: 67
End: 2022-03-25
Payer: MEDICARE

## 2022-03-25 ENCOUNTER — OFFICE VISIT (OUTPATIENT)
Dept: ORTHOPEDICS | Facility: CLINIC | Age: 67
End: 2022-03-25
Payer: MEDICARE

## 2022-03-25 VITALS
DIASTOLIC BLOOD PRESSURE: 75 MMHG | HEIGHT: 64 IN | SYSTOLIC BLOOD PRESSURE: 110 MMHG | WEIGHT: 194.31 LBS | BODY MASS INDEX: 33.17 KG/M2 | HEART RATE: 88 BPM

## 2022-03-25 DIAGNOSIS — G89.29 CHRONIC PAIN OF LEFT ANKLE: ICD-10-CM

## 2022-03-25 DIAGNOSIS — R53.1 DECREASED STRENGTH: Primary | ICD-10-CM

## 2022-03-25 DIAGNOSIS — M25.572 CHRONIC PAIN OF LEFT ANKLE: ICD-10-CM

## 2022-03-25 DIAGNOSIS — M75.122 NONTRAUMATIC COMPLETE TEAR OF LEFT ROTATOR CUFF: Primary | ICD-10-CM

## 2022-03-25 DIAGNOSIS — M19.019 AC (ACROMIOCLAVICULAR) ARTHRITIS: ICD-10-CM

## 2022-03-25 DIAGNOSIS — M25.512 LEFT SHOULDER PAIN, UNSPECIFIED CHRONICITY: ICD-10-CM

## 2022-03-25 DIAGNOSIS — M25.672 DECREASED RANGE OF MOTION OF LEFT ANKLE: ICD-10-CM

## 2022-03-25 DIAGNOSIS — M25.812 SHOULDER IMPINGEMENT, LEFT: Primary | ICD-10-CM

## 2022-03-25 PROCEDURE — 73030 XR SHOULDER COMPLETE 2 OR MORE VIEWS LEFT: ICD-10-PCS | Mod: 26,LT,, | Performed by: RADIOLOGY

## 2022-03-25 PROCEDURE — 1125F AMNT PAIN NOTED PAIN PRSNT: CPT | Mod: CPTII,S$GLB,, | Performed by: ORTHOPAEDIC SURGERY

## 2022-03-25 PROCEDURE — 3074F PR MOST RECENT SYSTOLIC BLOOD PRESSURE < 130 MM HG: ICD-10-PCS | Mod: CPTII,S$GLB,, | Performed by: ORTHOPAEDIC SURGERY

## 2022-03-25 PROCEDURE — 3078F PR MOST RECENT DIASTOLIC BLOOD PRESSURE < 80 MM HG: ICD-10-PCS | Mod: CPTII,S$GLB,, | Performed by: ORTHOPAEDIC SURGERY

## 2022-03-25 PROCEDURE — 73030 X-RAY EXAM OF SHOULDER: CPT | Mod: 26,LT,, | Performed by: RADIOLOGY

## 2022-03-25 PROCEDURE — 1125F PR PAIN SEVERITY QUANTIFIED, PAIN PRESENT: ICD-10-PCS | Mod: CPTII,S$GLB,, | Performed by: ORTHOPAEDIC SURGERY

## 2022-03-25 PROCEDURE — 3288F PR FALLS RISK ASSESSMENT DOCUMENTED: ICD-10-PCS | Mod: CPTII,S$GLB,, | Performed by: ORTHOPAEDIC SURGERY

## 2022-03-25 PROCEDURE — 1159F MED LIST DOCD IN RCRD: CPT | Mod: CPTII,S$GLB,, | Performed by: ORTHOPAEDIC SURGERY

## 2022-03-25 PROCEDURE — 3008F PR BODY MASS INDEX (BMI) DOCUMENTED: ICD-10-PCS | Mod: CPTII,S$GLB,, | Performed by: ORTHOPAEDIC SURGERY

## 2022-03-25 PROCEDURE — 73030 X-RAY EXAM OF SHOULDER: CPT | Mod: TC,FY,LT

## 2022-03-25 PROCEDURE — 1101F PT FALLS ASSESS-DOCD LE1/YR: CPT | Mod: CPTII,S$GLB,, | Performed by: ORTHOPAEDIC SURGERY

## 2022-03-25 PROCEDURE — 3074F SYST BP LT 130 MM HG: CPT | Mod: CPTII,S$GLB,, | Performed by: ORTHOPAEDIC SURGERY

## 2022-03-25 PROCEDURE — 97110 THERAPEUTIC EXERCISES: CPT | Mod: PN,CQ

## 2022-03-25 PROCEDURE — 3078F DIAST BP <80 MM HG: CPT | Mod: CPTII,S$GLB,, | Performed by: ORTHOPAEDIC SURGERY

## 2022-03-25 PROCEDURE — 1160F RVW MEDS BY RX/DR IN RCRD: CPT | Mod: CPTII,S$GLB,, | Performed by: ORTHOPAEDIC SURGERY

## 2022-03-25 PROCEDURE — 3008F BODY MASS INDEX DOCD: CPT | Mod: CPTII,S$GLB,, | Performed by: ORTHOPAEDIC SURGERY

## 2022-03-25 PROCEDURE — 99999 PR PBB SHADOW E&M-EST. PATIENT-LVL IV: CPT | Mod: PBBFAC,,, | Performed by: ORTHOPAEDIC SURGERY

## 2022-03-25 PROCEDURE — 1159F PR MEDICATION LIST DOCUMENTED IN MEDICAL RECORD: ICD-10-PCS | Mod: CPTII,S$GLB,, | Performed by: ORTHOPAEDIC SURGERY

## 2022-03-25 PROCEDURE — 97112 NEUROMUSCULAR REEDUCATION: CPT | Mod: PN,CQ

## 2022-03-25 PROCEDURE — 20610 DRAIN/INJ JOINT/BURSA W/O US: CPT | Mod: LT,S$GLB,, | Performed by: ORTHOPAEDIC SURGERY

## 2022-03-25 PROCEDURE — 3288F FALL RISK ASSESSMENT DOCD: CPT | Mod: CPTII,S$GLB,, | Performed by: ORTHOPAEDIC SURGERY

## 2022-03-25 PROCEDURE — 99999 PR PBB SHADOW E&M-EST. PATIENT-LVL IV: ICD-10-PCS | Mod: PBBFAC,,, | Performed by: ORTHOPAEDIC SURGERY

## 2022-03-25 PROCEDURE — 20610 LARGE JOINT ASPIRATION/INJECTION: L SUBACROMIAL BURSA: ICD-10-PCS | Mod: LT,S$GLB,, | Performed by: ORTHOPAEDIC SURGERY

## 2022-03-25 PROCEDURE — 1101F PR PT FALLS ASSESS DOC 0-1 FALLS W/OUT INJ PAST YR: ICD-10-PCS | Mod: CPTII,S$GLB,, | Performed by: ORTHOPAEDIC SURGERY

## 2022-03-25 PROCEDURE — 1160F PR REVIEW ALL MEDS BY PRESCRIBER/CLIN PHARMACIST DOCUMENTED: ICD-10-PCS | Mod: CPTII,S$GLB,, | Performed by: ORTHOPAEDIC SURGERY

## 2022-03-25 PROCEDURE — 99204 PR OFFICE/OUTPT VISIT, NEW, LEVL IV, 45-59 MIN: ICD-10-PCS | Mod: 25,S$GLB,, | Performed by: ORTHOPAEDIC SURGERY

## 2022-03-25 PROCEDURE — 99204 OFFICE O/P NEW MOD 45 MIN: CPT | Mod: 25,S$GLB,, | Performed by: ORTHOPAEDIC SURGERY

## 2022-03-25 RX ORDER — TIZANIDINE HYDROCHLORIDE 4 MG/1
4 TABLET ORAL EVERY 8 HOURS PRN
COMMUNITY
Start: 2022-03-22 | End: 2023-01-17 | Stop reason: ALTCHOICE

## 2022-03-25 RX ADMIN — TRIAMCINOLONE ACETONIDE 40 MG: 40 INJECTION, SUSPENSION INTRA-ARTICULAR; INTRAMUSCULAR at 10:03

## 2022-03-25 NOTE — PROGRESS NOTES
"[facility]   Physical Therapy Treatment Note     Name: Dorothy Burt  Clinic Number: [id number]    Therapy Diagnosis:   Encounter Diagnoses   Name Primary?    Decreased strength Yes    Decreased range of motion of left ankle     Chronic pain of left ankle      Physician: Self, Aaarefmiles    Visit Date: 3/25/2022    Reason for Visit:: left ankle pain and limited mobility/ADLs  Evaluation Date: 12/10/2021  Authorization Period Expiration: 2022  Plan of Care Expiration: 2022  Visit # / Visits authorized:   FOTO Visit #:  2/10 Performed 3/18/22  PTA Visit #:       Time In: 1:26 PM  Time Out: 2:11 PM  Total Billable Time: 25 1:1 and 20 supervised minutes (TE-1, NMR-1)      Precautions: Standard and asthma   MD orders on 11/15/2021: Recommend CAM boot or brace for 4-6 weeks    SUBJECTIVE     Pt reports: her foot was hurting a lot last night, /10, after walking a lot yesterday but today her foot pain is a little better, 3/10.  She was compliant with home exercise program.  Response to previous treatment: decreased pain  Functional change: walking in tennis shoes with no brace    Pain: 2-3/10  Location: left ankle/foot    OBJECTIVE     Objective Measures updated at progress report unless specified.     Treatment     Dorothy received the treatments listed below:      therapeutic exercises to develop strength, endurance, ROM, flexibility and posture for 15 1:1 and 15 supervised minutes including:  Side lying eversion: 2x20 left in plantarflexion   Ankle circles: 1 min each direction L    Standing exercises performed in shoe:   Lateral step downs: 3x10 B, 4 inch step  Tandem walkin laps at edge of mat table  Standing hip abduction: 3x10 Bilateral yellow theraband     Leg press: seat 5, 5 plates 3x10 double leg  Leg press: seat 5, 3 plates 3x10 single leg left   Fitter gastroc stretch: 3x30" DL  Sidestepping band at forefoot 15 ft x3 RTB Bilateral - not today    neuromuscular " "re-education activities to improve: Balance, Coordination and Proprioception for 10 1:1 and 5 supervised minutes. The following activities were included:  SLS: 3x30" left (max of 5" without touching with upper extremity)  Foam marches (slow motion): 2 minutes (mostly no upper extremity)  Heel raises: 30x double leg on foam   Feet together EC on airex: 3x30"   BOSU stepovers (round side up): 20x left   Side step to BOSU with pause (round side up): 20x to left     manual therapy techniques: Joint mobilizations, Manual traction and Soft tissue Mobilization were applied for 0 minutes, including:    MET for L anterior innominate - NT   PROM to L foot/ankle   L grade I-II subtalar and talocrural mobs   LAD to R    Patient Education and Home Exercises     Home Exercises Provided and Patient Education Provided   Education provided:   - keep all exercises in a pain free range as much as possible.   - Pt educated on wearing schedule to begin weaning out of boot    Written Home Exercises Provided: yes. Exercises were reviewed and Dorothy was able to demonstrate them prior to the end of the session.  Dorothy demonstrated good  understanding of the education provided. See EMR under Patient Instructions for exercises provided during therapy sessions    ASSESSMENT     Patient was 25 minutes late for her appointment due to locking herself out of her house. Pt continues to report minimal pain. Patient was able to finish all of her exercises due to my next patient was late for her appointment.      Dorothy Is progressing well towards her goals.   Pt prognosis is Good.     Pt will continue to benefit from skilled outpatient physical therapy to address the deficits listed in the problem list box on initial evaluation, provide pt/family education and to maximize pt's level of independence in the home and community environment.     Pt's spiritual, cultural and educational needs considered and pt agreeable to plan of care and goals.   "   Anticipated barriers to physical therapy: co-morbidities.    Goals:   Short Term Goals:  4 weeks  1. Report decreased L ankle pain </= 2/10 with non-weight bearing mobility to increase tolerance for ADLs. MET  2. Increase L ankle AROM by 5 degrees for inversion and eversion in order to walk with min to no compensation. PROGRESSING; NOT MET  3. Pt will demo good sitting and standing posture for improved spine and joint alignment for improved biomechanics.  MET  4. Pt to tolerate HEP to improve ROM and independence with ADL's. MET     Long Term Goals: 8 weeks  1. Report decreased L ankle pain </= 2/10 in standing/walking for short distances without CAM boot donned to increase tolerance for increased QoL and improved ADLs. PROGRESSING; NOT MET  2. Patient goal: to have less pain and avoid surgery.  MET  3. Increase strength to >/= 4+/5 for BLE to increase tolerance for ADL and work activities. PROGRESSING; NOT MET  4. Pt will report at </= 45% impaired on ANKLE FOTO score to demo increased functional mobility.   MET  5. Pt will be able to ambulate community distances and negotiate stairs with minimal ankle pain for increased functional mobility and QoL. PROGRESSING; NOT MET    PLAN     Continue with Plan Of Care and progress toward PT goals.  Plan discharge on 3/28/22    Ant Cohen, PTA

## 2022-03-28 ENCOUNTER — CLINICAL SUPPORT (OUTPATIENT)
Dept: REHABILITATION | Facility: HOSPITAL | Age: 67
End: 2022-03-28
Payer: MEDICARE

## 2022-03-28 DIAGNOSIS — M25.672 DECREASED RANGE OF MOTION OF LEFT ANKLE: ICD-10-CM

## 2022-03-28 DIAGNOSIS — R53.1 DECREASED STRENGTH: Primary | ICD-10-CM

## 2022-03-28 DIAGNOSIS — G89.29 CHRONIC PAIN OF LEFT ANKLE: ICD-10-CM

## 2022-03-28 DIAGNOSIS — M75.122 NONTRAUMATIC COMPLETE TEAR OF LEFT ROTATOR CUFF: ICD-10-CM

## 2022-03-28 DIAGNOSIS — M25.572 CHRONIC PAIN OF LEFT ANKLE: ICD-10-CM

## 2022-03-28 PROCEDURE — 97110 THERAPEUTIC EXERCISES: CPT | Mod: PN | Performed by: PHYSICAL THERAPIST

## 2022-03-28 NOTE — PROGRESS NOTES
[facility]   Physical Therapy Treatment and Discharge Note     Name: Dorothy Burt  Clinic Number: [id number]    Therapy Diagnosis:   Encounter Diagnoses   Name Primary?    Nontraumatic complete tear of left rotator cuff     Decreased strength Yes    Decreased range of motion of left ankle     Chronic pain of left ankle      Physician: Self, Aaareferral    Visit Date: 3/28/2022    Reason for Visit:: left ankle pain and limited mobility/ADLs  Evaluation Date: 12/10/2021  Authorization Period Expiration: 12/13/2022  Plan of Care Expiration: 4/1/2022  Visit # / Visits authorized: 24/24  FOTO Visit #:  2/10 Performed 3/18/22  PTA Visit #: 0/5      Time In: 10:15 AM  Time Out: 11:15 AM  Total Billable Time: 56 minutes (TE-4)      Precautions: Standard and asthma   MD orders on 11/15/2021: Recommend CAM boot or brace for 4-6 weeks    SUBJECTIVE     Pt reports: she feels it a little everting her foot. She feels like she has to watch the uneven surfaces  She was compliant with home exercise program.  Response to previous treatment: decreased pain  Functional change: walking in tennis shoes with no brace    Pain: 1/10  Location: left ankle/foot    OBJECTIVE     Update:   Gait: normal shoes, even weight shift, good push off and heel strike  Sensation: WNL  Palpation: (-) TTP  Flexibility: WNL       A/PROM and MMT  * = L ankle pain with testing  NT = Not tested  Hip   Right     Left   Pain/Dysfunction with Movement     AROM PROM MMT AROM PROM MMT     Flexion WFL WFL 5/5 WFL WFL 5/5     Extension WFL WFL NT WFL WFL NT     Abduction WFL WFL 4/5 WFL WFL 5/5        Knee   Right     Left   Pain/Dysfunction with Movement     AROM PROM MMT AROM PROM MMT     Flexion (140 deg) WFL WFL 5/5 WFL WFL 5/5     Extension (0-5 deg) WFL WFL 5/5 WFL WFL 5/5        Ankle   Right     Left   Pain/Dysfunction with Movement     AROM PROM MMT AROM PROM MMT     Great toe (45/70 deg) -- -- 5/5 -- -- 5/5    Plantarflexion  "(50 deg 40 WNL 5/5 32 (54) 38* (54) 4+/5  13x left heel raise    Dorsiflexion (20 deg) 5 WNL 5/5 2 (6) 4* (10) 5/5     Inversion (20-30 deg) 25 WNL 5/5 21 (40) 22* (40) 4+/5     Eversion (5-10 deg) 20 WNL 5/5 18 (21) 19* (26) 4+/5  "clicking" around 16 degree       DL heel raise assessment = able to perform equally B  SL heel raise assessment = decreased height on L compared to right, but able to perform (left = 5.5 cm, right = 10 cm)    6 minute walk test: 1,350 ft (no increase in ankle/foot pain, but inc right low back pain)         Treatment     Dorothy received the treatments listed below:      therapeutic exercises to develop strength, endurance, ROM, flexibility and posture for 56 minutes including re-assessment and education on HEP:    Foot on table leg gastroc stretch: 3x20" left   Foot on table leg soleus stretch: 3x20" left  Decline Heel raise (for end range of motion): 10x double leg   Decline heel raise (for end range of motion): 10x left   Leg press left heel raise: 2 plates 2x10 left   single leg stance with arm reach across: 10x each  heel raise holds: 10x10" double leg         Patient Education and Home Exercises     Home Exercises Provided and Patient Education Provided   Education provided:   - keep all exercises in a pain free range as much as possible.   - Pt educated on wearing schedule to begin weaning out of boot  - appropriate ankle braces and wear time    Written Home Exercises Provided: yes. Exercises were reviewed and Dorothy was able to demonstrate them prior to the end of the session.  Dorothy demonstrated good  understanding of the education provided. See EMR under Patient Instructions for exercises provided during therapy sessions    ASSESSMENT     Patient has now completed all 24 approved physical therapy visits after a diagnosis of peroneal tear. Her overall pain level and sensitivity have significantly improved. Her range of motion is WNL and strength has also improved. Plantarflexion " strength is limited with single leg heel raise height half of right side. She continues to also have to concentrate more on uneven surfaces. She would like to begin PT on her shoulder and is appropriate for discharge to an updated home exercise program. It has been recommended that she purchase an ankle orthotic for extended time on uneven surfaces.     Dorothy Is progressing well towards her goals.   Pt prognosis is Good.     Pt will continue to benefit from skilled outpatient physical therapy to address the deficits listed in the problem list box on initial evaluation, provide pt/family education and to maximize pt's level of independence in the home and community environment.     Pt's spiritual, cultural and educational needs considered and pt agreeable to plan of care and goals.     Anticipated barriers to physical therapy: co-morbidities.    Goals:   Short Term Goals:  4 weeks  1. Report decreased L ankle pain </= 2/10 with non-weight bearing mobility to increase tolerance for ADLs. MET  2. Increase L ankle AROM by 5 degrees for inversion and eversion in order to walk with min to no compensation. NOT MET (met for inversion)  3. Pt will demo good sitting and standing posture for improved spine and joint alignment for improved biomechanics.  MET  4. Pt to tolerate HEP to improve ROM and independence with ADL's. MET     Long Term Goals: 8 weeks  1. Report decreased L ankle pain </= 2/10 in standing/walking for short distances without CAM boot donned to increase tolerance for increased QoL and improved ADLs. MET  2. Patient goal: to have less pain and avoid surgery.  MET  3. Increase strength to >/= 4+/5 for BLE to increase tolerance for ADL and work activities. MET  4. Pt will report at </= 45% impaired on ANKLE FOTO score to demo increased functional mobility.   MET  5. Pt will be able to ambulate community distances and negotiate stairs with minimal ankle pain for increased functional mobility and QoL. MET    PLAN      Discharge    Louie Tobar, PT

## 2022-03-29 RX ORDER — TRIAMCINOLONE ACETONIDE 40 MG/ML
40 INJECTION, SUSPENSION INTRA-ARTICULAR; INTRAMUSCULAR
Status: DISCONTINUED | OUTPATIENT
Start: 2022-03-25 | End: 2022-03-29 | Stop reason: HOSPADM

## 2022-03-29 NOTE — PROCEDURES
Large Joint Aspiration/Injection: L subacromial bursa    Date/Time: 3/25/2022 10:45 AM  Performed by: Louie Luna MD  Authorized by: Louie Luna MD     Consent Done?:  Yes (Verbal)  Indications:  Pain  Site marked: the procedure site was marked    Timeout: prior to procedure the correct patient, procedure, and site was verified    Prep: patient was prepped and draped in usual sterile fashion      Local anesthesia used?: Yes    Local anesthetic:  Topical anesthetic and lidocaine 1% without epinephrine  Anesthetic total (ml):  4      Details:  Needle Size:  22 G  Ultrasonic Guidance for needle placement?: No    Approach:  Posterior  Location:  Shoulder  Site:  L subacromial bursa  Medications:  40 mg triamcinolone acetonide 40 mg/mL  Patient tolerance:  Patient tolerated the procedure well with no immediate complications

## 2022-04-04 ENCOUNTER — CLINICAL SUPPORT (OUTPATIENT)
Dept: REHABILITATION | Facility: HOSPITAL | Age: 67
End: 2022-04-04
Payer: MEDICARE

## 2022-04-04 DIAGNOSIS — M25.612 DECREASED RANGE OF MOTION OF LEFT SHOULDER: ICD-10-CM

## 2022-04-04 DIAGNOSIS — Z74.09 DECREASED FUNCTIONAL MOBILITY AND ENDURANCE: ICD-10-CM

## 2022-04-04 PROCEDURE — 97162 PT EVAL MOD COMPLEX 30 MIN: CPT | Mod: PN | Performed by: PHYSICAL THERAPIST
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
## 2022-04-06 ENCOUNTER — CLINICAL SUPPORT (OUTPATIENT)
Dept: REHABILITATION | Facility: HOSPITAL | Age: 67
End: 2022-04-06
Payer: MEDICARE

## 2022-04-06 DIAGNOSIS — Z74.09 DECREASED FUNCTIONAL MOBILITY AND ENDURANCE: ICD-10-CM

## 2022-04-06 DIAGNOSIS — M25.612 DECREASED RANGE OF MOTION OF LEFT SHOULDER: Primary | ICD-10-CM

## 2022-04-06 PROCEDURE — 97110 THERAPEUTIC EXERCISES: CPT | Mod: PN | Performed by: PHYSICAL THERAPIST

## 2022-04-06 PROCEDURE — 97140 MANUAL THERAPY 1/> REGIONS: CPT | Mod: PN | Performed by: PHYSICAL THERAPIST

## 2022-04-06 NOTE — PROGRESS NOTES
"[facility]   Physical Therapy Treatment Note     Name: Dorothy Burt  Clinic Number: [id number]    Therapy Diagnosis:   Encounter Diagnoses   Name Primary?    Decreased range of motion of left shoulder Yes    Decreased functional mobility and endurance      Physician: Louie Luna MD    Visit Date: 4/6/2022    Physician Orders: PT Eval and Treat   2-3 x per week x 6 weeks   RTC strengthening, ROM, and other indicated modalities     Medical Diagnosis from Referral:   M75.42 (ICD-10-CM) - Shoulder impingement, left   M19.019 (ICD-10-CM) - AC (acromioclavicular) arthritis       Evaluation Date: 4/4/2022  Authorization Period Expiration: 3/25/23  Plan of Care Expiration: 5/20/22  Progress Note Due: 5/20/22  Visit # / Visits authorized: 2/ 12   FOTO: 2/ 5   PTA Visit: 0/5     Precautions: Standard and asthma        Time In: 12:19 pm  Time Out: 1:13 pm  Total Billable Time: 54 minutes (TEx2, MTx2)    SUBJECTIVE     Pt reports: "I think the exercises may be helping. Yesterday it felt a little looser." She did take tramadol 30 minutes before coming today  She was compliant with home exercise program.  Response to previous treatment: eval only  Functional change: eval only    Pain: 7/10  Location: left shoulder      OBJECTIVE     4/6/22:  left PROM:  Flexion: 100 degree  Abduction: 90 degree   external rotation: 35-40 degree  internal rotation: 60 degree       Treatment     Dorothy received the treatments listed below:      Dorothy received therapeutic exercises to develop strength, endurance, ROM, flexibility, posture and core stabilization for 30 minutes including:    Side lying external rotation: 2x10  scapular retraction: 20x5" holds  shoulder rolls: 20x each  bicep curls: 30x 1 pound      Table slides flexion: 10x5"  Table slides external rotation with wedge: 10x5"  Isometrics: see below   Flexion: 10x5"   Abduction: 10x5"   Extension: 10x5"   external rotation: 10x5"   internal " "rotation: 10x5"     Dorothy received the following manual therapy techniques: Joint mobilizations, Myofacial release and Soft tissue Mobilization were applied to the: left shoulder for 24 minutes, including:    Scapular mobilizations in side lying  left shoulder PROM with Grade I-III GHJ mobilizations (inferior, anterior and posterior)        Patient Education and Home Exercises     Home Exercises Provided and Patient Education Provided     Education provided:   - continuing with home exercise program  - anatomy and referred pain    Written Home Exercises Provided: Patient instructed to cont prior HEP. Exercises were reviewed and Dorothy was able to demonstrate them prior to the end of the session.  Dorothy demonstrated good  understanding of the education provided. See EMR under Patient Instructions for exercises provided during therapy sessions    ASSESSMENT     Dorothy is a 67 y.o. female referred to outpatient Physical Therapy with a medical diagnosis of nontraumatic rotator cuff tear. She arrives today after having taken tramadol 30 minutes prior to visit as instructed and left shoulder PROM improved from evaluation. Worst pain with lowering (eccentric control).       Dorothy Is progressing well towards her goals.   Pt prognosis is Guarded.     Pt will continue to benefit from skilled outpatient physical therapy to address the deficits listed in the problem list box on initial evaluation, provide pt/family education and to maximize pt's level of independence in the home and community environment.     Pt's spiritual, cultural and educational needs considered and pt agreeable to plan of care and goals.     Anticipated Barriers for therapy: severity of symptoms, possible/expected tear     Goals:  Short Term Goals: 3 weeks   1.  Patient will report < 6/10 shoulder pain at worst for improved ADL performance. PROGRESSING; NOT MET  2.  Patient will demonstrate L shoulder flexion AROM > 85 deg to improve overhead reach. PROGRESSING; " NOT MET  3.  Patient will demonstrate left shoulder external rotation PROM > 45 degree for improved hair care. PROGRESSING; NOT MET     Long Term Goals: 12 weeks   1. Patient will report ability to wash her hair with her left hand with only a little bit of difficulty. PROGRESSING; NOT MET  2. Patient will demonstrate ability to reach overhead and remove a 2 lb object 5 times to simulate removing objects from an overhead shelf w/o an increase in symptoms. PROGRESSING; NOT MET  3. Patient will demonstrate all upper extremity strength via MicroFET at least 80% of right for improved ADL and IADL performance. PROGRESSING; NOT MET  4. Patient will improve FOTO to < 42% to improve ADL performance. PROGRESSING; NOT MET  5. Patient will report ability to dress upper and lower body with only a little bit of difficulty. PROGRESSING; NOT MET      PLAN     Continue with POC    Plan of care Certification: 4/4/2022 to 5/20/22.       Louie Tobar, PT

## 2022-04-12 ENCOUNTER — CLINICAL SUPPORT (OUTPATIENT)
Dept: REHABILITATION | Facility: HOSPITAL | Age: 67
End: 2022-04-12
Payer: MEDICARE

## 2022-04-12 DIAGNOSIS — Z74.09 DECREASED FUNCTIONAL MOBILITY AND ENDURANCE: ICD-10-CM

## 2022-04-12 DIAGNOSIS — M25.612 DECREASED RANGE OF MOTION OF LEFT SHOULDER: Primary | ICD-10-CM

## 2022-04-12 PROCEDURE — 97110 THERAPEUTIC EXERCISES: CPT | Mod: PN

## 2022-04-12 PROCEDURE — 97140 MANUAL THERAPY 1/> REGIONS: CPT | Mod: PN

## 2022-04-12 NOTE — PROGRESS NOTES
"[facility]   Physical Therapy Treatment Note     Name: Dorothy Burt  Clinic Number: [id number]    Therapy Diagnosis:   Encounter Diagnoses   Name Primary?    Decreased range of motion of left shoulder Yes    Decreased functional mobility and endurance      Physician: Louie Luna MD    Visit Date: 4/12/2022    Physician Orders: PT Eval and Treat   2-3 x per week x 6 weeks   RTC strengthening, ROM, and other indicated modalities     Medical Diagnosis from Referral:   M75.42 (ICD-10-CM) - Shoulder impingement, left   M19.019 (ICD-10-CM) - AC (acromioclavicular) arthritis       Evaluation Date: 4/4/2022  Authorization Period Expiration: 3/25/23  Plan of Care Expiration: 5/20/22  Progress Note Due: 5/20/22  Visit # / Visits authorized: 3/12   FOTO: 3/5   PTA Visit: 0/5    Precautions: Standard and asthma      Time In: 1:05 pm  Time Out: 2:00 pm  Total Billable Time: 55 minutes (TEx3, MTx1)    SUBJECTIVE     Pt reports: having trouble sleeping and will roll to her right side at times; trying to use a pillow under the left arm sometimes. A little more pain today, but feels like the exercises help with the pain.   She was compliant with home exercise program.  Response to previous treatment: eval only  Functional change: eval only    Pain: 7-8/10  Location: left shoulder      OBJECTIVE     4/6/22:  left PROM:  Flexion: 100 degree  Abduction: 90 degree   external rotation: 35-40 degree  internal rotation: 60 degree       Treatment     Dorothy received the treatments listed below:      Dorothy received therapeutic exercises to develop strength, endurance, ROM, flexibility, posture and core stabilization for 40 minutes including:    Isometrics: see below   Flexion: 10x5"   Abduction: 10x5"   Extension: 10x5"   external rotation: 10x5"   internal rotation: 10x5"  Supine shoulder external rotation: 2x10 left, 1#  Supine bicep curls: 30x 1#  Supine chest press AAROM: 2x8, cues to avoid elbows " "from flaring out and path of elevation  Side lying external rotation: 2x10 - not done today  scapular retraction with shrug: 30x5" holds     Table slides flexion: 2x8x5"  Table slides external rotation with wedge: 10x5" - not done today     Dorothy received the following manual therapy techniques: Joint mobilizations, Myofacial release and Soft tissue Mobilization were applied to the: left shoulder for 15 minutes, including:    Scapular mobilizations in side lying  left shoulder PROM with Grade I-III GHJ mobilizations (inferior, anterior and posterior)    Patient Education and Home Exercises     Home Exercises Provided and Patient Education Provided   Education provided:   - continuing with home exercise program  - anatomy and referred pain    Written Home Exercises Provided: Patient instructed to cont prior HEP. Exercises were reviewed and Dorothy was able to demonstrate them prior to the end of the session.  Dorothy demonstrated good  understanding of the education provided. See EMR under Patient Instructions for exercises provided during therapy sessions    ASSESSMENT     Dorothy is a 67 y.o. female referred to outpatient Physical Therapy with a medical diagnosis of nontraumatic rotator cuff tear. Pt did well today and decreased pain with left shoulder stabilization into posterior glide. Moderate to estelita irritability today that decreased to 4/10 after treatment. Added on chest press AAROM for home exercise program today. Pt did well and continue with Phase I-II shoulder rehabilitation.   Dorothy Is progressing well towards her goals.   Pt prognosis is Guarded.     Pt will continue to benefit from skilled outpatient physical therapy to address the deficits listed in the problem list box on initial evaluation, provide pt/family education and to maximize pt's level of independence in the home and community environment.     Pt's spiritual, cultural and educational needs considered and pt agreeable to plan of care and goals.   "   Anticipated Barriers for therapy: severity of symptoms, possible/expected tear     Goals:  Short Term Goals: 3 weeks   1.  Patient will report < 6/10 shoulder pain at worst for improved ADL performance. PROGRESSING; NOT MET  2.  Patient will demonstrate L shoulder flexion AROM > 85 deg to improve overhead reach. PROGRESSING; NOT MET  3.  Patient will demonstrate left shoulder external rotation PROM > 45 degree for improved hair care. PROGRESSING; NOT MET     Long Term Goals: 12 weeks   1. Patient will report ability to wash her hair with her left hand with only a little bit of difficulty. PROGRESSING; NOT MET  2. Patient will demonstrate ability to reach overhead and remove a 2 lb object 5 times to simulate removing objects from an overhead shelf w/o an increase in symptoms. PROGRESSING; NOT MET  3. Patient will demonstrate all upper extremity strength via MicroFET at least 80% of right for improved ADL and IADL performance. PROGRESSING; NOT MET  4. Patient will improve FOTO to < 42% to improve ADL performance. PROGRESSING; NOT MET  5. Patient will report ability to dress upper and lower body with only a little bit of difficulty. PROGRESSING; NOT MET    PLAN     Continue with POC    Plan of care Certification: 4/4/2022 to 5/20/22.     Jose C Lutz, PT

## 2022-04-13 ENCOUNTER — CLINICAL SUPPORT (OUTPATIENT)
Dept: REHABILITATION | Facility: HOSPITAL | Age: 67
End: 2022-04-13
Payer: MEDICARE

## 2022-04-13 DIAGNOSIS — M25.612 DECREASED RANGE OF MOTION OF LEFT SHOULDER: Primary | ICD-10-CM

## 2022-04-13 DIAGNOSIS — Z74.09 DECREASED FUNCTIONAL MOBILITY AND ENDURANCE: ICD-10-CM

## 2022-04-13 PROCEDURE — 97140 MANUAL THERAPY 1/> REGIONS: CPT | Mod: PN

## 2022-04-13 PROCEDURE — 97110 THERAPEUTIC EXERCISES: CPT | Mod: PN

## 2022-04-20 ENCOUNTER — CLINICAL SUPPORT (OUTPATIENT)
Dept: REHABILITATION | Facility: HOSPITAL | Age: 67
End: 2022-04-20
Payer: MEDICARE

## 2022-04-20 DIAGNOSIS — M25.612 DECREASED RANGE OF MOTION OF LEFT SHOULDER: Primary | ICD-10-CM

## 2022-04-20 DIAGNOSIS — Z74.09 DECREASED FUNCTIONAL MOBILITY AND ENDURANCE: ICD-10-CM

## 2022-04-20 PROCEDURE — 97110 THERAPEUTIC EXERCISES: CPT | Mod: PN

## 2022-04-20 PROCEDURE — 97140 MANUAL THERAPY 1/> REGIONS: CPT | Mod: PN

## 2022-04-20 NOTE — PROGRESS NOTES
"[facility]   Physical Therapy Treatment Note     Name: Dorothy Burt  Clinic Number: [id number]    Therapy Diagnosis:   Encounter Diagnoses   Name Primary?    Decreased range of motion of left shoulder Yes    Decreased functional mobility and endurance      Physician: Sara Segundo PA    Visit Date: 4/20/2022    Physician Orders: PT Eval and Treat   2-3 x per week x 6 weeks   RTC strengthening, ROM, and other indicated modalities     Medical Diagnosis from Referral:   M75.42 (ICD-10-CM) - Shoulder impingement, left   M19.019 (ICD-10-CM) - AC (acromioclavicular) arthritis     Evaluation Date: 4/4/2022  Authorization Period Expiration: 3/25/23  Plan of Care Expiration: 5/20/22  Progress Note Due: 5/20/22  Visit # / Visits authorized: 5/12   FOTO: 5/5 - Next  PTA Visit: 0/5    Precautions: Standard and asthma      Time In: 1:05 pm  Time Out: 2:00 pm  Total Billable Time: 55 minutes (TEx3, MTx1)    SUBJECTIVE     Pt reports: still sleeping on the couch, and house still being repaired. Shoulder doing better today and sleeping better with pillow support under her arm.   She was compliant with home exercise program.  Response to previous treatment: eval only  Functional change: eval only    Pain: 5-6/10  Location: left shoulder      OBJECTIVE     4/6/22:  left PROM:  Flexion: 100 degree  Abduction: 90 degree   external rotation: 35-40 degree  internal rotation: 60 degree     Treatment     Dorothy received the treatments listed below:      Dorothy received therapeutic exercises to develop strength, endurance, ROM, flexibility, posture and core stabilization for 40 minutes including:    Isometrics: see below   Flexion: 10x5"   Abduction: 10x5"   Extension: 10x5"   external rotation: 10x5"   internal rotation: 10x5"  Supine shoulder external rotation: 3x10 left, 1#  Supine shoulder external rotation/internal rotation at 90/90 shoulder position: 2x10 left  Supine bicep curls: 30x 1# left  Supine " "chest press + shoulder flexion AAROM: 2x8  Side lying external rotation: 2x10 left   scapular retraction with shrug: 30x5" holds  Pulleys: 2' flexion, gentle ROM     Table slides flexion: 2x8x5" - not done today  Table slides external rotation with wedge: 10x5" - not done today     Dorothy received the following manual therapy techniques: Joint mobilizations, Myofacial release and Soft tissue Mobilization were applied to the: left shoulder for 15 minutes, including:    Scapular mobilizations in side lying  left shoulder PROM with Grade I-III GHJ mobilizations (inferior, anterior and posterior)    Patient Education and Home Exercises     Home Exercises Provided and Patient Education Provided   Education provided:   - continuing with home exercise program  - anatomy and referred pain    Written Home Exercises Provided: Patient instructed to cont prior HEP. Exercises were reviewed and Dorothy was able to demonstrate them prior to the end of the session.  Dorothy demonstrated good  understanding of the education provided. See EMR under Patient Instructions for exercises provided during therapy sessions    ASSESSMENT     Dorothy is a 67 y.o. female referred to outpatient Physical Therapy with a medical diagnosis of nontraumatic rotator cuff tear. Mild irritability level present today, and progressing towards more AAROM at this time. Pt did well today, and currently stage I-II rotator cuff rehab.  Dorothy Is progressing well towards her goals.   Pt prognosis is Guarded.     Pt will continue to benefit from skilled outpatient physical therapy to address the deficits listed in the problem list box on initial evaluation, provide pt/family education and to maximize pt's level of independence in the home and community environment.     Pt's spiritual, cultural and educational needs considered and pt agreeable to plan of care and goals.     Anticipated Barriers for therapy: severity of symptoms, possible/expected tear     Goals:  Short Term " Goals: 3 weeks   1.  Patient will report < 6/10 shoulder pain at worst for improved ADL performance. PROGRESSING; NOT MET  2.  Patient will demonstrate L shoulder flexion AROM > 85 deg to improve overhead reach. PROGRESSING; NOT MET  3.  Patient will demonstrate left shoulder external rotation PROM > 45 degree for improved hair care. PROGRESSING; NOT MET     Long Term Goals: 12 weeks   1. Patient will report ability to wash her hair with her left hand with only a little bit of difficulty. PROGRESSING; NOT MET  2. Patient will demonstrate ability to reach overhead and remove a 2 lb object 5 times to simulate removing objects from an overhead shelf w/o an increase in symptoms. PROGRESSING; NOT MET  3. Patient will demonstrate all upper extremity strength via MicroFET at least 80% of right for improved ADL and IADL performance. PROGRESSING; NOT MET  4. Patient will improve FOTO to < 42% to improve ADL performance. PROGRESSING; NOT MET  5. Patient will report ability to dress upper and lower body with only a little bit of difficulty. PROGRESSING; NOT MET    PLAN     Continue with POC    Plan of care Certification: 4/4/2022 to 5/20/22.     Jose C Lutz, PT

## 2022-04-25 ENCOUNTER — CLINICAL SUPPORT (OUTPATIENT)
Dept: REHABILITATION | Facility: HOSPITAL | Age: 67
End: 2022-04-25
Payer: MEDICARE

## 2022-04-25 DIAGNOSIS — Z74.09 DECREASED FUNCTIONAL MOBILITY AND ENDURANCE: ICD-10-CM

## 2022-04-25 DIAGNOSIS — M25.612 DECREASED RANGE OF MOTION OF LEFT SHOULDER: Primary | ICD-10-CM

## 2022-04-25 PROCEDURE — 97140 MANUAL THERAPY 1/> REGIONS: CPT | Mod: PN

## 2022-04-25 PROCEDURE — 97110 THERAPEUTIC EXERCISES: CPT | Mod: PN

## 2022-04-25 NOTE — PROGRESS NOTES
"[facility]   Physical Therapy Treatment Note     Name: Dorothy Burt  Clinic Number: [id number]    Therapy Diagnosis:   Encounter Diagnoses   Name Primary?    Decreased range of motion of left shoulder Yes    Decreased functional mobility and endurance      Physician: Sara Segundo PA    Visit Date: 4/25/2022    Physician Orders: PT Eval and Treat   2-3 x per week x 6 weeks   RTC strengthening, ROM, and other indicated modalities     Medical Diagnosis from Referral:   M75.42 (ICD-10-CM) - Shoulder impingement, left   M19.019 (ICD-10-CM) - AC (acromioclavicular) arthritis     Evaluation Date: 4/4/2022  Authorization Period Expiration: 3/25/23  Plan of Care Expiration: 5/20/22  Progress Note Due: 5/20/22  Visit # / Visits authorized: 6/12   FOTO: 6/10  PTA Visit: 0/5    Precautions: Standard and asthma      Time In: 2:10 pm  Time Out: 3:00 pm  Total Billable Time: 30 minutes (TEx1, MTx1)    SUBJECTIVE     Pt reports: increased left shoulder pain recently, but a little better today.   She was compliant with home exercise program.  Response to previous treatment: eval only  Functional change: eval only    Pain: 6-7/10  Location: left shoulder      OBJECTIVE     4/6/22:  left PROM:  Flexion: 100 degree  Abduction: 90 degree   external rotation: 35-40 degree  internal rotation: 60 degree     Treatment     Dorothy received the treatments listed below:      Dorothy received therapeutic exercises to develop strength, endurance, ROM, flexibility, posture and core stabilization for 40 minutes including:    Isometrics: see below   Flexion: 10x5"   Abduction: 10x5"   Extension: 10x5"   external rotation: 10x5"   internal rotation: 10x5"  Supine shoulder external rotation: 2x10 left, 2#  Supine shoulder external rotation/internal rotation in scaption shoulder position: 2x10 left  Supine bicep curls: 30x 1# left  Supine chest press + shoulder flexion AAROM: 2x8  Side lying external rotation: 2x10 left " "  scapular retraction with shrug: 30x5" holds  Pulleys: 2' flexion, gentle ROM     Table slides flexion: 2x8x5" - not done today  Table slides external rotation with wedge: 10x5" - not done today     Dorothy received the following manual therapy techniques: Joint mobilizations, Myofacial release and Soft tissue Mobilization were applied to the: left shoulder for 15 minutes, including:    Scapular mobilizations in side lying  left shoulder PROM with Grade I-III GHJ mobilizations (inferior, anterior and posterior)    Patient Education and Home Exercises     Home Exercises Provided and Patient Education Provided   Education provided:   - continuing with home exercise program  - anatomy and referred pain    Written Home Exercises Provided: Patient instructed to cont prior HEP. Exercises were reviewed and Dorothy was able to demonstrate them prior to the end of the session.  Dorothy demonstrated good  understanding of the education provided. See EMR under Patient Instructions for exercises provided during therapy sessions    ASSESSMENT     Dorothy is a 67 y.o. female referred to outpatient Physical Therapy with a medical diagnosis of nontraumatic rotator cuff tear. Moderate irritability level present today, and decreased exercises progression only for today. Overall patient is improving gradually. Pt tahir jollyve an MD appointment coming up and surgery will likely be discussed.   Dorothy Is progressing well towards her goals.   Pt prognosis is Guarded.     Pt will continue to benefit from skilled outpatient physical therapy to address the deficits listed in the problem list box on initial evaluation, provide pt/family education and to maximize pt's level of independence in the home and community environment.     Pt's spiritual, cultural and educational needs considered and pt agreeable to plan of care and goals.     Anticipated Barriers for therapy: severity of symptoms, possible/expected tear     Goals:  Short Term Goals: 3 weeks   1.  " Patient will report < 6/10 shoulder pain at worst for improved ADL performance. PROGRESSING; NOT MET  2.  Patient will demonstrate L shoulder flexion AROM > 85 deg to improve overhead reach. PROGRESSING; NOT MET  3.  Patient will demonstrate left shoulder external rotation PROM > 45 degree for improved hair care. PROGRESSING; NOT MET     Long Term Goals: 12 weeks   1. Patient will report ability to wash her hair with her left hand with only a little bit of difficulty. PROGRESSING; NOT MET  2. Patient will demonstrate ability to reach overhead and remove a 2 lb object 5 times to simulate removing objects from an overhead shelf w/o an increase in symptoms. PROGRESSING; NOT MET  3. Patient will demonstrate all upper extremity strength via MicroFET at least 80% of right for improved ADL and IADL performance. PROGRESSING; NOT MET  4. Patient will improve FOTO to < 42% to improve ADL performance. PROGRESSING; NOT MET  5. Patient will report ability to dress upper and lower body with only a little bit of difficulty. PROGRESSING; NOT MET    PLAN     Continue with POC    Plan of care Certification: 4/4/2022 to 5/20/22.     Jose C Lutz, PT

## 2022-04-26 ENCOUNTER — DOCUMENTATION ONLY (OUTPATIENT)
Dept: REHABILITATION | Facility: HOSPITAL | Age: 67
End: 2022-04-26
Payer: MEDICARE

## 2022-04-26 NOTE — PROGRESS NOTES
PT met face to face with Ant Cohen PTA to discuss pt's treatment plan and progress towards established goals. Pt will be seen by a physical therapist minimally every 6th visit or every 30 days.    Louie Tobar, PT

## 2022-04-27 ENCOUNTER — CLINICAL SUPPORT (OUTPATIENT)
Dept: REHABILITATION | Facility: HOSPITAL | Age: 67
End: 2022-04-27
Payer: MEDICARE

## 2022-04-27 DIAGNOSIS — M25.612 DECREASED RANGE OF MOTION OF LEFT SHOULDER: Primary | ICD-10-CM

## 2022-04-27 DIAGNOSIS — Z74.09 DECREASED FUNCTIONAL MOBILITY AND ENDURANCE: ICD-10-CM

## 2022-04-27 PROCEDURE — 97140 MANUAL THERAPY 1/> REGIONS: CPT | Mod: PN

## 2022-04-27 PROCEDURE — 97110 THERAPEUTIC EXERCISES: CPT | Mod: PN

## 2022-04-27 NOTE — PROGRESS NOTES
[facility]   Physical Therapy Treatment Note     Name: Dorothy Burt  Clinic Number: [id number]    Therapy Diagnosis:   Encounter Diagnoses   Name Primary?    Decreased range of motion of left shoulder Yes    Decreased functional mobility and endurance      Physician: Sara Segundo PA    Visit Date: 4/27/2022    Physician Orders: PT Eval and Treat   2-3 x per week x 6 weeks   RTC strengthening, ROM, and other indicated modalities     Medical Diagnosis from Referral:   M75.42 (ICD-10-CM) - Shoulder impingement, left   M19.019 (ICD-10-CM) - AC (acromioclavicular) arthritis     Evaluation Date: 4/4/2022  Authorization Period Expiration: 3/25/23  Plan of Care Expiration: 5/20/22  Progress Note Due: 5/20/22  Visit # / Visits authorized: 7/12   FOTO: 7/10  PTA Visit: 0/5    Precautions: Standard and asthma      Time In: 12:05 pm  Time Out: 1:00 pm  Total Billable Time: 55 minutes (TEx3, MTx1)    SUBJECTIVE     Pt reports: pt feeling much better and taking some pain medication right now. She is not having whole body joint aches, and her left shoulder feels much better waking up this morning.   She was compliant with home exercise program.  Response to previous treatment: eval only  Functional change: eval only    Pain: 3/10  Location: left shoulder      OBJECTIVE     4/6/22:  left PROM:  Flexion: 100 degree  Abduction: 90 degree   external rotation: 35-40 degree  internal rotation: 60 degree     Treatment     Dorothy received the treatments listed below:      Dorothy received therapeutic exercises to develop strength, endurance, ROM, flexibility, posture and core stabilization for 40 minutes including:    Pulleys: 3' flexion, gentle ROM    Standing bicep curls: 2x15 left, 2#  Standing table slides on bolster on table: 3x8 left  Standing serratus pushup plus hold with left shoulder tap: 2x10 left  Standing internal rotation walkouts: 2x8 red theraband  Standing external rotation walkouts: 2x8  yellow theraband  Single arm lat pull downs: 2x10 left, red theraband (single band)  Supine dowel shoulder flexion: 2x8 with PVS pipe    Not done today:  Supine shoulder external rotation: 2x10 left, 2#  Supine shoulder external rotation/internal rotation in scaption shoulder position: 2x10 left  Supine chest press + shoulder flexion AAROM: 2x8  Sidelying external rotation: 2x10 left      Dorothy received the following manual therapy techniques: Joint mobilizations, Myofacial release and Soft tissue Mobilization were applied to the: left shoulder for 15 minutes, including:    Left shoulder PROM with Grade I-III GHJ mobilizations (inferior, anterior and posterior)    Patient Education and Home Exercises     Home Exercises Provided and Patient Education Provided   Education provided:   - continuing with home exercise program  - anatomy and referred pain    Written Home Exercises Provided: Patient instructed to cont prior HEP. Exercises were reviewed and Dorothy was able to demonstrate them prior to the end of the session.  Dorothy demonstrated good  understanding of the education provided. See EMR under Patient Instructions for exercises provided during therapy sessions    ASSESSMENT     Dorothy is a 67 y.o. female referred to outpatient Physical Therapy with a medical diagnosis of nontraumatic rotator cuff tear. Mild irritability level present today, and progressing towards more AAROM at this time. Pt did well today, and currently stage I-II rotator cuff rehab. Able to progress AAROM and added on closed chain left rotator cuff strengthening with minimal to no left shoulder pain. Able to perform AAROM shoulder flexion in supine form 0-90 degrees for first time today.   Dorothy Is progressing well towards her goals.   Pt prognosis is Guarded.     Pt will continue to benefit from skilled outpatient physical therapy to address the deficits listed in the problem list box on initial evaluation, provide pt/family education and to maximize  pt's level of independence in the home and community environment.     Pt's spiritual, cultural and educational needs considered and pt agreeable to plan of care and goals.     Anticipated Barriers for therapy: severity of symptoms, possible/expected tear     Goals:  Short Term Goals: 3 weeks   1.  Patient will report < 6/10 shoulder pain at worst for improved ADL performance. PROGRESSING; NOT MET  2.  Patient will demonstrate L shoulder flexion AROM > 85 deg to improve overhead reach. PROGRESSING; NOT MET  3.  Patient will demonstrate left shoulder external rotation PROM > 45 degree for improved hair care. PROGRESSING; NOT MET     Long Term Goals: 12 weeks   1. Patient will report ability to wash her hair with her left hand with only a little bit of difficulty. PROGRESSING; NOT MET  2. Patient will demonstrate ability to reach overhead and remove a 2 lb object 5 times to simulate removing objects from an overhead shelf w/o an increase in symptoms. PROGRESSING; NOT MET  3. Patient will demonstrate all upper extremity strength via MicroFET at least 80% of right for improved ADL and IADL performance. PROGRESSING; NOT MET  4. Patient will improve FOTO to < 42% to improve ADL performance. PROGRESSING; NOT MET  5. Patient will report ability to dress upper and lower body with only a little bit of difficulty. PROGRESSING; NOT MET    PLAN     Continue with POC    Plan of care Certification: 4/4/2022 to 5/20/22.     Jose C Lutz, PT

## 2022-04-29 ENCOUNTER — CLINICAL SUPPORT (OUTPATIENT)
Dept: REHABILITATION | Facility: HOSPITAL | Age: 67
End: 2022-04-29
Payer: MEDICARE

## 2022-04-29 DIAGNOSIS — Z74.09 DECREASED FUNCTIONAL MOBILITY AND ENDURANCE: ICD-10-CM

## 2022-04-29 DIAGNOSIS — M25.612 DECREASED RANGE OF MOTION OF LEFT SHOULDER: Primary | ICD-10-CM

## 2022-04-29 PROCEDURE — 97110 THERAPEUTIC EXERCISES: CPT | Mod: PN

## 2022-04-29 PROCEDURE — 97140 MANUAL THERAPY 1/> REGIONS: CPT | Mod: PN

## 2022-04-29 NOTE — PROGRESS NOTES
[facility]   Physical Therapy Treatment Note     Name: Dorothy Burt  Clinic Number: [id number]    Therapy Diagnosis:   Encounter Diagnoses   Name Primary?    Decreased range of motion of left shoulder Yes    Decreased functional mobility and endurance      Physician: Sara Segundo PA    Visit Date: 4/29/2022    Physician Orders: PT Eval and Treat   2-3 x per week x 6 weeks   RTC strengthening, ROM, and other indicated modalities     Medical Diagnosis from Referral:   M75.42 (ICD-10-CM) - Shoulder impingement, left   M19.019 (ICD-10-CM) - AC (acromioclavicular) arthritis     Evaluation Date: 4/4/2022  Authorization Period Expiration: 3/25/23  Plan of Care Expiration: 5/20/22  Progress Note Due: 5/20/22  Visit # / Visits authorized: 8/12   FOTO: 8/10  PTA Visit: 0/5    Precautions: Standard and asthma      Time In: 10:05 pm  Time Out: 11:00 pm  Total Billable Time: 55 minutes (TEx3, MTx1)    SUBJECTIVE     Pt reports: continues to feel better, and still unsure what caused all of her aching recently. Shoulder feels ok and wakes up about once a night now, sleeping much better than before.  She was compliant with home exercise program.  Response to previous treatment: eval only  Functional change: eval only    Pain: 3/10  Location: left shoulder      OBJECTIVE     4/6/22:  left PROM:  Flexion: 100 degree  Abduction: 90 degree   external rotation: 35-40 degree  internal rotation: 60 degree     Treatment     Dorothy received the treatments listed below:      Dorothy received therapeutic exercises to develop strength, endurance, ROM, flexibility, posture and core stabilization for 40 minutes including:    Pulleys: 3' flexion, gentle range of motion - not done today    Standing bicep curls: 2x15 left, 2#  Standing table slides on bolster on table: 2x15 left  Standing serratus pushup plus hold with left shoulder tap: 2x10 left  Standing internal rotation walkouts: 3x10 red theraband  Standing  external rotation walkouts: 2x8 yellow theraband  Single arm lat pull downs: 2x10 left, red theraband (single band)  Supine dowel shoulder flexion: 2x10 with PVS pipe  Supine chest press: 2x10 with PVC pipe     Dorothy received the following manual therapy techniques: Joint mobilizations, Myofacial release and Soft tissue Mobilization were applied to the: left shoulder for 15 minutes, including:    Left shoulder PROM with Grade I-III GHJ mobilizations (inferior, anterior and posterior)    Patient Education and Home Exercises     Home Exercises Provided and Patient Education Provided   Education provided:   - continuing with home exercise program  - anatomy and referred pain    Written Home Exercises Provided: Patient instructed to cont prior HEP. Exercises were reviewed and Dorothy was able to demonstrate them prior to the end of the session.  Dorothy demonstrated good  understanding of the education provided. See EMR under Patient Instructions for exercises provided during therapy sessions    ASSESSMENT     Dorothy is a 67 y.o. female referred to outpatient Physical Therapy with a medical diagnosis of nontraumatic rotator cuff tear. Mild irritability level present today, and progressing towards more AAROM at this time. Slowly increasing AAROM exercises. It was recommended that patient see orthopedic surgeon for check up and potential imaging. Pt did well today and pain well managed.   Dorothy Is progressing well towards her goals.   Pt prognosis is Guarded.     Pt will continue to benefit from skilled outpatient physical therapy to address the deficits listed in the problem list box on initial evaluation, provide pt/family education and to maximize pt's level of independence in the home and community environment.     Pt's spiritual, cultural and educational needs considered and pt agreeable to plan of care and goals.     Anticipated Barriers for therapy: severity of symptoms, possible/expected tear     Goals:  Short Term Goals: 3  weeks   1.  Patient will report < 6/10 shoulder pain at worst for improved ADL performance. PROGRESSING; NOT MET  2.  Patient will demonstrate L shoulder flexion AROM > 85 deg to improve overhead reach. PROGRESSING; NOT MET  3.  Patient will demonstrate left shoulder external rotation PROM > 45 degree for improved hair care. PROGRESSING; NOT MET     Long Term Goals: 12 weeks   1. Patient will report ability to wash her hair with her left hand with only a little bit of difficulty. PROGRESSING; NOT MET  2. Patient will demonstrate ability to reach overhead and remove a 2 lb object 5 times to simulate removing objects from an overhead shelf w/o an increase in symptoms. PROGRESSING; NOT MET  3. Patient will demonstrate all upper extremity strength via MicroFET at least 80% of right for improved ADL and IADL performance. PROGRESSING; NOT MET  4. Patient will improve FOTO to < 42% to improve ADL performance. PROGRESSING; NOT MET  5. Patient will report ability to dress upper and lower body with only a little bit of difficulty. PROGRESSING; NOT MET    PLAN     Continue with POC    Plan of care Certification: 4/4/2022 to 5/20/22.     Jose C Lutz, PT

## 2022-05-02 ENCOUNTER — CLINICAL SUPPORT (OUTPATIENT)
Dept: REHABILITATION | Facility: HOSPITAL | Age: 67
End: 2022-05-02
Payer: MEDICARE

## 2022-05-02 DIAGNOSIS — Z74.09 DECREASED FUNCTIONAL MOBILITY AND ENDURANCE: ICD-10-CM

## 2022-05-02 DIAGNOSIS — M25.612 DECREASED RANGE OF MOTION OF LEFT SHOULDER: Primary | ICD-10-CM

## 2022-05-02 PROCEDURE — 97140 MANUAL THERAPY 1/> REGIONS: CPT | Mod: PN | Performed by: PHYSICAL THERAPIST

## 2022-05-02 PROCEDURE — 97110 THERAPEUTIC EXERCISES: CPT | Mod: PN | Performed by: PHYSICAL THERAPIST

## 2022-05-02 NOTE — PROGRESS NOTES
"[facility]   Physical Therapy Treatment Note     Name: Dorothy Burt  Clinic Number: [id number]    Therapy Diagnosis:   Encounter Diagnoses   Name Primary?    Decreased range of motion of left shoulder Yes    Decreased functional mobility and endurance      Physician: Sara Segundo PA    Visit Date: 5/2/2022    Physician Orders: PT Eval and Treat   2-3 x per week x 6 weeks   RTC strengthening, ROM, and other indicated modalities     Medical Diagnosis from Referral:   M75.42 (ICD-10-CM) - Shoulder impingement, left   M19.019 (ICD-10-CM) - AC (acromioclavicular) arthritis     Evaluation Date: 4/4/2022  Authorization Period Expiration: 3/25/23  Plan of Care Expiration: 5/20/22  Progress Note Due: 5/20/22  Visit # / Visits authorized: 9/12   FOTO: 9/10  PTA Visit: 0/5    Precautions: Standard and asthma      Time In: 9:16 am  Time Out: 10:13 am  Total Billable Time: 55 minutes (TEx3, MTx1)    SUBJECTIVE     Pt reports: She understands that while her shoulder is improving, it's not near 100%. She does have a cruise planned for September, so will consider that in regards to if and when she would need surgery if recommended.     She was compliant with home exercise program.  Response to previous treatment: eval only  Functional change: eval only    Pain: 2/10  Location: left shoulder      OBJECTIVE     5/2/22:  left PROM:  Flexion: 135 degree  external rotation: 50-58 degree  internal rotation: 63-70 degree     Left AROM:  Flexion: 80 degree (minimal pain)    Treatment     Dorothy received the treatments listed below:      Dorothy received therapeutic exercises to develop strength, endurance, ROM, flexibility, posture and core stabilization for 40 minutes including:    Pulleys: 3' flexion, gentle range of motion - not done today    Standing bicep curls: 2x15 left, 2#  Standing table slides on bolster on table: 2x15 left  Wall walks: 10x5" holds left   Standing serratus pushup plus hold with " left shoulder tap: 2x10 left with head of table elevated once  Standing internal rotation walkouts: 3x10 red theraband  Standing external rotation walkouts: 2x8 yellow theraband  Single arm lat pull downs: 2x10 left, red theraband (single band)      Supine dowel shoulder flexion: 2x10 with PVC pipe  Supine chest press: 2x10 with PVC pipe  Side lying external rotation AROM: 2x10 left      Dorothy received the following manual therapy techniques: Joint mobilizations, Myofacial release and Soft tissue Mobilization were applied to the: left shoulder for 15 minutes, including:    Left shoulder PROM with Grade I-III GHJ mobilizations (inferior, anterior and posterior)    Patient Education and Home Exercises     Home Exercises Provided and Patient Education Provided   Education provided:   - continuing with home exercise program  - anatomy and referred pain    Written Home Exercises Provided: Patient instructed to cont prior HEP. Exercises were reviewed and Dorothy was able to demonstrate them prior to the end of the session.  Dorotyh demonstrated good  understanding of the education provided. See EMR under Patient Instructions for exercises provided during therapy sessions    ASSESSMENT     Dorothy is a 67 y.o. female referred to outpatient Physical Therapy with a medical diagnosis of nontraumatic rotator cuff tear. Dorothy arrived with minimal pain and no tenderness to palpation at her rotator cuff. Progressed to wall walks today with quick fatigue and external rotation AROM today with good form. PROM has improved over the last month, but remains limited in all directions. Recommended she schedule a follow up ortho appointment for next couple of weeks to determine further POC. She may benefit from MRI to determine if a tear is present and severity of tear.      Dorothy Is progressing well towards her goals.   Pt prognosis is Guarded.     Pt will continue to benefit from skilled outpatient physical therapy to address the deficits listed in  the problem list box on initial evaluation, provide pt/family education and to maximize pt's level of independence in the home and community environment.     Pt's spiritual, cultural and educational needs considered and pt agreeable to plan of care and goals.     Anticipated Barriers for therapy: severity of symptoms, possible/expected tear     Goals:  Short Term Goals: 3 weeks   1.  Patient will report < 6/10 shoulder pain at worst for improved ADL performance. PROGRESSING; NOT MET  2.  Patient will demonstrate L shoulder flexion AROM > 85 deg to improve overhead reach. PROGRESSING; NOT MET  3.  Patient will demonstrate left shoulder external rotation PROM > 45 degree for improved hair care. MET 5/2/22     Long Term Goals: 12 weeks   1. Patient will report ability to wash her hair with her left hand with only a little bit of difficulty. PROGRESSING; NOT MET  2. Patient will demonstrate ability to reach overhead and remove a 2 lb object 5 times to simulate removing objects from an overhead shelf w/o an increase in symptoms. PROGRESSING; NOT MET  3. Patient will demonstrate all upper extremity strength via MicroFET at least 80% of right for improved ADL and IADL performance. PROGRESSING; NOT MET  4. Patient will improve FOTO to < 42% to improve ADL performance. PROGRESSING; NOT MET  5. Patient will report ability to dress upper and lower body with only a little bit of difficulty. PROGRESSING; NOT MET    PLAN     Continue with POC    Plan of care Certification: 4/4/2022 to 5/20/22.     Louie Tobar, PT

## 2022-05-03 PROBLEM — M75.122 NONTRAUMATIC COMPLETE TEAR OF LEFT ROTATOR CUFF: Status: ACTIVE | Noted: 2022-05-03

## 2022-05-04 ENCOUNTER — CLINICAL SUPPORT (OUTPATIENT)
Dept: REHABILITATION | Facility: HOSPITAL | Age: 67
End: 2022-05-04
Payer: MEDICARE

## 2022-05-04 ENCOUNTER — TELEPHONE (OUTPATIENT)
Dept: ORTHOPEDICS | Facility: CLINIC | Age: 67
End: 2022-05-04
Payer: MEDICARE

## 2022-05-04 DIAGNOSIS — M25.612 DECREASED RANGE OF MOTION OF LEFT SHOULDER: Primary | ICD-10-CM

## 2022-05-04 DIAGNOSIS — Z74.09 DECREASED FUNCTIONAL MOBILITY AND ENDURANCE: ICD-10-CM

## 2022-05-04 PROCEDURE — 97110 THERAPEUTIC EXERCISES: CPT | Mod: PN

## 2022-05-04 PROCEDURE — 97140 MANUAL THERAPY 1/> REGIONS: CPT | Mod: PN

## 2022-05-04 NOTE — TELEPHONE ENCOUNTER
----- Message from Suzanne Yi sent at 5/4/2022  9:31 AM CDT -----  Type:  Patient Returning Call    Who Called:Pt     Would the patient rather a call back or a response via [facility]? Call back   Best Call Back Number: [phone]  Additional Information: trying to reschedule her appt... have a 11:45 today and said she was told she could reschedule for Friday if she couldn't make it today

## 2022-05-04 NOTE — PROGRESS NOTES
"[facility]   Physical Therapy Treatment Note     Name: Dorothy Burt  Clinic Number: [id number]    Therapy Diagnosis:   Encounter Diagnoses   Name Primary?    Decreased range of motion of left shoulder Yes    Decreased functional mobility and endurance      Physician: No ref. provider found    Visit Date: 5/4/2022    Physician Orders: PT Eval and Treat   2-3 x per week x 6 weeks   RTC strengthening, ROM, and other indicated modalities     Medical Diagnosis from Referral:   M75.42 (ICD-10-CM) - Shoulder impingement, left   M19.019 (ICD-10-CM) - AC (acromioclavicular) arthritis     Evaluation Date: 4/4/2022  Authorization Period Expiration: 3/25/23  Plan of Care Expiration: 5/20/22  Progress Note Due: 5/20/22  Visit # / Visits authorized: 10/12   FOTO: 10/10 - Next  PTA Visit: 0/5    Precautions: Standard and asthma      Time In: 8:05 am  Time Out: 9:00 am  Total Billable Time: 55 minutes (TEx3, MTx1)    SUBJECTIVE     Pt reports: knows her shoulder is improving and that surgery in the future is still a possibility.     She was compliant with home exercise program.  Response to previous treatment: eval only  Functional change: eval only    Pain: 2/10  Location: left shoulder      OBJECTIVE     5/2/22:  left PROM:  Flexion: 135 degree  external rotation: 50-58 degree  internal rotation: 63-70 degree     Left AROM:  Flexion: 80 degree (minimal pain)    Treatment     Dorothy received the treatments listed below:      Dorothy received therapeutic exercises to develop strength, endurance, ROM, flexibility, posture and core stabilization for 40 minutes including:    Pulleys: 3' flexion, gentle range of motion - not done today    Standing bicep curls: 2x15 left, 2#  Standing table slides on bolster on table: 2x15 left  Wall walks: 10x5" holds left   Standing serratus pushup plus hold with left shoulder tap: 2x10 left with head of table elevated once  Standing internal rotation walkouts: 3x10 red " theraband  Standing external rotation walkouts: 2x8 yellow theraband  Single arm lat pull downs: 2x10 left, red theraband (single band)    Supine dowel shoulder flexion: 2x10 with PVC pipe  Supine chest press: 2x10 with 2# dowel  Side lying external rotation AROM: 2x15 left      Dorothy received the following manual therapy techniques: Joint mobilizations, Myofacial release and Soft tissue Mobilization were applied to the: left shoulder for 15 minutes, including:    Left shoulder PROM with Grade I-III GHJ mobilizations (inferior, anterior and posterior)    Patient Education and Home Exercises     Home Exercises Provided and Patient Education Provided   Education provided:   - continuing with home exercise program  - anatomy and referred pain    Written Home Exercises Provided: Patient instructed to cont prior HEP. Exercises were reviewed and Dorothy was able to demonstrate them prior to the end of the session.  Dorothy demonstrated good  understanding of the education provided. See EMR under Patient Instructions for exercises provided during therapy sessions    ASSESSMENT     Dorothy is a 67 y.o. female referred to outpatient Physical Therapy with a medical diagnosis of nontraumatic rotator cuff tear. Pain well managed and PROM has improved overall. Gravity dependent AROM has improved though still under 90 degrees for flexion and abduction. Initiating more rotator cuff strengthening activities per patient tolerance. Pt will slowly progress, and surgery is a possibility. Pt is pursuing an MRI to determine shoulder health status.     Dorothy Is progressing well towards her goals.   Pt prognosis is Guarded.     Pt will continue to benefit from skilled outpatient physical therapy to address the deficits listed in the problem list box on initial evaluation, provide pt/family education and to maximize pt's level of independence in the home and community environment.     Pt's spiritual, cultural and educational needs considered and pt  agreeable to plan of care and goals.     Anticipated Barriers for therapy: severity of symptoms, possible/expected tear     Goals:  Short Term Goals: 3 weeks   1.  Patient will report < 6/10 shoulder pain at worst for improved ADL performance. PROGRESSING; NOT MET  2.  Patient will demonstrate L shoulder flexion AROM > 85 deg to improve overhead reach. PROGRESSING; NOT MET  3.  Patient will demonstrate left shoulder external rotation PROM > 45 degree for improved hair care. MET 5/2/22     Long Term Goals: 12 weeks   1. Patient will report ability to wash her hair with her left hand with only a little bit of difficulty. PROGRESSING; NOT MET  2. Patient will demonstrate ability to reach overhead and remove a 2 lb object 5 times to simulate removing objects from an overhead shelf w/o an increase in symptoms. PROGRESSING; NOT MET  3. Patient will demonstrate all upper extremity strength via MicroFET at least 80% of right for improved ADL and IADL performance. PROGRESSING; NOT MET  4. Patient will improve FOTO to < 42% to improve ADL performance. PROGRESSING; NOT MET  5. Patient will report ability to dress upper and lower body with only a little bit of difficulty. PROGRESSING; NOT MET    PLAN     Continue with POC    Plan of care Certification: 4/4/2022 to 5/20/22.     Jose C Lutz, PT

## 2022-05-06 ENCOUNTER — OFFICE VISIT (OUTPATIENT)
Dept: ORTHOPEDICS | Facility: CLINIC | Age: 67
End: 2022-05-06
Payer: MEDICARE

## 2022-05-06 ENCOUNTER — CLINICAL SUPPORT (OUTPATIENT)
Dept: REHABILITATION | Facility: HOSPITAL | Age: 67
End: 2022-05-06
Payer: MEDICARE

## 2022-05-06 VITALS
BODY MASS INDEX: 33.2 KG/M2 | HEIGHT: 64 IN | HEART RATE: 84 BPM | SYSTOLIC BLOOD PRESSURE: 134 MMHG | WEIGHT: 194.44 LBS | DIASTOLIC BLOOD PRESSURE: 84 MMHG

## 2022-05-06 DIAGNOSIS — M25.512 CHRONIC LEFT SHOULDER PAIN: ICD-10-CM

## 2022-05-06 DIAGNOSIS — Z74.09 DECREASED FUNCTIONAL MOBILITY AND ENDURANCE: ICD-10-CM

## 2022-05-06 DIAGNOSIS — M25.612 DECREASED RANGE OF MOTION OF LEFT SHOULDER: Primary | ICD-10-CM

## 2022-05-06 DIAGNOSIS — G89.29 CHRONIC LEFT SHOULDER PAIN: ICD-10-CM

## 2022-05-06 PROCEDURE — 3075F SYST BP GE 130 - 139MM HG: CPT | Mod: CPTII,S$GLB,, | Performed by: ORTHOPAEDIC SURGERY

## 2022-05-06 PROCEDURE — 99214 PR OFFICE/OUTPT VISIT, EST, LEVL IV, 30-39 MIN: ICD-10-PCS | Mod: S$GLB,,, | Performed by: ORTHOPAEDIC SURGERY

## 2022-05-06 PROCEDURE — 3079F DIAST BP 80-89 MM HG: CPT | Mod: CPTII,S$GLB,, | Performed by: ORTHOPAEDIC SURGERY

## 2022-05-06 PROCEDURE — 1101F PT FALLS ASSESS-DOCD LE1/YR: CPT | Mod: CPTII,S$GLB,, | Performed by: ORTHOPAEDIC SURGERY

## 2022-05-06 PROCEDURE — 3008F PR BODY MASS INDEX (BMI) DOCUMENTED: ICD-10-PCS | Mod: CPTII,S$GLB,, | Performed by: ORTHOPAEDIC SURGERY

## 2022-05-06 PROCEDURE — 97140 MANUAL THERAPY 1/> REGIONS: CPT | Mod: PN | Performed by: PHYSICAL THERAPIST

## 2022-05-06 PROCEDURE — 1160F PR REVIEW ALL MEDS BY PRESCRIBER/CLIN PHARMACIST DOCUMENTED: ICD-10-PCS | Mod: CPTII,S$GLB,, | Performed by: ORTHOPAEDIC SURGERY

## 2022-05-06 PROCEDURE — 3288F FALL RISK ASSESSMENT DOCD: CPT | Mod: CPTII,S$GLB,, | Performed by: ORTHOPAEDIC SURGERY

## 2022-05-06 PROCEDURE — 1159F MED LIST DOCD IN RCRD: CPT | Mod: CPTII,S$GLB,, | Performed by: ORTHOPAEDIC SURGERY

## 2022-05-06 PROCEDURE — 1101F PR PT FALLS ASSESS DOC 0-1 FALLS W/OUT INJ PAST YR: ICD-10-PCS | Mod: CPTII,S$GLB,, | Performed by: ORTHOPAEDIC SURGERY

## 2022-05-06 PROCEDURE — 99214 OFFICE O/P EST MOD 30 MIN: CPT | Mod: S$GLB,,, | Performed by: ORTHOPAEDIC SURGERY

## 2022-05-06 PROCEDURE — 97014 ELECTRIC STIMULATION THERAPY: CPT | Mod: PN | Performed by: PHYSICAL THERAPIST

## 2022-05-06 PROCEDURE — 97110 THERAPEUTIC EXERCISES: CPT | Mod: PN | Performed by: PHYSICAL THERAPIST

## 2022-05-06 PROCEDURE — 3288F PR FALLS RISK ASSESSMENT DOCUMENTED: ICD-10-PCS | Mod: CPTII,S$GLB,, | Performed by: ORTHOPAEDIC SURGERY

## 2022-05-06 PROCEDURE — 1160F RVW MEDS BY RX/DR IN RCRD: CPT | Mod: CPTII,S$GLB,, | Performed by: ORTHOPAEDIC SURGERY

## 2022-05-06 PROCEDURE — 3008F BODY MASS INDEX DOCD: CPT | Mod: CPTII,S$GLB,, | Performed by: ORTHOPAEDIC SURGERY

## 2022-05-06 PROCEDURE — 3079F PR MOST RECENT DIASTOLIC BLOOD PRESSURE 80-89 MM HG: ICD-10-PCS | Mod: CPTII,S$GLB,, | Performed by: ORTHOPAEDIC SURGERY

## 2022-05-06 PROCEDURE — 3075F PR MOST RECENT SYSTOLIC BLOOD PRESS GE 130-139MM HG: ICD-10-PCS | Mod: CPTII,S$GLB,, | Performed by: ORTHOPAEDIC SURGERY

## 2022-05-06 PROCEDURE — 99999 PR PBB SHADOW E&M-EST. PATIENT-LVL IV: ICD-10-PCS | Mod: PBBFAC,,, | Performed by: ORTHOPAEDIC SURGERY

## 2022-05-06 PROCEDURE — 1159F PR MEDICATION LIST DOCUMENTED IN MEDICAL RECORD: ICD-10-PCS | Mod: CPTII,S$GLB,, | Performed by: ORTHOPAEDIC SURGERY

## 2022-05-06 PROCEDURE — 99999 PR PBB SHADOW E&M-EST. PATIENT-LVL IV: CPT | Mod: PBBFAC,,, | Performed by: ORTHOPAEDIC SURGERY

## 2022-05-06 NOTE — PROGRESS NOTES
Patient ID:   Dorothy Burt is a 67 y.o. female.    Chief Complaint:   Follow-up evaluation for left rotator cuff tear    HPI:   The patient is returning for evaluation of the left shoulder. She previously underwent injection and is currently in physical therapy. She is improved but is still having pain and difficulty raising her arm.     Medications:    Current Outpatient Medications:     acyclovir (ZOVIRAX) 400 MG tablet, TAKE 1 BY MOUTH 5 TIMES DAILY FOR 5 DAYS, Disp: 25 tablet, Rfl: 2    albuterol (PROAIR HFA) 90 mcg/actuation inhaler, Inhale 2 puffs into the lungs every 6 (six) hours as needed for Wheezing or Shortness of Breath. Rescue, Disp: 18 g, Rfl: 11    diclofenac sodium (VOLTAREN) 1 % Gel, Apply 2 g topically 2 (two) times daily as needed (to knees)., Disp: 100 g, Rfl: 2    famotidine (PEPCID) 20 MG tablet, TAKE 1 TABLET BY MOUTH TWICE A DAY, Disp: 30 tablet, Rfl: 0    ibuprofen (ADVIL,MOTRIN) 600 MG tablet, Take 1 tablet (600 mg total) by mouth every 6 (six) hours as needed for Pain., Disp: 30 tablet, Rfl: 1    levocetirizine (XYZAL) 5 MG tablet, TAKE 1 TABLET BY MOUTH EVERY DAY AS NEEDED, Disp: 30 tablet, Rfl: 11    levothyroxine (SYNTHROID) 25 MCG tablet, TAKE 1.5 TABLET BY MOUTH DAILY, Disp: 135 tablet, Rfl: 3    naproxen (NAPROSYN) 500 MG tablet, TAKE 1 TABLET BY MOUTH TWICE A DAY AS NEEDED, Disp: 60 tablet, Rfl: 0    omeprazole (PRILOSEC) 20 MG capsule, Take 20 mg by mouth daily as needed., Disp: , Rfl:     ondansetron (ZOFRAN-ODT) 4 MG TbDL, Take 1 tablet (4 mg total) by mouth every 8 (eight) hours as needed (nausea)., Disp: 12 tablet, Rfl: 0    temazepam (RESTORIL) 15 mg Cap, Take 1 capsule (15 mg total) by mouth nightly as needed (Sleep)., Disp: 30 capsule, Rfl: 5    traMADoL (ULTRAM) 50 mg tablet, Take 2 tablets (100 mg total) by mouth every 8 (eight) hours as needed for Pain., Disp: 120 tablet, Rfl: 0    triamcinolone acetonide 0.1% (KENALOG) 0.1 % cream, Apply topically 2  "(two) times daily., Disp: 15 g, Rfl: 2    ZANAFLEX 4 mg tablet, Take 4 mg by mouth every 8 (eight) hours as needed., Disp: , Rfl:     Allergies:  Review of patient's allergies indicates:  No Known Allergies    Past Medical History:  Past Medical History:   Diagnosis Date    Arthritis     Asthma     Back pain     Knee pain     Thyroid disease         Past Surgical History:  Past Surgical History:   Procedure Laterality Date    CHOLECYSTECTOMY      CORONARY ANGIOGRAPHY N/A 4/19/2020    Procedure: ANGIOGRAM, CORONARY ARTERY;  Surgeon: Zachraiah Goldman MD;  Location: [facility] CATH LAB;  Service: Cardiology;  Laterality: N/A;    LEFT HEART CATHETERIZATION Right 4/19/2020    Procedure: Left heart cath;  Surgeon: Zachariah Goldman MD;  Location: [facility] CATH LAB;  Service: Cardiology;  Laterality: Right;    VASCULAR SURGERY         Social History:  Social History     Occupational History    Not on file   Tobacco Use    Smoking status: Never Smoker    Smokeless tobacco: Never Used   Substance and Sexual Activity    Alcohol use: Yes     Alcohol/week: 1.0 standard drink     Types: 1 Shots of liquor per week     Comment: occ    Drug use: No    Sexual activity: Not on file       Family History:  Family History   Problem Relation Age of Onset    Stroke Mother         ROS:  Review of Systems   Musculoskeletal: Positive for joint pain, muscle cramps, muscle weakness and myalgias.   Neurological: Negative for numbness and paresthesias.   All other systems reviewed and are negative.      Vitals:  /84 (BP Location: Left arm, Patient Position: Sitting, BP Method: Large (Automatic))   Pulse 84   Ht 5' 4" (1.626 m)   Wt 88.2 kg (194 lb 7.1 oz)   LMP 08/15/2005   BMI 33.38 kg/m²     Physical Examination:  Comprehensive Orthopaedic Musculoskeletal Exam    General        Constitutional: appears stated age, mildly obese, well-developed and well-nourished    Scleral icterus: no    Labored breathing: no    " Psychiatric: normal mood and affect and no acute distress    Neurological: alert and oriented x3    Skin: intact    Lymphadenopathy: none     Ortho Exam     Left shoulder exam:  ROM: active forward elevation 90, passive elevation 170, ER 20  RTC strength: 4/5 elevation, 4+/5 ER, 5/5 IR  Positive Neer. Positive Francis.    Assessment:  1. Chronic left shoulder pain      Plan:  The patient has not undergone MRI scan of the left shoulder. I have recommended that she undergo MRI to evaluate the rotator cuff. I will contact her after the study is completed to review her options going forward.      Orders Placed This Encounter    MRI Shoulder Without Contrast Left     No follow-ups on file.

## 2022-05-06 NOTE — PROGRESS NOTES
"[facility]   Physical Therapy Treatment Note     Name: Dorothy Burt  Clinic Number: [id number]    Therapy Diagnosis:   Encounter Diagnoses   Name Primary?    Decreased range of motion of left shoulder Yes    Decreased functional mobility and endurance      Physician: No ref. provider found    Visit Date: 5/6/2022    Physician Orders: PT Eval and Treat   2-3 x per week x 6 weeks   RTC strengthening, ROM, and other indicated modalities     Medical Diagnosis from Referral:   M75.42 (ICD-10-CM) - Shoulder impingement, left   M19.019 (ICD-10-CM) - AC (acromioclavicular) arthritis     Evaluation Date: 4/4/2022  Authorization Period Expiration: 3/25/23  Plan of Care Expiration: 5/20/22  Progress Note Due: 5/20/22  Visit # / Visits authorized: 10/12   FOTO: 10/10 Next  PTA Visit: 0/5    Precautions: Standard and asthma      Time In: 9:30 am  Time Out: 10:40 am  Total Billable Time: 55 minutes (TEx2, MTx2, e-stim)    SUBJECTIVE     Pt reports: She saw Dr. Luna this morning and she requested an MRI to see if and how big of a rotator cuff tear she has.     She was compliant with home exercise program.  Response to previous treatment: decreased pain  Functional change: can raise arm a little better    Pain: 2/10  Location: left shoulder      OBJECTIVE     5/2/22:  left PROM:  Flexion: 135 degree  external rotation: 50-58 degree  internal rotation: 63-70 degree     Left AROM:  Flexion: 80 degree (minimal pain)    Treatment     Dorothy received the treatments listed below:      Dorothy received therapeutic exercises to develop strength, endurance, ROM, flexibility, posture and core stabilization for 25 minutes including:    Pulleys: 3' flexion, gentle range of motion - not done today    Standing bicep curls: 2x15 left, 2#  Standing table slides on bolster on table: 2x15 left - not today  Wall walks: 10x5" holds left - not today  Standing serratus pushup plus hold with left shoulder tap: 2x10 left with " head of table elevated once  Standing internal rotation walkouts: 3x10 red theraband  Standing external rotation walkouts: 2x8 yellow theraband  Single arm lat pull downs: 2x10 left, red theraband (single band) - not today    Supine dowel shoulder flexion: 3x10 with PVC pipe  Supine chest press plus: 2x10 with 2# dowel  Side lying external rotation AROM: 2x15 left      Dorothy received the following manual therapy techniques: Joint mobilizations, Myofacial release and Soft tissue Mobilization were applied to the: left shoulder for 30 minutes, including:    Left shoulder PROM with Grade I-IV GHJ mobilizations (inferior, anterior and posterior)  Dry needling to left rotator cuff    Dorothy received the following supervised modalities after being cleared for contradictions: dry needling: Dorothy received TENS electrical stimulation for pain to the left rotator cuff. Electrical stimulation was applied to these needles at 4 Hz and a pulse of 250 µseconds for 10 minutes with a retirement check. Dorothy reported treatment well without any adverse effects.    Patient Education and Home Exercises     Home Exercises Provided and Patient Education Provided   Education provided:   - continuing with home exercise program  - anatomy and referred pain  - decreasing frequency to 1x/wk x 2 weeks and await MRI results    Written Home Exercises Provided: Patient instructed to cont prior HEP. Exercises were reviewed and Dorothy was able to demonstrate them prior to the end of the session.  Dorothy demonstrated good  understanding of the education provided. See EMR under Patient Instructions for exercises provided during therapy sessions    Updated home exercise program on 5/6/22 and provided with yellow theraband     ASSESSMENT     Dorothy is a 67 y.o. female referred to outpatient Physical Therapy with a medical diagnosis of nontraumatic rotator cuff tear. Dorothy continues to have minimal pain, but poor AROM. She saw Dr. Luna today and an MRI was scheduled  for 5/18/22. She is tender to palpation and origin of supraspinatus and infraspinatus. Based on testing and presentation, anticipate patient has rotator cuff tear which has limited her progress in PT at this point.    Dorothy was agreeable to dry needling by a certified dry needling PT and signed a consent prior to treatment after being made aware of risks. 2x50 mm and 2x30 mm needles were placed into the origin and teno-osseous insertion of the patient's supraspinatus, infraspinatus. Periostal pecking and winding for grasp were performed. Electrical stimulation was applied to these needles at 4 Hz and a pulse of 250 µseconds for 10 minutes with a nursing home check. Afterwards, needles were removed and placed into a sharps container. Dorothy reported a good response to treatment.    Dorothy Is progressing well towards her goals.   Pt prognosis is Guarded.     Pt will continue to benefit from skilled outpatient physical therapy to address the deficits listed in the problem list box on initial evaluation, provide pt/family education and to maximize pt's level of independence in the home and community environment.     Pt's spiritual, cultural and educational needs considered and pt agreeable to plan of care and goals.     Anticipated Barriers for therapy: severity of symptoms, possible/expected tear     Goals:  Short Term Goals: 3 weeks   1.  Patient will report < 6/10 shoulder pain at worst for improved ADL performance. PROGRESSING; NOT MET  2.  Patient will demonstrate L shoulder flexion AROM > 85 deg to improve overhead reach. PROGRESSING; NOT MET  3.  Patient will demonstrate left shoulder external rotation PROM > 45 degree for improved hair care. MET 5/2/22     Long Term Goals: 12 weeks   1. Patient will report ability to wash her hair with her left hand with only a little bit of difficulty. PROGRESSING; NOT MET  2. Patient will demonstrate ability to reach overhead and remove a 2 lb object 5 times to simulate removing objects  from an overhead shelf w/o an increase in symptoms. PROGRESSING; NOT MET  3. Patient will demonstrate all upper extremity strength via MicroFET at least 80% of right for improved ADL and IADL performance. PROGRESSING; NOT MET  4. Patient will improve FOTO to < 42% to improve ADL performance. PROGRESSING; NOT MET  5. Patient will report ability to dress upper and lower body with only a little bit of difficulty. PROGRESSING; NOT MET    PLAN     Continue with POC    Plan of care Certification: 4/4/2022 to 5/20/22.     Louie Tobar, PT

## 2022-05-10 ENCOUNTER — CLINICAL SUPPORT (OUTPATIENT)
Dept: REHABILITATION | Facility: HOSPITAL | Age: 67
End: 2022-05-10
Payer: MEDICARE

## 2022-05-10 DIAGNOSIS — Z74.09 DECREASED FUNCTIONAL MOBILITY AND ENDURANCE: ICD-10-CM

## 2022-05-10 DIAGNOSIS — M25.612 DECREASED RANGE OF MOTION OF LEFT SHOULDER: Primary | ICD-10-CM

## 2022-05-10 PROCEDURE — 97140 MANUAL THERAPY 1/> REGIONS: CPT | Mod: PN | Performed by: PHYSICAL THERAPIST

## 2022-05-10 PROCEDURE — 97110 THERAPEUTIC EXERCISES: CPT | Mod: PN | Performed by: PHYSICAL THERAPIST

## 2022-05-10 NOTE — PROGRESS NOTES
"[facility]   Physical Therapy Treatment Note     Name: Dorothy Burt  Clinic Number: [id number]    Therapy Diagnosis:   Encounter Diagnoses   Name Primary?    Decreased range of motion of left shoulder Yes    Decreased functional mobility and endurance      Physician: No ref. provider found    Visit Date: 5/10/2022    Physician Orders: PT Eval and Treat   2-3 x per week x 6 weeks   RTC strengthening, ROM, and other indicated modalities     Medical Diagnosis from Referral:   M75.42 (ICD-10-CM) - Shoulder impingement, left   M19.019 (ICD-10-CM) - AC (acromioclavicular) arthritis     Evaluation Date: 4/4/2022  Authorization Period Expiration: 3/25/23  Plan of Care Expiration: 5/20/22  Progress Note Due: 5/20/22  Visit # / Visits authorized: 11/12   FOTO: Performed 5/10/22  PTA Visit: 0/5    Precautions: Standard and asthma      Time In: 9:34 am  Time Out: 10:30 am  Total Billable Time: 55 minutes (TEx3, MTx1)    SUBJECTIVE     Pt reports: She might have felt a little looser, but yesterday her anterior shoulder was bothering her. She needed a pain pill and naproxen yesterday. She did eat a lot of seafood on Saturday.    She was compliant with home exercise program.  Response to previous treatment: decreased pain  Functional change: can raise arm a little better    Pain: 3-4/10  Location: left shoulder      OBJECTIVE     5/2/22:  left PROM:  Flexion: 135 degree  external rotation: 50-58 degree  internal rotation: 63-70 degree     Left AROM:  Flexion: 80 degree (minimal pain)    Treatment     Dorothy received the treatments listed below:      Dorothy received therapeutic exercises to develop strength, endurance, ROM, flexibility, posture and core stabilization for 43 minutes including FOTO:    Pulleys: 3' flexion, gentle range of motion    Standing bicep curls: 2x10 left, 3#  Standing table slides on bolster on table: 2x15 left - not today  Wall walks: 15x5" holds left  Standing serratus pushup " plus hold with left shoulder tap: 2x10 left with head of table elevated once - not today  Standing internal rotation walkouts: 3x10 red theraband  Standing external rotation walkouts: 2x10 yellow theraband  Single arm lat pull downs: 2x10 left, red theraband (single band)    Supine shoulder flexion AROM (about  degree): 10x  Supine dowel shoulder flexion: 3x10 with PVC pipe  Supine chest press plus: 2x10 with 2# dowel  Side lying external rotation AROM: 2x15 left      Dorothy received the following manual therapy techniques: Joint mobilizations, Myofacial release and Soft tissue Mobilization were applied to the: left shoulder for 12 minutes, including:    Left shoulder PROM with Grade I-IV GHJ mobilizations (inferior, anterior and posterior)        Patient Education and Home Exercises     Home Exercises Provided and Patient Education Provided   Education provided:   - continuing with home exercise program  - anatomy and referred pain  - decreasing frequency to 1x/wk x 2 weeks and await MRI results    Written Home Exercises Provided: Patient instructed to cont prior HEP. Exercises were reviewed and Dorothy was able to demonstrate them prior to the end of the session.  Dorothy demonstrated good  understanding of the education provided. See EMR under Patient Instructions for exercises provided during therapy sessions    Updated home exercise program on 5/6/22 and provided with yellow theraband     ASSESSMENT     Dorothy is a 67 y.o. female referred to outpatient Physical Therapy with a medical diagnosis of nontraumatic rotator cuff tear. Based on testing and presentation, anticipate patient has rotator cuff tear which has limited her progress in PT at this point. Minimal relief reported after dry needling last visit. Flexion AROM remains very limited. Scheduled MRI on 5/18, but patient calling daily to see if there are any cancellations. range of motion and strength seem to be plateauing recently.       Dorothy Is progressing  well towards her goals.   Pt prognosis is Guarded.     Pt will continue to benefit from skilled outpatient physical therapy to address the deficits listed in the problem list box on initial evaluation, provide pt/family education and to maximize pt's level of independence in the home and community environment.     Pt's spiritual, cultural and educational needs considered and pt agreeable to plan of care and goals.     Anticipated Barriers for therapy: severity of symptoms, possible/expected tear     Goals:  Short Term Goals: 3 weeks   1.  Patient will report < 6/10 shoulder pain at worst for improved ADL performance. MET  2.  Patient will demonstrate L shoulder flexion AROM > 85 deg to improve overhead reach. PROGRESSING; NOT MET  3.  Patient will demonstrate left shoulder external rotation PROM > 45 degree for improved hair care. MET 5/2/22     Long Term Goals: 12 weeks   1. Patient will report ability to wash her hair with her left hand with only a little bit of difficulty. PROGRESSING; NOT MET  2. Patient will demonstrate ability to reach overhead and remove a 2 lb object 5 times to simulate removing objects from an overhead shelf w/o an increase in symptoms. PROGRESSING; NOT MET  3. Patient will demonstrate all upper extremity strength via MicroFET at least 80% of right for improved ADL and IADL performance. PROGRESSING; NOT MET  4. Patient will improve FOTO to < 42% to improve ADL performance. PROGRESSING; NOT MET  5. Patient will report ability to dress upper and lower body with only a little bit of difficulty. PROGRESSING; NOT MET    PLAN     Continue with POC    Plan of care Certification: 4/4/2022 to 5/20/22.     Louie Tobar, PT

## 2022-05-17 ENCOUNTER — PATIENT MESSAGE (OUTPATIENT)
Dept: ORTHOPEDICS | Facility: CLINIC | Age: 67
End: 2022-05-17
Payer: MEDICARE

## 2022-05-17 RX ORDER — DIAZEPAM 5 MG/1
TABLET ORAL
Qty: 2 TABLET | Refills: 0 | Status: SHIPPED | OUTPATIENT
Start: 2022-05-17 | End: 2022-10-15

## 2022-05-18 ENCOUNTER — HOSPITAL ENCOUNTER (OUTPATIENT)
Dept: RADIOLOGY | Facility: HOSPITAL | Age: 67
Discharge: HOME | End: 2022-05-18
Attending: ORTHOPAEDIC SURGERY
Payer: MEDICARE

## 2022-05-18 ENCOUNTER — CLINICAL SUPPORT (OUTPATIENT)
Dept: REHABILITATION | Facility: HOSPITAL | Age: 67
End: 2022-05-18
Payer: MEDICARE

## 2022-05-18 DIAGNOSIS — G89.29 CHRONIC LEFT SHOULDER PAIN: ICD-10-CM

## 2022-05-18 DIAGNOSIS — M25.512 CHRONIC LEFT SHOULDER PAIN: ICD-10-CM

## 2022-05-18 DIAGNOSIS — Z74.09 DECREASED FUNCTIONAL MOBILITY AND ENDURANCE: ICD-10-CM

## 2022-05-18 DIAGNOSIS — M25.612 DECREASED RANGE OF MOTION OF LEFT SHOULDER: Primary | ICD-10-CM

## 2022-05-18 PROCEDURE — 73221 MRI SHOULDER WITHOUT CONTRAST LEFT: ICD-10-PCS | Mod: 26,LT,, | Performed by: RADIOLOGY

## 2022-05-18 PROCEDURE — 73221 MRI JOINT UPR EXTREM W/O DYE: CPT | Mod: 26,LT,, | Performed by: RADIOLOGY

## 2022-05-18 PROCEDURE — 97110 THERAPEUTIC EXERCISES: CPT | Mod: PN | Performed by: PHYSICAL THERAPIST

## 2022-05-18 PROCEDURE — 73221 MRI JOINT UPR EXTREM W/O DYE: CPT | Mod: TC,LT

## 2022-05-18 NOTE — PROGRESS NOTES
"[facility]   Physical Therapy Treatment Note     Name: Dorothy Burt  Clinic Number: [id number]    Therapy Diagnosis:   Encounter Diagnoses   Name Primary?    Decreased range of motion of left shoulder Yes    Decreased functional mobility and endurance      Physician: Sara Segundo PA    Visit Date: 5/18/2022    Physician Orders: PT Eval and Treat   2-3 x per week x 6 weeks   RTC strengthening, ROM, and other indicated modalities     Medical Diagnosis from Referral:   M75.42 (ICD-10-CM) - Shoulder impingement, left   M19.019 (ICD-10-CM) - AC (acromioclavicular) arthritis     Evaluation Date: 4/4/2022  Authorization Period Expiration: 3/25/23  Plan of Care Expiration: 5/20/22  Progress Note Due: 6/2/22  Visit # / Visits authorized: 13/16   FOTO: Performed 5/10/22  PTA Visit: 0/5    Precautions: Standard and asthma      Time In: 10:15 am  Time Out: 11:10 am  Total Billable Time: 45 1:1 and 10 supervised minutes (TEx3)    SUBJECTIVE     Pt reports: see updated POC    She was compliant with home exercise program.  Response to previous treatment: decreased pain  Functional change: can raise arm a little better    Pain: 2/10  Location: left shoulder      OBJECTIVE     See updated POC      Treatment     Dorothy received the treatments listed below:      Dorothy received therapeutic exercises to develop strength, endurance, ROM, flexibility, posture and core stabilization for 45 1:1 and 10 supervised minutes including re-assessment:    Pulleys: 3' flexion, gentle range of motion    Standing bicep curls: 2x10 left, 3#  Wall walks: 15x5" holds left  Standing internal rotation walkouts: 3x10 red theraband  Standing external rotation walkouts: 2x10 yellow theraband  Single arm lat pull downs: 2x10 left, red theraband (single band)    Supine shoulder flexion AROM (about  degree): 10x  Supine dowel shoulder flexion: 3x10 with PVC pipe  Supine chest press plus: 1x10 with 2# dowel (stopped due to " pain)  Side lying external rotation AROM: 2x15 left      Dorothy received the following manual therapy techniques: Joint mobilizations, Myofacial release and Soft tissue Mobilization were applied to the: left shoulder for 0 minutes, including:    Left shoulder PROM with Grade I-IV GHJ mobilizations (inferior, anterior and posterior)        Patient Education and Home Exercises     Home Exercises Provided and Patient Education Provided   Education provided:   - continuing with home exercise program  - anatomy and referred pain  - decreasing frequency to 1x/wk x 2 weeks and await MRI results    Written Home Exercises Provided: Patient instructed to cont prior HEP. Exercises were reviewed and Dorothy was able to demonstrate them prior to the end of the session.  Dorothy demonstrated good  understanding of the education provided. See EMR under Patient Instructions for exercises provided during therapy sessions    Updated home exercise program on 5/6/22 and provided with yellow theraband     ASSESSMENT     See updated POC      Dorothy Is progressing well towards her goals.   Pt prognosis is Guarded.     Pt will continue to benefit from skilled outpatient physical therapy to address the deficits listed in the problem list box on initial evaluation, provide pt/family education and to maximize pt's level of independence in the home and community environment.     Pt's spiritual, cultural and educational needs considered and pt agreeable to plan of care and goals.     Anticipated Barriers for therapy: severity of symptoms, possible/expected tear     Goals:  Short Term Goals: 3 weeks   1.  Patient will report < 6/10 shoulder pain at worst for improved ADL performance. MET  2.  Patient will demonstrate L shoulder flexion AROM > 85 deg to improve overhead reach. MET  3.  Patient will demonstrate left shoulder external rotation PROM > 45 degree for improved hair care. MET 5/2/22     Long Term Goals: 12 weeks   1. Patient will report ability  to wash her hair with her left hand with only a little bit of difficulty. PROGRESSING; NOT MET  2. Patient will demonstrate ability to reach overhead and remove a 2 lb object 5 times to simulate removing objects from an overhead shelf w/o an increase in symptoms. PROGRESSING; NOT MET  3. Patient will demonstrate all upper extremity strength via MicroFET at least 80% of right for improved ADL and IADL performance. PROGRESSING; NOT MET  4. Patient will improve FOTO to < 42% to improve ADL performance. PROGRESSING; NOT MET  5. Patient will report ability to dress upper and lower body with only a little bit of difficulty. PROGRESSING; NOT MET    PLAN     See updated POC. Will await MRI and decision on treatment course by patient and Dr. Luna    Plan of care Certification: 5/20/22 to 6/2/22.     Louie Tobar, PT

## 2022-05-18 NOTE — PLAN OF CARE
Outpatient Therapy Updated Plan of Care     Visit Date: 5/18/2022  Name: Dorothy Burt  Clinic Number: [id number]    Therapy Diagnosis:   Encounter Diagnoses   Name Primary?    Decreased range of motion of left shoulder Yes    Decreased functional mobility and endurance      Physician: Sara Segundo PA    Physician Orders: PT Eval and Treat   2-3 x per week x 6 weeks   RTC strengthening, ROM, and other indicated modalities     Medical Diagnosis from Referral:   M75.42 (ICD-10-CM) - Shoulder impingement, left   M19.019 (ICD-10-CM) - AC (acromioclavicular) arthritis      Evaluation Date: 4/4/2022      Total Visits Received: 13      Current Certification Period:  4/4/22 to 5/20/22  Precautions:  Standard and asthma    Visits from Evaluation Date:  12      Subjective     Update: she's gotten in the pool to use her arm. She feels like she got some more motion, but more pain lower on her arm. She's eager for her MRI tonight.    Objective     Update:   Posture:                  Shoulders: improved posture with arm rested by side (not guarded)                  Scapulae: Rest: left 8 cm, right 8 cm                           left mild scapular depression        Range of Motion:     Left Right   Shoulder Flexion P: 112*   A: 90 P: 170     A:170   Shoulder abduction P: 76*    A: 55 P: 170    A: 170   Shoulder ER P: 49 degree*    A: 9 cm; 44 deg P: 90    A: 21 cm   Shoulder IR P:     67A: 45 cm P: WNL    A: 36 cm   Elbow WNL WNL   Wrist WNL WNL   Cervical Flexion [40 deg] WNL     Cervical Extension [50 deg]  WNL     Cervical Rotation [50 deg] WNL WNL   Cervical Side Flexion [22 deg] WNL WNL      Upper Extremity Strength     All tests taken in supine position  *=pain    Left (kg) Right (kg) Notes   Shoulder flexion 2.8 kg* 13.5     Shoulder abduction 1.7 kg* 8.2     Shoulder external rotation  2.0 (3.6 kg) 8.6     Shoulder internal rotation  3.9 (5.0 kg) 9.2     Elbow flexion 7.6 (7.7) 13.2     Elbow extension 8.3 (7.1)   8.8             Assessment     Update: Dorothy has now completed 13 PT visits for her shoulder and has improved her left shoulder PROM, AROM and overall pain. Unfortunately, AROM remains very limited in particular, limiting her dressing, bathing, reaching, carrying and overall daily function. She presents with (+) drop arm test, empty can, infraspinatus test all indicating likely rotator cuff tear or involvement. While she has progressed use of her left arm, the plan is to await MRI results and plan with Dr. Luna before resuming possible PT at this time.    Previous Short Term Goals Status:     Short Term Goals: 3 weeks   1.  Patient will report < 6/10 shoulder pain at worst for improved ADL performance. MET  2.  Patient will demonstrate L shoulder flexion AROM > 85 deg to improve overhead reach. MET 5/18/22  3.  Patient will demonstrate left shoulder external rotation PROM > 45 degree for improved hair care. MET 5/2/22  New Short Term Goals Status:   none  Long Term Goal Status:   continue per initial plan of care.  1. Patient will report ability to wash her hair with her left hand with only a little bit of difficulty. PROGRESSING; NOT MET  2. Patient will demonstrate ability to reach overhead and remove a 2 lb object 5 times to simulate removing objects from an overhead shelf w/o an increase in symptoms. PROGRESSING; NOT MET  3. Patient will demonstrate all upper extremity strength via MicroFET at least 80% of right for improved ADL and IADL performance. PROGRESSING; NOT MET  4. Patient will improve FOTO to < 42% to improve ADL performance. PROGRESSING; NOT MET  5. Patient will report ability to dress upper and lower body with only a little bit of difficulty. PROGRESSING; NOT MET  Reasons for Recertification of Therapy:   Improvements made, but still very limited left upper extremity use    Plan     Updated Certification Period: 5/18/2022 to 6/3/22  Recommended Treatment Plan: 2 times per week for 3 visits:  Electrical Stimulation TENS, IFC, NMES, Manual Therapy, Moist Heat/ Ice, Neuromuscular Re-ed, Patient Education, Self Care, Therapeutic Activities, Therapeutic Exercise and dry needling  Other Recommendations: will await MRI results and decision on possible surgery    Louie Tobar, PT  5/18/2022      I CERTIFY THE NEED FOR THESE SERVICES FURNISHED UNDER THIS PLAN OF TREATMENT AND WHILE UNDER MY CARE    Physician's comments:        Physician's Signature: ___________________________________________________

## 2022-05-20 ENCOUNTER — OFFICE VISIT (OUTPATIENT)
Dept: ORTHOPEDICS | Facility: CLINIC | Age: 67
End: 2022-05-20
Payer: MEDICARE

## 2022-05-20 DIAGNOSIS — M75.122 NONTRAUMATIC COMPLETE TEAR OF LEFT ROTATOR CUFF: Primary | ICD-10-CM

## 2022-05-20 PROCEDURE — 1159F MED LIST DOCD IN RCRD: CPT | Mod: CPTII,S$GLB,, | Performed by: PHYSICIAN ASSISTANT

## 2022-05-20 PROCEDURE — 99213 OFFICE O/P EST LOW 20 MIN: CPT | Mod: S$GLB,,, | Performed by: PHYSICIAN ASSISTANT

## 2022-05-20 PROCEDURE — 1160F RVW MEDS BY RX/DR IN RCRD: CPT | Mod: CPTII,S$GLB,, | Performed by: PHYSICIAN ASSISTANT

## 2022-05-20 PROCEDURE — 99999 PR PBB SHADOW E&M-EST. PATIENT-LVL I: CPT | Mod: PBBFAC,,, | Performed by: PHYSICIAN ASSISTANT

## 2022-05-20 PROCEDURE — 1160F PR REVIEW ALL MEDS BY PRESCRIBER/CLIN PHARMACIST DOCUMENTED: ICD-10-PCS | Mod: CPTII,S$GLB,, | Performed by: PHYSICIAN ASSISTANT

## 2022-05-20 PROCEDURE — 99999 PR PBB SHADOW E&M-EST. PATIENT-LVL I: ICD-10-PCS | Mod: PBBFAC,,, | Performed by: PHYSICIAN ASSISTANT

## 2022-05-20 PROCEDURE — 1159F PR MEDICATION LIST DOCUMENTED IN MEDICAL RECORD: ICD-10-PCS | Mod: CPTII,S$GLB,, | Performed by: PHYSICIAN ASSISTANT

## 2022-05-20 PROCEDURE — 99213 PR OFFICE/OUTPT VISIT, EST, LEVL III, 20-29 MIN: ICD-10-PCS | Mod: S$GLB,,, | Performed by: PHYSICIAN ASSISTANT

## 2022-05-20 NOTE — PROGRESS NOTES
Subjective:      Patient ID: Dorothy Burt is a 67 y.o. female.    Chief Complaint: Pain of the Left Shoulder      67-year-old female presents here for left shoulder MRI results.  She is a patient of Dr. Luna.  She has been physical therapy for about 2 months as notice some improvement in symptoms.  Her pain is lateral shoulder.  She is able to lift her shoulder up a little more.       Review of Systems   Constitutional: Negative for chills and fever.   Cardiovascular: Negative for chest pain.   Respiratory: Negative for cough.    Hematologic/Lymphatic: Does not bruise/bleed easily.   Skin: Negative for poor wound healing and rash.   Musculoskeletal: Positive for joint pain, myalgias and stiffness.   Gastrointestinal: Negative for abdominal pain.   Genitourinary: Negative for bladder incontinence.   Neurological: Negative for dizziness, loss of balance and weakness.   Psychiatric/Behavioral: Negative for altered mental status.       Review of patient's allergies indicates:  No Known Allergies     Current Outpatient Medications   Medication Sig Dispense Refill    acyclovir (ZOVIRAX) 400 MG tablet TAKE 1 BY MOUTH 5 TIMES DAILY FOR 5 DAYS 25 tablet 2    albuterol (PROAIR HFA) 90 mcg/actuation inhaler Inhale 2 puffs into the lungs every 6 (six) hours as needed for Wheezing or Shortness of Breath. Rescue 18 g 11    diazePAM (VALIUM) 5 MG tablet Take 2 tablets 30 minutes prior to the MRI scan 2 tablet 0    diclofenac sodium (VOLTAREN) 1 % Gel Apply 2 g topically 2 (two) times daily as needed (to knees). 100 g 2    famotidine (PEPCID) 20 MG tablet TAKE 1 TABLET BY MOUTH TWICE A DAY 30 tablet 0    ibuprofen (ADVIL,MOTRIN) 600 MG tablet Take 1 tablet (600 mg total) by mouth every 6 (six) hours as needed for Pain. 30 tablet 1    levocetirizine (XYZAL) 5 MG tablet TAKE 1 TABLET BY MOUTH EVERY DAY AS NEEDED 30 tablet 11    levothyroxine (SYNTHROID) 25 MCG tablet TAKE 1.5 TABLET BY MOUTH DAILY 135 tablet 3     naproxen (NAPROSYN) 500 MG tablet TAKE 1 TABLET BY MOUTH TWICE A DAY AS NEEDED 60 tablet 0    omeprazole (PRILOSEC) 20 MG capsule Take 20 mg by mouth daily as needed.      ondansetron (ZOFRAN-ODT) 4 MG TbDL Take 1 tablet (4 mg total) by mouth every 8 (eight) hours as needed (nausea). 12 tablet 0    temazepam (RESTORIL) 15 mg Cap Take 1 capsule (15 mg total) by mouth nightly as needed (Sleep). 30 capsule 5    traMADoL (ULTRAM) 50 mg tablet Take 2 tablets (100 mg total) by mouth every 8 (eight) hours as needed for Pain. 120 tablet 0    triamcinolone acetonide 0.1% (KENALOG) 0.1 % cream Apply topically 2 (two) times daily. 15 g 2    ZANAFLEX 4 mg tablet Take 4 mg by mouth every 8 (eight) hours as needed.       No current facility-administered medications for this visit.        The patient's relevant past medical, surgical, and social history was reviewed in Epic.       Objective:      VITAL SIGNS: LMP 08/15/2005     Ortho/SPM Exam   MRI Shoulder Without Contrast Left  Narrative: EXAMINATION:  MRI SHOULDER WITHOUT CONTRAST LEFT    CLINICAL HISTORY:  Shoulder pain, rotator cuff disorder suspected, xray done;  Pain in left shoulder    TECHNIQUE:  Multiplanar multisequence imaging of the left shoulder.    COMPARISON:  Radiograph 03/25/2022    FINDINGS:  Rotator cuff: High-grade partial-thickness (>75%) tear of the supraspinatus tendon involving the bursal and articular surfaces measuring approximately 1.7 cm in AP dimension.  Subscapularis tendinosis.  Infraspinatus and teres minor tendons are intact.  Muscle bulk is maintained.  Edema extends deep to the subscapularis musculature.    Labrum: Attenuation and extensive tearing of the posterior and posterosuperior glenoid labrum.    Biceps: Fluid in the long head biceps tendon sheath compatible with tenosynovitis.    Bone: No fracture, osteonecrosis, or diffuse marrow replacement process.  Subcortical marrow edema and cystic change of the superior humeral head  anteriorly and posteriorly as well as the posterior glenoid.    Acromioclavicular joint: Type 3 acromion with mild acromioclavicular arthrosis.    Cartilage: Generalized thinning of the glenohumeral cartilage with full-thickness cartilage fissuring of the anterior glenoid.    Miscellaneous: Large glenohumeral effusion with synovitis.  Small amount of fluid in the subacromial/subdeltoid bursa.  Few prominent left axillary lymph nodes measuring up to 0.9 cm, likely reactive.  Impression: 1. Findings which may be seen in the setting of an inflammatory arthropathy including the large glenohumeral effusion with synovitis, glenohumeral chondromalacia and marrow edema/cystic change, and biceps tenosynovitis.  The clinical correlation recommended.  2. High-grade partial-thickness tear of the supraspinatus tendon as above.  Subscapularis tendinosis.  Extensive posterior glenoid labral tearing    Electronically signed by resident: Rajan Almonte  Date:    05/19/2022  Time:    08:04    Electronically signed by: Nigel Godinez Jr  Date:    05/19/2022  Time:    10:25    I have reviewed the above radiograph and agree with the findings stated by the radiologist.         Assessment:       1. Nontraumatic complete tear of left rotator cuff          Plan:         Dorothy was seen today for pain.    Diagnoses and all orders for this visit:    Nontraumatic complete tear of left rotator cuff      MRI reveals left shoulder partial rotator cuff tear.  Also reveals some labral tearing and degenerative changes.  I do not believe this is inflammatory arthropathy likely MRI suggest.  I will have Dr. Luna call the patient with he gets back in the clinic to discuss results more in detail.        Christiane Holden PA-C   05/20/2022

## 2022-05-24 ENCOUNTER — PATIENT MESSAGE (OUTPATIENT)
Dept: ORTHOPEDICS | Facility: CLINIC | Age: 67
End: 2022-05-24
Payer: MEDICARE

## 2022-05-24 ENCOUNTER — DOCUMENTATION ONLY (OUTPATIENT)
Dept: REHABILITATION | Facility: HOSPITAL | Age: 67
End: 2022-05-24
Payer: MEDICARE

## 2022-05-24 DIAGNOSIS — M75.122 NONTRAUMATIC COMPLETE TEAR OF LEFT ROTATOR CUFF: Primary | ICD-10-CM

## 2022-05-24 DIAGNOSIS — Z01.818 PRE-OP TESTING: ICD-10-CM

## 2022-05-24 DIAGNOSIS — M75.102 LEFT ROTATOR CUFF TEAR: ICD-10-CM

## 2022-05-24 RX ORDER — SODIUM CHLORIDE 9 MG/ML
INJECTION, SOLUTION INTRAVENOUS CONTINUOUS
Status: CANCELLED | OUTPATIENT
Start: 2022-05-24

## 2022-05-24 RX ORDER — CEFAZOLIN SODIUM 2 G/50ML
2 SOLUTION INTRAVENOUS
Status: CANCELLED | OUTPATIENT
Start: 2022-05-24

## 2022-05-26 ENCOUNTER — DOCUMENTATION ONLY (OUTPATIENT)
Dept: REHABILITATION | Facility: HOSPITAL | Age: 67
End: 2022-05-26
Payer: MEDICARE

## 2022-05-26 PROBLEM — M25.612 DECREASED RANGE OF MOTION OF LEFT SHOULDER: Status: RESOLVED | Noted: 2022-04-04 | Resolved: 2022-05-26

## 2022-05-26 PROBLEM — Z74.09 DECREASED FUNCTIONAL MOBILITY AND ENDURANCE: Status: RESOLVED | Noted: 2022-04-04 | Resolved: 2022-05-26

## 2022-05-26 NOTE — PROGRESS NOTES
Patient is now scheduled for surgery on 6/9/22. She will be discharged with expected new evaluation after surgery. She made progress during PT, but agree that surgery is appropriate at this time.

## 2022-05-27 ENCOUNTER — TELEPHONE (OUTPATIENT)
Dept: ORTHOPEDICS | Facility: CLINIC | Age: 67
End: 2022-05-27
Payer: MEDICARE

## 2022-05-27 NOTE — TELEPHONE ENCOUNTER
----- Message from Louie Luna MD sent at 5/27/2022 11:28 AM CDT -----  Regarding: RE: Appt  Contact: [phone]  She needs just basic medical clearance from her PCP    ----- Message -----  From: Maricel August MA  Sent: 5/27/2022  11:28 AM CDT  To: Louie Luna MD  Subject: FW: Appt                                           ----- Message -----  From: Yuli Guthrie  Sent: 5/27/2022  11:24 AM CDT  To: [facility] Palma Staff  Subject: Appt                                             Patient is calling to speak with nurse due her doctor wants to know if there is any type of clearance he needs for surgery. Please contact pt

## 2022-06-01 ENCOUNTER — PATIENT MESSAGE (OUTPATIENT)
Dept: SURGERY | Facility: HOSPITAL | Age: 67
End: 2022-06-01
Payer: MEDICARE

## 2022-06-07 ENCOUNTER — CLINICAL SUPPORT (OUTPATIENT)
Dept: LAB | Facility: HOSPITAL | Age: 67
End: 2022-06-07
Attending: ORTHOPAEDIC SURGERY
Payer: MEDICARE

## 2022-06-07 ENCOUNTER — HOSPITAL ENCOUNTER (OUTPATIENT)
Dept: PREADMISSION TESTING | Facility: HOSPITAL | Age: 67
Discharge: HOME | End: 2022-06-07
Attending: ORTHOPAEDIC SURGERY
Payer: MEDICARE

## 2022-06-07 ENCOUNTER — ANESTHESIA EVENT (OUTPATIENT)
Dept: SURGERY | Facility: HOSPITAL | Age: 67
End: 2022-06-07
Payer: MEDICARE

## 2022-06-07 VITALS
WEIGHT: 193.25 LBS | RESPIRATION RATE: 16 BRPM | OXYGEN SATURATION: 99 % | TEMPERATURE: 98 F | HEIGHT: 64 IN | SYSTOLIC BLOOD PRESSURE: 111 MMHG | DIASTOLIC BLOOD PRESSURE: 65 MMHG | BODY MASS INDEX: 32.99 KG/M2 | HEART RATE: 102 BPM

## 2022-06-07 DIAGNOSIS — Z01.818 PREOPERATIVE TESTING: Primary | ICD-10-CM

## 2022-06-07 DIAGNOSIS — Z01.818 PREOPERATIVE TESTING: ICD-10-CM

## 2022-06-07 PROCEDURE — 93010 ELECTROCARDIOGRAM REPORT: CPT | Mod: ,,, | Performed by: INTERNAL MEDICINE

## 2022-06-07 PROCEDURE — 93010 EKG 12-LEAD: ICD-10-PCS | Mod: ,,, | Performed by: INTERNAL MEDICINE

## 2022-06-07 PROCEDURE — 93005 ELECTROCARDIOGRAM TRACING: CPT

## 2022-06-07 RX ORDER — LIDOCAINE HYDROCHLORIDE 10 MG/ML
1 INJECTION, SOLUTION EPIDURAL; INFILTRATION; INTRACAUDAL; PERINEURAL ONCE
Status: CANCELLED | OUTPATIENT
Start: 2022-06-07 | End: 2022-06-07

## 2022-06-07 RX ORDER — SODIUM CHLORIDE, SODIUM LACTATE, POTASSIUM CHLORIDE, CALCIUM CHLORIDE 600; 310; 30; 20 MG/100ML; MG/100ML; MG/100ML; MG/100ML
INJECTION, SOLUTION INTRAVENOUS CONTINUOUS
Status: CANCELLED | OUTPATIENT
Start: 2022-06-07

## 2022-06-07 RX ORDER — SCOLOPAMINE TRANSDERMAL SYSTEM 1 MG/1
1 PATCH, EXTENDED RELEASE TRANSDERMAL
Status: CANCELLED | OUTPATIENT
Start: 2022-06-07

## 2022-06-07 NOTE — ANESTHESIA PREPROCEDURE EVALUATION
"                                                                                                             06/07/2022  Dorothy Burt is a 67 y.o., female.    Per internal medicine Dr. Jernigan, "Medically cleared for surgery."    Past Medical History:   Diagnosis Date    Arthritis     Asthma     Back pain     Knee pain     Thyroid disease      Past Surgical History:   Procedure Laterality Date    CHOLECYSTECTOMY      CORONARY ANGIOGRAPHY N/A 4/19/2020    Procedure: ANGIOGRAM, CORONARY ARTERY;  Surgeon: Zachariah Goldman MD;  Location: [facility] CATH LAB;  Service: Cardiology;  Laterality: N/A;    LEFT HEART CATHETERIZATION Right 4/19/2020    Procedure: Left heart cath;  Surgeon: Zachariah Goldman MD;  Location: [facility] CATH LAB;  Service: Cardiology;  Laterality: Right;    VASCULAR SURGERY         Pre-op Assessment    I have reviewed the Patient Summary Reports.     I have reviewed the Nursing Notes.    I have reviewed the Medications.     Review of Systems  Anesthesia Hx:  No problems with previous Anesthesia Denies Hx of Anesthetic complications  History of prior surgery of interest to airway management or planning:  Denies Personal Hx of Anesthesia complications.   Social:  Non-Smoker, Social Alcohol Use    Cardiovascular:  Cardiovascular Normal Exercise tolerance: good   Denies Angina.    Pulmonary:   Asthma mild Denies Shortness of breath.    Renal/:  Renal/ Normal     Hepatic/GI:  Hepatic/GI Normal    Musculoskeletal:   Arthritis     Neurological:   Denies TIA. Denies CVA. Neuromuscular Disease,  Denies Seizures.   Chronic Pain Syndrome   Endocrine:   Hypothyroidism  Obesity / BMI > 30      Physical Exam  General: Well nourished and Cooperative    Airway:  Mallampati: IV / III  Mouth Opening: Normal  TM Distance: Normal  Tongue: Normal  Neck ROM: Normal ROM    Dental:  Intact, Caps / Implants    Chest/Lungs:  Clear to auscultation, Normal Respiratory Rate    Heart:  Rate: Normal  Rhythm: Regular " Rhythm  Sounds: Normal        Anesthesia Plan  Type of Anesthesia, risks & benefits discussed:    Anesthesia Type: Regional, Gen ETT  Intra-op Monitoring Plan: Standard ASA Monitors  Post Op Pain Control Plan: multimodal analgesia  Induction:  IV  ASA Score: 2  Anesthesia Plan Notes: Anesthesia consent to be signed prior to surgery 6/9/22      Ready For Surgery From Anesthesia Perspective.     .

## 2022-06-07 NOTE — DISCHARGE INSTRUCTIONS
Your surgery is scheduled for 6/9/22.    Please report to [facility] on the 1st Floor at 1015 a.m.    THIS TIME IS SUBJECT TO CHANGE.  YOU WILL RECEIVE A PHONE CALL THE DAY BEFORE SURGERY BY 3:30 PM TO CONFIRM YOUR TIME OF ARRIVAL.  IF YOU HAVE NOT RECEIVED A PHONE CALL BY 3:30 PM THE DAY BEFORE YOUR SURGERY PLEASE CALL [phone]     INSTRUCTIONS IMPORTANT!!!  ¨ Do not eat or drink after 12 midnight-including water, candy, gum, & mints. OK to brush teeth.      ____  Proceed to [facility] on *** for additional testing.        ____  Do not wear makeup, including mascara.  ____  No powder, lotions or creams to surgical area.  ____  Please remove all jewelry, including piercings and leave at home.  ____  No money or valuables needed. Please leave at home.  ____  Please bring any documents given by your doctor.  ____  If going home the same day, arrange for a ride home. You will not be able to             drive if Anesthesia was used.  ____  Children under 18 years require a parent / guardian present the entire time             they are in surgery / recovery.  ____  Wear loose fitting clothing. Allow for dressings, bandages.  ____  Stop Aspirin, Ibuprofen, Motrin, Aleve, Goody's/BC powders, Excedrine and Naproxen (NSAIDS) at least 3-5 days before surgery, unless otherwise instructed by your doctor, or the nurse.   You MAY use Tylenol/acetaminophen until day of surgery.  ____  Wash the surgical area with Hibiclens the night before surgery, and again the             morning of surgery.  Be sure to rinse hibiclens off completely (if instructed by   nurse).  ____  If you take diabetic medication, do not take am of surgery unless instructed by Doctor.  ____  Call MD for temperature above 101 degrees or any other signs of infection such as Urinary (bladder) infection, Upper respiratory infection, skin boils, etc.  ____ Stop taking any Fish Oil supplement or any Vitamins that contain Vitamin E at  least 5 days prior to surgery.  ____ Do Not wear your contact lenses the day of your procedure.  You may wear your glasses.      ____Do not shave surgical site for 3 days prior to surgery.  ____ Practice Good hand washing before, during, and after procedure.      I have read or had read and explained to me, and understand the above information.  Additional comments or instructions:  For additional questions call [phone]      ANESTHESIA SIDE EFFECTS  -For the first 24 hours after surgery:  Do not drive, use heavy equipment, make important decisions, or drink alcohol  -It is normal to feel sleepy for several hours.  Rest until you are more awake.  -Have someone stay with you, if needed.  They can watch for problems and help keep you safe.  -Some possible post anesthesia side effects include: nausea and vomiting, sore throat and hoarseness, sleepiness, and dizziness.        Pre-Op Bathing Instructions    Before surgery, you can play an important role in your own health.    Because skin is not sterile, we need to be sure that your skin is as free of germs as possible. By following the instructions below, you can reduce the number of germs on your skin before surgery.    IMPORTANT: You will need to shower with a special soap called Hibiclens*, available at any pharmacy.  If you are allergic to Chlorhexidine (the antiseptic in Hibiclens), use an antibacterial soap such as Dial Soap for your preoperative shower.  You will shower with Hibiclens both the night before your surgery and the morning of your surgery.  Do not use Hibiclens on the head, face or genitals to avoid injury to those areas.    STEP #1: THE NIGHT BEFORE YOUR SURGERY     Do not shave the area of your body where your surgery will be performed.  Shower and wash your hair and body as usual with your normal soap and shampoo.  Rinse your hair and body thoroughly after you shower to remove all soap residue.  With your hand, apply one packet of Hibiclens soap to  the surgical site.   Wash the site gently for five (5) minutes. Do not scrub your skin too hard.   Do not wash with your regular soap after Hibiclens is used.  Rinse your body thoroughly.  Pat yourself dry with a clean, soft towel.  Do not use lotion, cream, or powder.  Wear clean clothes.    STEP #2: THE MORNING OF YOUR SURGERY     Repeat Step #1.    * Not to be used by people allergic to Chlorhexidine.

## 2022-06-07 NOTE — PRE-PROCEDURE INSTRUCTIONS
Jody Simon [phone]    Allergies, medical, surgical, family and psychosocial histories reviewed with patient. Periop plan of care reviewed. Preop instructions given, including medications to take and to hold. Hibiclens soap and instructions on use given. Time allotted for questions to be addressed.  Patient verbalized understanding.

## 2022-06-09 ENCOUNTER — HOSPITAL ENCOUNTER (OUTPATIENT)
Facility: HOSPITAL | Age: 67
Discharge: HOME | End: 2022-06-10
Attending: ORTHOPAEDIC SURGERY | Admitting: ORTHOPAEDIC SURGERY
Payer: MEDICARE

## 2022-06-09 ENCOUNTER — ANESTHESIA (OUTPATIENT)
Dept: SURGERY | Facility: HOSPITAL | Age: 67
End: 2022-06-09
Payer: MEDICARE

## 2022-06-09 DIAGNOSIS — Z01.818 PRE-OP TESTING: ICD-10-CM

## 2022-06-09 DIAGNOSIS — S46.212A BICEPS TENDON RUPTURE, PROXIMAL, LEFT, INITIAL ENCOUNTER: ICD-10-CM

## 2022-06-09 DIAGNOSIS — R07.9 CHEST PAIN: ICD-10-CM

## 2022-06-09 DIAGNOSIS — M75.112 NONTRAUMATIC INCOMPLETE TEAR OF LEFT ROTATOR CUFF: ICD-10-CM

## 2022-06-09 DIAGNOSIS — M75.42 SUBACROMIAL IMPINGEMENT, LEFT: ICD-10-CM

## 2022-06-09 DIAGNOSIS — E03.9 HYPOTHYROIDISM, UNSPECIFIED TYPE: ICD-10-CM

## 2022-06-09 DIAGNOSIS — M75.102 LEFT ROTATOR CUFF TEAR: ICD-10-CM

## 2022-06-09 DIAGNOSIS — J45.20 MILD INTERMITTENT ASTHMA WITHOUT COMPLICATION: Chronic | ICD-10-CM

## 2022-06-09 DIAGNOSIS — M75.122 NONTRAUMATIC COMPLETE TEAR OF LEFT ROTATOR CUFF: ICD-10-CM

## 2022-06-09 DIAGNOSIS — M25.512 ACUTE PAIN OF LEFT SHOULDER: ICD-10-CM

## 2022-06-09 DIAGNOSIS — M76.72 PERONEAL TENDONITIS, LEFT: ICD-10-CM

## 2022-06-09 PROCEDURE — 63600175 PHARM REV CODE 636 W HCPCS: Performed by: STUDENT IN AN ORGANIZED HEALTH CARE EDUCATION/TRAINING PROGRAM

## 2022-06-09 PROCEDURE — 37000009 HC ANESTHESIA EA ADD 15 MINS: Performed by: ORTHOPAEDIC SURGERY

## 2022-06-09 PROCEDURE — 27000221 HC OXYGEN, UP TO 24 HOURS

## 2022-06-09 PROCEDURE — 25000003 PHARM REV CODE 250

## 2022-06-09 PROCEDURE — 37000008 HC ANESTHESIA 1ST 15 MINUTES: Performed by: ORTHOPAEDIC SURGERY

## 2022-06-09 PROCEDURE — 29827 SHO ARTHRS SRG RT8TR CUF RPR: CPT | Mod: LT,,, | Performed by: ORTHOPAEDIC SURGERY

## 2022-06-09 PROCEDURE — 71000039 HC RECOVERY, EACH ADD'L HOUR: Performed by: ORTHOPAEDIC SURGERY

## 2022-06-09 PROCEDURE — 36000711: Performed by: ORTHOPAEDIC SURGERY

## 2022-06-09 PROCEDURE — 63600175 PHARM REV CODE 636 W HCPCS: Performed by: NURSE PRACTITIONER

## 2022-06-09 PROCEDURE — 25000003 PHARM REV CODE 250: Performed by: NURSE ANESTHETIST, CERTIFIED REGISTERED

## 2022-06-09 PROCEDURE — 63600175 PHARM REV CODE 636 W HCPCS: Performed by: NURSE ANESTHETIST, CERTIFIED REGISTERED

## 2022-06-09 PROCEDURE — 64415 NJX AA&/STRD BRCH PLXS IMG: CPT | Mod: 59,LT | Performed by: STUDENT IN AN ORGANIZED HEALTH CARE EDUCATION/TRAINING PROGRAM

## 2022-06-09 PROCEDURE — 27201423 OPTIME MED/SURG SUP & DEVICES STERILE SUPPLY: Performed by: ORTHOPAEDIC SURGERY

## 2022-06-09 PROCEDURE — 29823 SHO ARTHRS SRG XTNSV DBRDMT: CPT | Mod: 51,LT,, | Performed by: ORTHOPAEDIC SURGERY

## 2022-06-09 PROCEDURE — 25000003 PHARM REV CODE 250: Performed by: NURSE PRACTITIONER

## 2022-06-09 PROCEDURE — C1713 ANCHOR/SCREW BN/BN,TIS/BN: HCPCS | Performed by: ORTHOPAEDIC SURGERY

## 2022-06-09 PROCEDURE — 29827 PR SHLDR ARTHROSCOP,SURG,W/ROTAT CUFF REPR: ICD-10-PCS | Mod: LT,,, | Performed by: ORTHOPAEDIC SURGERY

## 2022-06-09 PROCEDURE — 94761 N-INVAS EAR/PLS OXIMETRY MLT: CPT

## 2022-06-09 PROCEDURE — 25000003 PHARM REV CODE 250: Performed by: ORTHOPAEDIC SURGERY

## 2022-06-09 PROCEDURE — 29823 PR SHLDR ARTHROSCOP,EXTEN DEBRIDE: ICD-10-PCS | Mod: 51,LT,, | Performed by: ORTHOPAEDIC SURGERY

## 2022-06-09 PROCEDURE — 71000033 HC RECOVERY, INTIAL HOUR: Performed by: ORTHOPAEDIC SURGERY

## 2022-06-09 PROCEDURE — 63600175 PHARM REV CODE 636 W HCPCS: Performed by: ORTHOPAEDIC SURGERY

## 2022-06-09 PROCEDURE — 25000003 PHARM REV CODE 250: Performed by: STUDENT IN AN ORGANIZED HEALTH CARE EDUCATION/TRAINING PROGRAM

## 2022-06-09 PROCEDURE — 36000710: Performed by: ORTHOPAEDIC SURGERY

## 2022-06-09 PROCEDURE — 63600175 PHARM REV CODE 636 W HCPCS: Performed by: ANESTHESIOLOGY

## 2022-06-09 RX ORDER — HYDROMORPHONE HYDROCHLORIDE 2 MG/ML
0.5 INJECTION, SOLUTION INTRAMUSCULAR; INTRAVENOUS; SUBCUTANEOUS EVERY 5 MIN PRN
Status: DISCONTINUED | OUTPATIENT
Start: 2022-06-09 | End: 2022-06-09 | Stop reason: HOSPADM

## 2022-06-09 RX ORDER — ROCURONIUM BROMIDE 10 MG/ML
INJECTION, SOLUTION INTRAVENOUS
Status: DISCONTINUED | OUTPATIENT
Start: 2022-06-09 | End: 2022-06-09

## 2022-06-09 RX ORDER — PHENYLEPHRINE HYDROCHLORIDE 10 MG/ML
INJECTION INTRAVENOUS
Status: DISCONTINUED | OUTPATIENT
Start: 2022-06-09 | End: 2022-06-09

## 2022-06-09 RX ORDER — SODIUM CHLORIDE 0.9 % (FLUSH) 0.9 %
10 SYRINGE (ML) INJECTION
Status: DISCONTINUED | OUTPATIENT
Start: 2022-06-09 | End: 2022-06-10 | Stop reason: HOSPADM

## 2022-06-09 RX ORDER — BUPIVACAINE HYDROCHLORIDE 2.5 MG/ML
INJECTION, SOLUTION EPIDURAL; INFILTRATION; INTRACAUDAL
Status: COMPLETED | OUTPATIENT
Start: 2022-06-09 | End: 2022-06-09

## 2022-06-09 RX ORDER — ONDANSETRON 2 MG/ML
4 INJECTION INTRAMUSCULAR; INTRAVENOUS ONCE AS NEEDED
Status: COMPLETED | OUTPATIENT
Start: 2022-06-09 | End: 2022-06-09

## 2022-06-09 RX ORDER — FLUMAZENIL 0.1 MG/ML
INJECTION INTRAVENOUS
Status: COMPLETED
Start: 2022-06-09 | End: 2022-06-09

## 2022-06-09 RX ORDER — FLUMAZENIL 0.1 MG/ML
0.2 INJECTION INTRAVENOUS ONCE
Status: COMPLETED | OUTPATIENT
Start: 2022-06-09 | End: 2022-06-09

## 2022-06-09 RX ORDER — ACETAMINOPHEN 10 MG/ML
INJECTION, SOLUTION INTRAVENOUS
Status: DISCONTINUED | OUTPATIENT
Start: 2022-06-09 | End: 2022-06-09

## 2022-06-09 RX ORDER — PROPOFOL 10 MG/ML
VIAL (ML) INTRAVENOUS
Status: DISCONTINUED | OUTPATIENT
Start: 2022-06-09 | End: 2022-06-09

## 2022-06-09 RX ORDER — LIDOCAINE HYDROCHLORIDE 20 MG/ML
INJECTION, SOLUTION EPIDURAL; INFILTRATION; INTRACAUDAL; PERINEURAL
Status: DISCONTINUED | OUTPATIENT
Start: 2022-06-09 | End: 2022-06-09

## 2022-06-09 RX ORDER — FENTANYL CITRATE 50 UG/ML
INJECTION, SOLUTION INTRAMUSCULAR; INTRAVENOUS
Status: DISCONTINUED | OUTPATIENT
Start: 2022-06-09 | End: 2022-06-09

## 2022-06-09 RX ORDER — ONDANSETRON HYDROCHLORIDE 8 MG/1
8 TABLET, FILM COATED ORAL EVERY 12 HOURS PRN
Qty: 24 TABLET | Refills: 0 | Status: SHIPPED | OUTPATIENT
Start: 2022-06-09 | End: 2022-06-17

## 2022-06-09 RX ORDER — MIDAZOLAM HYDROCHLORIDE 1 MG/ML
INJECTION, SOLUTION INTRAMUSCULAR; INTRAVENOUS
Status: DISCONTINUED | OUTPATIENT
Start: 2022-06-09 | End: 2022-06-09

## 2022-06-09 RX ORDER — OXYCODONE AND ACETAMINOPHEN 10; 325 MG/1; MG/1
1 TABLET ORAL EVERY 6 HOURS PRN
Status: DISCONTINUED | OUTPATIENT
Start: 2022-06-09 | End: 2022-06-10 | Stop reason: HOSPADM

## 2022-06-09 RX ORDER — LIDOCAINE HYDROCHLORIDE 10 MG/ML
1 INJECTION, SOLUTION EPIDURAL; INFILTRATION; INTRACAUDAL; PERINEURAL ONCE
Status: DISCONTINUED | OUTPATIENT
Start: 2022-06-09 | End: 2022-06-09 | Stop reason: HOSPADM

## 2022-06-09 RX ORDER — NEOSTIGMINE METHYLSULFATE 1 MG/ML
INJECTION, SOLUTION INTRAVENOUS
Status: DISCONTINUED | OUTPATIENT
Start: 2022-06-09 | End: 2022-06-09

## 2022-06-09 RX ORDER — ONDANSETRON 8 MG/1
8 TABLET, ORALLY DISINTEGRATING ORAL EVERY 12 HOURS PRN
Status: DISCONTINUED | OUTPATIENT
Start: 2022-06-09 | End: 2022-06-10 | Stop reason: HOSPADM

## 2022-06-09 RX ORDER — OXYCODONE AND ACETAMINOPHEN 10; 325 MG/1; MG/1
1 TABLET ORAL EVERY 6 HOURS PRN
Qty: 24 TABLET | Refills: 0 | Status: SHIPPED | OUTPATIENT
Start: 2022-06-09 | End: 2022-06-17

## 2022-06-09 RX ORDER — SODIUM CHLORIDE, SODIUM LACTATE, POTASSIUM CHLORIDE, CALCIUM CHLORIDE 600; 310; 30; 20 MG/100ML; MG/100ML; MG/100ML; MG/100ML
INJECTION, SOLUTION INTRAVENOUS CONTINUOUS
Status: DISCONTINUED | OUTPATIENT
Start: 2022-06-09 | End: 2022-06-10

## 2022-06-09 RX ORDER — OXYCODONE AND ACETAMINOPHEN 10; 325 MG/1; MG/1
1 TABLET ORAL ONCE AS NEEDED
Status: COMPLETED | OUTPATIENT
Start: 2022-06-09 | End: 2022-06-09

## 2022-06-09 RX ORDER — FLUMAZENIL 0.1 MG/ML
0.2 INJECTION INTRAVENOUS ONCE
Status: DISCONTINUED | OUTPATIENT
Start: 2022-06-09 | End: 2022-06-09

## 2022-06-09 RX ORDER — SCOLOPAMINE TRANSDERMAL SYSTEM 1 MG/1
1 PATCH, EXTENDED RELEASE TRANSDERMAL
Status: DISCONTINUED | OUTPATIENT
Start: 2022-06-09 | End: 2022-06-09 | Stop reason: HOSPADM

## 2022-06-09 RX ORDER — ONDANSETRON 2 MG/ML
INJECTION INTRAMUSCULAR; INTRAVENOUS
Status: DISCONTINUED | OUTPATIENT
Start: 2022-06-09 | End: 2022-06-09

## 2022-06-09 RX ORDER — SUCCINYLCHOLINE CHLORIDE 20 MG/ML
INJECTION INTRAMUSCULAR; INTRAVENOUS
Status: DISCONTINUED | OUTPATIENT
Start: 2022-06-09 | End: 2022-06-09

## 2022-06-09 RX ORDER — BUDESONIDE AND FORMOTEROL FUMARATE DIHYDRATE 160; 4.5 UG/1; UG/1
2 AEROSOL RESPIRATORY (INHALATION) NIGHTLY
COMMUNITY
End: 2023-11-13

## 2022-06-09 RX ORDER — SODIUM CHLORIDE 9 MG/ML
INJECTION, SOLUTION INTRAVENOUS CONTINUOUS
Status: DISCONTINUED | OUTPATIENT
Start: 2022-06-09 | End: 2022-06-10

## 2022-06-09 RX ORDER — EPINEPHRINE 1 MG/ML
INJECTION, SOLUTION INTRACARDIAC; INTRAMUSCULAR; INTRAVENOUS; SUBCUTANEOUS
Status: DISCONTINUED | OUTPATIENT
Start: 2022-06-09 | End: 2022-06-09 | Stop reason: HOSPADM

## 2022-06-09 RX ORDER — CEFAZOLIN SODIUM 2 G/50ML
2 SOLUTION INTRAVENOUS
Status: COMPLETED | OUTPATIENT
Start: 2022-06-09 | End: 2022-06-09

## 2022-06-09 RX ADMIN — ROCURONIUM BROMIDE 15 MG: 10 INJECTION, SOLUTION INTRAVENOUS at 07:06

## 2022-06-09 RX ADMIN — SUCCINYLCHOLINE CHLORIDE 120 MG: 20 INJECTION, SOLUTION INTRAMUSCULAR; INTRAVENOUS at 07:06

## 2022-06-09 RX ADMIN — BUPIVACAINE HYDROCHLORIDE 10 ML: 2.5 INJECTION, SOLUTION EPIDURAL; INFILTRATION; INTRACAUDAL; PERINEURAL at 06:06

## 2022-06-09 RX ADMIN — PHENYLEPHRINE HYDROCHLORIDE 100 MCG: 10 INJECTION INTRAVENOUS at 07:06

## 2022-06-09 RX ADMIN — FENTANYL CITRATE 100 MCG: 50 INJECTION, SOLUTION INTRAMUSCULAR; INTRAVENOUS at 07:06

## 2022-06-09 RX ADMIN — MIDAZOLAM 5 MG: 1 INJECTION INTRAMUSCULAR; INTRAVENOUS at 06:06

## 2022-06-09 RX ADMIN — ROCURONIUM BROMIDE 10 MG: 10 INJECTION, SOLUTION INTRAVENOUS at 07:06

## 2022-06-09 RX ADMIN — ONDANSETRON 8 MG: 8 TABLET, ORALLY DISINTEGRATING ORAL at 08:06

## 2022-06-09 RX ADMIN — FLUMAZENIL 0.2 MG: 0.1 INJECTION, SOLUTION INTRAVENOUS at 12:06

## 2022-06-09 RX ADMIN — SODIUM CHLORIDE, SODIUM LACTATE, POTASSIUM CHLORIDE, AND CALCIUM CHLORIDE: .6; .31; .03; .02 INJECTION, SOLUTION INTRAVENOUS at 07:06

## 2022-06-09 RX ADMIN — HYDROMORPHONE HYDROCHLORIDE 0.5 MG: 2 INJECTION, SOLUTION INTRAMUSCULAR; INTRAVENOUS; SUBCUTANEOUS at 11:06

## 2022-06-09 RX ADMIN — FENTANYL CITRATE 50 MCG: 50 INJECTION, SOLUTION INTRAMUSCULAR; INTRAVENOUS at 08:06

## 2022-06-09 RX ADMIN — ROCURONIUM BROMIDE 5 MG: 10 INJECTION, SOLUTION INTRAVENOUS at 07:06

## 2022-06-09 RX ADMIN — ACETAMINOPHEN 1000 MG: 10 INJECTION, SOLUTION INTRAVENOUS at 09:06

## 2022-06-09 RX ADMIN — FLUMAZENIL 0.2 MG: 0.1 INJECTION INTRAVENOUS at 12:06

## 2022-06-09 RX ADMIN — FENTANYL CITRATE 25 MCG: 50 INJECTION, SOLUTION INTRAMUSCULAR; INTRAVENOUS at 09:06

## 2022-06-09 RX ADMIN — NEOSTIGMINE METHYLSULFATE 3 MG: 1 INJECTION INTRAVENOUS at 09:06

## 2022-06-09 RX ADMIN — SCOPALAMINE 1 PATCH: 1 PATCH, EXTENDED RELEASE TRANSDERMAL at 05:06

## 2022-06-09 RX ADMIN — SUGAMMADEX 200 MG: 100 INJECTION, SOLUTION INTRAVENOUS at 10:06

## 2022-06-09 RX ADMIN — PHENYLEPHRINE HYDROCHLORIDE 0.1 MCG/KG/MIN: 10 INJECTION INTRAVENOUS at 08:06

## 2022-06-09 RX ADMIN — PHENYLEPHRINE HYDROCHLORIDE 100 MCG: 10 INJECTION INTRAVENOUS at 08:06

## 2022-06-09 RX ADMIN — GLYCOPYRROLATE 0.4 MG: 0.2 INJECTION, SOLUTION INTRAMUSCULAR; INTRAVITREAL at 09:06

## 2022-06-09 RX ADMIN — ONDANSETRON 4 MG: 2 INJECTION INTRAMUSCULAR; INTRAVENOUS at 12:06

## 2022-06-09 RX ADMIN — GLYCOPYRROLATE 0.2 MG: 0.2 INJECTION, SOLUTION INTRAMUSCULAR; INTRAVITREAL at 07:06

## 2022-06-09 RX ADMIN — CEFAZOLIN SODIUM 2 G: 2 SOLUTION INTRAVENOUS at 07:06

## 2022-06-09 RX ADMIN — OXYCODONE HYDROCHLORIDE AND ACETAMINOPHEN 1 TABLET: 10; 325 TABLET ORAL at 08:06

## 2022-06-09 RX ADMIN — PROPOFOL 130 MG: 10 INJECTION, EMULSION INTRAVENOUS at 07:06

## 2022-06-09 RX ADMIN — OXYCODONE HYDROCHLORIDE AND ACETAMINOPHEN 1 TABLET: 10; 325 TABLET ORAL at 11:06

## 2022-06-09 RX ADMIN — ONDANSETRON 8 MG: 2 INJECTION, SOLUTION INTRAMUSCULAR; INTRAVENOUS at 09:06

## 2022-06-09 RX ADMIN — LIDOCAINE HYDROCHLORIDE 75 MG: 20 INJECTION, SOLUTION EPIDURAL; INFILTRATION; INTRACAUDAL; PERINEURAL at 07:06

## 2022-06-09 NOTE — NURSING
Pt. In room. Pt. Oriented to room and call light. Pt. Ambulated with 1 person assist to bathroom, voiding spontaneously. Bed alarm on and call light in reach.

## 2022-06-09 NOTE — ANESTHESIA PROCEDURE NOTES
Intubation    Date/Time: 6/9/2022 7:22 AM  Performed by: Cinthia Ochoa CRNA  Authorized by: Rona Trivedi MD     Intubation:     Induction:  Intravenous    Intubated:  Postinduction    Mask Ventilation:  Easy mask    Attempts:  1    Attempted By:  Student    Method of Intubation:  Video laryngoscopy    Blade:  Samantha 3    Laryngeal View Grade: Grade I - full view of cords      Difficult Airway Encountered?: No      Complications:  None    Airway Device:  Oral endotracheal tube    Airway Device Size:  7.0    Style/Cuff Inflation:  Cuffed    Tube secured:  22    Secured at:  The lips    Placement Verified By:  Capnometry    Complicating Factors:  None    Findings Post-Intubation:  BS equal bilateral

## 2022-06-09 NOTE — PROGRESS NOTES
VN cued into room to complete admit assessment. Daughter at bedside. VIP model introduced; VN working alongside bedside treatment team.  Plan of care reviewed with patient. Patient informed of fall risk, fall precautions, call light within reach, side rails x2 elevated. Patient notified to ask staff for assistance. Patient verbalized complete understanding. Time allowed for questions. Will continue to monitor and intervene as needed.            06/09/22 1738   Admission   Initial VN Admission Questions Complete   Communication Issues? None   Shift   Virtual Nurse - Patient Verbalized Approval Of Camera Use   Safety/Activity   Patient Rounds bed in low position;call light in patient/parent reach;clutter free environment maintained;visualized patient   Safety Promotion/Fall Prevention side rails raised x 2;family to remain at bedside   Positioning   Body Position supine   Head of Bed (HOB) Positioning HOB at 30-45 degrees

## 2022-06-09 NOTE — H&P
Updated H&P    No changes in H&P documented below. All pertinent history reviewed. OR today for left shoulder arthroscopy, rotator cuff repair, possible SAD, possible biceps tenotomy,   _________________________________________________________    Patient ID:   Dorothy Burt is a 67 y.o. female.     Chief Complaint:   Follow-up evaluation for left rotator cuff tear     HPI:   The patient is returning for evaluation of the left shoulder. She previously underwent injection and is currently in physical therapy. She is improved but is still having pain and difficulty raising her arm.      Medications:     Current Outpatient Medications:     acyclovir (ZOVIRAX) 400 MG tablet, TAKE 1 BY MOUTH 5 TIMES DAILY FOR 5 DAYS, Disp: 25 tablet, Rfl: 2    albuterol (PROAIR HFA) 90 mcg/actuation inhaler, Inhale 2 puffs into the lungs every 6 (six) hours as needed for Wheezing or Shortness of Breath. Rescue, Disp: 18 g, Rfl: 11    diclofenac sodium (VOLTAREN) 1 % Gel, Apply 2 g topically 2 (two) times daily as needed (to knees)., Disp: 100 g, Rfl: 2    famotidine (PEPCID) 20 MG tablet, TAKE 1 TABLET BY MOUTH TWICE A DAY, Disp: 30 tablet, Rfl: 0    ibuprofen (ADVIL,MOTRIN) 600 MG tablet, Take 1 tablet (600 mg total) by mouth every 6 (six) hours as needed for Pain., Disp: 30 tablet, Rfl: 1    levocetirizine (XYZAL) 5 MG tablet, TAKE 1 TABLET BY MOUTH EVERY DAY AS NEEDED, Disp: 30 tablet, Rfl: 11    levothyroxine (SYNTHROID) 25 MCG tablet, TAKE 1.5 TABLET BY MOUTH DAILY, Disp: 135 tablet, Rfl: 3    naproxen (NAPROSYN) 500 MG tablet, TAKE 1 TABLET BY MOUTH TWICE A DAY AS NEEDED, Disp: 60 tablet, Rfl: 0    omeprazole (PRILOSEC) 20 MG capsule, Take 20 mg by mouth daily as needed., Disp: , Rfl:     ondansetron (ZOFRAN-ODT) 4 MG TbDL, Take 1 tablet (4 mg total) by mouth every 8 (eight) hours as needed (nausea)., Disp: 12 tablet, Rfl: 0    temazepam (RESTORIL) 15 mg Cap, Take 1 capsule (15 mg total) by mouth nightly as needed (Sleep)., Disp:  "30 capsule, Rfl: 5    traMADoL (ULTRAM) 50 mg tablet, Take 2 tablets (100 mg total) by mouth every 8 (eight) hours as needed for Pain., Disp: 120 tablet, Rfl: 0    triamcinolone acetonide 0.1% (KENALOG) 0.1 % cream, Apply topically 2 (two) times daily., Disp: 15 g, Rfl: 2    ZANAFLEX 4 mg tablet, Take 4 mg by mouth every 8 (eight) hours as needed., Disp: , Rfl:      Allergies:  Review of patient's allergies indicates:  No Known Allergies     Past Medical History:       Past Medical History:   Diagnosis Date    Arthritis      Asthma      Back pain      Knee pain      Thyroid disease           Past Surgical History:        Past Surgical History:   Procedure Laterality Date    CHOLECYSTECTOMY        CORONARY ANGIOGRAPHY N/A 4/19/2020     Procedure: ANGIOGRAM, CORONARY ARTERY;  Surgeon: Zachariah Goldman MD;  Location: [facility] CATH LAB;  Service: Cardiology;  Laterality: N/A;    LEFT HEART CATHETERIZATION Right 4/19/2020     Procedure: Left heart cath;  Surgeon: Zachariah Goldman MD;  Location: [facility] CATH LAB;  Service: Cardiology;  Laterality: Right;    VASCULAR SURGERY             Social History:  Social History            Occupational History    Not on file   Tobacco Use    Smoking status: Never Smoker    Smokeless tobacco: Never Used   Substance and Sexual Activity    Alcohol use: Yes       Alcohol/week: 1.0 standard drink       Types: 1 Shots of liquor per week       Comment: occ    Drug use: No    Sexual activity: Not on file         Family History:        Family History   Problem Relation Age of Onset    Stroke Mother           ROS:  Review of Systems   Musculoskeletal: Positive for joint pain, muscle cramps, muscle weakness and myalgias.   Neurological: Negative for numbness and paresthesias.   All other systems reviewed and are negative.        Vitals:  /84 (BP Location: Left arm, Patient Position: Sitting, BP Method: Large (Automatic))   Pulse 84   Ht 5' 4" (1.626 m)   Wt 88.2 kg (194 lb 7.1 oz)   " LMP 08/15/2005   BMI 33.38 kg/m²      Physical Examination:  Comprehensive Orthopaedic Musculoskeletal Exam     General        Constitutional: appears stated age, mildly obese, well-developed and well-nourished    Scleral icterus: no    Labored breathing: no    Psychiatric: normal mood and affect and no acute distress    Neurological: alert and oriented x3    Skin: intact    Lymphadenopathy: none     Ortho Exam      Left shoulder exam:  ROM: active forward elevation 90, passive elevation 170, ER 20  RTC strength: 4/5 elevation, 4+/5 ER, 5/5 IR  Positive Neer. Positive Francis.     Assessment:  1. Chronic left shoulder pain       Plan:  The patient has not undergone MRI scan of the left shoulder. I have recommended that she undergo MRI to evaluate the rotator cuff. I will contact her after the study is completed to review her options going forward.            Orders Placed This Encounter    MRI Shoulder Without Contrast Left      No follow-ups on file.    Marquis Arredondo MD  [facility]     I have reviewed the H&P. There are no interval changes to report.

## 2022-06-09 NOTE — PLAN OF CARE
Swelling left neck decreasing but still present, pt having left sided facial numbness. O2 sat on room air 93-98 when pt is kept awake but desat to 88% on room air when falls asleep. No sleep apnea noted. HOB elevated 45-60 degrees. Other VSS. Pt able to answer questions and follow commands, states still some pain, stinging left shoulder. Sling in place left arm, fingers warm and pink. Evaluated by Dr. Cohn and will keep pt overnight for extended recovery. Dr. Luna ok'd. Daughter notified and allowed to vs at bedside with pt. Pt drinking Sprite and tolerating well.

## 2022-06-09 NOTE — OP NOTE
[facility]    Date of surgery:  June 9, 2022     Surgeon:  Louie Luna MD    First assistant:    Second assistant:    Preoperative diagnosis:   1. Left rotator cuff tear   2. Left biceps tenosynovitis  3. Left shoulder impingement syndrome    Indication:  To improve pain     Postoperative diagnosis:  1. Left partial-thickness high-grade bursal sided rotator cuff tear ( supraspinatus )   2. Left partial-thickness rupture long head biceps tendon  3. Left glenohumeral extensive synovitis   4. Left shoulder impingement syndrome    Procedure:  1. Left arthroscopic rotator cuff repair  2. Left arthroscopic extensive debridement ( partial synovectomy, biceps tenotomy, subacromial decompression)    Anesthesia:  General and interscalene regional block    The patient was identified the preoperative holding area.  Written informed consent was verified.  The correct extremity was marked with a surgical pen.  Interscalene block was performed.  The patient was transferred to the operating room.  General anesthesia was administered.  The patient was positioned into a lateral decubitus position.  The left shoulder was wiped down with hydrogen peroxide and then prepped and draped in the usual sterile fashion.  The arm was suspended with 10 lb of traction.  A surgical time-out was performed.  A standard posterior portal was made.  The scope was introduced into the glenohumeral joint.  Using an outside in technique, the anterior portal was established.  Diagnostic arthroscopy was performed.  There was minor chondromalacia involving the humeral head and glenoid.  There was fraying of the labrum.  There was extensive synovitis in the rotator interval as well as the anterior posterior capsule.  There was thickening of the rotator interval.  The long head of the biceps demonstrated high-grade partial thickness tearing.  The articular side of the rotator cuff was intact.  Using a combination of a shaver and  electrocautery, a partial synovectomy was performed of any inflamed synovium.  The ablator was then used to perform a biceps tenotomy.  The scope was removed from the glenohumeral joint and redirected into the subacromial space.  Using an outside in technique a direct lateral portal was established.  A partial bursectomy was performed.  There was extensive bursal tissue present.  The undersurface of the acromion was exposed.  There was an anterior hook to the acromion.  Using a high-speed bur and anterior acromioplasty was performed.  Next, the bursal aspect of the rotator cuff was examined.  There was a bursal sided partial-thickness tear involving the anterior aspect of the supraspinatus.  The tear site was cleaned up with shaver.  An accessory anterolateral portal was made to place 1 Smith and Nephew 5.5 mm anchor.  Using the 1st pass, a horizontal mattress suture with the suture tape was placed across the tendon edge.  With remaining suture present in the anchor a simple stitch was placed on the most anterior aspect of the tendon edge.  Both sutures were then tied through a clear cannula lateral portal.  After completing the repair, the tendon was well approximated to the tuberosity.  The instruments were removed.  The portal incisions were closed with 2-0 nylon suture.    A sterile dressing was applied.An abduction sling was applied.   The patient was awakened and transferred to the postanesthesia care unit in stable condition.      Complications: None known     Drains:  None     Estimated blood loss:  Less than 50 cc

## 2022-06-09 NOTE — BRIEF OP NOTE
Left shoulder diagnostic arthroscopy, this is is extensive synovectomy, capsular release, subacromial decompression, acromioplasty, rotator cuff repair, biceps tenotomy Procedure Note    Dorothy Burt  [id number]    Date of Procedure: 6/9/2022    Pre-op Diagnosis:    1. Partial sided articular tear supra  2. Biceps tendinitis/tearing  3. Anterior labral tearing  4. Type 2 acromion        Post-op Diagnosis:   1. Extensive glenohumeral synovitis  2. Partial sided bursal tear supra  3. Biceps tearing/tendinitis  4. Type 2 slap tear  5. anterior labral degenerative tearing  6. Grade 2/isolated grade 3 chondromalacia glenoid  7. Hypertrophy rotator interval    Procedure(s):  1. Diagnostic arthroscopy left shoulder  2. Extensive synovectomy  3. Rotator interval release   4. Arthroscopic capsular release  5. Biceps tenotomy  6. Subacromial decompression  7. Acromioplasty   8. Rotator cuff repair     Anesthesia: General    Surgeon(s) and Role:  Panel 1:     * Louie Luna MD - Primary     * Marquis Arredondo MD - Resident: Surgeon Not Chief     * Eber Carcamo MD - Resident: Surgeon Chief    Estimated Blood Loss: Minimal    Quantatative Blood Loss: No Data Recorded           Drain: none            Total IV Fluids: see logsmL           Specimens: * No specimens in log *           Implants: jefferson and nephew biocomposite anchor            Complications: none    Findings: as abode            Disposition: awakened from anesthesia, extubated and taken to the recovery room in a stable condition, having suffered no apparent untoward event.           Condition: doing well without problems      Technique: See operative report     Admission Condition: stable    Discharged Condition: stable    Indication for Admission: [facility]    Hospital Course: Patient was admitted to same day surgery on 6/9/2022 for L shoulder scope with indicated procedures (as above). Pt underwent procedure, tolerated it well and without complication. Pt was brought  to PACU and subsequently stepped down back to same day surgery. In same day, pt's pain was adequately controlled with PO pain medicine and pt met all criteria and was deemed stable for discharge. Pt was discharged to home with PO pain medicine, thorough wound care instructions, and appropriate clinic follow up and discharge instructions.     Patient Instructions:     Activity: activity as tolerated  Diet: regular diet  Wound Care: as directed    - - -  Eber Carcamo MD   [facility], PGY-5

## 2022-06-09 NOTE — TRANSFER OF CARE
"Anesthesia Transfer of Care Note    Patient: Dorothy Burt    Procedure(s) Performed: Procedure(s) (LRB):  REPAIR, ROTATOR CUFF, ARTHROSCOPIC, extensive arthroscopic debridement (Left)    Patient location: PACU    Anesthesia Type: general    Transport from OR: Transported from OR on 6-10 L/min O2 by face mask with adequate spontaneous ventilation    Post pain: adequate analgesia    Post assessment: no apparent anesthetic complications    Post vital signs: stable    Level of consciousness: awake    Nausea/Vomiting: no nausea/vomiting    Complications: none    Transfer of care protocol was followed      Last vitals:   Visit Vitals  /62   Pulse 92   Temp 36.6 °C (97.9 °F) (Tympanic)   Resp 18   Ht 5' 4.5" (1.638 m)   Wt 88.5 kg (195 lb)   LMP 08/15/2005   SpO2 96%   Breastfeeding No   BMI 32.95 kg/m²     "

## 2022-06-09 NOTE — ANESTHESIA POSTPROCEDURE EVALUATION
Anesthesia Post Evaluation    Patient: Dorothy Burt    Procedure(s) Performed: Procedure(s) (LRB):  REPAIR, ROTATOR CUFF, ARTHROSCOPIC, extensive arthroscopic debridement (Left)    Final Anesthesia Type: general      Patient location during evaluation: PACU  Patient participation: Yes- Able to Participate  Level of consciousness: awake and alert  Post-procedure vital signs: reviewed and stable  Pain management: adequate  Airway patency: patent    PONV status at discharge: No PONV  Anesthetic complications: no      Cardiovascular status: blood pressure returned to baseline  Respiratory status: unassisted  Hydration status: euvolemic  Follow-up not needed.          Vitals Value Taken Time   /67 06/09/22 1641   Temp 36.4 °C (97.6 °F) 06/09/22 1641   Pulse 68 06/09/22 1641   Resp 20 06/09/22 1641   SpO2 96 % 06/09/22 1641         Event Time   Out of Recovery 16:13:26         Pain/Tl Score: Pain Rating Prior to Med Admin: 6 (6/9/2022 11:54 AM)  Pain Rating Post Med Admin: 0 (6/9/2022  4:00 PM)  Tl Score: 8 (6/9/2022  4:00 PM)

## 2022-06-10 VITALS
HEART RATE: 62 BPM | RESPIRATION RATE: 19 BRPM | TEMPERATURE: 98 F | BODY MASS INDEX: 35.18 KG/M2 | SYSTOLIC BLOOD PRESSURE: 133 MMHG | OXYGEN SATURATION: 98 % | DIASTOLIC BLOOD PRESSURE: 60 MMHG | WEIGHT: 211.19 LBS | HEIGHT: 65 IN

## 2022-06-10 PROBLEM — M75.122 NONTRAUMATIC COMPLETE TEAR OF LEFT ROTATOR CUFF: Status: RESOLVED | Noted: 2022-05-03 | Resolved: 2022-06-10

## 2022-06-10 PROBLEM — M25.512 LEFT SHOULDER PAIN: Status: ACTIVE | Noted: 2022-06-10

## 2022-06-10 PROCEDURE — 97165 OT EVAL LOW COMPLEX 30 MIN: CPT

## 2022-06-10 PROCEDURE — 93005 ELECTROCARDIOGRAM TRACING: CPT

## 2022-06-10 PROCEDURE — 97530 THERAPEUTIC ACTIVITIES: CPT

## 2022-06-10 PROCEDURE — 25000003 PHARM REV CODE 250: Performed by: STUDENT IN AN ORGANIZED HEALTH CARE EDUCATION/TRAINING PROGRAM

## 2022-06-10 PROCEDURE — 97116 GAIT TRAINING THERAPY: CPT

## 2022-06-10 PROCEDURE — 93010 EKG 12-LEAD: ICD-10-PCS | Mod: ,,, | Performed by: INTERNAL MEDICINE

## 2022-06-10 PROCEDURE — 97535 SELF CARE MNGMENT TRAINING: CPT

## 2022-06-10 PROCEDURE — 93010 ELECTROCARDIOGRAM REPORT: CPT | Mod: ,,, | Performed by: INTERNAL MEDICINE

## 2022-06-10 PROCEDURE — 97162 PT EVAL MOD COMPLEX 30 MIN: CPT

## 2022-06-10 PROCEDURE — 94761 N-INVAS EAR/PLS OXIMETRY MLT: CPT

## 2022-06-10 PROCEDURE — 25000003 PHARM REV CODE 250: Performed by: ORTHOPAEDIC SURGERY

## 2022-06-10 RX ORDER — ACETAMINOPHEN 500 MG
500 TABLET ORAL EVERY 8 HOURS PRN
Status: DISCONTINUED | OUTPATIENT
Start: 2022-06-10 | End: 2022-06-10 | Stop reason: HOSPADM

## 2022-06-10 RX ORDER — CALCIUM CARBONATE 200(500)MG
500 TABLET,CHEWABLE ORAL DAILY PRN
Status: DISCONTINUED | OUTPATIENT
Start: 2022-06-10 | End: 2022-06-10

## 2022-06-10 RX ORDER — CALCIUM CARBONATE 200(500)MG
500 TABLET,CHEWABLE ORAL EVERY 4 HOURS PRN
Status: DISCONTINUED | OUTPATIENT
Start: 2022-06-10 | End: 2022-06-10 | Stop reason: HOSPADM

## 2022-06-10 RX ADMIN — ACETAMINOPHEN 500 MG: 500 TABLET ORAL at 11:06

## 2022-06-10 RX ADMIN — CALCIUM CARBONATE (ANTACID) CHEW TAB 500 MG 500 MG: 500 CHEW TAB at 03:06

## 2022-06-10 RX ADMIN — ONDANSETRON 8 MG: 8 TABLET, ORALLY DISINTEGRATING ORAL at 08:06

## 2022-06-10 RX ADMIN — OXYCODONE HYDROCHLORIDE AND ACETAMINOPHEN 1 TABLET: 10; 325 TABLET ORAL at 03:06

## 2022-06-10 RX ADMIN — ONDANSETRON 8 MG: 8 TABLET, ORALLY DISINTEGRATING ORAL at 05:06

## 2022-06-10 RX ADMIN — CALCIUM CARBONATE (ANTACID) CHEW TAB 500 MG 500 MG: 500 CHEW TAB at 09:06

## 2022-06-10 RX ADMIN — OXYCODONE HYDROCHLORIDE AND ACETAMINOPHEN 1 TABLET: 10; 325 TABLET ORAL at 06:06

## 2022-06-10 NOTE — PLAN OF CARE
went to meet with patient. Patient reports she is independent and lives alone. She does not have any DME or HH. Patient plans to start OP Therapy after her Post-Op follow-up appointment with MD. Patient was still driving prior to hospital stay. Her daughter will transport home. Patient reports she has a friend that will bring her to her follow-up appointment. No anticipated discharge needs. Patient encouraged to call with any questions or concerns.  will continue to follow patient through transitions of care and assist with any discharge needs.     Patient Contacts    Name Relation Home Work Mobile   Jody Cueto Daughter [phone]     Future Appointments   Date Time Provider Department Center   6/28/2022  9:00 AM Louie Luna MD [facility]   11/3/2022  9:00 AM Justen Jernigan MD [facility]         06/10/22 0840   Discharge Assessment   Assessment Type Discharge Planning Brief Assessment   Confirmed/corrected address, phone number and insurance Yes   Confirmed Demographics Correct on Facesheet   Source of Information patient   Lives With alone   Facility Arrived From: Home   Do you expect to return to your current living situation? Yes   Prior to hospitilization cognitive status: Alert/Oriented   Current cognitive status: Alert/Oriented   Walking or Climbing Stairs Difficulty none   Dressing/Bathing Difficulty none   Equipment Currently Used at Home none   Do you take prescription medications? Yes   How do you get to doctors appointments? family or friend will provide   Are you on dialysis? No   Do you take coumadin? No   Discharge Plan A Home   Discharge Plan B Home with family   DME Needed Upon Discharge  none   Discharge Plan discussed with: Patient   Discharge Barriers Identified None     Naa Roque RN    [phone]

## 2022-06-10 NOTE — ASSESSMENT & PLAN NOTE
Admit to [facility] s/p arthroscopic extensive debridement (partial synovectomy, biceps tenotomy, subacromial decompression) 6/9/22  MRI Left Shoulder 5/18 showed High-grade partial-thickness tear of the supraspinatus tendon  6/10: swelling improved, pain controlled. Patient tolerating diet, voiding and stooling. Denies n/v, sob.    Pain management per Ortho  PT/OT recs: outpatient PT/OT  See PT/OT assessment for details

## 2022-06-10 NOTE — PLAN OF CARE
PT eval - OT present again after eval pt this am.  Pt is still sympathetic-dizzy and sleepy but not orthostatic.  Her gait is wobbly even with SPC.  Not safe for dc today.   REC: OP  DME:  [facility] and [facility]  Problem: Physical Therapy  Goal: Physical Therapy Goal  Description: Goals to be met by: 2022    Patient will increase functional independence with mobility by performin. Supine to sit with Contact Guard Assistance  2. Sit to supine with Stand-by Assistance  3. Sit to stand transfer with Supervision  4. Bed to chair transfer with Supervision using Single-point Cane   5. Gait  x 100 feet with Supervision using Single-point Cane .     6/10/2022 1350 by Radha Clarke, PT  Outcome: Ongoing, Progressing

## 2022-06-10 NOTE — PT/OT/SLP EVAL
"Occupational Therapy   Evaluation    Name: Dorothy Burt  MRN: [id number]  Admitting Diagnosis:  Left shoulder pain  Recent Surgery: Procedure(s) (LRB):  REPAIR, ROTATOR CUFF, ARTHROSCOPIC, extensive arthroscopic debridement (Left)  ACROMIOPLASTY  ARTHROSCOPY, SHOULDER, WITH SUBACROMIAL SPACE DECOMPRESSION  TENOTOMY, BICEPS, ARTHROSCOPIC  SYNOVECTOMY, SHOULDER 1 Day Post-Op    Recommendations:     Discharge Recommendations: outpatient OT, outpatient PT  Discharge Equipment Recommendations:  bedside commode, cane, straight  Barriers to discharge:  Decreased caregiver support, Other (Comment) (Pt is an increased fall risk at this time)    Assessment:     Dorothy Burt is a 67 y.o. female with a medical diagnosis of Left shoulder pain.  She presents with Diagnoses of Nontraumatic complete tear of left rotator cuff, Left rotator cuff tear, Pre-op testing, Nontraumatic incomplete tear of left rotator cuff, Biceps tendon rupture, proximal, left, initial encounter, Subacromial impingement, left, Peroneal tendonitis, left, and Chest pain were pertinent to this visit. Performance deficits affecting function: weakness, impaired functional mobilty, gait instability, impaired endurance, impaired balance, impaired self care skills, decreased ROM, orthopedic precautions, decreased lower extremity function, decreased upper extremity function, decreased coordination, pain, decreased safety awareness, impaired skin, edema, impaired fine motor.      Pt would benefit from cont OT services in order to maximize functional independence. Recommending OP OT/PT upon d/c as cleared by MD. Per orders pt "gentle left elbow, wrist, and ROM . Pendulums, passive elevation, passive ER at the shoulder level." Pt is NWPHYLLIS LARES. Secure chat sent to Dr. Luna to clarify precautions/exercises. Per secure chat "Just circular pendulums, passive ER up to 20 degrees in scapular plane, passive elevation in scapular plane to tolerance". Pt lives alone & " would benefit from OP therapy to assist in shoulder PROM/exercises. Pt educated on NWB LUE.  Unable to perform PROM/pendulum exercises at this time 2/2 pt with increased dizziness EOB/OOB & pt with decreased sensation to LUE. Pt reporting decreased sensation LUE from shoulder to fingertips. Pt educated on circular pendulum exercises with demonstration as well as donning/doffing brace, & adaptive UE dressing technique. Pt with increased dizziness upon AM session during functional mobility requiring Cliff with no AD. PM session pt continues to require Cliff with no AD, progressing to CGA with use of SPC. Pt with reports of chest pain during AM session & nsg notified immediately and in room to assess. Pt with mid back pain during PM session. Pt's eyes flutter frequently when up and requires cueing to keep eyes open. Orthostatics performed during PM session: Supine 129/68, /44, standing 136/69. DME: SPC & BSC.     Rehab Prognosis: Good; patient would benefit from acute skilled OT services to address these deficits and reach maximum level of function.       Plan:     Patient to be seen 3 x/week to address the above listed problems via self-care/home management, therapeutic activities, therapeutic exercises  · Plan of Care Expires: 07/10/22  · Plan of Care Reviewed with: patient    Subjective     Chief Complaint: Pain in L side of neck, pain in midline chest & back; dizziness  Patient/Family Comments/goals: Return to PLOF    Occupational Profile:  Living Environment: Pt lives alone, SSH, THE, tub/shower combo with SC  Previous level of function: Independent  Roles and Routines: Drives, does some work in real estate  Equipment Used at Home:  shower chair  Assistance upon Discharge: Daughter & friends who can come & stay with pt intermittently; pt reports her friend can  her home tomorrow's date & then her daughter can stay the night Saturday night     Pain/Comfort:  · Pain Rating 1: 3/10  · Location - Side 1:  "Left  · Location 1: neck  · Pain Addressed 1: Reposition, Distraction, Cessation of Activity, Nurse notified  · Pain Rating Post-Intervention 1: 3/10  · Pain Rating 2: 2/10  · Location - Orientation 2: midline  · Location 2: other (see comments) (chest/back)  · Pain Addressed 2: Pre-medicate for activity, Reposition, Distraction, Cessation of Activity, Nurse notified  · Pain Rating Post-Intervention 2: 7/10    Patients cultural, spiritual, Caodaism conflicts given the current situation: no    Objective:     Communicated with: nsg prior to session.  Patient found HOB elevated with bed alarm, peripheral IV upon OT entry to room.    General Precautions: Standard, fall   Orthopedic Precautions:LUE non weight bearing (gentle left elbow, wrist, and ROM . Pendulums, passive elevation, passive ER at the shoulder level. Per secure chat "Just circular pendulums, passive ER up to 20 degrees in scapular plane, passive elevation in scapular plane to tolerance")   Braces: Shoulder abduction brace  Respiratory Status: Room air    Occupational Performance:    Bed Mobility:    · Patient completed Supine to Sit with ModA progressing to Cliff second session  · Patient completed Sit to Supine with Cliff progressing to SBA second session    Functional Mobility/Transfers:  · Patient completed Sit <> Stand Transfer with contact guard assistance and minimum assistance  with  no assistive device and rolling walker   · Functional Mobility: Pt performing functional mobility in room AM session with Cliff & no AD. Pt continues to require Cliff with no AD in PM session, pt progressing to CGA with use of SPC. Pt with lateral sway/decreased gait stability & c/o lightheadedness.     Activities of Daily Living:  · Upper Body Dressing: moderate assistance 2/2 LUE in brace & pt educated on adaptive dressing for UB within precautions   · Toileting with Cliff & increased v/cs for use of grab bar & for clothing management     Cognitive/Visual " "Perceptual:  Cognitive/Psychosocial Skills:     -       Oriented to: Person, Place, Time and Situation   -       Follows Commands/attention:Follows multistep  commands  -       Memory: No Deficits noted  -       Mood/Affect/Coping skills/emotional control: Cooperative and Pleasant    Physical Exam:  Sensation:    -       Intact  RUE  -       Impaired  light/touch LUE; pt reporting 50% to 75%; pt stating I know you're touching it but I can barely feel it"  Upper Extremity Range of Motion:     -       Right Upper Extremity: WFL  Upper Extremity Strength:    -       Right Upper Extremity: WFL   Strength:    -       Right Upper Extremity: WFL    AMPAC 6 Click ADL:  AMPAC Total Score: 14    Treatment & Education:  Per orders pt "gentle left elbow, wrist, and ROM . Pendulums, passive elevation, passive ER at the shoulder level."   Pt is NWB LUE. Secure chat sent to Dr. Luna to clarify precautions/exercises. Per secure chat "Just circular pendulums, passive ER up to 20 degrees in scapular plane, passive elevation in scapular plane to tolerance".   Pt lives alone & would benefit from OP therapy to assist in shoulder PROM/exercises.   Pt educated on NWB LUE.    Unable to perform PROM/pendulum exercises at this time 2/2 pt with increased dizziness EOB/OOB & pt with decreased sensation to LUE.   Pt reporting decreased sensation LUE from shoulder to fingertips. Pt educated on circular pendulum exercises with demonstration as well as donning/doffing brace, & adaptive UE dressing technique.   Pt with increased dizziness upon AM session during functional mobility requiring Cliff with no AD. PM session pt continues to require Cliff with no AD, progressing to CGA with use of SPC.   Pt with reports of chest pain during AM session & nsg notified immediately and in room to assess. Pt with mid back pain during PM session.   Pt's eyes flutter frequently when up and requires cueing to keep eyes open.   Orthostatics performed during " PM session: Supine 129/68, /44, standing 136/69. DME: SPC & BSC.   Pt not safe for d/c at this time 2/2 decreased caregiver support & increased risk for falls.   Education:    Patient left HOB elevated with all lines intact, call button in reach, bed alarm on and nsg notified    GOALS:   Multidisciplinary Problems     Occupational Therapy Goals        Problem: Occupational Therapy    Goal Priority Disciplines Outcome Interventions   Occupational Therapy Goal     OT, PT/OT Ongoing, Progressing    Description: Goals to be met by: 7/10/2022     Patient will increase functional independence with ADLs by performing:    UE Dressing with Set-up Assistance.  Supine to sit with Modified [place].  Step transfer with Modified [place] & appropriate AD.  Toilet transfer to toilet with Modified [place] & appropriate AD.                      History:     Past Medical History:   Diagnosis Date    Arthritis     Asthma     Back pain     Knee pain     Thyroid disease        Past Surgical History:   Procedure Laterality Date    ACROMIOPLASTY  6/9/2022    Procedure: ACROMIOPLASTY;  Surgeon: Louie Luna MD;  Location: [facility] OR;  Service: Orthopedics;;    ARTHROSCOPIC REPAIR OF ROTATOR CUFF OF SHOULDER Left 6/9/2022    Procedure: REPAIR, ROTATOR CUFF, ARTHROSCOPIC, extensive arthroscopic debridement;  Surgeon: Louie Luna MD;  Location: [facility] OR;  Service: Orthopedics;  Laterality: Left;  [place]-Nephew     ARTHROSCOPIC TENOTOMY OF BICEPS TENDON  6/9/2022    Procedure: TENOTOMY, BICEPS, ARTHROSCOPIC;  Surgeon: Louie Luna MD;  Location: [facility] OR;  Service: Orthopedics;;    ARTHROSCOPY OF SHOULDER WITH DECOMPRESSION OF SUBACROMIAL SPACE  6/9/2022    Procedure: ARTHROSCOPY, SHOULDER, WITH SUBACROMIAL SPACE DECOMPRESSION;  Surgeon: Louie Luna MD;  Location: [facility] OR;  Service: Orthopedics;;    CHOLECYSTECTOMY      CORONARY ANGIOGRAPHY N/A 4/19/2020    Procedure: ANGIOGRAM, CORONARY  ARTERY;  Surgeon: Zachariah Goldman MD;  Location: [facility] CATH LAB;  Service: Cardiology;  Laterality: N/A;    LEFT HEART CATHETERIZATION Right 4/19/2020    Procedure: Left heart cath;  Surgeon: Zachariah Goldman MD;  Location: [facility] CATH LAB;  Service: Cardiology;  Laterality: Right;    SYNOVECTOMY OF SHOULDER  6/9/2022    Procedure: SYNOVECTOMY, SHOULDER;  Surgeon: Louie Luna MD;  Location: [facility];  Service: Orthopedics;;    VASCULAR SURGERY         Time Tracking:     OT Date of Treatment: 06/10/22  OT Start Time: 1100 (1247)  OT Stop Time: 1131 (1345)  OT Total Time (min): 31 min    Billable Minutes:Evaluation 8  Self Care/Home Management 23  Therapeutic Activity 23    6/10/2022

## 2022-06-10 NOTE — PROGRESS NOTES
[facility] Orthopaedic Surgery Progress Note     67F s/p arthroscopic extensive debridement ( partial synovectomy, biceps tenotomy, subacromial decompression)arthroscopic extensive debridement ( partial synovectomy, biceps tenotomy, subacromial decompression) on 6/9/22       S: NAEON. AFVSS and WNL. Swelling much improved around the neck and upper chest. Pain controlled. Sensation and motor returning after postop block. No F/C/N/V/SOB/CP. Breathing without issues.      O:      Temp:  [97.3 °F (36.3 °C)-98.6 °F (37 °C)] 98.6 °F (37 °C)  Pulse:  [56-86] 78  Resp:  [10-22] 16  SpO2:  [96 %-100 %] 97 %  BP: (108-159)/(55-87) 122/70     General: NAD,  Cardio: Regular rate by peripheral pulse   Pulm: Normal WOB on room air, symmetric chest expansion  Abd: Soft, NT, ND  Psych: normal affect/mood  Neuro: A&O    Neck/Upper chest  Swelling is significantly decreased from yesterday.     MSK:   LUE  Dressings are clean, dry, and intact; shoulder sling in place  Sensation and motor returning to M/U/R postblock but not fully returned yet  RP 2+      Assessment/Plan:     67F s/p arthroscopic extensive debridement ( partial synovectomy, biceps tenotomy, subacromial decompression)arthroscopic extensive debridement (partial synovectomy, biceps tenotomy, subacromial decompression) on 6/9/22. Kept overnight for monitoring due to swelling around the neck from soft tissue intravasation from surgery.    Swelling much improved. No issues breathing.   Keep dressings clean, dry, and intact and maintain sling  Pain control percocet  No labs this am   Okay to discharge from orthopedic standpoint   Weightbearing status: NWPHYLLIS Arredondo MD   [facility] Orthopaedic Surgery, PGY-3        I have reviewed the notes, assessments, and/or procedures performed by Dr. Arredondo, I concur with her/his documentation of Dorothy Burt.

## 2022-06-10 NOTE — PT/OT/SLP EVAL
Physical Therapy Evaluation and treatment    Patient Name:  Dorothy Burt   MRN:  [id number]    Recommendations:     Discharge Recommendations:  outpatient PT, outpatient OT   Discharge Equipment Recommendations: bedside commode, cane, straight   Barriers to discharge: current functional status-unsafe gait, decreased CG assistance     Assessment:     Dorothy Burt is a 67 y.o. female admitted for OP shoulder repair as below.  She presents with the following impairments/functional limitations:  weakness, impaired endurance, decreased ROM, impaired sensation, decreased coordination, impaired skin, decreased safety awareness, gait instability, pain, impaired balance, impaired functional mobilty, impaired self care skills, decreased upper extremity function .Pt is still sympathetic-dizzy and sleepy but not orthostatic.  Her gait is wobbly even with SPC.  Not safe for dc today.     Rehab Prognosis: Good; patient would benefit from acute skilled PT services to address these deficits and reach maximum level of function.    Recent Surgery: Procedure(s) (LRB):  REPAIR, ROTATOR CUFF, ARTHROSCOPIC, extensive arthroscopic debridement (Left)  ACROMIOPLASTY  ARTHROSCOPY, SHOULDER, WITH SUBACROMIAL SPACE DECOMPRESSION  TENOTOMY, BICEPS, ARTHROSCOPIC  SYNOVECTOMY, SHOULDER 1 Day Post-Op    Plan:     During this hospitalization, patient to be seen 3 x/week to address the identified rehab impairments via gait training, therapeutic activities, therapeutic exercises and progress toward the following goals:    · Plan of Care Expires:  07/10/22    Subjective     Chief Complaint: dizzy, sleepy, pain in post shoulder  Patient/Family Comments/goals: states she has been belching so the pain she was having in breast region is gone.   Pain/Comfort:  · Pain Rating 1: 3/10  · Location - Side 1: Left  · Location - Orientation 1: posterior  · Location 1: shoulder  · Pain Addressed 1: Pre-medicate for activity, Reposition, Distraction, Cessation  of Activity  · Pain Rating Post-Intervention 1: 3/10    Patients cultural, spiritual, Church conflicts given the current situation: other (see comments) (none stated)    Living Environment:  ptlioves alone in 1 story with no MARTIN,.   T/S combo   Prior to admission, patients level of function was I, driving, works in real estate.  Equipment used at home: none.  DME owned (not currently used): shower chair.  Upon discharge, patient will have INTERMITTENT assistance from daughter and friends.    Objective:     Communicated with nurse and OT prior to session.  Patient found HOB elevated with peripheral IV, bed alarm  upon PT entry to room.    General Precautions: Standard, fall , orthopedic  Orthopedic Precautions:LUE non weight bearing   Braces: Shoulder abduction brace  Respiratory Status: Room air    Exams:  · Cognitive Exam:  Patient is oriented to Person, Place, Time and Situation  · Gross Motor Coordination:  impaired  · Postural Exam:  Patient presented with the following abnormalities:    · -       Rounded shoulders  · Sensation: impaired in LUE per OT note, intact in BLE  · Skin Integrity/Edema:      · -       Skin integrity: L shoulder incision bandaged  · -       Edema: L shoulder and UE   · RLE ROM: WFL  · RLE Strength: Deficits: hip3+, knee 4+, ankle 5  · LLE ROM: WFL  · LLE Strength: Deficits: hip 3+, knee 4, Ankle 5    Functional Mobility:  · Bed Mobility:     · Supine to Sit: minimum assistance and HOB elevated  · Sit to Supine: stand by assistance with side rail and HOB elevated  · Transfers:     · Sit to Stand:  contact guard assistance with no AD  · Gait: amb without AD with min assist , CGA with SPC.  amb 30' total with wide LAYO, increased lat sway/wobbly gait and only slightly improved with use of AD.    · Balance: sit-  G, standing-F-/F    Therapeutic Activities and Exercises:  PT eval.  Orthostatic BP taken.  Pt is symptomatic.  C/o dizziness and L shoulder pain.        06/10/22 1300 06/10/22  1305 06/10/22 1310   Vital Signs   Pulse 66 77 85   /68 136/74 139/69   MAP (mmHg) 91 97 94   BP Location Right arm Right arm Right arm   Patient Position Lying Sitting Standing     Gait as above.  Pt repeatedly with eye flutter /closing eyes,  Cues to keep eyes open.  Self care with OT.  repositoned up in bed.  Reviewed bed and cahir postioning , precautions and safety.  Spoke to daughter who is unable to asssit pt to tomorrow evening.  Spoke to Marie KEMP to update and notify of needed DME.    AM-PAC 6 CLICK MOBILITY  Total Score:18     Patient left HOB elevated with all lines intact, call button in reach, bed alarm on and nurse notified.    GOALS:   Multidisciplinary Problems     Physical Therapy Goals        Problem: Physical Therapy    Goal Priority Disciplines Outcome Goal Variances Interventions   Physical Therapy Goal     PT, PT/OT Ongoing, Progressing     Description: Goals to be met by: 2022    Patient will increase functional independence with mobility by performin. Supine to sit with Contact Guard Assistance  2. Sit to supine with Stand-by Assistance  3. Sit to stand transfer with Supervision  4. Bed to chair transfer with Supervision using Single-point Cane   5. Gait  x 100 feet with Supervision using Single-point Cane .                      History:     Past Medical History:   Diagnosis Date    Arthritis     Asthma     Back pain     Knee pain     Thyroid disease        Past Surgical History:   Procedure Laterality Date    ACROMIOPLASTY  2022    Procedure: ACROMIOPLASTY;  Surgeon: Louie Luna MD;  Location: [facility] OR;  Service: Orthopedics;;    ARTHROSCOPIC REPAIR OF ROTATOR CUFF OF SHOULDER Left 2022    Procedure: REPAIR, ROTATOR CUFF, ARTHROSCOPIC, extensive arthroscopic debridement;  Surgeon: Louie Luna MD;  Location: [facility] OR;  Service: Orthopedics;  Laterality: Left;  [place]-Nephew     ARTHROSCOPIC TENOTOMY OF BICEPS TENDON  2022    Procedure:  TENOTOMY, BICEPS, ARTHROSCOPIC;  Surgeon: Louie Luna MD;  Location: [facility] OR;  Service: Orthopedics;;    ARTHROSCOPY OF SHOULDER WITH DECOMPRESSION OF SUBACROMIAL SPACE  6/9/2022    Procedure: ARTHROSCOPY, SHOULDER, WITH SUBACROMIAL SPACE DECOMPRESSION;  Surgeon: Louie Luna MD;  Location: [facility] OR;  Service: Orthopedics;;    CHOLECYSTECTOMY      CORONARY ANGIOGRAPHY N/A 4/19/2020    Procedure: ANGIOGRAM, CORONARY ARTERY;  Surgeon: Zachariah Goldman MD;  Location: [facility] CATH LAB;  Service: Cardiology;  Laterality: N/A;    LEFT HEART CATHETERIZATION Right 4/19/2020    Procedure: Left heart cath;  Surgeon: Zachariah Goldman MD;  Location: [facility] CATH LAB;  Service: Cardiology;  Laterality: Right;    SYNOVECTOMY OF SHOULDER  6/9/2022    Procedure: SYNOVECTOMY, SHOULDER;  Surgeon: Loiue Luna MD;  Location: [facility] OR;  Service: Orthopedics;;    VASCULAR SURGERY         Time Tracking:     PT Received On: 06/10/22  PT Start Time: 1247     PT Stop Time: 1345  PT Total Time (min): 58 min     Billable Minutes: Evaluation 10, Gait Training 10 and Therapeutic Activity 9  Overlap with OT     06/10/2022

## 2022-06-10 NOTE — HPI
68 yo f h/o arthritis, asthma, back and knee pain, thyroid dz p/w partial-sided articular tear supra, biceps tendinitis/tearing, anterior labral tearing, type 2 acromion. Admitted to [facility], now s/p arthroscopic extensive debridement on 6/9/22. Procedure tolerated well and observed overnight. Pain adequately controlled with PO pain medication. [facility] consulted for medical evaluation of patient.     MRI Left Shoulder 5/18 showed High-grade partial-thickness tear of the supraspinatus tendon  EKG 6/10 showed Sinus Rhythm with 1st degree AV block otherwise unremarkable

## 2022-06-10 NOTE — SUBJECTIVE & OBJECTIVE
Past Medical History:   Diagnosis Date    Arthritis     Asthma     Back pain     Knee pain     Thyroid disease        Past Surgical History:   Procedure Laterality Date    ACROMIOPLASTY  6/9/2022    Procedure: ACROMIOPLASTY;  Surgeon: Louie Luna MD;  Location: [facility] OR;  Service: Orthopedics;;    ARTHROSCOPIC REPAIR OF ROTATOR CUFF OF SHOULDER Left 6/9/2022    Procedure: REPAIR, ROTATOR CUFF, ARTHROSCOPIC, extensive arthroscopic debridement;  Surgeon: Louie Luna MD;  Location: [facility] OR;  Service: Orthopedics;  Laterality: Left;  Smith-Nephew     ARTHROSCOPIC TENOTOMY OF BICEPS TENDON  6/9/2022    Procedure: TENOTOMY, BICEPS, ARTHROSCOPIC;  Surgeon: Louie Luna MD;  Location: [facility] OR;  Service: Orthopedics;;    ARTHROSCOPY OF SHOULDER WITH DECOMPRESSION OF SUBACROMIAL SPACE  6/9/2022    Procedure: ARTHROSCOPY, SHOULDER, WITH SUBACROMIAL SPACE DECOMPRESSION;  Surgeon: Louie Luna MD;  Location: [facility] OR;  Service: Orthopedics;;    CHOLECYSTECTOMY      CORONARY ANGIOGRAPHY N/A 4/19/2020    Procedure: ANGIOGRAM, CORONARY ARTERY;  Surgeon: Zachariah Goldman MD;  Location: [facility] CATH LAB;  Service: Cardiology;  Laterality: N/A;    LEFT HEART CATHETERIZATION Right 4/19/2020    Procedure: Left heart cath;  Surgeon: Zachariah Goldman MD;  Location: [facility] CATH LAB;  Service: Cardiology;  Laterality: Right;    SYNOVECTOMY OF SHOULDER  6/9/2022    Procedure: SYNOVECTOMY, SHOULDER;  Surgeon: Louie Luna MD;  Location: [facility];  Service: Orthopedics;;    VASCULAR SURGERY         Review of patient's allergies indicates:  No Known Allergies    No current facility-administered medications on file prior to encounter.     Current Outpatient Medications on File Prior to Encounter   Medication Sig    acyclovir (ZOVIRAX) 400 MG tablet TAKE 1 BY MOUTH 5 TIMES DAILY FOR 5 DAYS    budesonide-formoterol 160-4.5 mcg (SYMBICORT) 160-4.5 mcg/actuation HFAA Inhale 2 puffs into the lungs every evening.  Controller    diclofenac sodium (VOLTAREN) 1 % Gel Apply 2 g topically 2 (two) times daily as needed (to knees).    ibuprofen (ADVIL,MOTRIN) 600 MG tablet Take 1 tablet (600 mg total) by mouth every 6 (six) hours as needed for Pain.    levocetirizine (XYZAL) 5 MG tablet TAKE 1 TABLET BY MOUTH EVERY DAY AS NEEDED    levothyroxine (SYNTHROID) 25 MCG tablet TAKE 1.5 TABLET BY MOUTH DAILY    naproxen (NAPROSYN) 500 MG tablet TAKE 1 TABLET BY MOUTH TWICE A DAY AS NEEDED    omeprazole (PRILOSEC) 20 MG capsule Take 20 mg by mouth daily as needed.    temazepam (RESTORIL) 15 mg Cap Take 1 capsule (15 mg total) by mouth nightly as needed (Sleep).    triamcinolone acetonide 0.1% (KENALOG) 0.1 % cream Apply topically 2 (two) times daily.    ZANAFLEX 4 mg tablet Take 4 mg by mouth every 8 (eight) hours as needed.    albuterol (PROAIR HFA) 90 mcg/actuation inhaler Inhale 2 puffs into the lungs every 6 (six) hours as needed for Wheezing or Shortness of Breath. Rescue    diazePAM (VALIUM) 5 MG tablet Take 2 tablets 30 minutes prior to the MRI scan     Family History       Problem Relation (Age of Onset)    Stroke Mother          Tobacco Use    Smoking status: Never Smoker    Smokeless tobacco: Never Used   Substance and Sexual Activity    Alcohol use: Yes     Alcohol/week: 1.0 standard drink     Types: 1 Shots of liquor per week     Comment: occ    Drug use: No    Sexual activity: Not on file     Review of Systems   Constitutional:  Negative for appetite change, chills and fever.   HENT:  Negative for trouble swallowing.    Respiratory:  Negative for cough, shortness of breath and wheezing.    Cardiovascular:  Negative for palpitations and leg swelling.   Gastrointestinal:  Negative for abdominal pain, diarrhea, nausea and vomiting.   Genitourinary:  Negative for decreased urine volume.   Musculoskeletal:  Positive for gait problem. Negative for neck pain and neck stiffness.   Skin:  Positive for wound (surgical). Negative for  color change and pallor.   Neurological:  Positive for dizziness, weakness and headaches. Negative for facial asymmetry and numbness.   Psychiatric/Behavioral:  Negative for agitation and confusion.    Objective:     Vital Signs (Most Recent):  Temp: 98.1 °F (36.7 °C) (06/10/22 1158)  Pulse: 85 (06/10/22 1310)  Resp: 18 (06/10/22 1521)  BP: 139/69 (06/10/22 1310)  SpO2: 96 % (06/10/22 0914) Vital Signs (24h Range):  Temp:  [97.6 °F (36.4 °C)-98.6 °F (37 °C)] 98.1 °F (36.7 °C)  Pulse:  [60-88] 85  Resp:  [10-20] 18  SpO2:  [96 %-100 %] 96 %  BP: (110-149)/(55-86) 139/69     Weight: 95.8 kg (211 lb 3.2 oz)  Body mass index is 35.69 kg/m².    Physical Exam  Vitals and nursing note reviewed.   Constitutional:       General: She is not in acute distress.     Appearance: She is not ill-appearing.   HENT:      Head: Normocephalic and atraumatic.      Right Ear: External ear normal.      Left Ear: External ear normal.      Nose: Nose normal.      Mouth/Throat:      Mouth: Mucous membranes are moist.      Pharynx: Oropharynx is clear.   Eyes:      Extraocular Movements: Extraocular movements intact.      Conjunctiva/sclera: Conjunctivae normal.      Pupils: Pupils are equal, round, and reactive to light.   Cardiovascular:      Rate and Rhythm: Normal rate and regular rhythm.      Pulses: Normal pulses.      Heart sounds: Normal heart sounds. No murmur heard.  Pulmonary:      Effort: Pulmonary effort is normal.      Breath sounds: Normal breath sounds. No wheezing.   Abdominal:      General: Bowel sounds are normal. There is no distension.      Palpations: Abdomen is soft.      Tenderness: There is no abdominal tenderness.   Musculoskeletal:      Cervical back: Normal range of motion. No rigidity.      Comments: Left arm in sling. Dressing c/d/I. Sensation and motor intact. RP 2+.   Skin:     General: Skin is warm.      Capillary Refill: Capillary refill takes less than 2 seconds.   Neurological:      General: No focal  deficit present.      Mental Status: She is alert and oriented to person, place, and time.   Psychiatric:         Mood and Affect: Mood normal.         Behavior: Behavior normal.       Significant Labs: All pertinent labs within the past 24 hours have been reviewed.    Significant Imaging: I have reviewed all pertinent imaging results/findings within the past 24 hours.

## 2022-06-10 NOTE — PLAN OF CARE
"Pt would benefit from cont OT services in order to maximize functional independence. Recommending OP OT/PT upon d/c as cleared by MD. Per orders pt "gentle left elbow, wrist, and ROM . Pendulums, passive elevation, passive ER at the shoulder level." Pt is NWB LUE. Secure chat sent to Dr. Luna to clarify precautions/exercises. Per secure chat "Just circular pendulums, passive ER up to 20 degrees in scapular plane, passive elevation in scapular plane to tolerance". Pt lives alone & would benefit from OP therapy to assist in shoulder PROM/exercises. Pt educated on NWB LUE.  Unable to perform PROM/pendulum exercises at this time 2/2 pt with increased dizziness EOB/OOB & pt with decreased sensation to LUE. Pt reporting decreased sensation LUE from shoulder to fingertips. Pt educated on circular pendulum exercises with demonstration as well as donning/doffing brace, & adaptive UE dressing technique. Pt with increased dizziness upon AM session during functional mobility requiring Cliff with no AD. PM session pt continues to require Cliff with no AD, progressing to CGA with use of SPC. Pt with reports of chest pain during AM session & nsg notified immediately and in room to assess. Pt with mid back pain during PM session. Pt's eyes flutter frequently when up and requires cueing to keep eyes open. Orthostatics performed during PM session: Supine 129/68, /44, standing 136/69. DME: SPC & BSC.     Problem: Occupational Therapy  Goal: Occupational Therapy Goal  Description: Goals to be met by: 7/10/2022     Patient will increase functional independence with ADLs by performing:    UE Dressing with Set-up Assistance.  Supine to sit with Modified Burnet.  Step transfer with Modified Burnet & appropriate AD.  Toilet transfer to toilet with Modified Burnet & appropriate AD.     Outcome: Ongoing, Progressing     "

## 2022-06-10 NOTE — PLAN OF CARE
Problem: Adult Inpatient Plan of Care  Goal: Absence of Hospital-Acquired Illness or Injury  Outcome: Ongoing, Not Progressing     Problem: Adult Inpatient Plan of Care  Goal: Plan of Care Review  Outcome: Ongoing, Not Progressing    Patient is AAOx 4. On room air. Sling to the L hand remain in place,Ice pack to the L shoulder. Pain was managed. PRN medications administered per MAR. Patient requested Ice cream at around 2200 to help with her itchy/painful throat. tolerated well without difficulty.  Safety was maintained. NAD noted during shift. Will continue to monitor.

## 2022-06-10 NOTE — PLAN OF CARE
Discharge orders noted. No DME or HH ordered. Post-Op follow-up previously scheduled. Patient's daughter will transport home at discharge. No further Case Management needs.     7217--Ambulatory Referral to PT/OT placed.    PT/OT ordered to work on patient. Will update CM once they work with patient in case any DME needs.    1341--Therapy stated patient will need bedside commode and cane ordered. Order placed, awaiting approval.    1424--Patient approved for BSC and Cane by Diana with [facility]. BSC and Straight cane delivered to bedside. Patient signed and DME information given.    Patient Contacts    Name Relation Home Work Mobile   Jody Cueto Daughter [phone]       Future Appointments   Date Time Provider Department Center   6/28/2022  9:00 AM Louie Luna MD [facility]  [facility]   11/3/2022  9:00 AM Justen Jernigan MD [facility]""     [facility] [id number] [facility] [place] [address]Verw Steps: Follow upAppointment: Instructions: Ambulatory Referral Placed for Outpatient Therapy. You will be contacted with follow-up information    [facility] [phone] [facility] [id number] Steps: Follow up       06/10/22 0843   Final Note   Assessment Type Final Discharge Note   Anticipated Discharge Disposition Home   Hospital Resources/Appts/Education Provided Appointments scheduled by Navigator/Coordinator   Post-Acute Status   Discharge Delays None known at this time     Naa Roque RN    [phone]

## 2022-06-10 NOTE — CONSULTS
[facility] Medicine  Consult Note    Patient Name: Dorothy Burt  MRN: [id number]  Admission Date: 6/9/2022  Hospital Length of Stay: 0 days  Attending Physician: Louie Luna MD   Primary Care Provider: Justen Jernigan MD           Patient information was obtained from patient, past medical records, ER records and primary team.     Consults  Subjective:     Principal Problem: Left shoulder pain    Chief Complaint: No chief complaint on file.       HPI: 66 yo f h/o arthritis, asthma, back and knee pain, thyroid dz p/w partial-sided articular tear supra, biceps tendinitis/tearing, anterior labral tearing, type 2 acromion. Admitted to [facility], now s/p arthroscopic extensive debridement on 6/9/22. Procedure tolerated well and observed overnight. Pain adequately controlled with PO pain medication. [facility] consulted for medical evaluation of patient.     MRI Left Shoulder 5/18 showed High-grade partial-thickness tear of the supraspinatus tendon  EKG 6/10 showed Sinus Rhythm with 1st degree AV block otherwise unremarkable       Past Medical History:   Diagnosis Date    Arthritis     Asthma     Back pain     Knee pain     Thyroid disease        Past Surgical History:   Procedure Laterality Date    ACROMIOPLASTY  6/9/2022    Procedure: ACROMIOPLASTY;  Surgeon: Louie Luna MD;  Location: [facility] OR;  Service: Orthopedics;;    ARTHROSCOPIC REPAIR OF ROTATOR CUFF OF SHOULDER Left 6/9/2022    Procedure: REPAIR, ROTATOR CUFF, ARTHROSCOPIC, extensive arthroscopic debridement;  Surgeon: Louie Luna MD;  Location: [facility] OR;  Service: Orthopedics;  Laterality: Left;  Palomino-Nephew     ARTHROSCOPIC TENOTOMY OF BICEPS TENDON  6/9/2022    Procedure: TENOTOMY, BICEPS, ARTHROSCOPIC;  Surgeon: Louie Luna MD;  Location: [facility] OR;  Service: Orthopedics;;    ARTHROSCOPY OF SHOULDER WITH DECOMPRESSION OF SUBACROMIAL SPACE  6/9/2022    Procedure: ARTHROSCOPY, SHOULDER,  WITH SUBACROMIAL SPACE DECOMPRESSION;  Surgeon: Louie Luna MD;  Location: [facility] OR;  Service: Orthopedics;;    CHOLECYSTECTOMY      CORONARY ANGIOGRAPHY N/A 4/19/2020    Procedure: ANGIOGRAM, CORONARY ARTERY;  Surgeon: Zachariah Goldman MD;  Location: [facility] CATH LAB;  Service: Cardiology;  Laterality: N/A;    LEFT HEART CATHETERIZATION Right 4/19/2020    Procedure: Left heart cath;  Surgeon: Zachariah Goldman MD;  Location: [facility] CATH LAB;  Service: Cardiology;  Laterality: Right;    SYNOVECTOMY OF SHOULDER  6/9/2022    Procedure: SYNOVECTOMY, SHOULDER;  Surgeon: Louie Lnua MD;  Location: [facility] OR;  Service: Orthopedics;;    VASCULAR SURGERY         Review of patient's allergies indicates:  No Known Allergies    No current facility-administered medications on file prior to encounter.     Current Outpatient Medications on File Prior to Encounter   Medication Sig    acyclovir (ZOVIRAX) 400 MG tablet TAKE 1 BY MOUTH 5 TIMES DAILY FOR 5 DAYS    budesonide-formoterol 160-4.5 mcg (SYMBICORT) 160-4.5 mcg/actuation HFAA Inhale 2 puffs into the lungs every evening. Controller    diclofenac sodium (VOLTAREN) 1 % Gel Apply 2 g topically 2 (two) times daily as needed (to knees).    ibuprofen (ADVIL,MOTRIN) 600 MG tablet Take 1 tablet (600 mg total) by mouth every 6 (six) hours as needed for Pain.    levocetirizine (XYZAL) 5 MG tablet TAKE 1 TABLET BY MOUTH EVERY DAY AS NEEDED    levothyroxine (SYNTHROID) 25 MCG tablet TAKE 1.5 TABLET BY MOUTH DAILY    naproxen (NAPROSYN) 500 MG tablet TAKE 1 TABLET BY MOUTH TWICE A DAY AS NEEDED    omeprazole (PRILOSEC) 20 MG capsule Take 20 mg by mouth daily as needed.    temazepam (RESTORIL) 15 mg Cap Take 1 capsule (15 mg total) by mouth nightly as needed (Sleep).    triamcinolone acetonide 0.1% (KENALOG) 0.1 % cream Apply topically 2 (two) times daily.    ZANAFLEX 4 mg tablet Take 4 mg by mouth every 8 (eight) hours as needed.    albuterol (PROAIR HFA)  90 mcg/actuation inhaler Inhale 2 puffs into the lungs every 6 (six) hours as needed for Wheezing or Shortness of Breath. Rescue    diazePAM (VALIUM) 5 MG tablet Take 2 tablets 30 minutes prior to the MRI scan     Family History       Problem Relation (Age of Onset)    Stroke Mother          Tobacco Use    Smoking status: Never Smoker    Smokeless tobacco: Never Used   Substance and Sexual Activity    Alcohol use: Yes     Alcohol/week: 1.0 standard drink     Types: 1 Shots of liquor per week     Comment: occ    Drug use: No    Sexual activity: Not on file     Review of Systems   Constitutional:  Negative for appetite change, chills and fever.   HENT:  Negative for trouble swallowing.    Respiratory:  Negative for cough, shortness of breath and wheezing.    Cardiovascular:  Negative for palpitations and leg swelling.   Gastrointestinal:  Negative for abdominal pain, diarrhea, nausea and vomiting.   Genitourinary:  Negative for decreased urine volume.   Musculoskeletal:  Positive for gait problem. Negative for neck pain and neck stiffness.   Skin:  Positive for wound (surgical). Negative for color change and pallor.   Neurological:  Positive for dizziness, weakness and headaches. Negative for facial asymmetry and numbness.   Psychiatric/Behavioral:  Negative for agitation and confusion.    Objective:     Vital Signs (Most Recent):  Temp: 98.1 °F (36.7 °C) (06/10/22 1158)  Pulse: 85 (06/10/22 1310)  Resp: 18 (06/10/22 1521)  BP: 139/69 (06/10/22 1310)  SpO2: 96 % (06/10/22 0914) Vital Signs (24h Range):  Temp:  [97.6 °F (36.4 °C)-98.6 °F (37 °C)] 98.1 °F (36.7 °C)  Pulse:  [60-88] 85  Resp:  [10-20] 18  SpO2:  [96 %-100 %] 96 %  BP: (110-149)/(55-86) 139/69     Weight: 95.8 kg (211 lb 3.2 oz)  Body mass index is 35.69 kg/m².    Physical Exam  Vitals and nursing note reviewed.   Constitutional:       General: She is not in acute distress.     Appearance: She is not ill-appearing.   HENT:      Head: Normocephalic  and atraumatic.      Right Ear: External ear normal.      Left Ear: External ear normal.      Nose: Nose normal.      Mouth/Throat:      Mouth: Mucous membranes are moist.      Pharynx: Oropharynx is clear.   Eyes:      Extraocular Movements: Extraocular movements intact.      Conjunctiva/sclera: Conjunctivae normal.      Pupils: Pupils are equal, round, and reactive to light.   Cardiovascular:      Rate and Rhythm: Normal rate and regular rhythm.      Pulses: Normal pulses.      Heart sounds: Normal heart sounds. No murmur heard.  Pulmonary:      Effort: Pulmonary effort is normal.      Breath sounds: Normal breath sounds. No wheezing.   Abdominal:      General: Bowel sounds are normal. There is no distension.      Palpations: Abdomen is soft.      Tenderness: There is no abdominal tenderness.   Musculoskeletal:      Cervical back: Normal range of motion. No rigidity.      Comments: Left arm in sling. Dressing c/d/I. Sensation and motor intact. RP 2+.   Skin:     General: Skin is warm.      Capillary Refill: Capillary refill takes less than 2 seconds.   Neurological:      General: No focal deficit present.      Mental Status: She is alert and oriented to person, place, and time.   Psychiatric:         Mood and Affect: Mood normal.         Behavior: Behavior normal.       Significant Labs: All pertinent labs within the past 24 hours have been reviewed.    Significant Imaging: I have reviewed all pertinent imaging results/findings within the past 24 hours.    Assessment/Plan:     * Left shoulder pain  Admit to [facility] s/p arthroscopic extensive debridement (partial synovectomy, biceps tenotomy, subacromial decompression) 6/9/22  MRI Left Shoulder 5/18 showed High-grade partial-thickness tear of the supraspinatus tendon  6/10: swelling improved, pain controlled. Patient tolerating diet, voiding and stooling. Denies n/v, sob.  PT/OT recs: outpatient PT/OT  See PT/OT assessment for details  Pain management per  Ortho    Hypothyroidism  Last TSH 2.67  Continue Home regimen: Synthroid 37.5  Stable    Mild intermittent asthma  Continue home regimen: Symbicort qhs and Albuterol daily  Stable        VTE Risk Mitigation (From admission, onward)    None          Follow up with PCP for continued medical management of stable hypothyroidism and asthma.   Pain management on discharge per Ortho    Thank you for your consult. I will sign off. Please contact us if you have any additional questions.    Kristen Oates MD  Department of Hospital Medicine   [facility]

## 2022-06-11 NOTE — NURSING
Patient with d/c orders. Patient requesting to be seen by PT/Ot before d/c. Pt c/o heartburn and headache. Dr Luna placed orders.

## 2022-06-11 NOTE — NURSING
Patient['s PIV removed. AVS printed and reviewed with patient. Home meds were picked up by daughter yesterday and is going to bring the medicines to patient tonight. Bedside commode and cane at bedside. Awaiting transport.

## 2022-06-11 NOTE — NURSING
Patient c/o chest pain relived when she passes out gas or belches. Dr Arredondo informed EKS order placed. TUMS dose adjusted.

## 2022-06-12 ENCOUNTER — NURSE TRIAGE (OUTPATIENT)
Dept: ADMINISTRATIVE | Facility: CLINIC | Age: 67
End: 2022-06-12
Payer: MEDICARE

## 2022-06-12 NOTE — TELEPHONE ENCOUNTER
Caller c/o bilateral leg weakness, L thumb numbness s/p rotator cuff surgery. Pt states she is unable to walk without assistance. Pt also c/o intermitted dizziness, denies dizziness at this time.  Pt advised per protocol and verbalized understanding.    Reason for Disposition   [1] SEVERE weakness (i.e., unable to walk or barely able to walk, requires support) AND [2] new-onset or worsening    Protocols used: NEUROLOGIC DEFICIT-A-AH

## 2022-06-13 ENCOUNTER — PATIENT MESSAGE (OUTPATIENT)
Dept: ORTHOPEDICS | Facility: CLINIC | Age: 67
End: 2022-06-13
Payer: MEDICARE

## 2022-06-14 ENCOUNTER — PATIENT MESSAGE (OUTPATIENT)
Dept: ORTHOPEDICS | Facility: CLINIC | Age: 67
End: 2022-06-14
Payer: MEDICARE

## 2022-06-14 NOTE — DISCHARGE SUMMARY
[facility] Surg  Orthopedics  Discharge Summary      Patient Name: Dorothy Burt  MRN: [id number]  Admission Date: 6/9/2022  Hospital Length of Stay: 0 days  Discharge Date and Time: 6/10/2022  6:33 PM  Attending Physician: Louie Luna MD  Discharging Provider: Marquis Arredondo MD  Primary Care Provider: Justen Jernigan MD    HPI: 68 yo f h/o arthritis, asthma, back and knee pain, thyroid dz p/w partial-sided articular tear supra, biceps tendinitis/tearing, anterior labral tearing, type 2 acromion. Admitted to [facility], now s/p arthroscopic extensive debridement on 6/9/22. Procedure tolerated well and observed overnight. Pain adequately controlled with PO pain medication. [facility] consulted for medical evaluation of patient.      MRI Left Shoulder 5/18 showed High-grade partial-thickness tear of the supraspinatus tendon  EKG 6/10 showed Sinus Rhythm with 1st degree AV block otherwise unremarkable     Procedure(s) (LRB):  REPAIR, ROTATOR CUFF, ARTHROSCOPIC, extensive arthroscopic debridement (Left)  ACROMIOPLASTY  ARTHROSCOPY, SHOULDER, WITH SUBACROMIAL SPACE DECOMPRESSION  TENOTOMY, BICEPS, ARTHROSCOPIC  SYNOVECTOMY, SHOULDER      Hospital Course:   Patient presented to [facility] on day of scheduled surgery and underwent Left arthroscopic rotator cuff repair and Left arthroscopic extensive debridement ( partial synovectomy, biceps tenotomy, subacromial decompression). Please see operative report for further detail. She was admitted for extended recovery secondary to swelling from her shoulder into her chest, neck, and face from intravasation of fluid in surrounding tissues during surgery. She had a patent airway and was breathing without difficulty or issues. Patient did well postoperatively and was found to be fit for discharge on POD# 1.  The patient mobilized with physical therapy.  They were taught how to use DME appropriately.  Their pain was controlled.  The patient met all discharge  "criteria.  The patient was discharged home in stable condition. Patient was given paper prescriptions.  They were given a follow-up appointment in 2 weeks in clinic for a wound check and range of motion check.  All questions and concerns of the patient were answered to their satisfaction.    Significant Diagnostic Studies: No pertinent studies.    Pending Diagnostic Studies:       None          Final Active Diagnoses:    Diagnosis Date Noted POA    PRINCIPAL PROBLEM:  Left shoulder pain [M25.512] 06/10/2022 Yes    Nontraumatic incomplete tear of left rotator cuff [M75.112]  Yes    Hypothyroidism [E03.9] 04/19/2020 Yes    Mild intermittent asthma [J45.20]  Yes     Chronic      Problems Resolved During this Admission:    Diagnosis Date Noted Date Resolved POA    Left rotator cuff tear [M75.102]  06/09/2022 Unknown    Biceps tendon rupture, proximal, left, initial encounter [S46.212A]  06/09/2022 Unknown    Subacromial impingement, left [M75.42]  06/09/2022 Unknown    Nontraumatic complete tear of left rotator cuff [M75.122] 05/03/2022 06/10/2022 Yes      Discharged Condition: good    Disposition: Home or Self Care    Follow Up:   Follow-up Information       [facility] Follow up.    Specialty: Physical Therapy  Why: Ambulatory Referral Placed for Outpatient Therapy. You will be contacted with follow-up information.  Contact information:  [address] [place] [address] [phone]               [facility] Follow up.    Specialty: DME Provider  Contact information:  [address] [place] [address] [phone]                           Patient Instructions:      COMMODE FOR HOME USE     Order Specific Question Answer Comments   Type: Standard    Height: 5' 4.5" (1.638 m)    Weight: 95.8 kg (211 lb 3.2 oz)    Does patient have medical equipment at home? none    Length of need (1-99 months): 99      CANE FOR HOME USE     Order Specific Question Answer Comments   Type of " "Cane: Straight    Height: 5' 4.5" (1.638 m)    Weight: 95.8 kg (211 lb 3.2 oz)    Does patient have medical equipment at home? none    Length of need (1-99 months): 99    Please check all that apply: Patient's condition impairs ambulation.      Ambulatory referral/consult to Physical/Occupational Therapy   Standing Status: Future   Referral Priority: Routine Referral Type: Physical Medicine   Referral Reason: Specialty Services Required   Number of Visits Requested: 1     Diet Adult Regular     Ice to affected area     Keep surgical extremity elevated     Lifting restrictions   Order Comments: No abduction   In sling until follow up appointment      Remove dressing in 72 hours     Shower on day dressing removed (No bath)     Medications:  Reconciled Home Medications:      Medication List        START taking these medications      ondansetron 8 MG tablet  Commonly known as: ZOFRAN  Take 1 tablet (8 mg total) by mouth every 12 (twelve) hours as needed for Nausea.     oxyCODONE-acetaminophen  mg per tablet  Commonly known as: PERCOCET  Take 1 tablet by mouth every 6 (six) hours as needed for Pain.            CONTINUE taking these medications      acyclovir 400 MG tablet  Commonly known as: ZOVIRAX  TAKE 1 BY MOUTH 5 TIMES DAILY FOR 5 DAYS     albuterol 90 mcg/actuation inhaler  Commonly known as: PROAIR HFA  Inhale 2 puffs into the lungs every 6 (six) hours as needed for Wheezing or Shortness of Breath. Rescue     diazePAM 5 MG tablet  Commonly known as: VALIUM  Take 2 tablets 30 minutes prior to the MRI scan     diclofenac sodium 1 % Gel  Commonly known as: VOLTAREN  Apply 2 g topically 2 (two) times daily as needed (to knees).     famotidine 20 MG tablet  Commonly known as: PEPCID  TAKE 1 TABLET BY MOUTH TWICE A DAY     ibuprofen 600 MG tablet  Commonly known as: ADVIL,MOTRIN  Take 1 tablet (600 mg total) by mouth every 6 (six) hours as needed for Pain.     levocetirizine 5 MG tablet  Commonly known as: " XYZAL  TAKE 1 TABLET BY MOUTH EVERY DAY AS NEEDED     levothyroxine 25 MCG tablet  Commonly known as: SYNTHROID  TAKE 1.5 TABLET BY MOUTH DAILY     naproxen 500 MG tablet  Commonly known as: NAPROSYN  TAKE 1 TABLET BY MOUTH TWICE A DAY AS NEEDED     omeprazole 20 MG capsule  Commonly known as: PRILOSEC  Take 20 mg by mouth daily as needed.     temazepam 15 mg Cap  Commonly known as: RESTORIL  Take 1 capsule (15 mg total) by mouth nightly as needed (Sleep).     triamcinolone acetonide 0.1% 0.1 % cream  Commonly known as: KENALOG  Apply topically 2 (two) times daily.     ZANAFLEX 4 MG tablet  Generic drug: tiZANidine  Take 4 mg by mouth every 8 (eight) hours as needed.            STOP taking these medications      ondansetron 4 MG Tbdl  Commonly known as: ZOFRAN-ODT     traMADoL 50 mg tablet  Commonly known as: ULTRAM            ASK your doctor about these medications      budesonide-formoterol 160-4.5 mcg 160-4.5 mcg/actuation Hfaa  Commonly known as: SYMBICORT  Inhale 2 puffs into the lungs every evening. Controller              Marquis Arredondo MD  Orthopedics  [facility]    I have reviewed the notes, assessments, and/or procedures performed by Dr. arredondo, I concur with her/his documentation of Dorothy Burt.

## 2022-06-17 ENCOUNTER — CLINICAL SUPPORT (OUTPATIENT)
Dept: REHABILITATION | Facility: HOSPITAL | Age: 67
End: 2022-06-17
Attending: ORTHOPAEDIC SURGERY
Payer: MEDICARE

## 2022-06-17 ENCOUNTER — OFFICE VISIT (OUTPATIENT)
Dept: ORTHOPEDICS | Facility: CLINIC | Age: 67
End: 2022-06-17
Payer: MEDICARE

## 2022-06-17 VITALS
HEIGHT: 64 IN | BODY MASS INDEX: 36.02 KG/M2 | HEART RATE: 82 BPM | WEIGHT: 211 LBS | SYSTOLIC BLOOD PRESSURE: 110 MMHG | DIASTOLIC BLOOD PRESSURE: 73 MMHG

## 2022-06-17 DIAGNOSIS — M75.112 NONTRAUMATIC INCOMPLETE TEAR OF LEFT ROTATOR CUFF: ICD-10-CM

## 2022-06-17 DIAGNOSIS — R53.1 DECREASED STRENGTH: Primary | ICD-10-CM

## 2022-06-17 DIAGNOSIS — M25.612 DECREASED RANGE OF MOTION OF LEFT SHOULDER: ICD-10-CM

## 2022-06-17 DIAGNOSIS — M75.42 SUBACROMIAL IMPINGEMENT, LEFT: ICD-10-CM

## 2022-06-17 DIAGNOSIS — S46.212A BICEPS TENDON RUPTURE, PROXIMAL, LEFT, INITIAL ENCOUNTER: ICD-10-CM

## 2022-06-17 DIAGNOSIS — M75.122 NONTRAUMATIC COMPLETE TEAR OF LEFT ROTATOR CUFF: Primary | ICD-10-CM

## 2022-06-17 PROCEDURE — 1160F PR REVIEW ALL MEDS BY PRESCRIBER/CLIN PHARMACIST DOCUMENTED: ICD-10-PCS | Mod: CPTII,S$GLB,, | Performed by: ORTHOPAEDIC SURGERY

## 2022-06-17 PROCEDURE — 1160F RVW MEDS BY RX/DR IN RCRD: CPT | Mod: CPTII,S$GLB,, | Performed by: ORTHOPAEDIC SURGERY

## 2022-06-17 PROCEDURE — 3288F FALL RISK ASSESSMENT DOCD: CPT | Mod: CPTII,S$GLB,, | Performed by: ORTHOPAEDIC SURGERY

## 2022-06-17 PROCEDURE — 3008F PR BODY MASS INDEX (BMI) DOCUMENTED: ICD-10-PCS | Mod: CPTII,S$GLB,, | Performed by: ORTHOPAEDIC SURGERY

## 2022-06-17 PROCEDURE — 99024 POSTOP FOLLOW-UP VISIT: CPT | Mod: S$GLB,,, | Performed by: ORTHOPAEDIC SURGERY

## 2022-06-17 PROCEDURE — 99024 PR POST-OP FOLLOW-UP VISIT: ICD-10-PCS | Mod: S$GLB,,, | Performed by: ORTHOPAEDIC SURGERY

## 2022-06-17 PROCEDURE — 97162 PT EVAL MOD COMPLEX 30 MIN: CPT | Mod: PN | Performed by: PHYSICAL THERAPIST

## 2022-06-17 PROCEDURE — 3074F SYST BP LT 130 MM HG: CPT | Mod: CPTII,S$GLB,, | Performed by: ORTHOPAEDIC SURGERY

## 2022-06-17 PROCEDURE — 3078F PR MOST RECENT DIASTOLIC BLOOD PRESSURE < 80 MM HG: ICD-10-PCS | Mod: CPTII,S$GLB,, | Performed by: ORTHOPAEDIC SURGERY

## 2022-06-17 PROCEDURE — 3078F DIAST BP <80 MM HG: CPT | Mod: CPTII,S$GLB,, | Performed by: ORTHOPAEDIC SURGERY

## 2022-06-17 PROCEDURE — 3288F PR FALLS RISK ASSESSMENT DOCUMENTED: ICD-10-PCS | Mod: CPTII,S$GLB,, | Performed by: ORTHOPAEDIC SURGERY

## 2022-06-17 PROCEDURE — 3074F PR MOST RECENT SYSTOLIC BLOOD PRESSURE < 130 MM HG: ICD-10-PCS | Mod: CPTII,S$GLB,, | Performed by: ORTHOPAEDIC SURGERY

## 2022-06-17 PROCEDURE — 1101F PR PT FALLS ASSESS DOC 0-1 FALLS W/OUT INJ PAST YR: ICD-10-PCS | Mod: CPTII,S$GLB,, | Performed by: ORTHOPAEDIC SURGERY

## 2022-06-17 PROCEDURE — 1101F PT FALLS ASSESS-DOCD LE1/YR: CPT | Mod: CPTII,S$GLB,, | Performed by: ORTHOPAEDIC SURGERY

## 2022-06-17 PROCEDURE — 99999 PR PBB SHADOW E&M-EST. PATIENT-LVL IV: ICD-10-PCS | Mod: PBBFAC,,, | Performed by: ORTHOPAEDIC SURGERY

## 2022-06-17 PROCEDURE — 1159F MED LIST DOCD IN RCRD: CPT | Mod: CPTII,S$GLB,, | Performed by: ORTHOPAEDIC SURGERY

## 2022-06-17 PROCEDURE — 1159F PR MEDICATION LIST DOCUMENTED IN MEDICAL RECORD: ICD-10-PCS | Mod: CPTII,S$GLB,, | Performed by: ORTHOPAEDIC SURGERY

## 2022-06-17 PROCEDURE — 99999 PR PBB SHADOW E&M-EST. PATIENT-LVL IV: CPT | Mod: PBBFAC,,, | Performed by: ORTHOPAEDIC SURGERY

## 2022-06-17 PROCEDURE — 3008F BODY MASS INDEX DOCD: CPT | Mod: CPTII,S$GLB,, | Performed by: ORTHOPAEDIC SURGERY

## 2022-06-17 RX ORDER — OXYCODONE AND ACETAMINOPHEN 5; 325 MG/1; MG/1
1 TABLET ORAL EVERY 6 HOURS PRN
Qty: 20 TABLET | Refills: 0 | Status: SHIPPED | OUTPATIENT
Start: 2022-06-17 | End: 2022-06-28

## 2022-06-17 RX ORDER — ONDANSETRON HYDROCHLORIDE 8 MG/1
8 TABLET, FILM COATED ORAL EVERY 8 HOURS PRN
Qty: 15 TABLET | Refills: 1 | Status: SHIPPED | OUTPATIENT
Start: 2022-06-17 | End: 2022-10-15
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
## 2022-06-17 NOTE — PROGRESS NOTES
Patient ID:   Dorothy Burt is a 67 y.o. female.    Chief Complaint:   Surgical aftercare status post left shoulder arthroscopy with partial synovectomy, extensive debridement, rotator cuff repair    HPI:   The patient is returning for a wound check. She called yesterday about infection concerns. She denies fever or chills.     Medications:    Current Outpatient Medications:     acyclovir (ZOVIRAX) 400 MG tablet, TAKE 1 BY MOUTH 5 TIMES DAILY FOR 5 DAYS, Disp: 25 tablet, Rfl: 2    albuterol (PROAIR HFA) 90 mcg/actuation inhaler, Inhale 2 puffs into the lungs every 6 (six) hours as needed for Wheezing or Shortness of Breath. Rescue, Disp: 18 g, Rfl: 11    budesonide-formoterol 160-4.5 mcg (SYMBICORT) 160-4.5 mcg/actuation HFAA, Inhale 2 puffs into the lungs every evening. Controller, Disp: , Rfl:     diazePAM (VALIUM) 5 MG tablet, Take 2 tablets 30 minutes prior to the MRI scan, Disp: 2 tablet, Rfl: 0    diclofenac sodium (VOLTAREN) 1 % Gel, Apply 2 g topically 2 (two) times daily as needed (to knees)., Disp: 100 g, Rfl: 2    famotidine (PEPCID) 20 MG tablet, TAKE 1 TABLET BY MOUTH TWICE A DAY, Disp: 30 tablet, Rfl: 0    ibuprofen (ADVIL,MOTRIN) 600 MG tablet, Take 1 tablet (600 mg total) by mouth every 6 (six) hours as needed for Pain., Disp: 30 tablet, Rfl: 1    levocetirizine (XYZAL) 5 MG tablet, TAKE 1 TABLET BY MOUTH EVERY DAY AS NEEDED, Disp: 30 tablet, Rfl: 11    levothyroxine (SYNTHROID) 25 MCG tablet, TAKE 1.5 TABLET BY MOUTH DAILY, Disp: 135 tablet, Rfl: 3    naproxen (NAPROSYN) 500 MG tablet, TAKE 1 TABLET BY MOUTH TWICE A DAY AS NEEDED, Disp: 60 tablet, Rfl: 0    omeprazole (PRILOSEC) 20 MG capsule, Take 20 mg by mouth daily as needed., Disp: , Rfl:     ondansetron (ZOFRAN) 8 MG tablet, Take 1 tablet (8 mg total) by mouth every 12 (twelve) hours as needed for Nausea., Disp: 24 tablet, Rfl: 0    oxyCODONE-acetaminophen (PERCOCET)  mg per tablet, Take 1 tablet by mouth every 6 (six)  hours as needed for Pain., Disp: 24 tablet, Rfl: 0    temazepam (RESTORIL) 15 mg Cap, Take 1 capsule (15 mg total) by mouth nightly as needed (Sleep)., Disp: 30 capsule, Rfl: 5    triamcinolone acetonide 0.1% (KENALOG) 0.1 % cream, Apply topically 2 (two) times daily., Disp: 15 g, Rfl: 2    ZANAFLEX 4 mg tablet, Take 4 mg by mouth every 8 (eight) hours as needed., Disp: , Rfl:     Allergies:  Review of patient's allergies indicates:  No Known Allergies    Vitals:  LMP 08/15/2005     Physical Examination:  Ortho Exam   Left shoulder exam:  Portal incisions are clean and dry. There is a mild amount of redness around the anterior portal.     Assessment:  1. Nontraumatic complete tear of left rotator cuff        Plan:  The surgical incisions look okay. Patient was instructed to return if any worsening. Medication refills provided. RTC in 7-10 days.        No follow-ups on file.

## 2022-06-17 NOTE — PATIENT INSTRUCTIONS
Shoulder Home Exercise Program Post-Op    It is recommended that you take approximately 15-30 minutes in the morning and 15-30 minutes in the evening to perform these exercises.     Pendulum Exercises: Spend approximately 5 minutes gently rotating the arm in a clockwise and counterclockwise direction.        2.  Supine Passive Elevation: Gently elevate the operative arm with the opposite arm. When the shoulder feels tight, hold in place for about 5-10 seconds and then bring the arm down. Repeat these steps to complete 10 repetitions.        3.  Supine Passive External Rotation: Gently rotate the operative arm with the opposite arm. When the shoulder feels tight, hold in place for about 5-10 seconds and then bring the arm back into a neutral position. Repeat these steps to complete 10 repetitions.

## 2022-06-20 ENCOUNTER — CLINICAL SUPPORT (OUTPATIENT)
Dept: REHABILITATION | Facility: HOSPITAL | Age: 67
End: 2022-06-20
Payer: MEDICARE

## 2022-06-20 DIAGNOSIS — R53.1 DECREASED STRENGTH: Primary | ICD-10-CM

## 2022-06-20 DIAGNOSIS — M25.612 DECREASED RANGE OF MOTION OF LEFT SHOULDER: ICD-10-CM

## 2022-06-20 PROCEDURE — 97110 THERAPEUTIC EXERCISES: CPT | Mod: PN | Performed by: PHYSICAL THERAPIST

## 2022-06-20 PROCEDURE — 97140 MANUAL THERAPY 1/> REGIONS: CPT | Mod: PN | Performed by: PHYSICAL THERAPIST

## 2022-06-20 NOTE — PROGRESS NOTES
[facility]   Physical Therapy Treatment Note     Name: Dorothy Burt  Clinic Number: [id number]    Therapy Diagnosis:   Encounter Diagnoses   Name Primary?    Decreased strength Yes    Decreased range of motion of left shoulder      Physician: Louie Luna MD    Visit Date: 6/20/2022    Physician Orders: PT Eval and Treat   2-3 x per week x 6 weeks   Gentle passive elevation and ER, pendulums. No RTC strengthening        Medical Diagnosis from Referral:M75.112 (ICD-10-CM) - Nontraumatic incomplete tear of left rotator cuff S46.212A (ICD-10-CM) - Biceps tendon rupture, proximal, left, initial encounter M75.42 (ICD-10-CM) - Subacromial impingement, left      Evaluation Date: 6/17/2022  Authorization Period Expiration: 7/15/22  Plan of Care Expiration: 8/12/22  Progress Note Due: 8/12/22 (or by 12th visit)  Visit # / Visits authorized: 2/ 12   FOTO: 2/ 5   PTA Visit: 0/5     Precautions: Standard (post-op protocol)      Time In: 9:55 am  Time Out: 11:04 am  Total Billable Time: 45 1:1 and 10 supervised minutes (TEx2, MTx1) + 10 unbilled CP    Procedure: Procedure(s):  1. Diagnostic arthroscopy left shoulder  2. Extensive synovectomy  3. Rotator interval release   4. Arthroscopic capsular release  5. Biceps tenotomy  6. Subacromial decompression  7. Acromioplasty   8. Rotator cuff repair       SUBJECTIVE     Pt reports: she's getting better with her sling. Most trouble with home exercise program is wrist flexion and extension  She was compliant with home exercise program.  Response to previous treatment: eval only  Functional change: eval only    Pain: 3/10  Location: left shoulder      OBJECTIVE     6/20/22:  left elbow extension PROM: improved from 26 degree to 20 degree from zero after manuals  left external rotation: 20 degree      Treatment     Dorothy received the treatments listed below:        Dorothy received therapeutic exercises to develop strength, endurance, ROM,  "flexibility, posture and core stabilization for 25 1:1 and 10 supervised and minutes including:       left upper trap stretch: 3x20"  left levator scapular stretch: 3x20"  : ~10 kg 3 min  Powerweb: 2 minutes green (flexion), 2 minutes yellow (extension)  Wrist extension AROM: 2x10  Wrist flexion AROM: 2x10  Supination: 2x10    Scapular retraction: 20x5"   Elbow extension AAROM: 20x  Wand shoulder external rotation: 15x5" holds  Table slides flexion: not today        Dorothy received the following manual therapy techniques: Joint mobilizations, Myofacial release and Soft tissue Mobilization were applied to the: left shoulder/elbow for 20 minutes, including:     left elbow PROM, left shoulder flexion, external rotation PROM  STM to left biceps, deltoid  Grade I GHJ oscillations/mobilizations      cold pack for left shoulder for 10 minutes at completion of visit.      Patient Education and Home Exercises     Home Exercises Provided and Patient Education Provided     Education provided:   - anatomy and expectations with pain and symptoms    Written Home Exercises Provided: Patient instructed to cont prior HEP. Exercises were reviewed and Dorothy was able to demonstrate them prior to the end of the session.  Dorothy demonstrated good  understanding of the education provided. See EMR under Patient Instructions for exercises provided during therapy sessions    ASSESSMENT     Dorothy is a 67 y.o. female referred to outpatient Physical Therapy with a medical diagnosis of noncomplete rotator cuff tear, biceps tendon rupture and subacromial impingement. Pt presents with status post left rotator cuff repair (supraspinatus), acromioplasty, biceps tenotomy, subchondral decompression, synovectomy, capsular release on 6/9/22. Biggest deficits are PROM at this point, particularly with elbow extension. Elbow extension did improve after manuals today.      Dorothy Is progressing well towards her goals.   Pt prognosis is Good.     Pt will " continue to benefit from skilled outpatient physical therapy to address the deficits listed in the problem list box on initial evaluation, provide pt/family education and to maximize pt's level of independence in the home and community environment.     Pt's spiritual, cultural and educational needs considered and pt agreeable to plan of care and goals.     Anticipated Barriers for therapy: failed conservative treatment     Goals:   Short Term Goals: 4 weeks   1.  Patient will report < 6/10 shoulder pain at worst for improved ADL performance. PROGRESSING; NOT MET  2.  Patient will demonstrate L shoulder flexion PROM > 140 deg to improve overhead reach. PROGRESSING; NOT MET  3.  Patient will demonstrate left shoulder external rotation PROM > 40 degree. PROGRESSING; NOT MET  4.  Patient will demonstrate left  strength > 45 pounds.  PROGRESSING; NOT MET     Long Term Goals: 12 weeks   1. Patient will report ability to wash her hair without restriction. PROGRESSING; NOT MET  2. Patient will demonstrate ability to reach overhead and remove a 2 lb object 5 times to simulate removing objects from an overhead shelf w/o an increase in symptoms. PROGRESSING; NOT MET  3. Patient will demonstrate all left shoulder AROM within 85% of right for improved ADL performance. PROGRESSING; NOT MET  4. Patient will improve FOTO to < 38% to improve ADL performance. PROGRESSING; NOT MET      PLAN     Cont with POC with phase 1 of rehab protocol     Plan of care Certification: 6/17/2022 to 8/12/22.       Louie Tobar, PT

## 2022-06-23 ENCOUNTER — CLINICAL SUPPORT (OUTPATIENT)
Dept: REHABILITATION | Facility: HOSPITAL | Age: 67
End: 2022-06-23
Payer: MEDICARE

## 2022-06-23 DIAGNOSIS — M25.612 DECREASED RANGE OF MOTION OF LEFT SHOULDER: ICD-10-CM

## 2022-06-23 DIAGNOSIS — R53.1 DECREASED STRENGTH: Primary | ICD-10-CM

## 2022-06-23 PROCEDURE — 97140 MANUAL THERAPY 1/> REGIONS: CPT | Mod: PN,CQ

## 2022-06-23 PROCEDURE — 97110 THERAPEUTIC EXERCISES: CPT | Mod: PN,CQ

## 2022-06-23 NOTE — PROGRESS NOTES
[facility]   Physical Therapy Treatment Note     Name: Dorothy Burt  Clinic Number: [id number]    Therapy Diagnosis:   Encounter Diagnoses   Name Primary?    Decreased strength Yes    Decreased range of motion of left shoulder      Physician: Louie Luna MD    Visit Date: 6/23/2022    Physician Orders: PT Eval and Treat   2-3 x per week x 6 weeks   Gentle passive elevation and ER, pendulums. No RTC strengthening     Medical Diagnosis from Referral:M75.112 (ICD-10-CM) - Nontraumatic incomplete tear of left rotator cuff S46.212A (ICD-10-CM) - Biceps tendon rupture, proximal, left, initial encounter M75.42 (ICD-10-CM) - Subacromial impingement, left      Evaluation Date: 6/17/2022  Authorization Period Expiration: 7/15/22  Plan of Care Expiration: 8/12/22  Progress Note Due: 8/12/22 (or by 12th visit)  Visit # / Visits authorized: 3/ 12   FOTO: 3/ 5   PTA Visit: 1/5     Precautions: Standard (post-op protocol)    Time In: 11:00 am  Time Out: 11:58 am  Total Billable Time: 58 minutes (TEx3, MTx1)     Procedure: Procedure(s):  1. Diagnostic arthroscopy left shoulder  2. Extensive synovectomy  3. Rotator interval release   4. Arthroscopic capsular release  5. Biceps tenotomy  6. Subacromial decompression  7. Acromioplasty   8. Rotator cuff repair     SUBJECTIVE     Pt reports: she did not sleep well due to shoulder pain and not able to find a comfortable position for more than a few minutes.  She was compliant with home exercise program.  Response to previous treatment: sore.  Functional change: not at this time.    Pain: 3/10  Location: left shoulder      OBJECTIVE     6/20/22:  left elbow extension PROM: improved from 26 degree to 20 degree from zero after manuals  left external rotation: 20 degree      Treatment     Dorothy received the treatments listed below:      Dorothy received therapeutic exercises to develop strength, endurance, ROM, flexibility, posture and core stabilization  "for 50 minutes including:    Left upper trap stretch: 3x20"  Left levator scapular stretch: 3x20"  : ~10 kg 3 minutes   Powerweb: 2 minutes green (flexion), 2 minutes yellow (extension)   Wrist extension AROM: 3x10  Wrist flexion AROM: 3x10  Supination: 2x10    Scapular retraction: 20x5"   Elbow extension AAROM: 20x  Wand shoulder external rotation: 15x5" holds  Table slides flexion: x10 on bedside table    Dorothy received the following manual therapy techniques: Joint mobilizations, Myofacial release and Soft tissue Mobilization were applied to the: left shoulder/elbow for 8 minutes, including:     left elbow PROM, left shoulder flexion, external rotation PROM  STM to left biceps, deltoid  Grade I GHJ oscillations/mobilizations    cold pack for left shoulder for 00 minutes at completion of visit. Not today    Patient Education and Home Exercises     Home Exercises Provided and Patient Education Provided     Education provided:   - anatomy and expectations with pain and symptoms    Written Home Exercises Provided: Patient instructed to cont prior HEP. Exercises were reviewed and Dorothy was able to demonstrate them prior to the end of the session.  Dorothy demonstrated good  understanding of the education provided. See EMR under Patient Instructions for exercises provided during therapy sessions    ASSESSMENT     Dorothy is a 67 y.o. female referred to outpatient Physical Therapy with a medical diagnosis of noncomplete rotator cuff tear, biceps tendon rupture and subacromial impingement. Pt presents with status post left rotator cuff repair (supraspinatus), acromioplasty, biceps tenotomy, subchondral decompression, synovectomy, capsular release on 6/9/22. Patient completed her therapy along with new exercise added as well as today's progressions with no increase in symptoms prior to leaving the clinic.     Dorothy Is progressing well towards her goals.   Pt prognosis is Good.     Pt will continue to benefit from skilled " outpatient physical therapy to address the deficits listed in the problem list box on initial evaluation, provide pt/family education and to maximize pt's level of independence in the home and community environment.     Pt's spiritual, cultural and educational needs considered and pt agreeable to plan of care and goals.     Anticipated Barriers for therapy: failed conservative treatment     Goals:   Short Term Goals: 4 weeks   1.  Patient will report < 6/10 shoulder pain at worst for improved ADL performance. PROGRESSING; NOT MET  2.  Patient will demonstrate L shoulder flexion PROM > 140 deg to improve overhead reach. PROGRESSING; NOT MET  3.  Patient will demonstrate left shoulder external rotation PROM > 40 degree. PROGRESSING; NOT MET  4.  Patient will demonstrate left  strength > 45 pounds.  PROGRESSING; NOT MET     Long Term Goals: 12 weeks   1. Patient will report ability to wash her hair without restriction. PROGRESSING; NOT MET  2. Patient will demonstrate ability to reach overhead and remove a 2 lb object 5 times to simulate removing objects from an overhead shelf w/o an increase in symptoms. PROGRESSING; NOT MET  3. Patient will demonstrate all left shoulder AROM within 85% of right for improved ADL performance. PROGRESSING; NOT MET  4. Patient will improve FOTO to < 38% to improve ADL performance. PROGRESSING; NOT MET    PLAN     Cont with POC with phase 1 of rehab protocol     Plan of care Certification: 6/17/2022 to 8/12/22.       Ant Cohen, PTA

## 2022-06-28 ENCOUNTER — OFFICE VISIT (OUTPATIENT)
Dept: ORTHOPEDICS | Facility: CLINIC | Age: 67
End: 2022-06-28
Payer: MEDICARE

## 2022-06-28 VITALS
HEART RATE: 84 BPM | SYSTOLIC BLOOD PRESSURE: 132 MMHG | BODY MASS INDEX: 32.61 KG/M2 | HEIGHT: 64 IN | WEIGHT: 191 LBS | DIASTOLIC BLOOD PRESSURE: 84 MMHG

## 2022-06-28 DIAGNOSIS — Z98.890 S/P LEFT ROTATOR CUFF REPAIR: Primary | ICD-10-CM

## 2022-06-28 PROCEDURE — 1125F AMNT PAIN NOTED PAIN PRSNT: CPT | Mod: CPTII,S$GLB,, | Performed by: ORTHOPAEDIC SURGERY

## 2022-06-28 PROCEDURE — 3008F PR BODY MASS INDEX (BMI) DOCUMENTED: ICD-10-PCS | Mod: CPTII,S$GLB,, | Performed by: ORTHOPAEDIC SURGERY

## 2022-06-28 PROCEDURE — 3079F DIAST BP 80-89 MM HG: CPT | Mod: CPTII,S$GLB,, | Performed by: ORTHOPAEDIC SURGERY

## 2022-06-28 PROCEDURE — 99999 PR PBB SHADOW E&M-EST. PATIENT-LVL III: CPT | Mod: PBBFAC,,, | Performed by: ORTHOPAEDIC SURGERY

## 2022-06-28 PROCEDURE — 1125F PR PAIN SEVERITY QUANTIFIED, PAIN PRESENT: ICD-10-PCS | Mod: CPTII,S$GLB,, | Performed by: ORTHOPAEDIC SURGERY

## 2022-06-28 PROCEDURE — 1160F PR REVIEW ALL MEDS BY PRESCRIBER/CLIN PHARMACIST DOCUMENTED: ICD-10-PCS | Mod: CPTII,S$GLB,, | Performed by: ORTHOPAEDIC SURGERY

## 2022-06-28 PROCEDURE — 99024 POSTOP FOLLOW-UP VISIT: CPT | Mod: S$GLB,,, | Performed by: ORTHOPAEDIC SURGERY

## 2022-06-28 PROCEDURE — 3075F PR MOST RECENT SYSTOLIC BLOOD PRESS GE 130-139MM HG: ICD-10-PCS | Mod: CPTII,S$GLB,, | Performed by: ORTHOPAEDIC SURGERY

## 2022-06-28 PROCEDURE — 1160F RVW MEDS BY RX/DR IN RCRD: CPT | Mod: CPTII,S$GLB,, | Performed by: ORTHOPAEDIC SURGERY

## 2022-06-28 PROCEDURE — 1101F PR PT FALLS ASSESS DOC 0-1 FALLS W/OUT INJ PAST YR: ICD-10-PCS | Mod: CPTII,S$GLB,, | Performed by: ORTHOPAEDIC SURGERY

## 2022-06-28 PROCEDURE — 3079F PR MOST RECENT DIASTOLIC BLOOD PRESSURE 80-89 MM HG: ICD-10-PCS | Mod: CPTII,S$GLB,, | Performed by: ORTHOPAEDIC SURGERY

## 2022-06-28 PROCEDURE — 3288F PR FALLS RISK ASSESSMENT DOCUMENTED: ICD-10-PCS | Mod: CPTII,S$GLB,, | Performed by: ORTHOPAEDIC SURGERY

## 2022-06-28 PROCEDURE — 1101F PT FALLS ASSESS-DOCD LE1/YR: CPT | Mod: CPTII,S$GLB,, | Performed by: ORTHOPAEDIC SURGERY

## 2022-06-28 PROCEDURE — 3288F FALL RISK ASSESSMENT DOCD: CPT | Mod: CPTII,S$GLB,, | Performed by: ORTHOPAEDIC SURGERY

## 2022-06-28 PROCEDURE — 1159F MED LIST DOCD IN RCRD: CPT | Mod: CPTII,S$GLB,, | Performed by: ORTHOPAEDIC SURGERY

## 2022-06-28 PROCEDURE — 99999 PR PBB SHADOW E&M-EST. PATIENT-LVL III: ICD-10-PCS | Mod: PBBFAC,,, | Performed by: ORTHOPAEDIC SURGERY

## 2022-06-28 PROCEDURE — 1159F PR MEDICATION LIST DOCUMENTED IN MEDICAL RECORD: ICD-10-PCS | Mod: CPTII,S$GLB,, | Performed by: ORTHOPAEDIC SURGERY

## 2022-06-28 PROCEDURE — 99024 PR POST-OP FOLLOW-UP VISIT: ICD-10-PCS | Mod: S$GLB,,, | Performed by: ORTHOPAEDIC SURGERY

## 2022-06-28 PROCEDURE — 3075F SYST BP GE 130 - 139MM HG: CPT | Mod: CPTII,S$GLB,, | Performed by: ORTHOPAEDIC SURGERY

## 2022-06-28 PROCEDURE — 3008F BODY MASS INDEX DOCD: CPT | Mod: CPTII,S$GLB,, | Performed by: ORTHOPAEDIC SURGERY

## 2022-06-28 RX ORDER — OXYCODONE AND ACETAMINOPHEN 5; 325 MG/1; MG/1
1 TABLET ORAL EVERY 12 HOURS PRN
Qty: 14 TABLET | Refills: 0 | Status: SHIPPED | OUTPATIENT
Start: 2022-06-28 | End: 2022-07-05

## 2022-06-28 NOTE — PROGRESS NOTES
Patient ID:   Dorohty Burt is a 67 y.o. female.    Chief Complaint:   Three weeks s/p left RCR, extensive debridement    HPI:   The patient is returning for suture removal.  She reports a pain level of 3/10.  She is reporting itching around the elbow.    Medications:    Current Outpatient Medications:     acyclovir (ZOVIRAX) 400 MG tablet, TAKE 1 BY MOUTH 5 TIMES DAILY FOR 5 DAYS, Disp: 25 tablet, Rfl: 2    albuterol (PROAIR HFA) 90 mcg/actuation inhaler, Inhale 2 puffs into the lungs every 6 (six) hours as needed for Wheezing or Shortness of Breath. Rescue, Disp: 18 g, Rfl: 11    budesonide-formoterol 160-4.5 mcg (SYMBICORT) 160-4.5 mcg/actuation HFAA, Inhale 2 puffs into the lungs every evening. Controller, Disp: , Rfl:     diazePAM (VALIUM) 5 MG tablet, Take 2 tablets 30 minutes prior to the MRI scan, Disp: 2 tablet, Rfl: 0    diclofenac sodium (VOLTAREN) 1 % Gel, Apply 2 g topically 2 (two) times daily as needed (to knees)., Disp: 100 g, Rfl: 2    famotidine (PEPCID) 20 MG tablet, TAKE 1 TABLET BY MOUTH TWICE A DAY, Disp: 30 tablet, Rfl: 11    ibuprofen (ADVIL,MOTRIN) 600 MG tablet, Take 1 tablet (600 mg total) by mouth every 6 (six) hours as needed for Pain., Disp: 30 tablet, Rfl: 1    levocetirizine (XYZAL) 5 MG tablet, TAKE 1 TABLET BY MOUTH EVERY DAY AS NEEDED, Disp: 30 tablet, Rfl: 11    levothyroxine (SYNTHROID) 25 MCG tablet, TAKE 1.5 TABLET BY MOUTH DAILY, Disp: 135 tablet, Rfl: 3    naproxen (NAPROSYN) 500 MG tablet, TAKE 1 TABLET BY MOUTH TWICE A DAY AS NEEDED, Disp: 60 tablet, Rfl: 0    omeprazole (PRILOSEC) 20 MG capsule, Take 20 mg by mouth daily as needed., Disp: , Rfl:     ondansetron (ZOFRAN) 8 MG tablet, Take 1 tablet (8 mg total) by mouth every 8 (eight) hours as needed for Nausea., Disp: 15 tablet, Rfl: 1    oxyCODONE-acetaminophen (PERCOCET) 5-325 mg per tablet, Take 1 tablet by mouth every 6 (six) hours as needed for Pain., Disp: 20 tablet, Rfl: 0    temazepam (RESTORIL) 15  mg Cap, Take 1 capsule (15 mg total) by mouth nightly as needed (Sleep)., Disp: 30 capsule, Rfl: 5    triamcinolone acetonide 0.1% (KENALOG) 0.1 % cream, Apply topically 2 (two) times daily., Disp: 15 g, Rfl: 2    ZANAFLEX 4 mg tablet, Take 4 mg by mouth every 8 (eight) hours as needed., Disp: , Rfl:     Allergies:  Review of patient's allergies indicates:  No Known Allergies    Vitals:  LMP 08/15/2005     Physical Examination:  Ortho Exam   Left shoulder exam:  Incisions C/D/I    Assessment:  1. S/P left rotator cuff repair      Plan:  Suture removal today.  Follow-up in 3-4 weeks       No follow-ups on file.

## 2022-06-29 ENCOUNTER — CLINICAL SUPPORT (OUTPATIENT)
Dept: REHABILITATION | Facility: HOSPITAL | Age: 67
End: 2022-06-29
Payer: MEDICARE

## 2022-06-29 DIAGNOSIS — R53.1 DECREASED STRENGTH: Primary | ICD-10-CM

## 2022-06-29 DIAGNOSIS — M25.612 DECREASED RANGE OF MOTION OF LEFT SHOULDER: ICD-10-CM

## 2022-06-29 PROCEDURE — 97110 THERAPEUTIC EXERCISES: CPT | Mod: PN | Performed by: PHYSICAL THERAPIST

## 2022-06-29 PROCEDURE — 97140 MANUAL THERAPY 1/> REGIONS: CPT | Mod: PN | Performed by: PHYSICAL THERAPIST

## 2022-06-29 NOTE — PROGRESS NOTES
[facility]   Physical Therapy Treatment Note     Name: Dorothy Burt  Clinic Number: [id number]    Therapy Diagnosis:   Encounter Diagnoses   Name Primary?    Decreased strength Yes    Decreased range of motion of left shoulder      Physician: Louie Luna MD    Visit Date: 6/29/2022    Physician Orders: PT Eval and Treat   2-3 x per week x 6 weeks   Gentle passive elevation and ER, pendulums. No RTC strengthening     Medical Diagnosis from Referral:M75.112 (ICD-10-CM) - Nontraumatic incomplete tear of left rotator cuff S46.212A (ICD-10-CM) - Biceps tendon rupture, proximal, left, initial encounter M75.42 (ICD-10-CM) - Subacromial impingement, left      Evaluation Date: 6/17/2022  Authorization Period Expiration: 7/15/22  Plan of Care Expiration: 8/12/22  Progress Note Due: 8/12/22 (or by 12th visit)  Visit # / Visits authorized: 4/ 12   FOTO: 4/ 5   PTA Visit: 1/5     Precautions: Standard (post-op protocol)    Time In: 10:15 am  Time Out: 11:23 am  Total Billable Time: 68 minutes (TEx3, MTx2)     Procedure: Procedure(s):  1. Diagnostic arthroscopy left shoulder  2. Extensive synovectomy  3. Rotator interval release   4. Arthroscopic capsular release  5. Biceps tenotomy  6. Subacromial decompression  7. Acromioplasty   8. Rotator cuff repair     SUBJECTIVE     Pt reports: her shoulder's bothering her a little after the bus ride here  She was compliant with home exercise program.  Response to previous treatment: sore.  Functional change: not at this time.    Pain: 3-4/10  Location: left shoulder      OBJECTIVE     6/29/22:  left elbow extension PROM: improved from 20 degree to 9 degree from zero after manuals  left external rotation: 35 degree      Treatment     Dorothy received the treatments listed below:      Dorothy received therapeutic exercises to develop strength, endurance, ROM, flexibility, posture and core stabilization for 50 minutes including:    Left upper trap stretch:  "3x20"  Left levator scapular stretch: 3x20"  : ~10 kg 3 minutes   Powerweb: 2 minutes green (flexion), 2 minutes yellow (extension)   Wrist extension AROM: 3x10  Wrist flexion AROM: 3x10  Supination: 2x10  LLLD bicep stretch: 5 minutes (distal humerus on 1/2 bolster)    Scapular retraction: 20x5"   Elbow extension AAROM: 20x  Wand shoulder external rotation: 15x5" holds  Table slides flexion: x10 on mat table    Dorothy received the following manual therapy techniques: Joint mobilizations, Myofacial release and Soft tissue Mobilization were applied to the: left shoulder/elbow for 18 minutes, including:     left elbow PROM, left shoulder flexion, external rotation PROM  STM to left biceps, deltoid  Grade I GHJ oscillations/mobilizations    cold pack for left shoulder for 00 minutes at completion of visit. Not today    Patient Education and Home Exercises     Home Exercises Provided and Patient Education Provided     Education provided:   - anatomy and expectations with pain and symptoms    Written Home Exercises Provided: Patient instructed to cont prior HEP. Exercises were reviewed and Dorothy was able to demonstrate them prior to the end of the session.  Dorothy demonstrated good  understanding of the education provided. See EMR under Patient Instructions for exercises provided during therapy sessions    ASSESSMENT     Dorothy is a 67 y.o. female referred to outpatient Physical Therapy with a medical diagnosis of noncomplete rotator cuff tear, biceps tendon rupture and subacromial impingement. Pt presents with status post left rotator cuff repair (supraspinatus), acromioplasty, biceps tenotomy, subchondral decompression, synovectomy, capsular release on 6/9/22. Elbow extension and external rotation PROM continue to improve as seen in measurements both upon arrival and after manuals. Supination AROM has also improved.     Dorothy Is progressing well towards her goals.   Pt prognosis is Good.     Pt will continue to benefit " from skilled outpatient physical therapy to address the deficits listed in the problem list box on initial evaluation, provide pt/family education and to maximize pt's level of independence in the home and community environment.     Pt's spiritual, cultural and educational needs considered and pt agreeable to plan of care and goals.     Anticipated Barriers for therapy: failed conservative treatment     Goals:   Short Term Goals: 4 weeks   1.  Patient will report < 6/10 shoulder pain at worst for improved ADL performance. PROGRESSING; NOT MET  2.  Patient will demonstrate L shoulder flexion PROM > 140 deg to improve overhead reach. PROGRESSING; NOT MET  3.  Patient will demonstrate left shoulder external rotation PROM > 40 degree. PROGRESSING; NOT MET  4.  Patient will demonstrate left  strength > 45 pounds.  PROGRESSING; NOT MET     Long Term Goals: 12 weeks   1. Patient will report ability to wash her hair without restriction. PROGRESSING; NOT MET  2. Patient will demonstrate ability to reach overhead and remove a 2 lb object 5 times to simulate removing objects from an overhead shelf w/o an increase in symptoms. PROGRESSING; NOT MET  3. Patient will demonstrate all left shoulder AROM within 85% of right for improved ADL performance. PROGRESSING; NOT MET  4. Patient will improve FOTO to < 38% to improve ADL performance. PROGRESSING; NOT MET    PLAN     Cont with POC with phase 1 of rehab protocol     Plan of care Certification: 6/17/2022 to 8/12/22.       Louie Tobar, PT

## 2022-06-30 ENCOUNTER — DOCUMENTATION ONLY (OUTPATIENT)
Dept: REHABILITATION | Facility: HOSPITAL | Age: 67
End: 2022-06-30
Payer: MEDICARE

## 2022-07-01 ENCOUNTER — CLINICAL SUPPORT (OUTPATIENT)
Dept: REHABILITATION | Facility: HOSPITAL | Age: 67
End: 2022-07-01
Payer: MEDICARE

## 2022-07-01 DIAGNOSIS — R53.1 DECREASED STRENGTH: Primary | ICD-10-CM

## 2022-07-01 DIAGNOSIS — M25.612 DECREASED RANGE OF MOTION OF LEFT SHOULDER: ICD-10-CM

## 2022-07-01 PROCEDURE — 97140 MANUAL THERAPY 1/> REGIONS: CPT | Mod: PN,CQ

## 2022-07-01 PROCEDURE — 97110 THERAPEUTIC EXERCISES: CPT | Mod: PN,CQ

## 2022-07-01 NOTE — PROGRESS NOTES
[facility]   Physical Therapy Treatment Note     Name: Dorothy Burt  Clinic Number: [id number]    Therapy Diagnosis:   Encounter Diagnoses   Name Primary?    Decreased strength Yes    Decreased range of motion of left shoulder      Physician: Louie Luna MD    Visit Date: 7/1/2022    Physician Orders: PT Eval and Treat   2-3 x per week x 6 weeks   Gentle passive elevation and ER, pendulums. No RTC strengthening     Medical Diagnosis from Referral:M75.112 (ICD-10-CM) - Nontraumatic incomplete tear of left rotator cuff S46.212A (ICD-10-CM) - Biceps tendon rupture, proximal, left, initial encounter M75.42 (ICD-10-CM) - Subacromial impingement, left      Evaluation Date: 6/17/2022  Authorization Period Expiration: 7/15/22  Plan of Care Expiration: 8/12/22  Progress Note Due: 8/12/22 (or by 12th visit)  Visit # / Visits authorized: 5/ 12   FOTO: 5/ 5 NEXT VISIT   PTA Visit: 1/5     Precautions: Standard (post-op protocol)    Time In: 2:00 pm  Time Out: 2:59 pm  Total Billable Time: 30 1:1 and 29 supervised minutes (TEx1, MTx1)     Procedure: Procedure(s):  1. Diagnostic arthroscopy left shoulder  2. Extensive synovectomy  3. Rotator interval release   4. Arthroscopic capsular release  5. Biceps tenotomy  6. Subacromial decompression  7. Acromioplasty   8. Rotator cuff repair     SUBJECTIVE     Pt reports: she feels achy but feels that it is from the weather.  She was compliant with home exercise program.  Response to previous treatment: sore.  Functional change: not at this time.    Pain: 2/10  Location: left shoulder      OBJECTIVE     6/29/22:  left elbow extension PROM: improved from 20 degree to 9 degree from zero after manuals  left external rotation: 35 degree      Treatment     Dorothy received the treatments listed below:      Dorothy received therapeutic exercises to develop strength, endurance, ROM, flexibility, posture and core stabilization for 22 1:1 and 29 supervised  "minutes including:    Left upper trap stretch: 3x20"  Left levator scapular stretch: 3x20"  : ~10 kg 3 minutes   Powerweb: 2 minutes green (flexion), 2 minutes yellow (extension)   Wrist extension AROM: 3x10  Wrist flexion AROM: 3x10  Supination: 2x10  LLLD bicep stretch: 5 minutes (distal humerus on 1/2 bolster)    Scapular retraction: 20x5"   Elbow extension AAROM: 20x  Wand shoulder external rotation: 15x5" holds  Table slides flexion: x15 on mat table    Dorothy received the following manual therapy techniques: Joint mobilizations, Myofacial release and Soft tissue Mobilization were applied to the: left shoulder/elbow for 8 minutes, including:     left elbow PROM, left shoulder flexion, external rotation PROM  STM to left biceps, deltoid  Grade I GHJ oscillations/mobilizations    cold pack for left shoulder for 00 minutes at completion of visit. Not today    Patient Education and Home Exercises     Home Exercises Provided and Patient Education Provided     Education provided:   - anatomy and expectations with pain and symptoms    Written Home Exercises Provided: Patient instructed to cont prior HEP. Exercises were reviewed and Dorothy was able to demonstrate them prior to the end of the session.  Dorothy demonstrated good  understanding of the education provided. See EMR under Patient Instructions for exercises provided during therapy sessions    ASSESSMENT     Dorothy is a 67 y.o. female referred to outpatient Physical Therapy with a medical diagnosis of noncomplete rotator cuff tear, biceps tendon rupture and subacromial impingement. Pt presents with status post left rotator cuff repair (supraspinatus), acromioplasty, biceps tenotomy, subchondral decompression, synovectomy, capsular release on 6/9/22. Elbow extension and external rotation PROM continue to improve after manuals. Patient had no difficulty with today's progressions noted in bold.    Dorothy Is progressing well towards her goals.   Pt prognosis is Good. "     Pt will continue to benefit from skilled outpatient physical therapy to address the deficits listed in the problem list box on initial evaluation, provide pt/family education and to maximize pt's level of independence in the home and community environment.     Pt's spiritual, cultural and educational needs considered and pt agreeable to plan of care and goals.     Anticipated Barriers for therapy: failed conservative treatment     Goals:   Short Term Goals: 4 weeks   1.  Patient will report < 6/10 shoulder pain at worst for improved ADL performance. PROGRESSING; NOT MET  2.  Patient will demonstrate L shoulder flexion PROM > 140 deg to improve overhead reach. PROGRESSING; NOT MET  3.  Patient will demonstrate left shoulder external rotation PROM > 40 degree. PROGRESSING; NOT MET  4.  Patient will demonstrate left  strength > 45 pounds.  PROGRESSING; NOT MET     Long Term Goals: 12 weeks   1. Patient will report ability to wash her hair without restriction. PROGRESSING; NOT MET  2. Patient will demonstrate ability to reach overhead and remove a 2 lb object 5 times to simulate removing objects from an overhead shelf w/o an increase in symptoms. PROGRESSING; NOT MET  3. Patient will demonstrate all left shoulder AROM within 85% of right for improved ADL performance. PROGRESSING; NOT MET  4. Patient will improve FOTO to < 38% to improve ADL performance. PROGRESSING; NOT MET    PLAN     Cont with POC with phase 1 of rehab protocol     Plan of care Certification: 6/17/2022 to 8/12/22.       Ant Cohen, PTA

## 2022-07-05 ENCOUNTER — CLINICAL SUPPORT (OUTPATIENT)
Dept: REHABILITATION | Facility: HOSPITAL | Age: 67
End: 2022-07-05
Payer: MEDICARE

## 2022-07-05 DIAGNOSIS — R53.1 DECREASED STRENGTH: Primary | ICD-10-CM

## 2022-07-05 DIAGNOSIS — M25.612 DECREASED RANGE OF MOTION OF LEFT SHOULDER: ICD-10-CM

## 2022-07-05 PROCEDURE — 97110 THERAPEUTIC EXERCISES: CPT | Mod: PN | Performed by: PHYSICAL THERAPIST

## 2022-07-05 PROCEDURE — 97140 MANUAL THERAPY 1/> REGIONS: CPT | Mod: PN | Performed by: PHYSICAL THERAPIST

## 2022-07-05 NOTE — PROGRESS NOTES
[facility]   Physical Therapy Treatment Note     Name: Dorothy Burt  Clinic Number: [id number]    Therapy Diagnosis:   Encounter Diagnoses   Name Primary?    Decreased strength Yes    Decreased range of motion of left shoulder      Physician: Louie Luna MD    Visit Date: 7/5/2022    Physician Orders: PT Eval and Treat   2-3 x per week x 6 weeks   Gentle passive elevation and ER, pendulums. No RTC strengthening     Medical Diagnosis from Referral:M75.112 (ICD-10-CM) - Nontraumatic incomplete tear of left rotator cuff S46.212A (ICD-10-CM) - Biceps tendon rupture, proximal, left, initial encounter M75.42 (ICD-10-CM) - Subacromial impingement, left      Evaluation Date: 6/17/2022  Authorization Period Expiration: 7/15/22  Plan of Care Expiration: 8/12/22  Progress Note Due: 8/12/22 (or by 12th visit)  Visit # / Visits authorized: 6/ 12   FOTO: 6/10  PTA Visit: 0/5     Precautions: Standard (post-op protocol)    Time In: 11:30 am  Time Out: 12:38 pm  Total Billable Time: 68 1:1 minutes (TEx3, MTx2)     6/9/22 Procedure: Procedure(s):  1. Diagnostic arthroscopy left shoulder  2. Extensive synovectomy  3. Rotator interval release   4. Arthroscopic capsular release  5. Biceps tenotomy  6. Subacromial decompression  7. Acromioplasty   8. Rotator cuff repair     SUBJECTIVE     Pt reports: she feels achy but feels that it is from the weather.  She was compliant with home exercise program.  Response to previous treatment: sore.  Functional change: not at this time.    Pain: 3-4/10  Location: left shoulder      OBJECTIVE     7/5/22:  left elbow extension PROM: improved from 7 degree to 3 degree from zero after manuals  left elbow extension AROM: improved from 12 degree to 6 degree from zero after manuals  left external rotation: 43 degree      Treatment     Dorothy received the treatments listed below:      Dorothy received therapeutic exercises to develop strength, endurance, ROM,  "flexibility, posture and core stabilization for 48 1:1 minutes including:    Left upper trap stretch: 3x20"  Left levator scapular stretch: 3x20"  : ~10 kg 3 minutes   Powerweb: 2 minutes green (flexion), 2 minutes yellow (extension)   Wrist extension AROM: 2x10 1 lb  Wrist flexion AROM: 2x10 1 lb   Supination: 2x10  LLLD bicep stretch: 5 minutes (distal humerus on 1/2 bolster)    Scapular retraction: 20x5" (not today)  Elbow extension AAROM: 20x  Wand shoulder external rotation: 15x5" holds  Table slides flexion: x10 on inclined mat table and x10 on flat mat table    Dorothy received the following manual therapy techniques: Joint mobilizations, Myofacial release and Soft tissue Mobilization were applied to the: left shoulder/elbow for 20 minutes, including:     left elbow PROM, left shoulder flexion, external rotation PROM  IASTM to left biceps, deltoid  Cross friction massage to distal biceps tendon  Grade I GHJ oscillations/mobilizations    cold pack for left shoulder for 00 minutes at completion of visit. Not today    Patient Education and Home Exercises     Home Exercises Provided and Patient Education Provided     Education provided:   - anatomy and expectations with pain and symptoms    Written Home Exercises Provided: Patient instructed to cont prior HEP. Exercises were reviewed and Dorothy was able to demonstrate them prior to the end of the session.  Dorothy demonstrated good  understanding of the education provided. See EMR under Patient Instructions for exercises provided during therapy sessions    ASSESSMENT     Dorothy is a 67 y.o. female referred to outpatient Physical Therapy with a medical diagnosis of noncomplete rotator cuff tear, biceps tendon rupture and subacromial impingement. Pt presents with status post left rotator cuff repair (supraspinatus), acromioplasty, biceps tenotomy, subchondral decompression, synovectomy, capsular release on 6/9/22. Elbow extension is nearing normal with tenderness at " distal biceps tendon and most discomfort near deltoid insertion. Still in phase I of RTC rehab protocol.    Dorothy Is progressing well towards her goals.   Pt prognosis is Good.     Pt will continue to benefit from skilled outpatient physical therapy to address the deficits listed in the problem list box on initial evaluation, provide pt/family education and to maximize pt's level of independence in the home and community environment.     Pt's spiritual, cultural and educational needs considered and pt agreeable to plan of care and goals.     Anticipated Barriers for therapy: failed conservative treatment     Goals:   Short Term Goals: 4 weeks   1.  Patient will report < 6/10 shoulder pain at worst for improved ADL performance. PROGRESSING; NOT MET  2.  Patient will demonstrate L shoulder flexion PROM > 140 deg to improve overhead reach. PROGRESSING; NOT MET  3.  Patient will demonstrate left shoulder external rotation PROM > 40 degree. PROGRESSING; NOT MET  4.  Patient will demonstrate left  strength > 45 pounds.  PROGRESSING; NOT MET     Long Term Goals: 12 weeks   1. Patient will report ability to wash her hair without restriction. PROGRESSING; NOT MET  2. Patient will demonstrate ability to reach overhead and remove a 2 lb object 5 times to simulate removing objects from an overhead shelf w/o an increase in symptoms. PROGRESSING; NOT MET  3. Patient will demonstrate all left shoulder AROM within 85% of right for improved ADL performance. PROGRESSING; NOT MET  4. Patient will improve FOTO to < 38% to improve ADL performance. PROGRESSING; NOT MET    PLAN     Cont with POC with phase 1 of rehab protocol     Plan of care Certification: 6/17/2022 to 8/12/22.       Louie Tobar, PT

## 2022-07-07 ENCOUNTER — CLINICAL SUPPORT (OUTPATIENT)
Dept: REHABILITATION | Facility: HOSPITAL | Age: 67
End: 2022-07-07
Payer: MEDICARE

## 2022-07-07 DIAGNOSIS — M25.612 DECREASED RANGE OF MOTION OF LEFT SHOULDER: ICD-10-CM

## 2022-07-07 DIAGNOSIS — R53.1 DECREASED STRENGTH: Primary | ICD-10-CM

## 2022-07-07 PROCEDURE — 97140 MANUAL THERAPY 1/> REGIONS: CPT | Mod: PN | Performed by: PHYSICAL THERAPIST

## 2022-07-07 PROCEDURE — 97110 THERAPEUTIC EXERCISES: CPT | Mod: PN | Performed by: PHYSICAL THERAPIST

## 2022-07-07 NOTE — PROGRESS NOTES
[facility]   Physical Therapy Treatment Note     Name: Dorothy Burt  Clinic Number: [id number]    Therapy Diagnosis:   Encounter Diagnoses   Name Primary?    Decreased strength Yes    Decreased range of motion of left shoulder      Physician: Louie Luna MD    Visit Date: 7/7/2022    Physician Orders: PT Eval and Treat   2-3 x per week x 6 weeks   Gentle passive elevation and ER, pendulums. No RTC strengthening     Medical Diagnosis from Referral:M75.112 (ICD-10-CM) - Nontraumatic incomplete tear of left rotator cuff S46.212A (ICD-10-CM) - Biceps tendon rupture, proximal, left, initial encounter M75.42 (ICD-10-CM) - Subacromial impingement, left      Evaluation Date: 6/17/2022  Authorization Period Expiration: 7/15/22  Plan of Care Expiration: 8/12/22  Progress Note Due: 8/12/22 (or by 12th visit)  Visit # / Visits authorized: 7/ 12   FOTO: 7/10  PTA Visit: 0/5     Precautions: Standard (post-op protocol)    Time In: 2:30 pm  Time Out: 3:27 pm  Total Billable Time: 56 1:1 minutes (TEx2, MTx2)     6/9/22 Procedure: Procedure(s):  1. Diagnostic arthroscopy left shoulder  2. Extensive synovectomy  3. Rotator interval release   4. Arthroscopic capsular release  5. Biceps tenotomy  6. Subacromial decompression  7. Acromioplasty   8. Rotator cuff repair     SUBJECTIVE     Pt reports: she feels achy but feels that it is from the weather.  She was compliant with home exercise program.  Response to previous treatment: sore.  Functional change: not at this time.    Pain: 3/10  Location: left shoulder      OBJECTIVE     7/5/22:  left elbow extension PROM: improved from 7 degree to 3 degree from zero after manuals  left elbow extension AROM: improved from 12 degree to 6 degree from zero after manuals  left external rotation: 43 degree      Treatment     Dorothy received the treatments listed below:      Dorothy received therapeutic exercises to develop strength, endurance, ROM, flexibility,  "posture and core stabilization for 35 1:1 minutes including:    Left upper trap stretch: 3x20"  Left levator scapular stretch: 3x20"  : ~15 kg 3 minutes   Powerweb: 2 minutes green (flexion & extension)  Wrist extension AROM: 2x10 1 lb  Wrist flexion AROM: 2x10 1 lb   Supination: 2x10  LLLD bicep stretch: 5 minutes (distal humerus on 1/2 bolster)    Scapular retraction: 20x5"  Elbow extension AAROM: 20x  Wand shoulder external rotation: 15x5" holds  Table slides flexion: x20 on inclined mat table     Dorothy received the following manual therapy techniques: Joint mobilizations, Myofacial release and Soft tissue Mobilization were applied to the: left shoulder/elbow for 22 minutes, including:     left elbow PROM, left shoulder flexion, external rotation PROM  IASTM to left biceps, deltoid  Cross friction massage to distal biceps tendon  Grade I GHJ oscillations/mobilizations  left Lat and subscapularis trigger point release    cold pack for left shoulder for 00 minutes at completion of visit. Not today    Patient Education and Home Exercises     Home Exercises Provided and Patient Education Provided     Education provided:   - anatomy and expectations with pain and symptoms    Written Home Exercises Provided: Patient instructed to cont prior HEP. Exercises were reviewed and Dorothy was able to demonstrate them prior to the end of the session.  Dorothy demonstrated good  understanding of the education provided. See EMR under Patient Instructions for exercises provided during therapy sessions    ASSESSMENT     Dorothy is a 67 y.o. female referred to outpatient Physical Therapy with a medical diagnosis of noncomplete rotator cuff tear, biceps tendon rupture and subacromial impingement. Pt presents with status post left rotator cuff repair (supraspinatus), acromioplasty, biceps tenotomy, subchondral decompression, synovectomy, capsular release on 6/9/22. Biggest gains remain in elbow extension range of motion, approaching zero " after manuals. Still with expected limitations in shoulder flexion and external rotation. Remains in phase I of rehab protocol.    Dorothy Is progressing well towards her goals.   Pt prognosis is Good.     Pt will continue to benefit from skilled outpatient physical therapy to address the deficits listed in the problem list box on initial evaluation, provide pt/family education and to maximize pt's level of independence in the home and community environment.     Pt's spiritual, cultural and educational needs considered and pt agreeable to plan of care and goals.     Anticipated Barriers for therapy: failed conservative treatment     Goals:   Short Term Goals: 4 weeks   1.  Patient will report < 6/10 shoulder pain at worst for improved ADL performance. PROGRESSING; NOT MET  2.  Patient will demonstrate L shoulder flexion PROM > 140 deg to improve overhead reach. PROGRESSING; NOT MET  3.  Patient will demonstrate left shoulder external rotation PROM > 40 degree. MET 7/7/22  4.  Patient will demonstrate left  strength > 45 pounds.  PROGRESSING; NOT MET     Long Term Goals: 12 weeks   1. Patient will report ability to wash her hair without restriction. PROGRESSING; NOT MET  2. Patient will demonstrate ability to reach overhead and remove a 2 lb object 5 times to simulate removing objects from an overhead shelf w/o an increase in symptoms. PROGRESSING; NOT MET  3. Patient will demonstrate all left shoulder AROM within 85% of right for improved ADL performance. PROGRESSING; NOT MET  4. Patient will improve FOTO to < 38% to improve ADL performance. PROGRESSING; NOT MET    PLAN     Cont with POC with phase 1 of rehab protocol     Plan of care Certification: 6/17/2022 to 8/12/22.       Louie Tobar, PT

## 2022-07-12 ENCOUNTER — CLINICAL SUPPORT (OUTPATIENT)
Dept: REHABILITATION | Facility: HOSPITAL | Age: 67
End: 2022-07-12
Payer: MEDICARE

## 2022-07-12 DIAGNOSIS — R53.1 DECREASED STRENGTH: Primary | ICD-10-CM

## 2022-07-12 DIAGNOSIS — M25.612 DECREASED RANGE OF MOTION OF LEFT SHOULDER: ICD-10-CM

## 2022-07-12 PROCEDURE — 97110 THERAPEUTIC EXERCISES: CPT | Mod: PN,CQ

## 2022-07-12 PROCEDURE — 97140 MANUAL THERAPY 1/> REGIONS: CPT | Mod: PN,CQ

## 2022-07-12 NOTE — PROGRESS NOTES
[facility]   Physical Therapy Treatment Note     Name: Dorothy Burt  Clinic Number: [id number]    Therapy Diagnosis:   Encounter Diagnoses   Name Primary?    Decreased strength Yes    Decreased range of motion of left shoulder      Physician: Louie Luna MD    Visit Date: 7/12/2022    Physician Orders: PT Eval and Treat   2-3 x per week x 6 weeks   Gentle passive elevation and ER, pendulums. No RTC strengthening     Medical Diagnosis from Referral:M75.112 (ICD-10-CM) - Nontraumatic incomplete tear of left rotator cuff S46.212A (ICD-10-CM) - Biceps tendon rupture, proximal, left, initial encounter M75.42 (ICD-10-CM) - Subacromial impingement, left      Evaluation Date: 6/17/2022  Authorization Period Expiration: 7/15/22  Plan of Care Expiration: 8/12/22  Progress Note Due: 8/12/22 (or by 12th visit)  Visit # / Visits authorized: 8/ 12   FOTO: 8/10  PTA Visit: 1/5     Precautions: Standard (post-op protocol)    Time In: 3:00 pm  Time Out: 3:58 pm  Total Billable Time: 58 minutes (TEx3, MTx1)     6/9/22 Procedure: Procedure(s):  1. Diagnostic arthroscopy left shoulder  2. Extensive synovectomy  3. Rotator interval release   4. Arthroscopic capsular release  5. Biceps tenotomy  6. Subacromial decompression  7. Acromioplasty   8. Rotator cuff repair     SUBJECTIVE     Pt reports: she was having bouts of pain in the anterior left shoulder, interrupting her sleep..  She was compliant with home exercise program.  Response to previous treatment: sore.  Functional change: not at this time.    Pain: 3-4/10  Location: left shoulder      OBJECTIVE     7/5/22:  left elbow extension PROM: improved from 7 degree to 3 degree from zero after manuals  left elbow extension AROM: improved from 12 degree to 6 degree from zero after manuals  left external rotation: 43 degree      Treatment     Dorothy received the treatments listed below:      Dorothy received therapeutic exercises to develop strength,  "endurance, ROM, flexibility, posture and core stabilization for 43 minutes including:    Left upper trap stretch: 3x20"  Left levator scapular stretch: 3x20"  : ~15 kg 3 minutes   Powerweb: 2 minutes green (flexion & extension)  Wrist extension AROM: 2x10 1 lb  Wrist flexion AROM: 2x10 1 lb   Supination: 2x10  LLLD bicep stretch: 5 minutes (distal humerus on 1/2 bolster)    Scapular retraction: 20x5"  Elbow extension AAROM: 20x  Wand shoulder external rotation: 15x5" holds  Table slides flexion: x20 on inclined mat table     Dorothy received the following manual therapy techniques: Joint mobilizations, Myofacial release and Soft tissue Mobilization were applied to the: left shoulder/elbow for 15 minutes, including:     left elbow PROM, left shoulder flexion, external rotation PROM  IASTM to left biceps, deltoid - not today  Cross friction massage to distal biceps tendon  Grade I GHJ oscillations/mobilizations  left Lat and subscapularis trigger point release - not today    cold pack for left shoulder for 00 minutes at completion of visit. Not today    Patient Education and Home Exercises     Home Exercises Provided and Patient Education Provided     Education provided:   - anatomy and expectations with pain and symptoms    Written Home Exercises Provided: Patient instructed to cont prior HEP. Exercises were reviewed and Dorothy was able to demonstrate them prior to the end of the session.  Dorothy demonstrated good  understanding of the education provided. See EMR under Patient Instructions for exercises provided during therapy sessions    ASSESSMENT     Dorothy is a 67 y.o. female referred to outpatient Physical Therapy with a medical diagnosis of noncomplete rotator cuff tear, biceps tendon rupture and subacromial impingement. Pt presents with status post left rotator cuff repair (supraspinatus), acromioplasty, biceps tenotomy, subchondral decompression, synovectomy, capsular release on 6/9/22. Biggest gains remain in " elbow extension range of motion, approaching zero after manuals. Still with expected limitations in shoulder flexion and external rotation. Remains in phase I of rehab protocol.    Dorothy Is progressing well towards her goals.   Pt prognosis is Good.     Pt will continue to benefit from skilled outpatient physical therapy to address the deficits listed in the problem list box on initial evaluation, provide pt/family education and to maximize pt's level of independence in the home and community environment.     Pt's spiritual, cultural and educational needs considered and pt agreeable to plan of care and goals.     Anticipated Barriers for therapy: failed conservative treatment     Goals:   Short Term Goals: 4 weeks   1.  Patient will report < 6/10 shoulder pain at worst for improved ADL performance. PROGRESSING; NOT MET  2.  Patient will demonstrate L shoulder flexion PROM > 140 deg to improve overhead reach. PROGRESSING; NOT MET  3.  Patient will demonstrate left shoulder external rotation PROM > 40 degree. MET 7/7/22  4.  Patient will demonstrate left  strength > 45 pounds.  PROGRESSING; NOT MET     Long Term Goals: 12 weeks   1. Patient will report ability to wash her hair without restriction. PROGRESSING; NOT MET  2. Patient will demonstrate ability to reach overhead and remove a 2 lb object 5 times to simulate removing objects from an overhead shelf w/o an increase in symptoms. PROGRESSING; NOT MET  3. Patient will demonstrate all left shoulder AROM within 85% of right for improved ADL performance. PROGRESSING; NOT MET  4. Patient will improve FOTO to < 38% to improve ADL performance. PROGRESSING; NOT MET    PLAN     Cont with POC with phase 1 of rehab protocol     Plan of care Certification: 6/17/2022 to 8/12/22.       Ant Cohen, PTA

## 2022-07-14 ENCOUNTER — CLINICAL SUPPORT (OUTPATIENT)
Dept: REHABILITATION | Facility: HOSPITAL | Age: 67
End: 2022-07-14
Payer: MEDICARE

## 2022-07-14 DIAGNOSIS — M25.612 DECREASED RANGE OF MOTION OF LEFT SHOULDER: ICD-10-CM

## 2022-07-14 DIAGNOSIS — R53.1 DECREASED STRENGTH: Primary | ICD-10-CM

## 2022-07-14 PROCEDURE — 97110 THERAPEUTIC EXERCISES: CPT | Mod: PN,CQ

## 2022-07-14 PROCEDURE — 97140 MANUAL THERAPY 1/> REGIONS: CPT | Mod: PN,CQ

## 2022-07-14 NOTE — PROGRESS NOTES
[facility]   Physical Therapy Treatment Note     Name: Dorothy Burt  Clinic Number: [id number]    Therapy Diagnosis:   Encounter Diagnoses   Name Primary?    Decreased strength Yes    Decreased range of motion of left shoulder      Physician: Louie Luna MD    Visit Date: 7/14/2022    Physician Orders: PT Eval and Treat   2-3 x per week x 6 weeks   Gentle passive elevation and ER, pendulums. No RTC strengthening     Medical Diagnosis from Referral:M75.112 (ICD-10-CM) - Nontraumatic incomplete tear of left rotator cuff S46.212A (ICD-10-CM) - Biceps tendon rupture, proximal, left, initial encounter M75.42 (ICD-10-CM) - Subacromial impingement, left      Evaluation Date: 6/17/2022  Authorization Period Expiration: 7/15/22  Plan of Care Expiration: 8/12/22  Progress Note Due: 8/12/22 (or by 12th visit)  Visit # / Visits authorized: 9/ 12   FOTO: 9/10  PTA Visit: 2/5     Precautions: Standard (post-op protocol)    Time In: 3:00 pm  Time Out: 4:05 pm  Total Billable Time: 30 1:1 and 25 supervised minutes (TEx1, MTx1)     6/9/22 Procedure: Procedure(s):  1. Diagnostic arthroscopy left shoulder  2. Extensive synovectomy  3. Rotator interval release   4. Arthroscopic capsular release  5. Biceps tenotomy  6. Subacromial decompression  7. Acromioplasty   8. Rotator cuff repair     SUBJECTIVE     Pt reports: she continues with a lot of pain in the anterior left shoulder and feels like she has a knot in her posterior shoulder area at the base of her neck.  She was compliant with home exercise program.  Response to previous treatment: sore.  Functional change: not at this time.    Pain: 4/10  Location: left shoulder      OBJECTIVE     7/5/22:  left elbow extension PROM: improved from 7 degree to 3 degree from zero after manuals  left elbow extension AROM: improved from 12 degree to 6 degree from zero after manuals  left external rotation: 43 degree      Treatment     Dorothy received the  "treatments listed below:      Dorothy received therapeutic exercises to develop strength, endurance, ROM, flexibility, posture and core stabilization for 22 1:1 and 25 supervised minutes including:    Left upper trap stretch: 3x20"  Left levator scapular stretch: 3x20"  : ~15 kg 3 minutes   Powerweb: 2 minutes green (flexion & extension)  Wrist extension AROM: 2x10 1 lb  Wrist flexion AROM: 2x10 1 lb   Supination: 2x10  LLLD bicep stretch: 5 minutes (distal humerus on 1/2 bolster)    Scapular retraction: 20x5"  Elbow extension AAROM: 20x  Wand shoulder external rotation: 15x5" holds  Table slides flexion: x20 on inclined mat table     Dorothy received the following manual therapy techniques: Joint mobilizations, Myofacial release and Soft tissue Mobilization were applied to the: left shoulder/elbow for 8 1:1 minutes, including:     left elbow PROM, left shoulder flexion, external rotation PROM  IASTM to left biceps, deltoid - not today  Cross friction massage to distal biceps tendon  Grade I GHJ oscillations/mobilizations  left Lat and subscapularis trigger point release - not today    cold pack for left shoulder for 00 minutes at completion of visit. Not today    Patient Education and Home Exercises     Home Exercises Provided and Patient Education Provided     Education provided:   - anatomy and expectations with pain and symptoms    Written Home Exercises Provided: Patient instructed to cont prior HEP. Exercises were reviewed and Dorothy was able to demonstrate them prior to the end of the session.  Dorothy demonstrated good  understanding of the education provided. See EMR under Patient Instructions for exercises provided during therapy sessions    ASSESSMENT     Dorothy is a 67 y.o. female referred to outpatient Physical Therapy with a medical diagnosis of noncomplete rotator cuff tear, biceps tendon rupture and subacromial impingement. Pt presents with status post left rotator cuff repair (supraspinatus), acromioplasty, " biceps tenotomy, subchondral decompression, synovectomy, capsular release on 6/9/22. Biggest gains remain in elbow extension range of motion, approaching zero after manuals. Still with expected limitations in shoulder flexion and external rotation.  Patient continues to report that her worse area of pain in the proximal biceps area. Remains in phase I of rehab protocol.    Dorothy Is progressing well towards her goals.   Pt prognosis is Good.     Pt will continue to benefit from skilled outpatient physical therapy to address the deficits listed in the problem list box on initial evaluation, provide pt/family education and to maximize pt's level of independence in the home and community environment.     Pt's spiritual, cultural and educational needs considered and pt agreeable to plan of care and goals.     Anticipated Barriers for therapy: failed conservative treatment     Goals:   Short Term Goals: 4 weeks   1.  Patient will report < 6/10 shoulder pain at worst for improved ADL performance. PROGRESSING; NOT MET  2.  Patient will demonstrate L shoulder flexion PROM > 140 deg to improve overhead reach. PROGRESSING; NOT MET  3.  Patient will demonstrate left shoulder external rotation PROM > 40 degree. MET 7/7/22  4.  Patient will demonstrate left  strength > 45 pounds.  PROGRESSING; NOT MET     Long Term Goals: 12 weeks   1. Patient will report ability to wash her hair without restriction. PROGRESSING; NOT MET  2. Patient will demonstrate ability to reach overhead and remove a 2 lb object 5 times to simulate removing objects from an overhead shelf w/o an increase in symptoms. PROGRESSING; NOT MET  3. Patient will demonstrate all left shoulder AROM within 85% of right for improved ADL performance. PROGRESSING; NOT MET  4. Patient will improve FOTO to < 38% to improve ADL performance. PROGRESSING; NOT MET    PLAN     Cont with POC with phase 1 of rehab protocol     Plan of care Certification: 6/17/2022 to 8/12/22.        Ant Cohen, PTA

## 2022-07-19 ENCOUNTER — CLINICAL SUPPORT (OUTPATIENT)
Dept: REHABILITATION | Facility: HOSPITAL | Age: 67
End: 2022-07-19
Payer: MEDICARE

## 2022-07-19 DIAGNOSIS — M25.612 DECREASED RANGE OF MOTION OF LEFT SHOULDER: ICD-10-CM

## 2022-07-19 DIAGNOSIS — R53.1 DECREASED STRENGTH: Primary | ICD-10-CM

## 2022-07-19 PROCEDURE — 97140 MANUAL THERAPY 1/> REGIONS: CPT | Mod: PN

## 2022-07-19 PROCEDURE — 97110 THERAPEUTIC EXERCISES: CPT | Mod: PN

## 2022-07-19 NOTE — PROGRESS NOTES
[facility]   Physical Therapy Treatment Note     Name: Dorothy Burt  Clinic Number: [id number]    Therapy Diagnosis:   Encounter Diagnoses   Name Primary?    Decreased strength Yes    Decreased range of motion of left shoulder      Physician: No ref. provider found    Visit Date: 7/19/2022    Physician Orders: PT Eval and Treat   2-3 x per week x 6 weeks   Gentle passive elevation and ER, pendulums. No RTC strengthening     Medical Diagnosis from Referral:M75.112 (ICD-10-CM) - Nontraumatic incomplete tear of left rotator cuff S46.212A (ICD-10-CM) - Biceps tendon rupture, proximal, left, initial encounter M75.42 (ICD-10-CM) - Subacromial impingement, left   Evaluation Date: 6/17/2022  Authorization Period Expiration: 7/15/22  Plan of Care Expiration: 8/12/22  Progress Note Due: 8/12/22 (or by 12th visit)  Visit # / Visits authorized: 10/12   FOTO: 10/10 - Next  PTA Visit: 0/5     Precautions: Standard (post-op protocol)    Time In: 2:00 pm  Time Out: 3:00 pm  Total Billable Time: 30 minutes (TEx1, MTx1)     6/9/22 Procedure: Procedure(s):  1. Diagnostic arthroscopy left shoulder  2. Extensive synovectomy  3. Rotator interval release   4. Arthroscopic capsular release  5. Biceps tenotomy  6. Subacromial decompression  7. Acromioplasty   8. Rotator cuff repair     SUBJECTIVE     Pt reports: pain behind her left shoulder is better, but still having left anterior shoulder soreness during the day and nighttime. Tries to wear the brace during the day for relief. Pain is low at rest and feels it more at night.  She was compliant with home exercise program.  Response to previous treatment: sore.  Functional change: not at this time.    Pain: 4/10  Location: left shoulder      OBJECTIVE     7/5/22:  left elbow extension PROM: improved from 7 degree to 3 degree from zero after manuals  left elbow extension AROM: improved from 12 degree to 6 degree from zero after manuals  left external rotation:  "43 degree      Treatment     Dorothy received the treatments listed below:      Dorothy received therapeutic exercises to develop strength, endurance, ROM, flexibility, posture and core stabilization for 35 minutes including:    : ~15 kg 3 minutes   Wrist extension AROM: 2x10 1 lb  Wrist flexion AROM: 2x10 1 lb   Supination: 2x10 (1# next)  LLLD bicep stretch: 5 minutes (distal humerus on 1/2 bolster)    Scapular retraction: 20x5"  Elbow extension AAROM: 20x  AAROM unilateral left shoulder serratus punches: 3x8 with PT assist  Wand shoulder external rotation: 15x5" holds  Supine shoulder dowel flexion: 2x10, PVC pipe  Table slides flexion: x20 on inclined mat table     Dorothy received the following manual therapy techniques: Joint mobilizations, Myofacial release and Soft tissue Mobilization were applied to the: left shoulder/elbow for 25 minutes, including:     Left elbow extension stretch passively: 4 rounds  left elbow PROM, left shoulder flexion, external rotation PROM  Grade I-III left GHJ oscillations/mobilizations  left Lat and subscapularis trigger point release - not today    cold pack for left shoulder for 00 minutes at completion of visit. Not today    Patient Education and Home Exercises     Home Exercises Provided and Patient Education Provided   Education provided:   - anatomy and expectations with pain and symptoms    Written Home Exercises Provided: Patient instructed to cont prior HEP. Exercises were reviewed and Dorothy was able to demonstrate them prior to the end of the session.  Dorothy demonstrated good  understanding of the education provided. See EMR under Patient Instructions for exercises provided during therapy sessions    ASSESSMENT     Dorothy is a 67 y.o. female referred to outpatient Physical Therapy with a medical diagnosis of noncomplete rotator cuff tear, biceps tendon rupture and subacromial impingement. Pt presents with status post left rotator cuff repair (supraspinatus), acromioplasty, " biceps tenotomy, subchondral decompression, synovectomy, capsular release on 6/9/22. Progressed to beginning stage of Phase II AAROM rehab today. Pain well managed during the session with only discomfort with end range stretching. Left arm sling adjusted, and education on using pillows between arm/body and under upper left arm for good support to rest at night. Will continue to monitor pain. No left upper trap pain after adjusting left arm sling for appropriate LUE support.     Dorothy Is progressing well towards her goals.   Pt prognosis is Good.     Pt will continue to benefit from skilled outpatient physical therapy to address the deficits listed in the problem list box on initial evaluation, provide pt/family education and to maximize pt's level of independence in the home and community environment.     Pt's spiritual, cultural and educational needs considered and pt agreeable to plan of care and goals.     Anticipated Barriers for therapy: failed conservative treatment     Goals:   Short Term Goals: 4 weeks   1.  Patient will report < 6/10 shoulder pain at worst for improved ADL performance. PROGRESSING; NOT MET  2.  Patient will demonstrate L shoulder flexion PROM > 140 deg to improve overhead reach. PROGRESSING; NOT MET  3.  Patient will demonstrate left shoulder external rotation PROM > 40 degree. MET 7/7/22  4.  Patient will demonstrate left  strength > 45 pounds.  PROGRESSING; NOT MET     Long Term Goals: 12 weeks   1. Patient will report ability to wash her hair without restriction. PROGRESSING; NOT MET  2. Patient will demonstrate ability to reach overhead and remove a 2 lb object 5 times to simulate removing objects from an overhead shelf w/o an increase in symptoms. PROGRESSING; NOT MET  3. Patient will demonstrate all left shoulder AROM within 85% of right for improved ADL performance. PROGRESSING; NOT MET  4. Patient will improve FOTO to < 38% to improve ADL performance. PROGRESSING; NOT MET    PLAN      Cont with POC with phase 1 of rehab protocol     Plan of care Certification: 6/17/2022 to 8/12/22.       Jose C Lutz, PT

## 2022-07-21 ENCOUNTER — OFFICE VISIT (OUTPATIENT)
Dept: DERMATOLOGY | Facility: CLINIC | Age: 67
End: 2022-07-21
Payer: MEDICARE

## 2022-07-21 ENCOUNTER — CLINICAL SUPPORT (OUTPATIENT)
Dept: REHABILITATION | Facility: HOSPITAL | Age: 67
End: 2022-07-21
Payer: MEDICARE

## 2022-07-21 DIAGNOSIS — M25.612 DECREASED RANGE OF MOTION OF LEFT SHOULDER: ICD-10-CM

## 2022-07-21 DIAGNOSIS — T14.8XXA BLISTER OF SKIN: ICD-10-CM

## 2022-07-21 DIAGNOSIS — R53.1 DECREASED STRENGTH: Primary | ICD-10-CM

## 2022-07-21 DIAGNOSIS — L90.5 SCAR: Primary | ICD-10-CM

## 2022-07-21 PROCEDURE — 97110 THERAPEUTIC EXERCISES: CPT | Mod: PN,CQ

## 2022-07-21 PROCEDURE — 1126F AMNT PAIN NOTED NONE PRSNT: CPT | Mod: CPTII,S$GLB,, | Performed by: DERMATOLOGY

## 2022-07-21 PROCEDURE — 99999 PR PBB SHADOW E&M-EST. PATIENT-LVL III: ICD-10-PCS | Mod: PBBFAC,,, | Performed by: DERMATOLOGY

## 2022-07-21 PROCEDURE — 97140 MANUAL THERAPY 1/> REGIONS: CPT | Mod: PN,CQ

## 2022-07-21 PROCEDURE — 99999 PR PBB SHADOW E&M-EST. PATIENT-LVL III: CPT | Mod: PBBFAC,,, | Performed by: DERMATOLOGY

## 2022-07-21 PROCEDURE — 3288F FALL RISK ASSESSMENT DOCD: CPT | Mod: CPTII,S$GLB,, | Performed by: DERMATOLOGY

## 2022-07-21 PROCEDURE — 1159F PR MEDICATION LIST DOCUMENTED IN MEDICAL RECORD: ICD-10-PCS | Mod: CPTII,S$GLB,, | Performed by: DERMATOLOGY

## 2022-07-21 PROCEDURE — 99213 OFFICE O/P EST LOW 20 MIN: CPT | Mod: S$GLB,,, | Performed by: DERMATOLOGY

## 2022-07-21 PROCEDURE — 1101F PT FALLS ASSESS-DOCD LE1/YR: CPT | Mod: CPTII,S$GLB,, | Performed by: DERMATOLOGY

## 2022-07-21 PROCEDURE — 1101F PR PT FALLS ASSESS DOC 0-1 FALLS W/OUT INJ PAST YR: ICD-10-PCS | Mod: CPTII,S$GLB,, | Performed by: DERMATOLOGY

## 2022-07-21 PROCEDURE — 99213 PR OFFICE/OUTPT VISIT, EST, LEVL III, 20-29 MIN: ICD-10-PCS | Mod: S$GLB,,, | Performed by: DERMATOLOGY

## 2022-07-21 PROCEDURE — 1159F MED LIST DOCD IN RCRD: CPT | Mod: CPTII,S$GLB,, | Performed by: DERMATOLOGY

## 2022-07-21 PROCEDURE — 3288F PR FALLS RISK ASSESSMENT DOCUMENTED: ICD-10-PCS | Mod: CPTII,S$GLB,, | Performed by: DERMATOLOGY

## 2022-07-21 PROCEDURE — 1126F PR PAIN SEVERITY QUANTIFIED, NO PAIN PRESENT: ICD-10-PCS | Mod: CPTII,S$GLB,, | Performed by: DERMATOLOGY

## 2022-07-21 NOTE — PROGRESS NOTES
Subjective:       Patient ID:  Dorothy Burt is a 67 y.o. female who presents for   Chief Complaint   Patient presents with    Blister     nose     Pt c/o blister to nose x 1mo. Bled. Prev tx with acyclovir and TAC.  Also would like to know what to put on scars      Review of Systems   Constitutional: Negative for fever and chills.   Respiratory: Negative for cough and shortness of breath.    Skin: Positive for daily sunscreen use and activity-related sunscreen use.   Hematologic/Lymphatic: Bruises/bleeds easily (bruise).        Objective:    Physical Exam   Constitutional: She appears well-developed and well-nourished. No distress.   Eyes: No conjunctival no injection.   Neurological: She is alert and oriented to person, place, and time. She is not disoriented.   Psychiatric: She has a normal mood and affect.   Skin:   Areas Examined (abnormalities noted in diagram):   Head / Face Inspection Performed  LUE Inspection Performed                   Diagram Legend     Erythematous scaling macule/papule c/w actinic keratosis       Vascular papule c/w angioma      Pigmented verrucoid papule/plaque c/w seborrheic keratosis      Yellow umbilicated papule c/w sebaceous hyperplasia      Irregularly shaped tan macule c/w lentigo     1-2 mm smooth white papules consistent with Milia      Movable subcutaneous cyst with punctum c/w epidermal inclusion cyst      Subcutaneous movable cyst c/w pilar cyst      Firm pink to brown papule c/w dermatofibroma      Pedunculated fleshy papule(s) c/w skin tag(s)      Evenly pigmented macule c/w junctional nevus     Mildly variegated pigmented, slightly irregular-bordered macule c/w mildly atypical nevus      Flesh colored to evenly pigmented papule c/w intradermal nevus       Pink pearly papule/plaque c/w basal cell carcinoma      Erythematous hyperkeratotic cursted plaque c/w SCC      Surgical scar with no sign of skin cancer recurrence      Open and closed comedones      Inflammatory  papules and pustules      Verrucoid papule consistent consistent with wart     Erythematous eczematous patches and plaques     Dystrophic onycholytic nail with subungual debris c/w onychomycosis     Umbilicated papule    Erythematous-base heme-crusted tan verrucoid plaque consistent with inflamed seborrheic keratosis     Erythematous Silvery Scaling Plaque c/w Psoriasis     See annotation      Assessment / Plan:        Scars  Discussed with patient the benign nature of these lesions and that no treatment is indicated.    Massage daily with petroleum jelly or coconut oil to break up tissue and firmness.    Recommended a salicylic acid OTC wash to be used regularly for acne, keratosis pilaris, actinic keratoses, or as needed for oil control.  Use with wash cloth with some rubbing but not abrasively.  For texture of nasal tip scar.  Proper application of medications and or care for affected area(s) and condition(s) reviewed.    Blister of skin  None noted today.  Patient and or guardian to monitor this area/lesion or these areas/lesions for changes or worsening or darkening (for moles and freckles).  Patient and or guardian to contact us if any changes are noted for such.  Suspect hsv vs focal shingles.  Unlikely bcc.             Follow up in about 3 months (around 10/21/2022).

## 2022-07-21 NOTE — PATIENT INSTRUCTIONS
Wash nose daily with salicylic acid soap to help rebuild texture  Massage with petroleum jelly     Follow up in 3 months to recheck

## 2022-07-22 ENCOUNTER — PATIENT MESSAGE (OUTPATIENT)
Dept: DERMATOLOGY | Facility: CLINIC | Age: 67
End: 2022-07-22
Payer: MEDICARE

## 2022-07-25 ENCOUNTER — PATIENT MESSAGE (OUTPATIENT)
Dept: DERMATOLOGY | Facility: CLINIC | Age: 67
End: 2022-07-25
Payer: MEDICARE

## 2022-07-25 ENCOUNTER — TELEPHONE (OUTPATIENT)
Dept: ORTHOPEDICS | Facility: CLINIC | Age: 67
End: 2022-07-25
Payer: MEDICARE

## 2022-07-25 ENCOUNTER — TELEPHONE (OUTPATIENT)
Dept: DERMATOLOGY | Facility: CLINIC | Age: 67
End: 2022-07-25
Payer: MEDICARE

## 2022-07-25 RX ORDER — OXYCODONE AND ACETAMINOPHEN 5; 325 MG/1; MG/1
1 TABLET ORAL EVERY 4 HOURS PRN
Qty: 28 TABLET | Refills: 0 | Status: SHIPPED | OUTPATIENT
Start: 2022-07-25 | End: 2022-10-15

## 2022-07-25 NOTE — TELEPHONE ENCOUNTER
----- Message from Maricel August MA sent at 7/25/2022  3:38 PM CDT -----  Regarding: FW: Prescription  Contact: [phone]    ----- Message -----  From: Maricel August MA  Sent: 7/25/2022  11:51 AM CDT  To: Luis Armando Alberts DO  Subject: FW: Prescription                                   ----- Message -----  From: Sabina Torrez  Sent: 7/25/2022  11:33 AM CDT  To: Jeremy Palma Staff  Subject: Prescription                                     Calling to get a prescription for Rx Percocet 5-325 as instructed by physical therapist to be taken before therapy. Please call to confirm prescription sent to [facility] [address].    [facility]/pharmacy #[id number] - [place] - [address]  [address]  [place] [address]  Phone: [phone] Fax: [phone]

## 2022-07-25 NOTE — TELEPHONE ENCOUNTER
Spoke with pt and she stated her nose is really red. Pt stated she applied Sal acid 20% on nose. Informed pt to send a photo through the portal so that I can send it to the provider. Pt verbalized understanding and stated she just wanted to make sure it was normal.    RD       ----- Message from Shira Lindo RN sent at 7/22/2022  4:34 PM CDT -----  Regarding: FW: Advise  Contact: PT @ [phone]    ----- Message -----  From: Mary Ellen Rubin  Sent: 7/22/2022   4:28 PM CDT  To: Basilio Miller Staff  Subject: Advise                                           Pt is calling to speak to someone in Dr. Richmond's office today. Pt states that is experiencing some discomfort from the rx that Dr. Richmond advised her to buy over the counter. Pt states that she is using the rx on her nose and now her nose if red and irritated. Pt is asking for a call back today. Please call. Thanks.

## 2022-07-26 ENCOUNTER — CLINICAL SUPPORT (OUTPATIENT)
Dept: REHABILITATION | Facility: HOSPITAL | Age: 67
End: 2022-07-26
Payer: MEDICARE

## 2022-07-26 DIAGNOSIS — R53.1 DECREASED STRENGTH: Primary | ICD-10-CM

## 2022-07-26 DIAGNOSIS — M25.612 DECREASED RANGE OF MOTION OF LEFT SHOULDER: ICD-10-CM

## 2022-07-26 PROCEDURE — 97110 THERAPEUTIC EXERCISES: CPT | Mod: PN,CQ

## 2022-07-26 PROCEDURE — 97140 MANUAL THERAPY 1/> REGIONS: CPT | Mod: PN,CQ

## 2022-07-26 NOTE — PROGRESS NOTES
[facility]   Physical Therapy Treatment Note     Name: Dorothy Burt  Clinic Number: [id number]    Therapy Diagnosis:   Encounter Diagnoses   Name Primary?    Decreased strength Yes    Decreased range of motion of left shoulder      Physician: Louie Luna MD    Visit Date: 7/26/2022    Physician Orders: PT Eval and Treat   2-3 x per week x 6 weeks   Gentle passive elevation and ER, pendulums. No RTC strengthening     Medical Diagnosis from Referral:M75.112 (ICD-10-CM) - Nontraumatic incomplete tear of left rotator cuff S46.212A (ICD-10-CM) - Biceps tendon rupture, proximal, left, initial encounter M75.42 (ICD-10-CM) - Subacromial impingement, left   Evaluation Date: 6/17/2022  Authorization Period Expiration: 7/15/22  Plan of Care Expiration: 8/12/22  Progress Note Due: 8/12/22 (or by 12th visit)  Visit # / Visits authorized: 9/12   FOTO: 12/10 - Next Visit  PTA Visit: 2/5     Precautions: Standard (post-op protocol)    Time In: 11:00 am  Time Out: 12:05 pm  Total Billable Time: 55 minutes (TEx3, MTx1)     6/9/22 Procedure: Procedure(s):  1. Diagnostic arthroscopy left shoulder  2. Extensive synovectomy  3. Rotator interval release   4. Arthroscopic capsular release  5. Biceps tenotomy  6. Subacromial decompression  7. Acromioplasty   8. Rotator cuff repair     SUBJECTIVE     Pt reports: her pain is a little better today.  She was compliant with home exercise program.  Response to previous treatment: decreased pain.  Functional change: not at this time.    Pain: 2/10  Location: left shoulder      OBJECTIVE     7/5/22:  left elbow extension PROM: improved from 7 degree to 3 degree from zero after manuals  left elbow extension AROM: improved from 12 degree to 6 degree from zero after manuals  left external rotation: 43 degree      Treatment     Dorothy received the treatments listed below:      Dorothy received therapeutic exercises to develop strength, endurance, ROM, flexibility,  "posture and core stabilization for 40 minutes including:    : ~15 kg 3 minutes   Wrist extension AROM: 2x10 1 lb  Wrist flexion AROM: 2x10 1 lb   Supination: 2x10 1#   LLLD bicep stretch: 5 minutes (distal humerus on 1/2 bolster)    Scapular retraction: 20x5"  Elbow extension AAROM: 20x  AAROM unilateral left shoulder serratus punches: 3x10 with PT assist  Wand shoulder external rotation: 20x5" holds  Supine shoulder dowel flexion: 2x10, PVC pipe  Table slides flexion: x20 on inclined mat table     Dorothy received the following manual therapy techniques: Joint mobilizations, Myofacial release and Soft tissue Mobilization were applied to the: left shoulder/elbow for 15 minutes, including:     Left elbow extension stretch passively: 4 rounds  left elbow PROM, left shoulder flexion, external rotation PROM  Grade I-III left GHJ oscillations/mobilizations  left Lat and subscapularis trigger point release - not today    cold pack for left shoulder for 10 minutes at completion of visit.     Patient Education and Home Exercises     Home Exercises Provided and Patient Education Provided   Education provided:   - anatomy and expectations with pain and symptoms    Written Home Exercises Provided: Patient instructed to cont prior HEP. Exercises were reviewed and Dorothy was able to demonstrate them prior to the end of the session.  Dorothy demonstrated good  understanding of the education provided. See EMR under Patient Instructions for exercises provided during therapy sessions    ASSESSMENT     Dorothy is a 67 y.o. female referred to outpatient Physical Therapy with a medical diagnosis of noncomplete rotator cuff tear, biceps tendon rupture and subacromial impingement. Pt presents with status post left rotator cuff repair (supraspinatus), acromioplasty, biceps tenotomy, subchondral decompression, synovectomy, capsular release on 6/9/22.  Patient expressed concern with her inability to raise her arm up more than a few degrees and " with pain.  Explained to patient that her surgery was a little involved and would take time to heal.       Dorothy Is progressing well towards her goals.   Pt prognosis is Good.     Pt will continue to benefit from skilled outpatient physical therapy to address the deficits listed in the problem list box on initial evaluation, provide pt/family education and to maximize pt's level of independence in the home and community environment.     Pt's spiritual, cultural and educational needs considered and pt agreeable to plan of care and goals.     Anticipated Barriers for therapy: failed conservative treatment     Goals:   Short Term Goals: 4 weeks   1.  Patient will report < 6/10 shoulder pain at worst for improved ADL performance. PROGRESSING; NOT MET  2.  Patient will demonstrate L shoulder flexion PROM > 140 deg to improve overhead reach. PROGRESSING; NOT MET  3.  Patient will demonstrate left shoulder external rotation PROM > 40 degree. MET 7/7/22  4.  Patient will demonstrate left  strength > 45 pounds.  PROGRESSING; NOT MET     Long Term Goals: 12 weeks   1. Patient will report ability to wash her hair without restriction. PROGRESSING; NOT MET  2. Patient will demonstrate ability to reach overhead and remove a 2 lb object 5 times to simulate removing objects from an overhead shelf w/o an increase in symptoms. PROGRESSING; NOT MET  3. Patient will demonstrate all left shoulder AROM within 85% of right for improved ADL performance. PROGRESSING; NOT MET  4. Patient will improve FOTO to < 38% to improve ADL performance. PROGRESSING; NOT MET    PLAN     Cont with POC with phase 2 of rehab protocol     Plan of care Certification: 6/17/2022 to 8/12/22.       Atn Cohen, PTA

## 2022-07-27 ENCOUNTER — DOCUMENTATION ONLY (OUTPATIENT)
Dept: REHABILITATION | Facility: HOSPITAL | Age: 67
End: 2022-07-27
Payer: MEDICARE

## 2022-07-29 ENCOUNTER — CLINICAL SUPPORT (OUTPATIENT)
Dept: REHABILITATION | Facility: HOSPITAL | Age: 67
End: 2022-07-29
Payer: MEDICARE

## 2022-07-29 DIAGNOSIS — R53.1 DECREASED STRENGTH: Primary | ICD-10-CM

## 2022-07-29 DIAGNOSIS — M25.612 DECREASED RANGE OF MOTION OF LEFT SHOULDER: ICD-10-CM

## 2022-07-29 PROCEDURE — 97110 THERAPEUTIC EXERCISES: CPT | Mod: PN

## 2022-07-29 PROCEDURE — 97140 MANUAL THERAPY 1/> REGIONS: CPT | Mod: PN

## 2022-07-29 NOTE — PROGRESS NOTES
[facility]   Physical Therapy Treatment Note     Name: Dorothy Burt  Clinic Number: [id number]    Therapy Diagnosis:   Encounter Diagnoses   Name Primary?    Decreased strength Yes    Decreased range of motion of left shoulder      Physician: Louie Luna MD    Visit Date: 7/29/2022    Physician Orders: PT Eval and Treat   2-3 x per week x 6 weeks   Gentle passive elevation and ER, pendulums. No RTC strengthening     Medical Diagnosis from Referral:M75.112 (ICD-10-CM) - Nontraumatic incomplete tear of left rotator cuff S46.212A (ICD-10-CM) - Biceps tendon rupture, proximal, left, initial encounter M75.42 (ICD-10-CM) - Subacromial impingement, left   Evaluation Date: 6/17/2022  Authorization Period Expiration: 7/15/22  Plan of Care Expiration: 8/12/22  Progress Note Due: 8/12/22 (or by 12th visit)  Visit # / Visits authorized: 13/12   FOTO: 13/10 - Next Visit  PTA Visit: 0/5     Precautions: Standard (post-op protocol)    Time In: 2:05 pm  Time Out: 3:10 pm  Total Billable Time: 55 minutes (TEx1, MTx1)     6/9/22 Procedure: Procedure(s):  1. Diagnostic arthroscopy left shoulder  2. Extensive synovectomy  3. Rotator interval release   4. Arthroscopic capsular release  5. Biceps tenotomy  6. Subacromial decompression  7. Acromioplasty   8. Rotator cuff repair     SUBJECTIVE     Pt reports: pain is ok today, and still wearing the sling off and on. Has pulleys at home, doing regular home exercise program right now. Goes to see MD next week.  She was compliant with home exercise program.  Response to previous treatment: decreased pain.  Functional change: not at this time.    Pain: 1-2/10  Location: left shoulder      OBJECTIVE     7/5/22:  left elbow extension PROM: improved from 7 degree to 3 degree from zero after manuals  left elbow extension AROM: improved from 12 degree to 6 degree from zero after manuals  left external rotation: 43 degree      Treatment     Dorothy received the  "treatments listed below:      Dorothy received therapeutic exercises to develop strength, endurance, ROM, flexibility, posture and core stabilization for 40 minutes including:    Scapular retraction: 20x5"  Elbow extension AAROM: 20x  AAROM unilateral left shoulder serratus punches: 3x10 with 1#, PT assist  Wand shoulder external rotation: 20x5" holds  Chest press: 2x10, 2#  Supine shoulder external rotation/internal rotation at 0 degrees abd: 2x10 left, 1#  Supine shoulder dowel flexion: 2x10, 1# dowel  Table slides flexion: x20 on inclined mat table   Pulleys: 3' flexion    Not done today:  : ~15 kg 3 minutes   Wrist extension AROM: 2x10 1 lb  Wrist flexion AROM: 2x10 1 lb   Supination: 2x10 1#   LLLD bicep stretch: 5 minutes (distal humerus on 1/2 bolster)    Dorothy received the following manual therapy techniques: Joint mobilizations, Myofacial release and Soft tissue Mobilization were applied to the: left shoulder/elbow for 15 minutes, including:     Left elbow extension stretch passively: 4 rounds  left elbow PROM, left shoulder flexion, external rotation PROM  Grade I-III left GHJ oscillations/mobilizations  left Lat and subscapularis trigger point release - not today    Cold pack for left shoulder for 10 minutes at completion of visit.     Patient Education and Home Exercises     Home Exercises Provided and Patient Education Provided   Education provided:   - anatomy and expectations with pain and symptoms    Written Home Exercises Provided: Patient instructed to cont prior HEP. Exercises were reviewed and Dorothy was able to demonstrate them prior to the end of the session.  Dorothy demonstrated good  understanding of the education provided. See EMR under Patient Instructions for exercises provided during therapy sessions    ASSESSMENT     Dorothy is a 67 y.o. female referred to outpatient Physical Therapy with a medical diagnosis of noncomplete rotator cuff tear, biceps tendon rupture and subacromial impingement. Pt " presents with status post left rotator cuff repair (supraspinatus), acromioplasty, biceps tenotomy, subchondral decompression, synovectomy, capsular release on 6/9/22.  Pain well managed at this time, and increased AAROM activities today with gentle weight. Left shoulder PROM flexion to about 140 degrees today with good pain tolerance. Soreness at incision sites, and focusing on gentle AAROM and primarily range of motion gains.     Dorothy Is progressing well towards her goals.   Pt prognosis is Good.     Pt will continue to benefit from skilled outpatient physical therapy to address the deficits listed in the problem list box on initial evaluation, provide pt/family education and to maximize pt's level of independence in the home and community environment.     Pt's spiritual, cultural and educational needs considered and pt agreeable to plan of care and goals.     Anticipated Barriers for therapy: failed conservative treatment     Goals:   Short Term Goals: 4 weeks   1.  Patient will report < 6/10 shoulder pain at worst for improved ADL performance. PROGRESSING; NOT MET  2.  Patient will demonstrate L shoulder flexion PROM > 140 deg to improve overhead reach. PROGRESSING; NOT MET  3.  Patient will demonstrate left shoulder external rotation PROM > 40 degree. MET 7/7/22  4.  Patient will demonstrate left  strength > 45 pounds.  PROGRESSING; NOT MET     Long Term Goals: 12 weeks   1. Patient will report ability to wash her hair without restriction. PROGRESSING; NOT MET  2. Patient will demonstrate ability to reach overhead and remove a 2 lb object 5 times to simulate removing objects from an overhead shelf w/o an increase in symptoms. PROGRESSING; NOT MET  3. Patient will demonstrate all left shoulder AROM within 85% of right for improved ADL performance. PROGRESSING; NOT MET  4. Patient will improve FOTO to < 38% to improve ADL performance. PROGRESSING; NOT MET    PLAN     Cont with POC with phase 2 of rehab  protocol     Plan of care Certification: 6/17/2022 to 8/12/22.       Jose C Lutz, PT

## 2022-08-01 ENCOUNTER — CLINICAL SUPPORT (OUTPATIENT)
Dept: REHABILITATION | Facility: HOSPITAL | Age: 67
End: 2022-08-01
Payer: MEDICARE

## 2022-08-01 DIAGNOSIS — M25.612 DECREASED RANGE OF MOTION OF LEFT SHOULDER: ICD-10-CM

## 2022-08-01 DIAGNOSIS — R53.1 DECREASED STRENGTH: Primary | ICD-10-CM

## 2022-08-01 PROCEDURE — 97140 MANUAL THERAPY 1/> REGIONS: CPT | Mod: PN

## 2022-08-01 PROCEDURE — 97110 THERAPEUTIC EXERCISES: CPT | Mod: PN

## 2022-08-01 NOTE — PROGRESS NOTES
[facility]   Physical Therapy Treatment Note     Name: Dorothy Burt  Clinic Number: [id number]    Therapy Diagnosis:   Encounter Diagnoses   Name Primary?    Decreased strength Yes    Decreased range of motion of left shoulder      Physician: Louie Luna MD    Visit Date: 8/1/2022    Physician Orders: PT Eval and Treat   2-3 x per week x 6 weeks   Gentle passive elevation and ER, pendulums. No RTC strengthening     Medical Diagnosis from Referral:M75.112 (ICD-10-CM) - Nontraumatic incomplete tear of left rotator cuff S46.212A (ICD-10-CM) - Biceps tendon rupture, proximal, left, initial encounter M75.42 (ICD-10-CM) - Subacromial impingement, left   Evaluation Date: 6/17/2022  Authorization Period Expiration: 7/15/22  Plan of Care Expiration: 8/12/22  Progress Note Due: 8/12/22 (or by 12th visit)  Visit # / Visits authorized: 14/12   FOTO: 14/10 - Done today 8/1/22  PTA Visit: 0/5     Precautions: Standard (post-op protocol)    Time In: 12:05 pm  Time Out: 1:10 pm  Total Billable Time: 55 minutes (TEx3, MTx1)     6/9/22 Procedure: Procedure(s):  1. Diagnostic arthroscopy left shoulder  2. Extensive synovectomy  3. Rotator interval release   4. Arthroscopic capsular release  5. Biceps tenotomy  6. Subacromial decompression  7. Acromioplasty   8. Rotator cuff repair     SUBJECTIVE     Pt reports:   She was compliant with home exercise program.  Response to previous treatment: decreased pain.  Functional change: not at this time.    Pain: 1-2/10  Location: left shoulder      OBJECTIVE     8/1/22: Left shoulder PROM in degrees  Flexion: 145  External rotation: 45   Abduction: 90  Internal rotation: 50    Treatment     Dorothy received the treatments listed below:      Dorothy received therapeutic exercises to develop strength, endurance, ROM, flexibility, posture and core stabilization for 40 minutes including:    Elbow extension AAROM: 20x, cues for scapular retraction  Wand shoulder  "external rotation at 45 degrees of abd: 20x5" holds  Chest press: 2x10, 2#  Supine shoulder external rotation/internal rotation at 0 degrees abd: 2x10 left, 1#  Supine shoulder dowel flexion: 2x10, 1# dowel  AAROM unilateral left shoulder serratus punches: 3x10 with 1#, PT assist  Table slides flexion: x20 on inclined mat table   Pulleys: 3' flexion    Not done today:  LLLD bicep stretch: 5 minutes (distal humerus on 1/2 bolster)    Dorothy received the following manual therapy techniques: Joint mobilizations, Myofacial release and Soft tissue Mobilization were applied to the: left shoulder/elbow for 15 minutes, including:     Grade I-III left GHJ oscillations/mobilizations  Left elbow extension stretch passively: 4 rounds  :eft elbow and shoulder PROM  Left Lat and subscapularis trigger point release - not today    Cold pack for left shoulder for 10 minutes at completion of visit.     Patient Education and Home Exercises     Home Exercises Provided and Patient Education Provided   Education provided:   - anatomy and expectations with pain and symptoms    Written Home Exercises Provided: Patient instructed to cont prior HEP. Exercises were reviewed and Dorothy was able to demonstrate them prior to the end of the session.  Dorothy demonstrated good  understanding of the education provided. See EMR under Patient Instructions for exercises provided during therapy sessions    ASSESSMENT     Dorothy is a 67 y.o. female referred to outpatient Physical Therapy with a medical diagnosis of noncomplete rotator cuff tear, biceps tendon rupture and subacromial impingement. Pt presents with status post left rotator cuff repair (supraspinatus), acromioplasty, biceps tenotomy, subchondral decompression, synovectomy, capsular release on 6/9/22.  Steadily progressing left shoulder range of motion with good pain control. External rotation progressing slower than flexion, and pt educated to pursue more end range stretching at home.    Dorothy Is " progressing well towards her goals.   Pt prognosis is Good.     Pt will continue to benefit from skilled outpatient physical therapy to address the deficits listed in the problem list box on initial evaluation, provide pt/family education and to maximize pt's level of independence in the home and community environment.     Pt's spiritual, cultural and educational needs considered and pt agreeable to plan of care and goals.     Anticipated Barriers for therapy: failed conservative treatment     Goals:   Short Term Goals: 4 weeks   1.  Patient will report < 6/10 shoulder pain at worst for improved ADL performance. PROGRESSING; NOT MET  2.  Patient will demonstrate L shoulder flexion PROM > 140 deg to improve overhead reach. PROGRESSING; NOT MET  3.  Patient will demonstrate left shoulder external rotation PROM > 40 degree. MET 7/7/22  4.  Patient will demonstrate left  strength > 45 pounds.  PROGRESSING; NOT MET     Long Term Goals: 12 weeks   1. Patient will report ability to wash her hair without restriction. PROGRESSING; NOT MET  2. Patient will demonstrate ability to reach overhead and remove a 2 lb object 5 times to simulate removing objects from an overhead shelf w/o an increase in symptoms. PROGRESSING; NOT MET  3. Patient will demonstrate all left shoulder AROM within 85% of right for improved ADL performance. PROGRESSING; NOT MET  4. Patient will improve FOTO to < 38% to improve ADL performance. PROGRESSING; NOT MET    PLAN     Cont with POC with phase 2 of rehab protocol     Plan of care Certification: 6/17/2022 to 8/12/22.       Jose C Lutz, PT

## 2022-08-02 ENCOUNTER — OFFICE VISIT (OUTPATIENT)
Dept: ORTHOPEDICS | Facility: CLINIC | Age: 67
End: 2022-08-02
Payer: MEDICARE

## 2022-08-02 ENCOUNTER — CLINICAL SUPPORT (OUTPATIENT)
Dept: REHABILITATION | Facility: HOSPITAL | Age: 67
End: 2022-08-02
Payer: MEDICARE

## 2022-08-02 VITALS
BODY MASS INDEX: 33.34 KG/M2 | DIASTOLIC BLOOD PRESSURE: 84 MMHG | HEIGHT: 64 IN | HEART RATE: 82 BPM | SYSTOLIC BLOOD PRESSURE: 132 MMHG | WEIGHT: 195.31 LBS

## 2022-08-02 DIAGNOSIS — R53.1 DECREASED STRENGTH: Primary | ICD-10-CM

## 2022-08-02 DIAGNOSIS — M25.612 DECREASED RANGE OF MOTION OF LEFT SHOULDER: ICD-10-CM

## 2022-08-02 DIAGNOSIS — Z98.890 S/P LEFT ROTATOR CUFF REPAIR: Primary | ICD-10-CM

## 2022-08-02 PROCEDURE — 99999 PR PBB SHADOW E&M-EST. PATIENT-LVL III: ICD-10-PCS | Mod: PBBFAC,,, | Performed by: ORTHOPAEDIC SURGERY

## 2022-08-02 PROCEDURE — 1160F PR REVIEW ALL MEDS BY PRESCRIBER/CLIN PHARMACIST DOCUMENTED: ICD-10-PCS | Mod: CPTII,S$GLB,, | Performed by: ORTHOPAEDIC SURGERY

## 2022-08-02 PROCEDURE — 3008F BODY MASS INDEX DOCD: CPT | Mod: CPTII,S$GLB,, | Performed by: ORTHOPAEDIC SURGERY

## 2022-08-02 PROCEDURE — 99024 PR POST-OP FOLLOW-UP VISIT: ICD-10-PCS | Mod: S$GLB,,, | Performed by: ORTHOPAEDIC SURGERY

## 2022-08-02 PROCEDURE — 1159F PR MEDICATION LIST DOCUMENTED IN MEDICAL RECORD: ICD-10-PCS | Mod: CPTII,S$GLB,, | Performed by: ORTHOPAEDIC SURGERY

## 2022-08-02 PROCEDURE — 3288F FALL RISK ASSESSMENT DOCD: CPT | Mod: CPTII,S$GLB,, | Performed by: ORTHOPAEDIC SURGERY

## 2022-08-02 PROCEDURE — 3288F PR FALLS RISK ASSESSMENT DOCUMENTED: ICD-10-PCS | Mod: CPTII,S$GLB,, | Performed by: ORTHOPAEDIC SURGERY

## 2022-08-02 PROCEDURE — 97110 THERAPEUTIC EXERCISES: CPT | Mod: PN | Performed by: PHYSICAL THERAPIST

## 2022-08-02 PROCEDURE — 1160F RVW MEDS BY RX/DR IN RCRD: CPT | Mod: CPTII,S$GLB,, | Performed by: ORTHOPAEDIC SURGERY

## 2022-08-02 PROCEDURE — 3075F SYST BP GE 130 - 139MM HG: CPT | Mod: CPTII,S$GLB,, | Performed by: ORTHOPAEDIC SURGERY

## 2022-08-02 PROCEDURE — 1125F AMNT PAIN NOTED PAIN PRSNT: CPT | Mod: CPTII,S$GLB,, | Performed by: ORTHOPAEDIC SURGERY

## 2022-08-02 PROCEDURE — 1159F MED LIST DOCD IN RCRD: CPT | Mod: CPTII,S$GLB,, | Performed by: ORTHOPAEDIC SURGERY

## 2022-08-02 PROCEDURE — 3079F DIAST BP 80-89 MM HG: CPT | Mod: CPTII,S$GLB,, | Performed by: ORTHOPAEDIC SURGERY

## 2022-08-02 PROCEDURE — 99024 POSTOP FOLLOW-UP VISIT: CPT | Mod: S$GLB,,, | Performed by: ORTHOPAEDIC SURGERY

## 2022-08-02 PROCEDURE — 99999 PR PBB SHADOW E&M-EST. PATIENT-LVL III: CPT | Mod: PBBFAC,,, | Performed by: ORTHOPAEDIC SURGERY

## 2022-08-02 PROCEDURE — 1101F PR PT FALLS ASSESS DOC 0-1 FALLS W/OUT INJ PAST YR: ICD-10-PCS | Mod: CPTII,S$GLB,, | Performed by: ORTHOPAEDIC SURGERY

## 2022-08-02 PROCEDURE — 97140 MANUAL THERAPY 1/> REGIONS: CPT | Mod: PN | Performed by: PHYSICAL THERAPIST

## 2022-08-02 PROCEDURE — 3008F PR BODY MASS INDEX (BMI) DOCUMENTED: ICD-10-PCS | Mod: CPTII,S$GLB,, | Performed by: ORTHOPAEDIC SURGERY

## 2022-08-02 PROCEDURE — 3075F PR MOST RECENT SYSTOLIC BLOOD PRESS GE 130-139MM HG: ICD-10-PCS | Mod: CPTII,S$GLB,, | Performed by: ORTHOPAEDIC SURGERY

## 2022-08-02 PROCEDURE — 1101F PT FALLS ASSESS-DOCD LE1/YR: CPT | Mod: CPTII,S$GLB,, | Performed by: ORTHOPAEDIC SURGERY

## 2022-08-02 PROCEDURE — 3079F PR MOST RECENT DIASTOLIC BLOOD PRESSURE 80-89 MM HG: ICD-10-PCS | Mod: CPTII,S$GLB,, | Performed by: ORTHOPAEDIC SURGERY

## 2022-08-02 PROCEDURE — 1125F PR PAIN SEVERITY QUANTIFIED, PAIN PRESENT: ICD-10-PCS | Mod: CPTII,S$GLB,, | Performed by: ORTHOPAEDIC SURGERY

## 2022-08-02 NOTE — PROGRESS NOTES
[facility]   Physical Therapy Treatment Note     Name: Dorothy Burt  Clinic Number: [id number]    Therapy Diagnosis:   Encounter Diagnoses   Name Primary?    Decreased strength Yes    Decreased range of motion of left shoulder      Physician: Louie Luna MD    Visit Date: 8/2/2022    Physician Orders: PT Eval and Treat   2-3 x per week x 6 weeks   Gentle passive elevation and ER, pendulums. No RTC strengthening     Medical Diagnosis from Referral:M75.112 (ICD-10-CM) - Nontraumatic incomplete tear of left rotator cuff S46.212A (ICD-10-CM) - Biceps tendon rupture, proximal, left, initial encounter M75.42 (ICD-10-CM) - Subacromial impingement, left   Evaluation Date: 6/17/2022  Authorization Period Expiration: 7/15/22  Plan of Care Expiration: 8/12/22  Progress Note Due: 8/12/22 (or by 12th visit)  Visit # / Visits authorized: 15/20  FOTO: 1/10 - Done 8/1/22  PTA Visit: 0/5     Precautions: Standard (post-op protocol)    Time In: 10:30 am  Time Out: 11:15 am  Total Billable Time: 32 1:1 and 13 supervised minutes (TEx1, MTx1)     6/9/22 Procedure: Procedure(s):  1. Diagnostic arthroscopy left shoulder  2. Extensive synovectomy  3. Rotator interval release   4. Arthroscopic capsular release  5. Biceps tenotomy  6. Subacromial decompression  7. Acromioplasty   8. Rotator cuff repair     SUBJECTIVE     Pt reports: she was sore and tired last night after her treatment, but woke up with minimal pain. She sees Dr. Luna later today.    She was compliant with home exercise program.  Response to previous treatment: decreased pain.  Functional change: not at this time.    Pain: 1-2/10  Location: left shoulder      OBJECTIVE     8/1/22: Left shoulder PROM in degrees  External rotation: 60-70 degree        Treatment     Dorothy received the treatments listed below:      Dorothy received therapeutic exercises to develop strength, endurance, ROM, flexibility, posture and core stabilization for 14  "1:1 and 13 supervised minutes including:    Wand shoulder external rotation at 45 degrees of abd: 30x5" holds  Chest press: 3x10, 2#  Supine shoulder external rotation/internal rotation at 0 degrees abd: 3x10 left, 1#  Supine shoulder dowel flexion: 3x10, 1# dowel  AAROM unilateral left shoulder serratus punches: 3x10 with 1#, PT assist  Side lying scapular clocks: 20x each        Dorothy received the following manual therapy techniques: Joint mobilizations, Myofacial release and Soft tissue Mobilization were applied to the: left shoulder/elbow for 18 minutes, including:     Grade I-III left GHJ oscillations/mobilizations  Left elbow and shoulder PROM  Left Lat and subscapularis trigger point release    Not today:  Left elbow extension stretch passively: 4 rounds    Cold pack for left shoulder for 10 minutes at completion of visit.     Patient Education and Home Exercises     Home Exercises Provided and Patient Education Provided   Education provided:   - anatomy and expectations with pain and symptoms    Written Home Exercises Provided: Patient instructed to cont prior HEP. Exercises were reviewed and Dorothy was able to demonstrate them prior to the end of the session.  Dorothy demonstrated good  understanding of the education provided. See EMR under Patient Instructions for exercises provided during therapy sessions    ASSESSMENT     Dorothy is a 67 y.o. female referred to outpatient Physical Therapy with a medical diagnosis of noncomplete rotator cuff tear, biceps tendon rupture and subacromial impingement. Pt presents with status post left rotator cuff repair (supraspinatus), acromioplasty, biceps tenotomy, subchondral decompression, synovectomy, capsular release on 6/9/22 (week 8).  left shoulder external rotation significantly improved from last visit and again improving from 60-70 degree after manuals today. Pain continues to be well controlled. See note from Jose C Lutz DPT, for further assessment from today's " visit.    Dorothy Is progressing well towards her goals.   Pt prognosis is Good.     Pt will continue to benefit from skilled outpatient physical therapy to address the deficits listed in the problem list box on initial evaluation, provide pt/family education and to maximize pt's level of independence in the home and community environment.     Pt's spiritual, cultural and educational needs considered and pt agreeable to plan of care and goals.     Anticipated Barriers for therapy: failed conservative treatment     Goals:   Short Term Goals: 4 weeks   1.  Patient will report < 6/10 shoulder pain at worst for improved ADL performance. PROGRESSING; NOT MET  2.  Patient will demonstrate L shoulder flexion PROM > 140 deg to improve overhead reach. PROGRESSING; NOT MET  3.  Patient will demonstrate left shoulder external rotation PROM > 40 degree. MET 7/7/22  4.  Patient will demonstrate left  strength > 45 pounds.  PROGRESSING; NOT MET     Long Term Goals: 12 weeks   1. Patient will report ability to wash her hair without restriction. PROGRESSING; NOT MET  2. Patient will demonstrate ability to reach overhead and remove a 2 lb object 5 times to simulate removing objects from an overhead shelf w/o an increase in symptoms. PROGRESSING; NOT MET  3. Patient will demonstrate all left shoulder AROM within 85% of right for improved ADL performance. PROGRESSING; NOT MET  4. Patient will improve FOTO to < 38% to improve ADL performance. PROGRESSING; NOT MET    PLAN     Cont with POC with phase 2 of rehab protocol     Plan of care Certification: 6/17/2022 to 8/12/22.       Louie Tobar, PT

## 2022-08-09 ENCOUNTER — CLINICAL SUPPORT (OUTPATIENT)
Dept: REHABILITATION | Facility: HOSPITAL | Age: 67
End: 2022-08-09
Payer: MEDICARE

## 2022-08-09 DIAGNOSIS — R53.1 DECREASED STRENGTH: Primary | ICD-10-CM

## 2022-08-09 DIAGNOSIS — M25.612 DECREASED RANGE OF MOTION OF LEFT SHOULDER: ICD-10-CM

## 2022-08-09 PROCEDURE — 97140 MANUAL THERAPY 1/> REGIONS: CPT | Mod: PN,CQ

## 2022-08-09 PROCEDURE — 97110 THERAPEUTIC EXERCISES: CPT | Mod: PN,CQ

## 2022-08-09 NOTE — PROGRESS NOTES
[facility]   Physical Therapy Treatment Note     Name: Dorothy Burt  Clinic Number: [id number]    Therapy Diagnosis:   Encounter Diagnoses   Name Primary?    Decreased strength Yes    Decreased range of motion of left shoulder      Physician: Louie Luna MD    Visit Date: 8/9/2022    Physician Orders: PT Eval and Treat   2-3 x per week x 6 weeks   Gentle passive elevation and ER, pendulums. No RTC strengthening     Medical Diagnosis from Referral:M75.112 (ICD-10-CM) - Nontraumatic incomplete tear of left rotator cuff S46.212A (ICD-10-CM) - Biceps tendon rupture, proximal, left, initial encounter M75.42 (ICD-10-CM) - Subacromial impingement, left   Evaluation Date: 6/17/2022  Authorization Period Expiration: 7/15/22  Plan of Care Expiration: 8/12/22  Progress Note Due: 8/12/22 (or by 12th visit)  Visit # / Visits authorized: 16/20  FOTO: 2/10 - Done 8/1/22  PTA Visit: 1/5     Precautions: Standard (post-op protocol)    Time In: 11:00 am  Time Out: 11:58 am  Total Billable Time: 48 minutes (TEx2, MTx1)     6/9/22 Procedure: Procedure(s):  1. Diagnostic arthroscopy left shoulder  2. Extensive synovectomy  3. Rotator interval release   4. Arthroscopic capsular release  5. Biceps tenotomy  6. Subacromial decompression  7. Acromioplasty   8. Rotator cuff repair     SUBJECTIVE     Pt reports: her pain is not bad, 1/10, feeling pretty good. Patient reports her doctor said she is doing fine at this time, at her appointment with him.    She was compliant with home exercise program.  Response to previous treatment: decreased pain.  Functional change: not at this time.    Pain: 1/10  Location: left shoulder      OBJECTIVE     8/1/22: Left shoulder PROM in degrees  External rotation: 60-70 degree      Treatment     Dorothy received the treatments listed below:      Dorothy received therapeutic exercises to develop strength, endurance, ROM, flexibility, posture and core stabilization for 33  "supervised minutes including:    Wand shoulder external rotation at 45 degrees of abd: 30x5" holds  Chest press: 3x10, 2#  Supine shoulder external rotation/internal rotation at 0 degrees abd: 3x10 left, 1#  Supine shoulder dowel flexion: 3x10, 1# dowel  AAROM unilateral left shoulder serratus punches: 3x10 with 1#, PT assist  Side lying scapular clocks: 20x each    Dorothy received the following manual therapy techniques: Joint mobilizations, Myofacial release and Soft tissue Mobilization were applied to the: left shoulder/elbow for 15 minutes, including:     Grade I-III left GHJ oscillations/mobilizations  Left elbow and shoulder PROM  Left Lat and subscapularis trigger point release    Not today:  Left elbow extension stretch passively: 4 rounds    Cold pack for left shoulder for 10 minutes at completion of visit.     Patient Education and Home Exercises     Home Exercises Provided and Patient Education Provided   Education provided:   - anatomy and expectations with pain and symptoms    Written Home Exercises Provided: Patient instructed to cont prior HEP. Exercises were reviewed and Dorothy was able to demonstrate them prior to the end of the session.  Dorothy demonstrated good  understanding of the education provided. See EMR under Patient Instructions for exercises provided during therapy sessions    ASSESSMENT     Dorothy is a 67 y.o. female referred to outpatient Physical Therapy with a medical diagnosis of noncomplete rotator cuff tear, biceps tendon rupture and subacromial impingement. Pt presents with status post left rotator cuff repair (supraspinatus), acromioplasty, biceps tenotomy, subchondral decompression, synovectomy, capsular release on 6/9/22 (week 8).  Pain continues to be well controlled. Patient is self aware of performing her exercises correctly.    Dorothy Is progressing well towards her goals.   Pt prognosis is Good.     Pt will continue to benefit from skilled outpatient physical therapy to address the " deficits listed in the problem list box on initial evaluation, provide pt/family education and to maximize pt's level of independence in the home and community environment.     Pt's spiritual, cultural and educational needs considered and pt agreeable to plan of care and goals.     Anticipated Barriers for therapy: failed conservative treatment     Goals:   Short Term Goals: 4 weeks   1.  Patient will report < 6/10 shoulder pain at worst for improved ADL performance. PROGRESSING; NOT MET  2.  Patient will demonstrate L shoulder flexion PROM > 140 deg to improve overhead reach. PROGRESSING; NOT MET  3.  Patient will demonstrate left shoulder external rotation PROM > 40 degree. MET 7/7/22  4.  Patient will demonstrate left  strength > 45 pounds.  PROGRESSING; NOT MET     Long Term Goals: 12 weeks   1. Patient will report ability to wash her hair without restriction. PROGRESSING; NOT MET  2. Patient will demonstrate ability to reach overhead and remove a 2 lb object 5 times to simulate removing objects from an overhead shelf w/o an increase in symptoms. PROGRESSING; NOT MET  3. Patient will demonstrate all left shoulder AROM within 85% of right for improved ADL performance. PROGRESSING; NOT MET  4. Patient will improve FOTO to < 38% to improve ADL performance. PROGRESSING; NOT MET    PLAN     Cont with POC with phase 2 of rehab protocol     Plan of care Certification: 6/17/2022 to 8/12/22.       Ant Cohen, PTA

## 2022-08-11 ENCOUNTER — CLINICAL SUPPORT (OUTPATIENT)
Dept: REHABILITATION | Facility: HOSPITAL | Age: 67
End: 2022-08-11
Payer: MEDICARE

## 2022-08-11 DIAGNOSIS — R53.1 DECREASED STRENGTH: Primary | ICD-10-CM

## 2022-08-11 DIAGNOSIS — M25.612 DECREASED RANGE OF MOTION OF LEFT SHOULDER: ICD-10-CM

## 2022-08-11 PROCEDURE — 97140 MANUAL THERAPY 1/> REGIONS: CPT | Mod: PN | Performed by: PHYSICAL THERAPIST

## 2022-08-11 PROCEDURE — 97110 THERAPEUTIC EXERCISES: CPT | Mod: PN | Performed by: PHYSICAL THERAPIST

## 2022-08-11 NOTE — PROGRESS NOTES
"[facility]   Physical Therapy Treatment Note     Name: Dorothy Burt  Clinic Number: [id number]    Therapy Diagnosis:   Encounter Diagnoses   Name Primary?    Decreased strength Yes    Decreased range of motion of left shoulder      Physician: Louie Luna MD    Visit Date: 8/11/2022    Physician Orders: PT Eval and Treat   2-3 x per week x 6 weeks   Gentle passive elevation and ER, pendulums. No RTC strengthening     Medical Diagnosis from Referral:M75.112 (ICD-10-CM) - Nontraumatic incomplete tear of left rotator cuff S46.212A (ICD-10-CM) - Biceps tendon rupture, proximal, left, initial encounter M75.42 (ICD-10-CM) - Subacromial impingement, left   Evaluation Date: 6/17/2022  Authorization Period Expiration: 7/15/22  Plan of Care Expiration: 9/30/22  Progress Note Due: 9/30/22  Visit # / Visits authorized: 17/20  FOTO: 2/10 - Done 8/1/22  PTA Visit: 1/5     Precautions: Standard (post-op protocol - surgery 6/9/22)    Time In: 12:07 pm  Time Out: 1:00 pm  Total Billable Time: 40 1:1 and 13 supervised minutes (TEx2, MTx1)     6/9/22 Procedure: Procedure(s):  1. Diagnostic arthroscopy left shoulder  2. Extensive synovectomy  3. Rotator interval release   4. Arthroscopic capsular release  5. Biceps tenotomy  6. Subacromial decompression  7. Acromioplasty   8. Rotator cuff repair     SUBJECTIVE     Pt reports: "I feel like I'm doing well." Since this AM, she has been having "shocks" in her anterior shoulder.    She was compliant with home exercise program.  Response to previous treatment: decreased pain.  Functional change: not at this time.    Pain: 1/10  Location: left shoulder      OBJECTIVE     See updated POC from 8/11/22    Treatment     Dorothy received the treatments listed below:      Dorothy received therapeutic exercises to develop strength, endurance, ROM, flexibility, posture and core stabilization for 30 1:1 and 13 supervised minutes including re-assessment:    Chest " "press: 3x10, 2# - not today  Supine shoulder external rotation/internal rotation at 0 degrees abd: 3x10 left, 1#  Supine shoulder dowel flexion: 2x10, 1# dowel  Dowel serratus punches: 3x10 with 1#  Supine left shoulder flexion AROM: 2x8  Side lying external rotation: 3x10    Submax Isometrics: all 10x5" holds  Flexion, Extension, Abduction, external rotation, internal rotation     Dorothy received the following manual therapy techniques: Joint mobilizations, Myofacial release and Soft tissue Mobilization were applied to the: left shoulder/elbow for 10 minutes, including:     Grade I-III left GHJ oscillations/mobilizations  Left shoulder PROM      Not today:  Left Lat and subscapularis trigger point release      Patient Education and Home Exercises     Home Exercises Provided and Patient Education Provided   Education provided:   - anatomy and expectations with pain and symptoms    Written Home Exercises Provided: Patient instructed to cont prior HEP. Exercises were reviewed and Dorothy was able to demonstrate them prior to the end of the session.  Dorothy demonstrated good  understanding of the education provided. See EMR under Patient Instructions for exercises provided during therapy sessions    ASSESSMENT     Dorothy is a 67 y.o. female referred to outpatient Physical Therapy with a medical diagnosis of noncomplete rotator cuff tear, biceps tendon rupture and subacromial impingement. Pt presents with status post left rotator cuff repair (supraspinatus), acromioplasty, biceps tenotomy, subchondral decompression, synovectomy, capsular release on 6/9/22 (week 10).  See updated POC.    Dorothy Is progressing well towards her goals.   Pt prognosis is Good.     Pt will continue to benefit from skilled outpatient physical therapy to address the deficits listed in the problem list box on initial evaluation, provide pt/family education and to maximize pt's level of independence in the home and community environment.     Pt's spiritual, " cultural and educational needs considered and pt agreeable to plan of care and goals.     Anticipated Barriers for therapy: failed conservative treatment     Goals:   Short Term Goals: 4 weeks   1.  Patient will report < 6/10 shoulder pain at worst for improved ADL performance. MET  2.  Patient will demonstrate L shoulder flexion PROM > 140 deg to improve overhead reach. MET 8/11/22  3.  Patient will demonstrate left shoulder external rotation PROM > 40 degree. MET 7/7/22  4.  Patient will demonstrate left  strength > 45 pounds.  MET 8/11/22     Long Term Goals: 12 weeks   1. Patient will report ability to wash her hair without restriction. PROGRESSING; NOT MET  2. Patient will demonstrate ability to reach overhead and remove a 2 lb object 5 times to simulate removing objects from an overhead shelf w/o an increase in symptoms. PROGRESSING; NOT MET  3. Patient will demonstrate all left shoulder AROM within 85% of right for improved ADL performance. PROGRESSING; NOT MET  4. Patient will improve FOTO to < 38% to improve ADL performance. PROGRESSING; NOT MET    PLAN     Cont with POC with phase 2 to 3 of rehab protocol     Plan of care Certification: 8/11/22 to 9/30/22      Louie Tobar, PT

## 2022-08-11 NOTE — PLAN OF CARE
"  Outpatient Therapy Updated Plan of Care     Visit Date: 8/11/2022  Name: Dorothy Burt  Clinic Number: [id number]    Therapy Diagnosis:   Encounter Diagnoses   Name Primary?    Decreased strength Yes    Decreased range of motion of left shoulder      Physician: Louie Luna MD    Physician Orders: PT Eval and Treat   2-3 x per week x 6 weeks   Gentle passive elevation and ER, pendulums. No RTC strengthening     Medical Diagnosis from Referral:M75.112 (ICD-10-CM) - Nontraumatic incomplete tear of left rotator cuff S46.212A (ICD-10-CM) - Biceps tendon rupture, proximal, left, initial encounter M75.42 (ICD-10-CM) - Subacromial impingement, left   Evaluation Date: 6/17/2022    Total Visits Received: 17      Current Certification Period:  6/17/22 to 8/12/22  Precautions:  Standard (post-op protocol - surgery 6/9/22)  Visits from Evaluation Date:  16      Subjective     Update: "I feel like I'm doing well." Since this AM, she has been having "shocks" in her anterior shoulder. She's happy to be out of her sling    Objective     Update:   Range of Motion:     Left Right   Shoulder Flexion P: 142    A: 92 P: 170     A:170       Shoulder abduction P: 135    A: 72 P: 170     A: 170   Shoulder ER P: 55    A: 43 P: 90    A: 21 cm       Shoulder IR P: 60    A: 60 P: WNL    A: 36 cm       Elbow P: 0-full flexion WNL   Wrist WNL WNL      Upper Extremity Strength     Wrist flexion and extension: 5/5 each     : right: 75 pounds; left 45      Assessment     Update: Dorothy is a 67 y.o. female referred to outpatient Physical Therapy with a medical diagnosis of noncomplete rotator cuff tear, biceps tendon rupture and subacromial impingement. Pt presents with status post left rotator cuff repair (supraspinatus), acromioplasty, biceps tenotomy, subchondral decompression, synovectomy, capsular release on 6/9/22 (week 10).  She presents with mild PROM deficits and expected AROM and strength deficits as seen by measurements. Pain " has been well controlled. Elbow range of motion has returned to full. She is appropriate for skilled PT to concentrate on regaining AROM and then strength to help her reach her goals.    Previous Short Term Goals Status:     1.  Patient will report < 6/10 shoulder pain at worst for improved ADL performance. MET  2.  Patient will demonstrate L shoulder flexion PROM > 140 deg to improve overhead reach. MET 8/11/22  3.  Patient will demonstrate left shoulder external rotation PROM > 40 degree. MET 7/7/22  4.  Patient will demonstrate left  strength > 45 pounds.  MET 8/11/22  New Short Term Goals Status:   none  Long Term Goal Status:   continue per initial plan of care.  Reasons for Recertification of Therapy:   protocol    Plan     Updated Certification Period: 8/11/2022 to 9/30/22  Recommended Treatment Plan: 2 times per week for 7 weeks: Electrical Stimulation TENS, IFC, NMES, Manual Therapy, Moist Heat/ Ice, Neuromuscular Re-ed, Patient Education, Self Care, Therapeutic Activities, Therapeutic Exercise and dry needling  Other Recommendations: progress per protocol    Luoie Tobar, PT  8/11/2022      I CERTIFY THE NEED FOR THESE SERVICES FURNISHED UNDER THIS PLAN OF TREATMENT AND WHILE UNDER MY CARE    Physician's comments:        Physician's Signature: ___________________________________________________    I certify the need for these services furnished under this plan of treatment and while under my care.        Louie Luna MD, FAAOS  , Orthopaedic Sports Medicine  Residency   [facility] Department of Orthopaedic Surgery  Assistant Orthopaedic Surgeon, [facility]  Head Team Physician, [facility]

## 2022-08-16 ENCOUNTER — CLINICAL SUPPORT (OUTPATIENT)
Dept: REHABILITATION | Facility: HOSPITAL | Age: 67
End: 2022-08-16
Payer: MEDICARE

## 2022-08-16 DIAGNOSIS — M25.612 DECREASED RANGE OF MOTION OF LEFT SHOULDER: ICD-10-CM

## 2022-08-16 DIAGNOSIS — R53.1 DECREASED STRENGTH: Primary | ICD-10-CM

## 2022-08-16 PROCEDURE — 97110 THERAPEUTIC EXERCISES: CPT | Mod: PN

## 2022-08-16 NOTE — PROGRESS NOTES
[facility]   Physical Therapy Treatment Note     Name: Dorothy Burt  Clinic Number: [id number]    Therapy Diagnosis:   Encounter Diagnoses   Name Primary?    Decreased strength Yes    Decreased range of motion of left shoulder      Physician: Louie Luna MD    Visit Date: 8/16/2022    Physician Orders: PT Eval and Treat   2-3 x per week x 6 weeks   Gentle passive elevation and ER, pendulums. No RTC strengthening     Medical Diagnosis from Referral:M75.112 (ICD-10-CM) - Nontraumatic incomplete tear of left rotator cuff S46.212A (ICD-10-CM) - Biceps tendon rupture, proximal, left, initial encounter M75.42 (ICD-10-CM) - Subacromial impingement, left   Evaluation Date: 6/17/2022  Authorization Period Expiration: 7/15/22  Plan of Care Expiration: 9/30/22  Progress Note Due: 9/30/22  Visit # / Visits authorized: 18/20  FOTO: 2/10 - Done 8/1/22  PTA Visit: 1/5     Precautions: Standard (post-op protocol - surgery 6/9/22)    Time In: 11:00 am  Time Out: 12:00 pm  Total Billable Time: 60 minutes (2 TE) - 1 on 1 x 30 minutes     6/9/22 Procedure: Procedure(s):  1. Diagnostic arthroscopy left shoulder  2. Extensive synovectomy  3. Rotator interval release   4. Arthroscopic capsular release  5. Biceps tenotomy  6. Subacromial decompression  7. Acromioplasty   8. Rotator cuff repair     SUBJECTIVE     Pt reports: she has been doing well and very diligent with her home exercise program. She reports insidious shocking pain at times.    She was compliant with home exercise program.  Response to previous treatment: decreased pain.  Functional change: not at this time.    Pain: 1/10  Location: left shoulder      OBJECTIVE     See updated POC from 8/11/22    Treatment     Dorothy received the treatments listed below:      Dorothy received therapeutic exercises to develop strength, endurance, ROM, flexibility, posture and core stabilization for 45 minutes including:    Chest press: 3x10, 2# - not  "today  Supine shoulder external rotation/internal rotation at 0 degrees abd: 3x10 left, 1#  Supine shoulder dowel flexion: 2x10, 1# dowel  Dowel serratus punches: 3x10 with 1#  Supine left shoulder flexion AROM: 2x10  Side lying external rotation: 3x10  Sidelying shoulder abduction AROM: 2x8    Submax Isometrics: all 10x5" holds  Flexion, Extension, Abduction, external rotation, internal rotation     Dorothy received the following manual therapy techniques: Joint mobilizations, Myofacial release and Soft tissue Mobilization were applied to the: left shoulder/elbow for 10 minutes, including:     Grade I-III left GHJ oscillations/mobilizations  Left shoulder PROM - FLX/ABD   Left biceps/pec STM       Patient Education and Home Exercises     Home Exercises Provided and Patient Education Provided   Education provided:   - anatomy and expectations with pain and symptoms    Written Home Exercises Provided: Patient instructed to cont prior HEP. Exercises were reviewed and Dorothy was able to demonstrate them prior to the end of the session.  Dorothy demonstrated good  understanding of the education provided. See EMR under Patient Instructions for exercises provided during therapy sessions    ASSESSMENT     Dorothy is a 67 y.o. female referred to outpatient Physical Therapy with a medical diagnosis of noncomplete rotator cuff tear, biceps tendon rupture and subacromial impingement. Pt presents with status post left rotator cuff repair (supraspinatus), acromioplasty, biceps tenotomy, subchondral decompression, synovectomy, capsular release on 6/9/22 (week 10).  Progressing well with AROM/PROM. Patient education provided on sub-max isometrics.     Dorothy Is progressing well towards her goals.   Pt prognosis is Good.     Pt will continue to benefit from skilled outpatient physical therapy to address the deficits listed in the problem list box on initial evaluation, provide pt/family education and to maximize pt's level of independence in " the home and community environment.     Pt's spiritual, cultural and educational needs considered and pt agreeable to plan of care and goals.     Anticipated Barriers for therapy: failed conservative treatment     Goals:   Short Term Goals: 4 weeks   1.  Patient will report < 6/10 shoulder pain at worst for improved ADL performance. MET  2.  Patient will demonstrate L shoulder flexion PROM > 140 deg to improve overhead reach. MET 8/11/22  3.  Patient will demonstrate left shoulder external rotation PROM > 40 degree. MET 7/7/22  4.  Patient will demonstrate left  strength > 45 pounds.  MET 8/11/22     Long Term Goals: 12 weeks   1. Patient will report ability to wash her hair without restriction. PROGRESSING; NOT MET  2. Patient will demonstrate ability to reach overhead and remove a 2 lb object 5 times to simulate removing objects from an overhead shelf w/o an increase in symptoms. PROGRESSING; NOT MET  3. Patient will demonstrate all left shoulder AROM within 85% of right for improved ADL performance. PROGRESSING; NOT MET  4. Patient will improve FOTO to < 38% to improve ADL performance. PROGRESSING; NOT MET    PLAN     Cont with POC with phase 2 to 3 of rehab protocol     Plan of care Certification: 8/11/22 to 9/30/22      Michele Alberto, PT

## 2022-08-18 ENCOUNTER — CLINICAL SUPPORT (OUTPATIENT)
Dept: REHABILITATION | Facility: HOSPITAL | Age: 67
End: 2022-08-18
Payer: MEDICARE

## 2022-08-18 DIAGNOSIS — M25.612 DECREASED RANGE OF MOTION OF LEFT SHOULDER: ICD-10-CM

## 2022-08-18 DIAGNOSIS — R53.1 DECREASED STRENGTH: Primary | ICD-10-CM

## 2022-08-18 PROCEDURE — 97110 THERAPEUTIC EXERCISES: CPT | Mod: PN,CQ

## 2022-08-18 PROCEDURE — 97140 MANUAL THERAPY 1/> REGIONS: CPT | Mod: PN,CQ

## 2022-08-18 NOTE — PROGRESS NOTES
[facility]   Physical Therapy Treatment Note     Name: Dorothy Burt  Clinic Number: [id number]    Therapy Diagnosis:   Encounter Diagnoses   Name Primary?    Decreased strength Yes    Decreased range of motion of left shoulder      Physician: Louie Luna MD    Visit Date: 8/18/2022    Physician Orders: PT Eval and Treat   2-3 x per week x 6 weeks   Gentle passive elevation and ER, pendulums. No RTC strengthening     Medical Diagnosis from Referral:M75.112 (ICD-10-CM) - Nontraumatic incomplete tear of left rotator cuff S46.212A (ICD-10-CM) - Biceps tendon rupture, proximal, left, initial encounter M75.42 (ICD-10-CM) - Subacromial impingement, left   Evaluation Date: 6/17/2022  Authorization Period Expiration: 7/15/22  Plan of Care Expiration: 9/30/22  Progress Note Due: 9/30/22  Visit # / Visits authorized: 18/20  FOTO: 3/10 - Done 8/1/22  PTA Visit: 1/5     Precautions: Standard (post-op protocol - surgery 6/9/22)    Time In: 11:15 am  Time Out: 11:59 pm  Total Billable Time: 44 minutes (TEx2, MTx1)     6/9/22 Procedure: Procedure(s):  1. Diagnostic arthroscopy left shoulder  2. Extensive synovectomy  3. Rotator interval release   4. Arthroscopic capsular release  5. Biceps tenotomy  6. Subacromial decompression  7. Acromioplasty   8. Rotator cuff repair     SUBJECTIVE     Pt reports: she woke up hurting all over today.  Patient reports her left shoulder is hurting a little more than usual but her SI is hurting a lot.    She was compliant with home exercise program.  Response to previous treatment: decreased pain.  Functional change: not at this time.    Pain: 2/10  Location: left shoulder      OBJECTIVE     See updated POC from 8/11/22    Treatment     Dorothy received the treatments listed below:      Dorothy received therapeutic exercises to develop strength, endurance, ROM, flexibility, posture and core stabilization for 34 minutes including:    Chest press: 3x10, 2# - not  "today  Supine shoulder external rotation/internal rotation at 0 degrees abd: 3x10 left, 1#  Supine shoulder dowel flexion: 2x10, 1# dowel  Dowel serratus punches: 3x10 with 1#  Supine left shoulder flexion AROM: 2x10  Side lying external rotation: 3x10  Sidelying shoulder abduction AROM: 2x10    Submax Isometrics: all 10x5" holds  Flexion, Extension, Abduction, external rotation, internal rotation     Dorothy received the following manual therapy techniques: Joint mobilizations, Myofacial release and Soft tissue Mobilization were applied to the: left shoulder/elbow for 10 minutes, including:     Grade I-III left GHJ oscillations/mobilizations  Left shoulder PROM - FLX/ABD   Left biceps/pec STM     Patient Education and Home Exercises     Home Exercises Provided and Patient Education Provided   Education provided:   - anatomy and expectations with pain and symptoms    Written Home Exercises Provided: Patient instructed to cont prior HEP. Exercises were reviewed and Dorothy was able to demonstrate them prior to the end of the session.  Dorothy demonstrated good  understanding of the education provided. See EMR under Patient Instructions for exercises provided during therapy sessions    ASSESSMENT     Dorothy is a 67 y.o. female referred to outpatient Physical Therapy with a medical diagnosis of noncomplete rotator cuff tear, biceps tendon rupture and subacromial impingement. Pt presents with status post left rotator cuff repair (supraspinatus), acromioplasty, biceps tenotomy, subchondral decompression, synovectomy, capsular release on 6/9/22 (week 10).  Progressions were limited today due to patient arriving late to her appointment.     Dorothy Is progressing well towards her goals.   Pt prognosis is Good.     Pt will continue to benefit from skilled outpatient physical therapy to address the deficits listed in the problem list box on initial evaluation, provide pt/family education and to maximize pt's level of independence in the " home and community environment.     Pt's spiritual, cultural and educational needs considered and pt agreeable to plan of care and goals.     Anticipated Barriers for therapy: failed conservative treatment     Goals:   Short Term Goals: 4 weeks   1.  Patient will report < 6/10 shoulder pain at worst for improved ADL performance. MET  2.  Patient will demonstrate L shoulder flexion PROM > 140 deg to improve overhead reach. MET 8/11/22  3.  Patient will demonstrate left shoulder external rotation PROM > 40 degree. MET 7/7/22  4.  Patient will demonstrate left  strength > 45 pounds.  MET 8/11/22     Long Term Goals: 12 weeks   1. Patient will report ability to wash her hair without restriction. PROGRESSING; NOT MET  2. Patient will demonstrate ability to reach overhead and remove a 2 lb object 5 times to simulate removing objects from an overhead shelf w/o an increase in symptoms. PROGRESSING; NOT MET  3. Patient will demonstrate all left shoulder AROM within 85% of right for improved ADL performance. PROGRESSING; NOT MET  4. Patient will improve FOTO to < 38% to improve ADL performance. PROGRESSING; NOT MET    PLAN     Cont with POC with phase 2 to 3 of rehab protocol     Plan of care Certification: 8/11/22 to 9/30/22      Ant Cohen, PTA

## 2022-08-23 ENCOUNTER — DOCUMENTATION ONLY (OUTPATIENT)
Dept: REHABILITATION | Facility: HOSPITAL | Age: 67
End: 2022-08-23
Payer: MEDICARE

## 2022-08-23 ENCOUNTER — CLINICAL SUPPORT (OUTPATIENT)
Dept: REHABILITATION | Facility: HOSPITAL | Age: 67
End: 2022-08-23
Payer: MEDICARE

## 2022-08-23 DIAGNOSIS — M25.612 DECREASED RANGE OF MOTION OF LEFT SHOULDER: ICD-10-CM

## 2022-08-23 DIAGNOSIS — R53.1 DECREASED STRENGTH: Primary | ICD-10-CM

## 2022-08-23 PROCEDURE — 97110 THERAPEUTIC EXERCISES: CPT | Mod: PN,CQ

## 2022-08-23 PROCEDURE — 97140 MANUAL THERAPY 1/> REGIONS: CPT | Mod: PN,CQ

## 2022-08-23 NOTE — PROGRESS NOTES
[facility]   Physical Therapy Treatment Note     Name: Dorothy Burt  Clinic Number: [id number]    Therapy Diagnosis:   Encounter Diagnoses   Name Primary?    Decreased strength Yes    Decreased range of motion of left shoulder      Physician: Louie Luna MD    Visit Date: 8/23/2022    Physician Orders: PT Eval and Treat   2-3 x per week x 6 weeks   Gentle passive elevation and ER, pendulums. No RTC strengthening     Medical Diagnosis from Referral:M75.112 (ICD-10-CM) - Nontraumatic incomplete tear of left rotator cuff S46.212A (ICD-10-CM) - Biceps tendon rupture, proximal, left, initial encounter M75.42 (ICD-10-CM) - Subacromial impingement, left   Evaluation Date: 6/17/2022  Authorization Period Expiration: 7/15/22  Plan of Care Expiration: 9/30/22  Progress Note Due: 9/30/22  Visit # / Visits authorized: 19/20  FOTO: 4/10 - Done 8/1/22  PTA Visit: 2/5     Precautions: Standard (post-op protocol - surgery 6/9/22)    Time In: 10:00 am  Time Out: 10:55 pm  Total Billable Time: 45 minutes (TEx2, MTx1)     6/9/22 Procedure: Procedure(s):  1. Diagnostic arthroscopy left shoulder  2. Extensive synovectomy  3. Rotator interval release   4. Arthroscopic capsular release  5. Biceps tenotomy  6. Subacromial decompression  7. Acromioplasty   8. Rotator cuff repair     SUBJECTIVE     Pt reports: her pain is not bad but still hurts more at night and it wakes her up.      She was compliant with home exercise program.  Response to previous treatment: decreased pain.  Functional change: not at this time.    Pain: 1/10  Location: left shoulder      OBJECTIVE     See updated POC from 8/11/22    Treatment     Dorothy received the treatments listed below:      Dorothy received therapeutic exercises to develop strength, endurance, ROM, flexibility, posture and core stabilization for 35 minutes including:    Chest press: 3x10, 2# - not today  Supine shoulder external rotation/internal rotation at 0  "degrees abd: 3x10 left, 1#  Supine shoulder dowel flexion: 2x10, 1# dowel  Dowel serratus punches: 3x10 with 2#  Supine left shoulder flexion AROM: 3x10  Side lying external rotation: 3x10  Sidelying shoulder abduction AROM: 2x10    Submax Isometrics: all 10x5" holds  Flexion, Extension, Abduction, external rotation, internal rotation     Dorothy received the following manual therapy techniques: Joint mobilizations, Myofacial release and Soft tissue Mobilization were applied to the: left shoulder/elbow for 10 minutes, including:     Grade I-III left GHJ oscillations/mobilizations  Left shoulder PROM - FLX/ABD   Left biceps/pec STM     Patient received cold pack to left shoulder for 10 minutes.    Patient Education and Home Exercises     Home Exercises Provided and Patient Education Provided   Education provided:   - anatomy and expectations with pain and symptoms    Written Home Exercises Provided: Patient instructed to cont prior HEP. Exercises were reviewed and Dorothy was able to demonstrate them prior to the end of the session.  Dorothy demonstrated good  understanding of the education provided. See EMR under Patient Instructions for exercises provided during therapy sessions    ASSESSMENT     Dorothy is a 67 y.o. female referred to outpatient Physical Therapy with a medical diagnosis of noncomplete rotator cuff tear, biceps tendon rupture and subacromial impingement. Pt presents with status post left rotator cuff repair (supraspinatus), acromioplasty, biceps tenotomy, subchondral decompression, synovectomy, capsular release on 6/9/22 (week 10).  Patient continues to require occasional cues for scapula positioning with her exercises.  Patient had no difficulty with today's increase in dowel weight from 1# to 2#.     Dorothy Is progressing well towards her goals.   Pt prognosis is Good.     Pt will continue to benefit from skilled outpatient physical therapy to address the deficits listed in the problem list box on initial " evaluation, provide pt/family education and to maximize pt's level of independence in the home and community environment.     Pt's spiritual, cultural and educational needs considered and pt agreeable to plan of care and goals.     Anticipated Barriers for therapy: failed conservative treatment     Goals:   Short Term Goals: 4 weeks   1.  Patient will report < 6/10 shoulder pain at worst for improved ADL performance. MET  2.  Patient will demonstrate L shoulder flexion PROM > 140 deg to improve overhead reach. MET 8/11/22  3.  Patient will demonstrate left shoulder external rotation PROM > 40 degree. MET 7/7/22  4.  Patient will demonstrate left  strength > 45 pounds.  MET 8/11/22     Long Term Goals: 12 weeks   1. Patient will report ability to wash her hair without restriction. PROGRESSING; NOT MET  2. Patient will demonstrate ability to reach overhead and remove a 2 lb object 5 times to simulate removing objects from an overhead shelf w/o an increase in symptoms. PROGRESSING; NOT MET  3. Patient will demonstrate all left shoulder AROM within 85% of right for improved ADL performance. PROGRESSING; NOT MET  4. Patient will improve FOTO to < 38% to improve ADL performance. PROGRESSING; NOT MET    PLAN     Cont with POC with phase 2 to 3 of rehab protocol     Plan of care Certification: 8/11/22 to 9/30/22      Ant Cohen, PTA

## 2022-08-26 ENCOUNTER — CLINICAL SUPPORT (OUTPATIENT)
Dept: REHABILITATION | Facility: HOSPITAL | Age: 67
End: 2022-08-26
Payer: MEDICARE

## 2022-08-26 DIAGNOSIS — R53.1 DECREASED STRENGTH: Primary | ICD-10-CM

## 2022-08-26 DIAGNOSIS — M25.612 DECREASED RANGE OF MOTION OF LEFT SHOULDER: ICD-10-CM

## 2022-08-26 PROCEDURE — 97110 THERAPEUTIC EXERCISES: CPT | Mod: PN,CQ

## 2022-08-26 NOTE — PROGRESS NOTES
"[facility]   Physical Therapy Treatment Note     Name: Dorothy Burt  Clinic Number: [id number]    Therapy Diagnosis:   Encounter Diagnoses   Name Primary?    Decreased strength Yes    Decreased range of motion of left shoulder      Physician: Louie Luna MD    Visit Date: 8/26/2022    Physician Orders: PT Eval and Treat   2-3 x per week x 6 weeks   Gentle passive elevation and ER, pendulums. No RTC strengthening     Medical Diagnosis from Referral:M75.112 (ICD-10-CM) - Nontraumatic incomplete tear of left rotator cuff S46.212A (ICD-10-CM) - Biceps tendon rupture, proximal, left, initial encounter M75.42 (ICD-10-CM) - Subacromial impingement, left   Evaluation Date: 6/17/2022  Authorization Period Expiration: 7/15/22  Plan of Care Expiration: 9/30/22  Progress Note Due: 9/30/22  Visit # / Visits authorized: 21/20  FOTO: 5/10 - Done 8/1/22  PTA Visit: 3/5     Precautions: Standard (post-op protocol - surgery 6/9/22)    Time In: 11:00 am  Time Out: 12:05 pm  Total Billable Time: 30 1:1 and 25 supervised minutes (TEx2)     6/9/22 Procedure: Procedure(s):  1. Diagnostic arthroscopy left shoulder  2. Extensive synovectomy  3. Rotator interval release   4. Arthroscopic capsular release  5. Biceps tenotomy  6. Subacromial decompression  7. Acromioplasty   8. Rotator cuff repair     SUBJECTIVE     Pt reports: her pain is "maybe a 1".    She was compliant with home exercise program.  Response to previous treatment: decreased pain.  Functional change: not at this time.    Pain: 1/10  Location: left shoulder      OBJECTIVE     See updated POC from 8/11/22    Treatment     Dorothy received the treatments listed below:      Dorothy received therapeutic exercises to develop strength, endurance, ROM, flexibility, posture and core stabilization for 25 1:1 and 25 supervised minutes including:    Chest press: 3x10, 2# - not today  Supine shoulder external rotation/internal rotation at 0 degrees abd: " "3x10 left, 1#  Supine shoulder dowel flexion: 2x10, 1# dowel  Dowel serratus punches: 3x10 with 2#  Supine left shoulder flexion AROM: 3x10  Side lying external rotation: 3x10  Sidelying shoulder abduction AROM: 2x10    Submax Isometrics: all 10x5" holds  Flexion, Extension, Abduction, external rotation, internal rotation     Dorothy received the following manual therapy techniques: Joint mobilizations, Myofacial release and Soft tissue Mobilization were applied to the: left shoulder/elbow for 5 1:1 minutes, including:     Grade I-III left GHJ oscillations/mobilizations  Left shoulder PROM - FLX/ABD   Left biceps/pec STM     Patient received cold pack to left shoulder for 10 minutes.    Patient Education and Home Exercises     Home Exercises Provided and Patient Education Provided   Education provided:   - anatomy and expectations with pain and symptoms    Written Home Exercises Provided: Patient instructed to cont prior HEP. Exercises were reviewed and Dorothy was able to demonstrate them prior to the end of the session.  Dorothy demonstrated good  understanding of the education provided. See EMR under Patient Instructions for exercises provided during therapy sessions    ASSESSMENT     Dorothy is a 67 y.o. female referred to outpatient Physical Therapy with a medical diagnosis of noncomplete rotator cuff tear, biceps tendon rupture and subacromial impingement. Pt presents with status post left rotator cuff repair (supraspinatus), acromioplasty, biceps tenotomy, subchondral decompression, synovectomy, capsular release on 6/9/22 (week 10).  Patient continues to require occasional cues for scapula positioning with her exercises.  Patient completed her therapy with no increase in symptoms prior to leaving the clinic.      Dorothy Is progressing well towards her goals.   Pt prognosis is Good.     Pt will continue to benefit from skilled outpatient physical therapy to address the deficits listed in the problem list box on initial " evaluation, provide pt/family education and to maximize pt's level of independence in the home and community environment.     Pt's spiritual, cultural and educational needs considered and pt agreeable to plan of care and goals.     Anticipated Barriers for therapy: failed conservative treatment     Goals:   Short Term Goals: 4 weeks   1.  Patient will report < 6/10 shoulder pain at worst for improved ADL performance. MET  2.  Patient will demonstrate L shoulder flexion PROM > 140 deg to improve overhead reach. MET 8/11/22  3.  Patient will demonstrate left shoulder external rotation PROM > 40 degree. MET 7/7/22  4.  Patient will demonstrate left  strength > 45 pounds.  MET 8/11/22     Long Term Goals: 12 weeks   1. Patient will report ability to wash her hair without restriction. PROGRESSING; NOT MET  2. Patient will demonstrate ability to reach overhead and remove a 2 lb object 5 times to simulate removing objects from an overhead shelf w/o an increase in symptoms. PROGRESSING; NOT MET  3. Patient will demonstrate all left shoulder AROM within 85% of right for improved ADL performance. PROGRESSING; NOT MET  4. Patient will improve FOTO to < 38% to improve ADL performance. PROGRESSING; NOT MET    PLAN     Cont with POC with phase 2 to 3 of rehab protocol     Plan of care Certification: 8/11/22 to 9/30/22      Ant Cohen, PTA

## 2022-08-30 ENCOUNTER — CLINICAL SUPPORT (OUTPATIENT)
Dept: REHABILITATION | Facility: HOSPITAL | Age: 67
End: 2022-08-30
Payer: MEDICARE

## 2022-08-30 DIAGNOSIS — M25.612 DECREASED RANGE OF MOTION OF LEFT SHOULDER: ICD-10-CM

## 2022-08-30 DIAGNOSIS — R53.1 DECREASED STRENGTH: Primary | ICD-10-CM

## 2022-08-30 PROCEDURE — 97110 THERAPEUTIC EXERCISES: CPT | Mod: PN | Performed by: PHYSICAL THERAPIST

## 2022-08-30 PROCEDURE — 97140 MANUAL THERAPY 1/> REGIONS: CPT | Mod: PN | Performed by: PHYSICAL THERAPIST

## 2022-08-30 NOTE — PROGRESS NOTES
"[facility]   Physical Therapy Treatment Note     Name: Dorothy Burt  Clinic Number: [id number]    Therapy Diagnosis:   Encounter Diagnoses   Name Primary?    Decreased strength Yes    Decreased range of motion of left shoulder      Physician: Louie Luna MD    Visit Date: 8/30/2022    Physician Orders: PT Eval and Treat   2-3 x per week x 6 weeks   Gentle passive elevation and ER, pendulums. No RTC strengthening     Medical Diagnosis from Referral:M75.112 (ICD-10-CM) - Nontraumatic incomplete tear of left rotator cuff S46.212A (ICD-10-CM) - Biceps tendon rupture, proximal, left, initial encounter M75.42 (ICD-10-CM) - Subacromial impingement, left   Evaluation Date: 6/17/2022  Authorization Period Expiration: 7/15/22  Plan of Care Expiration: 9/30/22  Progress Note Due: 9/30/22  Visit # / Visits authorized: 22/20  FOTO: 5/10 - Done 8/1/22  PTA Visit: 0/5     Precautions: Standard (post-op protocol - surgery 6/9/22)    Time In: 10:38 am  Time Out: 11:40 am  Total Billable Time: 40 1:1 and 18 supervised minutes (Tex2, MTx1)     6/9/22 Procedure: Procedure(s):  1. Diagnostic arthroscopy left shoulder  2. Extensive synovectomy  3. Rotator interval release   4. Arthroscopic capsular release  5. Biceps tenotomy  6. Subacromial decompression  7. Acromioplasty   8. Rotator cuff repair     SUBJECTIVE     Pt reports: occasionally annoying "shocking" pain. She'll be away on a cruise in the middle of September.    She was compliant with home exercise program.  Response to previous treatment: decreased pain.  Functional change: not at this time.    Pain: 1/10  Location: left shoulder      OBJECTIVE     See updated POC from 8/11/22    Treatment     Dorothy received the treatments listed below:      Dorothy received therapeutic exercises to develop strength, endurance, ROM, flexibility, posture and core stabilization for 32 1:1 and 18 supervised minutes including:    Wall walks: 15x5" holds  Wall " shoulder taps: 20x each  Chest press: 3x10, 4#   Supine shoulder external rotation/internal rotation at 0 degrees abd: 3x10 left, 1#  Supine shoulder dowel flexion: 2x10, 1# dowel  Dowel serratus punches: 3x10 with 2#  Supine left shoulder flexion AROM: 3x10  Side lying external rotation: 3x10  Sidelying shoulder abduction AROM: 2x10  Prone Rows: 3x10 left   Prone extension: 2x10 left   Prone T with palm down: 2x5 left   Prone Y: 2x5 left (small range of motion)    Theraband Isometric walkouts: all 15x red theraband   Flexion, Extension, Abduction, external rotation, internal rotation     Dorothy received the following manual therapy techniques: Joint mobilizations, Myofacial release and Soft tissue Mobilization were applied to the: left shoulder/elbow for 8 1:1 minutes, including:     Left shoulder PROM in all directions with grade I-III GHJ mobs    Patient received cold pack to left shoulder for 10 minutes.    Patient Education and Home Exercises     Home Exercises Provided and Patient Education Provided   Education provided:   - anatomy and expectations with pain and symptoms    Written Home Exercises Provided: Patient instructed to cont prior HEP. Exercises were reviewed and Dorothy was able to demonstrate them prior to the end of the session.  Dorothy demonstrated good  understanding of the education provided. See EMR under Patient Instructions for exercises provided during therapy sessions    ASSESSMENT     Dorothy is a 67 y.o. female referred to outpatient Physical Therapy with a medical diagnosis of noncomplete rotator cuff tear, biceps tendon rupture and subacromial impingement. Pt presents with status post left rotator cuff repair (supraspinatus), acromioplasty, biceps tenotomy, subchondral decompression, synovectomy, capsular release on 6/9/22 (week 11).  Progressed periscapular activation and AROM exercises today with expected fatigue. Continues to present with decreased shoulder flexion AROM and PROM. Pain is  mostly well controlled.    Dorothy Is progressing well towards her goals.   Pt prognosis is Good.     Pt will continue to benefit from skilled outpatient physical therapy to address the deficits listed in the problem list box on initial evaluation, provide pt/family education and to maximize pt's level of independence in the home and community environment.     Pt's spiritual, cultural and educational needs considered and pt agreeable to plan of care and goals.     Anticipated Barriers for therapy: failed conservative treatment     Goals:   Short Term Goals: 4 weeks   1.  Patient will report < 6/10 shoulder pain at worst for improved ADL performance. MET  2.  Patient will demonstrate L shoulder flexion PROM > 140 deg to improve overhead reach. MET 8/11/22  3.  Patient will demonstrate left shoulder external rotation PROM > 40 degree. MET 7/7/22  4.  Patient will demonstrate left  strength > 45 pounds.  MET 8/11/22     Long Term Goals: 12 weeks   1. Patient will report ability to wash her hair without restriction. PROGRESSING; NOT MET  2. Patient will demonstrate ability to reach overhead and remove a 2 lb object 5 times to simulate removing objects from an overhead shelf w/o an increase in symptoms. PROGRESSING; NOT MET  3. Patient will demonstrate all left shoulder AROM within 85% of right for improved ADL performance. PROGRESSING; NOT MET  4. Patient will improve FOTO to < 38% to improve ADL performance. PROGRESSING; NOT MET    PLAN     Cont with POC with phase 3 of rehab protocol     Plan of care Certification: 8/11/22 to 9/30/22      Louei Tobar, PT

## 2022-09-02 ENCOUNTER — CLINICAL SUPPORT (OUTPATIENT)
Dept: REHABILITATION | Facility: HOSPITAL | Age: 67
End: 2022-09-02
Payer: MEDICARE

## 2022-09-02 DIAGNOSIS — M25.612 DECREASED RANGE OF MOTION OF LEFT SHOULDER: ICD-10-CM

## 2022-09-02 DIAGNOSIS — R53.1 DECREASED STRENGTH: Primary | ICD-10-CM

## 2022-09-02 PROCEDURE — 97110 THERAPEUTIC EXERCISES: CPT | Mod: PN | Performed by: PHYSICAL THERAPIST

## 2022-09-02 PROCEDURE — 97140 MANUAL THERAPY 1/> REGIONS: CPT | Mod: PN | Performed by: PHYSICAL THERAPIST

## 2022-09-02 NOTE — PROGRESS NOTES
"[facility]   Physical Therapy Treatment Note     Name: Dorothy Burt  Clinic Number: [id number]    Therapy Diagnosis:   Encounter Diagnoses   Name Primary?    Decreased strength Yes    Decreased range of motion of left shoulder      Physician: Louie Luna MD    Visit Date: 9/2/2022    Physician Orders: PT Eval and Treat   2-3 x per week x 6 weeks   Gentle passive elevation and ER, pendulums. No RTC strengthening     Medical Diagnosis from Referral:M75.112 (ICD-10-CM) - Nontraumatic incomplete tear of left rotator cuff S46.212A (ICD-10-CM) - Biceps tendon rupture, proximal, left, initial encounter M75.42 (ICD-10-CM) - Subacromial impingement, left   Evaluation Date: 6/17/2022  Authorization Period Expiration: 7/15/22  Plan of Care Expiration: 9/30/22  Progress Note Due: 9/30/22  Visit # / Visits authorized: 23/34  FOTO: 6/10 - Done 8/1/22  PTA Visit: 0/5     Precautions: Standard (post-op protocol - surgery 6/9/22)    Time In: 11:01 am  Time Out: 12:00 pm  Total Billable Time: 57 1:1 minutes (Tex3, MTx1)     6/9/22 Procedure: Procedure(s):  1. Diagnostic arthroscopy left shoulder  2. Extensive synovectomy  3. Rotator interval release   4. Arthroscopic capsular release  5. Biceps tenotomy  6. Subacromial decompression  7. Acromioplasty   8. Rotator cuff repair     SUBJECTIVE     Pt reports: occasionally annoying "shocking" pain. She had only a little soreness after last visit    She was compliant with home exercise program.  Response to previous treatment: decreased pain.  Functional change: not at this time.    Pain: 1/10  Location: left shoulder      OBJECTIVE     See updated POC from 8/11/22    Treatment     Dorothy received the treatments listed below:      Dorothy received therapeutic exercises to develop strength, endurance, ROM, flexibility, posture and core stabilization for 47 minutes including:    Pulleys: 2 minutes flexion, 2 minutes abduction    Wall walks: 15x5" holds  Wall " shoulder taps: 20x each  Chest press: 3x10, 4# - not today  Supine shoulder external rotation/internal rotation at 0 degrees abd: 3x10 left, 1# - not today  Supine shoulder dowel flexion: 2x10, 1# dowel - not today  Dowel serratus punches: 3x10 with 2#  Supine left shoulder flexion AROM: 3x10  Side lying external rotation: 3x10  Sidelying shoulder abduction AROM: 2x10  Prone Rows: 3x10 left   Prone extension: 2x10 left   Prone T with palm down: 2x5 left   Prone Y: 2x5 left (small range of motion)    Theraband Isometric walkouts: all 15x red theraband   Flexion, Extension, Abduction, external rotation, internal rotation     Dorothy received the following manual therapy techniques: Joint mobilizations, Myofacial release and Soft tissue Mobilization were applied to the: left shoulder/elbow for 10 1:1 minutes, including:     Left shoulder PROM in all directions with grade I-III GHJ mobs      Patient Education and Home Exercises     Home Exercises Provided and Patient Education Provided   Education provided:   - anatomy and expectations with pain and symptoms    Written Home Exercises Provided: Patient instructed to cont prior HEP. Exercises were reviewed and Dorothy was able to demonstrate them prior to the end of the session.  Dorothy demonstrated good  understanding of the education provided. See EMR under Patient Instructions for exercises provided during therapy sessions    ASSESSMENT     Dorothy is a 67 y.o. female referred to outpatient Physical Therapy with a medical diagnosis of noncomplete rotator cuff tear, biceps tendon rupture and subacromial impingement. Pt presents with status post left rotator cuff repair (supraspinatus), acromioplasty, biceps tenotomy, subchondral decompression, synovectomy, capsular release on 6/9/22 (week 11). Catching was reported with side lying abduction until scapular retraction. Continue to concentrate on shoulder AROM and mostly flexion PROM.      Dorothy Is progressing well towards her goals.    Pt prognosis is Good.     Pt will continue to benefit from skilled outpatient physical therapy to address the deficits listed in the problem list box on initial evaluation, provide pt/family education and to maximize pt's level of independence in the home and community environment.     Pt's spiritual, cultural and educational needs considered and pt agreeable to plan of care and goals.     Anticipated Barriers for therapy: failed conservative treatment     Goals:   Short Term Goals: 4 weeks   1.  Patient will report < 6/10 shoulder pain at worst for improved ADL performance. MET  2.  Patient will demonstrate L shoulder flexion PROM > 140 deg to improve overhead reach. MET 8/11/22  3.  Patient will demonstrate left shoulder external rotation PROM > 40 degree. MET 7/7/22  4.  Patient will demonstrate left  strength > 45 pounds.  MET 8/11/22     Long Term Goals: 12 weeks   1. Patient will report ability to wash her hair without restriction. PROGRESSING; NOT MET  2. Patient will demonstrate ability to reach overhead and remove a 2 lb object 5 times to simulate removing objects from an overhead shelf w/o an increase in symptoms. PROGRESSING; NOT MET  3. Patient will demonstrate all left shoulder AROM within 85% of right for improved ADL performance. PROGRESSING; NOT MET  4. Patient will improve FOTO to < 38% to improve ADL performance. PROGRESSING; NOT MET    PLAN     Cont with POC with phase 3 of rehab protocol     Plan of care Certification: 8/11/22 to 9/30/22      Louie Tobar, PT

## 2022-09-05 NOTE — PROGRESS NOTES
Patient ID:   Dorothy Burt is a 67 y.o. female.    Chief Complaint:   13 weeks s/p L arthroscopic RCR, extensive arthroscopic debridement    HPI:   The patient is returning today approximately 13 weeks out from her surgery.  She reports a pain level of 1/10.  She states that she has been doing physical therapy.  She does report some soreness over the lateral and anterior aspects of her shoulder.    Medications:    Current Outpatient Medications:     acyclovir (ZOVIRAX) 400 MG tablet, TAKE 1 BY MOUTH 5 TIMES DAILY FOR 5 DAYS, Disp: 25 tablet, Rfl: 2    albuterol (PROAIR HFA) 90 mcg/actuation inhaler, Inhale 2 puffs into the lungs every 6 (six) hours as needed for Wheezing or Shortness of Breath. Rescue, Disp: 18 g, Rfl: 11    budesonide-formoterol 160-4.5 mcg (SYMBICORT) 160-4.5 mcg/actuation HFAA, Inhale 2 puffs into the lungs every evening. Controller, Disp: , Rfl:     diazePAM (VALIUM) 5 MG tablet, Take 2 tablets 30 minutes prior to the MRI scan, Disp: 2 tablet, Rfl: 0    diclofenac sodium (VOLTAREN) 1 % Gel, Apply 2 g topically 2 (two) times daily as needed (to knees)., Disp: 100 g, Rfl: 2    famotidine (PEPCID) 20 MG tablet, TAKE 1 TABLET BY MOUTH TWICE A DAY, Disp: 30 tablet, Rfl: 11    ibuprofen (ADVIL,MOTRIN) 600 MG tablet, Take 1 tablet (600 mg total) by mouth every 6 (six) hours as needed for Pain., Disp: 30 tablet, Rfl: 1    levocetirizine (XYZAL) 5 MG tablet, TAKE 1 TABLET BY MOUTH EVERY DAY AS NEEDED, Disp: 90 tablet, Rfl: 3    levothyroxine (SYNTHROID) 25 MCG tablet, TAKE 1.5 TABLET BY MOUTH DAILY, Disp: 135 tablet, Rfl: 3    naproxen (NAPROSYN) 500 MG tablet, TAKE 1 TABLET BY MOUTH TWICE A DAY AS NEEDED, Disp: 60 tablet, Rfl: 0    omeprazole (PRILOSEC) 20 MG capsule, Take 20 mg by mouth daily as needed., Disp: , Rfl:     ondansetron (ZOFRAN) 8 MG tablet, Take 1 tablet (8 mg total) by mouth every 8 (eight) hours as needed for Nausea., Disp: 15 tablet, Rfl: 1    oxyCODONE-acetaminophen (PERCOCET)  5-325 mg per tablet, Take 1 tablet by mouth every 4 (four) hours as needed for Pain., Disp: 28 tablet, Rfl: 0    temazepam (RESTORIL) 15 mg Cap, Take 1 capsule (15 mg total) by mouth nightly as needed (Sleep)., Disp: 30 capsule, Rfl: 5    triamcinolone acetonide 0.1% (KENALOG) 0.1 % cream, Apply topically 2 (two) times daily., Disp: 15 g, Rfl: 2    ZANAFLEX 4 mg tablet, Take 4 mg by mouth every 8 (eight) hours as needed., Disp: , Rfl:     Allergies:  Review of patient's allergies indicates:  No Known Allergies    Vitals:  LMP 08/15/2005     Physical Examination:  Ortho Exam   Left shoulder exam:  Range of motion reveals elevation to 160, external rotation 20.  She gets good resistance to forward elevation.      Assessment:  1. S/P left rotator cuff repair      Plan:  Patient will start some gentle strengthening exercises with the left shoulder.  She is going on a trip in the near future.  I have asked her to be careful with loading her luggage into overhead bin ends.  I would like to see her in 6 weeks.       No follow-ups on file.

## 2022-09-06 ENCOUNTER — OFFICE VISIT (OUTPATIENT)
Dept: ORTHOPEDICS | Facility: CLINIC | Age: 67
End: 2022-09-06
Payer: MEDICARE

## 2022-09-06 VITALS
WEIGHT: 194.88 LBS | HEIGHT: 65 IN | HEART RATE: 94 BPM | DIASTOLIC BLOOD PRESSURE: 83 MMHG | SYSTOLIC BLOOD PRESSURE: 127 MMHG | BODY MASS INDEX: 32.47 KG/M2

## 2022-09-06 DIAGNOSIS — Z98.890 S/P LEFT ROTATOR CUFF REPAIR: Primary | ICD-10-CM

## 2022-09-06 PROCEDURE — 3074F PR MOST RECENT SYSTOLIC BLOOD PRESSURE < 130 MM HG: ICD-10-PCS | Mod: CPTII,S$GLB,, | Performed by: ORTHOPAEDIC SURGERY

## 2022-09-06 PROCEDURE — 1159F PR MEDICATION LIST DOCUMENTED IN MEDICAL RECORD: ICD-10-PCS | Mod: CPTII,S$GLB,, | Performed by: ORTHOPAEDIC SURGERY

## 2022-09-06 PROCEDURE — 3079F PR MOST RECENT DIASTOLIC BLOOD PRESSURE 80-89 MM HG: ICD-10-PCS | Mod: CPTII,S$GLB,, | Performed by: ORTHOPAEDIC SURGERY

## 2022-09-06 PROCEDURE — 1159F MED LIST DOCD IN RCRD: CPT | Mod: CPTII,S$GLB,, | Performed by: ORTHOPAEDIC SURGERY

## 2022-09-06 PROCEDURE — 1125F PR PAIN SEVERITY QUANTIFIED, PAIN PRESENT: ICD-10-PCS | Mod: CPTII,S$GLB,, | Performed by: ORTHOPAEDIC SURGERY

## 2022-09-06 PROCEDURE — 3008F PR BODY MASS INDEX (BMI) DOCUMENTED: ICD-10-PCS | Mod: CPTII,S$GLB,, | Performed by: ORTHOPAEDIC SURGERY

## 2022-09-06 PROCEDURE — 3074F SYST BP LT 130 MM HG: CPT | Mod: CPTII,S$GLB,, | Performed by: ORTHOPAEDIC SURGERY

## 2022-09-06 PROCEDURE — 99999 PR PBB SHADOW E&M-EST. PATIENT-LVL III: ICD-10-PCS | Mod: PBBFAC,,, | Performed by: ORTHOPAEDIC SURGERY

## 2022-09-06 PROCEDURE — 99024 PR POST-OP FOLLOW-UP VISIT: ICD-10-PCS | Mod: S$GLB,,, | Performed by: ORTHOPAEDIC SURGERY

## 2022-09-06 PROCEDURE — 3288F FALL RISK ASSESSMENT DOCD: CPT | Mod: CPTII,S$GLB,, | Performed by: ORTHOPAEDIC SURGERY

## 2022-09-06 PROCEDURE — 1101F PR PT FALLS ASSESS DOC 0-1 FALLS W/OUT INJ PAST YR: ICD-10-PCS | Mod: CPTII,S$GLB,, | Performed by: ORTHOPAEDIC SURGERY

## 2022-09-06 PROCEDURE — 99024 POSTOP FOLLOW-UP VISIT: CPT | Mod: S$GLB,,, | Performed by: ORTHOPAEDIC SURGERY

## 2022-09-06 PROCEDURE — 3079F DIAST BP 80-89 MM HG: CPT | Mod: CPTII,S$GLB,, | Performed by: ORTHOPAEDIC SURGERY

## 2022-09-06 PROCEDURE — 1101F PT FALLS ASSESS-DOCD LE1/YR: CPT | Mod: CPTII,S$GLB,, | Performed by: ORTHOPAEDIC SURGERY

## 2022-09-06 PROCEDURE — 1160F RVW MEDS BY RX/DR IN RCRD: CPT | Mod: CPTII,S$GLB,, | Performed by: ORTHOPAEDIC SURGERY

## 2022-09-06 PROCEDURE — 3288F PR FALLS RISK ASSESSMENT DOCUMENTED: ICD-10-PCS | Mod: CPTII,S$GLB,, | Performed by: ORTHOPAEDIC SURGERY

## 2022-09-06 PROCEDURE — 1125F AMNT PAIN NOTED PAIN PRSNT: CPT | Mod: CPTII,S$GLB,, | Performed by: ORTHOPAEDIC SURGERY

## 2022-09-06 PROCEDURE — 99999 PR PBB SHADOW E&M-EST. PATIENT-LVL III: CPT | Mod: PBBFAC,,, | Performed by: ORTHOPAEDIC SURGERY

## 2022-09-06 PROCEDURE — 3008F BODY MASS INDEX DOCD: CPT | Mod: CPTII,S$GLB,, | Performed by: ORTHOPAEDIC SURGERY

## 2022-09-06 PROCEDURE — 1160F PR REVIEW ALL MEDS BY PRESCRIBER/CLIN PHARMACIST DOCUMENTED: ICD-10-PCS | Mod: CPTII,S$GLB,, | Performed by: ORTHOPAEDIC SURGERY

## 2022-09-09 ENCOUNTER — CLINICAL SUPPORT (OUTPATIENT)
Dept: REHABILITATION | Facility: HOSPITAL | Age: 67
End: 2022-09-09
Payer: MEDICARE

## 2022-09-09 DIAGNOSIS — R53.1 DECREASED STRENGTH: Primary | ICD-10-CM

## 2022-09-09 DIAGNOSIS — M25.612 DECREASED RANGE OF MOTION OF LEFT SHOULDER: ICD-10-CM

## 2022-09-09 PROCEDURE — 97140 MANUAL THERAPY 1/> REGIONS: CPT | Mod: PN | Performed by: PHYSICAL THERAPIST

## 2022-09-09 PROCEDURE — 97110 THERAPEUTIC EXERCISES: CPT | Mod: PN | Performed by: PHYSICAL THERAPIST

## 2022-09-09 NOTE — PROGRESS NOTES
[facility]   Physical Therapy Treatment Note     Name: Dorothy Burt  Clinic Number: [id number]    Therapy Diagnosis:   Encounter Diagnoses   Name Primary?    Decreased strength Yes    Decreased range of motion of left shoulder      Physician: Louie Luna MD    Visit Date: 9/9/2022    Physician Orders: PT Eval and Treat   2-3 x per week x 6 weeks   Gentle passive elevation and ER, pendulums. No RTC strengthening     Medical Diagnosis from Referral:M75.112 (ICD-10-CM) - Nontraumatic incomplete tear of left rotator cuff S46.212A (ICD-10-CM) - Biceps tendon rupture, proximal, left, initial encounter M75.42 (ICD-10-CM) - Subacromial impingement, left   Evaluation Date: 6/17/2022  Authorization Period Expiration: 7/15/22  Plan of Care Expiration: 9/30/22  Progress Note Due: 9/30/22  Visit # / Visits authorized: 24/34  FOTO: 7/10 - Done 8/1/22  PTA Visit: 0/5     Precautions: Standard (post-op protocol - surgery 6/9/22)    Time In: 10:30 am  Time Out: 11:38 pm  Total Billable Time: 58 1:1 minutes (Tex3, MTx1)     6/9/22 Procedure: Procedure(s):  1. Diagnostic arthroscopy left shoulder  2. Extensive synovectomy  3. Rotator interval release   4. Arthroscopic capsular release  5. Biceps tenotomy  6. Subacromial decompression  7. Acromioplasty   8. Rotator cuff repair     SUBJECTIVE     Pt reports: she was a little sore for 2 days after last visit. She's going to bring her sling for her trip.    She was compliant with home exercise program.  Response to previous treatment: 2 days of soreness, not severe pain  Functional change: improving arm use and motion    Pain: 1/10  Location: left shoulder      OBJECTIVE     See updated POC from 8/11/22    Treatment     Dorothy received the treatments listed below:      Dorothy received therapeutic exercises to develop strength, endurance, ROM, flexibility, posture and core stabilization for 43 minutes including:    Pulleys: 2 minutes flexion, 2 minutes  "abduction    Wall walks: 15x5" holds - not today  Wall shoulder taps: 20x each - not today  Chest press plus: 3x10, 4#  Supine shoulder external rotation/internal rotation at 0 degrees abd: 3x10 left, 1#   Supine shoulder dowel flexion: 2x10, 1# dowel - not today  Supine left shoulder flexion AROM: 3x10  Side lying external rotation: 3x10  Sidelying shoulder abduction AROM: 2x10  Prone Rows: 3x10 left   Prone extension: 3x10 left   Prone T with palm down: 2x5 left   Prone Y: 2x5 left (small range of motion)    Not today:  Theraband Isometric walkouts: all 15x red theraband   Flexion, Extension, Abduction, external rotation, internal rotation     Dorothy received the following manual therapy techniques: Joint mobilizations, Myofacial release and Soft tissue Mobilization were applied to the: left shoulder/elbow for 15 1:1 minutes, including:     Left shoulder PROM in all directions with grade I-III GHJ mobs  Acupressure of left lats with flexion and subscapularis with external rotation   MET for right posterior innominate with alternating isometric hip add/abd to improve right LBP, which was limiting ability to perform exercises for her shoulder.      Patient Education and Home Exercises     Home Exercises Provided and Patient Education Provided   Education provided:   - anatomy and expectations with pain and symptoms    Written Home Exercises Provided: Patient instructed to cont prior HEP. Exercises were reviewed and Dorothy was able to demonstrate them prior to the end of the session.  Dorothy demonstrated good  understanding of the education provided. See EMR under Patient Instructions for exercises provided during therapy sessions    ASSESSMENT     Dorothy is a 67 y.o. female referred to outpatient Physical Therapy with a medical diagnosis of noncomplete rotator cuff tear, biceps tendon rupture and subacromial impingement. Pt presents with status post left rotator cuff repair (supraspinatus), acromioplasty, biceps tenotomy, " subchondral decompression, synovectomy, capsular release on 6/9/22 (week 11). Session a little limited today by right LBP, but she was able to perform most exercises. She was found to have a right posterior innominate that was corrected with MET. She was able to perform mat transfers and ambulate a little easier afterwards. Progressing strength with table exercises.    Dorothy Is progressing well towards her goals.   Pt prognosis is Good.     Pt will continue to benefit from skilled outpatient physical therapy to address the deficits listed in the problem list box on initial evaluation, provide pt/family education and to maximize pt's level of independence in the home and community environment.     Pt's spiritual, cultural and educational needs considered and pt agreeable to plan of care and goals.     Anticipated Barriers for therapy: failed conservative treatment     Goals:   Short Term Goals: 4 weeks   1.  Patient will report < 6/10 shoulder pain at worst for improved ADL performance. MET  2.  Patient will demonstrate L shoulder flexion PROM > 140 deg to improve overhead reach. MET 8/11/22  3.  Patient will demonstrate left shoulder external rotation PROM > 40 degree. MET 7/7/22  4.  Patient will demonstrate left  strength > 45 pounds.  MET 8/11/22     Long Term Goals: 12 weeks   1. Patient will report ability to wash her hair without restriction. PROGRESSING; NOT MET  2. Patient will demonstrate ability to reach overhead and remove a 2 lb object 5 times to simulate removing objects from an overhead shelf w/o an increase in symptoms. PROGRESSING; NOT MET  3. Patient will demonstrate all left shoulder AROM within 85% of right for improved ADL performance. PROGRESSING; NOT MET  4. Patient will improve FOTO to < 38% to improve ADL performance. PROGRESSING; NOT MET    PLAN     Cont with POC with phase 3 of rehab protocol     Plan of care Certification: 8/11/22 to 9/30/22      Louie Tobar, PT

## 2022-09-12 ENCOUNTER — CLINICAL SUPPORT (OUTPATIENT)
Dept: REHABILITATION | Facility: HOSPITAL | Age: 67
End: 2022-09-12
Payer: MEDICARE

## 2022-09-12 DIAGNOSIS — M25.612 DECREASED RANGE OF MOTION OF LEFT SHOULDER: ICD-10-CM

## 2022-09-12 DIAGNOSIS — R53.1 DECREASED STRENGTH: Primary | ICD-10-CM

## 2022-09-12 PROCEDURE — 97140 MANUAL THERAPY 1/> REGIONS: CPT | Mod: PN | Performed by: PHYSICAL THERAPIST

## 2022-09-12 PROCEDURE — 97110 THERAPEUTIC EXERCISES: CPT | Mod: PN | Performed by: PHYSICAL THERAPIST

## 2022-09-12 NOTE — PROGRESS NOTES
[facility]   Physical Therapy Treatment Note     Name: Dorothy Burt  Clinic Number: [id number]    Therapy Diagnosis:   Encounter Diagnoses   Name Primary?    Decreased strength Yes    Decreased range of motion of left shoulder      Physician: Louie Luna MD    Visit Date: 9/12/2022    Physician Orders: PT Eval and Treat   2-3 x per week x 6 weeks   Gentle passive elevation and ER, pendulums. No RTC strengthening     Medical Diagnosis from Referral:M75.112 (ICD-10-CM) - Nontraumatic incomplete tear of left rotator cuff S46.212A (ICD-10-CM) - Biceps tendon rupture, proximal, left, initial encounter M75.42 (ICD-10-CM) - Subacromial impingement, left   Evaluation Date: 6/17/2022  Authorization Period Expiration: 7/15/22  Plan of Care Expiration: 9/30/22  Progress Note Due: 9/30/22  Visit # / Visits authorized: 25/34  FOTO: 7/10 - Done 8/1/22  PTA Visit: 0/5     Precautions: Standard (post-op protocol - surgery 6/9/22)    Time In: 10:55 am  Time Out: 11:55 am  Total Billable Time: 25 1:1 and 32 supervised minutes (Tex1, MTx1)     6/9/22 Procedure: Procedure(s):  1. Diagnostic arthroscopy left shoulder  2. Extensive synovectomy  3. Rotator interval release   4. Arthroscopic capsular release  5. Biceps tenotomy  6. Subacromial decompression  7. Acromioplasty   8. Rotator cuff repair     SUBJECTIVE     Pt reports: she thought her appointment was for 11 and was happy she could still be seen. She still gets her stabbing moments, otherwise doing ok.    She was compliant with home exercise program.  Response to previous treatment: 2 days of soreness, not severe pain  Functional change: improving arm use and motion    Pain: 1/10  Location: left shoulder      OBJECTIVE     See updated POC from 8/11/22    Treatment     Dorothy received the treatments listed below:      Dorothy received therapeutic exercises to develop strength, endurance, ROM, flexibility, posture and core stabilization for 15 1:1  "and 32 supervised minutes including:    Pulleys: 2 minutes flexion, 2 minutes abduction    Wall walks: 15x5" holds - not today  Wall shoulder taps: 20x each - not today  Chest press plus: 3x10, 4#  Supine shoulder external rotation/internal rotation at 0 degrees abd: 3x10 left, 1#   Supine shoulder dowel flexion: 2x10, 1# dowel - not today  Supine left shoulder flexion AROM: 3x10  Side lying external rotation: 3x10  Sidelying shoulder abduction AROM: 2x10  Prone Rows: 3x10 left   Prone extension: 3x10 left   Prone T with palm down: 2x5 left   Prone Y: 2x5 left (small range of motion)    Not today:  Theraband Isometric walkouts: all 15x red theraband   Flexion, Extension, Abduction, external rotation, internal rotation     Dorothy received the following manual therapy techniques: Joint mobilizations, Myofacial release and Soft tissue Mobilization were applied to the: left shoulder/elbow for 10 1:1 minutes, including:     Left shoulder PROM in all directions with grade I-III GHJ mobs  Acupressure of left lats with flexion and subscapularis with external rotation   MET for right posterior innominate with alternating isometric hip add/abd to improve right LBP, which was limiting ability to perform exercises for her shoulder.      Patient Education and Home Exercises     Home Exercises Provided and Patient Education Provided   Education provided:   - anatomy and expectations with pain and symptoms    Written Home Exercises Provided: Patient instructed to cont prior HEP. Exercises were reviewed and Dorothy was able to demonstrate them prior to the end of the session.  Dorothy demonstrated good  understanding of the education provided. See EMR under Patient Instructions for exercises provided during therapy sessions    Updated POC on 9/12/22 of what to perform on cruise    ASSESSMENT     Dorothy is a 67 y.o. female referred to outpatient Physical Therapy with a medical diagnosis of noncomplete rotator cuff tear, biceps tendon rupture " and subacromial impingement. Pt presents with status post left rotator cuff repair (supraspinatus), acromioplasty, biceps tenotomy, subchondral decompression, synovectomy, capsular release on 6/9/22 (week 12). LBP has improved, but pelvic innominate still present and corrected with MET. Exercises were not limited today due to LBP. Updated home exercise program due to her going on a cruise at the end of the week.    Dorothy Is progressing well towards her goals.   Pt prognosis is Good.     Pt will continue to benefit from skilled outpatient physical therapy to address the deficits listed in the problem list box on initial evaluation, provide pt/family education and to maximize pt's level of independence in the home and community environment.     Pt's spiritual, cultural and educational needs considered and pt agreeable to plan of care and goals.     Anticipated Barriers for therapy: failed conservative treatment     Goals:   Short Term Goals: 4 weeks   1.  Patient will report < 6/10 shoulder pain at worst for improved ADL performance. MET  2.  Patient will demonstrate L shoulder flexion PROM > 140 deg to improve overhead reach. MET 8/11/22  3.  Patient will demonstrate left shoulder external rotation PROM > 40 degree. MET 7/7/22  4.  Patient will demonstrate left  strength > 45 pounds.  MET 8/11/22     Long Term Goals: 12 weeks   1. Patient will report ability to wash her hair without restriction. PROGRESSING; NOT MET  2. Patient will demonstrate ability to reach overhead and remove a 2 lb object 5 times to simulate removing objects from an overhead shelf w/o an increase in symptoms. PROGRESSING; NOT MET  3. Patient will demonstrate all left shoulder AROM within 85% of right for improved ADL performance. PROGRESSING; NOT MET  4. Patient will improve FOTO to < 38% to improve ADL performance. PROGRESSING; NOT MET    PLAN     Cont with POC with phase 3 of rehab protocol     Plan of care Certification: 8/11/22 to  9/30/22      Louie Tobar, PT

## 2022-09-17 ENCOUNTER — OFFICE VISIT (OUTPATIENT)
Dept: URGENT CARE | Facility: CLINIC | Age: 67
End: 2022-09-17
Payer: MEDICARE

## 2022-09-17 VITALS
HEIGHT: 65 IN | BODY MASS INDEX: 32.32 KG/M2 | OXYGEN SATURATION: 98 % | SYSTOLIC BLOOD PRESSURE: 126 MMHG | TEMPERATURE: 98 F | RESPIRATION RATE: 18 BRPM | WEIGHT: 194 LBS | DIASTOLIC BLOOD PRESSURE: 84 MMHG | HEART RATE: 93 BPM

## 2022-09-17 DIAGNOSIS — J06.9 URI, ACUTE: ICD-10-CM

## 2022-09-17 DIAGNOSIS — Z11.59 SCREENING FOR VIRAL DISEASE: Primary | ICD-10-CM

## 2022-09-17 LAB
CTP QC/QA: YES
SARS-COV-2 RDRP RESP QL NAA+PROBE: NEGATIVE

## 2022-09-17 PROCEDURE — 1126F AMNT PAIN NOTED NONE PRSNT: CPT | Mod: CPTII,S$GLB,, | Performed by: FAMILY MEDICINE

## 2022-09-17 PROCEDURE — 3008F BODY MASS INDEX DOCD: CPT | Mod: CPTII,S$GLB,, | Performed by: FAMILY MEDICINE

## 2022-09-17 PROCEDURE — 1159F PR MEDICATION LIST DOCUMENTED IN MEDICAL RECORD: ICD-10-PCS | Mod: CPTII,S$GLB,, | Performed by: FAMILY MEDICINE

## 2022-09-17 PROCEDURE — 99213 OFFICE O/P EST LOW 20 MIN: CPT | Mod: S$GLB,,, | Performed by: FAMILY MEDICINE

## 2022-09-17 PROCEDURE — 99213 PR OFFICE/OUTPT VISIT, EST, LEVL III, 20-29 MIN: ICD-10-PCS | Mod: S$GLB,,, | Performed by: FAMILY MEDICINE

## 2022-09-17 PROCEDURE — U0002: ICD-10-PCS | Mod: QW,S$GLB,, | Performed by: FAMILY MEDICINE

## 2022-09-17 PROCEDURE — 1159F MED LIST DOCD IN RCRD: CPT | Mod: CPTII,S$GLB,, | Performed by: FAMILY MEDICINE

## 2022-09-17 PROCEDURE — 3008F PR BODY MASS INDEX (BMI) DOCUMENTED: ICD-10-PCS | Mod: CPTII,S$GLB,, | Performed by: FAMILY MEDICINE

## 2022-09-17 PROCEDURE — U0002 COVID-19 LAB TEST NON-CDC: HCPCS | Mod: QW,S$GLB,, | Performed by: FAMILY MEDICINE

## 2022-09-17 PROCEDURE — 1126F PR PAIN SEVERITY QUANTIFIED, NO PAIN PRESENT: ICD-10-PCS | Mod: CPTII,S$GLB,, | Performed by: FAMILY MEDICINE

## 2022-09-17 PROCEDURE — 3074F PR MOST RECENT SYSTOLIC BLOOD PRESSURE < 130 MM HG: ICD-10-PCS | Mod: CPTII,S$GLB,, | Performed by: FAMILY MEDICINE

## 2022-09-17 PROCEDURE — 3074F SYST BP LT 130 MM HG: CPT | Mod: CPTII,S$GLB,, | Performed by: FAMILY MEDICINE

## 2022-09-17 PROCEDURE — 3079F DIAST BP 80-89 MM HG: CPT | Mod: CPTII,S$GLB,, | Performed by: FAMILY MEDICINE

## 2022-09-17 PROCEDURE — 3079F PR MOST RECENT DIASTOLIC BLOOD PRESSURE 80-89 MM HG: ICD-10-PCS | Mod: CPTII,S$GLB,, | Performed by: FAMILY MEDICINE

## 2022-09-17 RX ORDER — LORATADINE 10 MG/1
10 TABLET ORAL DAILY
Qty: 30 TABLET | Refills: 2 | Status: SHIPPED | OUTPATIENT
Start: 2022-09-17 | End: 2023-11-13

## 2022-09-17 RX ORDER — BENZONATATE 100 MG/1
200 CAPSULE ORAL 3 TIMES DAILY PRN
Qty: 30 CAPSULE | Refills: 1 | Status: SHIPPED | OUTPATIENT
Start: 2022-09-17 | End: 2022-10-15

## 2022-09-17 NOTE — PROGRESS NOTES
"Subjective:       Patient ID: Dorothy Burt is a 67 y.o. female.    Vitals:  height is 5' 5" (1.651 m) and weight is 88 kg (194 lb). Her temperature is 97.6 °F (36.4 °C). Her blood pressure is 126/84 and her pulse is 93. Her respiration is 18 and oxygen saturation is 98%.     Chief Complaint: Cough    C/o sore throat and dry cough x 2 days, was ex[posed to friends with covid  Denies f/c  Vaccinated      Cough  This is a new problem. The current episode started today. The problem has been unchanged. The problem occurs every few minutes. The cough is Non-productive. Associated symptoms include myalgias. Pertinent negatives include no chest pain, chills, ear congestion, ear pain, fever, headaches, heartburn, hemoptysis, nasal congestion, postnasal drip, rash, rhinorrhea, sore throat, shortness of breath, sweats, weight loss or wheezing. Nothing aggravates the symptoms. She has tried nothing for the symptoms. There is no history of asthma, bronchiectasis, bronchitis, COPD, emphysema, environmental allergies or pneumonia.     Constitution: Negative for chills and fever.   HENT:  Negative for ear pain, postnasal drip and sore throat.    Cardiovascular:  Negative for chest pain.   Respiratory:  Positive for cough. Negative for bloody sputum, shortness of breath and wheezing.    Gastrointestinal:  Negative for heartburn.   Musculoskeletal:  Positive for muscle ache.   Skin:  Negative for rash.   Allergic/Immunologic: Negative for environmental allergies.   Neurological:  Negative for headaches.     Objective:      Physical Exam   Constitutional: She is oriented to person, place, and time. She appears well-developed. She is cooperative.  Non-toxic appearance. She does not appear ill. No distress.   HENT:   Head: Normocephalic and atraumatic.   Ears:   Right Ear: Hearing, tympanic membrane, external ear and ear canal normal.   Left Ear: Hearing, tympanic membrane, external ear and ear canal normal.   Nose: Nose normal. No " mucosal edema, rhinorrhea or nasal deformity. No epistaxis. Right sinus exhibits no maxillary sinus tenderness and no frontal sinus tenderness. Left sinus exhibits no maxillary sinus tenderness and no frontal sinus tenderness.   Mouth/Throat: Uvula is midline, oropharynx is clear and moist and mucous membranes are normal. Mucous membranes are moist. No trismus in the jaw. Normal dentition. No uvula swelling. No oropharyngeal exudate. Oropharynx is clear.   Eyes: Conjunctivae and lids are normal. Pupils are equal, round, and reactive to light. Right eye exhibits no discharge. Left eye exhibits no discharge. No scleral icterus. Extraocular movement intact   Neck: Trachea normal and phonation normal. Neck supple.   Cardiovascular: Normal rate, regular rhythm, normal heart sounds and normal pulses.   Pulmonary/Chest: Effort normal and breath sounds normal. No respiratory distress.   Abdominal: Normal appearance and bowel sounds are normal. She exhibits no distension and no mass. Soft. There is no abdominal tenderness.   Musculoskeletal: Normal range of motion.         General: No deformity. Normal range of motion.   Neurological: She is alert and oriented to person, place, and time. She exhibits normal muscle tone. Coordination normal.   Skin: Skin is warm, dry, intact, not diaphoretic and not pale.   Psychiatric: Her speech is normal and behavior is normal. Judgment and thought content normal.   Nursing note and vitals reviewed.      Assessment:       1. Screening for viral disease    2. URI, acute          Plan:         Screening for viral disease  -     POCT COVID-19 Rapid Screening    URI, acute    Other orders  -     loratadine (CLARITIN) 10 mg tablet; Take 1 tablet (10 mg total) by mouth once daily.  Dispense: 30 tablet; Refill: 2  -     benzonatate (TESSALON PERLES) 100 MG capsule; Take 2 capsules (200 mg total) by mouth 3 (three) times daily as needed for Cough.  Dispense: 30 capsule; Refill: 1           Results  for orders placed or performed in visit on 09/17/22   POCT COVID-19 Rapid Screening   Result Value Ref Range    POC Rapid COVID Negative Negative     Acceptable Yes

## 2022-09-26 ENCOUNTER — CLINICAL SUPPORT (OUTPATIENT)
Dept: REHABILITATION | Facility: HOSPITAL | Age: 67
End: 2022-09-26
Payer: MEDICARE

## 2022-09-26 DIAGNOSIS — R53.1 DECREASED STRENGTH: Primary | ICD-10-CM

## 2022-09-26 DIAGNOSIS — M25.612 DECREASED RANGE OF MOTION OF LEFT SHOULDER: ICD-10-CM

## 2022-09-26 PROCEDURE — 97140 MANUAL THERAPY 1/> REGIONS: CPT | Mod: PN | Performed by: PHYSICAL THERAPIST

## 2022-09-26 PROCEDURE — 97110 THERAPEUTIC EXERCISES: CPT | Mod: PN | Performed by: PHYSICAL THERAPIST

## 2022-09-26 NOTE — PLAN OF CARE
Outpatient Therapy Updated Plan of Care     Visit Date: 9/26/2022  Name: Dorothy Burt  Clinic Number: [id number]    Therapy Diagnosis:   Encounter Diagnoses   Name Primary?    Decreased strength Yes    Decreased range of motion of left shoulder      Physician: Louie Luna MD    Physician Orders: PT Eval and Treat   2-3 x per week x 6 weeks   Gentle passive elevation and ER, pendulums. No RTC strengthening     Medical Diagnosis from Referral:M75.112 (ICD-10-CM) - Nontraumatic incomplete tear of left rotator cuff S46.212A (ICD-10-CM) - Biceps tendon rupture, proximal, left, initial encounter M75.42 (ICD-10-CM) - Subacromial impingement, left   Evaluation Date: 6/17/2022    Total Visits Received: 26      Current Certification Period:  8/12/22 to 9/30/22  Precautions:  Standard (post-op protocol - surgery 6/9/22)  Visits from Evaluation Date:  25      Subjective     Update: her shoulder did well on vacation. Less of the shocking pain. It was a little sore the last day from packing up and using it more, but overall good.    Objective     Update:     () - new measurements    Range of Motion:     Left Right   Shoulder Flexion P: 142  (140)   A: 92 (111) P: 170     A:170       Shoulder abduction P: 135 (142)    A: 72 (80) P: 170     A: 170   Shoulder ER P: 55  (67)  A: 43 (57) P: 90    A: 21 cm       Shoulder IR P: 60 (60)   A: 60  P: WNL    A: 36 cm       Elbow P: 0-full flexion WNL   Wrist WNL WNL      Upper Extremity Strength    Flexion: 3-/5  Abductino: 3-/5  External rotation: 4/5  Internal rotation: 4+/5     Wrist flexion and extension: 5/5 each     : right: 75 pounds; left 44      Assessment     Update: Dorothy is a 67 y.o. female referred to outpatient Physical Therapy with a medical diagnosis of noncomplete rotator cuff tear, biceps tendon rupture and subacromial impingement. Pt presents with status post left rotator cuff repair (supraspinatus), acromioplasty, biceps tenotomy, subchondral  decompression, synovectomy, capsular release on 6/9/22 (week 15). She presents with limited overhead AROM with shrug sign, but pain well controlled. Flexion AROM greater in supine than against gravity. Her deficits limit her reaching overhead, carrying, lifting, ADLs and IADLs. She is appropriate for continued skilled PT to help her reach her goals.    Previous Short Term Goals Status:     1.  Patient will report < 6/10 shoulder pain at worst for improved ADL performance. MET  2.  Patient will demonstrate L shoulder flexion PROM > 140 deg to improve overhead reach. MET 8/11/22  3.  Patient will demonstrate left shoulder external rotation PROM > 40 degree. MET 7/7/22  4.  Patient will demonstrate left  strength > 45 pounds.  MET 8/11/22  New Short Term Goals Status:   none  Long Term Goal Status:   continue per initial plan of care.  Reasons for Recertification of Therapy:   protocol    Plan     Updated Certification Period: 9/26/2022 to 11/25/22  Recommended Treatment Plan: 2 times per week for 7 weeks: Electrical Stimulation TENS, IFC, NMES, Manual Therapy, Moist Heat/ Ice, Neuromuscular Re-ed, Patient Education, Self Care, Therapeutic Activities, Therapeutic Exercise and dry needling  Other Recommendations: progress per protocol    Louie Tobar, PT  9/26/2022      I CERTIFY THE NEED FOR THESE SERVICES FURNISHED UNDER THIS PLAN OF TREATMENT AND WHILE UNDER MY CARE    Physician's comments:        Physician's Signature: ___________________________________________________    I certify the need for these services furnished under this plan of treatment and while under my care.

## 2022-09-26 NOTE — PROGRESS NOTES
[facility]   Physical Therapy Treatment Note     Name: Dorothy Burt  Clinic Number: [id number]    Therapy Diagnosis:   Encounter Diagnoses   Name Primary?    Decreased strength Yes    Decreased range of motion of left shoulder        Physician: Louie Luna MD    Visit Date: 9/26/2022    Physician Orders: PT Eval and Treat   2-3 x per week x 6 weeks   Gentle passive elevation and ER, pendulums. No RTC strengthening     Medical Diagnosis from Referral:M75.112 (ICD-10-CM) - Nontraumatic incomplete tear of left rotator cuff S46.212A (ICD-10-CM) - Biceps tendon rupture, proximal, left, initial encounter M75.42 (ICD-10-CM) - Subacromial impingement, left   Evaluation Date: 6/17/2022  Authorization Period Expiration: 7/15/22  Plan of Care Expiration: 9/30/22; 11/25/22  Progress Note Due: 9/30/22  Visit # / Visits authorized: 26/34  FOTO: 7/10 - Done 8/1/22  PTA Visit: 0/5     Precautions: Standard (post-op protocol - surgery 6/9/22)    Time In: 3:24 pm (arrived late)  Time Out: 4:15 pm  Total Billable Time: 50 minutes (Tex2, MTx1)     6/9/22 Procedure: Procedure(s):  1. Diagnostic arthroscopy left shoulder  2. Extensive synovectomy  3. Rotator interval release   4. Arthroscopic capsular release  5. Biceps tenotomy  6. Subacromial decompression  7. Acromioplasty   8. Rotator cuff repair     SUBJECTIVE     Pt reports: her shoulder did well on vacation. Less of the shocking pain. It was a little sore the last day from packing up and using it more, but overall good.    She was compliant with home exercise program.  Response to previous treatment: 2 days of soreness, not severe pain  Functional change: improving arm use and motion    Pain: 1/10  Location: left shoulder      OBJECTIVE     See updated POC from 9/26/22    Treatment     Dorothy received the treatments listed below:      Dorothy received therapeutic exercises to develop strength, endurance, ROM, flexibility, posture and core  "stabilization for 42 minutes including:    Pulleys: 2 minutes flexion, 2 minutes abduction    Wall walks: 15x5" holds   Wall shoulder taps: 30x each     Supine left shoulder flexion AROM: 3x10  Side lying external rotation: 3x10  Sidelying shoulder abduction AROM: 2x10  Prone Rows: 3x10 left   Prone extension: 3x10 left   Prone T with palm down: 3x10 left       Not today (OOT)  Prone Y: 2x5 left (small range of motion)  Chest press plus: 3x10, 4#  Supine shoulder external rotation/internal rotation at 0 degrees abd: 3x10 left, 1#   Supine shoulder dowel flexion: 2x10, 1# dowel - not today    Assisted active seated flexion with cables    Theraband Isometric walkouts: all 15x red theraband   Flexion, Extension, Abduction, external rotation, internal rotation     Dorothy received the following manual therapy techniques: Joint mobilizations, Myofacial release and Soft tissue Mobilization were applied to the: left shoulder/elbow for 8 1:1 minutes, including:     Left shoulder PROM in all directions with grade I-III GHJ mobs    Not today:  Acupressure of left lats with flexion and subscapularis with external rotation   MET for right posterior innominate with alternating isometric hip add/abd to improve right LBP, which was limiting ability to perform exercises for her shoulder.      Patient Education and Home Exercises     Home Exercises Provided and Patient Education Provided   Education provided:   - anatomy and expectations with pain and symptoms    Written Home Exercises Provided: Patient instructed to cont prior HEP. Exercises were reviewed and Dorothy was able to demonstrate them prior to the end of the session.  Dorothy demonstrated good  understanding of the education provided. See EMR under Patient Instructions for exercises provided during therapy sessions    Updated POC on 9/12/22 of what to perform on cruise    ASSESSMENT     Dorothy is a 67 y.o. female referred to outpatient Physical Therapy with a medical diagnosis of " noncomplete rotator cuff tear, biceps tendon rupture and subacromial impingement. Pt presents with status post left rotator cuff repair (supraspinatus), acromioplasty, biceps tenotomy, subchondral decompression, synovectomy, capsular release on 6/9/22 (week 15). See UPOC.    Dorothy Is progressing well towards her goals.   Pt prognosis is Good.     Pt will continue to benefit from skilled outpatient physical therapy to address the deficits listed in the problem list box on initial evaluation, provide pt/family education and to maximize pt's level of independence in the home and community environment.     Pt's spiritual, cultural and educational needs considered and pt agreeable to plan of care and goals.     Anticipated Barriers for therapy: failed conservative treatment     Goals:   Short Term Goals: 4 weeks   1.  Patient will report < 6/10 shoulder pain at worst for improved ADL performance. MET  2.  Patient will demonstrate L shoulder flexion PROM > 140 deg to improve overhead reach. MET 8/11/22  3.  Patient will demonstrate left shoulder external rotation PROM > 40 degree. MET 7/7/22  4.  Patient will demonstrate left  strength > 45 pounds.  MET 8/11/22     Long Term Goals: 12 weeks   1. Patient will report ability to wash her hair without restriction. PROGRESSING; NOT MET  2. Patient will demonstrate ability to reach overhead and remove a 2 lb object 5 times to simulate removing objects from an overhead shelf w/o an increase in symptoms. PROGRESSING; NOT MET  3. Patient will demonstrate all left shoulder AROM within 85% of right for improved ADL performance. PROGRESSING; NOT MET  4. Patient will improve FOTO to < 38% to improve ADL performance. PROGRESSING; NOT MET    PLAN     Cont with POC with phase 3 of rehab protocol     Plan of care Certification: 9/30/22 to 11/25/22      Louie Tobar, PT

## 2022-09-28 ENCOUNTER — DOCUMENTATION ONLY (OUTPATIENT)
Dept: REHABILITATION | Facility: HOSPITAL | Age: 67
End: 2022-09-28
Payer: MEDICARE

## 2022-09-30 ENCOUNTER — PATIENT MESSAGE (OUTPATIENT)
Dept: PODIATRY | Facility: CLINIC | Age: 67
End: 2022-09-30
Payer: MEDICARE

## 2022-09-30 ENCOUNTER — TELEPHONE (OUTPATIENT)
Dept: PODIATRY | Facility: CLINIC | Age: 67
End: 2022-09-30
Payer: MEDICARE

## 2022-10-03 ENCOUNTER — CLINICAL SUPPORT (OUTPATIENT)
Dept: REHABILITATION | Facility: HOSPITAL | Age: 67
End: 2022-10-03
Payer: MEDICARE

## 2022-10-03 ENCOUNTER — TELEPHONE (OUTPATIENT)
Dept: PODIATRY | Facility: CLINIC | Age: 67
End: 2022-10-03
Payer: MEDICARE

## 2022-10-03 DIAGNOSIS — M25.612 DECREASED RANGE OF MOTION OF LEFT SHOULDER: ICD-10-CM

## 2022-10-03 DIAGNOSIS — R53.1 DECREASED STRENGTH: Primary | ICD-10-CM

## 2022-10-03 DIAGNOSIS — Z71.3 NUTRITIONAL COUNSELING: Primary | ICD-10-CM

## 2022-10-03 PROCEDURE — 97140 MANUAL THERAPY 1/> REGIONS: CPT | Mod: PN,CQ

## 2022-10-03 PROCEDURE — 97110 THERAPEUTIC EXERCISES: CPT | Mod: PN,CQ

## 2022-10-03 NOTE — TELEPHONE ENCOUNTER
----- Message from Jesica Tucker sent at 10/3/2022  8:32 AM CDT -----  Regarding: Appt 10/10/2022  Pt is requesting a call back regarding appt .  Pt states she was told that she can come into the office today for a sooner appt please call to discuss further the patient state she is In pain an can barely walk.    Pt states she is at the office now   PT @[phone]

## 2022-10-03 NOTE — TELEPHONE ENCOUNTER
called pt on 10/3/22 spoke with pt ...pt is in bad pain and can barely walk..pt wanted to walk in but there are no openings for today ...i advised pt to go to urget care or ER until appt date ...pt will go to urgent care...per Giovanan BE.

## 2022-10-03 NOTE — PROGRESS NOTES
"[facility]   Physical Therapy Treatment Note     Name: Dorothy Burt  Clinic Number: [id number]    Therapy Diagnosis:   Encounter Diagnoses   Name Primary?    Decreased strength Yes    Decreased range of motion of left shoulder        Physician: Louie Luna MD    Visit Date: 10/3/2022    Physician Orders: PT Eval and Treat   2-3 x per week x 6 weeks   Gentle passive elevation and ER, pendulums. No RTC strengthening     Medical Diagnosis from Referral:M75.112 (ICD-10-CM) - Nontraumatic incomplete tear of left rotator cuff S46.212A (ICD-10-CM) - Biceps tendon rupture, proximal, left, initial encounter M75.42 (ICD-10-CM) - Subacromial impingement, left   Evaluation Date: 6/17/2022  Authorization Period Expiration: 7/15/22  Plan of Care Expiration: 9/30/22; 11/25/22  Progress Note Due: 9/30/22  Visit # / Visits authorized: 27/34  FOTO: 8/10 - Done 8/1/22  PTA Visit: 1/5     Precautions: Standard (post-op protocol - surgery 6/9/22)    Time In: 11:00 am   Time Out: 12:03 pm  Total Billable Time: 53 minutes (Tex3, MTx1)     6/9/22 Procedure: Procedure(s):  1. Diagnostic arthroscopy left shoulder  2. Extensive synovectomy  3. Rotator interval release   4. Arthroscopic capsular release  5. Biceps tenotomy  6. Subacromial decompression  7. Acromioplasty   8. Rotator cuff repair     SUBJECTIVE     Pt reports: her shoulder shoulder pain is minimal.    She was compliant with home exercise program.  Response to previous treatment: 2 days of soreness, not severe pain  Functional change: improving arm use and motion    Pain: 1/10  Location: left shoulder      OBJECTIVE     See updated POC from 9/26/22    Treatment     Dorothy received the treatments listed below:      Dorothy received therapeutic exercises to develop strength, endurance, ROM, flexibility, posture and core stabilization for 47 minutes including:    Pulleys: 2 minutes flexion, 2 minutes abduction    Wall walks: 15x5" holds   Wall " shoulder taps: 30x each     Supine left shoulder flexion AROM: 3x10  Side lying external rotation: 3x10  Sidelying shoulder abduction AROM: 3x10  Prone Rows: 3x10 left   Prone extension: 3x10 left   Prone T with palm down: 3x10 left     Not today (OOT)  Prone Y: 2x5 left (small range of motion)  Chest press plus: 3x10, 4#  Supine shoulder external rotation/internal rotation at 0 degrees abd: 3x10 left, 1#   Supine shoulder dowel flexion: 2x10, 1# dowel - not today    Assisted active seated flexion with cables: 3# x10    Theraband Isometric walkouts: all 15x red theraband   Flexion, Extension, Abduction, external rotation, internal rotation     Dorothy received the following manual therapy techniques: Joint mobilizations, Myofacial release and Soft tissue Mobilization were applied to the: left shoulder/elbow for 8 minutes, including:     Left shoulder PROM in all directions with grade I-III GHJ mobs    Not today:  Acupressure of left lats with flexion and subscapularis with external rotation   MET for right posterior innominate with alternating isometric hip add/abd to improve right LBP, which was limiting ability to perform exercises for her shoulder.    Cold pack was applied for 10 minutes to left shoulder.    Patient Education and Home Exercises     Home Exercises Provided and Patient Education Provided   Education provided:   - anatomy and expectations with pain and symptoms    Written Home Exercises Provided: Patient instructed to cont prior HEP. Exercises were reviewed and Dorothy was able to demonstrate them prior to the end of the session.  Dorothy demonstrated good  understanding of the education provided. See EMR under Patient Instructions for exercises provided during therapy sessions    Updated POC on 9/12/22 of what to perform on cruise    ASSESSMENT     Dorothy is a 67 y.o. female referred to outpatient Physical Therapy with a medical diagnosis of noncomplete rotator cuff tear, biceps tendon rupture and subacromial  impingement. Pt presents with status post left rotator cuff repair (supraspinatus), acromioplasty, biceps tenotomy, subchondral decompression, synovectomy, capsular release on 6/9/22 (week 15).     Patient completed her therapy and had no difficulty with new exercise added as well as today's progressions, noted in bold, with no increase in symptoms prior to leaving the clinic.     Dorothy Is progressing well towards her goals.   Pt prognosis is Good.     Pt will continue to benefit from skilled outpatient physical therapy to address the deficits listed in the problem list box on initial evaluation, provide pt/family education and to maximize pt's level of independence in the home and community environment.     Pt's spiritual, cultural and educational needs considered and pt agreeable to plan of care and goals.     Anticipated Barriers for therapy: failed conservative treatment     Goals:   Short Term Goals: 4 weeks   1.  Patient will report < 6/10 shoulder pain at worst for improved ADL performance. MET  2.  Patient will demonstrate L shoulder flexion PROM > 140 deg to improve overhead reach. MET 8/11/22  3.  Patient will demonstrate left shoulder external rotation PROM > 40 degree. MET 7/7/22  4.  Patient will demonstrate left  strength > 45 pounds.  MET 8/11/22     Long Term Goals: 12 weeks   1. Patient will report ability to wash her hair without restriction. PROGRESSING; NOT MET  2. Patient will demonstrate ability to reach overhead and remove a 2 lb object 5 times to simulate removing objects from an overhead shelf w/o an increase in symptoms. PROGRESSING; NOT MET  3. Patient will demonstrate all left shoulder AROM within 85% of right for improved ADL performance. PROGRESSING; NOT MET  4. Patient will improve FOTO to < 38% to improve ADL performance. PROGRESSING; NOT MET    PLAN     Cont with POC with phase 3 of rehab protocol     Plan of care Certification: 9/30/22 to 11/25/22      Ant Cohen, PTA

## 2022-10-03 NOTE — TELEPHONE ENCOUNTER
----- Message from Jesica Tucker sent at 10/3/2022  8:32 AM CDT -----  Regarding: Appt 10/10/2022  Pt is requesting a call back regarding appt .  Pt states she was told that she can come into the office today for a sooner appt please call to discuss further the patient state she is In pain an can barely walk.    Pt states she is at the office now   PT @[phone]

## 2022-10-04 ENCOUNTER — OFFICE VISIT (OUTPATIENT)
Dept: PODIATRY | Facility: CLINIC | Age: 67
End: 2022-10-04
Payer: MEDICARE

## 2022-10-04 VITALS
SYSTOLIC BLOOD PRESSURE: 123 MMHG | DIASTOLIC BLOOD PRESSURE: 76 MMHG | WEIGHT: 198.44 LBS | BODY MASS INDEX: 33.06 KG/M2 | RESPIRATION RATE: 18 BRPM | HEART RATE: 75 BPM | HEIGHT: 65 IN

## 2022-10-04 DIAGNOSIS — M72.2 PLANTAR FASCIITIS: Primary | ICD-10-CM

## 2022-10-04 PROCEDURE — 1125F AMNT PAIN NOTED PAIN PRSNT: CPT | Mod: CPTII,S$GLB,, | Performed by: PODIATRIST

## 2022-10-04 PROCEDURE — 3078F PR MOST RECENT DIASTOLIC BLOOD PRESSURE < 80 MM HG: ICD-10-PCS | Mod: CPTII,S$GLB,, | Performed by: PODIATRIST

## 2022-10-04 PROCEDURE — 99213 PR OFFICE/OUTPT VISIT, EST, LEVL III, 20-29 MIN: ICD-10-PCS | Mod: S$GLB,,, | Performed by: PODIATRIST

## 2022-10-04 PROCEDURE — 3008F PR BODY MASS INDEX (BMI) DOCUMENTED: ICD-10-PCS | Mod: CPTII,S$GLB,, | Performed by: PODIATRIST

## 2022-10-04 PROCEDURE — 3008F BODY MASS INDEX DOCD: CPT | Mod: CPTII,S$GLB,, | Performed by: PODIATRIST

## 2022-10-04 PROCEDURE — 1125F PR PAIN SEVERITY QUANTIFIED, PAIN PRESENT: ICD-10-PCS | Mod: CPTII,S$GLB,, | Performed by: PODIATRIST

## 2022-10-04 PROCEDURE — 3074F PR MOST RECENT SYSTOLIC BLOOD PRESSURE < 130 MM HG: ICD-10-PCS | Mod: CPTII,S$GLB,, | Performed by: PODIATRIST

## 2022-10-04 PROCEDURE — 99999 PR PBB SHADOW E&M-EST. PATIENT-LVL II: CPT | Mod: PBBFAC,,, | Performed by: PODIATRIST

## 2022-10-04 PROCEDURE — 99999 PR PBB SHADOW E&M-EST. PATIENT-LVL II: ICD-10-PCS | Mod: PBBFAC,,, | Performed by: PODIATRIST

## 2022-10-04 PROCEDURE — 3078F DIAST BP <80 MM HG: CPT | Mod: CPTII,S$GLB,, | Performed by: PODIATRIST

## 2022-10-04 PROCEDURE — 3074F SYST BP LT 130 MM HG: CPT | Mod: CPTII,S$GLB,, | Performed by: PODIATRIST

## 2022-10-04 PROCEDURE — 99213 OFFICE O/P EST LOW 20 MIN: CPT | Mod: S$GLB,,, | Performed by: PODIATRIST

## 2022-10-05 ENCOUNTER — CLINICAL SUPPORT (OUTPATIENT)
Dept: REHABILITATION | Facility: HOSPITAL | Age: 67
End: 2022-10-05
Payer: MEDICARE

## 2022-10-05 DIAGNOSIS — R53.1 DECREASED STRENGTH: Primary | ICD-10-CM

## 2022-10-05 DIAGNOSIS — M25.612 DECREASED RANGE OF MOTION OF LEFT SHOULDER: ICD-10-CM

## 2022-10-05 PROCEDURE — 97110 THERAPEUTIC EXERCISES: CPT | Mod: PN

## 2022-10-05 PROCEDURE — 97140 MANUAL THERAPY 1/> REGIONS: CPT | Mod: PN

## 2022-10-05 NOTE — PROGRESS NOTES
[facility]   Physical Therapy Treatment Note     Name: Dorothy Burt  Clinic Number: [id number]    Therapy Diagnosis:   Encounter Diagnoses   Name Primary?    Decreased strength Yes    Decreased range of motion of left shoulder        Physician: Louie Luna MD    Visit Date: 10/5/2022    Physician Orders: PT Eval and Treat   2-3 x per week x 6 weeks   Gentle passive elevation and ER, pendulums. No RTC strengthening     Medical Diagnosis from Referral:M75.112 (ICD-10-CM) - Nontraumatic incomplete tear of left rotator cuff S46.212A (ICD-10-CM) - Biceps tendon rupture, proximal, left, initial encounter M75.42 (ICD-10-CM) - Subacromial impingement, left   Evaluation Date: 6/17/2022  Authorization Period Expiration: 7/15/22  Plan of Care Expiration: 9/30/22; 11/25/22  Progress Note Due: 9/30/22  Visit # / Visits authorized: 28/34  FOTO: 8/10 - Next  PTA Visit: 0/5     Precautions: Standard (post-op protocol - surgery 6/9/22)    Time In: 12:05 pm   Time Out: 1:00 pm  Total Billable Time: 55 minutes (Tex3, MTx1)    6/9/22 Procedure: Procedure(s):  1. Diagnostic arthroscopy left shoulder  2. Extensive synovectomy  3. Rotator interval release   4. Arthroscopic capsular release  5. Biceps tenotomy  6. Subacromial decompression  7. Acromioplasty   8. Rotator cuff repair     SUBJECTIVE     Pt reports: she is feeling ok and pain is low.     She was compliant with home exercise program.  Response to previous treatment: 2 days of soreness, not severe pain  Functional change: improving arm use and motion    Pain: 1/10  Location: left shoulder      OBJECTIVE       Treatment     Dorothy received the treatments listed below:      Dorothy received the following manual therapy techniques: Joint mobilizations, Myofacial release and Soft tissue Mobilization were applied to the: left shoulder/elbow for 15 minutes, including:  Left shoulder humeral head posterior andinferior mobs, grade III-IV  Left shoulder  "PROM in all directions with grade IV GHJ mobs  Left shoulder scapular upward elevation mobs, grade III-IV    Dorothy received therapeutic exercises to develop strength, endurance, ROM, flexibility, posture and core stabilization for 40 minutes including:    Pulleys: 2 minutes flexion, 2 minutes abduction    Supine shoulder flexion AROM: 2x10, 5'' holds 3#  Supine shoulder external rotation (90 degrees abd): 2x10 left, 1#  Side lying external rotation: 3x8, 1#  Sidelying shoulder abduction AROM: 2x8, 1#  Prone Rows: 2x8 left, 4#   Prone extension: 3x10 left  - not done today  Prone T with palm down: 3x10 left     Assisted active seated flexion with cables: 3#, 2x12 (height number 3)  Wall walks: 15x5" holds     Not today:  Wall shoulder taps: 30x each  Prone Y: 2x5 left (small range of motion)  Chest press plus: 3x10, 4#    Theraband Isometric walkouts: all 15x red theraband   Flexion, Extension, Abduction, external rotation, internal rotation     Cold pack was applied for 00  minutes to left shoulder.    Patient Education and Home Exercises     Home Exercises Provided and Patient Education Provided   Education provided:   - anatomy and expectations with pain and symptoms    Written Home Exercises Provided: Patient instructed to cont prior HEP. Exercises were reviewed and Dorothy was able to demonstrate them prior to the end of the session.  Dorothy demonstrated good  understanding of the education provided. See EMR under Patient Instructions for exercises provided during therapy sessions    ASSESSMENT     Dorothy is a 67 y.o. female referred to outpatient Physical Therapy with a medical diagnosis of noncomplete rotator cuff tear, biceps tendon rupture and subacromial impingement. Pt presents with status post left rotator cuff repair (supraspinatus), acromioplasty, biceps tenotomy, subchondral decompression, synovectomy, capsular release on 6/9/22 (week 15).   Increased resistance and overhead repetition load today with cables. " Pt reported mild left shoulder soreness that was more fatigue rather than pain.    Dorothy Is progressing well towards her goals.   Pt prognosis is Good.     Pt will continue to benefit from skilled outpatient physical therapy to address the deficits listed in the problem list box on initial evaluation, provide pt/family education and to maximize pt's level of independence in the home and community environment.     Pt's spiritual, cultural and educational needs considered and pt agreeable to plan of care and goals.     Anticipated Barriers for therapy: failed conservative treatment     Goals:   Short Term Goals: 4 weeks   1.  Patient will report < 6/10 shoulder pain at worst for improved ADL performance. MET  2.  Patient will demonstrate L shoulder flexion PROM > 140 deg to improve overhead reach. MET 8/11/22  3.  Patient will demonstrate left shoulder external rotation PROM > 40 degree. MET 7/7/22  4.  Patient will demonstrate left  strength > 45 pounds.  MET 8/11/22     Long Term Goals: 12 weeks   1. Patient will report ability to wash her hair without restriction. PROGRESSING; NOT MET  2. Patient will demonstrate ability to reach overhead and remove a 2 lb object 5 times to simulate removing objects from an overhead shelf w/o an increase in symptoms. PROGRESSING; NOT MET  3. Patient will demonstrate all left shoulder AROM within 85% of right for improved ADL performance. PROGRESSING; NOT MET  4. Patient will improve FOTO to < 38% to improve ADL performance. PROGRESSING; NOT MET    PLAN     Cont with POC with phase 3 of rehab protocol     Plan of care Certification: 9/30/22 to 11/25/22    Jose C Lutz, PT

## 2022-10-06 DIAGNOSIS — Z71.3 NUTRITIONAL COUNSELING: Primary | ICD-10-CM

## 2022-10-07 NOTE — PROGRESS NOTES
Subjective:      Patient ID: Dorothy Burt is a 67 y.o. female.    Chief Complaint: Heel Pain (Rt foot ) and Ankle Pain (Left foot )    Dorothy is a 67 y.o. female who presents to the clinic complaining of heel pain in the right foot, especially with the first step in the morning. The pain is described as Dull, Tight, and Deep. The onset of the pain was gradual and has worsened over the past several days.   Review of Systems   Constitutional: Negative for chills, decreased appetite and fever.   Cardiovascular:  Negative for leg swelling.   Musculoskeletal:  Negative for arthritis, joint pain, joint swelling and myalgias.   Gastrointestinal:  Negative for nausea and vomiting.   Neurological:  Negative for loss of balance, numbness and paresthesias.           Patient Active Problem List   Diagnosis    Arthritis    Mild intermittent asthma    Sacroiliac joint pain    Chronic pain    Hypothyroidism    Gastritis due to nonsteroidal anti-inflammatory drug    Recurrent cold sores    History of pericarditis    Chondromalacia patellae    Osteoarthritis of knee    Stress incontinence    Tear of medial meniscus of knee    Fibromyalgia    Unspecified inflammatory spondylopathy, lumbar region    Venous insufficiency    Peroneal tendonitis, left    Decreased strength    Decreased range of motion of left shoulder    Left shoulder pain    Nontraumatic incomplete tear of left rotator cuff       Current Outpatient Medications on File Prior to Visit   Medication Sig Dispense Refill    acyclovir (ZOVIRAX) 400 MG tablet TAKE 1 BY MOUTH 5 TIMES DAILY FOR 5 DAYS 25 tablet 2    albuterol (PROAIR HFA) 90 mcg/actuation inhaler Inhale 2 puffs into the lungs every 6 (six) hours as needed for Wheezing or Shortness of Breath. Rescue 18 g 11    benzonatate (TESSALON PERLES) 100 MG capsule Take 2 capsules (200 mg total) by mouth 3 (three) times daily as needed for Cough. 30 capsule 1    budesonide-formoterol 160-4.5 mcg (SYMBICORT) 160-4.5  mcg/actuation HFAA Inhale 2 puffs into the lungs every evening. Controller      diclofenac sodium (VOLTAREN) 1 % Gel Apply 2 g topically 2 (two) times daily as needed (to knees). 100 g 2    famotidine (PEPCID) 20 MG tablet TAKE 1 TABLET BY MOUTH TWICE A DAY 30 tablet 11    levocetirizine (XYZAL) 5 MG tablet TAKE 1 TABLET BY MOUTH EVERY DAY AS NEEDED 90 tablet 3    levothyroxine (SYNTHROID) 25 MCG tablet TAKE 1.5 TABLET BY MOUTH DAILY 135 tablet 3    loratadine (CLARITIN) 10 mg tablet Take 1 tablet (10 mg total) by mouth once daily. 30 tablet 2    naproxen (NAPROSYN) 500 MG tablet Take 1 tablet (500 mg total) by mouth 2 (two) times daily as needed. 60 tablet 0    ondansetron (ZOFRAN) 8 MG tablet Take 1 tablet (8 mg total) by mouth every 8 (eight) hours as needed for Nausea. 15 tablet 1    temazepam (RESTORIL) 15 mg Cap Take 1 capsule (15 mg total) by mouth nightly as needed (Sleep). 30 capsule 5    traMADoL (ULTRAM) 50 mg tablet Take 2 tablets (100 mg total) by mouth every 8 (eight) hours as needed for Pain. 120 tablet 5    triamcinolone acetonide 0.1% (KENALOG) 0.1 % cream Apply topically 2 (two) times daily. 15 g 2    diazePAM (VALIUM) 5 MG tablet Take 2 tablets 30 minutes prior to the MRI scan (Patient not taking: Reported on 10/4/2022) 2 tablet 0    ibuprofen (ADVIL,MOTRIN) 600 MG tablet Take 1 tablet (600 mg total) by mouth every 6 (six) hours as needed for Pain. (Patient not taking: Reported on 10/4/2022) 30 tablet 1    omeprazole (PRILOSEC) 20 MG capsule Take 20 mg by mouth daily as needed.      oxyCODONE-acetaminophen (PERCOCET) 5-325 mg per tablet Take 1 tablet by mouth every 4 (four) hours as needed for Pain. (Patient not taking: Reported on 10/4/2022) 28 tablet 0    ZANAFLEX 4 mg tablet Take 4 mg by mouth every 8 (eight) hours as needed.       No current facility-administered medications on file prior to visit.       Review of patient's allergies indicates:  No Known Allergies    Past Surgical History:  "  Procedure Laterality Date    ACROMIOPLASTY  6/9/2022    Procedure: ACROMIOPLASTY;  Surgeon: Louie Luna MD;  Location: [facility] OR;  Service: Orthopedics;;    ARTHROSCOPIC REPAIR OF ROTATOR CUFF OF SHOULDER Left 6/9/2022    Procedure: REPAIR, ROTATOR CUFF, ARTHROSCOPIC, extensive arthroscopic debridement;  Surgeon: Louie Luna MD;  Location: [facility] OR;  Service: Orthopedics;  Laterality: Left;  Smith-Nephew     ARTHROSCOPIC TENOTOMY OF BICEPS TENDON  6/9/2022    Procedure: TENOTOMY, BICEPS, ARTHROSCOPIC;  Surgeon: Louie Luna MD;  Location: [facility] OR;  Service: Orthopedics;;    ARTHROSCOPY OF SHOULDER WITH DECOMPRESSION OF SUBACROMIAL SPACE  6/9/2022    Procedure: ARTHROSCOPY, SHOULDER, WITH SUBACROMIAL SPACE DECOMPRESSION;  Surgeon: Louie Luna MD;  Location: [facility] OR;  Service: Orthopedics;;    CHOLECYSTECTOMY      CORONARY ANGIOGRAPHY N/A 4/19/2020    Procedure: ANGIOGRAM, CORONARY ARTERY;  Surgeon: Zachariah Goldman MD;  Location: [facility] CATH LAB;  Service: Cardiology;  Laterality: N/A;    LEFT HEART CATHETERIZATION Right 4/19/2020    Procedure: Left heart cath;  Surgeon: Zachariah Goldman MD;  Location: [facility] CATH LAB;  Service: Cardiology;  Laterality: Right;    SYNOVECTOMY OF SHOULDER  6/9/2022    Procedure: SYNOVECTOMY, SHOULDER;  Surgeon: Louie Luna MD;  [facility];  Service: Orthopedics;;    VASCULAR SURGERY         Family History   Problem Relation Age of Onset    Stroke Mother        Social History     Socioeconomic History    Marital status:    Tobacco Use    Smoking status: Never    Smokeless tobacco: Never   Substance and Sexual Activity    Alcohol use: Yes     Alcohol/week: 1.0 standard drink     Types: 1 Shots of liquor per week     Comment: occ    Drug use: No           Objective:      Vitals:    10/04/22 1022   BP: 123/76   Pulse: 75   Resp: 18   Weight: 90 kg (198 lb 6.6 oz)   Height: 5' 4.5" (1.638 m)   PainSc:   7   PainLoc: Foot    "     Physical Exam  Vitals reviewed.   Constitutional:       Appearance: She is well-developed.   Cardiovascular:      Pulses:           Dorsalis pedis pulses are 2+ on the right side and 2+ on the left side.        Posterior tibial pulses are 2+ on the right side and 2+ on the left side.   Musculoskeletal:         General: Tenderness (r heel) present.      Right ankle: Normal.      Left ankle: Normal.      Right foot: No swelling, deformity or crepitus.      Left foot: No swelling, deformity or crepitus.      Comments: Adequate joint range of motion without pain, limitation, nor crepitation Bilateral feet and ankle joints. Muscle strength is 5/5 in all groups bilaterally.         Pain on palpation of r  plantar heel consistent with location of patient's chief complaint. Negative Tinel's sign. No pain with lateral compression of heels.     Lymphadenopathy:      Comments: No palpable lymph nodes   Skin:     General: Skin is warm and dry.      Coloration: Skin is not jaundiced or mottled.      Findings: No erythema or rash.      Nails: There is no clubbing.   Neurological:      Mental Status: She is alert and oriented to person, place, and time.   Psychiatric:         Behavior: Behavior normal. Behavior is cooperative.             Assessment:       Encounter Diagnosis   Name Primary?    Plantar fasciitis - Right Foot Yes         Plan:       Dorothy was seen today for heel pain and ankle pain.    Diagnoses and all orders for this visit:    Plantar fasciitis - Right Foot      I counseled the patient on her conditions, their implications and medical management.    Discussed different treatment options for heel pain. including conservative and interventional.  I gave written and verbal instructions on heel cord stretching and this was demonstrated for the patient. Patient expressed understanding. Discussed wearing appropriate shoe gear and avoiding flats, slippers, sandals, barefoot, and sockfeet. Recommended arch supports.  My recommendation for OTC supports is Spenco polysorb replacement insoles or patient may elect more aggressive treatment with prescription arch supports. We also discussed cortisone injections and NSAID therapy.    Patient instructed on adequate icing techniques. Patient should ice the affected area at least once per day x 10 minutes for 10 days . I advised the  patient that extra icing would also be beneficial to ensure adequate anti inflammatory effect     Stretching handout dispensed to patient. Instructions on adequate stretching reviewed in clinic      Handout dispensed with information in regards to several type of Spenco Inserts along with purchasing information.      .

## 2022-10-13 ENCOUNTER — HOSPITAL ENCOUNTER (OUTPATIENT)
Dept: RADIOLOGY | Facility: HOSPITAL | Age: 67
Discharge: HOME | End: 2022-10-13
Attending: FAMILY MEDICINE
Payer: MEDICARE

## 2022-10-13 DIAGNOSIS — M54.31 SCIATICA OF RIGHT SIDE: ICD-10-CM

## 2022-10-13 PROCEDURE — 72100 XR LUMBAR SPINE AP AND LATERAL: ICD-10-PCS | Mod: 26,,, | Performed by: RADIOLOGY

## 2022-10-13 PROCEDURE — 72100 X-RAY EXAM L-S SPINE 2/3 VWS: CPT | Mod: TC,FY

## 2022-10-13 PROCEDURE — 72100 X-RAY EXAM L-S SPINE 2/3 VWS: CPT | Mod: 26,,, | Performed by: RADIOLOGY

## 2022-10-14 ENCOUNTER — CLINICAL SUPPORT (OUTPATIENT)
Dept: REHABILITATION | Facility: HOSPITAL | Age: 67
End: 2022-10-14
Payer: MEDICARE

## 2022-10-14 DIAGNOSIS — R53.1 DECREASED STRENGTH: Primary | ICD-10-CM

## 2022-10-14 DIAGNOSIS — M25.612 DECREASED RANGE OF MOTION OF LEFT SHOULDER: ICD-10-CM

## 2022-10-14 PROCEDURE — 97110 THERAPEUTIC EXERCISES: CPT | Mod: PN,CQ

## 2022-10-14 NOTE — PROGRESS NOTES
[facility]   Physical Therapy Treatment Note     Name: Dorothy Burt  Clinic Number: [id number]    Therapy Diagnosis:   Encounter Diagnoses   Name Primary?    Decreased strength Yes    Decreased range of motion of left shoulder      Physician: Louie Luna MD    Visit Date: 10/14/2022    Physician Orders: PT Eval and Treat   2-3 x per week x 6 weeks   Gentle passive elevation and ER, pendulums. No RTC strengthening     Medical Diagnosis from Referral:M75.112 (ICD-10-CM) - Nontraumatic incomplete tear of left rotator cuff S46.212A (ICD-10-CM) - Biceps tendon rupture, proximal, left, initial encounter M75.42 (ICD-10-CM) - Subacromial impingement, left   Evaluation Date: 6/17/2022  Authorization Period Expiration: 7/15/22  Plan of Care Expiration: 9/30/22; 11/25/22  Progress Note Due: 9/30/22  Visit # / Visits authorized: 28/32  FOTO: 9/10 - Next  PTA Visit: 1/5     Precautions: Standard (post-op protocol - surgery 6/9/22)    Time In: 2:05 pm   Time Out: 2:55pm  Total Billable Time: 30 1:1 and 25 supervised minutes (Tex2)    6/9/22 Procedure: Procedure(s):  1. Diagnostic arthroscopy left shoulder  2. Extensive synovectomy  3. Rotator interval release   4. Arthroscopic capsular release  5. Biceps tenotomy  6. Subacromial decompression  7. Acromioplasty   8. Rotator cuff repair     SUBJECTIVE     Pt reports: feeling more achy than pain.     She was compliant with home exercise program.  Response to previous treatment: 2 days of soreness, not severe pain  Functional change: improving arm use and motion    Pain: 1/10  Location: left shoulder      OBJECTIVE       Treatment     Dorothy received the treatments listed below:      Dorothy received the following manual therapy techniques: Joint mobilizations, Myofacial release and Soft tissue Mobilization were applied to the: left shoulder/elbow for 7 1:1 minutes, including:  Left shoulder humeral head posterior andinferior mobs, grade III-IV  Left  "shoulder PROM in all directions with grade IV GHJ mobs  Left shoulder scapular upward elevation mobs, grade III-IV    Dorothy received therapeutic exercises to develop strength, endurance, ROM, flexibility, posture and core stabilization for 23 1:1 and 25 supervised minutes including:    Pulleys: 2 minutes flexion, 2 minutes abduction    Supine shoulder flexion AROM: 2x10, 5'' holds 3#  Supine shoulder external rotation (90 degrees abd): 3x10 left, 1#  Side lying external rotation: 3x10, 1#  Sidelying shoulder abduction AROM: 2x10, 1#  Prone Rows: 2x10 left, 4#   Prone extension: 3x10 left    Prone T with palm down: 3x10 left     Assisted active seated flexion with cables: 3#, 3x10 (height number 3)  Wall walks: 15x5" holds     Not today:  Wall shoulder taps: 30x each  Prone Y: 2x5 left (small range of motion)  Chest press plus: 3x10, 4#    Theraband Isometric walkouts: all 15x red theraband   Flexion, Extension, Abduction, external rotation, internal rotation     Cold pack was applied for 00 minutes to left shoulder.    Patient Education and Home Exercises     Home Exercises Provided and Patient Education Provided   Education provided:   - anatomy and expectations with pain and symptoms    Written Home Exercises Provided: Patient instructed to cont prior HEP. Exercises were reviewed and Dorothy was able to demonstrate them prior to the end of the session.  Dorothy demonstrated good  understanding of the education provided. See EMR under Patient Instructions for exercises provided during therapy sessions    ASSESSMENT     Dorothy is a 67 y.o. female referred to outpatient Physical Therapy with a medical diagnosis of noncomplete rotator cuff tear, biceps tendon rupture and subacromial impingement. Pt presents with status post left rotator cuff repair (supraspinatus), acromioplasty, biceps tenotomy, subchondral decompression, synovectomy, capsular release on 6/9/22 (week 15).   Patient had no difficulty with today's progressions " noted in bold.    Dorothy Is progressing well towards her goals.   Pt prognosis is Good.     Pt will continue to benefit from skilled outpatient physical therapy to address the deficits listed in the problem list box on initial evaluation, provide pt/family education and to maximize pt's level of independence in the home and community environment.     Pt's spiritual, cultural and educational needs considered and pt agreeable to plan of care and goals.     Anticipated Barriers for therapy: failed conservative treatment     Goals:   Short Term Goals: 4 weeks   1.  Patient will report < 6/10 shoulder pain at worst for improved ADL performance. MET  2.  Patient will demonstrate L shoulder flexion PROM > 140 deg to improve overhead reach. MET 8/11/22  3.  Patient will demonstrate left shoulder external rotation PROM > 40 degree. MET 7/7/22  4.  Patient will demonstrate left  strength > 45 pounds.  MET 8/11/22     Long Term Goals: 12 weeks   1. Patient will report ability to wash her hair without restriction. PROGRESSING; NOT MET  2. Patient will demonstrate ability to reach overhead and remove a 2 lb object 5 times to simulate removing objects from an overhead shelf w/o an increase in symptoms. PROGRESSING; NOT MET  3. Patient will demonstrate all left shoulder AROM within 85% of right for improved ADL performance. PROGRESSING; NOT MET  4. Patient will improve FOTO to < 38% to improve ADL performance. PROGRESSING; NOT MET    PLAN     Cont with POC with phase 3 of rehab protocol     Plan of care Certification: 9/30/22 to 11/25/22    Ant Cohen, PTA

## 2022-10-17 ENCOUNTER — CLINICAL SUPPORT (OUTPATIENT)
Dept: REHABILITATION | Facility: HOSPITAL | Age: 67
End: 2022-10-17
Payer: MEDICARE

## 2022-10-17 DIAGNOSIS — R53.1 DECREASED STRENGTH: Primary | ICD-10-CM

## 2022-10-17 DIAGNOSIS — M46.1 SACROILIITIS: ICD-10-CM

## 2022-10-17 DIAGNOSIS — M25.612 DECREASED RANGE OF MOTION OF LEFT SHOULDER: ICD-10-CM

## 2022-10-17 PROCEDURE — 97110 THERAPEUTIC EXERCISES: CPT | Mod: PN,CQ

## 2022-10-17 NOTE — PROGRESS NOTES
[facility]   Physical Therapy Treatment Note     Name: Dorothy Burt  Clinic Number: [id number]    Therapy Diagnosis:   Encounter Diagnoses   Name Primary?    Decreased strength Yes    Decreased range of motion of left shoulder      Physician: Louie Luna MD    Visit Date: 10/17/2022    Physician Orders: PT Eval and Treat   2-3 x per week x 6 weeks   Gentle passive elevation and ER, pendulums. No RTC strengthening     Medical Diagnosis from Referral:M75.112 (ICD-10-CM) - Nontraumatic incomplete tear of left rotator cuff S46.212A (ICD-10-CM) - Biceps tendon rupture, proximal, left, initial encounter M75.42 (ICD-10-CM) - Subacromial impingement, left   Evaluation Date: 6/17/2022  Authorization Period Expiration: 7/15/22  Plan of Care Expiration: 9/30/22; 11/25/22  Progress Note Due: 9/30/22  Visit # / Visits authorized: 29/32  FOTO: 10/10 - Next  PTA Visit: 2/5     Precautions: Standard (post-op protocol - surgery 6/9/22)    Time In: 1:00 pm   Time Out: 1:55 pm  Total Billable Time: 30 1:1 and 25 supervised minutes (Tex2)    6/9/22 Procedure: Procedure(s):  1. Diagnostic arthroscopy left shoulder  2. Extensive synovectomy  3. Rotator interval release   4. Arthroscopic capsular release  5. Biceps tenotomy  6. Subacromial decompression  7. Acromioplasty   8. Rotator cuff repair     SUBJECTIVE     Pt reports: feeling more achy than pain.     She was compliant with home exercise program.  Response to previous treatment: 2 days of soreness, not severe pain  Functional change: improving arm use and motion    Pain: 1/10  Location: left shoulder      OBJECTIVE       Treatment     Dorothy received the treatments listed below:      Dorothy received the following manual therapy techniques: Joint mobilizations, Myofacial release and Soft tissue Mobilization were applied to the: left shoulder/elbow for 7 1:1 minutes, including:  Left shoulder humeral head posterior andinferior mobs, grade  "III-IV  Left shoulder PROM in all directions with grade IV GHJ mobs  Left shoulder scapular upward elevation mobs, grade III-IV    Dorothy received therapeutic exercises to develop strength, endurance, ROM, flexibility, posture and core stabilization for 23 1:1 and 25 supervised minutes including:    Pulleys: 2 minutes flexion, 2 minutes abduction    Supine shoulder flexion AROM: 2x10, 5'' holds 3#  Supine shoulder external rotation (90 degrees abd): 3x10 left, 1#  Side lying external rotation: 3x10, 1#  Sidelying shoulder abduction AROM: 3x10, 1#  Prone Rows: 3x10 left, 4#   Prone extension: 3x10 left   Prone T with palm down: 3x10 left     Assisted active seated flexion with cables: 3#, 3x10 (height number 3)  Wall walks: 15x5" holds     Not today:  Wall shoulder taps: 30x each  Prone Y: 2x5 left (small range of motion)  Chest press plus: 3x10, 4#    Theraband Isometric walkouts: all 15x red theraband   Flexion, Extension, Abduction, external rotation, internal rotation     Cold pack was applied for 00 minutes to left shoulder.    Patient Education and Home Exercises     Home Exercises Provided and Patient Education Provided   Education provided:   - anatomy and expectations with pain and symptoms    Written Home Exercises Provided: Patient instructed to cont prior HEP. Exercises were reviewed and Dorothy was able to demonstrate them prior to the end of the session.  Dorothy demonstrated good  understanding of the education provided. See EMR under Patient Instructions for exercises provided during therapy sessions    ASSESSMENT     Dorothy is a 67 y.o. female referred to outpatient Physical Therapy with a medical diagnosis of noncomplete rotator cuff tear, biceps tendon rupture and subacromial impingement. Pt presents with status post left rotator cuff repair (supraspinatus), acromioplasty, biceps tenotomy, subchondral decompression, synovectomy, capsular release on 6/9/22 (week 15).   Patient had no difficulty with today's " progressions noted in bold.    Dorothy Is progressing well towards her goals.   Pt prognosis is Good.     Pt will continue to benefit from skilled outpatient physical therapy to address the deficits listed in the problem list box on initial evaluation, provide pt/family education and to maximize pt's level of independence in the home and community environment.     Pt's spiritual, cultural and educational needs considered and pt agreeable to plan of care and goals.     Anticipated Barriers for therapy: failed conservative treatment     Goals:   Short Term Goals: 4 weeks   1.  Patient will report < 6/10 shoulder pain at worst for improved ADL performance. MET  2.  Patient will demonstrate L shoulder flexion PROM > 140 deg to improve overhead reach. MET 8/11/22  3.  Patient will demonstrate left shoulder external rotation PROM > 40 degree. MET 7/7/22  4.  Patient will demonstrate left  strength > 45 pounds.  MET 8/11/22     Long Term Goals: 12 weeks   1. Patient will report ability to wash her hair without restriction. PROGRESSING; NOT MET  2. Patient will demonstrate ability to reach overhead and remove a 2 lb object 5 times to simulate removing objects from an overhead shelf w/o an increase in symptoms. PROGRESSING; NOT MET  3. Patient will demonstrate all left shoulder AROM within 85% of right for improved ADL performance. PROGRESSING; NOT MET  4. Patient will improve FOTO to < 38% to improve ADL performance. PROGRESSING; NOT MET    PLAN     Cont with POC with phase 3 of rehab protocol     Plan of care Certification: 9/30/22 to 11/25/22    Ant Cohen, PTA

## 2022-10-18 ENCOUNTER — OFFICE VISIT (OUTPATIENT)
Dept: ORTHOPEDICS | Facility: CLINIC | Age: 67
End: 2022-10-18
Payer: MEDICARE

## 2022-10-18 ENCOUNTER — HOSPITAL ENCOUNTER (OUTPATIENT)
Dept: RADIOLOGY | Facility: HOSPITAL | Age: 67
Discharge: HOME | End: 2022-10-18
Attending: STUDENT IN AN ORGANIZED HEALTH CARE EDUCATION/TRAINING PROGRAM
Payer: MEDICARE

## 2022-10-18 ENCOUNTER — OFFICE VISIT (OUTPATIENT)
Dept: PODIATRY | Facility: CLINIC | Age: 67
End: 2022-10-18
Payer: MEDICARE

## 2022-10-18 VITALS
DIASTOLIC BLOOD PRESSURE: 83 MMHG | HEART RATE: 91 BPM | HEIGHT: 65 IN | SYSTOLIC BLOOD PRESSURE: 122 MMHG | WEIGHT: 195.56 LBS | BODY MASS INDEX: 32.58 KG/M2

## 2022-10-18 VITALS
BODY MASS INDEX: 33.05 KG/M2 | SYSTOLIC BLOOD PRESSURE: 122 MMHG | DIASTOLIC BLOOD PRESSURE: 83 MMHG | HEIGHT: 65 IN | HEART RATE: 91 BPM

## 2022-10-18 DIAGNOSIS — Z98.890 S/P LEFT ROTATOR CUFF REPAIR: Primary | ICD-10-CM

## 2022-10-18 DIAGNOSIS — M79.675 PAIN OF TOE OF LEFT FOOT: ICD-10-CM

## 2022-10-18 DIAGNOSIS — M79.675 PAIN OF TOE OF LEFT FOOT: Primary | ICD-10-CM

## 2022-10-18 DIAGNOSIS — M72.2 PLANTAR FASCIITIS: ICD-10-CM

## 2022-10-18 PROCEDURE — 3079F PR MOST RECENT DIASTOLIC BLOOD PRESSURE 80-89 MM HG: ICD-10-PCS | Mod: CPTII,S$GLB,, | Performed by: STUDENT IN AN ORGANIZED HEALTH CARE EDUCATION/TRAINING PROGRAM

## 2022-10-18 PROCEDURE — 1159F MED LIST DOCD IN RCRD: CPT | Mod: CPTII,S$GLB,, | Performed by: STUDENT IN AN ORGANIZED HEALTH CARE EDUCATION/TRAINING PROGRAM

## 2022-10-18 PROCEDURE — 1125F AMNT PAIN NOTED PAIN PRSNT: CPT | Mod: CPTII,S$GLB,, | Performed by: STUDENT IN AN ORGANIZED HEALTH CARE EDUCATION/TRAINING PROGRAM

## 2022-10-18 PROCEDURE — 3288F PR FALLS RISK ASSESSMENT DOCUMENTED: ICD-10-PCS | Mod: CPTII,S$GLB,, | Performed by: ORTHOPAEDIC SURGERY

## 2022-10-18 PROCEDURE — 99999 PR PBB SHADOW E&M-EST. PATIENT-LVL III: CPT | Mod: PBBFAC,,, | Performed by: STUDENT IN AN ORGANIZED HEALTH CARE EDUCATION/TRAINING PROGRAM

## 2022-10-18 PROCEDURE — 1125F PR PAIN SEVERITY QUANTIFIED, PAIN PRESENT: ICD-10-PCS | Mod: CPTII,S$GLB,, | Performed by: ORTHOPAEDIC SURGERY

## 2022-10-18 PROCEDURE — 3079F DIAST BP 80-89 MM HG: CPT | Mod: CPTII,S$GLB,, | Performed by: STUDENT IN AN ORGANIZED HEALTH CARE EDUCATION/TRAINING PROGRAM

## 2022-10-18 PROCEDURE — 3074F PR MOST RECENT SYSTOLIC BLOOD PRESSURE < 130 MM HG: ICD-10-PCS | Mod: CPTII,S$GLB,, | Performed by: ORTHOPAEDIC SURGERY

## 2022-10-18 PROCEDURE — 3074F PR MOST RECENT SYSTOLIC BLOOD PRESSURE < 130 MM HG: ICD-10-PCS | Mod: CPTII,S$GLB,, | Performed by: STUDENT IN AN ORGANIZED HEALTH CARE EDUCATION/TRAINING PROGRAM

## 2022-10-18 PROCEDURE — 99213 PR OFFICE/OUTPT VISIT, EST, LEVL III, 20-29 MIN: ICD-10-PCS | Mod: S$GLB,,, | Performed by: ORTHOPAEDIC SURGERY

## 2022-10-18 PROCEDURE — 99999 PR PBB SHADOW E&M-EST. PATIENT-LVL III: ICD-10-PCS | Mod: PBBFAC,,, | Performed by: STUDENT IN AN ORGANIZED HEALTH CARE EDUCATION/TRAINING PROGRAM

## 2022-10-18 PROCEDURE — 99999 PR PBB SHADOW E&M-EST. PATIENT-LVL III: ICD-10-PCS | Mod: PBBFAC,,, | Performed by: ORTHOPAEDIC SURGERY

## 2022-10-18 PROCEDURE — 3074F SYST BP LT 130 MM HG: CPT | Mod: CPTII,S$GLB,, | Performed by: STUDENT IN AN ORGANIZED HEALTH CARE EDUCATION/TRAINING PROGRAM

## 2022-10-18 PROCEDURE — 1159F PR MEDICATION LIST DOCUMENTED IN MEDICAL RECORD: ICD-10-PCS | Mod: CPTII,S$GLB,, | Performed by: STUDENT IN AN ORGANIZED HEALTH CARE EDUCATION/TRAINING PROGRAM

## 2022-10-18 PROCEDURE — 99214 OFFICE O/P EST MOD 30 MIN: CPT | Mod: S$GLB,,, | Performed by: STUDENT IN AN ORGANIZED HEALTH CARE EDUCATION/TRAINING PROGRAM

## 2022-10-18 PROCEDURE — 1125F AMNT PAIN NOTED PAIN PRSNT: CPT | Mod: CPTII,S$GLB,, | Performed by: ORTHOPAEDIC SURGERY

## 2022-10-18 PROCEDURE — 3074F SYST BP LT 130 MM HG: CPT | Mod: CPTII,S$GLB,, | Performed by: ORTHOPAEDIC SURGERY

## 2022-10-18 PROCEDURE — 73630 XR FOOT COMPLETE 3 VIEW LEFT: ICD-10-PCS | Mod: 26,LT,, | Performed by: RADIOLOGY

## 2022-10-18 PROCEDURE — 3079F DIAST BP 80-89 MM HG: CPT | Mod: CPTII,S$GLB,, | Performed by: ORTHOPAEDIC SURGERY

## 2022-10-18 PROCEDURE — 3079F PR MOST RECENT DIASTOLIC BLOOD PRESSURE 80-89 MM HG: ICD-10-PCS | Mod: CPTII,S$GLB,, | Performed by: ORTHOPAEDIC SURGERY

## 2022-10-18 PROCEDURE — 99213 OFFICE O/P EST LOW 20 MIN: CPT | Mod: S$GLB,,, | Performed by: ORTHOPAEDIC SURGERY

## 2022-10-18 PROCEDURE — 1101F PT FALLS ASSESS-DOCD LE1/YR: CPT | Mod: CPTII,S$GLB,, | Performed by: ORTHOPAEDIC SURGERY

## 2022-10-18 PROCEDURE — 3288F FALL RISK ASSESSMENT DOCD: CPT | Mod: CPTII,S$GLB,, | Performed by: ORTHOPAEDIC SURGERY

## 2022-10-18 PROCEDURE — 1101F PR PT FALLS ASSESS DOC 0-1 FALLS W/OUT INJ PAST YR: ICD-10-PCS | Mod: CPTII,S$GLB,, | Performed by: ORTHOPAEDIC SURGERY

## 2022-10-18 PROCEDURE — 73630 X-RAY EXAM OF FOOT: CPT | Mod: TC,FY,LT

## 2022-10-18 PROCEDURE — 99214 PR OFFICE/OUTPT VISIT, EST, LEVL IV, 30-39 MIN: ICD-10-PCS | Mod: S$GLB,,, | Performed by: STUDENT IN AN ORGANIZED HEALTH CARE EDUCATION/TRAINING PROGRAM

## 2022-10-18 PROCEDURE — 99999 PR PBB SHADOW E&M-EST. PATIENT-LVL III: CPT | Mod: PBBFAC,,, | Performed by: ORTHOPAEDIC SURGERY

## 2022-10-18 PROCEDURE — 73630 X-RAY EXAM OF FOOT: CPT | Mod: 26,LT,, | Performed by: RADIOLOGY

## 2022-10-18 PROCEDURE — 1125F PR PAIN SEVERITY QUANTIFIED, PAIN PRESENT: ICD-10-PCS | Mod: CPTII,S$GLB,, | Performed by: STUDENT IN AN ORGANIZED HEALTH CARE EDUCATION/TRAINING PROGRAM

## 2022-10-18 NOTE — PROGRESS NOTES
Subjective:      Patient ID: Dorothy Burt is a 67 y.o. female.    Chief Complaint: Foot Problem (Right ft.,  left ft. She hit her 5th toe this morning on a bed), Foot Pain, and Follow-up    Dorothy is a 67 y.o. female  has a past medical history of Arthritis, Asthma, Back pain, Knee pain, and Thyroid disease.who presents to the podiatry clinic  with complaint of  left foot pain. Onset of the symptoms was several weeks ago. Precipitating event: was working outside and she believes this made the pain worse. Current symptoms include: worsening symptoms after a period of activity. Aggravating factors: any weight bearing. Symptoms have gradually worsened. Patient has had prior foot problems. Evaluation to date: none. Treatment to date: none. Patients rates pain 10/10 on pain scale.    11/15/21: Pt seen today, states she completed medrol dose pack which helped, however boot recently started to make pain worse and she had to remove it.  States she has been off of her foot recently and it has felt much better. Pain is currently 6/10. No other pedal complaints.     10/18/22: Seen today for right heel pain, sharp and worse in the morning, and new pain to left 5th toe, relates she hit her toe recently and it is still hurting. Denies open wounds. Relates she is out of Ibuprofen, requesting for refill. No further pedal complaints.         Review of Systems   Constitutional: Negative for chills, decreased appetite, diaphoresis and fever.   HENT:  Negative for congestion and hearing loss.    Cardiovascular:  Negative for chest pain, claudication, leg swelling and syncope.   Respiratory:  Negative for cough and shortness of breath.    Skin:  Negative for color change, dry skin, flushing, itching, nail changes, poor wound healing and rash.   Musculoskeletal:  Positive for back pain and joint pain.   Gastrointestinal:  Negative for nausea and vomiting.   Neurological:  Negative for focal weakness, numbness, paresthesias and  weakness.   Psychiatric/Behavioral:  Negative for altered mental status. The patient is not nervous/anxious.          Objective:      Physical Exam  Constitutional:       General: She is not in acute distress.     Appearance: She is well-developed. She is not diaphoretic.   Cardiovascular:      Comments: Dorsalis pedis and posterior tibial pulses are within normal limits. Skin temperature is within normal limits. Toes are cool to touch and feet are warm proximally. Hair growth is within normal limits. Skin is normotrophic and without hyperpigmentation. No edema noted. No spider veins or varicosities noted, bilaterally.     Musculoskeletal:         General: Tenderness present.      Comments: Adequate joint range of motion without pain, limitation, nor crepitation to bilateral feet and ankle joints. Muscle strength is 5/5 in all groups bilaterally.    Tenderness to right plantar heel at insertion of plantar fascia to medial tuberosity of the calcaneus     Tenderness to distal left 5th digit with mild edema and erythema noted. Toenail appears intact, no open wounds or signs of infection.     Neutral arches noted, bilaterally     Lymphadenopathy:      Comments: Negative lymphangitic streaking    Skin:     General: Skin is warm and dry.      Findings: No lesion.      Comments: Skin is warm and dry, no acute signs of infection noted. No open wounds, macerations or hyperkeratotic lesions, bilaterally.     Toenails are well trimmed and of normal morphology, bilaterally.        Neurological:      Mental Status: She is alert and oriented to person, place, and time.      Sensory: No sensory deficit.      Motor: No abnormal muscle tone.      Comments: Light touch within normal limits.   Psychiatric:         Behavior: Behavior normal.         Thought Content: Thought content normal.         Judgment: Judgment normal.             Assessment:       Encounter Diagnoses   Name Primary?    Pain of toe of left foot Yes    Plantar  fasciitis          Plan:       Dorothy was seen today for foot problem, foot pain and follow-up.    Diagnoses and all orders for this visit:    Pain of toe of left foot  -     X-Ray Foot Complete Left; Future    Plantar fasciitis  -     PT evaluate and treat; Future    Other orders  -     meloxicam (MOBIC) 7.5 MG tablet; Take 1 tablet (7.5 mg total) by mouth once daily.    I counseled the patient on her conditions, their implications and medical management.    Xray ordered left foot for 5th toe pain, recommend supportive tennis shoes with wide toe box at all times while ambulating. May nina tape 4th to 5th digit, instructed not to wrap too tightly.     RICE PRN    Recommend supportive tennis shoes at all times while ambulating, consider OTC arch supports    PT referral placed for plantar fasciitis     RTC in 6-8 weeks, sooner PRN

## 2022-10-19 ENCOUNTER — CLINICAL SUPPORT (OUTPATIENT)
Dept: REHABILITATION | Facility: HOSPITAL | Age: 67
End: 2022-10-19
Payer: MEDICARE

## 2022-10-19 DIAGNOSIS — G89.29 CHRONIC PAIN OF RIGHT HEEL: ICD-10-CM

## 2022-10-19 DIAGNOSIS — M25.561 CHRONIC PAIN OF RIGHT KNEE: ICD-10-CM

## 2022-10-19 DIAGNOSIS — M54.41 CHRONIC RIGHT-SIDED LOW BACK PAIN WITH RIGHT-SIDED SCIATICA: Primary | ICD-10-CM

## 2022-10-19 DIAGNOSIS — M79.671 CHRONIC PAIN OF RIGHT HEEL: ICD-10-CM

## 2022-10-19 DIAGNOSIS — G89.29 CHRONIC PAIN OF RIGHT KNEE: ICD-10-CM

## 2022-10-19 DIAGNOSIS — G89.29 CHRONIC RIGHT-SIDED LOW BACK PAIN WITH RIGHT-SIDED SCIATICA: Primary | ICD-10-CM

## 2022-10-19 PROCEDURE — 97811 ACUP 1/> W/O ESTIM EA ADD 15: CPT

## 2022-10-19 PROCEDURE — 97810 ACUP 1/> WO ESTIM 1ST 15 MIN: CPT

## 2022-10-19 RX ORDER — MELOXICAM 7.5 MG/1
7.5 TABLET ORAL DAILY
Qty: 30 TABLET | Refills: 0 | Status: SHIPPED | OUTPATIENT
Start: 2022-10-19 | End: 2023-01-17

## 2022-10-19 NOTE — PROGRESS NOTES
"  Acupuncture Evaluation Note     Name: Dorothy Burt  Clinic Number: [id number]    TCM Diagnosis: Qi and Blood stagnation in Right Lower Back, in Right Thigh, in Right Knee and in Right Heel  Physician: Self, Aaareferral    Date of Service: 10/19/2022     Medical Diagnosis: Pain in Right Lower Back radiating pavithra to Right Thigh, Pain in Right Knee, Right Heel      Evaluation Date: 10/19/2022    Plan of Care Certification Period: 12  Visit #/Visits authorized: 1/ 12     Precautions: Standard    Subjective     Chief Concern: Pain in Right Lower Back radiating down to Right Thigh, Pain in Right Knee, Right Heel    Onset:  Right Lower Back Pain radiating down to Right Thigh for 1 year, Knee Pain for 45 days, Heel Pain for 1 month                  Progression since onset:  has worsened    Aggravating Factors:  movement, standing, and changing positions   Relieving Factors:  ice and rest    Quality:  Burning, Throbbing, and Shooting    Radiation: Down to right thigh    Severity:  9    Frequency:  continuously    Treatments Tried:  Therapy  and Acupuncture    Diet/Taste/Fluids:  in general, a "healthy" diet  /is drinking plenty of fluids/coffee 2 /day    Digestion:  Acid Reflux    Head and Body: WNL    Sleep: WNL    Exercise:  Doing some exercise    Energy Levels:  6/10    Emotional Symptoms:  None    Women's Symptoms:  None    Objective     Tongue:  Pink, big with thick yellow fur    Pulse:  Slight wiry    Observation:  Looks healthy    Treatment     Treatment:  Increasing Qi and Blood in Right Lower Back, in Right Thigh, in Right Knee, and in Right Heel    Acupuncture points used:      Right:  UB 54, GB 30, Aimee on lower back + 4, GB 31, GB 32, SP 9, Aimee on knee + 4, UB 60, SP 5 Aimee on lower leg + 6, Aimee on heel + 2    Auricular Treatment: None    NEEDLES IN: 23    NEEDLES OUT: 23    NEEDLES W/O STIM  AT: 8:10 AM    NEEDLES W/O STIM REMOVED AT: 8:50 AM    Modalities:  None    Assessment     Patient felt very good "     Cause of Condition: Chronic muscle injury    Associated Risk Factors:  None    Traditional Chinese Medicine Diagnosis:  Qi and Blood stagnation in Right Lower Back, in Right Knee and in Right Heel    Patient prognosis is Excellent.     Patient will continue to benefit from acupuncture treatment to address the deficits listed in the problem list box on initial evaluation, provide patient family education and to maximize pt's level of independence in the home and community environment.     Patient's spiritual, cultural and educational needs considered and pt agreeable to plan of care and goals.     Anticipated barriers to treatment: None    Plan     Recommend 1 sessions per week for 12 sessions then re-assess.      Recommendations:  PT    Education:  Patient does understand the nature of acupuncture treatment

## 2022-10-19 NOTE — PROGRESS NOTES
Patient ID:   Dorothy Burt is a 67 y.o. female.    Chief Complaint:   4.5 months status post left arthroscopic rotator cuff repair and extensive arthroscopic debridement    HPI:   The patient is returning today for evaluation of the left shoulder.  Pain level is 1/10.  She reports some occasional discomfort but overall feels that she is progressing well.    Medications:    Current Outpatient Medications:     acyclovir (ZOVIRAX) 400 MG tablet, TAKE 1 BY MOUTH 5 TIMES DAILY FOR 5 DAYS, Disp: 25 tablet, Rfl: 2    albuterol (PROAIR HFA) 90 mcg/actuation inhaler, Inhale 2 puffs into the lungs every 6 (six) hours as needed for Wheezing or Shortness of Breath. Rescue, Disp: 18 g, Rfl: 11    budesonide-formoterol 160-4.5 mcg (SYMBICORT) 160-4.5 mcg/actuation HFAA, Inhale 2 puffs into the lungs every evening. Controller, Disp: , Rfl:     diclofenac sodium (VOLTAREN) 1 % Gel, Apply 2 g topically 2 (two) times daily as needed (to knees)., Disp: 100 g, Rfl: 2    famotidine (PEPCID) 20 MG tablet, TAKE 1 TABLET BY MOUTH TWICE A DAY, Disp: 30 tablet, Rfl: 11    ibuprofen (ADVIL,MOTRIN) 600 MG tablet, Take 1 tablet (600 mg total) by mouth every 6 (six) hours as needed for Pain., Disp: 30 tablet, Rfl: 1    levocetirizine (XYZAL) 5 MG tablet, TAKE 1 TABLET BY MOUTH EVERY DAY AS NEEDED, Disp: 90 tablet, Rfl: 3    levothyroxine (SYNTHROID) 25 MCG tablet, TAKE 1.5 TABLET BY MOUTH DAILY, Disp: 135 tablet, Rfl: 3    loratadine (CLARITIN) 10 mg tablet, Take 1 tablet (10 mg total) by mouth once daily., Disp: 30 tablet, Rfl: 2    naproxen (NAPROSYN) 500 MG tablet, Take 1 tablet (500 mg total) by mouth 2 (two) times daily as needed., Disp: 60 tablet, Rfl: 0    omeprazole (PRILOSEC) 20 MG capsule, Take 20 mg by mouth daily as needed., Disp: , Rfl:     traMADoL (ULTRAM) 50 mg tablet, Take 2 tablets (100 mg total) by mouth every 8 (eight) hours as needed for Pain., Disp: 120 tablet, Rfl: 5    ZANAFLEX 4 mg tablet, Take 4 mg by mouth every 8  "(eight) hours as needed., Disp: , Rfl:     Allergies:  Review of patient's allergies indicates:  No Known Allergies    Vitals:  /83 (BP Location: Left arm, Patient Position: Sitting, BP Method: Small (Automatic))   Pulse 91   Ht 5' 4.5" (1.638 m)   Wt 88.7 kg (195 lb 8.8 oz)   LMP 08/15/2005   BMI 33.05 kg/m²     Physical Examination:  Ortho Exam   Left shoulder exam:   Range of motion:  Passive elevation 170, active elevation 150, external rotation 30  Rotator cuff strength exam 4+ out of 5 elevation, 5/5 internal and external rotation.    Assessment:  1. S/P left rotator cuff repair        Plan:  Patient will continue physical therapy to work on her range of motion and strength.  Follow-up in 6 weeks for repeat evaluation       No follow-ups on file.          "

## 2022-10-21 ENCOUNTER — TELEPHONE (OUTPATIENT)
Dept: REHABILITATION | Facility: HOSPITAL | Age: 67
End: 2022-10-21
Payer: MEDICARE

## 2022-10-21 NOTE — TELEPHONE ENCOUNTER
Called regarding today's No Show, left a message on her voicemail reminding her that her next appointment is on 10/25/22 @ 10:30.

## 2022-10-24 ENCOUNTER — OFFICE VISIT (OUTPATIENT)
Dept: DERMATOLOGY | Facility: CLINIC | Age: 67
End: 2022-10-24
Payer: MEDICARE

## 2022-10-24 DIAGNOSIS — D18.01 CHERRY ANGIOMA: ICD-10-CM

## 2022-10-24 DIAGNOSIS — Z76.89 ENCOUNTER FOR SKIN CARE: ICD-10-CM

## 2022-10-24 DIAGNOSIS — L90.5 SCAR: Primary | ICD-10-CM

## 2022-10-24 DIAGNOSIS — L81.4 LENTIGO: ICD-10-CM

## 2022-10-24 PROCEDURE — 99213 OFFICE O/P EST LOW 20 MIN: CPT | Mod: S$GLB,,, | Performed by: DERMATOLOGY

## 2022-10-24 PROCEDURE — 1101F PR PT FALLS ASSESS DOC 0-1 FALLS W/OUT INJ PAST YR: ICD-10-PCS | Mod: CPTII,S$GLB,, | Performed by: DERMATOLOGY

## 2022-10-24 PROCEDURE — 99999 PR PBB SHADOW E&M-EST. PATIENT-LVL III: ICD-10-PCS | Mod: PBBFAC,,, | Performed by: DERMATOLOGY

## 2022-10-24 PROCEDURE — 1159F MED LIST DOCD IN RCRD: CPT | Mod: CPTII,S$GLB,, | Performed by: DERMATOLOGY

## 2022-10-24 PROCEDURE — 3288F PR FALLS RISK ASSESSMENT DOCUMENTED: ICD-10-PCS | Mod: CPTII,S$GLB,, | Performed by: DERMATOLOGY

## 2022-10-24 PROCEDURE — 3288F FALL RISK ASSESSMENT DOCD: CPT | Mod: CPTII,S$GLB,, | Performed by: DERMATOLOGY

## 2022-10-24 PROCEDURE — 99213 PR OFFICE/OUTPT VISIT, EST, LEVL III, 20-29 MIN: ICD-10-PCS | Mod: S$GLB,,, | Performed by: DERMATOLOGY

## 2022-10-24 PROCEDURE — 1126F AMNT PAIN NOTED NONE PRSNT: CPT | Mod: CPTII,S$GLB,, | Performed by: DERMATOLOGY

## 2022-10-24 PROCEDURE — 1101F PT FALLS ASSESS-DOCD LE1/YR: CPT | Mod: CPTII,S$GLB,, | Performed by: DERMATOLOGY

## 2022-10-24 PROCEDURE — 99999 PR PBB SHADOW E&M-EST. PATIENT-LVL III: CPT | Mod: PBBFAC,,, | Performed by: DERMATOLOGY

## 2022-10-24 PROCEDURE — 1126F PR PAIN SEVERITY QUANTIFIED, NO PAIN PRESENT: ICD-10-PCS | Mod: CPTII,S$GLB,, | Performed by: DERMATOLOGY

## 2022-10-24 PROCEDURE — 1159F PR MEDICATION LIST DOCUMENTED IN MEDICAL RECORD: ICD-10-PCS | Mod: CPTII,S$GLB,, | Performed by: DERMATOLOGY

## 2022-10-24 NOTE — PROGRESS NOTES
Subjective:       Patient ID:  Dorothy Burt is a 67 y.o. female who presents for   Chief Complaint   Patient presents with    Follow-up     Nose     Follow-up - Follow-up  Symptom course: improving  Affected locations: nose  Signs / symptoms: asymptomatic  Severity: mild    Review of Systems   Constitutional:  Negative for fever and chills.   HENT:  Negative for sore throat.    Respiratory:  Negative for cough.       Objective:    Physical Exam   Constitutional: She appears well-developed and well-nourished.   Eyes: No conjunctival no injection.   Neurological: She is alert and oriented to person, place, and time.   Psychiatric: She has a normal mood and affect.   Skin:   Areas Examined (abnormalities noted in diagram):   Head / Face Inspection Performed            Diagram Legend     Erythematous scaling macule/papule c/w actinic keratosis       Vascular papule c/w angioma      Pigmented verrucoid papule/plaque c/w seborrheic keratosis      Yellow umbilicated papule c/w sebaceous hyperplasia      Irregularly shaped tan macule c/w lentigo     1-2 mm smooth white papules consistent with Milia      Movable subcutaneous cyst with punctum c/w epidermal inclusion cyst      Subcutaneous movable cyst c/w pilar cyst      Firm pink to brown papule c/w dermatofibroma      Pedunculated fleshy papule(s) c/w skin tag(s)      Evenly pigmented macule c/w junctional nevus     Mildly variegated pigmented, slightly irregular-bordered macule c/w mildly atypical nevus      Flesh colored to evenly pigmented papule c/w intradermal nevus       Pink pearly papule/plaque c/w basal cell carcinoma      Erythematous hyperkeratotic cursted plaque c/w SCC      Surgical scar with no sign of skin cancer recurrence      Open and closed comedones      Inflammatory papules and pustules      Verrucoid papule consistent consistent with wart     Erythematous eczematous patches and plaques     Dystrophic onycholytic nail with subungual debris c/w  onychomycosis     Umbilicated papule    Erythematous-base heme-crusted tan verrucoid plaque consistent with inflamed seborrheic keratosis     Erythematous Silvery Scaling Plaque c/w Psoriasis     See annotation      Assessment / Plan:        Scar  Condition is stable.  We will continue present management.  Patient and or guardian to monitor this area/lesion or these areas/lesions for changes or worsening or darkening (for moles and freckles).  Patient and or guardian to contact us if any changes are noted for such.  Okay to cont sal acid wash for texture.    Cherry angioma  Discussed with patient the benign nature of these lesions and that no treatment is indicated.    Encounter for skin care  Recommend Glytone products for proven effects on collagen production with alpha hydroxy products.    Lentigo  Discussed with patient the benign nature of these lesions and that no treatment is indicated.  Cont sal acid vs glytone.  Patient instructed in importance in daily sun protection. Sun avoidance and topical protection discussed.     Patient encouraged to wear hat for all outdoor exposure.     Also discussed sun protective clothing.           Follow up in about 6 months (around 4/24/2023).

## 2022-10-25 ENCOUNTER — CLINICAL SUPPORT (OUTPATIENT)
Dept: REHABILITATION | Facility: HOSPITAL | Age: 67
End: 2022-10-25
Payer: MEDICARE

## 2022-10-25 DIAGNOSIS — M25.612 DECREASED RANGE OF MOTION OF LEFT SHOULDER: ICD-10-CM

## 2022-10-25 DIAGNOSIS — R53.1 DECREASED STRENGTH: Primary | ICD-10-CM

## 2022-10-25 PROCEDURE — 97110 THERAPEUTIC EXERCISES: CPT | Mod: PN | Performed by: PHYSICAL THERAPIST

## 2022-10-25 NOTE — PROGRESS NOTES
[facility]   Physical Therapy Treatment Note     Name: Dorothy Bosch  Clinic Number: [id number]    Therapy Diagnosis:   Encounter Diagnoses   Name Primary?    Decreased strength Yes    Decreased range of motion of left shoulder      Physician: Louie Luna MD    Visit Date: 10/25/2022    Physician Orders: PT Eval and Treat   2-3 x per week x 6 weeks   Gentle passive elevation and ER, pendulums. No RTC strengthening     Medical Diagnosis from Referral:M75.112 (ICD-10-CM) - Nontraumatic incomplete tear of left rotator cuff S46.212A (ICD-10-CM) - Biceps tendon rupture, proximal, left, initial encounter M75.42 (ICD-10-CM) - Subacromial impingement, left   Evaluation Date: 6/17/2022  Authorization Period Expiration: 7/15/22  Plan of Care Expiration: 11/25/22  Progress Note Due: 11/25/22  Visit # / Visits authorized: 30/32  FOTO: Performed 10/25/22  PTA Visit: 0/5     Precautions: Standard (post-op protocol - surgery 6/9/22)    Time In: 10:30 pm   Time Out: 11:25 pm  Total Billable Time: 30 1:1 and 23 supervised minutes (Tex2)    6/9/22 Procedure: Procedure(s):  1. Diagnostic arthroscopy left shoulder  2. Extensive synovectomy  3. Rotator interval release   4. Arthroscopic capsular release  5. Biceps tenotomy  6. Subacromial decompression  7. Acromioplasty   8. Rotator cuff repair     SUBJECTIVE     Pt reports: her arm hurt more last night, but feels better today    She was compliant with home exercise program.  Response to previous treatment: 2 days of soreness, not severe pain  Functional change: improving arm use and motion    Pain: 1/10  Location: left shoulder      OBJECTIVE     See last POC    Treatment     Dorohty received the treatments listed below:        Dorothy received therapeutic exercises to develop strength, endurance, ROM, flexibility, posture and core stabilization for 30 1:1 and 23 supervised minutes including FOTO:    Pulleys: 2 minutes flexion, 2 minutes  "abduction    Side lying external rotation: 3x10, 2#  Seated 90/90 external rotation: 3x10 left     Prone Y: 2x8 left (small range of motion)  Prone T with palm down: 3x10 left     Assisted active seated flexion with cables: 3#, 3x10 (height number 3) - not today  Wall walks: 15x5" holds     Wall shoulder taps: 30x each    Theraband rows: red theraband 3x10 (inc to Green)  Theraband SAPD: red theraband 3x10  Theraband external rotation: yellow theraband 3x10 left with towel roll under elbow  Theraband internal rotation: red theraband 3x10 left     Cold pack was applied for 00 minutes to left shoulder.    Patient Education and Home Exercises     Home Exercises Provided and Patient Education Provided   Education provided:   - anatomy and expectations with pain and symptoms    Written Home Exercises Provided: Patient instructed to cont prior HEP. Exercises were reviewed and Dorothy was able to demonstrate them prior to the end of the session.  Dorothy demonstrated good  understanding of the education provided. See EMR under Patient Instructions for exercises provided during therapy sessions    ASSESSMENT     Dorothy is a 67 y.o. female referred to outpatient Physical Therapy with a medical diagnosis of noncomplete rotator cuff tear, biceps tendon rupture and subacromial impingement. Pt presents with status post left rotator cuff repair (supraspinatus), acromioplasty, biceps tenotomy, subchondral decompression, synovectomy, capsular release on 6/9/22 (week 19).   Progressed her program to light strengthening with good active external rotation range of motion present. Concentration of treatment will be on strengthening in upcoming visits with planned POC on 10/31/22.    Dorothy Is progressing well towards her goals.   Pt prognosis is Good.     Pt will continue to benefit from skilled outpatient physical therapy to address the deficits listed in the problem list box on initial evaluation, provide pt/family education and to maximize " pt's level of independence in the home and community environment.     Pt's spiritual, cultural and educational needs considered and pt agreeable to plan of care and goals.     Anticipated Barriers for therapy: failed conservative treatment     Goals:   Short Term Goals: 4 weeks   1.  Patient will report < 6/10 shoulder pain at worst for improved ADL performance. MET  2.  Patient will demonstrate L shoulder flexion PROM > 140 deg to improve overhead reach. MET 8/11/22  3.  Patient will demonstrate left shoulder external rotation PROM > 40 degree. MET 7/7/22  4.  Patient will demonstrate left  strength > 45 pounds.  MET 8/11/22     Long Term Goals: 12 weeks   1. Patient will report ability to wash her hair without restriction. PROGRESSING; NOT MET  2. Patient will demonstrate ability to reach overhead and remove a 2 lb object 5 times to simulate removing objects from an overhead shelf w/o an increase in symptoms. PROGRESSING; NOT MET  3. Patient will demonstrate all left shoulder AROM within 85% of right for improved ADL performance. PROGRESSING; NOT MET  4. Patient will improve FOTO to < 38% to improve ADL performance. PROGRESSING; NOT MET    PLAN     Cont with POC with phase 3 of rehab protocol   Updated POC on 10/31/22    Plan of care Certification: 9/30/22 to 11/25/22    Louie Tobar, PT

## 2022-10-26 ENCOUNTER — DOCUMENTATION ONLY (OUTPATIENT)
Dept: REHABILITATION | Facility: HOSPITAL | Age: 67
End: 2022-10-26
Payer: MEDICARE

## 2022-10-27 ENCOUNTER — CLINICAL SUPPORT (OUTPATIENT)
Dept: REHABILITATION | Facility: HOSPITAL | Age: 67
End: 2022-10-27
Payer: MEDICARE

## 2022-10-27 DIAGNOSIS — M25.612 DECREASED RANGE OF MOTION OF LEFT SHOULDER: ICD-10-CM

## 2022-10-27 DIAGNOSIS — R53.1 DECREASED STRENGTH: Primary | ICD-10-CM

## 2022-10-27 PROCEDURE — 97110 THERAPEUTIC EXERCISES: CPT | Mod: PN,CQ

## 2022-10-27 PROCEDURE — 97140 MANUAL THERAPY 1/> REGIONS: CPT | Mod: PN,CQ

## 2022-10-27 NOTE — PROGRESS NOTES
[facility]   Physical Therapy Treatment Note     Name: Dorothy Burt  Clinic Number: [id number]    Therapy Diagnosis:   Encounter Diagnoses   Name Primary?    Decreased strength Yes    Decreased range of motion of left shoulder      Physician: Louie Luna MD    Visit Date: 10/27/2022    Physician Orders: PT Eval and Treat   2-3 x per week x 6 weeks   Gentle passive elevation and ER, pendulums. No RTC strengthening     Medical Diagnosis from Referral:M75.112 (ICD-10-CM) - Nontraumatic incomplete tear of left rotator cuff S46.212A (ICD-10-CM) - Biceps tendon rupture, proximal, left, initial encounter M75.42 (ICD-10-CM) - Subacromial impingement, left   Evaluation Date: 6/17/2022  Authorization Period Expiration: 7/15/22  Plan of Care Expiration: 11/25/22  Progress Note Due: 11/25/22  Visit # / Visits authorized: 30/32  FOTO: Performed 10/25/22  PTA Visit: 1/5     Precautions: Standard (post-op protocol - surgery 6/9/22)    Time In: 3:00 PM   Time Out: 4:05 PM   Total Billable Time: 55 (3-TE, 1-MT)    6/9/22 Procedure: Procedure(s):  1. Diagnostic arthroscopy left shoulder  2. Extensive synovectomy  3. Rotator interval release   4. Arthroscopic capsular release  5. Biceps tenotomy  6. Subacromial decompression  7. Acromioplasty   8. Rotator cuff repair     SUBJECTIVE     Pt reports: her arm hurt more last night, but feels better today    She was compliant with home exercise program.  Response to previous treatment: 2 days of soreness, not severe pain  Functional change: improving arm use and motion    Pain: 1/10  Location: left shoulder      OBJECTIVE     No today    Treatment     Dorothy received the treatments listed below:        Dorothy received therapeutic exercises to develop strength, endurance, ROM, flexibility, posture and core stabilization for 45     Pulleys: 2 minutes flexion, 2 minutes abduction    Side lying external rotation: 3x10, 2#  Seated 90/90 external rotation: 3x10  "left     Prone Y: 2x8 left (small range of motion)  Prone T with palm down: 3x10 left     Assisted active seated flexion with cables: 3#, 3x10 (height number 3) - (mirror provided for visual feedback)  Wall walks: 15x5" holds     Wall shoulder taps: 30x each    Shoulder stabilization ball on wall rolls 30x CW/30CCW (un-weighted yellow ball)    Theraband rows: green theraband 3x10   Theraband SAPD: red theraband 3x10  Theraband external rotation: yellow theraband 3x10 left with towel roll under elbow  Theraband internal rotation: yellow theraband 3x10 left (red increased pain today so deferred and modified to yellow)    Dorothy received the following manual therapy techniques: Joint mobilizations, Manual traction, Myofacial release, Soft tissue Mobilization, and Friction Massage were applied to the: left shoulder musculature  for 10 minutes, including:  Grade I-IV GH  AP joint mobs  Left upper trap static pressure and follow to release  Left scapulae mobilization      Cold pack was applied for 00 minutes to left shoulder.    Patient Education and Home Exercises     Home Exercises Provided and Patient Education Provided   Education provided:   - anatomy and expectations with pain and symptoms    Written Home Exercises Provided: Patient instructed to cont prior HEP. Exercises were reviewed and Dorothy was able to demonstrate them prior to the end of the session.  Dorothy demonstrated good  understanding of the education provided. See EMR under Patient Instructions for exercises provided during therapy sessions    ASSESSMENT     Dorothy is a 67 y.o. female referred to outpatient Physical Therapy with a medical diagnosis of noncomplete rotator cuff tear, biceps tendon rupture and subacromial impingement. Pt presents with status post left rotator cuff repair (supraspinatus), acromioplasty, biceps tenotomy, subchondral decompression, synovectomy, capsular release on 6/9/22 (week 19).   Progressed her program to light strengthening " with good active external rotation range of motion present. Concentration of treatment will be on strengthening in upcoming visits with planned POC on 10/31/22.    Dorothy Is progressing well towards her goals.   Pt prognosis is Good.     Pt will continue to benefit from skilled outpatient physical therapy to address the deficits listed in the problem list box on initial evaluation, provide pt/family education and to maximize pt's level of independence in the home and community environment.     Pt's spiritual, cultural and educational needs considered and pt agreeable to plan of care and goals.     Anticipated Barriers for therapy: failed conservative treatment     Goals:   Short Term Goals: 4 weeks   1.  Patient will report < 6/10 shoulder pain at worst for improved ADL performance. MET  2.  Patient will demonstrate L shoulder flexion PROM > 140 deg to improve overhead reach. MET 8/11/22  3.  Patient will demonstrate left shoulder external rotation PROM > 40 degree. MET 7/7/22  4.  Patient will demonstrate left  strength > 45 pounds.  MET 8/11/22     Long Term Goals: 12 weeks   1. Patient will report ability to wash her hair without restriction. PROGRESSING; NOT MET  2. Patient will demonstrate ability to reach overhead and remove a 2 lb object 5 times to simulate removing objects from an overhead shelf w/o an increase in symptoms. PROGRESSING; NOT MET  3. Patient will demonstrate all left shoulder AROM within 85% of right for improved ADL performance. PROGRESSING; NOT MET  4. Patient will improve FOTO to < 38% to improve ADL performance. PROGRESSING; NOT MET    PLAN     Cont with POC with phase 3 of rehab protocol   Updated POC on 10/31/22    Plan of care Certification: 9/30/22 to 11/25/22    Antoine Bradley PTA

## 2022-10-31 ENCOUNTER — CLINICAL SUPPORT (OUTPATIENT)
Dept: REHABILITATION | Facility: HOSPITAL | Age: 67
End: 2022-10-31
Payer: MEDICARE

## 2022-10-31 DIAGNOSIS — R53.1 DECREASED STRENGTH: Primary | ICD-10-CM

## 2022-10-31 DIAGNOSIS — M25.612 DECREASED RANGE OF MOTION OF LEFT SHOULDER: ICD-10-CM

## 2022-10-31 PROCEDURE — 97110 THERAPEUTIC EXERCISES: CPT | Mod: PN

## 2022-10-31 PROCEDURE — 97140 MANUAL THERAPY 1/> REGIONS: CPT | Mod: PN

## 2022-10-31 NOTE — PROGRESS NOTES
[facility]   Physical Therapy Treatment Note     Name: oDrothy Burt  Clinic Number: [id number]    Therapy Diagnosis:   Encounter Diagnoses   Name Primary?    Decreased strength Yes    Decreased range of motion of left shoulder      Physician: Louie Luna MD    Visit Date: 10/31/2022    Physician Orders: PT Eval and Treat   2-3 x per week x 6 weeks   Gentle passive elevation and ER, pendulums. No RTC strengthening     Medical Diagnosis from Referral:M75.112 (ICD-10-CM) - Nontraumatic incomplete tear of left rotator cuff S46.212A (ICD-10-CM) - Biceps tendon rupture, proximal, left, initial encounter M75.42 (ICD-10-CM) - Subacromial impingement, left   Evaluation Date: 6/17/2022  Authorization Period Expiration: 7/15/22  Plan of Care Expiration: 11/25/22  Progress Note Due: 11/25/22  Visit # / Visits authorized: 31/32  FOTO: 1/10  PTA Visit: 0/5     Precautions: Standard (post-op protocol - surgery 6/9/22)    Time In: 2:00 PM   Time Out: 3:00 PM   Total Billable Time: 30 (1-TE, 1-MT)    6/9/22 Procedure: Procedure(s):  1. Diagnostic arthroscopy left shoulder  2. Extensive synovectomy  3. Rotator interval release   4. Arthroscopic capsular release  5. Biceps tenotomy  6. Subacromial decompression  7. Acromioplasty   8. Rotator cuff repair     SUBJECTIVE     Pt reports: infrequent night pain, but there and wakes her at times. Can tell she can lift her arm better.    She was compliant with home exercise program.  Response to previous treatment: 2 days of soreness, not severe pain  Functional change: improving arm use and motion    Pain: 1/10  Location: left shoulder      OBJECTIVE     No today    Treatment     Dorothy received the treatments listed below:        Dorothy received therapeutic exercises for 45 minutes:    Pulleys: 2 minutes flexion, 2 minutes abduction    Side lying external rotation: 3x10, 2#  Seated 90/90 external rotation: 3x10 left     Prone Y: 2x8 left (small range of  "motion)  Prone T with palm down: 3x10 left     Assisted active seated flexion with cables: 3#, 3x10 (height number 3) - (mirror provided for visual feedback)  Wall walks: 15x5" holds     Wall shoulder taps: 30x each    Shoulder stabilization ball on wall rolls 30x CW/30CCW (un-weighted yellow ball)    Theraband rows: green theraband 3x10   Theraband SAPD: red theraband 3x10  Theraband external rotation: yellow theraband 3x10 left with towel roll under elbow  Theraband internal rotation: yellow theraband 3x10 left (red increased pain today so deferred and modified to yellow)    Dorothy received the following manual therapy techniques were applied to the left shoulder for 15 minutes, including:  Grade I-IV GH  AP joint mobs  Left upper trap static pressure and follow to release  Left scapulae mobilization      Cold pack was applied for 00 minutes to left shoulder.    Patient Education and Home Exercises     Home Exercises Provided and Patient Education Provided   Education provided:   - anatomy and expectations with pain and symptoms    Written Home Exercises Provided: Patient instructed to cont prior HEP. Exercises were reviewed and Dorothy was able to demonstrate them prior to the end of the session.  Dorothy demonstrated good  understanding of the education provided. See EMR under Patient Instructions for exercises provided during therapy sessions    ASSESSMENT     Dorothy is a 67 y.o. female referred to outpatient Physical Therapy with a medical diagnosis of noncomplete rotator cuff tear, biceps tendon rupture and subacromial impingement. Pt presents with status post left rotator cuff repair (supraspinatus), acromioplasty, biceps tenotomy, subchondral decompression, synovectomy, capsular release on 6/9/22 (week 19).   Steadily progressing overhead strengthening and increasing resistance for all activities. AROM gently progressing, and focusing on strengthening for more AROM strength. Minimal to no pain throughout session " present.    Dorothy Is progressing well towards her goals.   Pt prognosis is Good.     Pt will continue to benefit from skilled outpatient physical therapy to address the deficits listed in the problem list box on initial evaluation, provide pt/family education and to maximize pt's level of independence in the home and community environment.     Pt's spiritual, cultural and educational needs considered and pt agreeable to plan of care and goals.     Anticipated Barriers for therapy: failed conservative treatment     Goals:   Short Term Goals: 4 weeks   1.  Patient will report < 6/10 shoulder pain at worst for improved ADL performance. MET  2.  Patient will demonstrate L shoulder flexion PROM > 140 deg to improve overhead reach. MET 8/11/22  3.  Patient will demonstrate left shoulder external rotation PROM > 40 degree. MET 7/7/22  4.  Patient will demonstrate left  strength > 45 pounds.  MET 8/11/22     Long Term Goals: 12 weeks   1. Patient will report ability to wash her hair without restriction. PROGRESSING; NOT MET  2. Patient will demonstrate ability to reach overhead and remove a 2 lb object 5 times to simulate removing objects from an overhead shelf w/o an increase in symptoms. PROGRESSING; NOT MET  3. Patient will demonstrate all left shoulder AROM within 85% of right for improved ADL performance. PROGRESSING; NOT MET  4. Patient will improve FOTO to < 38% to improve ADL performance. PROGRESSING; NOT MET    PLAN     Cont with POC with phase 3 of rehab protocol   Updated POC on 10/31/22    Plan of care Certification: 9/30/22 to 11/25/22    Jose C Lutz, PT

## 2022-11-04 ENCOUNTER — CLINICAL SUPPORT (OUTPATIENT)
Dept: REHABILITATION | Facility: HOSPITAL | Age: 67
End: 2022-11-04
Payer: MEDICARE

## 2022-11-04 DIAGNOSIS — R53.1 DECREASED STRENGTH: Primary | ICD-10-CM

## 2022-11-04 DIAGNOSIS — M25.612 DECREASED RANGE OF MOTION OF LEFT SHOULDER: ICD-10-CM

## 2022-11-04 PROCEDURE — 97110 THERAPEUTIC EXERCISES: CPT | Mod: PN

## 2022-11-04 PROCEDURE — 97140 MANUAL THERAPY 1/> REGIONS: CPT | Mod: PN

## 2022-11-07 ENCOUNTER — CLINICAL SUPPORT (OUTPATIENT)
Dept: REHABILITATION | Facility: HOSPITAL | Age: 67
End: 2022-11-07
Payer: MEDICARE

## 2022-11-07 DIAGNOSIS — R53.1 DECREASED STRENGTH: Primary | ICD-10-CM

## 2022-11-07 DIAGNOSIS — M25.612 DECREASED RANGE OF MOTION OF LEFT SHOULDER: ICD-10-CM

## 2022-11-07 PROCEDURE — 97110 THERAPEUTIC EXERCISES: CPT | Mod: PN,CQ

## 2022-11-07 NOTE — PROGRESS NOTES
[facility]   Physical Therapy Treatment Note     Name: Dorothy Burt  Clinic Number: [id number]    Therapy Diagnosis:   Encounter Diagnoses   Name Primary?    Decreased strength Yes    Decreased range of motion of left shoulder      Physician: Louie Luna MD    Visit Date: 11/7/2022    Physician Orders: PT Eval and Treat   2-3 x per week x 6 weeks   Gentle passive elevation and ER, pendulums. No RTC strengthening     Medical Diagnosis from Referral:M75.112 (ICD-10-CM) - Nontraumatic incomplete tear of left rotator cuff S46.212A (ICD-10-CM) - Biceps tendon rupture, proximal, left, initial encounter M75.42 (ICD-10-CM) - Subacromial impingement, left   Evaluation Date: 6/17/2022  Authorization Period Expiration: 7/15/22  Plan of Care Expiration: 11/25/22  Progress Note Due: 11/25/22  Visit # / Visits authorized: 34/32  FOTO: 5/10  PTA Visit: 1/5     Precautions: Standard (post-op protocol - surgery 6/9/22)    Time In: 1:15 PM   Time Out: 1:59 PM   Total Billable Time: 44 minutes (3-TE)     6/9/22 Procedure: Procedure(s):  1. Diagnostic arthroscopy left shoulder  2. Extensive synovectomy  3. Rotator interval release   4. Arthroscopic capsular release  5. Biceps tenotomy  6. Subacromial decompression  7. Acromioplasty   8. Rotator cuff repair     SUBJECTIVE     Pt reports: having anterior shoulder pain but not a lot.  Patient reports she was late today as she had to wait for the repair man to finish a job in her apartment.    She was compliant with home exercise program.  Response to previous treatment: 2 days of soreness, not severe pain  Functional change: improving arm use and motion    Pain: 1.5/10  Location: left shoulder      OBJECTIVE     Not today.    Treatment     Dorothy received the treatments listed below:      Dorothy received therapeutic exercises to develop strength, endurance, ROM, flexibility, posture and core stabilization for 38 minutes:     Pulleys: 2 minutes flexion, 2  "minutes abduction     Side lying external rotation: 3x10, 2#  Sidelying shoulder abduction: 2x15 , 1#  Seated 90/90 external rotation: 3x10 left     Prone Y: 2x8 left (small range of motion) - not today  Prone T with palm down: 3x10 left - not today    Assisted active seated flexion with cables: 3#, 3x10 (height number 2) - (mirror provided for visual feedback)    Wall slides: 12x5" holds   Wall ball roll ups: 2x10 with swiss ball  Wall pushups on ballet bar: 3x10    Not today  Shoulder stabilization ball on wall rolls 30x CW/30CCW (un-weighted yellow ball)  Theraband rows: green theraband 3x10   Theraband external rotation: yellow theraband 3x10 left with towel roll under elbow  Theraband internal rotation: yellow theraband 3x10 left (red increased pain today so deferred and modified to yellow)    Dorothy received the following manual therapy techniques: Joint mobilizations, Manual traction, Myofacial release, Soft tissue Mobilization, and Friction Massage were applied to the: left shoulder musculature for 6 minutes, including:  Grade I-IV GH  AP joint mobs  Left upper trap static pressure and follow to release  Left scapulae mobilization      Cold pack was applied for 00 minutes to left shoulder.    Patient Education and Home Exercises     Home Exercises Provided and Patient Education Provided   Education provided:   - anatomy and expectations with pain and symptoms  - given yellow theraband on 11/4/22 for external rotation and internal rotation doorway strengthening at home    Written Home Exercises Provided: Patient instructed to cont prior HEP. Exercises were reviewed and Dorothy was able to demonstrate them prior to the end of the session.  Dorothy demonstrated good  understanding of the education provided. See EMR under Patient Instructions for exercises provided during therapy sessions    ASSESSMENT     Dorothy is a 67 y.o. female referred to outpatient Physical Therapy with a medical diagnosis of noncomplete rotator " cuff tear, biceps tendon rupture and subacromial impingement. Pt presents with status post left rotator cuff repair (supraspinatus), acromioplasty, biceps tenotomy, subchondral decompression, synovectomy, capsular release on 6/9/22 (week 19). Pt did well today and nearing terminal shoulder flexion and shoulder external rotation around 50% at this time. Focusing on increasing shoulder strength and overhead activities in therapy. Patient had no difficulty with today's progressions, noted in bold, with no increase in symptoms prior to leaving the clinic.  Patient arrived 15 minutes late for her appointment.    Dorothy Is progressing well towards her goals.   Pt prognosis is Good.     Pt will continue to benefit from skilled outpatient physical therapy to address the deficits listed in the problem list box on initial evaluation, provide pt/family education and to maximize pt's level of independence in the home and community environment.     Pt's spiritual, cultural and educational needs considered and pt agreeable to plan of care and goals.     Anticipated Barriers for therapy: failed conservative treatment     Goals:   Short Term Goals: 4 weeks   1.  Patient will report < 6/10 shoulder pain at worst for improved ADL performance. MET  2.  Patient will demonstrate L shoulder flexion PROM > 140 deg to improve overhead reach. MET 8/11/22  3.  Patient will demonstrate left shoulder external rotation PROM > 40 degree. MET 7/7/22  4.  Patient will demonstrate left  strength > 45 pounds.  MET 8/11/22     Long Term Goals: 12 weeks   1. Patient will report ability to wash her hair without restriction. PROGRESSING; NOT MET  2. Patient will demonstrate ability to reach overhead and remove a 2 lb object 5 times to simulate removing objects from an overhead shelf w/o an increase in symptoms. PROGRESSING; NOT MET  3. Patient will demonstrate all left shoulder AROM within 85% of right for improved ADL performance. PROGRESSING; NOT  MET  4. Patient will improve FOTO to < 38% to improve ADL performance. PROGRESSING; NOT MET    PLAN     Cont with POC with phase 3 of rehab protocol   Updated POC on 10/31/22    Plan of care Certification: 9/30/22 to 11/25/22    Ant Cohen, PTA

## 2022-11-09 NOTE — ANESTHESIA PROCEDURE NOTES
Peripheral Block    Patient location during procedure: pre-op   Block not for primary anesthetic.  Reason for block: at surgeon's request and post-op pain management   Post-op Pain Location: Left Shoulder   Start time: 6/9/2022 6:54 AM  Timeout: 6/9/2022 6:53 AM   End time: 6/9/2022 6:56 AM    Staffing  Authorizing Provider: Lisandro Boyd MD  Performing Provider: Lisandro Boyd MD    Staffing  Other anesthesia staff: Rodrigo Mario MD  Preanesthetic Checklist  Completed: patient identified, IV checked, site marked, risks and benefits discussed, surgical consent, monitors and equipment checked, pre-op evaluation and timeout performed  Peripheral Block  Patient position: sitting  Prep: ChloraPrep  Patient monitoring: heart rate, continuous pulse ox and frequent blood pressure checks  Block type: interscalene  Laterality: left  Injection technique: single shot  Needle  Needle type: Stimuplex   Needle gauge: 20 G  Needle length: 4 in  Needle localization: ultrasound guidance   -ultrasound image captured on disc.  Assessment  Injection assessment: negative parasthesia, negative aspiration and local visualized surrounding nerve  Paresthesia pain: none  Heart rate change: no  Slow fractionated injection: yes  Pain Tolerance: comfortable throughout block and no complaints  Medications:    Medications: bupivacaine (pf) (MARCAINE) injection 0.25% - Perineural   10 mL - 6/9/2022 6:55:00 AM    Additional Notes  10cc 0.25% bupivicaine and 10cc exparel used for block          0

## 2022-11-11 ENCOUNTER — CLINICAL SUPPORT (OUTPATIENT)
Dept: REHABILITATION | Facility: HOSPITAL | Age: 67
End: 2022-11-11
Payer: MEDICARE

## 2022-11-11 DIAGNOSIS — M25.612 DECREASED RANGE OF MOTION OF LEFT SHOULDER: ICD-10-CM

## 2022-11-11 DIAGNOSIS — R53.1 DECREASED STRENGTH: Primary | ICD-10-CM

## 2022-11-11 PROCEDURE — 97140 MANUAL THERAPY 1/> REGIONS: CPT | Mod: PN | Performed by: PHYSICAL THERAPIST

## 2022-11-11 PROCEDURE — 97110 THERAPEUTIC EXERCISES: CPT | Mod: PN | Performed by: PHYSICAL THERAPIST

## 2022-11-11 NOTE — PROGRESS NOTES
[facility]   Physical Therapy Treatment Note     Name: Dorothy Burt  Clinic Number: [id number]    Therapy Diagnosis:   Encounter Diagnoses   Name Primary?    Decreased strength Yes    Decreased range of motion of left shoulder      Physician: Louie Luna MD    Visit Date: 11/11/2022    Physician Orders: PT Eval and Treat   2-3 x per week x 6 weeks   Gentle passive elevation and ER, pendulums. No RTC strengthening     Medical Diagnosis from Referral:M75.112 (ICD-10-CM) - Nontraumatic incomplete tear of left rotator cuff S46.212A (ICD-10-CM) - Biceps tendon rupture, proximal, left, initial encounter M75.42 (ICD-10-CM) - Subacromial impingement, left   Evaluation Date: 6/17/2022  Authorization Period Expiration: 7/15/22  Plan of Care Expiration: 11/25/22  Progress Note Due: 11/25/22  Visit # / Visits authorized: 35/32  FOTO: 5/10  PTA Visit: 0/5     Precautions: Standard (post-op protocol - surgery 6/9/22)    Time In: 12:33 PM   Time Out: 1:25 PM   Total Billable Time: 26 1:1 and 22 supervised minutes (1-TE, 1-MT)     6/9/22 Procedure: Procedure(s):  1. Diagnostic arthroscopy left shoulder  2. Extensive synovectomy  3. Rotator interval release   4. Arthroscopic capsular release  5. Biceps tenotomy  6. Subacromial decompression  7. Acromioplasty   8. Rotator cuff repair     SUBJECTIVE     Pt reports: See UPOC    She was compliant with home exercise program.  Response to previous treatment: 2 days of soreness, not severe pain  Functional change: improving arm use and motion    Pain: 1.5/10  Location: left shoulder      OBJECTIVE     Not today.    Treatment     Dorothy received the treatments listed below:      Dorothy received therapeutic exercises to develop strength, endurance, ROM, flexibility, posture and core stabilization for 16 1:1 and 22 supervised minutes including re-assessment:     Theraband rows: green theraband 3x10   Theraband external rotation: yellow theraband 3x10 left  "with towel roll under elbow  Theraband internal rotation: yellow theraband 3x10 left     Wall slides: 12x5" holds   Wall ball roll ups: 2x10 with swiss ball  Wall pushups on ballet bar: 3x10  Wand shoulder extension: 10x5" holds    Supine left shoulder flexion AROM: 2x10    Not today  Pulleys: 2 minutes flexion, 2 minutes abduction       Assisted active seated flexion with cables: 3#, 3x10 (height number 2) - (mirror provided for visual feedback)  Side lying external rotation: 3x10, 2#  Sidelying shoulder abduction: 2x15 , 1#  Seated 90/90 external rotation: 3x10 left     Prone Y: 2x8 left (small range of motion) - not today  Prone T with palm down: 3x10 left - not today  Shoulder stabilization ball on wall rolls 30x CW/30CCW (un-weighted yellow ball)    Dorothy received the following manual therapy techniques: Joint mobilizations, Manual traction, Myofacial release, Soft tissue Mobilization, and Friction Massage were applied to the: left shoulder musculature for 10 minutes, including:    Grade I-IV GH  AP joint mobs  Left upper trap static pressure and follow to release  Left scapulae mobilization - not today     Cold pack was applied for 00 minutes to left shoulder.    Patient Education and Home Exercises     Home Exercises Provided and Patient Education Provided   Education provided:   - anatomy and expectations with pain and symptoms  - given yellow theraband on 11/4/22 for external rotation and internal rotation doorway strengthening at home    Written Home Exercises Provided: Patient instructed to cont prior HEP. Exercises were reviewed and Dorothy was able to demonstrate them prior to the end of the session.  Dorothy demonstrated good  understanding of the education provided. See EMR under Patient Instructions for exercises provided during therapy sessions    ASSESSMENT     Dorothy is a 67 y.o. female referred to outpatient Physical Therapy with a medical diagnosis of noncomplete rotator cuff tear, biceps tendon rupture " and subacromial impingement. Pt presents with status post left rotator cuff repair (supraspinatus), acromioplasty, biceps tenotomy, subchondral decompression, synovectomy, capsular release on 6/9/22. See UPOC.    Dorothy Is progressing well towards her goals.   Pt prognosis is Good.     Pt will continue to benefit from skilled outpatient physical therapy to address the deficits listed in the problem list box on initial evaluation, provide pt/family education and to maximize pt's level of independence in the home and community environment.     Pt's spiritual, cultural and educational needs considered and pt agreeable to plan of care and goals.     Anticipated Barriers for therapy: failed conservative treatment     Goals:   Short Term Goals: 4 weeks   1.  Patient will report < 6/10 shoulder pain at worst for improved ADL performance. MET  2.  Patient will demonstrate L shoulder flexion PROM > 140 deg to improve overhead reach. MET 8/11/22  3.  Patient will demonstrate left shoulder external rotation PROM > 40 degree. MET 7/7/22  4.  Patient will demonstrate left  strength > 45 pounds.  MET 8/11/22     Long Term Goals: 12 weeks   1. Patient will report ability to wash her hair without restriction. PROGRESSING; NOT MET  2. Patient will demonstrate ability to reach overhead and remove a 2 lb object 5 times to simulate removing objects from an overhead shelf w/o an increase in symptoms. PROGRESSING; NOT MET  3. Patient will demonstrate all left shoulder AROM within 85% of right for improved ADL performance. PROGRESSING; NOT MET  4. Patient will improve FOTO to < 38% to improve ADL performance. PROGRESSING; NOT MET    PLAN     Cont with POC with phase 3 of rehab protocol   Updated POC on 11/11/22    Plan of care Certification: 9/30/22 to 11/25/22    Louie Tobar, PT

## 2022-11-11 NOTE — PLAN OF CARE
Outpatient Therapy Updated Plan of Care     Visit Date: 11/11/2022  Name: Dorothy Burt  Clinic Number: [id number]    Therapy Diagnosis:   Encounter Diagnoses   Name Primary?    Decreased strength Yes    Decreased range of motion of left shoulder      Physician: Louie Luna MD    Physician Orders: PT Eval and Treat   2-3 x per week x 6 weeks   Gentle passive elevation and ER, pendulums. No RTC strengthening     Medical Diagnosis from Referral:M75.112 (ICD-10-CM) - Nontraumatic incomplete tear of left rotator cuff S46.212A (ICD-10-CM) - Biceps tendon rupture, proximal, left, initial encounter M75.42 (ICD-10-CM) - Subacromial impingement, left   Evaluation Date: 6/17/2022    Total Visits Received: 36      Current Certification Period:  9/30/22 to 11/25/22  Precautions:  Standard (post-op protocol - surgery 6/9/22)  Visits from Evaluation Date:  35      Subjective     Update: she's still limited grabbing things out of an overhead cabinet or top of the refrigerator. She's unable to reach behind her back to hook/unhook her bra.    Objective     Update:     () - new measurements    Range of Motion:     Left Right   Shoulder Flexion P: 142  (142)   A: 92 (130) P: 170     A:170       Shoulder abduction P: 135 (137)    A: 72 (92) P: 170     A: 170   Shoulder ER P: 55  (70)  A: 43 (57/11 cm) P: 90    A: 21 cm       Shoulder IR P: 60 (65)   A: 60 (50 cm)  P: WNL    A: 36 cm       Elbow P: 0-full flexion WNL   Wrist WNL WNL      Upper Extremity Strength    Flexion: 4-/5  Abductino: 3-/5  External rotation: 4/5  Internal rotation: 4+/5     Wrist flexion and extension: 5/5 each     : right: 75 pounds; left 56      Assessment     Update: Dorothy is a 67 y.o. female referred to outpatient Physical Therapy with a medical diagnosis of noncomplete rotator cuff tear, biceps tendon rupture and subacromial impingement. Pt presents with status post left rotator cuff repair (supraspinatus), acromioplasty, biceps tenotomy,  subchondral decompression, synovectomy, capsular release on 6/9/22. Over the past month, she has progressed her shoulder AROM, but PROM appears to have plateaued. She continues to present with rotator cuff and periscapular weakness, which is improving. Her deficits limit her ability to reach overhead, hook/unhook a bra and reach behind her back and carrying objects in her left hand. She would benefit from continued PT with continued work towards her goals.    Previous Short Term Goals Status:     1.  Patient will report < 6/10 shoulder pain at worst for improved ADL performance. MET  2.  Patient will demonstrate L shoulder flexion PROM > 140 deg to improve overhead reach. MET 8/11/22  3.  Patient will demonstrate left shoulder external rotation PROM > 40 degree. MET 7/7/22  4.  Patient will demonstrate left  strength > 45 pounds.  MET 8/11/22  New Short Term Goals Status:   none  Long Term Goal Status:   continue per initial plan of care.  Reasons for Recertification of Therapy:   protocol    Plan     Updated Certification Period: 11/11/2022 to 1/6/23  Recommended Treatment Plan: 2 times per week for 8 weeks: Electrical Stimulation TENS, IFC, NMES, Manual Therapy, Moist Heat/ Ice, Neuromuscular Re-ed, Patient Education, Self Care, Therapeutic Activities, Therapeutic Exercise and dry needling  Other Recommendations: progress per protocol    Louie Tobar, PT  11/11/2022      I CERTIFY THE NEED FOR THESE SERVICES FURNISHED UNDER THIS PLAN OF TREATMENT AND WHILE UNDER MY CARE    Physician's comments:        Physician's Signature: ___________________________________________________    I certify the need for these services furnished under this plan of treatment and while under my care.    I certify the need for these services furnished under this plan of treatment and while under my care.        Louie Luna MD, FAAOS  , Orthopaedic Sports Medicine  Residency   [facility] Department  of Orthopaedic Surgery  Assistant Orthopaedic Surgeon, [place] [facility]  Head Team Physician, [place] [facility]

## 2022-11-14 ENCOUNTER — CLINICAL SUPPORT (OUTPATIENT)
Dept: REHABILITATION | Facility: HOSPITAL | Age: 67
End: 2022-11-14
Payer: MEDICARE

## 2022-11-14 DIAGNOSIS — R53.1 DECREASED STRENGTH: Primary | ICD-10-CM

## 2022-11-14 DIAGNOSIS — M25.612 DECREASED RANGE OF MOTION OF LEFT SHOULDER: ICD-10-CM

## 2022-11-14 PROCEDURE — 97110 THERAPEUTIC EXERCISES: CPT | Mod: PN,CQ

## 2022-11-14 NOTE — PROGRESS NOTES
[facility]   Physical Therapy Treatment Note     Name: Dorothy Burt  Clinic Number: [id number]    Therapy Diagnosis:   Encounter Diagnoses   Name Primary?    Decreased strength Yes    Decreased range of motion of left shoulder      Physician: Louie Luna MD    Visit Date: 11/14/2022    Physician Orders: PT Eval and Treat   2-3 x per week x 6 weeks   Gentle passive elevation and ER, pendulums. No RTC strengthening     Medical Diagnosis from Referral:M75.112 (ICD-10-CM) - Nontraumatic incomplete tear of left rotator cuff S46.212A (ICD-10-CM) - Biceps tendon rupture, proximal, left, initial encounter M75.42 (ICD-10-CM) - Subacromial impingement, left   Evaluation Date: 6/17/2022  Authorization Period Expiration: 7/15/22  Plan of Care Expiration: 11/25/22  Progress Note Due: 11/25/22  Visit # / Visits authorized: 37/32  FOTO: 6/10  PTA Visit: 1/5     Precautions: Standard (post-op protocol - surgery 6/9/22)    Time In: 1:00 PM   Time Out: 1:55 PM   Total Billable Time: 30 1:1 and 25 supervised minutes (2-TE)     6/9/22 Procedure: Procedure(s):  1. Diagnostic arthroscopy left shoulder  2. Extensive synovectomy  3. Rotator interval release   4. Arthroscopic capsular release  5. Biceps tenotomy  6. Subacromial decompression  7. Acromioplasty   8. Rotator cuff repair     SUBJECTIVE     Pt reports: hardly any pain, more stiffness.    She was compliant with home exercise program.  Response to previous treatment: no problems.  Functional change: improving arm use and motion    Pain: 1/10  Location: left shoulder      OBJECTIVE     Not today.    Treatment     Dorothy received the treatments listed below:      Dorothy received therapeutic exercises to develop strength, endurance, ROM, flexibility, posture and core stabilization for 25 1:1 and 25 supervised minutes including:     Pulleys: 2 minutes flexion, 2 minutes abduction   Theraband rows: green theraband 3x10   Theraband external rotation:  "yellow theraband 3x10 left with towel roll under elbow  Theraband internal rotation: yellow theraband 3x10 left     Wall slides: 12x5" holds   Wall ball roll ups: 2x10 with swiss ball  Wall pushups on ballet bar: 3x10  Wand shoulder extension: 15x5" holds   Shoulder stabilization ball on wall rolls 30x CW/30CCW (un-weighted yellow ball)    Supine left shoulder flexion AROM: 2x10  Side lying external rotation: 3x10, 2#  Sidelying shoulder abduction: 2x15, 1#     Not today  Assisted active seated flexion with cables: 3#, 3x10 (height number 2) - (mirror provided for visual feedback)  Seated 90/90 external rotation: 3x10 left     Prone Y: 2x8 left (small range of motion) - not today  Prone T with palm down: 3x10 left - not today      Dorothy received the following manual therapy techniques: Joint mobilizations, Manual traction, Myofacial release, Soft tissue Mobilization, and Friction Massage were applied to the: left shoulder musculature for 5 minutes, including:    Grade I-IV GH  AP joint mobs  Left upper trap static pressure and follow to release  Left scapulae mobilization - not today     Cold pack was applied for 00 minutes to left shoulder.    Patient Education and Home Exercises     Home Exercises Provided and Patient Education Provided   Education provided:   - anatomy and expectations with pain and symptoms  - given yellow theraband on 11/4/22 for external rotation and internal rotation doorway strengthening at home    Written Home Exercises Provided: Patient instructed to cont prior HEP. Exercises were reviewed and Dorothy was able to demonstrate them prior to the end of the session.  Dorothy demonstrated good  understanding of the education provided. See EMR under Patient Instructions for exercises provided during therapy sessions    ASSESSMENT     Dorothy is a 67 y.o. female referred to outpatient Physical Therapy with a medical diagnosis of noncomplete rotator cuff tear, biceps tendon rupture and subacromial " impingement. Pt presents with status post left rotator cuff repair (supraspinatus), acromioplasty, biceps tenotomy, subchondral decompression, synovectomy, capsular release on 6/9/22. Patient completed her therapy with no increase in symptoms prior to leaving the clinic.     Dorothy Is progressing well towards her goals.   Pt prognosis is Good.     Pt will continue to benefit from skilled outpatient physical therapy to address the deficits listed in the problem list box on initial evaluation, provide pt/family education and to maximize pt's level of independence in the home and community environment.     Pt's spiritual, cultural and educational needs considered and pt agreeable to plan of care and goals.     Anticipated Barriers for therapy: failed conservative treatment     Goals:   Short Term Goals: 4 weeks   1.  Patient will report < 6/10 shoulder pain at worst for improved ADL performance. MET  2.  Patient will demonstrate L shoulder flexion PROM > 140 deg to improve overhead reach. MET 8/11/22  3.  Patient will demonstrate left shoulder external rotation PROM > 40 degree. MET 7/7/22  4.  Patient will demonstrate left  strength > 45 pounds.  MET 8/11/22     Long Term Goals: 12 weeks   1. Patient will report ability to wash her hair without restriction. PROGRESSING; NOT MET  2. Patient will demonstrate ability to reach overhead and remove a 2 lb object 5 times to simulate removing objects from an overhead shelf w/o an increase in symptoms. PROGRESSING; NOT MET  3. Patient will demonstrate all left shoulder AROM within 85% of right for improved ADL performance. PROGRESSING; NOT MET  4. Patient will improve FOTO to < 38% to improve ADL performance. PROGRESSING; NOT MET    PLAN     Cont with POC with phase 3 of rehab protocol   Updated POC on 11/11/22    Plan of care Certification: 9/30/22 to 11/25/22    Ant Cohen, OSCAR

## 2022-11-15 ENCOUNTER — NUTRITION (OUTPATIENT)
Dept: NUTRITION | Facility: CLINIC | Age: 67
End: 2022-11-15
Payer: MEDICARE

## 2022-11-15 DIAGNOSIS — E66.09 CLASS 1 OBESITY DUE TO EXCESS CALORIES WITHOUT SERIOUS COMORBIDITY WITH BODY MASS INDEX (BMI) OF 32.0 TO 32.9 IN ADULT: ICD-10-CM

## 2022-11-15 PROCEDURE — 99999 PR PBB SHADOW E&M-EST. PATIENT-LVL I: ICD-10-PCS | Mod: PBBFAC,,, | Performed by: NUTRITIONIST

## 2022-11-15 PROCEDURE — 99999 PR PBB SHADOW E&M-EST. PATIENT-LVL I: CPT | Mod: PBBFAC,,, | Performed by: NUTRITIONIST

## 2022-11-15 PROCEDURE — 97802 PR MED NUTR THER, 1ST, INDIV, EA 15 MIN: ICD-10-PCS | Mod: S$GLB,,, | Performed by: NUTRITIONIST

## 2022-11-15 PROCEDURE — 97802 MEDICAL NUTRITION INDIV IN: CPT | Mod: S$GLB,,, | Performed by: NUTRITIONIST

## 2022-11-15 NOTE — PROGRESS NOTES
"Nutrition Assessment for Initial Medical Nutrition Therapy      Reason for MNT visit: Pt in for education and nutrition counseling regarding Obesity, chronic pain      ASSESSMENT    Age: 67 y.o.  Wt: 193 lbs  Wt Readings from Last 1 Encounters:   11/09/22 87.5 kg (193 lb)     Ht: 5'4"  Ht Readings from Last 1 Encounters:   11/09/22 5' 4.4" (1.636 m)     BMI: 32.7  BMI Readings from Last 1 Encounters:   11/09/22 32.72 kg/m²       Clinical Signs/Symptoms: patient stated she has a lot of pain in her knees, back and shoulder, Her goal is to lose weight and feel better    Medical History:   Past Medical History:   Diagnosis Date    Arthritis     Asthma     Back pain     Knee pain     Thyroid disease      Problem List             Resolved    Arthritis         Mild intermittent asthma (Chronic)         Sacroiliac joint pain         Chronic pain         Hypothyroidism         Gastritis due to nonsteroidal anti-inflammatory drug         Recurrent cold sores (Chronic)         History of pericarditis         Chondromalacia patellae         Osteoarthritis of knee         Stress incontinence         Tear of medial meniscus of knee         Fibromyalgia (Chronic)         Unspecified inflammatory spondylopathy, lumbar region         Venous insufficiency         Peroneal tendonitis, left         Decreased strength         Decreased range of motion of left shoulder         Left shoulder pain         Nontraumatic incomplete tear of left rotator cuff            Medications:   Current Outpatient Medications:     acyclovir (ZOVIRAX) 400 MG tablet, TAKE 1 BY MOUTH 5 TIMES DAILY FOR 5 DAYS, Disp: 25 tablet, Rfl: 2    albuterol (PROAIR HFA) 90 mcg/actuation inhaler, Inhale 2 puffs into the lungs every 6 (six) hours as needed for Wheezing or Shortness of Breath. Rescue, Disp: 18 g, Rfl: 11    budesonide-formoterol 160-4.5 mcg (SYMBICORT) 160-4.5 mcg/actuation HFAA, Inhale 2 puffs into the lungs every evening. Controller, Disp: , Rfl:     " diclofenac sodium (VOLTAREN) 1 % Gel, Apply 2 g topically 2 (two) times daily as needed (to knees)., Disp: 100 g, Rfl: 2    famotidine (PEPCID) 20 MG tablet, TAKE 1 TABLET BY MOUTH TWICE A DAY, Disp: 30 tablet, Rfl: 11    ibuprofen (ADVIL,MOTRIN) 600 MG tablet, Take 1 tablet (600 mg total) by mouth every 6 (six) hours as needed for Pain., Disp: 30 tablet, Rfl: 1    levocetirizine (XYZAL) 5 MG tablet, TAKE 1 TABLET BY MOUTH EVERY DAY AS NEEDED, Disp: 90 tablet, Rfl: 3    levothyroxine (SYNTHROID) 25 MCG tablet, TAKE 1.5 TABLET BY MOUTH DAILY, Disp: 135 tablet, Rfl: 3    loratadine (CLARITIN) 10 mg tablet, Take 1 tablet (10 mg total) by mouth once daily., Disp: 30 tablet, Rfl: 2    meloxicam (MOBIC) 7.5 MG tablet, Take 1 tablet (7.5 mg total) by mouth once daily., Disp: 30 tablet, Rfl: 0    naproxen (NAPROSYN) 500 MG tablet, Take 1 tablet (500 mg total) by mouth 2 (two) times daily as needed., Disp: 60 tablet, Rfl: 0    omeprazole (PRILOSEC) 20 MG capsule, Take 20 mg by mouth daily as needed., Disp: , Rfl:     traMADoL (ULTRAM) 50 mg tablet, Take 2 tablets (100 mg total) by mouth every 8 (eight) hours as needed for Pain., Disp: 120 tablet, Rfl: 5    ZANAFLEX 4 mg tablet, Take 4 mg by mouth every 8 (eight) hours as needed., Disp: , Rfl:     Food allergies  Intolerances: [facility]    Labs:  Reviewed and noted  Hemoglobin A1C   Date Value Ref Range Status   11/14/2022 5.6 4.5 - 5.7 % Final   04/19/2020 5.4 4.0 - 5.6 % Final     Comment:     ADA Screening Guidelines:  5.7-6.4%  Consistent with prediabetes  >or=6.5%  Consistent with diabetes  High levels of fetal hemoglobin interfere with the HbA1C  assay. Heterozygous hemoglobin variants (HbS, HgC, etc)do  not significantly interfere with this assay.   However, presence of multiple variants may affect accuracy.     09/27/2012 5.7 (H) % Final     Cholesterol   Date Value Ref Range Status   11/14/2022 211 (H) 100 - 200 mg/dL Final   04/20/2020 177 120 - 199 mg/dL Final      Comment:     The National Cholesterol Education Program (NCEP) has set the  following guidelines (reference ranges) for Cholesterol:  Optimal.....................<200 mg/dL  Borderline High.............200-239 mg/dL  High........................> or = 240 mg/dL       Triglycerides   Date Value Ref Range Status   11/14/2022 119 30 - 150 mg/dL Final   04/20/2020 79 30 - 150 mg/dL Final     Comment:     The National Cholesterol Education Program (NCEP) has set the  following guidelines (reference values) for triglycerides:  Normal......................<150 mg/dL  Borderline High.............150-199 mg/dL  High........................200-499 mg/dL       HDL   Date Value Ref Range Status   11/14/2022 73 40 - 75 mg/dL Final   04/20/2020 56 40 - 75 mg/dL Final     Comment:     The National Cholesterol Education Program (NCEP) has set the  following guidelines (reference values) for HDL Cholesterol:  Low...............<40 mg/dL  Optimal...........>60 mg/dL       LDL Calculated   Date Value Ref Range Status   11/14/2022 119 0 - 125 mg/dL Final     LDL Cholesterol   Date Value Ref Range Status   04/20/2020 105.2 63.0 - 159.0 mg/dL Final     Comment:     The National Cholesterol Education Program (NCEP) has set the  following guidelines (reference values) for LDL Cholesterol:  Optimal.......................<130 mg/dL  Borderline High...............130-159 mg/dL  High..........................160-189 mg/dL  Very High.....................>190 mg/dL         Typical day recall: Reviewed and noted during consult     Beverages: water: 3-4, 16.9 oz bottle water; coffee with cream and Stevia    Dining out: Regular, 1-2 times per week    Alcohol: nonsmoker; patient stated she can drink several crown and diet cokes depending on the occasion, but only drinks socially    Lifestyle Influences  Support System: self    Meal preparation/shopping: self    Current Activity Level: currently none. She is doing physical therapy for her shoulder  2x a week and has been doing this since June 2022    Patient motivation, anticipated barriers, expected compliance: Patient is motivated and has verbalized understanding and intent to comply     DIAGNOSIS   Problem: Excessive Energy intake and inadequate physical activity   Etiology: RT eating high calorie foods and restaurants and not exercising  Signs/Symptoms: AEB 24 hour recall and BMI    INTERVENTION  Nutrition prescription: estimated energy requirements:   Calories: 1700  Protein: 125-145 grams  Carbohydrates: 120-140 grams  Fats: choose plant-based heart healthy fats  Total fluid: 95 oz + sweat loss  Limit added sugar to 20 grams or less per day (Note: 1 teaspoon of sugar = ~5 gram)      Recommendations & Goals:  Patient goals and recommendations are tailored to the specific patient's needs, readiness to change, lifestyle, culture, skills, resources, & abilities. Strategies to help achieve these nutrition-related goals were discussed which can include but are not limited to SMART goal setting & mindful eating.     Aim for a minimum of 7 hours sleep   Exercise 60 minutes most days  Eat breakfast within 1-2 hours of waking up  Try not to skip any meals or snacks, not going more than 3-4 hours without eating.   At each meal and snack, try to include a source of fiber + lean protein + healthy fat.       Written Materials Provided  These resources are intended to assist the patient in making it easier to choose recommended options when eating out & to identify better-for-you brands at the grocery store:    Meal Planning Guide with recommendations discussed along with portion sizes and a meal plan   Fueling Well On The Go Guide  Eat Fit Grocery Product list   RD contact information- for patient to contact regarding any questions, needs, and/or concerns that may arise     MONITORING & EVALUATION    Communicated with healthcare provider    Documented plan for referral to appropriate agency/healthcare provider as  needed    Comprehension: fair     Motivation to change: moderate    Follow-up: 5-6 weeks    Counseling time: 2 Hours

## 2022-11-15 NOTE — PATIENT INSTRUCTIONS
Name: Dorothy Burt  Date: 11.15.2022    Daily Energy Requirements:  Calories: 1700  Protein: 125-145 grams  Carbohydrates: 120-140 grams  Fats: choose plant-based heart healthy fats  Total fluid: 95 oz + sweat loss  Limit added sugar to 20 grams or less per day (Note: 1 teaspoon of sugar = ~5 gram)    Goals:  Eat every 3-4 hours  Know at most how many carbohydrate servings are recommended for each meal and snack. The rest of your plate will consist of lean protein and non-starchy veggies  Start exercising/moving more throughout the day. Even a 10-minute daily walk adds up  For your coffee: Instead of using cream, use a Ready to drink protein shake such as Premier    Breakfast: 2 servings of carbs = 30-40 grams + at least 20 grams lean protein/heart healthy fat  ½ cup cooked oatmeal + 1 piece of fruit + 2 whole eggs  1 cup cooked grits + 2 whole eggs  Smoothie: See notes below how to make a healthy smoothie.    Snack: A mid-morning snack may be needed if going >3-4 hours between meals      Lunch: 5 oz lean protein + unlimited non-starchy veggies + 2 servings of carbs = 30-40 grams  Examples of 2 servings of carbohydrates = ~30-40 grams:  1 cup cooked pasta (see notes below for brand examples); 2/3 cup cooked brown rice; 1 medium potato or sweet potato (5-6 oz in weight); 1 medium size piece of fruit + 2 slices Tom's Killer thin sliced bread; 1 serving of whole grain chips (see notes) + 2 slices thin sliced whole grain bread; 2 slices 100-calorie bread; 1 serving of Beanito chips + ½ cup cooked whole grain starch; 1 cup cooked beans; ½ cup cooked beans + 1/3 cup cooked brown rice, etc  The rest of your plate will consist of 5 oz lean protein + unlimited non-starchy veggies  Meal Examples:  Salad: Unlimited non-starchy veggies + 5 oz lean protein + 2-3 salad add-ins (see notes below) + 1 cup fruit  [place]: 2 slices Tom's Killer 21 whole grains and seeds regular sliced bread + 5 oz freshly sliced turkey + 1  tablespoon regular ballesteros + 1 slice cheese + non-starchy veggies of your choice  Note: If you want a piece of fruit or 1 serving of whole grain chips with your sandwich, then do 2 slices Tom's Killer thin-sliced bread  Spaghetti & meat sauce: 1 cup cooked lentil pasta + 5 oz 93/7 lean ground beef + side of non-starchy veggies  Red beans & rice: ½ cup cooked beans + 1/3 cup cooked brown rice + additional 3 oz lean protein (ex: chicken sausage, ham, chicken, etc) + side of non-starchy veggies  If you don't want rice, then you can do 1 cup cooked beans over riced cauliflower + additional 3 oz lean protein (ex: chicken sausage, ham, chicken, etc) + side of non-starchy veggies  5 oz lean protein + non-starchy veggies + 1 medium potato (5-6 oz in weight)  Restaurants: See tips below, particularly the Pick 1 out of the 4 rule  Bring your own 100% whole wheat bread for your sandwiches. If splitting a salad with your sandwich, then still pick 2 salad additives (see below for options)  Fast Food  Convenient Options: See FUELING WELL on-the-go guide     Snack: ~1 serving of carbs = 15-25 grams + at least 15 grams lean protein/heart healthy fat + unlimited amounts of non-starchy vegetables  ¾ cup 2% cottage cheese + 1 cup fruit or 2 satsumas'  Flavored Greek yogurt (See notes for brands) + 2 tablespoons nuts or low sugar granola  [place] balance break + piece of fruit  ¼ cup nuts + piece of fruit  Piece of fruit + 2 tablespoons regular nut butter  1 serving of Beanito chips + 1, 100-calorie wholly guacamole packet  1 serving of Beanito chips + ¼ cup shredded cheese melted on top (aka better-for-you emmie's)  Flapjacked protein mighty muffin (typically found on baking aisle of grocery stores)  Protein bar  Granola bar (see below for brand examples)  Avocado toast: ½ small Gabriel avocado on 1 slice 100% whole grain toast      Dinner: Lower carb  Note: If you would like a carbohydrate for dinner, then have 1 serving of carbs =  15-20 grams = ~½ cup cooked whole grain starch   Focus on 5 oz lean protein + 1 cup or more non-starchy veggies + small amount of heart healthy fat  Carb swaps:  Hearts of palm-based 'linguini'  Brand example: Palmini   Lasagna made with hearts of palm lasagna sheets (Palmini = brand example)  'Riced' Cauliflower  'Riced' Broccoli  Cauliflower mash to mimic mashed potatoes  Spaghetti squash  Zoodles  Spiralized Beets   Low Carb Breads:  'Base Culture' 7 Nut + Seed and Original Paleo Bread  [place]bonaut: Seeded low carb keto bread (Whole Foods)  Unbuns: Low carb hamburger bun (Whole Foods)   Outer A[place] Plantpower à makes pre-made frozen cauliflower pizza crust & wraps as well as frozen sandwich thins  Great for making low carb pizzas, burgers and sandwiches  Cory'flour frozen flatbreads and pizza crustsà any flavor   Great for making low carb pizzas, burgers, and sandwiches    Think outside the box for low carb dishes:  Kabobs, stir garibay with riced cauliflower or riced broccoli, stuffed bell peppers with no rice, lettuce wraps, eggplant lasagna, shrimp etouffee made with riced cauliflower, request a sushi roll that contains raw fish to be made without rice, etc      Optional post-dinner sweet: Anything up to 200 calories  'Fun size'                    Building A Better Smoothie  Choose your protein. Pick 1 from the followin oz 2% Plain Greek yogurt  5 oz 2% Cottage cheese  Plant-based protein powder  Orgain  Sunwarrior  Vu  Garden of Life RAW  100% whey protein powder  2 scoops Collagen Peptides (Vital Proteins is a brand favorite)  Make it sweet:  Choose 1 cup frozen or fresh fruit of your choice to mary lou up your smoothie  Make sure you are choosing frozen fruit with no sugar added; be sure to look at the ingredient list  Add your veggies:  Instead of ice, choose frozen cauliflower florets. Cauliflower helps with the detoxification process in our bodies, and it's virtually tasteless!  Greens: spinach,  kale, or other leafy greens  Beets are a great addition to smoothies, especially paired with strawberries and lemon  Cucumbers and celery are refreshing ways to enhance nutrition and hydration within your smoothie  Add in a plant-based fat:   Nut Butter: 1 teaspoon   Natural peanut butter  Reading butter  Cashew butter  Nuts: ~2 tablespoons   Avocado (¼)  Avocado oil  EVOO: 1 teaspoon  Add a liquid to reach your desired consistency:  Unsweetened/No sugar added plant-based milk   Reading milk, Hemp milk, Cashew milk, Coconut milk, Rice milk, Soy milk  Milk: 1% or 2%  Healthy add-ins for an extra nutritional boost:  1 tablespoon flaxseeds or kyree seeds        Restaurant Tips      Pick 1 out of the 4 Rule: Instead of eating bread/tortilla chips, an appetizer, alcoholic drink and dessert, choose just one to have with your entrée  Focus on lean proteins: Refer to lean meat/meat substitutes page in meal planning guidebook. Select items grilled, baked, broiled, braised, poached or roasted      For your heart health, avoid crispy, crunchy, breaded, paneed or stuffed items and items that are cream based, au gratin or buttered      Order sauces, dressings, and gravies on the side. This way you can add 1-2 tablespoons yourself. This helps with portion control     Request extra non-starchy vegetables instead of a starchy side dish. If the starchy side is something you love, consider splitting it with someone else at the table     Beverages: Order water with lemon, sparkling water, or unsweetened tea. Avoid sugary soft drinks, juices and mixers    Dining Out     [facility]  Designed to take the guesswork out of dining out healthfully, [facility] makes the healthy choice the easy choice  Visit    Order the Eat Fit Cookbook to create restaurant quality dishes at home  Download the [facility] Eat Fit eyad for free on your smartphone  All Eat Fit restaurants & dishes by location   Nutrition facts for every Eat Fit  dish  200 + Eat Fit approved recipes  Grocery shopping guides + community wellness resources    - Ordering A Better-For-You Salad:  Look for lean protein, unlimited non-starchy vegetables, and plant-based fats  Order dressings on the side to better control portion sizes. Add 1-2 tablespoons yourself to lightly coat  Pick 2-3 salad add-ins, each being ~2 tablespoons:  Dressing  Cheese  Nuts  Rivera  Croutons  Dried Fruit  Avocado/Guacamole  Eggs        Additional Nutrition Tips    -How to Read a Nutrition Fact Label:  1. Ingredients first: ingredients are listed form most to least, what is at the top is most occurring  2. Serving size: this is the amount that the numbers represent. If you eat less, divide, if you eat more multiply the numbers  3. Added sugar: Added sugar will be listed under carbohydrates and total sugar. Per product/meal you want your added sugar to be below 7 grams, ideally not more than 20 grams for your whole day    -Rule of thumb for Aisle products: (Food products located in the center aisles of grocery stores):   Mal #7 Rule  Look for products that have 7 grams or less added sugar, and at least 7 grams or more protein  -Frozen Meal Guidelines:   Aim for meals that contain 45 grams or less of carbohydrates and 20 grams or more protein  Note: If you find one you like that doesn't have 20 grams protein, you can add leftover lean protein to the frozen meal  See notes below for several brand examples  Refer to the Eat Fit Shopping Guide for additional brand specific recommendations    -Choose 100% whole wheat/whole grain products   -Note: 'wheat' bread and 'wheat' flour are white bread/white flour    -Unsweetened frozen fruits and veggies contain the same nutritional value as fresh fruit and veggies. I recommend keeping these as staples in your freezer     -I know you don't eat fried foods, but wanted to provide you with this info still: Taking the skin off fried chicken is basically the same as  eating grilled chicken  Compromise: If the skin is your favorite part, then eat your first piece of fried chicken without skin, and your last piece with skin  Note: Fried foods shouldn't be consumed more than twice a month    -Training/retraining the body is key, and may take some time:   Whatever you are currently doing for your meals/snacks and exercise routine, your body is used to it  When changing a habit(s), it's normal for it to be a little hard at first. Your body gets used to new habits over time with consistency  Exercise/Movement: Aim for at least 60 minutes of moderate exercise 5 days a week.   Note: Start off with small goals if having a hard time getting back on an exercise routine. Even a daily 15-minute walk adds up initially. Increase the length of time you exercise gradually until you're able to do 60 minutes most days of the week    -Water is not only important for hydration, but also for feeling alert and more energized. If our body is even slightly dehydrated, that could make us feel more fatigued throughout the day  Aim for all daily fluids being half your body weight in oz + sweat loss (see under Energy Requirements at the top of page 1 for your recommended daily total fluid intake)    -I recommend measuring everything in cooked portions to see what each recommended portion looks like on your plate and bowls; Then eyeball after you feel comfortable with recommended portions    -What potentially increases inflammation/flare-ups in our body?  White/refined carbohydrates  Sugar  Alcohol  Fried foods    -If you're having trouble finding a specific food product, try looking on the brands website. Most companies have a 'store /find a store' on their website        Favorite Food Brands    - Whole Grain Breads:  Tom's Killer Bread - 21 Whole Grains and Seeds (green label), Good Seed (yellow label), Power Seed (red label)   Thin sliced -or- regular slice  Any 100% whole wheat  100% whole grain  option  'Base Culture' 7 Nut + Seed and Original Paleo Bread (GF and found in freezer section)  Per slice: 110 calories and 4 grams net carbs  Great to use for dinner since low carb    -Whole Grain Tortillas  Wraps:  Corn   Coconut wrap - (Typically found in refrigerated section by cheese)  CAMERON ADELINA - 100% whole wheat   [place] - Whole wheat tortillas + whole wheat carb balance    -Whole Grain Frozen Waffles:  Kashi GO Protein Waffles  [place] Cakes Gluten Free and Whole Grain Protein Waffles  Van's Power Grains Original    -Frozen Meals: Single Serving:  Healthy Choice: MAX (this brand will have the most protein)  Healthy Choice: POWER Bowls  Happi Foodi: Carb Wise (Keto meal)  'Life Cuisine' Keto pizza  Quest: Low Carb Frozen Pizza  Realgood Co: Real Enchilada's  Eating Well (Rouses)    -Frozen Meals: Bulk Low Carb Options:  Cory'flour Foods: Keto Lasagna  Realgood: Low carb Lightly breaded chicken strips/nuggets  Harman's Natural Foods: Heat and Eat Entrees   NOTE: these come refrigerated, but you can freeze them for up to 6 months)    -High Protein Pasta's:  'Banza' chickpea products:   Mac-n-cheese  Pasta's - all varieties   Rice   Red lentil  Yellow lentil pasta  Black bean pasta (aka squid ink pasta)  Edamame-based pasta    - Red Sauce (In A Jar):  Mitch & Rosario's Heart Smart Sauce (available flavors: Roasted Garlic, Ald[place] or Original)  Erna's Finest Gourmet Foods Pasta Sauce  Classico Original  Engine 2 Plant-Strong    -Cold Cereals:  Special K PROTEIN  Premier Protein   Kashi Go Grain Free  Dark Cocoa + Cinnamon Vanilla  [place] Crunch Keto-Friendly Cereals  Iwon organic protein crunchies  Three Wishes  Magic Spoon Grain-Free Cereals (online only)    - Hot Cereals- Grab & Go Oatmeal Cups:  Powerful Overnight Oats  Jhonatan's Red Mill: Gluten Free Oatmeal with Flax and Nigel  Wild Friends: Oats & Nut Butter  Think Thin: Protein & Fiber Hot Oatmeal    -Hot Cereal- Oatmeal Packets:  Kashi Go Lean Creamy  All-Natural Vanilla  Moravian - Low Sugar - Any Flavor    -Whole Grain Chips:  SunChips  Beanito's  [place] Bean Chips  Popcorners- FLEX Protein Crisps (10 grams protein per serving)    -High Protein Chips:  iwon organics protein stix  protein puffs  Quest  Protes    -Whole Grain Crackers:  Triscuits - Regular + thin crisps  Wheat Thins  Blue Asiya almond nut thins  Elana's Gone Cracker  Crunchmaster protein sea salt cracker    -Better-For-You Ice Cream  Ice Cream Bars:  Halo Top high protein ice cream pints - regular and keto series  KETO pint ice cream bars  Halo Top Pops  Realgood ice cream  Enlightened ice cream bar  Enlightened 'Light' ice cream  Yasso Frozen Greek yogurt bar    -Protein  Granola Bars:  [place] Protein Chewy   Kashi Go Protein Bar   KIND Protein + KIND Bars (with 7 grams sugar or less)   Rx Bar   Rx Layers Bars   Kashi Grain Free Coconut [place]  Oatmega Bars    -BBQ Sauce:   DIDIER all natural   Primal Kitchen Classic BBQ sauce    -Salad Dressings:  James's Salad Dressing: Sensation, Balsamic, Strawberry, Avocado, Caesar, Creole Ranch, Sweet Creole Mustard, Garlic & Red Wine   Primal Kitchen- all varieties    Lopez's Own - Balsamic Vinaigrette, Classic Oil & Vinegar     -Ready-to-Drink Protein Drinks:  Iconic Grass-Fed Protein Drink  Orgain Clean- grass-fed + plant based  OWYN Plant-Based Protein Shake  Fairlife 30-gram Protein Drink  [facility] CORE POWER Protein Drink    -Granola: [Note: granola should be used as a topper for a crunch, and not as a main meal]  ProGranola by Whitetruffle  Engine 2 Plant Strong  Good Granoly Health Nut    -Flavored Greek Yogurt:  Chobani Less Sugar  Chobani Zero Sugar  Oikos Triple Zero  Two Good - All varieties   :ratio PROTEIN coconut Greek yogurt (25 grams protein  3 grams sugar)    - 'Vital Proteins' Collagen Peptides, unflavored:   Great to have as a staple in your pantry. You can add to soups, hummus, coffee, etc to make higher  protein    -Supplements:  Vitamin D3: 1,000 - 2,000 i.u per day   Fish Oil: Look for at least 1200 mg EPA + DHA per 2 capsules  My favorite brand is Countdown To Buy's Ultimate Omega  One-A-Day MVI  Brand example: [facility]'s Women's Multivitamin Ultra Samuel    -If you need to reschedule a nutrition appointment, please call [facility] at [phone]

## 2022-11-17 ENCOUNTER — CLINICAL SUPPORT (OUTPATIENT)
Dept: REHABILITATION | Facility: HOSPITAL | Age: 67
End: 2022-11-17
Payer: MEDICARE

## 2022-11-17 DIAGNOSIS — M25.612 DECREASED RANGE OF MOTION OF LEFT SHOULDER: ICD-10-CM

## 2022-11-17 DIAGNOSIS — R53.1 DECREASED STRENGTH: Primary | ICD-10-CM

## 2022-11-17 PROCEDURE — 97110 THERAPEUTIC EXERCISES: CPT | Mod: PN | Performed by: PHYSICAL THERAPIST

## 2022-11-17 PROCEDURE — 97140 MANUAL THERAPY 1/> REGIONS: CPT | Mod: PN | Performed by: PHYSICAL THERAPIST

## 2022-11-17 NOTE — PROGRESS NOTES
[facility]   Physical Therapy Treatment Note     Name: Dorothy Burt  Clinic Number: [id number]    Therapy Diagnosis:   Encounter Diagnoses   Name Primary?    Decreased strength Yes    Decreased range of motion of left shoulder      Physician: Louie Luna MD    Visit Date: 11/17/2022    Physician Orders: PT Eval and Treat   2-3 x per week x 6 weeks   Gentle passive elevation and ER, pendulums. No RTC strengthening     Medical Diagnosis from Referral:M75.112 (ICD-10-CM) - Nontraumatic incomplete tear of left rotator cuff S46.212A (ICD-10-CM) - Biceps tendon rupture, proximal, left, initial encounter M75.42 (ICD-10-CM) - Subacromial impingement, left   Evaluation Date: 6/17/2022  Authorization Period Expiration: 7/15/22  Plan of Care Expiration: 1/6/23  Progress Note Due: 1/6/23  Visit # / Visits authorized: 37/32 (+ eval)  FOTO: 7/10  PTA Visit: 0/5     Precautions: Standard (post-op protocol - surgery 6/9/22)    Time In: 2:08 PM   Time Out: 3:02 PM   Total Billable Time: 30 1:1 and 25 supervised minutes (3-TE, 1-MT)     6/9/22 Procedure: Procedure(s):  1. Diagnostic arthroscopy left shoulder  2. Extensive synovectomy  3. Rotator interval release   4. Arthroscopic capsular release  5. Biceps tenotomy  6. Subacromial decompression  7. Acromioplasty   8. Rotator cuff repair     SUBJECTIVE     Pt reports: both shoulders bothered her this morning. Not sure why.    She was compliant with home exercise program.  Response to previous treatment: no problems.  Functional change: improving arm use and motion    Pain: 1/10  Location: left shoulder      OBJECTIVE     11/17/22:  After manuals:  Flexion AROM: 137 degree   Internal rotation AROM: 45.5 cm    Treatment     Dorothy received the treatments listed below:      Dorothy received therapeutic exercises to develop strength, endurance, ROM, flexibility, posture and core stabilization for 46 1:1 minutes including:     Active shoulder flexion with  "PT scapular assist: 10x  Want shoulder extension: 10x5" holds  Strap internal rotation stretch: 15x5" holds  Theraband rows: green theraband 3x10   Theraband external rotation: yellow theraband 3x10 left with towel roll under elbow  Theraband internal rotation: yellow theraband 3x10 left     Standing wand overhead press: 2x10  Wall slides: 12x5" holds   Wall ball roll ups with alternating lift off: 15x with swiss ball  Shoulder stabilization ball on wall rolls 30x CW/30CCW (un-weighted yellow ball)    Supine left shoulder flexion AROM: 2x10  Side lying external rotation: 3x10, 2#  Sidelying shoulder abduction: 2x15, 1#     Not today  Wall pushups on ballet bar: 3x10  Assisted active seated flexion with cables: 3#, 3x10 (height number 2) - (mirror provided for visual feedback)  Seated 90/90 external rotation: 3x10 left     Prone Y: 2x8 left (small range of motion) - not today  Prone T with palm down: 3x10 left - not today      Dorothy received the following manual therapy techniques: Joint mobilizations, Manual traction, Myofacial release, Soft tissue Mobilization, and Friction Massage were applied to the: left shoulder musculature for 8 minutes, including:    Grade I-IV GH  AP and inferior joint mobs with shoulder PROM    Not today:  Left upper trap static pressure and follow to release  Left scapulae mobilization - not today     Cold pack was applied for 00 minutes to left shoulder.    Patient Education and Home Exercises     Home Exercises Provided and Patient Education Provided   Education provided:   - anatomy and expectations with pain and symptoms  - given yellow theraband on 11/4/22 for external rotation and internal rotation doorway strengthening at home    Written Home Exercises Provided: Patient instructed to cont prior HEP. Exercises were reviewed and Dorothy was able to demonstrate them prior to the end of the session.  Dorothy demonstrated good  understanding of the education provided. See EMR under Patient " Instructions for exercises provided during therapy sessions    ASSESSMENT     Dorothy is a 67 y.o. female referred to outpatient Physical Therapy with a medical diagnosis of noncomplete rotator cuff tear, biceps tendon rupture and subacromial impingement. Pt presents with status post left rotator cuff repair (supraspinatus), acromioplasty, biceps tenotomy, subchondral decompression, synovectomy, capsular release on 6/9/22. Mild increases in AROM after manuals today compared to most recent POC. Main limitations are reaching behind her back and overhead. Shrug compensation needed due to limitations at 135 degrees.      Dorothy Is progressing well towards her goals.   Pt prognosis is Good.     Pt will continue to benefit from skilled outpatient physical therapy to address the deficits listed in the problem list box on initial evaluation, provide pt/family education and to maximize pt's level of independence in the home and community environment.     Pt's spiritual, cultural and educational needs considered and pt agreeable to plan of care and goals.     Anticipated Barriers for therapy: failed conservative treatment     Goals:   Short Term Goals: 4 weeks   1.  Patient will report < 6/10 shoulder pain at worst for improved ADL performance. MET  2.  Patient will demonstrate L shoulder flexion PROM > 140 deg to improve overhead reach. MET 8/11/22  3.  Patient will demonstrate left shoulder external rotation PROM > 40 degree. MET 7/7/22  4.  Patient will demonstrate left  strength > 45 pounds.  MET 8/11/22     Long Term Goals: 12 weeks   1. Patient will report ability to wash her hair without restriction. PROGRESSING; NOT MET  2. Patient will demonstrate ability to reach overhead and remove a 2 lb object 5 times to simulate removing objects from an overhead shelf w/o an increase in symptoms. PROGRESSING; NOT MET  3. Patient will demonstrate all left shoulder AROM within 85% of right for improved ADL performance. PROGRESSING;  NOT MET  4. Patient will improve FOTO to < 38% to improve ADL performance. PROGRESSING; NOT MET    PLAN     Cont with POC with phase 3 of rehab protocol   Updated POC on 11/11/22    Plan of care Certification: 9/30/22 to 11/25/22    Louie Tobar, PT

## 2022-11-22 ENCOUNTER — CLINICAL SUPPORT (OUTPATIENT)
Dept: REHABILITATION | Facility: HOSPITAL | Age: 67
End: 2022-11-22
Payer: MEDICARE

## 2022-11-22 DIAGNOSIS — R53.1 DECREASED STRENGTH: Primary | ICD-10-CM

## 2022-11-22 DIAGNOSIS — M25.612 DECREASED RANGE OF MOTION OF LEFT SHOULDER: ICD-10-CM

## 2022-11-22 PROCEDURE — 97110 THERAPEUTIC EXERCISES: CPT | Mod: PN | Performed by: PHYSICAL THERAPIST

## 2022-11-22 PROCEDURE — 97140 MANUAL THERAPY 1/> REGIONS: CPT | Mod: PN | Performed by: PHYSICAL THERAPIST

## 2022-11-22 NOTE — PROGRESS NOTES
"[facility]   Physical Therapy Treatment Note     Name: Dorothy Burt  Clinic Number: [id number]    Therapy Diagnosis:   Encounter Diagnoses   Name Primary?    Decreased strength Yes    Decreased range of motion of left shoulder      Physician: Louie Luna MD    Visit Date: 11/22/2022    Physician Orders: PT Eval and Treat   2-3 x per week x 6 weeks   Gentle passive elevation and ER, pendulums. No RTC strengthening     Medical Diagnosis from Referral:M75.112 (ICD-10-CM) - Nontraumatic incomplete tear of left rotator cuff S46.212A (ICD-10-CM) - Biceps tendon rupture, proximal, left, initial encounter M75.42 (ICD-10-CM) - Subacromial impingement, left   Evaluation Date: 6/17/2022  Authorization Period Expiration: 7/15/22  Plan of Care Expiration: 12/12/22  Progress Note Due: 12/12/22  Visit # / Visits authorized: 38/44 (+ eval)  FOTO: 7/10  PTA Visit: 0/5     Precautions: Standard (post-op protocol - surgery 6/9/22)    Time In: 12:30 PM   Time Out: 1:30 PM   Total Billable Time: 54 minutes (3-TE, 1-MT)     6/9/22 Procedure: Procedure(s):  1. Diagnostic arthroscopy left shoulder  2. Extensive synovectomy  3. Rotator interval release   4. Arthroscopic capsular release  5. Biceps tenotomy  6. Subacromial decompression  7. Acromioplasty   8. Rotator cuff repair     SUBJECTIVE     Pt reports: "My shoulders are ok, it's the rest of my body today."  She brought in her approval letter for more PT.    She was compliant with home exercise program.  Response to previous treatment: no problems.  Functional change: improving arm use and motion    Pain: 1/10  Location: left shoulder      OBJECTIVE     11/17/22:  After manuals:  Flexion AROM: 137 degree   Internal rotation AROM: 45.5 cm    Treatment     Dorothy received the treatments listed below:      Dorothy received therapeutic exercises to develop strength, endurance, ROM, flexibility, posture and core stabilization for 46 1:1 minutes including: " "    Active shoulder flexion with PT scapular assist: 10x  Want shoulder extension: 10x5" holds  Strap internal rotation stretch: 15x5" holds  Theraband rows: blue theraband 3x10   Theraband external rotation: red theraband 3x10 left with towel roll under elbow  Theraband internal rotation: red theraband 3x10 left     Standing wand overhead press: 2x10  Wall slides: 12x5" holds   Wall ball roll ups with alternating lift off: 2x10 with swiss ball  Shoulder stabilization ball on wall rolls 30x CW/30CCW (un-weighted yellow ball)    Supine left shoulder flexion AROM: 2x10  Side lying external rotation: 3x10, 2#  Sidelying shoulder abduction: 2x15, 1#     Not today  Wall pushups on ballet bar: 3x10  Assisted active seated flexion with cables: 3#, 3x10 (height number 2) - (mirror provided for visual feedback)  Seated 90/90 external rotation: 3x10 left     Prone Y: 2x8 left (small range of motion) - not today  Prone T with palm down: 3x10 left - not today      Dorothy received the following manual therapy techniques: Joint mobilizations, Manual traction, Myofacial release, Soft tissue Mobilization, and Friction Massage were applied to the: left shoulder musculature for 8 minutes, including:    Grade I-IV GH  AP and inferior joint mobs with shoulder PROM    Not today:  Left upper trap static pressure and follow to release  Left scapulae mobilization - not today     Cold pack was applied for 00 minutes to left shoulder.    Patient Education and Home Exercises     Home Exercises Provided and Patient Education Provided   Education provided:   - anatomy and expectations with pain and symptoms  - given yellow theraband on 11/4/22 for external rotation and internal rotation doorway strengthening at home    Written Home Exercises Provided: Patient instructed to cont prior HEP. Exercises were reviewed and Dorothy was able to demonstrate them prior to the end of the session.  Dorothy demonstrated good  understanding of the education provided. " See EMR under Patient Instructions for exercises provided during therapy sessions    ASSESSMENT     Dorothy is a 67 y.o. female referred to outpatient Physical Therapy with a medical diagnosis of noncomplete rotator cuff tear, biceps tendon rupture and subacromial impingement. Pt presents with status post left rotator cuff repair (supraspinatus), acromioplasty, biceps tenotomy, subchondral decompression, synovectomy, capsular release on 6/9/22. Improving external rotation/rotator cuff strength, but main limitations remains shoulder flexion end range with shrug sign.       Dorothy Is progressing well towards her goals.   Pt prognosis is Good.     Pt will continue to benefit from skilled outpatient physical therapy to address the deficits listed in the problem list box on initial evaluation, provide pt/family education and to maximize pt's level of independence in the home and community environment.     Pt's spiritual, cultural and educational needs considered and pt agreeable to plan of care and goals.     Anticipated Barriers for therapy: failed conservative treatment     Goals:   Short Term Goals: 4 weeks   1.  Patient will report < 6/10 shoulder pain at worst for improved ADL performance. MET  2.  Patient will demonstrate L shoulder flexion PROM > 140 deg to improve overhead reach. MET 8/11/22  3.  Patient will demonstrate left shoulder external rotation PROM > 40 degree. MET 7/7/22  4.  Patient will demonstrate left  strength > 45 pounds.  MET 8/11/22     Long Term Goals: 12 weeks   1. Patient will report ability to wash her hair without restriction. PROGRESSING; NOT MET  2. Patient will demonstrate ability to reach overhead and remove a 2 lb object 5 times to simulate removing objects from an overhead shelf w/o an increase in symptoms. PROGRESSING; NOT MET  3. Patient will demonstrate all left shoulder AROM within 85% of right for improved ADL performance. PROGRESSING; NOT MET  4. Patient will improve FOTO to <  38% to improve ADL performance. PROGRESSING; NOT MET    PLAN     Cont with POC with phase 3 of rehab protocol   Updated POC on 11/11/22    Plan of care Certification: 9/30/22 to 11/25/22    Louie Tobar, PT

## 2022-11-28 ENCOUNTER — CLINICAL SUPPORT (OUTPATIENT)
Dept: REHABILITATION | Facility: HOSPITAL | Age: 67
End: 2022-11-28
Attending: FAMILY MEDICINE
Payer: MEDICARE

## 2022-11-28 DIAGNOSIS — R53.1 DECREASED STRENGTH: Primary | ICD-10-CM

## 2022-11-28 DIAGNOSIS — M25.612 DECREASED RANGE OF MOTION OF LEFT SHOULDER: ICD-10-CM

## 2022-11-28 PROCEDURE — 97110 THERAPEUTIC EXERCISES: CPT | Mod: PN

## 2022-11-28 PROCEDURE — 97140 MANUAL THERAPY 1/> REGIONS: CPT | Mod: PN

## 2022-11-28 NOTE — PROGRESS NOTES
[facility]   Physical Therapy Treatment Note     Name: Dorothy Burt  Clinic Number: [id number]    Therapy Diagnosis:   Encounter Diagnoses   Name Primary?    Decreased strength Yes    Decreased range of motion of left shoulder      Physician: Louie Luna MD    Visit Date: 11/28/2022    Physician Orders: PT Eval and Treat   2-3 x per week x 6 weeks   Gentle passive elevation and ER, pendulums. No RTC strengthening     Medical Diagnosis from Referral:M75.112 (ICD-10-CM) - Nontraumatic incomplete tear of left rotator cuff S46.212A (ICD-10-CM) - Biceps tendon rupture, proximal, left, initial encounter M75.42 (ICD-10-CM) - Subacromial impingement, left   Evaluation Date: 6/17/2022  Authorization Period Expiration: 7/15/22  Plan of Care Expiration: 12/12/22  Progress Note Due: 12/12/22  Visit # / Visits authorized: 39/44 (+ eval)  FOTO: 9/10  PTA Visit: 0/5     Precautions: Standard (post-op protocol - surgery 6/9/22)    Time In: 10:05 AM   Time Out: 11:00 AM   Total Billable Time: 55 minutes (3-TE, 1-MT)     6/9/22 Procedure: Procedure(s):  1. Diagnostic arthroscopy left shoulder  2. Extensive synovectomy  3. Rotator interval release   4. Arthroscopic capsular release  5. Biceps tenotomy  6. Subacromial decompression  7. Acromioplasty   8. Rotator cuff repair     SUBJECTIVE     Pt reports: doing ok today and sees MD tomorrow. Minimal to no pain and just a little soreness from time to time.  She brought in her approval letter for more PT.    She was compliant with home exercise program.  Response to previous treatment: no problems.  Functional change: improving arm use and motion    Pain: 1/10  Location: left shoulder      OBJECTIVE     11/17/22:  After manuals:  Flexion AROM: 137 degree   Internal rotation AROM: 45.5 cm    Treatment     Dorothy received the treatments listed below:      Dorothy received therapeutic exercises to develop strength, endurance, ROM, flexibility, posture and  "core stabilization for 40 1:1 minutes including:     Active shoulder flexion with PT scapular assist: 10x  Wand shoulder extension: 10x5" holds  Strap internal rotation stretch: 15x5" holds  Theraband rows: blue theraband 3x10 - not today  Theraband external rotation: red theraband 3x10 left with towel roll under elbow  Theraband internal rotation: green theraband 2x10 left     Standing wand overhead press: 2x10 on wall, 1# dowel  Wall slides with shrug: 12x5" holds   Wall ball roll ups with alternating lift off: 2x10 with swiss ball  Shoulder stabilization ball on wall rolls 20x CW/30CCW (weighted soft red ball - 1.5kg)    Supine left shoulder flexion AROM: 2x10 - not today  Sidelying shoulder abduction: 2x10, 2#     Not today  Wall pushups on ballet bar: 3x10  Assisted active seated flexion with cables: 3#, 3x10 (height number 2) - (mirror provided for visual feedback)  Seated 90/90 external rotation: 3x10 left     Prone Y: 2x8 left (small range of motion) - not today  Prone T with palm down: 3x10 left - not today      Dorothy received the following manual therapy techniques: Joint mobilizations, Manual traction, Myofacial release, Soft tissue Mobilization, and Friction Massage were applied to the: left shoulder musculature for 10 minutes, including:    Grade I-IV GH  AP and inferior joint mobs with shoulder PROM    Not today:  Left upper trap static pressure and follow to release  Left scapulae mobilization - not today     Cold pack was applied for 00 minutes to left shoulder.    Patient Education and Home Exercises     Home Exercises Provided and Patient Education Provided   Education provided:   - anatomy and expectations with pain and symptoms  - given yellow theraband on 11/4/22 for external rotation and internal rotation doorway strengthening at home    Written Home Exercises Provided: Patient instructed to cont prior HEP. Exercises were reviewed and Dorothy was able to demonstrate them prior to the end of the " session.  Dorothy demonstrated good  understanding of the education provided. See EMR under Patient Instructions for exercises provided during therapy sessions    ASSESSMENT     Dorothy is a 67 y.o. female referred to outpatient Physical Therapy with a medical diagnosis of noncomplete rotator cuff tear, biceps tendon rupture and subacromial impingement. Pt presents with status post left rotator cuff repair (supraspinatus), acromioplasty, biceps tenotomy, subchondral decompression, synovectomy, capsular release on 6/9/22. Gradually improving left shoulder external rotation and flexion. Able to tolerate increased resistance for rotator cuff muscles. AAROM and PROM greater than AROM and focusing on left scapular and rotator cuff strengthening. Pt progressing well and pain well managed.     Dorothy Is progressing well towards her goals.   Pt prognosis is Good.     Pt will continue to benefit from skilled outpatient physical therapy to address the deficits listed in the problem list box on initial evaluation, provide pt/family education and to maximize pt's level of independence in the home and community environment.     Pt's spiritual, cultural and educational needs considered and pt agreeable to plan of care and goals.     Anticipated Barriers for therapy: failed conservative treatment     Goals:   Short Term Goals: 4 weeks   1.  Patient will report < 6/10 shoulder pain at worst for improved ADL performance. MET  2.  Patient will demonstrate L shoulder flexion PROM > 140 deg to improve overhead reach. MET 8/11/22  3.  Patient will demonstrate left shoulder external rotation PROM > 40 degree. MET 7/7/22  4.  Patient will demonstrate left  strength > 45 pounds.  MET 8/11/22     Long Term Goals: 12 weeks   1. Patient will report ability to wash her hair without restriction. PROGRESSING; NOT MET  2. Patient will demonstrate ability to reach overhead and remove a 2 lb object 5 times to simulate removing objects from an overhead  shelf w/o an increase in symptoms. PROGRESSING; NOT MET  3. Patient will demonstrate all left shoulder AROM within 85% of right for improved ADL performance. PROGRESSING; NOT MET  4. Patient will improve FOTO to < 38% to improve ADL performance. PROGRESSING; NOT MET    PLAN     Cont with POC with phase 3 of rehab protocol   Updated POC on 11/11/22    Plan of care Certification: 9/30/22 to 11/25/22    Jose C Lutz, PT

## 2022-11-29 ENCOUNTER — OFFICE VISIT (OUTPATIENT)
Dept: ORTHOPEDICS | Facility: CLINIC | Age: 67
End: 2022-11-29
Payer: MEDICARE

## 2022-11-29 VITALS
HEART RATE: 102 BPM | SYSTOLIC BLOOD PRESSURE: 105 MMHG | BODY MASS INDEX: 33.27 KG/M2 | WEIGHT: 194.88 LBS | HEIGHT: 64 IN | DIASTOLIC BLOOD PRESSURE: 72 MMHG

## 2022-11-29 DIAGNOSIS — Z98.890 S/P LEFT ROTATOR CUFF REPAIR: Primary | ICD-10-CM

## 2022-11-29 PROCEDURE — 3078F DIAST BP <80 MM HG: CPT | Mod: CPTII,S$GLB,, | Performed by: ORTHOPAEDIC SURGERY

## 2022-11-29 PROCEDURE — 99213 PR OFFICE/OUTPT VISIT, EST, LEVL III, 20-29 MIN: ICD-10-PCS | Mod: S$GLB,,, | Performed by: ORTHOPAEDIC SURGERY

## 2022-11-29 PROCEDURE — 1159F MED LIST DOCD IN RCRD: CPT | Mod: CPTII,S$GLB,, | Performed by: ORTHOPAEDIC SURGERY

## 2022-11-29 PROCEDURE — 99213 OFFICE O/P EST LOW 20 MIN: CPT | Mod: S$GLB,,, | Performed by: ORTHOPAEDIC SURGERY

## 2022-11-29 PROCEDURE — 3288F FALL RISK ASSESSMENT DOCD: CPT | Mod: CPTII,S$GLB,, | Performed by: ORTHOPAEDIC SURGERY

## 2022-11-29 PROCEDURE — 3288F PR FALLS RISK ASSESSMENT DOCUMENTED: ICD-10-PCS | Mod: CPTII,S$GLB,, | Performed by: ORTHOPAEDIC SURGERY

## 2022-11-29 PROCEDURE — 1125F PR PAIN SEVERITY QUANTIFIED, PAIN PRESENT: ICD-10-PCS | Mod: CPTII,S$GLB,, | Performed by: ORTHOPAEDIC SURGERY

## 2022-11-29 PROCEDURE — 3074F SYST BP LT 130 MM HG: CPT | Mod: CPTII,S$GLB,, | Performed by: ORTHOPAEDIC SURGERY

## 2022-11-29 PROCEDURE — 99999 PR PBB SHADOW E&M-EST. PATIENT-LVL III: ICD-10-PCS | Mod: PBBFAC,,, | Performed by: ORTHOPAEDIC SURGERY

## 2022-11-29 PROCEDURE — 1101F PT FALLS ASSESS-DOCD LE1/YR: CPT | Mod: CPTII,S$GLB,, | Performed by: ORTHOPAEDIC SURGERY

## 2022-11-29 PROCEDURE — 3008F PR BODY MASS INDEX (BMI) DOCUMENTED: ICD-10-PCS | Mod: CPTII,S$GLB,, | Performed by: ORTHOPAEDIC SURGERY

## 2022-11-29 PROCEDURE — 3078F PR MOST RECENT DIASTOLIC BLOOD PRESSURE < 80 MM HG: ICD-10-PCS | Mod: CPTII,S$GLB,, | Performed by: ORTHOPAEDIC SURGERY

## 2022-11-29 PROCEDURE — 99999 PR PBB SHADOW E&M-EST. PATIENT-LVL III: CPT | Mod: PBBFAC,,, | Performed by: ORTHOPAEDIC SURGERY

## 2022-11-29 PROCEDURE — 1160F PR REVIEW ALL MEDS BY PRESCRIBER/CLIN PHARMACIST DOCUMENTED: ICD-10-PCS | Mod: CPTII,S$GLB,, | Performed by: ORTHOPAEDIC SURGERY

## 2022-11-29 PROCEDURE — 3074F PR MOST RECENT SYSTOLIC BLOOD PRESSURE < 130 MM HG: ICD-10-PCS | Mod: CPTII,S$GLB,, | Performed by: ORTHOPAEDIC SURGERY

## 2022-11-29 PROCEDURE — 1159F PR MEDICATION LIST DOCUMENTED IN MEDICAL RECORD: ICD-10-PCS | Mod: CPTII,S$GLB,, | Performed by: ORTHOPAEDIC SURGERY

## 2022-11-29 PROCEDURE — 3044F HG A1C LEVEL LT 7.0%: CPT | Mod: CPTII,S$GLB,, | Performed by: ORTHOPAEDIC SURGERY

## 2022-11-29 PROCEDURE — 1101F PR PT FALLS ASSESS DOC 0-1 FALLS W/OUT INJ PAST YR: ICD-10-PCS | Mod: CPTII,S$GLB,, | Performed by: ORTHOPAEDIC SURGERY

## 2022-11-29 PROCEDURE — 3008F BODY MASS INDEX DOCD: CPT | Mod: CPTII,S$GLB,, | Performed by: ORTHOPAEDIC SURGERY

## 2022-11-29 PROCEDURE — 1160F RVW MEDS BY RX/DR IN RCRD: CPT | Mod: CPTII,S$GLB,, | Performed by: ORTHOPAEDIC SURGERY

## 2022-11-29 PROCEDURE — 1125F AMNT PAIN NOTED PAIN PRSNT: CPT | Mod: CPTII,S$GLB,, | Performed by: ORTHOPAEDIC SURGERY

## 2022-11-29 PROCEDURE — 3044F PR MOST RECENT HEMOGLOBIN A1C LEVEL <7.0%: ICD-10-PCS | Mod: CPTII,S$GLB,, | Performed by: ORTHOPAEDIC SURGERY

## 2022-11-29 NOTE — PROGRESS NOTES
Patient ID:   Dorothy Burt is a 67 y.o. female.    Chief Complaint:   Six months s/p L arthroscopic RCR, extensive arthroscopic debridement    HPI:   The patient is returning today just shy of 6 months out from her surgery.  She denies any significant pain.  Pain intensity is reported to be 1/10.  She has some difficulty reaching behind her back.  She has been making progress with physical therapy.    Medications:    Current Outpatient Medications:     acyclovir (ZOVIRAX) 400 MG tablet, TAKE 1 BY MOUTH 5 TIMES DAILY FOR 5 DAYS, Disp: 25 tablet, Rfl: 2    albuterol (PROAIR HFA) 90 mcg/actuation inhaler, Inhale 2 puffs into the lungs every 6 (six) hours as needed for Wheezing or Shortness of Breath. Rescue, Disp: 18 g, Rfl: 11    budesonide-formoterol 160-4.5 mcg (SYMBICORT) 160-4.5 mcg/actuation HFAA, Inhale 2 puffs into the lungs every evening. Controller, Disp: , Rfl:     diclofenac sodium (VOLTAREN) 1 % Gel, Apply 2 g topically 2 (two) times daily as needed (to knees)., Disp: 100 g, Rfl: 2    famotidine (PEPCID) 20 MG tablet, TAKE 1 TABLET BY MOUTH TWICE A DAY, Disp: 30 tablet, Rfl: 11    ibuprofen (ADVIL,MOTRIN) 600 MG tablet, Take 1 tablet (600 mg total) by mouth every 6 (six) hours as needed for Pain., Disp: 30 tablet, Rfl: 1    levocetirizine (XYZAL) 5 MG tablet, TAKE 1 TABLET BY MOUTH EVERY DAY AS NEEDED, Disp: 90 tablet, Rfl: 3    levothyroxine (SYNTHROID) 25 MCG tablet, TAKE 1.5 TABLET BY MOUTH DAILY, Disp: 135 tablet, Rfl: 3    loratadine (CLARITIN) 10 mg tablet, Take 1 tablet (10 mg total) by mouth once daily., Disp: 30 tablet, Rfl: 2    meloxicam (MOBIC) 7.5 MG tablet, Take 1 tablet (7.5 mg total) by mouth once daily., Disp: 30 tablet, Rfl: 0    naproxen (NAPROSYN) 500 MG tablet, Take 1 tablet (500 mg total) by mouth 2 (two) times daily as needed., Disp: 60 tablet, Rfl: 0    omeprazole (PRILOSEC) 20 MG capsule, Take 20 mg by mouth daily as needed., Disp: , Rfl:     traMADoL (ULTRAM) 50 mg tablet,  Take 2 tablets (100 mg total) by mouth every 8 (eight) hours as needed for Pain., Disp: 120 tablet, Rfl: 5    ZANAFLEX 4 mg tablet, Take 4 mg by mouth every 8 (eight) hours as needed., Disp: , Rfl:     Allergies:  Review of patient's allergies indicates:  No Known Allergies    Past Medical History:  Past Medical History:   Diagnosis Date    Arthritis     Asthma     Back pain     Knee pain     Thyroid disease         Past Surgical History:  Past Surgical History:   Procedure Laterality Date    ACROMIOPLASTY  6/9/2022    Procedure: ACROMIOPLASTY;  Surgeon: Louie Luna MD;  Location: [facility];  Service: Orthopedics;;    ARTHROSCOPIC REPAIR OF ROTATOR CUFF OF SHOULDER Left 6/9/2022    Procedure: REPAIR, ROTATOR CUFF, ARTHROSCOPIC, extensive arthroscopic debridement;  Surgeon: Louie Luna MD;  Location: [facility] OR;  Service: Orthopedics;  Laterality: Left;  Smith-Nephew     ARTHROSCOPIC TENOTOMY OF BICEPS TENDON  6/9/2022    Procedure: TENOTOMY, BICEPS, ARTHROSCOPIC;  Surgeon: Louie Luna MD;  Location: [facility] OR;  Service: Orthopedics;;    ARTHROSCOPY OF SHOULDER WITH DECOMPRESSION OF SUBACROMIAL SPACE  6/9/2022    Procedure: ARTHROSCOPY, SHOULDER, WITH SUBACROMIAL SPACE DECOMPRESSION;  Surgeon: Louie Luna MD;  Location: [facility] OR;  Service: Orthopedics;;    CHOLECYSTECTOMY      CORONARY ANGIOGRAPHY N/A 4/19/2020    Procedure: ANGIOGRAM, CORONARY ARTERY;  Surgeon: Zachariah Goldman MD;  Location: [facility] CATH LAB;  Service: Cardiology;  Laterality: N/A;    LEFT HEART CATHETERIZATION Right 4/19/2020    Procedure: Left heart cath;  Surgeon: Zachariah Goldman MD;  Location: [facility] CATH LAB;  Service: Cardiology;  Laterality: Right;    SYNOVECTOMY OF SHOULDER  6/9/2022    Procedure: SYNOVECTOMY, SHOULDER;  Surgeon: Louie Luna MD;  Location: [facility] OR;  Service: Orthopedics;;    VASCULAR SURGERY         Social History:  Social History     Occupational History    Not on file   Tobacco Use     "Smoking status: Never    Smokeless tobacco: Never   Substance and Sexual Activity    Alcohol use: Yes     Alcohol/week: 1.0 standard drink     Types: 1 Shots of liquor per week     Comment: occ    Drug use: No    Sexual activity: Not on file       Family History:  Family History   Problem Relation Age of Onset    Stroke Mother         ROS:  Review of Systems   Musculoskeletal:  Positive for joint pain and stiffness.   All other systems reviewed and are negative.    Vitals:  /72 (BP Location: Left arm, Patient Position: Sitting, BP Method: Large (Automatic))   Pulse 102   Ht 5' 4" (1.626 m)   Wt 88.4 kg (194 lb 14.2 oz)   LMP 08/15/2005   BMI 33.45 kg/m²     Physical Examination:  Comprehensive Orthopaedic Musculoskeletal Exam    General      Constitutional: appears stated age, mildly obese, well-developed and well-nourished    Scleral icterus: no    Labored breathing: no    Psychiatric: normal mood and affect and no acute distress    Neurological: alert and oriented x3    Skin: intact    Lymphadenopathy: none   Ortho Exam   Left shoulder exam:  Range of motion testing reveals active elevation 160, passive 170, external rotation at the side 30, internal rotation to L2.  Rotator cuff strength is 4+ out of 5 in elevation, 5/5 internal and external rotation.    Assessment:  1. S/P left rotator cuff repair      Plan:  The patient is making progress and will continue with physical therapy.  She will continue to focus on gaining her range of motion and strength.  I suggested a follow-up evaluation in 3 months.       No follow-ups on file.         "

## 2022-12-05 ENCOUNTER — CLINICAL SUPPORT (OUTPATIENT)
Dept: REHABILITATION | Facility: HOSPITAL | Age: 67
End: 2022-12-05
Payer: MEDICARE

## 2022-12-05 DIAGNOSIS — M25.612 DECREASED RANGE OF MOTION OF LEFT SHOULDER: ICD-10-CM

## 2022-12-05 DIAGNOSIS — R53.1 DECREASED STRENGTH: Primary | ICD-10-CM

## 2022-12-05 PROCEDURE — 97110 THERAPEUTIC EXERCISES: CPT | Mod: PN | Performed by: PHYSICAL THERAPIST

## 2022-12-05 NOTE — PROGRESS NOTES
[facility]   Physical Therapy Treatment Note     Name: Dorothy Burt  Clinic Number: [id number]    Therapy Diagnosis:   Encounter Diagnoses   Name Primary?    Decreased strength Yes    Decreased range of motion of left shoulder        Physician: Louie Luna MD    Visit Date: 12/5/2022    Physician Orders: PT Eval and Treat   2-3 x per week x 6 weeks   Gentle passive elevation and ER, pendulums. No RTC strengthening     Medical Diagnosis from Referral:M75.112 (ICD-10-CM) - Nontraumatic incomplete tear of left rotator cuff S46.212A (ICD-10-CM) - Biceps tendon rupture, proximal, left, initial encounter M75.42 (ICD-10-CM) - Subacromial impingement, left   Evaluation Date: 6/17/2022  Authorization Period Expiration: 7/15/22  Plan of Care Expiration: 12/12/22  Progress Note Due: 12/12/22  Visit # / Visits authorized: 40/44 (+ eval)  FOTO: 10/10  PTA Visit: 0/5     Precautions: Standard (post-op protocol - surgery 6/9/22)    Time In: 10:15 AM (arrived late)  Time Out: 11:00 AM   Total Billable Time: 25 1:1 and 17 supervised minutes (2-TE)     6/9/22 Procedure: Procedure(s):  1. Diagnostic arthroscopy left shoulder  2. Extensive synovectomy  3. Rotator interval release   4. Arthroscopic capsular release  5. Biceps tenotomy  6. Subacromial decompression  7. Acromioplasty   8. Rotator cuff repair     SUBJECTIVE     Pt reports: she states Dr. Luna told her to continue with PT and she will see him again in 3 months.     She was compliant with home exercise program.  Response to previous treatment: no problems.  Functional change: improving arm use and motion    Pain: 1/10  Location: left shoulder      OBJECTIVE     12/5/22:    Left shoulder flexion AROM: 130 degree     Treatment     Dorothy received the treatments listed below:      Dorothy received therapeutic exercises to develop strength, endurance, ROM, flexibility, posture and core stabilization for 25 1:1 and 17 supervised minutes  "including:     Active shoulder flexion with PT scapular assist: 10x  Wand shoulder extension: 10x5" holds  Strap internal rotation stretch: 15x5" holds  Theraband rows: blue theraband 3x10   Theraband external rotation: red theraband 3x10 left with towel roll under elbow  Theraband internal rotation: green theraband 2x10 left   Theraband shoulder flexion: yellow theraband 2x5     Standing wand overhead press: 2x10, 1# dowel  Wall slides with shrug: 12x5" holds   Wall ball roll ups with alternating lift off: 2x10 with swiss ball  Shoulder stabilization ball on wall rolls 20x CW/30CCW (weighted soft red ball - 1.5kg)      Not today  Wall pushups on ballet bar: 3x10  Assisted active seated flexion with cables: 3#, 3x10 (height number 2) - (mirror provided for visual feedback)  Seated 90/90 external rotation: 3x10 left     Supine left shoulder flexion AROM: 2x10 - not today  Sidelying shoulder abduction: 2x10, 2#   Prone Y: 2x8 left (small range of motion) - not today  Prone T with palm down: 3x10 left - not today      Dorothy received the following manual therapy techniques: Joint mobilizations, Manual traction, Myofacial release, Soft tissue Mobilization, and Friction Massage were applied to the: left shoulder musculature for 00 minutes, including:    Grade I-IV GH  AP and inferior joint mobs with shoulder PROM    Not today:  Left upper trap static pressure and follow to release  Left scapulae mobilization - not today     Cold pack was applied for 00 minutes to left shoulder.    Patient Education and Home Exercises     Home Exercises Provided and Patient Education Provided   Education provided:   - anatomy and expectations with pain and symptoms  - given yellow theraband on 11/4/22 for external rotation and internal rotation doorway strengthening at home    Written Home Exercises Provided: Patient instructed to cont prior HEP. Exercises were reviewed and Dorothy was able to demonstrate them prior to the end of the session.  " Dorothy demonstrated good  understanding of the education provided. See EMR under Patient Instructions for exercises provided during therapy sessions    ASSESSMENT     Dorothy is a 67 y.o. female referred to outpatient Physical Therapy with a medical diagnosis of noncomplete rotator cuff tear, biceps tendon rupture and subacromial impingement. Pt presents with status post left rotator cuff repair (supraspinatus), acromioplasty, biceps tenotomy, subchondral decompression, synovectomy, capsular release on 6/9/22. Left shoulder flexion AROM remains about 130 degrees upon arrival with hike compensation. Yellow theraband shoulder flexion able to be performed to about 75 degrees.     Dorothy Is progressing well towards her goals.   Pt prognosis is Good.     Pt will continue to benefit from skilled outpatient physical therapy to address the deficits listed in the problem list box on initial evaluation, provide pt/family education and to maximize pt's level of independence in the home and community environment.     Pt's spiritual, cultural and educational needs considered and pt agreeable to plan of care and goals.     Anticipated Barriers for therapy: failed conservative treatment     Goals:   Short Term Goals: 4 weeks   1.  Patient will report < 6/10 shoulder pain at worst for improved ADL performance. MET  2.  Patient will demonstrate L shoulder flexion PROM > 140 deg to improve overhead reach. MET 8/11/22  3.  Patient will demonstrate left shoulder external rotation PROM > 40 degree. MET 7/7/22  4.  Patient will demonstrate left  strength > 45 pounds.  MET 8/11/22     Long Term Goals: 12 weeks   1. Patient will report ability to wash her hair without restriction. PROGRESSING; NOT MET  2. Patient will demonstrate ability to reach overhead and remove a 2 lb object 5 times to simulate removing objects from an overhead shelf w/o an increase in symptoms. PROGRESSING; NOT MET  3. Patient will demonstrate all left shoulder AROM  within 85% of right for improved ADL performance. PROGRESSING; NOT MET  4. Patient will improve FOTO to < 38% to improve ADL performance. PROGRESSING; NOT MET    PLAN     Cont with POC with phase 3 of rehab protocol   Updated POC on 11/11/22    Plan of care Certification: 9/30/22 to 11/25/22    Louie Tobar, PT

## 2022-12-09 ENCOUNTER — CLINICAL SUPPORT (OUTPATIENT)
Dept: REHABILITATION | Facility: HOSPITAL | Age: 67
End: 2022-12-09
Payer: MEDICARE

## 2022-12-09 DIAGNOSIS — M25.612 DECREASED RANGE OF MOTION OF LEFT SHOULDER: ICD-10-CM

## 2022-12-09 DIAGNOSIS — R53.1 DECREASED STRENGTH: Primary | ICD-10-CM

## 2022-12-09 PROCEDURE — 97110 THERAPEUTIC EXERCISES: CPT | Mod: PN | Performed by: PHYSICAL THERAPIST

## 2022-12-09 NOTE — PROGRESS NOTES
"  [facility]   Physical Therapy Treatment Note     Name: Dorothy Burt  Clinic Number: [id number]    Therapy Diagnosis:   Encounter Diagnoses   Name Primary?    Decreased strength Yes    Decreased range of motion of left shoulder      Physician: Louie Luna MD    Visit Date: 12/9/2022    Physician Orders: PT Eval and Treat   2-3 x per week x 6 weeks   Gentle passive elevation and ER, pendulums. No RTC strengthening     Medical Diagnosis from Referral:M75.112 (ICD-10-CM) - Nontraumatic incomplete tear of left rotator cuff S46.212A (ICD-10-CM) - Biceps tendon rupture, proximal, left, initial encounter M75.42 (ICD-10-CM) - Subacromial impingement, left   Evaluation Date: 6/17/2022  Authorization Period Expiration: 7/15/22  Plan of Care Expiration: 1/6/23  Progress Note Due: 12/21/22  Visit # / Visits authorized: 41/44 (+ eval)  FOTO: 10/10  PTA Visit: 0/5     Precautions: Standard (post-op protocol - surgery 6/9/22)    Time In: 10:08 AM (arrived late)  Time Out: 11:02 AM   Total Billable Time: 54 minutes (4-TE)     6/9/22 Procedure: Procedure(s):  1. Diagnostic arthroscopy left shoulder  2. Extensive synovectomy  3. Rotator interval release   4. Arthroscopic capsular release  5. Biceps tenotomy  6. Subacromial decompression  7. Acromioplasty   8. Rotator cuff repair     SUBJECTIVE     Pt reports: she had increased shoulder pain bilaterally laying in bed yesterday, but her hips have also been bothering her. "I think it's this weather."    She was compliant with home exercise program.  Response to previous treatment: no problems.  Functional change: improving arm use and motion    Pain: 1/10  Location: left shoulder      OBJECTIVE     12/9/22:    Left shoulder flexion AROM: 138 degree   Improved to 144 degree after landmine exercise    Treatment     Dorothy received the treatments listed below:      Dorothy received therapeutic exercises to develop strength, endurance, ROM, flexibility, " "posture and core stabilization for 50 1:1  minutes including:     Landmines with ranger at table: 15x5" holds  Active shoulder flexion with PT scapular assist: 10x  Wand shoulder extension: 15x5" holds  Strap internal rotation stretch: 15x5" holds  Theraband rows: blue theraband 3x10   Theraband external rotation: red theraband 3x10 left with towel roll under elbow  Theraband internal rotation: green theraband 2x10 left   Theraband shoulder flexion: yellow theraband 2x6     Standing wand overhead press: 2x10, 2# dowel (emphasis on avoiding chest press)  Wall slides with shrug: 12x5" holds   Wall ball roll ups with alternating lift off: 2x10 with swiss ball  Shoulder stabilization ball on wall rolls 20x CW/CCW (weighted soft red ball - 1.5kg)      Dorothy received the following manual therapy techniques: Joint mobilizations, Manual traction, Myofacial release, Soft tissue Mobilization, and Friction Massage were applied to the: left shoulder musculature for 00 minutes, including:    Grade I-IV GH  AP and inferior joint mobs with shoulder PROM      Patient Education and Home Exercises     Home Exercises Provided and Patient Education Provided   Education provided:   - anatomy and expectations with pain and symptoms  - given yellow theraband on 11/4/22 for external rotation and internal rotation doorway strengthening at home    Written Home Exercises Provided: Patient instructed to cont prior HEP. Exercises were reviewed and Dorothy was able to demonstrate them prior to the end of the session.  Dorothy demonstrated good  understanding of the education provided. See EMR under Patient Instructions for exercises provided during therapy sessions    ASSESSMENT     Dorothy is a 67 y.o. female referred to outpatient Physical Therapy with a medical diagnosis of noncomplete rotator cuff tear, biceps tendon rupture and subacromial impingement. Pt presents with status post left rotator cuff repair (supraspinatus), acromioplasty, biceps " tenotomy, subchondral decompression, synovectomy, capsular release on 6/9/22. Left shoulder flexion AROM improved upon arrival today, and again after landmine exercise. Cues throughout session to avoid compensatory strategies with overhead/shoulder flexion. Increased fatigue upon completion today.      Dorothy Is progressing well towards her goals.   Pt prognosis is Good.     Pt will continue to benefit from skilled outpatient physical therapy to address the deficits listed in the problem list box on initial evaluation, provide pt/family education and to maximize pt's level of independence in the home and community environment.     Pt's spiritual, cultural and educational needs considered and pt agreeable to plan of care and goals.     Anticipated Barriers for therapy: failed conservative treatment     Goals:   Short Term Goals: 4 weeks   1.  Patient will report < 6/10 shoulder pain at worst for improved ADL performance. MET  2.  Patient will demonstrate L shoulder flexion PROM > 140 deg to improve overhead reach. MET 8/11/22  3.  Patient will demonstrate left shoulder external rotation PROM > 40 degree. MET 7/7/22  4.  Patient will demonstrate left  strength > 45 pounds.  MET 8/11/22     Long Term Goals: 12 weeks   1. Patient will report ability to wash her hair without restriction. PROGRESSING; NOT MET  2. Patient will demonstrate ability to reach overhead and remove a 2 lb object 5 times to simulate removing objects from an overhead shelf w/o an increase in symptoms. PROGRESSING; NOT MET  3. Patient will demonstrate all left shoulder AROM within 85% of right for improved ADL performance. PROGRESSING; NOT MET  4. Patient will improve FOTO to < 38% to improve ADL performance. PROGRESSING; NOT MET    PLAN     Cont with POC with phase 3 of rehab protocol   Updated POC on 11/11/22    Plan of care Certification: 9/30/22 to 11/25/22    Louie Tobar, PT

## 2022-12-12 ENCOUNTER — CLINICAL SUPPORT (OUTPATIENT)
Dept: REHABILITATION | Facility: HOSPITAL | Age: 67
End: 2022-12-12
Payer: MEDICARE

## 2022-12-12 DIAGNOSIS — M25.612 DECREASED RANGE OF MOTION OF LEFT SHOULDER: ICD-10-CM

## 2022-12-12 DIAGNOSIS — R53.1 DECREASED STRENGTH: Primary | ICD-10-CM

## 2022-12-12 PROCEDURE — 97110 THERAPEUTIC EXERCISES: CPT | Mod: PN

## 2022-12-12 NOTE — PROGRESS NOTES
[facility]   Physical Therapy Treatment Note     Name: Dorothy Burt  Clinic Number: [id number]    Therapy Diagnosis:   Encounter Diagnoses   Name Primary?    Decreased strength Yes    Decreased range of motion of left shoulder        Physician: Louie Luna MD    Visit Date: 12/12/2022    Physician Orders: PT Eval and Treat   2-3 x per week x 6 weeks   Gentle passive elevation and ER, pendulums. No RTC strengthening     Medical Diagnosis from Referral:M75.112 (ICD-10-CM) - Nontraumatic incomplete tear of left rotator cuff S46.212A (ICD-10-CM) - Biceps tendon rupture, proximal, left, initial encounter M75.42 (ICD-10-CM) - Subacromial impingement, left   Evaluation Date: 6/17/2022  Authorization Period Expiration: 7/15/22  Plan of Care Expiration: 1/6/23  Progress Note Due: 12/21/22  Visit # / Visits authorized: 42/44 (+ eval)  FOTO: 11/10  PTA Visit: 0/5     Precautions: Standard (post-op protocol - surgery 6/9/22)    Time In: 10:06 AM   Time Out: 11:00 AM   Total Billable Time: 24 minutes (2-TE)     6/9/22 Procedure: Procedure(s):  1. Diagnostic arthroscopy left shoulder  2. Extensive synovectomy  3. Rotator interval release   4. Arthroscopic capsular release  5. Biceps tenotomy  6. Subacromial decompression  7. Acromioplasty   8. Rotator cuff repair     SUBJECTIVE     Pt reports:she is doing alright today. joint aches from the weather over the weekend throughout her body    She was compliant with home exercise program.  Response to previous treatment: no problems.  Functional change: improving arm use and motion    Pain: 1/10  Location: left shoulder      OBJECTIVE     12/9/22:    Left shoulder flexion AROM: 138 degree   Improved to 144 degree after landmine exercise    Treatment     Dorothy received the treatments listed below:      Dorothy received therapeutic exercises to develop strength, endurance, ROM, flexibility, posture and core stabilization for 24 1:1  minutes  "including:     Landmines with ranger at table: 20x5" holds  Active shoulder flexion with PT scapular assist: 10x  Wand shoulder extension: 15x5" holds  Strap internal rotation stretch: 20x5" holds  Theraband rows: blue theraband 3x10   Theraband external rotation: red theraband 3x10 left with towel roll under elbow  Theraband internal rotation: green theraband 2x10 left   Theraband shoulder flexion: yellow theraband 2x6     Standing wand overhead press: 3x10, 2# dowel (emphasis on avoiding chest press)  Wall slides with shrug: 12x5" holds   Wall ball roll ups with alternating lift off: 2x10 with swiss ball  Shoulder stabilization ball on wall rolls 20x CW/CCW (weighted soft red ball - 1.5kg)      Dorothy received the following manual therapy techniques: Joint mobilizations, Manual traction, Myofacial release, Soft tissue Mobilization, and Friction Massage were applied to the: left shoulder musculature for 00 minutes, including:    Grade I-IV GH  AP and inferior joint mobs with shoulder PROM      Patient Education and Home Exercises     Home Exercises Provided and Patient Education Provided   Education provided:   - anatomy and expectations with pain and symptoms  - given yellow theraband on 11/4/22 for external rotation and internal rotation doorway strengthening at home    Written Home Exercises Provided: Patient instructed to cont prior HEP. Exercises were reviewed and Dorothy was able to demonstrate them prior to the end of the session.  Dorothy demonstrated good  understanding of the education provided. See EMR under Patient Instructions for exercises provided during therapy sessions    ASSESSMENT     Dorothy is a 67 y.o. female referred to outpatient Physical Therapy with a medical diagnosis of noncomplete rotator cuff tear, biceps tendon rupture and subacromial impingement. Pt presents with status post left rotator cuff repair (supraspinatus), acromioplasty, biceps tenotomy, subchondral decompression, synovectomy, " capsular release on 6/9/22. Pt presents with mild shoulder pain today. Continued to work on end range shoulder range of motion and able to increase repetitions without exacerbation. Occasional cuing provided for shoulder elevation Pt reports she notices increased muscular fatigue with higher range of motion strengthening. Continue to progress as tolerated.       Dorothy Is progressing well towards her goals.   Pt prognosis is Good.     Pt will continue to benefit from skilled outpatient physical therapy to address the deficits listed in the problem list box on initial evaluation, provide pt/family education and to maximize pt's level of independence in the home and community environment.     Pt's spiritual, cultural and educational needs considered and pt agreeable to plan of care and goals.     Anticipated Barriers for therapy: failed conservative treatment     Goals:   Short Term Goals: 4 weeks   1.  Patient will report < 6/10 shoulder pain at worst for improved ADL performance. MET  2.  Patient will demonstrate L shoulder flexion PROM > 140 deg to improve overhead reach. MET 8/11/22  3.  Patient will demonstrate left shoulder external rotation PROM > 40 degree. MET 7/7/22  4.  Patient will demonstrate left  strength > 45 pounds.  MET 8/11/22     Long Term Goals: 12 weeks   1. Patient will report ability to wash her hair without restriction. PROGRESSING; NOT MET  2. Patient will demonstrate ability to reach overhead and remove a 2 lb object 5 times to simulate removing objects from an overhead shelf w/o an increase in symptoms. PROGRESSING; NOT MET  3. Patient will demonstrate all left shoulder AROM within 85% of right for improved ADL performance. PROGRESSING; NOT MET  4. Patient will improve FOTO to < 38% to improve ADL performance. PROGRESSING; NOT MET    PLAN     Cont with POC with phase 3 of rehab protocol   Updated POC on 11/11/22    Plan of care Certification: 9/30/22 to 11/25/22    LAKEISHA GONZALEZ, PT

## 2022-12-14 ENCOUNTER — DOCUMENTATION ONLY (OUTPATIENT)
Dept: REHABILITATION | Facility: HOSPITAL | Age: 67
End: 2022-12-14
Payer: MEDICARE

## 2022-12-15 ENCOUNTER — CLINICAL SUPPORT (OUTPATIENT)
Dept: REHABILITATION | Facility: HOSPITAL | Age: 67
End: 2022-12-15
Payer: MEDICARE

## 2022-12-15 DIAGNOSIS — R53.1 DECREASED STRENGTH: Primary | ICD-10-CM

## 2022-12-15 DIAGNOSIS — M25.612 DECREASED RANGE OF MOTION OF LEFT SHOULDER: ICD-10-CM

## 2022-12-15 PROCEDURE — 97110 THERAPEUTIC EXERCISES: CPT | Mod: PN,CQ

## 2022-12-15 NOTE — PROGRESS NOTES
[facility]   Physical Therapy Treatment Note     Name: Dorothy Bosch  Clinic Number: [id number]    Therapy Diagnosis:   Encounter Diagnoses   Name Primary?    Decreased strength Yes    Decreased range of motion of left shoulder        Physician: Louie Luna MD    Visit Date: 12/15/2022    Physician Orders: PT Eval and Treat   2-3 x per week x 6 weeks   Gentle passive elevation and ER, pendulums. No RTC strengthening     Medical Diagnosis from Referral:M75.112 (ICD-10-CM) - Nontraumatic incomplete tear of left rotator cuff S46.212A (ICD-10-CM) - Biceps tendon rupture, proximal, left, initial encounter M75.42 (ICD-10-CM) - Subacromial impingement, left   Evaluation Date: 6/17/2022  Authorization Period Expiration: 7/15/22  Plan of Care Expiration: 1/6/23  Progress Note Due: 12/21/22  Visit # / Visits authorized: 43/44 (+ eval)  FOTO: 12/10  PTA Visit: 1/5     Precautions: Standard (post-op protocol - surgery 6/9/22)    Time In: 3:10 PM   Time Out: 3:50 PM   Total Billable Time: 40 minutes (3-TE)     6/9/22 Procedure: Procedure(s):  1. Diagnostic arthroscopy left shoulder  2. Extensive synovectomy  3. Rotator interval release   4. Arthroscopic capsular release  5. Biceps tenotomy  6. Subacromial decompression  7. Acromioplasty   8. Rotator cuff repair     SUBJECTIVE     Pt reports: having only minimal discomfort, maybe less than 1/10.    She was compliant with home exercise program.  Response to previous treatment: no problems.  Functional change: improving arm use and motion    Pain: 1/10  Location: left shoulder      OBJECTIVE     12/9/22:    Left shoulder flexion AROM: 138 degree   Improved to 144 degree after landmine exercise    Treatment     Dorothy received the treatments listed below:      Dorothy received therapeutic exercises to develop strength, endurance, ROM, flexibility, posture and core stabilization for 40 minutes including:     Landmines with ranger at table:  "   Active shoulder flexion with PT scapular assist: 10x  Wand shoulder extension: 15x5" holds  Strap internal rotation stretch: 20x5" holds  Theraband rows: blue theraband 3x10   Theraband external rotation: red theraband 3x10 left with towel roll under elbow  Theraband internal rotation: green theraband 3x10 left   Theraband shoulder flexion: yellow theraband 2x8     Standing wand overhead press: 2x10, 3# dowel (emphasis on avoiding chest press)  Wall slides with shrug: 12x5" holds   Wall ball roll ups with alternating lift off: 2x10 with swiss ball  Shoulder stabilization ball on wall rolls 20x CW/CCW (weighted soft red ball - 1.5kg)      Dorothy received the following manual therapy techniques: Joint mobilizations, Manual traction, Myofacial release, Soft tissue Mobilization, and Friction Massage were applied to the: left shoulder musculature for 00 minutes, including:    Grade I-IV GH  AP and inferior joint mobs with shoulder PROM      Patient Education and Home Exercises     Home Exercises Provided and Patient Education Provided   Education provided:   - anatomy and expectations with pain and symptoms  - given yellow theraband on 11/4/22 for external rotation and internal rotation doorway strengthening at home    Written Home Exercises Provided: Patient instructed to cont prior HEP. Exercises were reviewed and Dorothy was able to demonstrate them prior to the end of the session.  Dorothy demonstrated good  understanding of the education provided. See EMR under Patient Instructions for exercises provided during therapy sessions    ASSESSMENT     Dorothy is a 67 y.o. female referred to outpatient Physical Therapy with a medical diagnosis of noncomplete rotator cuff tear, biceps tendon rupture and subacromial impingement. Pt presents with status post left rotator cuff repair (supraspinatus), acromioplasty, biceps tenotomy, subchondral decompression, synovectomy, capsular release on 6/9/22. Pt presents with mild shoulder pain " today. Continued to work on end range shoulder range of motion. Patient continues to require occasional cuing for shoulder elevation.  Patient had no difficulty with today's progressions, noted in bold.    Dorothy Is progressing well towards her goals.   Pt prognosis is Good.     Pt will continue to benefit from skilled outpatient physical therapy to address the deficits listed in the problem list box on initial evaluation, provide pt/family education and to maximize pt's level of independence in the home and community environment.     Pt's spiritual, cultural and educational needs considered and pt agreeable to plan of care and goals.     Anticipated Barriers for therapy: failed conservative treatment     Goals:   Short Term Goals: 4 weeks   1.  Patient will report < 6/10 shoulder pain at worst for improved ADL performance. MET  2.  Patient will demonstrate L shoulder flexion PROM > 140 deg to improve overhead reach. MET 8/11/22  3.  Patient will demonstrate left shoulder external rotation PROM > 40 degree. MET 7/7/22  4.  Patient will demonstrate left  strength > 45 pounds.  MET 8/11/22     Long Term Goals: 12 weeks   1. Patient will report ability to wash her hair without restriction. PROGRESSING; NOT MET  2. Patient will demonstrate ability to reach overhead and remove a 2 lb object 5 times to simulate removing objects from an overhead shelf w/o an increase in symptoms. PROGRESSING; NOT MET  3. Patient will demonstrate all left shoulder AROM within 85% of right for improved ADL performance. PROGRESSING; NOT MET  4. Patient will improve FOTO to < 38% to improve ADL performance. PROGRESSING; NOT MET    PLAN     Cont with POC with phase 3 of rehab protocol   Updated POC on 11/11/22    Plan of care Certification: 11/25/22 to 1/6/23    Ant Cohen PTA

## 2022-12-19 ENCOUNTER — CLINICAL SUPPORT (OUTPATIENT)
Dept: NUTRITION | Facility: CLINIC | Age: 67
End: 2022-12-19

## 2022-12-19 ENCOUNTER — NUTRITION (OUTPATIENT)
Dept: NUTRITION | Facility: CLINIC | Age: 67
End: 2022-12-19
Payer: MEDICARE

## 2022-12-19 DIAGNOSIS — Z71.3 NUTRITIONAL COUNSELING: Primary | ICD-10-CM

## 2022-12-19 DIAGNOSIS — E66.09 CLASS 1 OBESITY DUE TO EXCESS CALORIES WITHOUT SERIOUS COMORBIDITY WITH BODY MASS INDEX (BMI) OF 32.0 TO 32.9 IN ADULT: Primary | ICD-10-CM

## 2022-12-19 PROCEDURE — 97802 PR MED NUTR THER, 1ST, INDIV, EA 15 MIN: ICD-10-PCS | Mod: CSM,95,, | Performed by: NUTRITIONIST

## 2022-12-19 PROCEDURE — 97803 PR MED NUTR THER, SUBSQ, INDIV, EA 15 MIN: ICD-10-PCS | Mod: S$GLB,,, | Performed by: NUTRITIONIST

## 2022-12-19 PROCEDURE — 97803 MED NUTRITION INDIV SUBSEQ: CPT | Mod: S$GLB,,, | Performed by: NUTRITIONIST

## 2022-12-19 PROCEDURE — 97802 MEDICAL NUTRITION INDIV IN: CPT | Mod: CSM,95,, | Performed by: NUTRITIONIST

## 2022-12-19 NOTE — PATIENT INSTRUCTIONS
BMR = basal metabolic rate = 'estimate' based off your age, height, gender and weight. BMR = 1500    REE = resting energy expenditure. This is what we tested in my office. REE = how many calories your body burns at rest in a 24 hour period. REE = 1320

## 2022-12-19 NOTE — PROGRESS NOTES
"Nutrition Assessment for Established Patient Medical Nutrition Therapy      Reason for MNT visit: Pt in for education and nutrition counseling regarding Obesity, chronic pain      ASSESSMENT    Age: 67 y.o.  Wt: 193 lbs  Wt Readings from Last 1 Encounters:   11/29/22 88.4 kg (194 lb 14.2 oz)     Ht: 5'4"  Ht Readings from Last 1 Encounters:   11/29/22 5' 4" (1.626 m)     BMI: 32.7  BMI Readings from Last 1 Encounters:   11/29/22 33.45 kg/m²       Clinical Signs/Symptoms: patient stated her clothes are fitting the same. She has not looked at the meal plan yet, so our goal for our next follow-up is to review her meal plan with her meal planning guide book    Medical History:   Past Medical History:   Diagnosis Date    Arthritis     Asthma     Back pain     Knee pain     Thyroid disease      Problem List             Resolved    Arthritis         Mild intermittent asthma (Chronic)         Sacroiliac joint pain         Chronic pain         Hypothyroidism         Gastritis due to nonsteroidal anti-inflammatory drug         Recurrent cold sores (Chronic)         History of pericarditis         Chondromalacia patellae         Osteoarthritis of knee         Stress incontinence         Tear of medial meniscus of knee         Fibromyalgia (Chronic)         Unspecified inflammatory spondylopathy, lumbar region         Venous insufficiency         Peroneal tendonitis, left         Decreased strength         Decreased range of motion of left shoulder         Left shoulder pain         Nontraumatic incomplete tear of left rotator cuff            Medications:   Current Outpatient Medications:     acyclovir (ZOVIRAX) 400 MG tablet, TAKE 1 BY MOUTH 5 TIMES DAILY FOR 5 DAYS, Disp: 25 tablet, Rfl: 2    albuterol (PROAIR HFA) 90 mcg/actuation inhaler, Inhale 2 puffs into the lungs every 6 (six) hours as needed for Wheezing or Shortness of Breath. Rescue, Disp: 18 g, Rfl: 11    budesonide-formoterol 160-4.5 mcg (SYMBICORT) 160-4.5 " mcg/actuation HFAA, Inhale 2 puffs into the lungs every evening. Controller, Disp: , Rfl:     diclofenac sodium (VOLTAREN) 1 % Gel, Apply 2 g topically 2 (two) times daily as needed (to knees)., Disp: 100 g, Rfl: 2    famotidine (PEPCID) 20 MG tablet, TAKE 1 TABLET BY MOUTH TWICE A DAY, Disp: 30 tablet, Rfl: 11    ibuprofen (ADVIL,MOTRIN) 600 MG tablet, Take 1 tablet (600 mg total) by mouth every 6 (six) hours as needed for Pain., Disp: 30 tablet, Rfl: 1    levocetirizine (XYZAL) 5 MG tablet, TAKE 1 TABLET BY MOUTH EVERY DAY AS NEEDED, Disp: 90 tablet, Rfl: 3    levothyroxine (SYNTHROID) 25 MCG tablet, TAKE 1.5 TABLET BY MOUTH DAILY, Disp: 135 tablet, Rfl: 3    loratadine (CLARITIN) 10 mg tablet, Take 1 tablet (10 mg total) by mouth once daily., Disp: 30 tablet, Rfl: 2    meloxicam (MOBIC) 7.5 MG tablet, Take 1 tablet (7.5 mg total) by mouth once daily., Disp: 30 tablet, Rfl: 0    naproxen (NAPROSYN) 500 MG tablet, Take 1 tablet (500 mg total) by mouth 2 (two) times daily as needed., Disp: 60 tablet, Rfl: 0    omeprazole (PRILOSEC) 20 MG capsule, Take 20 mg by mouth daily as needed., Disp: , Rfl:     traMADoL (ULTRAM) 50 mg tablet, Take 2 tablets (100 mg total) by mouth every 8 (eight) hours as needed for Pain., Disp: 120 tablet, Rfl: 5    ZANAFLEX 4 mg tablet, Take 4 mg by mouth every 8 (eight) hours as needed., Disp: , Rfl:     Food allergies  Intolerances: [facility]    Labs:  Reviewed and noted  Hemoglobin A1C   Date Value Ref Range Status   11/14/2022 5.6 4.5 - 5.7 % Final   04/19/2020 5.4 4.0 - 5.6 % Final     Comment:     ADA Screening Guidelines:  5.7-6.4%  Consistent with prediabetes  >or=6.5%  Consistent with diabetes  High levels of fetal hemoglobin interfere with the HbA1C  assay. Heterozygous hemoglobin variants (HbS, HgC, etc)do  not significantly interfere with this assay.   However, presence of multiple variants may affect accuracy.     09/27/2012 5.7 (H) % Final     Cholesterol   Date Value Ref Range  Status   11/14/2022 211 (H) 100 - 200 mg/dL Final   04/20/2020 177 120 - 199 mg/dL Final     Comment:     The National Cholesterol Education Program (NCEP) has set the  following guidelines (reference ranges) for Cholesterol:  Optimal.....................<200 mg/dL  Borderline High.............200-239 mg/dL  High........................> or = 240 mg/dL       Triglycerides   Date Value Ref Range Status   11/14/2022 119 30 - 150 mg/dL Final   04/20/2020 79 30 - 150 mg/dL Final     Comment:     The National Cholesterol Education Program (NCEP) has set the  following guidelines (reference values) for triglycerides:  Normal......................<150 mg/dL  Borderline High.............150-199 mg/dL  High........................200-499 mg/dL       HDL   Date Value Ref Range Status   11/14/2022 73 40 - 75 mg/dL Final   04/20/2020 56 40 - 75 mg/dL Final     Comment:     The National Cholesterol Education Program (NCEP) has set the  following guidelines (reference values) for HDL Cholesterol:  Low...............<40 mg/dL  Optimal...........>60 mg/dL       LDL Calculated   Date Value Ref Range Status   11/14/2022 119 0 - 125 mg/dL Final     LDL Cholesterol   Date Value Ref Range Status   04/20/2020 105.2 63.0 - 159.0 mg/dL Final     Comment:     The National Cholesterol Education Program (NCEP) has set the  following guidelines (reference values) for LDL Cholesterol:  Optimal.......................<130 mg/dL  Borderline High...............130-159 mg/dL  High..........................160-189 mg/dL  Very High.....................>190 mg/dL         Typical day recall: Reviewed and noted during consult     Beverages: water: 3-4, 16.9 oz bottle water; coffee with cream and Stevia    Dining out: Regular, 1-2 times per week    Alcohol: nonsmoker; patient stated she can drink several crown and diet cokes depending on the occasion, but only drinks socially    Lifestyle Influences  Support System: self    Meal preparation/shopping:  self    Current Activity Level: currently none. She is doing physical therapy for her shoulder 2x a week and has been doing this since June 2022    Patient motivation, anticipated barriers, expected compliance: Patient is motivated and has verbalized understanding and intent to comply     DIAGNOSIS   Problem: Excessive Energy intake and inadequate physical activity   Etiology: RT eating high calorie foods and restaurants and not exercising  Signs/Symptoms: AEB 24 hour recall and BMI    INTERVENTION  Nutrition prescription: estimated energy requirements:   Calories: 1700  Protein: 125-145 grams  Carbohydrates: 120-140 grams  Fats: choose plant-based heart healthy fats  Total fluid: 95 oz + sweat loss  Limit added sugar to 20 grams or less per day (Note: 1 teaspoon of sugar = ~5 gram)      Recommendations & Goals:  Patient goals and recommendations are tailored to the specific patient's needs, readiness to change, lifestyle, culture, skills, resources, & abilities. Strategies to help achieve these nutrition-related goals were discussed which can include but are not limited to SMART goal setting & mindful eating.     Aim for a minimum of 7 hours sleep   Exercise 60 minutes most days  Eat breakfast within 1-2 hours of waking up  Try not to skip any meals or snacks, not going more than 3-4 hours without eating.   At each meal and snack, try to include a source of fiber + lean protein + healthy fat.       Written Materials Provided  These resources are intended to assist the patient in making it easier to choose recommended options when eating out & to identify better-for-you brands at the grocery store:    Meal Planning Guide with recommendations discussed along with portion sizes and a meal plan   Fueling Well On The Go Guide  Eat Fit Grocery Product list   RD contact information- for patient to contact regarding any questions, needs, and/or concerns that may arise     MONITORING & EVALUATION    Communicated with  healthcare provider    Documented plan for referral to appropriate agency/healthcare provider as needed    Comprehension: fair    Motivation to change: low/moderate    Follow-up: 5-6 weeks    Counseling time: 1 hour

## 2022-12-21 ENCOUNTER — CLINICAL SUPPORT (OUTPATIENT)
Dept: REHABILITATION | Facility: HOSPITAL | Age: 67
End: 2022-12-21
Payer: MEDICARE

## 2022-12-21 DIAGNOSIS — R53.1 DECREASED STRENGTH: Primary | ICD-10-CM

## 2022-12-21 DIAGNOSIS — M25.612 DECREASED RANGE OF MOTION OF LEFT SHOULDER: ICD-10-CM

## 2022-12-21 PROCEDURE — 97110 THERAPEUTIC EXERCISES: CPT | Mod: PN | Performed by: PHYSICAL THERAPIST

## 2022-12-21 NOTE — PLAN OF CARE
Outpatient Therapy Updated Plan of Care     Visit Date: 12/21/2022  Name: Dorothy Burt  Clinic Number: [id number]    Therapy Diagnosis:   Encounter Diagnoses   Name Primary?    Decreased strength Yes    Decreased range of motion of left shoulder      Physician: Louie Luna MD    Physician Orders: PT Eval and Treat   2-3 x per week x 6 weeks   Gentle passive elevation and ER, pendulums. No RTC strengthening     Medical Diagnosis from Referral:M75.112 (ICD-10-CM) - Nontraumatic incomplete tear of left rotator cuff S46.212A (ICD-10-CM) - Biceps tendon rupture, proximal, left, initial encounter M75.42 (ICD-10-CM) - Subacromial impingement, left   Evaluation Date: 6/17/2022    Total Visits Received: 45      Current Certification Period:  11/25/22 to 1/6/23  Precautions:  Standard (post-op protocol - surgery 6/9/22)  Visits from Evaluation Date:  44      Subjective     Update: she's still limited grabbing things out of an overhead cabinet or top of the refrigerator. She's unable to reach behind her back to hook/unhook her bra. Her shoulder's not bothering her much, but feels like she needs to regain more strength, particularly with overhead.    Objective     Update:     () - new measurements    Range of Motion:     Left 12/21/22 Right   Shoulder Flexion P: 142  (142)   A: 92 (130) P:  150     A: 140 P: 170     A:170       Shoulder abduction P: 135 (137)    A: 72 (92) P:  155     A: 110  P: 170     A: 170   Shoulder ER P: 55  (70)  A: 43 (57/11 cm) P: 75      A: 67/11 cm P: 90    A: 21 cm       Shoulder IR P: 60 (65)   A: 60 (50 cm)  P: 67    A: 44 cm P: WNL    A: 36 cm       Elbow P: 0-full flexion  WNL   Wrist WNL  WNL      Upper Extremity Strength      All tests taken in supine position  *=pain   Left (kg) Right (kg) Notes   Shoulder flexion 5.3 13.3 40%   Shoulder abduction 4.6 12.1 38%   Shoulder external rotation (infraspinatus) 5.2 9.5 55%   Shoulder external rotation (teres minor) 3.9 7.2 54%   Shoulder  internal rotation  4.7 12.5 37.6%   Elbow flexion 11.1 14.9 75%   Elbow extension 8 11.3 71%         Assessment     Update: Dorothy is a 67 y.o. female referred to outpatient Physical Therapy with a medical diagnosis of noncomplete rotator cuff tear, biceps tendon rupture and subacromial impingement. Pt presents with status post left rotator cuff repair (supraspinatus), acromioplasty, biceps tenotomy, subchondral decompression, synovectomy, capsular release on 6/9/22. Mild AROM improvements with flexion and abduction since last UPOC. Significant strength deficits seen with microFET testing today, particularly with shoulder flexion and abduction. Updated home exercise program with theraband exercises and stressed importance of strengthening at home. Pain is mostly well controlled at this time. Functional deficits include anything overhead, hooking/unhooking a bra and carrying objects of weight. She is appropriate for continued skilled PT with progression to discharge with home exercise program hopefully after upcoming 6 weeks.     Previous Short Term Goals Status:    4/4  1.  Patient will report < 6/10 shoulder pain at worst for improved ADL performance. MET  2.  Patient will demonstrate L shoulder flexion PROM > 140 deg to improve overhead reach. MET 8/11/22  3.  Patient will demonstrate left shoulder external rotation PROM > 40 degree. MET 7/7/22  4.  Patient will demonstrate left  strength > 45 pounds.  MET 8/11/22  New Short Term Goals Status:   none  Long Term Goal Status:   continue per initial plan of care.  Reasons for Recertification of Therapy:   protocol    Plan     Updated Certification Period: 12/21/2022 to 2/3/23  Recommended Treatment Plan: 2 times per week for 6 weeks: Electrical Stimulation TENS, IFC, NMES, Manual Therapy, Moist Heat/ Ice, Neuromuscular Re-ed, Patient Education, Self Care, Therapeutic Activities, Therapeutic Exercise and dry needling  Other Recommendations: progress per  protocol    Louie Tobar, PT  12/21/2022      I CERTIFY THE NEED FOR THESE SERVICES FURNISHED UNDER THIS PLAN OF TREATMENT AND WHILE UNDER MY CARE    Physician's comments:        Physician's Signature: ___________________________________________________    I certify the need for these services furnished under this plan of treatment and while under my care.      I certify the need for these services furnished under this plan of treatment and while under my care.        Louie Luna MD, FAAOS  , Orthopaedic Sports Medicine  Residency   [facility] Department of Orthopaedic Surgery  Assistant Orthopaedic Surgeon, [facility]  Head Team Physician, [facility]

## 2022-12-21 NOTE — PROGRESS NOTES
[facility]   Physical Therapy Treatment Note     Name: Dorothy Burt  Clinic Number: [id number]    Therapy Diagnosis:   Encounter Diagnoses   Name Primary?    Decreased strength Yes    Decreased range of motion of left shoulder          Physician: Louie Luna MD    Visit Date: 12/21/2022    Physician Orders: PT Eval and Treat   2-3 x per week x 6 weeks   Gentle passive elevation and ER, pendulums. No RTC strengthening     Medical Diagnosis from Referral:M75.112 (ICD-10-CM) - Nontraumatic incomplete tear of left rotator cuff S46.212A (ICD-10-CM) - Biceps tendon rupture, proximal, left, initial encounter M75.42 (ICD-10-CM) - Subacromial impingement, left   Evaluation Date: 6/17/2022  Authorization Period Expiration: 7/15/22  Plan of Care Expiration: 1/6/23  Progress Note Due: 12/21/22  Visit # / Visits authorized: 44/44 (+ eval)  FOTO: performed on 12/21/22  PTA Visit: 0/5     Precautions: Standard (post-op protocol - surgery 6/9/22)    Time In: 10:10 AM (arrived late)  Time Out: 11:00 AM   Total Billable Time: 45 minutes (3-TE)     6/9/22 Procedure: Procedure(s):  1. Diagnostic arthroscopy left shoulder  2. Extensive synovectomy  3. Rotator interval release   4. Arthroscopic capsular release  5. Biceps tenotomy  6. Subacromial decompression  7. Acromioplasty   8. Rotator cuff repair     SUBJECTIVE     Pt reports: she's still limited grabbing things out of an overhead cabinet or top of the refrigerator. She's unable to reach behind her back to hook/unhook her bra. Her shoulder's not bothering her much, but feels like she needs to regain more strength, particularly with overhead.    She was partially compliant with home exercise program.  Response to previous treatment: no problems.  Functional change: improving arm use and motion    Pain: 1/10  Location: left shoulder      OBJECTIVE     See UPOC on 12/21/22    Treatment     Dorothy received the treatments listed below:      Dorothy  "received therapeutic exercises to develop strength, endurance, ROM, flexibility, posture and core stabilization for 45 minutes including re-assessment and FOTO:     Theraband rows: blue theraband 3x10   Theraband external rotation: red theraband 3x10 left with towel roll under elbow  Theraband internal rotation: green theraband 3x10 left   Theraband shoulder flexion: yellow theraband 2x10 left   Seated throwers external rotation (arm rested on table): 2x10 left   Standing scaption: 2x10 left     Not today:  Landmines with ranger at table:    Active shoulder flexion with PT scapular assist: 10x  Wand shoulder extension: 15x5" holds  Strap internal rotation stretch: 20x5" holds    Standing wand overhead press: 2x10, 3# dowel (emphasis on avoiding chest press)  Wall slides with shrug: 12x5" holds   Wall ball roll ups with alternating lift off: 2x10 with swiss ball  Shoulder stabilization ball on wall rolls 20x CW/CCW (weighted soft red ball - 1.5kg)      Dorothy received the following manual therapy techniques: Joint mobilizations, Manual traction, Myofacial release, Soft tissue Mobilization, and Friction Massage were applied to the: left shoulder musculature for 00 minutes, including:    Grade I-IV GH  AP and inferior joint mobs with shoulder PROM      Patient Education and Home Exercises     Home Exercises Provided and Patient Education Provided   Education provided:   - anatomy and expectations with pain and symptoms  - given yellow theraband on 11/4/22 for external rotation and internal rotation doorway strengthening at home    Written Home Exercises Provided: Patient instructed to cont prior HEP. Exercises were reviewed and Dorothy was able to demonstrate them prior to the end of the session.  Dorothy demonstrated good  understanding of the education provided. See EMR under Patient Instructions for exercises provided during therapy sessions    ASSESSMENT     Dorothy is a 67 y.o. female referred to outpatient Physical Therapy " with a medical diagnosis of noncomplete rotator cuff tear, biceps tendon rupture and subacromial impingement. Pt presents with status post left rotator cuff repair (supraspinatus), acromioplasty, biceps tenotomy, subchondral decompression, synovectomy, capsular release on 6/9/22. See UPOC    Dorothy Is progressing well towards her goals.   Pt prognosis is Good.     Pt will continue to benefit from skilled outpatient physical therapy to address the deficits listed in the problem list box on initial evaluation, provide pt/family education and to maximize pt's level of independence in the home and community environment.     Pt's spiritual, cultural and educational needs considered and pt agreeable to plan of care and goals.     Anticipated Barriers for therapy: failed conservative treatment     Goals:   Short Term Goals: 4 weeks   1.  Patient will report < 6/10 shoulder pain at worst for improved ADL performance. MET  2.  Patient will demonstrate L shoulder flexion PROM > 140 deg to improve overhead reach. MET 8/11/22  3.  Patient will demonstrate left shoulder external rotation PROM > 40 degree. MET 7/7/22  4.  Patient will demonstrate left  strength > 45 pounds.  MET 8/11/22     Long Term Goals: 12 weeks   1. Patient will report ability to wash her hair without restriction. MET  2. Patient will demonstrate ability to reach overhead and remove a 2 lb object 5 times to simulate removing objects from an overhead shelf w/o an increase in symptoms. PROGRESSING; NOT MET  3. Patient will demonstrate all left shoulder AROM within 85% of right for improved ADL performance. PROGRESSING; NOT MET  4. Patient will improve FOTO to < 38% to improve ADL performance. PROGRESSING; NOT MET    PLAN     Cont with POC with phase 3 of rehab protocol   Updated POC on 12/21/22      Louie Tobar, PT

## 2022-12-27 ENCOUNTER — TELEPHONE (OUTPATIENT)
Dept: REHABILITATION | Facility: HOSPITAL | Age: 67
End: 2022-12-27
Payer: MEDICARE

## 2022-12-27 NOTE — TELEPHONE ENCOUNTER
Called regarding today's No Show, spoke with patient and she stated she thought her appointment was for tomorrow.  Reminded her that her next appointment is on 12/29/22 @ 10:00.  She stated that she would not be able to make that appointment.  Consulted with Jose C Lutz PT, and moved her appointment to 2:00 same day.  Patient verbally acknowledged.

## 2022-12-30 ENCOUNTER — OFFICE VISIT (OUTPATIENT)
Dept: URGENT CARE | Facility: CLINIC | Age: 67
End: 2022-12-30
Payer: MEDICARE

## 2022-12-30 VITALS
RESPIRATION RATE: 14 BRPM | OXYGEN SATURATION: 96 % | TEMPERATURE: 99 F | DIASTOLIC BLOOD PRESSURE: 87 MMHG | WEIGHT: 197 LBS | SYSTOLIC BLOOD PRESSURE: 160 MMHG | BODY MASS INDEX: 33.63 KG/M2 | HEIGHT: 64 IN | HEART RATE: 107 BPM

## 2022-12-30 DIAGNOSIS — U07.1 COVID: ICD-10-CM

## 2022-12-30 DIAGNOSIS — J45.41 MODERATE PERSISTENT ASTHMA WITH EXACERBATION: ICD-10-CM

## 2022-12-30 DIAGNOSIS — J34.89 RHINORRHEA: Primary | ICD-10-CM

## 2022-12-30 LAB
CTP QC/QA: YES
SARS-COV-2 AG RESP QL IA.RAPID: POSITIVE

## 2022-12-30 PROCEDURE — 1159F MED LIST DOCD IN RCRD: CPT | Mod: CPTII,S$GLB,, | Performed by: FAMILY MEDICINE

## 2022-12-30 PROCEDURE — 3077F PR MOST RECENT SYSTOLIC BLOOD PRESSURE >= 140 MM HG: ICD-10-PCS | Mod: CPTII,S$GLB,, | Performed by: FAMILY MEDICINE

## 2022-12-30 PROCEDURE — 94640 PR INHAL RX, AIRWAY OBST/DX SPUTUM INDUCT: ICD-10-PCS | Mod: S$GLB,,, | Performed by: FAMILY MEDICINE

## 2022-12-30 PROCEDURE — 1125F PR PAIN SEVERITY QUANTIFIED, PAIN PRESENT: ICD-10-PCS | Mod: CPTII,S$GLB,, | Performed by: FAMILY MEDICINE

## 2022-12-30 PROCEDURE — 3044F PR MOST RECENT HEMOGLOBIN A1C LEVEL <7.0%: ICD-10-PCS | Mod: CPTII,S$GLB,, | Performed by: FAMILY MEDICINE

## 2022-12-30 PROCEDURE — 3077F SYST BP >= 140 MM HG: CPT | Mod: CPTII,S$GLB,, | Performed by: FAMILY MEDICINE

## 2022-12-30 PROCEDURE — 3079F DIAST BP 80-89 MM HG: CPT | Mod: CPTII,S$GLB,, | Performed by: FAMILY MEDICINE

## 2022-12-30 PROCEDURE — 1125F AMNT PAIN NOTED PAIN PRSNT: CPT | Mod: CPTII,S$GLB,, | Performed by: FAMILY MEDICINE

## 2022-12-30 PROCEDURE — 94640 AIRWAY INHALATION TREATMENT: CPT | Mod: 59,S$GLB,, | Performed by: FAMILY MEDICINE

## 2022-12-30 PROCEDURE — 3079F PR MOST RECENT DIASTOLIC BLOOD PRESSURE 80-89 MM HG: ICD-10-PCS | Mod: CPTII,S$GLB,, | Performed by: FAMILY MEDICINE

## 2022-12-30 PROCEDURE — 1160F PR REVIEW ALL MEDS BY PRESCRIBER/CLIN PHARMACIST DOCUMENTED: ICD-10-PCS | Mod: CPTII,S$GLB,, | Performed by: FAMILY MEDICINE

## 2022-12-30 PROCEDURE — 71046 XR CHEST PA AND LATERAL: ICD-10-PCS | Mod: FY,S$GLB,, | Performed by: RADIOLOGY

## 2022-12-30 PROCEDURE — 1159F PR MEDICATION LIST DOCUMENTED IN MEDICAL RECORD: ICD-10-PCS | Mod: CPTII,S$GLB,, | Performed by: FAMILY MEDICINE

## 2022-12-30 PROCEDURE — 87811 SARS CORONAVIRUS 2 ANTIGEN POCT, MANUAL READ: ICD-10-PCS | Mod: QW,S$GLB,, | Performed by: FAMILY MEDICINE

## 2022-12-30 PROCEDURE — 71046 X-RAY EXAM CHEST 2 VIEWS: CPT | Mod: FY,S$GLB,, | Performed by: RADIOLOGY

## 2022-12-30 PROCEDURE — 99214 OFFICE O/P EST MOD 30 MIN: CPT | Mod: 25,S$GLB,, | Performed by: FAMILY MEDICINE

## 2022-12-30 PROCEDURE — 87811 SARS-COV-2 COVID19 W/OPTIC: CPT | Mod: QW,S$GLB,, | Performed by: FAMILY MEDICINE

## 2022-12-30 PROCEDURE — 3044F HG A1C LEVEL LT 7.0%: CPT | Mod: CPTII,S$GLB,, | Performed by: FAMILY MEDICINE

## 2022-12-30 PROCEDURE — 1160F RVW MEDS BY RX/DR IN RCRD: CPT | Mod: CPTII,S$GLB,, | Performed by: FAMILY MEDICINE

## 2022-12-30 PROCEDURE — 3008F PR BODY MASS INDEX (BMI) DOCUMENTED: ICD-10-PCS | Mod: CPTII,S$GLB,, | Performed by: FAMILY MEDICINE

## 2022-12-30 PROCEDURE — 3008F BODY MASS INDEX DOCD: CPT | Mod: CPTII,S$GLB,, | Performed by: FAMILY MEDICINE

## 2022-12-30 PROCEDURE — 99214 PR OFFICE/OUTPT VISIT, EST, LEVL IV, 30-39 MIN: ICD-10-PCS | Mod: 25,S$GLB,, | Performed by: FAMILY MEDICINE

## 2022-12-30 RX ORDER — PROMETHAZINE HYDROCHLORIDE AND DEXTROMETHORPHAN HYDROBROMIDE 6.25; 15 MG/5ML; MG/5ML
5 SYRUP ORAL EVERY 4 HOURS PRN
Qty: 240 ML | Refills: 0 | Status: SHIPPED | OUTPATIENT
Start: 2022-12-30 | End: 2023-01-09

## 2022-12-30 RX ORDER — PREDNISONE 50 MG/1
50 TABLET ORAL DAILY
Qty: 4 TABLET | Refills: 0 | Status: SHIPPED | OUTPATIENT
Start: 2022-12-31 | End: 2023-01-04

## 2022-12-30 RX ORDER — ALBUTEROL SULFATE 90 UG/1
2 AEROSOL, METERED RESPIRATORY (INHALATION) EVERY 6 HOURS PRN
Qty: 18 G | Refills: 0 | Status: SHIPPED | OUTPATIENT
Start: 2022-12-30 | End: 2023-08-01

## 2022-12-30 RX ORDER — IPRATROPIUM BROMIDE 0.5 MG/2.5ML
0.5 SOLUTION RESPIRATORY (INHALATION)
Status: COMPLETED | OUTPATIENT
Start: 2022-12-30 | End: 2022-12-30

## 2022-12-30 RX ORDER — ALBUTEROL SULFATE 0.83 MG/ML
2.5 SOLUTION RESPIRATORY (INHALATION)
Status: COMPLETED | OUTPATIENT
Start: 2022-12-30 | End: 2022-12-30

## 2022-12-30 RX ORDER — PREDNISONE 20 MG/1
40 TABLET ORAL
Status: COMPLETED | OUTPATIENT
Start: 2022-12-30 | End: 2022-12-30

## 2022-12-30 RX ADMIN — PREDNISONE 40 MG: 20 TABLET ORAL at 05:12

## 2022-12-30 RX ADMIN — IPRATROPIUM BROMIDE 0.5 MG: 0.5 SOLUTION RESPIRATORY (INHALATION) at 05:12

## 2022-12-30 RX ADMIN — ALBUTEROL SULFATE 2.5 MG: 0.83 SOLUTION RESPIRATORY (INHALATION) at 05:12

## 2022-12-30 NOTE — PATIENT INSTRUCTIONS
Your test was POSITIVE for COVID-19 (coronavirus).       Please isolate yourself at home.  You may leave home and/or return to work once the following conditions are met:    If you were not hospitalized and are not moderately to severely immunocompromised:   More than 5 days since symptoms first appeared AND  More than 24 hours fever free without medications AND  Symptoms are improving  Continue to wear a mask around others for 5 additional days.    If you were hospitalized OR are moderately to severely immunocompromised:  More than 20 days since symptoms first appeared  More than 24 hours fever free without medications  Symptoms have improved    If you had no symptoms but tested positive:  More than 5 days since the date of the first positive test (20 days if moderately to severely immunocompromised). If you develop symptoms, then use the guidelines above.  Continue to wear a mask around others for 5 additional days.      Contact Tracing    As one of the next steps, you will receive a call or text from the [place] Department of Health ([facility]) COVID-19 Tracing Team. See the contact information below so you know not to ignore the health departments call. It is important that you contact them back immediately so they can help.      Contact Tracer Number:  [phone]  Caller ID for most carriers: [facility]     What is contact tracing?  Contact tracing is a process that helps identify everyone who has been in close contact with an infected person. Contact tracers let those people know they may have been exposed and guide them on next steps. Confidentiality is important for everyone; no one will be told who may have exposed them to the virus.  Your involvement is important. The more we know about where and how this virus is spreading, the better chance we have at stopping it from spreading further.  What does exposure mean?  Exposure means you have been within 6 feet for more than 15 minutes with a person who  has or had COVID-19.  What kind of questions do the contact tracers ask?  A contact tracer will confirm your basic contact information including name, address, phone number, and next of kin, as well as asking about any symptoms you may have had. Theyll also ask you how you think you may have gotten sick, such as places where you may have been exposed to the virus, and people you were with. Those names will never be shared with anyone outside of that call, and will only be used to help trace and stop the spread of the virus.   I have privacy concerns. How will the state use my information?  Your privacy about your health is important. All calls are completed using call centers that use the appropriate health privacy protection measures (HIPAA compliance), meaning that your patient information is safe. No one will ever ask you any questions related to immigration status. Your health comes first.   Do I have to participate?  You do not have to participate, but we strongly encourage you to. Contact tracing can help us catch and control new outbreaks as theyre developing to keep your friends and family safe.   What if I dont hear from anyone?  If you dont receive a call within 24 hours, you can call the number above right away to inquire about your status. That line is open from 8:00 am - 8:00 p.m., 7 days a week.  Contact tracing saves lives! Together, we have the power to beat this virus and keep our loved ones and neighbors safe.    For more information see CDC link below.      https://www.cdc.gov/coronavirus/2019-ncov/hcp/guidance-prevent-spread.html#precautions        Sources:  CDC, [place] [facility]         Seek immediate care in the emergency room in the event of severe abdominal pain, chest pain, respiratory distress, fever unresponsive to antipyretic, dehydration, loss of consciousness, seizure.

## 2022-12-30 NOTE — PROGRESS NOTES
"Subjective:       Patient ID: Dorothy Burt is a 67 y.o. female.    Vitals:  height is 5' 4" (1.626 m) and weight is 89.4 kg (197 lb). Her oral temperature is 98.9 °F (37.2 °C). Her blood pressure is 160/87 (abnormal) and her pulse is 107. Her respiration is 14 and oxygen saturation is 96%.     Chief Complaint: Sinus Problem    Of note, patient has been dog sitting since 12/27/22    Sinus Problem  This is a new problem. The current episode started in the past 7 days (12/26/22). The problem has been gradually worsening since onset. Her pain is at a severity of 5/10. The pain is moderate. Associated symptoms include congestion, coughing (non-productive), headaches, a hoarse voice, sinus pressure and sneezing. Pertinent negatives include no chills, diaphoresis, ear pain, neck pain, shortness of breath, sore throat or swollen glands. (Post-nasal drip, rhinorrhea, sweats, wheezing, bilateral ear congestion, eye irritation) Treatments tried: symbicort inhaler; flonase; started loratidine this morning; OTC cough suppresant. The treatment provided mild relief.     Constitution: Negative for chills and sweating.   HENT:  Positive for congestion and sinus pressure. Negative for ear pain and sore throat.    Neck: Negative for neck pain.   Respiratory:  Positive for cough (non-productive). Negative for shortness of breath.    Allergic/Immunologic: Positive for sneezing.   Neurological:  Positive for headaches.     Objective:      Vitals:    12/30/22 1624   BP: (!) 160/87   Pulse: 107   Resp: 14   Temp: 98.9 °F (37.2 °C)   TempSrc: Oral   SpO2: 96%   Weight: 89.4 kg (197 lb)   Height: 5' 4" (1.626 m)      Physical Exam   Constitutional: She is oriented to person, place, and time. She appears well-developed. She is cooperative.  Non-toxic appearance. She does not appear ill. No distress.   HENT:   Head: Normocephalic and atraumatic.   Ears:   Right Ear: Hearing, tympanic membrane, external ear and ear canal normal.   Left Ear: " Hearing, tympanic membrane, external ear and ear canal normal.   Nose: Congestion present. No mucosal edema, rhinorrhea or nasal deformity. No epistaxis. Right sinus exhibits no maxillary sinus tenderness and no frontal sinus tenderness. Left sinus exhibits no maxillary sinus tenderness and no frontal sinus tenderness.   Mouth/Throat: Uvula is midline, oropharynx is clear and moist and mucous membranes are normal. No trismus in the jaw. Normal dentition. No uvula swelling. No oropharyngeal exudate, posterior oropharyngeal edema or posterior oropharyngeal erythema.   Eyes: Conjunctivae and lids are normal. No scleral icterus.   Neck: Trachea normal and phonation normal. Neck supple. No edema present. No erythema present. No neck rigidity present.   Cardiovascular: Normal rate, regular rhythm, normal heart sounds and normal pulses.   Pulmonary/Chest: Effort normal. No respiratory distress. She has no decreased breath sounds. She has wheezes (resolved postneb treatment). She has no rhonchi.   Abdominal: Normal appearance.   Musculoskeletal: Normal range of motion.         General: No deformity. Normal range of motion.   Neurological: She is alert and oriented to person, place, and time. She exhibits normal muscle tone. Coordination normal.   Skin: Skin is warm, dry, intact, not diaphoretic and not pale.   Psychiatric: Her speech is normal and behavior is normal. Judgment and thought content normal.   Nursing note and vitals reviewed.      Results for orders placed or performed in visit on 12/30/22   SARS Coronavirus 2 Antigen, POCT Manual Read   Result Value Ref Range    SARS Coronavirus 2 Antigen Positive (A) Negative     Acceptable Yes       XR CHEST PA AND LATERAL    Result Date: 12/30/2022  EXAMINATION: XR CHEST PA AND LATERAL CLINICAL HISTORY: Moderate persistent asthma with (acute) exacerbation TECHNIQUE: PA and lateral views of the chest were performed. COMPARISON: 12/30/2020. FINDINGS: The lungs  are well expanded and clear. No focal opacities are seen. The pleural spaces are clear. The cardiac silhouette is unremarkable. The visualized osseous structures are unremarkable.     No acute abnormality. Electronically signed by: Stanley Bryan Date:    12/30/2022 Time:    17:46    Assessment:       1. Rhinorrhea    2. COVID    3. Moderate persistent asthma with exacerbation          Plan:         Rhinorrhea  -     SARS Coronavirus 2 Antigen, POCT Manual Read  On Flonase    2. COVID  -     nirmatrelvir-ritonavir 300 mg (150 mg x 2)-100 mg copackaged tablets (EUA); Take 3 tablets by mouth 2 (two) times daily for 5 days. Each dose contains 2 nirmatrelvir (pink tablets) and 1 ritonavir (white tablet). Take all 3 tablets together. Do not combine with pain killer or muscle relaxant.  Dispense: 30 tablet; Refill: 0    3. Moderate persistent asthma with exacerbation  -     albuterol nebulizer solution 2.5 mg  -     ipratropium 0.02 % nebulizer solution 0.5 mg  -     predniSONE tablet 40 mg  -     predniSONE (DELTASONE) 50 MG Tab; Take 1 tablet (50 mg total) by mouth once daily. for 4 days  Dispense: 4 tablet; Refill: 0  -     albuterol (VENTOLIN HFA) 90 mcg/actuation inhaler; Inhale 2 puffs into the lungs every 6 (six) hours as needed for Wheezing. Rescue. Ok to substitute based on availability: Proair, Proventil, ventolin  Dispense: 18 g; Refill: 0  -     promethazine-dextromethorphan (PROMETHAZINE-DM) 6.25-15 mg/5 mL Syrp; Take 5 mLs by mouth every 4 (four) hours as needed (cough).  Dispense: 240 mL; Refill: 0  -     XR CHEST PA AND LATERAL; Future; Expected date: 12/30/2022    Patient Instructions   Your test was POSITIVE for COVID-19 (coronavirus).       Please isolate yourself at home.  You may leave home and/or return to work once the following conditions are met:    If you were not hospitalized and are not moderately to severely immunocompromised:   More than 5 days since symptoms first appeared AND  More than 24  hours fever free without medications AND  Symptoms are improving  Continue to wear a mask around others for 5 additional days.    If you were hospitalized OR are moderately to severely immunocompromised:  More than 20 days since symptoms first appeared  More than 24 hours fever free without medications  Symptoms have improved    If you had no symptoms but tested positive:  More than 5 days since the date of the first positive test (20 days if moderately to severely immunocompromised). If you develop symptoms, then use the guidelines above.  Continue to wear a mask around others for 5 additional days.      Contact Tracing    As one of the next steps, you will receive a call or text from the [place] Department of Health ([facility]) COVID-19 Tracing Team. See the contact information below so you know not to ignore the health departments call. It is important that you contact them back immediately so they can help.      Contact Tracer Number:  [phone]  Caller ID for most carriers: [facility]     What is contact tracing?  Contact tracing is a process that helps identify everyone who has been in close contact with an infected person. Contact tracers let those people know they may have been exposed and guide them on next steps. Confidentiality is important for everyone; no one will be told who may have exposed them to the virus.  Your involvement is important. The more we know about where and how this virus is spreading, the better chance we have at stopping it from spreading further.  What does exposure mean?  Exposure means you have been within 6 feet for more than 15 minutes with a person who has or had COVID-19.  What kind of questions do the contact tracers ask?  A contact tracer will confirm your basic contact information including name, address, phone number, and next of kin, as well as asking about any symptoms you may have had. Theyll also ask you how you think you may have gotten sick, such as places  where you may have been exposed to the virus, and people you were with. Those names will never be shared with anyone outside of that call, and will only be used to help trace and stop the spread of the virus.   I have privacy concerns. How will the state use my information?  Your privacy about your health is important. All calls are completed using call centers that use the appropriate health privacy protection measures (HIPAA compliance), meaning that your patient information is safe. No one will ever ask you any questions related to immigration status. Your health comes first.   Do I have to participate?  You do not have to participate, but we strongly encourage you to. Contact tracing can help us catch and control new outbreaks as theyre developing to keep your friends and family safe.   What if I dont hear from anyone?  If you dont receive a call within 24 hours, you can call the number above right away to inquire about your status. That line is open from 8:00 am - 8:00 p.m., 7 days a week.  Contact tracing saves lives! Together, we have the power to beat this virus and keep our loved ones and neighbors safe.    For more information see CDC link below.      https://www.cdc.gov/coronavirus/2019-ncov/hcp/guidance-prevent-spread.html#precautions        Sources:  [facility]         Seek immediate care in the emergency room in the event of severe abdominal pain, chest pain, respiratory distress, fever unresponsive to antipyretic, dehydration, loss of consciousness, seizure.

## 2023-01-10 ENCOUNTER — LAB VISIT (OUTPATIENT)
Dept: LAB | Facility: HOSPITAL | Age: 68
End: 2023-01-10
Attending: FAMILY MEDICINE
Payer: MEDICARE

## 2023-01-10 DIAGNOSIS — M25.461 EFFUSION OF RIGHT KNEE: ICD-10-CM

## 2023-01-10 LAB — URATE SERPL-MCNC: 5 MG/DL (ref 2.4–5.7)

## 2023-01-10 PROCEDURE — 84550 ASSAY OF BLOOD/URIC ACID: CPT | Performed by: FAMILY MEDICINE

## 2023-01-10 PROCEDURE — 36415 COLL VENOUS BLD VENIPUNCTURE: CPT | Performed by: FAMILY MEDICINE

## 2023-01-17 ENCOUNTER — NUTRITION (OUTPATIENT)
Dept: NUTRITION | Facility: CLINIC | Age: 68
End: 2023-01-17
Payer: MEDICARE

## 2023-01-17 ENCOUNTER — OFFICE VISIT (OUTPATIENT)
Dept: URGENT CARE | Facility: CLINIC | Age: 68
End: 2023-01-17
Payer: MEDICARE

## 2023-01-17 VITALS
TEMPERATURE: 98 F | WEIGHT: 189 LBS | OXYGEN SATURATION: 97 % | RESPIRATION RATE: 18 BRPM | HEART RATE: 97 BPM | SYSTOLIC BLOOD PRESSURE: 100 MMHG | DIASTOLIC BLOOD PRESSURE: 70 MMHG | BODY MASS INDEX: 32.27 KG/M2 | HEIGHT: 64 IN

## 2023-01-17 DIAGNOSIS — M25.561 PAIN AND SWELLING OF RIGHT KNEE: ICD-10-CM

## 2023-01-17 DIAGNOSIS — E66.09 CLASS 1 OBESITY DUE TO EXCESS CALORIES WITHOUT SERIOUS COMORBIDITY WITH BODY MASS INDEX (BMI) OF 32.0 TO 32.9 IN ADULT: Primary | ICD-10-CM

## 2023-01-17 DIAGNOSIS — Z71.3 NUTRITIONAL COUNSELING: ICD-10-CM

## 2023-01-17 DIAGNOSIS — M25.461 PAIN AND SWELLING OF RIGHT KNEE: ICD-10-CM

## 2023-01-17 DIAGNOSIS — M25.461 EFFUSION OF RIGHT KNEE: Primary | ICD-10-CM

## 2023-01-17 PROCEDURE — 73562 XR KNEE 3 VIEW RIGHT: ICD-10-PCS | Mod: FY,RT,S$GLB, | Performed by: RADIOLOGY

## 2023-01-17 PROCEDURE — 3074F PR MOST RECENT SYSTOLIC BLOOD PRESSURE < 130 MM HG: ICD-10-PCS | Mod: CPTII,S$GLB,, | Performed by: NURSE PRACTITIONER

## 2023-01-17 PROCEDURE — 1159F PR MEDICATION LIST DOCUMENTED IN MEDICAL RECORD: ICD-10-PCS | Mod: CPTII,S$GLB,, | Performed by: NURSE PRACTITIONER

## 2023-01-17 PROCEDURE — 1160F RVW MEDS BY RX/DR IN RCRD: CPT | Mod: CPTII,S$GLB,, | Performed by: NURSE PRACTITIONER

## 2023-01-17 PROCEDURE — 1160F PR REVIEW ALL MEDS BY PRESCRIBER/CLIN PHARMACIST DOCUMENTED: ICD-10-PCS | Mod: CPTII,S$GLB,, | Performed by: NURSE PRACTITIONER

## 2023-01-17 PROCEDURE — 3078F DIAST BP <80 MM HG: CPT | Mod: CPTII,S$GLB,, | Performed by: NURSE PRACTITIONER

## 2023-01-17 PROCEDURE — 1125F PR PAIN SEVERITY QUANTIFIED, PAIN PRESENT: ICD-10-PCS | Mod: CPTII,S$GLB,, | Performed by: NURSE PRACTITIONER

## 2023-01-17 PROCEDURE — 3008F BODY MASS INDEX DOCD: CPT | Mod: CPTII,S$GLB,, | Performed by: NURSE PRACTITIONER

## 2023-01-17 PROCEDURE — 3074F SYST BP LT 130 MM HG: CPT | Mod: CPTII,S$GLB,, | Performed by: NURSE PRACTITIONER

## 2023-01-17 PROCEDURE — 3078F PR MOST RECENT DIASTOLIC BLOOD PRESSURE < 80 MM HG: ICD-10-PCS | Mod: CPTII,S$GLB,, | Performed by: NURSE PRACTITIONER

## 2023-01-17 PROCEDURE — 96372 PR INJECTION,THERAP/PROPH/DIAG2ST, IM OR SUBCUT: ICD-10-PCS | Mod: S$GLB,,, | Performed by: NURSE PRACTITIONER

## 2023-01-17 PROCEDURE — 99214 OFFICE O/P EST MOD 30 MIN: CPT | Mod: 25,S$GLB,, | Performed by: NURSE PRACTITIONER

## 2023-01-17 PROCEDURE — 3008F PR BODY MASS INDEX (BMI) DOCUMENTED: ICD-10-PCS | Mod: CPTII,S$GLB,, | Performed by: NURSE PRACTITIONER

## 2023-01-17 PROCEDURE — 1125F AMNT PAIN NOTED PAIN PRSNT: CPT | Mod: CPTII,S$GLB,, | Performed by: NURSE PRACTITIONER

## 2023-01-17 PROCEDURE — 97803 PR MED NUTR THER, SUBSQ, INDIV, EA 15 MIN: ICD-10-PCS | Mod: S$GLB,,, | Performed by: NUTRITIONIST

## 2023-01-17 PROCEDURE — 99214 PR OFFICE/OUTPT VISIT, EST, LEVL IV, 30-39 MIN: ICD-10-PCS | Mod: 25,S$GLB,, | Performed by: NURSE PRACTITIONER

## 2023-01-17 PROCEDURE — 96372 THER/PROPH/DIAG INJ SC/IM: CPT | Mod: S$GLB,,, | Performed by: NURSE PRACTITIONER

## 2023-01-17 PROCEDURE — 73562 X-RAY EXAM OF KNEE 3: CPT | Mod: FY,RT,S$GLB, | Performed by: RADIOLOGY

## 2023-01-17 PROCEDURE — 97803 MED NUTRITION INDIV SUBSEQ: CPT | Mod: S$GLB,,, | Performed by: NUTRITIONIST

## 2023-01-17 PROCEDURE — 1159F MED LIST DOCD IN RCRD: CPT | Mod: CPTII,S$GLB,, | Performed by: NURSE PRACTITIONER

## 2023-01-17 RX ORDER — METHOCARBAMOL 500 MG/1
500 TABLET, FILM COATED ORAL 4 TIMES DAILY PRN
Qty: 40 TABLET | Refills: 0 | Status: SHIPPED | OUTPATIENT
Start: 2023-01-17 | End: 2023-01-27

## 2023-01-17 RX ORDER — MELOXICAM 15 MG/1
15 TABLET ORAL DAILY PRN
Qty: 14 TABLET | Refills: 0 | Status: SHIPPED | OUTPATIENT
Start: 2023-01-17 | End: 2023-01-17

## 2023-01-17 RX ORDER — DOXYCYCLINE 100 MG/1
100 CAPSULE ORAL EVERY 12 HOURS
Qty: 14 CAPSULE | Refills: 0 | Status: SHIPPED | OUTPATIENT
Start: 2023-01-17 | End: 2023-01-24

## 2023-01-17 RX ORDER — MELOXICAM 15 MG/1
15 TABLET ORAL DAILY PRN
Qty: 14 TABLET | Refills: 0 | Status: SHIPPED | OUTPATIENT
Start: 2023-01-18 | End: 2023-01-19 | Stop reason: SDUPTHER

## 2023-01-17 RX ORDER — KETOROLAC TROMETHAMINE 30 MG/ML
30 INJECTION, SOLUTION INTRAMUSCULAR; INTRAVENOUS
Status: COMPLETED | OUTPATIENT
Start: 2023-01-17 | End: 2023-01-17

## 2023-01-17 RX ADMIN — KETOROLAC TROMETHAMINE 30 MG: 30 INJECTION, SOLUTION INTRAMUSCULAR; INTRAVENOUS at 03:01

## 2023-01-17 NOTE — PROGRESS NOTES
"Subjective:       Patient ID: Dorothy Burt is a 68 y.o. female.    Vitals:  height is 5' 4" (1.626 m) and weight is 85.7 kg (189 lb). Her oral temperature is 97.8 °F (36.6 °C). Her blood pressure is 100/70 and her pulse is 97. Her respiration is 18 and oxygen saturation is 97%.     Chief Complaint: Knee Pain    67 y/o female presents to  today with c/o right knee pain and swelling, worsening over the last 24 hours. Pt saw PCP for this on 1/10. Uric acid level was normal. Pt denies injury. Denies awkward twist or turn of the knee or leg; as well as denies any muscle pull known to her. Denies fever, however she reports that her knee is hot to the touch. The pain is to the medial aspect of the anterior knee, with pain radiating both up and down. She also has pain behind the knee. Pain worsens when she tries to stand. She reports that she can not stand or walk at all. Unable to bear weight to right leg. Pt s/p covid on 1/3/23.     Knee Pain   The incident occurred 12 to 24 hours ago. The incident occurred at home. There was no injury mechanism. The pain is present in the right knee. The quality of the pain is described as aching, stabbing and shooting. The pain is at a severity of 10/10. The pain is severe. The pain has been Constant since onset. Associated symptoms include an inability to bear weight. She reports no foreign bodies present. The symptoms are aggravated by movement and weight bearing. She has tried ice (bio freeze, voltaren gel, icy hot) for the symptoms. The treatment provided no relief. Dictation #1  MRN:[id number]  CSN:[id number]     Musculoskeletal:  Positive for pain and joint swelling.   Skin:  Negative for wound, skin thickening/induration, erythema and abscess.     Objective:      Physical Exam   Constitutional: She is oriented to person, place, and time.  Non-toxic appearance. She does not appear ill. No distress.   HENT:   Head: Normocephalic.   Nose: Nose normal.   Mouth/Throat: Mucous " membranes are moist.   Eyes: Right eye exhibits no discharge. Left eye exhibits no discharge.   Neck: Neck supple.   Cardiovascular: Normal rate.   Pulmonary/Chest: Effort normal and breath sounds normal.   Abdominal: Normal appearance.   Musculoskeletal:         General: Swelling and tenderness present. No deformity.      Right lower leg: No edema.      Left lower leg: No edema.      Comments: Right knee is edematous. Tender to touch to whole knee-front and back. Right knee is warmer to touch than the left knee. Pt in wheelchair. Pt having trouble standing up and can not bear weight without holding on to wheelchair and exam room bed. Unable to ambulate without assistance.    Neurological: She is alert and oriented to person, place, and time.   Skin: Skin is warm, dry and not diaphoretic. Capillary refill takes less than 2 seconds. No erythema   Psychiatric: Her behavior is normal. Mood normal.   Nursing note and vitals reviewed.        XR KNEE 3 VIEW RIGHT    Result Date: 1/17/2023  EXAMINATION: XR KNEE 3 VIEW RIGHT CLINICAL HISTORY: Pain in right knee TECHNIQUE: AP, lateral, and Merchant views of the right knee were performed. COMPARISON: None. FINDINGS: No acute fracture or dislocation.  Moderate tricompartmental degenerative changes.  Possible small joint effusion.  No unexpected radiopaque foreign body.     Tricompartmental degenerative changes and small joint effusion.  No acute displaced fracture. Electronically signed by: Bharathi Iniguez MD Date:    01/17/2023 Time:    14:36    Assessment:       1. Effusion of right knee    2. Pain and swelling of right knee          Plan:         Effusion of right knee  -     XR KNEE 3 VIEW RIGHT; Future; Expected date: 01/17/2023  -     Discontinue: meloxicam (MOBIC) 15 MG tablet; Take 1 tablet (15 mg total) by mouth daily as needed for Pain.  Dispense: 14 tablet; Refill: 0  -     methocarbamoL (ROBAXIN) 500 MG Tab; Take 1 tablet (500 mg total) by mouth 4 (four) times  daily as needed (muscle pain).  Dispense: 40 tablet; Refill: 0  -     doxycycline (VIBRAMYCIN) 100 MG Cap; Take 1 capsule (100 mg total) by mouth every 12 (twelve) hours. for 7 days  Dispense: 14 capsule; Refill: 0  -     Ambulatory referral/consult to Orthopedics  -     CRUTCHES FOR HOME USE  -     IMMOBILIZER FOR HOME USE  -     meloxicam (MOBIC) 15 MG tablet; Take 1 tablet (15 mg total) by mouth daily as needed for Pain.  Dispense: 14 tablet; Refill: 0  -     ketorolac injection 30 mg    Pain and swelling of right knee  -     XR KNEE 3 VIEW RIGHT; Future; Expected date: 01/17/2023  -     Discontinue: meloxicam (MOBIC) 15 MG tablet; Take 1 tablet (15 mg total) by mouth daily as needed for Pain.  Dispense: 14 tablet; Refill: 0  -     methocarbamoL (ROBAXIN) 500 MG Tab; Take 1 tablet (500 mg total) by mouth 4 (four) times daily as needed (muscle pain).  Dispense: 40 tablet; Refill: 0  -     doxycycline (VIBRAMYCIN) 100 MG Cap; Take 1 capsule (100 mg total) by mouth every 12 (twelve) hours. for 7 days  Dispense: 14 capsule; Refill: 0  -     Ambulatory referral/consult to Orthopedics  -     CRUTCHES FOR HOME USE  -     IMMOBILIZER FOR HOME USE  -     meloxicam (MOBIC) 15 MG tablet; Take 1 tablet (15 mg total) by mouth daily as needed for Pain.  Dispense: 14 tablet; Refill: 0  -     ketorolac injection 30 mg         Patient Instructions   Rest   Elevate the knee  Ice packs for swelling  Heating pad for comfort (ok to alternate hot and cold [email or web address])  Compression and/or knee immobilizer   Crutches to walk as needed   Codeine recently prescribed for cough-ok to take for pain, however do not take muscle relaxer together with codeine.

## 2023-01-17 NOTE — PATIENT INSTRUCTIONS
Rest   Elevate the knee  Ice packs for swelling  Heating pad for comfort (ok to alternate hot and cold therypay)  Compression and/or knee immobilizer   Crutches to walk as needed   Codeine recently prescribed for cough-ok to take for pain, however do not take muscle relaxer together with codeine.

## 2023-01-17 NOTE — PROGRESS NOTES
"Nutrition Assessment for Established Patient Medical Nutrition Therapy      Reason for MNT visit: Pt in for education and nutrition counseling regarding Obesity, chronic pain      ASSESSMENT    Age: 68 y.o.  Wt: 193 lbs  Wt Readings from Last 1 Encounters:   01/10/23 85.7 kg (189 lb)     Ht: 5'4"  Ht Readings from Last 1 Encounters:   01/10/23 5' 4" (1.626 m)     BMI: 32.7  BMI Readings from Last 1 Encounters:   01/10/23 32.44 kg/m²       Clinical Signs/Symptoms: Since our last visit, Dorothy tested positive for COVID and her right knee was swollen as of yesterday. She hasn't tried many new products and hasn't sat down (but once) to review her meal plan and try new recommended food products.    Medical History:   Past Medical History:   Diagnosis Date    Arthritis     Asthma     Back pain     Knee pain     Thyroid disease      Problem List             Resolved    Arthritis         Mild intermittent asthma (Chronic)         Sacroiliac joint pain         Chronic pain         Hypothyroidism         Gastritis due to nonsteroidal anti-inflammatory drug         Recurrent cold sores (Chronic)         History of pericarditis         Chondromalacia patellae         Osteoarthritis of knee         Stress incontinence         Tear of medial meniscus of knee         Fibromyalgia (Chronic)         Unspecified inflammatory spondylopathy, lumbar region         Venous insufficiency         Peroneal tendonitis, left         Decreased strength         Decreased range of motion of left shoulder         Left shoulder pain         Nontraumatic incomplete tear of left rotator cuff            Medications:   Current Outpatient Medications:     acyclovir (ZOVIRAX) 400 MG tablet, TAKE 1 BY MOUTH 5 TIMES DAILY FOR 5 DAYS, Disp: 25 tablet, Rfl: 2    albuterol (PROAIR HFA) 90 mcg/actuation inhaler, Inhale 2 puffs into the lungs every 6 (six) hours as needed for Wheezing or Shortness of Breath. Rescue, Disp: 18 g, Rfl: 11    albuterol (VENTOLIN HFA) " 90 mcg/actuation inhaler, Inhale 2 puffs into the lungs every 6 (six) hours as needed for Wheezing. Rescue. Ok to substitute based on availability: Proair, Proventil, ventolin, Disp: 18 g, Rfl: 0    budesonide-formoterol 160-4.5 mcg (SYMBICORT) 160-4.5 mcg/actuation HFAA, Inhale 2 puffs into the lungs every evening. Controller, Disp: , Rfl:     diclofenac sodium (VOLTAREN) 1 % Gel, Apply 2 g topically 2 (two) times daily as needed (to knees)., Disp: 100 g, Rfl: 2    famotidine (PEPCID) 20 MG tablet, TAKE 1 TABLET BY MOUTH TWICE A DAY, Disp: 30 tablet, Rfl: 11    guaiFENesin-codeine 100-10 mg/5 ml (TUSSI-ORGANIDIN NR)  mg/5 mL syrup, TAKE 5 MLS BY MOUTH EVERY 6 HOURS AS NEEDED FOR COUGH, Disp: 200 mL, Rfl: 0    ibuprofen (ADVIL,MOTRIN) 600 MG tablet, Take 1 tablet (600 mg total) by mouth every 6 (six) hours as needed for Pain., Disp: 30 tablet, Rfl: 1    levocetirizine (XYZAL) 5 MG tablet, TAKE 1 TABLET BY MOUTH EVERY DAY AS NEEDED, Disp: 90 tablet, Rfl: 3    levothyroxine (SYNTHROID) 25 MCG tablet, TAKE 1.5 TABLET BY MOUTH DAILY, Disp: 135 tablet, Rfl: 3    loratadine (CLARITIN) 10 mg tablet, Take 1 tablet (10 mg total) by mouth once daily., Disp: 30 tablet, Rfl: 2    meloxicam (MOBIC) 7.5 MG tablet, Take 1 tablet (7.5 mg total) by mouth once daily., Disp: 30 tablet, Rfl: 0    naproxen (NAPROSYN) 500 MG tablet, TAKE 1 TABLET BY MOUTH TWICE A DAY AS NEEDED, Disp: 60 tablet, Rfl: 1    omeprazole (PRILOSEC) 20 MG capsule, Take 20 mg by mouth daily as needed., Disp: , Rfl:     traMADoL (ULTRAM) 50 mg tablet, Take 2 tablets (100 mg total) by mouth every 8 (eight) hours as needed for Pain., Disp: 120 tablet, Rfl: 5    ZANAFLEX 4 mg tablet, Take 4 mg by mouth every 8 (eight) hours as needed., Disp: , Rfl:     Food allergies  Intolerances: NKFA    Labs:  Reviewed and noted  Hemoglobin A1C   Date Value Ref Range Status   11/14/2022 5.6 4.5 - 5.7 % Final   04/19/2020 5.4 4.0 - 5.6 % Final     Comment:     ADA Screening  Guidelines:  5.7-6.4%  Consistent with prediabetes  >or=6.5%  Consistent with diabetes  High levels of fetal hemoglobin interfere with the HbA1C  assay. Heterozygous hemoglobin variants (HbS, HgC, etc)do  not significantly interfere with this assay.   However, presence of multiple variants may affect accuracy.     09/27/2012 5.7 (H) % Final     Cholesterol   Date Value Ref Range Status   11/14/2022 211 (H) 100 - 200 mg/dL Final   04/20/2020 177 120 - 199 mg/dL Final     Comment:     The National Cholesterol Education Program (NCEP) has set the  following guidelines (reference ranges) for Cholesterol:  Optimal.....................<200 mg/dL  Borderline High.............200-239 mg/dL  High........................> or = 240 mg/dL       Triglycerides   Date Value Ref Range Status   11/14/2022 119 30 - 150 mg/dL Final   04/20/2020 79 30 - 150 mg/dL Final     Comment:     The National Cholesterol Education Program (NCEP) has set the  following guidelines (reference values) for triglycerides:  Normal......................<150 mg/dL  Borderline High.............150-199 mg/dL  High........................200-499 mg/dL       HDL   Date Value Ref Range Status   11/14/2022 73 40 - 75 mg/dL Final   04/20/2020 56 40 - 75 mg/dL Final     Comment:     The National Cholesterol Education Program (NCEP) has set the  following guidelines (reference values) for HDL Cholesterol:  Low...............<40 mg/dL  Optimal...........>60 mg/dL       LDL Calculated   Date Value Ref Range Status   11/14/2022 119 0 - 125 mg/dL Final     LDL Cholesterol   Date Value Ref Range Status   04/20/2020 105.2 63.0 - 159.0 mg/dL Final     Comment:     The National Cholesterol Education Program (NCEP) has set the  following guidelines (reference values) for LDL Cholesterol:  Optimal.......................<130 mg/dL  Borderline High...............130-159 mg/dL  High..........................160-189 mg/dL  Very High.....................>190 mg/dL          Typical day recall: Reviewed and noted during consult     Beverages: water: 3-4, 16.9 oz bottle water; coffee with cream and Stevia    Dining out: Regular, 1-2 times per week    Alcohol: nonsmoker; patient stated she can drink several crown and diet cokes depending on the occasion, but only drinks socially    Lifestyle Influences  Support System: self    Meal preparation/shopping: self    Current Activity Level: currently none. She is doing physical therapy for her shoulder 2x a week and has been doing this since June 2022    Patient motivation, anticipated barriers, expected compliance: Patient is motivated and has verbalized understanding and intent to comply     DIAGNOSIS   Problem: Excessive Energy intake and inadequate physical activity   Etiology: RT eating high calorie foods and restaurants and not exercising  Signs/Symptoms: AEB 24 hour recall and BMI    INTERVENTION  Nutrition prescription: estimated energy requirements:   Calories: 1700  Protein: 125-145 grams  Carbohydrates: 120-140 grams  Fats: choose plant-based heart healthy fats  Total fluid: 95 oz + sweat loss  Limit added sugar to 20 grams or less per day (Note: 1 teaspoon of sugar = ~5 gram)      Recommendations & Goals:  Patient goals and recommendations are tailored to the specific patient's needs, readiness to change, lifestyle, culture, skills, resources, & abilities. Strategies to help achieve these nutrition-related goals were discussed which can include but are not limited to SMART goal setting & mindful eating.     Aim for a minimum of 7 hours sleep   Exercise 60 minutes most days  Eat breakfast within 1-2 hours of waking up  Try not to skip any meals or snacks, not going more than 3-4 hours without eating.   At each meal and snack, try to include a source of fiber + lean protein + healthy fat.       Written Materials Provided  These resources are intended to assist the patient in making it easier to choose recommended options when  eating out & to identify better-for-you brands at the grocery store:    Meal Planning Guide with recommendations discussed along with portion sizes and a meal plan   Fueling Well On The Go Guide  Eat Fit Grocery Product list   RD contact information- for patient to contact regarding any questions, needs, and/or concerns that may arise     MONITORING & EVALUATION    Communicated with healthcare provider    Documented plan for referral to appropriate agency/healthcare provider as needed    Comprehension: fair    Motivation to change: low/moderate    Follow-up: 3 months    Counseling time: 30 minutes

## 2023-01-17 NOTE — PATIENT INSTRUCTIONS
-Add Vital Proteins Collagen Peptides to your soups, hummus, smoothies, etc  -Halo Top ice cream  -Oikos PRO 20 grams protein   -I recommend ordering from [employer] or [employer] grocery delivery since you hurt your knee. You can copy and paste some of the brands I recommended below in your meal plan and then click 'add to cart  -Continue to review your recommended meal plan          Daily Energy Requirements:  Calories: 1500  Protein: 125-145 grams  Carbohydrates: 120-140 grams  Fats: choose plant-based heart healthy fats  Total fluid: 95 oz + sweat loss  Limit added sugar to 20 grams or less per day (Note: 1 teaspoon of sugar = ~5 gram)    Goals:  Eat every 3-4 hours  Know at most how many carbohydrate servings are recommended for each meal and snack. The rest of your plate will consist of lean protein and non-starchy veggies  Start exercising/moving more throughout the day. Even a 10-minute daily walk adds up  For your coffee: Instead of using cream, use a Ready to drink protein shake such as Premier    Breakfast: 2 servings of carbs = 30-40 grams + at least 20 grams lean protein/heart healthy fat  ½ cup cooked oatmeal + 1 piece of fruit + 2 whole eggs  1 cup cooked grits + 2 whole eggs  Smoothie: See notes below how to make a healthy smoothie.    Snack: A mid-morning snack may be needed if going >3-4 hours between meals      Lunch: 4 oz lean protein + unlimited non-starchy veggies + 2 servings of carbs = 30-40 grams  Examples of 2 servings of carbohydrates = ~30-40 grams:  1 cup cooked pasta (see notes below for brand examples); 2/3 cup cooked brown rice; 1 medium potato or sweet potato (5-6 oz in weight); 1 medium size piece of fruit + 2 slices Tom's Killer thin sliced bread; 1 serving of whole grain chips (see notes) + 2 slices thin sliced whole grain bread; 2 slices 100-calorie bread; 1 serving of Beanito chips + ½ cup cooked whole grain starch; 1 cup cooked beans; ½ cup cooked beans + 1/3 cup cooked brown rice,  etc  The rest of your plate will consist of 4 oz lean protein + unlimited non-starchy veggies  Meal Examples:  Salad: Unlimited non-starchy veggies + 4 oz lean protein + 2-3 salad add-ins (see notes below) + 1 cup fruit  [place]: 2 slices Tom's Killer 21 whole grains and seeds regular sliced bread + 4 oz freshly sliced turkey + 1 tablespoon regular ballesteros + 1 slice cheese + non-starchy veggies of your choice  Note: If you want a piece of fruit or 1 serving of whole grain chips with your sandwich, then do 2 slices Iam Tai thin-sliced bread  Spaghetti & meat sauce: 1 cup cooked lentil pasta + 4 oz 93/7 lean ground beef + side of non-starchy veggies  Red beans & rice: ½ cup cooked beans + 1/3 cup cooked brown rice + additional 2 oz lean protein (ex: chicken sausage, ham, chicken, etc) + side of non-starchy veggies  If you don't want rice, then you can do 1 cup cooked beans over riced cauliflower + additional 2 oz lean protein (ex: chicken sausage, ham, chicken, etc) + side of non-starchy veggies  5 oz lean protein + non-starchy veggies + 1 medium potato (5-6 oz in weight)  Restaurants: See tips below, particularly the Pick 1 out of the 4 rule  Bring your own 100% whole wheat bread for your sandwiches. If splitting a salad with your sandwich, then still pick 2 salad additives (see below for options)  Fast Food  Convenient Options: See FUELING WELL on-the-go guide     Snack: ~1 serving of carbs = 15-25 grams + at least 15 grams lean protein/heart healthy fat + unlimited amounts of non-starchy vegetables  ¾ cup 2% cottage cheese + 1 cup fruit or 2 satsumas'  Flavored Greek yogurt (See notes for brands) + 2 tablespoons nuts or low sugar granola  [place] balance break + piece of fruit  ¼ cup nuts + piece of fruit  Piece of fruit + 2 tablespoons regular nut butter  1 serving of Beanito chips + 1, 100-calorie wholly guacamole packet  1 serving of Beanito chips + ¼ cup shredded cheese melted on top (aka  better-for-you emmie's)  Flapjacked protein mighty muffin (typically found on baking aisle of grocery stores)  Protein bar  Granola bar (see below for brand examples)  Avocado toast: ½ small Gabriel avocado on 1 slice 100% whole grain toast      Dinner: Lower carb  Note: If you would like a carbohydrate for dinner, then have 1 serving of carbs = 15-20 grams = ~½ cup cooked whole grain starch   Focus on 4 oz lean protein + 1 cup or more non-starchy veggies + small amount of heart healthy fat  Carb swaps:  Hearts of palm-based 'linguini'  Brand example: Palmini   Lasagna made with hearts of palm lasagna sheets (Palmini = brand example)  'Riced' Cauliflower  'Riced' Broccoli  Cauliflower mash to mimic mashed potatoes  Spaghetti squash  Zoodles  Spiralized Beets   Low Carb Breads:  'Base Culture' 7 Nut + Seed and Original Paleo Bread  [place]: Seeded low carb keto bread (Whole Foods)  Unbuns: Low carb hamburger bun (Whole Foods)   Out[place] Plantpower à makes pre-made frozen cauliflower pizza crust & wraps as well as frozen sandwich thins  Great for making low carb pizzas, burgers and sandwiches  Cory'flour frozen flatbreads and pizza crustsà any flavor   Great for making low carb pizzas, burgers, and sandwiches    Think outside the box for low carb dishes:  Kabobs, stir garibay with riced cauliflower or riced broccoli, stuffed bell peppers with no rice, lettuce wraps, eggplant lasagna, shrimp etouffee made with riced cauliflower, request a sushi roll that contains raw fish to be made without rice, etc      Optional post-dinner sweet: Anything up to 200 calories  'Fun size'                    Building A Better Smoothie  Choose your protein. Pick 1 from the followin oz 2% Plain Greek yogurt  5 oz 2% Cottage cheese  Plant-based protein powder  Orgain  Sunwarrior  Vu  Garden of Life RAW  100% whey protein powder  2 scoops Collagen Peptides (Vital Proteins is a brand favorite)  Make it sweet:  Choose 1 cup  frozen or fresh fruit of your choice to mary lou up your smoothie  Make sure you are choosing frozen fruit with no sugar added; be sure to look at the ingredient list  Add your veggies:  Instead of ice, choose frozen cauliflower florets. Cauliflower helps with the detoxification process in our bodies, and it's virtually tasteless!  Greens: spinach, kale, or other leafy greens  Beets are a great addition to smoothies, especially paired with strawberries and lemon  Cucumbers and celery are refreshing ways to enhance nutrition and hydration within your smoothie  Add in a plant-based fat:   Nut Butter: 1 teaspoon   Natural peanut butter  [place] butter  Cashew butter  Nuts: ~2 tablespoons   Avocado (¼)  Avocado oil  EVOO: 1 teaspoon  Add a liquid to reach your desired consistency:  Unsweetened/No sugar added plant-based milk   [place] milk, Hemp milk, Cashew milk, Coconut milk, Rice milk, Soy milk  Milk: 1% or 2%  Healthy add-ins for an extra nutritional boost:  1 tablespoon flaxseeds or kyree seeds        Restaurant Tips      Pick 1 out of the 4 Rule: Instead of eating bread/tortilla chips, an appetizer, alcoholic drink and dessert, choose just one to have with your entrée  Focus on lean proteins: Refer to lean meat/meat substitutes page in meal planning guidebook. Select items grilled, baked, broiled, braised, poached or roasted      For your heart health, avoid crispy, crunchy, breaded, paneed or stuffed items and items that are cream based, au gratin or buttered      Order sauces, dressings, and gravies on the side. This way you can add 1-2 tablespoons yourself. This helps with portion control     Request extra non-starchy vegetables instead of a starchy side dish. If the starchy side is something you love, consider splitting it with someone else at the table     Beverages: Order water with lemon, sparkling water, or unsweetened tea. Avoid sugary soft drinks, juices and mixers    Dining Out     [facility]  Designed to take the guesswork out of dining out healthfully, [facility] makes the healthy choice the easy choice  Visit    Order the Eat Fit Cookbook to create restaurant quality dishes at home  Download the [facility] Eat [facility] eyad for free on your smartphone  All Eat Fit restaurants & dishes by location   Nutrition facts for every Eat Fit dish  200 + Eat Fit approved recipes  Grocery shopping guides + community wellness resources    - Ordering A Better-For-You Salad:  Look for lean protein, unlimited non-starchy vegetables, and plant-based fats  Order dressings on the side to better control portion sizes. Add 1-2 tablespoons yourself to lightly coat  Pick 2-3 salad add-ins, each being ~2 tablespoons:  Dressing  Cheese  Nuts  Rivera  Croutons  Dried Fruit  Avocado/Guacamole  Eggs        Additional Nutrition Tips    -How to Read a Nutrition Fact Label:  1. Ingredients first: ingredients are listed form most to least, what is at the top is most occurring  2. Serving size: this is the amount that the numbers represent. If you eat less, divide, if you eat more multiply the numbers  3. Added sugar: Added sugar will be listed under carbohydrates and total sugar. Per product/meal you want your added sugar to be below 7 grams, ideally not more than 20 grams for your whole day    -Rule of thumb for Aisle products: (Food products located in the center aisles of grocery stores):   Mal #7 Rule  Look for products that have 7 grams or less added sugar, and at least 7 grams or more protein  -Frozen Meal Guidelines:   Aim for meals that contain 45 grams or less of carbohydrates and 20 grams or more protein  Note: If you find one you like that doesn't have 20 grams protein, you can add leftover lean protein to the frozen meal  See notes below for several brand examples  Refer to the Eat Fit Shopping Guide for additional brand specific recommendations    -Choose 100% whole wheat/whole grain products   -Note: 'wheat' bread  and 'wheat' flour are white bread/white flour    -Unsweetened frozen fruits and veggies contain the same nutritional value as fresh fruit and veggies. I recommend keeping these as staples in your freezer     -I know you don't eat fried foods, but wanted to provide you with this info still: Taking the skin off fried chicken is basically the same as eating grilled chicken  Compromise: If the skin is your favorite part, then eat your first piece of fried chicken without skin, and your last piece with skin  Note: Fried foods shouldn't be consumed more than twice a month    -Training/retraining the body is key, and may take some time:   Whatever you are currently doing for your meals/snacks and exercise routine, your body is used to it  When changing a habit(s), it's normal for it to be a little hard at first. Your body gets used to new habits over time with consistency  Exercise/Movement: Aim for at least 60 minutes of moderate exercise 5 days a week.   Note: Start off with small goals if having a hard time getting back on an exercise routine. Even a daily 15-minute walk adds up initially. Increase the length of time you exercise gradually until you're able to do 60 minutes most days of the week    -Water is not only important for hydration, but also for feeling alert and more energized. If our body is even slightly dehydrated, that could make us feel more fatigued throughout the day  Aim for all daily fluids being half your body weight in oz + sweat loss (see under Energy Requirements at the top of page 1 for your recommended daily total fluid intake)    -I recommend measuring everything in cooked portions to see what each recommended portion looks like on your plate and bowls; Then eyeball after you feel comfortable with recommended portions    -What potentially increases inflammation/flare-ups in our body?  White/refined carbohydrates  Sugar  Alcohol  Fried foods    -If you're having trouble finding a specific food  product, try looking on the brands website. Most companies have a 'store /find a store' on their website        Favorite Food Brands    - Whole Grain Breads:  Tom's Killer Bread - 21 Whole Grains and Seeds (green label), Good Seed (yellow label), Power Seed (red label)   Thin sliced -or- regular slice  Any 100% whole wheat  100% whole grain option  'Base Culture' 7 Nut + Seed and Original Paleo Bread (GF and found in freezer section)  Per slice: 110 calories and 4 grams net carbs  Great to use for dinner since low carb    -Whole Grain Tortillas  Wraps:  Corn   Coconut wrap - (Typically found in refrigerated section by cheese)  CAMERON ADELINA - 100% whole wheat   [place] - Whole wheat tortillas + whole wheat carb balance    -Whole Grain Frozen Waffles:  Kashi GO Protein Waffles  [place] Cakes Gluten Free and Whole Grain Protein Waffles  Van's Power Grains Original    -Frozen Meals: Single Serving:  Healthy Choice: MAX (this brand will have the most protein)  Healthy Choice: POWER Bowls  Happi Foodi: Carb Wise (Keto meal)  'Life Cuisine' Keto pizza  Quest: Low Carb Frozen Pizza  Realgood Co: Real Enchilada's  Eating Well (Rouses)    -Frozen Meals: Bulk Low Carb Options:  Cory'flour Foods: Keto Lasagna  Realgood: Low carb Lightly breaded chicken strips/nuggets  Harman's Natural Foods: Heat and Eat Entrees   NOTE: these come refrigerated, but you can freeze them for up to 6 months)    -High Protein Pasta's:  'Banza' chickpea products:   Mac-n-cheese  Pasta's - all varieties   Rice   Red lentil  Yellow lentil pasta  Black bean pasta (aka squid ink pasta)  Edamame-based pasta    - Red Sauce (In A Jar):  Mitch & Rosario's Heart Smart Sauce (available flavors: Roasted Garlic, [place] or Original)  [place]'s Finest Gourmet Foods Pasta Sauce  Classico Original  Engine 2 Plant-Strong    -Cold Cereals:  Special K PROTEIN  Premier Protein   Kashi Go Grain Free  Dark Cocoa + Cinnamon Vanilla  Makeda Crunch Keto-Friendly  Cereals  Iwon organic protein crunchies  Three Wishes  Magic Spoon Grain-Free Cereals (online only)    - Hot Cereals- Grab & Go Oatmeal Cups:  Powerful Overnight Oats  Jhonatan's [place]: Gluten Free Oatmeal with Flax and Nigel  Wild Friends: Oats & Nut Butter  Think Thin: Protein & Fiber Hot Oatmeal    -Hot Cereal- Oatmeal Packets:  Kashi Go Lean Creamy All-Natural Vanilla  Sikhism - Low Sugar - Any Flavor    -Whole Grain Chips:  SunChips  Beanito's  [place]fields Bean Chips  Popcorners- FLEX Protein Crisps (10 grams protein per serving)    -High Protein Chips:  iwon organics protein stix  protein puffs  Quest  Protes    -Whole Grain Crackers:  Triscuits - Regular + thin crisps  Wheat Thins  Blue Asiya almond nut thins  Elana's Gone Cracker  Crunchmaster protein sea salt cracker    -Better-For-You Ice Cream  Ice Cream Bars:  Halo Top high protein ice cream pints - regular and keto series  KETO pint ice cream bars  Halo Top Pops  Realgood ice cream  Enlightened ice cream bar  Enlightened 'Light' ice cream  Yasso Frozen Greek yogurt bar    -Protein  Granola Bars:  [place] Protein Chewy   Kashi Go Protein Bar   KIND Protein + KIND Bars (with 7 grams sugar or less)   Rx Bar   Rx Layers Bars   Kashi Grain Free Coconut Waka  Oatmega Bars    -BBQ Sauce:   DIDIER all natural   Primal Kitchen Classic BBQ sauce    -Salad Dressings:  James's Salad Dressing: Sensation, Balsamic, Strawberry, Avocado, Caesar, Creole Ranch, Sweet Creole Mustard, Garlic & Red Wine   Primal Kitchen- all varieties    Lopez's Own - Balsamic Vinaigrette, Classic Oil & Vinegar     -Ready-to-Drink Protein Drinks:  Iconic Grass-Fed Protein Drink  Orgain Clean- grass-fed + plant based  OWYN Plant-Based Protein Shake  Fairlife 30-gram Protein Drink  McLean [place] CORE POWER Protein Drink    -Granola: [Note: granola should be used as a topper for a crunch, and not as a main meal]  ProGranola by Parag Corgenix  Engine 2 Plant Strong  Good Granoly Health  Nut    -Flavored Greek Yogurt:  Chobani Less Sugar  Chobani Zero Sugar  Oikos Triple Zero  Two Good - All varieties   :ratio PROTEIN coconut Greek yogurt (25 grams protein  3 grams sugar)    - 'Vital Proteins' Collagen Peptides, unflavored:   Great to have as a staple in your pantry. You can add to soups, hummus, coffee, etc to make higher protein    -Supplements:  Vitamin D3: 1,000 - 2,000 i.u per day   Fish Oil: Look for at least 1200 mg EPA + DHA per 2 capsules  My favorite brand is [employer] Ultimate Omega  One-A-Day MVI  Brand example: [employer] Women's Multivitamin Ultra Samuel    -If you need to reschedule a nutrition appointment, please call [facility] at [phone]

## 2023-01-23 ENCOUNTER — DOCUMENTATION ONLY (OUTPATIENT)
Dept: REHABILITATION | Facility: HOSPITAL | Age: 68
End: 2023-01-23
Payer: MEDICARE

## 2023-01-23 PROBLEM — R53.1 DECREASED STRENGTH: Status: RESOLVED | Noted: 2021-12-15 | Resolved: 2023-01-23

## 2023-01-23 PROBLEM — M25.612 DECREASED RANGE OF MOTION OF LEFT SHOULDER: Status: RESOLVED | Noted: 2022-04-04 | Resolved: 2023-01-23

## 2023-01-23 NOTE — PROGRESS NOTES
PATIENT DISCHARGE:  Patient has not returned since last POC. She had attended 45 visits and has not scheduled any further appointments. She will be discharged at this time.

## 2023-01-30 ENCOUNTER — CLINICAL SUPPORT (OUTPATIENT)
Dept: REHABILITATION | Facility: HOSPITAL | Age: 68
End: 2023-01-30
Payer: MEDICARE

## 2023-01-30 DIAGNOSIS — M25.561 CHRONIC PAIN OF RIGHT KNEE: ICD-10-CM

## 2023-01-30 DIAGNOSIS — M54.9 UPPER BACK PAIN ON RIGHT SIDE: Primary | ICD-10-CM

## 2023-01-30 DIAGNOSIS — G89.29 CHRONIC PAIN OF RIGHT KNEE: ICD-10-CM

## 2023-01-30 PROCEDURE — 97810 ACUP 1/> WO ESTIM 1ST 15 MIN: CPT

## 2023-01-30 PROCEDURE — 97811 ACUP 1/> W/O ESTIM EA ADD 15: CPT

## 2023-01-30 NOTE — PROGRESS NOTES
Acupuncture Treatment Note     Name: Dorothy Burt  [facility] Number: [id number]    Traditional Chinese Medicine Diagnosis:  Qi and Blood stagnation in Right Upper Back, and in Right Knee     Physician: Self, Aaareferral    Date of Service: 1/30/2023     Medical Diagnosis: Pain in right upper back and in right knee    Evaluation Date: 10/19/2022    Plan of Care Certification Period: 12  Visit #/Visits authorized: 1/ 12     Precautions: Standard    Subjective     Chief Complaint:  Qi and Blood stagnation in Right Upper Back, and in Right Knee     Response to Previous Treatment:  Good    Quality of Symptoms (Better/Worse):  Better    Other Condition/Symptoms:  None    Objective      New Findings:  None    Treatment Principles:  Increasing Qi and Blood in Right Upper Back, and in Right Knee,   Acupuncture Points:     Right:   Aimee on upper back + 5, GB 31, GB 32, LV 8, SP 9, GB 34, Aimee on knee + 4,     NEEDLES W/O STIM  AT: 3.06 PM    NEEDLES W/O STIM REMOVED AT: 3:36 PM    #NEEDLES IN: 14    #NEEDLES OUT: 14    Other Traditional Chinese Medicine Modalities:  None    Recommendations:  PT    Education:  Patient is aware of cumulative effect of acupuncture      Assessment      Analysis of Treatment:  Patient felt good    Pt prognosis is Good.     Patient will continue to benefit from acupuncture treatment to address the deficits listed in the problem list box on initial evaluation, provide patient family education and to maximize pt's level of independence in the home and community environment.     Patient's spiritual, cultural and educational needs considered and pt agreeable to plan of care and goals.     Anticipated barriers to treatment: None    Plan     Recommend    1    /week for 12   treatments and re-assess.

## 2023-02-22 ENCOUNTER — HOSPITAL ENCOUNTER (OUTPATIENT)
Dept: RADIOLOGY | Facility: HOSPITAL | Age: 68
Discharge: HOME | End: 2023-02-22
Attending: FAMILY MEDICINE
Payer: MEDICARE

## 2023-02-22 DIAGNOSIS — Z12.31 ENCOUNTER FOR SCREENING MAMMOGRAM FOR BREAST CANCER: ICD-10-CM

## 2023-02-22 PROCEDURE — 77063 MAMMO DIGITAL SCREENING BILAT WITH TOMO: ICD-10-PCS | Mod: 26,,, | Performed by: RADIOLOGY

## 2023-02-22 PROCEDURE — 77067 SCR MAMMO BI INCL CAD: CPT | Mod: 26,,, | Performed by: RADIOLOGY

## 2023-02-22 PROCEDURE — 77067 MAMMO DIGITAL SCREENING BILAT WITH TOMO: ICD-10-PCS | Mod: 26,,, | Performed by: RADIOLOGY

## 2023-02-22 PROCEDURE — 77063 BREAST TOMOSYNTHESIS BI: CPT | Mod: 26,,, | Performed by: RADIOLOGY

## 2023-02-22 PROCEDURE — 77067 SCR MAMMO BI INCL CAD: CPT | Mod: TC,PO

## 2023-02-28 ENCOUNTER — OFFICE VISIT (OUTPATIENT)
Dept: ORTHOPEDICS | Facility: CLINIC | Age: 68
End: 2023-02-28
Payer: MEDICARE

## 2023-02-28 VITALS
DIASTOLIC BLOOD PRESSURE: 72 MMHG | WEIGHT: 188.94 LBS | HEART RATE: 89 BPM | BODY MASS INDEX: 32.26 KG/M2 | SYSTOLIC BLOOD PRESSURE: 106 MMHG | HEIGHT: 64 IN

## 2023-02-28 DIAGNOSIS — Z98.890 S/P LEFT ROTATOR CUFF REPAIR: Primary | ICD-10-CM

## 2023-02-28 PROCEDURE — 3288F FALL RISK ASSESSMENT DOCD: CPT | Mod: CPTII,S$GLB,, | Performed by: ORTHOPAEDIC SURGERY

## 2023-02-28 PROCEDURE — 3288F PR FALLS RISK ASSESSMENT DOCUMENTED: ICD-10-PCS | Mod: CPTII,S$GLB,, | Performed by: ORTHOPAEDIC SURGERY

## 2023-02-28 PROCEDURE — 1159F PR MEDICATION LIST DOCUMENTED IN MEDICAL RECORD: ICD-10-PCS | Mod: CPTII,S$GLB,, | Performed by: ORTHOPAEDIC SURGERY

## 2023-02-28 PROCEDURE — 99213 PR OFFICE/OUTPT VISIT, EST, LEVL III, 20-29 MIN: ICD-10-PCS | Mod: S$GLB,,, | Performed by: ORTHOPAEDIC SURGERY

## 2023-02-28 PROCEDURE — 1126F PR PAIN SEVERITY QUANTIFIED, NO PAIN PRESENT: ICD-10-PCS | Mod: CPTII,S$GLB,, | Performed by: ORTHOPAEDIC SURGERY

## 2023-02-28 PROCEDURE — 3078F DIAST BP <80 MM HG: CPT | Mod: CPTII,S$GLB,, | Performed by: ORTHOPAEDIC SURGERY

## 2023-02-28 PROCEDURE — 1101F PT FALLS ASSESS-DOCD LE1/YR: CPT | Mod: CPTII,S$GLB,, | Performed by: ORTHOPAEDIC SURGERY

## 2023-02-28 PROCEDURE — 3008F BODY MASS INDEX DOCD: CPT | Mod: CPTII,S$GLB,, | Performed by: ORTHOPAEDIC SURGERY

## 2023-02-28 PROCEDURE — 99213 OFFICE O/P EST LOW 20 MIN: CPT | Mod: S$GLB,,, | Performed by: ORTHOPAEDIC SURGERY

## 2023-02-28 PROCEDURE — 99999 PR PBB SHADOW E&M-EST. PATIENT-LVL IV: CPT | Mod: PBBFAC,,, | Performed by: ORTHOPAEDIC SURGERY

## 2023-02-28 PROCEDURE — 1160F RVW MEDS BY RX/DR IN RCRD: CPT | Mod: CPTII,S$GLB,, | Performed by: ORTHOPAEDIC SURGERY

## 2023-02-28 PROCEDURE — 3008F PR BODY MASS INDEX (BMI) DOCUMENTED: ICD-10-PCS | Mod: CPTII,S$GLB,, | Performed by: ORTHOPAEDIC SURGERY

## 2023-02-28 PROCEDURE — 1160F PR REVIEW ALL MEDS BY PRESCRIBER/CLIN PHARMACIST DOCUMENTED: ICD-10-PCS | Mod: CPTII,S$GLB,, | Performed by: ORTHOPAEDIC SURGERY

## 2023-02-28 PROCEDURE — 3074F PR MOST RECENT SYSTOLIC BLOOD PRESSURE < 130 MM HG: ICD-10-PCS | Mod: CPTII,S$GLB,, | Performed by: ORTHOPAEDIC SURGERY

## 2023-02-28 PROCEDURE — 1159F MED LIST DOCD IN RCRD: CPT | Mod: CPTII,S$GLB,, | Performed by: ORTHOPAEDIC SURGERY

## 2023-02-28 PROCEDURE — 1101F PR PT FALLS ASSESS DOC 0-1 FALLS W/OUT INJ PAST YR: ICD-10-PCS | Mod: CPTII,S$GLB,, | Performed by: ORTHOPAEDIC SURGERY

## 2023-02-28 PROCEDURE — 3078F PR MOST RECENT DIASTOLIC BLOOD PRESSURE < 80 MM HG: ICD-10-PCS | Mod: CPTII,S$GLB,, | Performed by: ORTHOPAEDIC SURGERY

## 2023-02-28 PROCEDURE — 3074F SYST BP LT 130 MM HG: CPT | Mod: CPTII,S$GLB,, | Performed by: ORTHOPAEDIC SURGERY

## 2023-02-28 PROCEDURE — 99999 PR PBB SHADOW E&M-EST. PATIENT-LVL IV: ICD-10-PCS | Mod: PBBFAC,,, | Performed by: ORTHOPAEDIC SURGERY

## 2023-02-28 PROCEDURE — 1126F AMNT PAIN NOTED NONE PRSNT: CPT | Mod: CPTII,S$GLB,, | Performed by: ORTHOPAEDIC SURGERY

## 2023-02-28 RX ORDER — TRAMADOL HYDROCHLORIDE 50 MG/1
50 TABLET ORAL EVERY 8 HOURS PRN
COMMUNITY
Start: 2023-02-17 | End: 2023-12-04

## 2023-03-03 ENCOUNTER — CLINICAL SUPPORT (OUTPATIENT)
Dept: REHABILITATION | Facility: HOSPITAL | Age: 68
End: 2023-03-03
Payer: MEDICARE

## 2023-03-03 DIAGNOSIS — M54.41 CHRONIC BILATERAL LOW BACK PAIN WITH BILATERAL SCIATICA: Primary | ICD-10-CM

## 2023-03-03 DIAGNOSIS — G89.29 CHRONIC PAIN OF LEFT ANKLE: ICD-10-CM

## 2023-03-03 DIAGNOSIS — M25.572 CHRONIC PAIN OF LEFT ANKLE: ICD-10-CM

## 2023-03-03 DIAGNOSIS — G89.29 CHRONIC BILATERAL LOW BACK PAIN WITH BILATERAL SCIATICA: Primary | ICD-10-CM

## 2023-03-03 DIAGNOSIS — M54.42 CHRONIC BILATERAL LOW BACK PAIN WITH BILATERAL SCIATICA: Primary | ICD-10-CM

## 2023-03-03 NOTE — PROGRESS NOTES
Acupuncture Treatment Note     Name: Dorothy Burt  [facility] Number: [id number]    Traditional Chinese Medicine Diagnosis: Qi and Blood stagnation in lower Back and in both thighs, Pain in Left Ankle  Physician: Self, Aaareferral    Date of Service: 3/3/2023     Medical Diagnosis: Lower Back Pain with Sciatica, Left Ankle Pain  Evaluation Date: 3/3/2023    Plan of Care Certification Period: 12  Visit #/Visits authorized: 2/ 12     Precautions: Standard    Subjective     Chief Complaint:  Lower Back Pain with Sciatica, Left Ankle Pain    Response to Previous Treatment:  Good    Quality of Symptoms (Better/Worse):  Better    Other Condition/Symptoms:  None    Objective      New Findings:  None    Treatment Principles:  Increasing Qi and Blood in lower Back, in both thighs, and in Left Ankle    Acupuncture Points:     Bilateral: Butch Teeo Maribell Ji + 2, UB 25, Tashi Kenny, UB 54, GB 30, Aimee on gluteus muscle + 2, GB 31, GB 32, GB 33, Aimee on thigh + 2    Left: Aimee on ankle + 2    NEEDLES W/O STIM  AT: 9: 35 AM    NEEDLES W/O STIM REMOVED AT: 10: 05 AM    #NEEDLES IN: 28    #NEEDLES OUT: 28    Other Traditional Chinese Medicine Modalities:  None    Recommendations:  PT    Education:  Patient is aware of cumulative effect of acupuncture      Assessment      Analysis of Treatment:  Patient felt good    Pt prognosis is Good.     Patient will continue to benefit from acupuncture treatment to address the deficits listed in the problem list box on initial evaluation, provide patient family education and to maximize pt's level of independence in the home and community environment.     Patient's spiritual, cultural and educational needs considered and pt agreeable to plan of care and goals.     Anticipated barriers to treatment: None    Plan     Recommend     1   /week for 12  treatments and re-assess.                 Wound Care Progress Note    Chief Complaint   Chief Complaint   Patient presents with   • Wound     PTWC   • Office Visit     compression wrap                                                         Wound Assessments  Pain  Pain Assessment Tool: Numeric Rating Scale 0-10  Pain Intensity  Numeric Rating Scale 0-10: 0  Pain Goal  Patient's Stated Pain Goal: No pain       Wound Leg Left Anterior;Medial Surgical Wound (Active)   Date First Assessed/Time First Assessed: 11/09/21 0940   Location: Leg  Laterality: Left  Modifier: Anterior;Medial  Level of Skin Injury: Full Thickness  Primary Wound Type: Surgical Wound  Wound Approximate Age at First Assessment (Weeks): 12 weeks ...      Assessments 1/31/2022 10:30 AM   Wound Image      Dressing Assessment Intact;Drainage present   Dressing Activity Changed   Dressing Changed On   01/31/22   Wound Exudate Small;Serous   Cleansing Agent Normal saline   Wound Bed/Tissue Type Granulated   Periwound Condition Edema   Wound Edge Poorly defined   Wound Length (cm) 0.3 cm   Wound Width (cm) 0.3 cm   Wound Depth (cm) 0.1 cm   Wound Surface Area (cm^2) 0.09 cm^2   Wound Volume (cm^3) 0.009 cm^3   PhotoTaken? Yes   Wound Bed % Granulated 100 %   Wound Volume Change (Initial) -31.24 cm3   Wound Volume % Change (Initial) -99.97 %      RLE - Right Lower Extremity  Circumference - Calf (cm): 56 cm (at 28)  LLE - Left Lower Extremity  Circumference - Calf (cm): 61.4 cm (at 28)  Circumference - Ankle (cm): 31.2 cm  Circumference - Foot (cm): 27.2 cm  Treatment  Wound Location: L LE  Treatment Type: Compression, Debridement  Compression  Limb Cleansed: Soap and water  Laterality: Left  Compression Applied to: Other (comment) (toe bases to tibial tuberosity)  Compression Used: Other (comment) (comprilan wraps; see notes)  Debridement  Photo Taken: Yes  Wound Cleansing : 0.9% Saline  Type of Tissue Debrided: Devitalized epidermis, Slough  Debridement Type: Non-selective     ASSESSMENT  Pt  with positive response to use of black foam and comprilan lymphedema wraps. Reduction in circumference of all measurements. Significant reduction at ankle. Overall, calf has reduce by 7.0 cm since initiation of multi layer compression wraps. L calf still 4.4 cm larger than R calf. Added black foam to calf this session to promote greater reduction. After greater reduction achieved, plan to fit patient for velcro compression wraps.   Applied tricoplast as initial stockinette, followed by artiflex to promote symmetry of leg. Applied black foam to foot, ankle, calf for overpressure. Applied comprilan wraps; size 6 to foot/ankle, size 8 for HAS, size 10 (2) for distal leg, size 12 for distal to proximal lower leg. Alternating direction of spiral wrap.    Plan  Cont PTWC 2x/week for compression wrapping; plan to transitiion to velcro garments once lymphedema further reduced.     Dana Hart, PT   1/31/2022

## 2023-03-13 NOTE — PROGRESS NOTES
Patient ID:   Dorothy Burt is a 68 y.o. female.    Chief Complaint:   8m 19d s/p left arthroscopic RCR    HPI:   The patient is returning for evaluation of the left shoulder. She reports a pain level of 0/10. She has some soreness with certain movements. She still feels some weakness in the shoulder.     Medications:    Current Outpatient Medications:     acyclovir (ZOVIRAX) 400 MG tablet, TAKE 1 BY MOUTH 5 TIMES DAILY FOR 5 DAYS, Disp: 25 tablet, Rfl: 2    albuterol (PROAIR HFA) 90 mcg/actuation inhaler, Inhale 2 puffs into the lungs every 6 (six) hours as needed for Wheezing or Shortness of Breath. Rescue, Disp: 18 g, Rfl: 11    albuterol (VENTOLIN HFA) 90 mcg/actuation inhaler, Inhale 2 puffs into the lungs every 6 (six) hours as needed for Wheezing. Rescue. Ok to substitute based on availability: Proair, Proventil, ventolin, Disp: 18 g, Rfl: 0    budesonide-formoterol 160-4.5 mcg (SYMBICORT) 160-4.5 mcg/actuation HFAA, Inhale 2 puffs into the lungs every evening. Controller, Disp: , Rfl:     diclofenac sodium (VOLTAREN) 1 % Gel, Apply 2 g topically 2 (two) times daily as needed (to knees)., Disp: 100 g, Rfl: 2    famotidine (PEPCID) 20 MG tablet, TAKE 1 TABLET BY MOUTH TWICE A DAY, Disp: 30 tablet, Rfl: 11    guaiFENesin-codeine 100-10 mg/5 ml (TUSSI-ORGANIDIN NR)  mg/5 mL syrup, TAKE 5 MLS BY MOUTH EVERY 6 HOURS AS NEEDED FOR COUGH, Disp: 200 mL, Rfl: 0    levocetirizine (XYZAL) 5 MG tablet, TAKE 1 TABLET BY MOUTH EVERY DAY AS NEEDED, Disp: 90 tablet, Rfl: 3    levothyroxine (SYNTHROID) 25 MCG tablet, TAKE 1.5 TABLET BY MOUTH DAILY, Disp: 135 tablet, Rfl: 3    loratadine (CLARITIN) 10 mg tablet, Take 1 tablet (10 mg total) by mouth once daily., Disp: 30 tablet, Rfl: 2    meloxicam (MOBIC) 15 MG tablet, TAKE 1 TABLET BY MOUTH EVERY DAY AS NEEDED FOR PAIN, Disp: 30 tablet, Rfl: 0    omeprazole (PRILOSEC) 20 MG capsule, Take 20 mg by mouth daily as needed., Disp: , Rfl:     traMADoL (ULTRAM) 50 mg  tablet, , Disp: , Rfl:     Allergies:  Review of patient's allergies indicates:   Allergen Reactions    House dust        Past Medical History:  Past Medical History:   Diagnosis Date    Arthritis     Asthma     Back pain     Knee pain     Thyroid disease         Past Surgical History:  Past Surgical History:   Procedure Laterality Date    ACROMIOPLASTY  6/9/2022    Procedure: ACROMIOPLASTY;  Surgeon: Louie Luna MD;  Location: [facility];  Service: Orthopedics;;    ARTHROSCOPIC REPAIR OF ROTATOR CUFF OF SHOULDER Left 6/9/2022    Procedure: REPAIR, ROTATOR CUFF, ARTHROSCOPIC, extensive arthroscopic debridement;  Surgeon: Louie Luna MD;  Location: [facility];  Service: Orthopedics;  Laterality: Left;  Smith-Nephew     ARTHROSCOPIC TENOTOMY OF BICEPS TENDON  6/9/2022    Procedure: TENOTOMY, BICEPS, ARTHROSCOPIC;  Surgeon: Louie Luna MD;  Location: [facility];  Service: Orthopedics;;    ARTHROSCOPY OF SHOULDER WITH DECOMPRESSION OF SUBACROMIAL SPACE  6/9/2022    Procedure: ARTHROSCOPY, SHOULDER, WITH SUBACROMIAL SPACE DECOMPRESSION;  Surgeon: Louie Luna MD;  Location: [facility];  Service: Orthopedics;;    CHOLECYSTECTOMY      CORONARY ANGIOGRAPHY N/A 4/19/2020    Procedure: ANGIOGRAM, CORONARY ARTERY;  Surgeon: Zachariah Goldman MD;  Location: [facility] CATH LAB;  Service: Cardiology;  Laterality: N/A;    LEFT HEART CATHETERIZATION Right 4/19/2020    Procedure: Left heart cath;  Surgeon: Zachariah Goldman MD;  Location: [facility] CATH LAB;  Service: Cardiology;  Laterality: Right;    SYNOVECTOMY OF SHOULDER  6/9/2022    Procedure: SYNOVECTOMY, SHOULDER;  Surgeon: Louie Luna MD;  Location: [facility];  Service: Orthopedics;;    VASCULAR SURGERY         Social History:  Social History     Occupational History    Not on file   Tobacco Use    Smoking status: Never    Smokeless tobacco: Never   Substance and Sexual Activity    Alcohol use: Yes     Alcohol/week: 1.0 standard drink     Types: 1 Shots  "of liquor per week     Comment: occ    Drug use: No    Sexual activity: Not on file       Family History:  Family History   Problem Relation Age of Onset    Stroke Mother         ROS:  Review of Systems   Musculoskeletal:  Positive for muscle weakness and stiffness. Negative for joint pain and myalgias.   All other systems reviewed and are negative.    Vitals:  /72   Pulse 89   Ht 5' 4" (1.626 m)   Wt 85.7 kg (188 lb 15 oz)   LMP 08/15/2005   BMI 32.43 kg/m²     Physical Examination:  Comprehensive Orthopaedic Musculoskeletal Exam    General      Constitutional: appears stated age, well-developed and well-nourished    Scleral icterus: no    Labored breathing: no    Psychiatric: normal mood and affect and no acute distress    Neurological: alert and oriented x3    Skin: intact    Lymphadenopathy: none   Ortho Exam   Left shoulder exam:  ROM: active , passive , ER 20, IR PSIS  RTC strength: 4+/5 FE, 5/5 ER and IR    Assessment:  1. S/P left rotator cuff repair      Plan:  The patient will continue PT to gain more motion and strength. Follow-up at  the one year humaira post-op.   Orders Placed This Encounter    Ambulatory referral/consult to Physical/Occupational Therapy     No follow-ups on file.         "

## 2023-03-14 ENCOUNTER — CLINICAL SUPPORT (OUTPATIENT)
Dept: REHABILITATION | Facility: HOSPITAL | Age: 68
End: 2023-03-14
Attending: ORTHOPAEDIC SURGERY
Payer: MEDICARE

## 2023-03-14 DIAGNOSIS — R53.1 DECREASED STRENGTH: ICD-10-CM

## 2023-03-14 DIAGNOSIS — Z98.890 S/P LEFT ROTATOR CUFF REPAIR: ICD-10-CM

## 2023-03-14 DIAGNOSIS — M25.612 DECREASED ROM OF LEFT SHOULDER: ICD-10-CM

## 2023-03-14 PROCEDURE — 97110 THERAPEUTIC EXERCISES: CPT | Mod: PN

## 2023-03-14 PROCEDURE — 97161 PT EVAL LOW COMPLEX 20 MIN: CPT | Mod: PN

## 2023-03-14 NOTE — PLAN OF CARE
[facility]  Physical Therapy Initial Evaluation    Name: Dorothy Burt  Clinic Number: [id number]    Therapy Diagnosis:   Encounter Diagnoses   Name Primary?    S/P left rotator cuff repair     Decreased strength     Decreased ROM of left shoulder      Physician: Louie Luna MD    Physician Orders: PT Eval and Treat  Medical Diagnosis from Referral: Z98.890 (ICD-10-CM) - S/P left rotator cuff repair  Evaluation Date: 3/14/2023  Authorization Period Expiration: 04/04/2023  Plan of Care Expiration: 5/12/23  Visit # / Visits authorized: 1/1  FOTO: 1/5  PTA Visit: 0/6    Time In: 1:15 PM  Time Out: 2:00 PM  Total Billable Time: 45 minutes (1 TE + 1 LCE)    Precautions: Standard, Asthma, Thyroid disease    Subjective   Date of onset: 6/9/2022  History of current condition - Dorothy reports: difficulty with lifting left arm overhead and heavy carrying activities especially overhead. This is usually followed with some soreness in her left shoulder the next day. Denies much night pain and no pain at rest. She wants to get her left arm stronger and moving better if possible. Denies any significant neck pain or numbness/tingling symptoms.  Will be gone for 5 days starting on the 25th going on a cruise.      Medical History:   Past Medical History:   Diagnosis Date    Arthritis     Asthma     Back pain     Knee pain     Thyroid disease        Surgical History:   Dorothy Burt  has a past surgical history that includes Cholecystectomy; Vascular surgery; Left heart catheterization (Right, 4/19/2020); Coronary angiography (N/A, 4/19/2020); Arthroscopic repair of rotator cuff of shoulder (Left, 6/9/2022); Acromioplasty (6/9/2022); Arthroscopy of shoulder with decompression of subacromial space (6/9/2022); Arthroscopic tenotomy of biceps tendon (6/9/2022); and Synovectomy of shoulder (6/9/2022).    Medications:   Dorothy has a current medication list which includes the following prescription(s):  acyclovir, albuterol, albuterol, budesonide-formoterol 160-4.5 mcg, diclofenac sodium, famotidine, guaifenesin-codeine 100-10 mg/5 ml, levocetirizine, levothyroxine, loratadine, meloxicam, omeprazole, and tramadol.    Allergies:   Review of patient's allergies indicates:   Allergen Reactions    House dust         Imaging, See imaging in EMR    Prior Therapy: yes after left rotator cuff repair  Social History: [facility] [employer] [facility]  Occupation: retired. Working on repairing her house  Prior Level of Function: chronic left shoulder weakness and range of motion limitation before and after surgery  Current Level of Function limitation: difficulty and mild aching with above shoulder activities, paying on left shoulder   Pts goals: to get my shoulder stronger    Pain:  Current 0/10, worst 2/10, best 0/10   Location: left shoulder  Description: Aching, Dull, and Tight  Aggravating Factors: see current level of function  Easing Factors: pain medication    Objective     Posture: increased CTJ kyphosis, mild forward head  Sensation: WNL  Palpation: superior aspect of left shoulder at deltoid  Joint Mobility: decreased inferior glide of left humeral head, mild left anterior capsule laxity  Resting scapular assessment: mild left scapular abduction and depression bilaterally  Handedness: right    * = left shoulder pain with testing  NT = Not tested  AROM: CERVICAL   Flexion 100%   Extension 80%   Right side bending 60%   Left side bending 40%   Right rotation 75%   Left rotation 50%     Shoulder  Right   Left  Pain/Dysfunction with Movement    AROM PROM MMT AROM PROM MMT    Upper trap WFL WFL 5/5 WFL WFL 5/5    Middle trap WFL WFL 4/5 WFL WFL 2+/5    Lower trap WFL WFL 4/5 WFL WFL 2+/5    Serratus anterior WFL WFL 4/5 WFL WFL NT Unable to test due to range of motion limitations   Flexion (180 deg) 180 180 5/5 110 140* 3+/5 Supine A/PROM: 137/155*   Extension (50 deg) WFL WFL 5/5 WFL WFL 4/5    Abduction (180 deg) 180 180 5/5 90 95* 4/5 Supine  A/PROM: 100/112*   Horizontal Adduction (30-45 deg) WFL WFL NT WFL WFL NT    IR (80 deg) WFL WFL 5/5 70 75* 4/5    ER at 90° abd (90 deg) WFL WFL 5/5 60 63* 3+/5    ER at 0° abd  (90 deg) WFL [facility] 5/5 55 60* 4-/5      Shoulder Tests:  Shoulder capsular pattern (for frozen shoulder): ER>Abd>IR limited  AC joint Tests:  - AC Compression Test = - left  - Cross-Body Adduction Test: - for pain, limited active/passive motion  - Resisted Extension Test: - left  Labral tears:  - Burt's Test = NT  Anterior instability:  - Apprehension test = slight laxity on left  - Relocation test (Gregg subluxation test) = no pain present with apprehension test  Inferior instability:  - Sulcus sign = - left  Subacromial impingement test cluster: (+3/3 = +LR of 10.56, +2/3 = +LR of 5.03, 0/3 = -LR 0.17)  - Painful arc sign (pain at  deg range) = - left  - Infraspinatus Test = - left  - Francis Gage Test = + left, 50% motion compared to right  Full thickness rotator cuff tear test cluster: (+3/3 = +LR 15.6, +2/3 = +LR 3.6, 0/0 = -LR 0.16)  - Painful arc sign = - left  - Drop arm test = - left  - Infraspinatus test = - left  Other:  - Neer's Test (rotator cuff pathology)= NT  - Speed's Test (long head of biceps, rotator cuff pathology) = slight + on left  - Empty Can Test (supraspinatus, rotator cuff pathology) = NT  - Subscapularis Lift off Sign (rotator cuff pathology) =   - Shoulder glide test = mild left anterior capsule laxity    CMS Impairment/Limitation/Restriction for FOTO Shoulder Survey    Therapist reviewed FOTO scores for Dorothy Burt on 3/14/2023.   FOTO documents entered into EPIC - see Media section.    Limitation Score: 50%  Category: Carrying  Predicted: 31%     TREATMENT     Total Treatment time separate from Evaluation: 8 minutes    Dorothy received therapeutic exercises to develop strength, endurance, ROM, flexibility and posture for 10 minutes including:  Education    Next:  Single arm supine-trendelenburg  shoulder flexion with 1-2# dumbbell   Supine shoulder external rotation with arm at 90 degrees abd with 2# dumbbell   Standing shoulder external rotation  Ball circles on wall with scapular protraction  Wall slides with shrug  Wall ball roll ups      Home Exercises and Patient Education Provided  Education provided:   - proper lifting and carrying mechanics  - avoiding sharp concordant pain in mobility  - course of therapy, prognosis  - importance of HEP    Written Home Exercises Provided: yes.  Exercises were reviewed and Dorothy was able to demonstrate them prior to the end of the session.  Dorothy demonstrated good  understanding of the education provided.     See EMR under Patient Instructions for exercises provided in future. Will continue old home exercise program for now.    Assessment   Dorothy is a 68 y.o. female referred to outpatient Physical Therapy with a medical diagnosis of  S/P left rotator cuff repair presenting to PT at [facility]. Pt currently presents with left shoulder pain, decreased cervical ROM, decreased LUE strength, poor posture, impaired scapulohumeral rhythm, and functional deficits with heavy chest level and overhead activities. Pt would benefit from skilled PT consisting of muscular skeletal stretching/strengthening, manual therapy, neuro muscular re-education, and modalities prn to address limitations and increase functional mobility.    Pt prognosis is Good.   Pt will benefit from skilled outpatient Physical Therapy to address the deficits stated above and in the chart below, provide pt/family education, and to maximize pt's level of independence.     Plan of care discussed with patient: Yes  Pt's spiritual, cultural and educational needs considered and patient is agreeable to the plan of care and goals as stated below:     Anticipated Barriers for therapy: chronic left shoulder range of motion limitations    Medical Necessity is demonstrated by the  following  History  Co-morbidities and personal factors that may impact the plan of care Co-morbidities:   Asthma, Thyroid disease    Personal Factors:   no deficits     low   Examination  Body Structures and Functions, activity limitations and participation restrictions that may impact the plan of care Body Regions:   neck  back  upper extremities  trunk    BAsthma, Thyroid diseaseody Systems:    gross symmetry  ROM  strength  gross coordinated movement  balance  gait  transitions    Participation Restrictions:   None     Activity limitations:   Learning and applying knowledge  no deficits    General Tasks and Commands  no deficits    Communication  no deficits    Mobility  lifting and carrying objects    Self care  no deficits    Domestic Life  doing house work (cleaning house, washing dishes, laundry)    Interactions/Relationships  no deficits    Life Areas  no deficits    Community and Social Life  no deficits         high   Clinical Presentation stable and uncomplicated low   Decision Making/ Complexity Score: low     GOALS: Short Term Goals: 4 weeks  1. Report decreased left shoulder pain </= 0/10 with chest level activities to increase tolerance for ADLs and increased QoL.  2. Increase left shoulder PROM to 140 degrees flexion for increased functional mobility.  3. Increased strength by 1/3 MMT grade in LUE to increase tolerance for ADL and work activities.  4. Pt to tolerate HEP to improve ROM and independence with ADL's.    Long Term Goals: 8 weeks  1. Report decreased left shoulder pain </= 2/10 with overhead activities to increase tolerance for ADLs and increased QoL.  2. Increase left shoulder AROM to 150 degrees flexion for increased functional mobility and independent ADLs with decreased pain.  3. Increase strength to >/= 4/5 in BUE to increase tolerance for ADL and work activities.  4. Pt goal: to get my shoulder stronger.  5. Pt will have improved to score of </= 31% limited on shoulder FOTO in order  to demonstrate true functional improvement.    Plan   Plan of care Certification: 3/14/2023 to 5/12/23.    Outpatient Physical Therapy 2 times weekly for 8 weeks to include the following interventions: Aquatic Therapy, Cervical/Lumbar Traction, Electrical Stimulation, Gait Training, Manual Therapy, Moist Heat/ Ice, Neuromuscular Re-ed, Orthotic Management and Training, Patient Education, Self Care, Therapeutic Activites and Therapeutic Exercise.     Jose C Lutz, PT    I certify the need for these services furnished under this plan of treatment and while under my care.        Louie Luna MD, FAAOS  , Orthopaedic Sports Medicine  Residency   [facility] Department of Orthopaedic Surgery  Assistant Orthopaedic Surgeon, [place] [facility]  Head Team Physician, [place] [facility]

## 2023-03-17 ENCOUNTER — PATIENT MESSAGE (OUTPATIENT)
Dept: RESEARCH | Facility: HOSPITAL | Age: 68
End: 2023-03-17
Payer: MEDICARE

## 2023-03-17 ENCOUNTER — CLINICAL SUPPORT (OUTPATIENT)
Dept: REHABILITATION | Facility: HOSPITAL | Age: 68
End: 2023-03-17
Payer: MEDICARE

## 2023-03-17 DIAGNOSIS — R53.1 DECREASED STRENGTH: Primary | ICD-10-CM

## 2023-03-17 DIAGNOSIS — M25.612 DECREASED ROM OF LEFT SHOULDER: ICD-10-CM

## 2023-03-17 PROCEDURE — 97140 MANUAL THERAPY 1/> REGIONS: CPT | Mod: PN,CQ

## 2023-03-17 PROCEDURE — 97530 THERAPEUTIC ACTIVITIES: CPT | Mod: PN,CQ

## 2023-03-17 PROCEDURE — 97110 THERAPEUTIC EXERCISES: CPT | Mod: PN,CQ

## 2023-03-17 NOTE — PROGRESS NOTES
"                              Physical Therapy Daily Treatment Note     Name: Dorothy Burt  Clinic Number: [id number]    Therapy Diagnosis: No diagnosis found.  Physician: Louie Luna MD    Visit Date: 3/17/2023    Physician Orders: PT Eval and Treat  Medical Diagnosis from Referral: Z98.890 (ICD-10-CM) - S/P left rotator cuff repair  Evaluation Date: 3/14/2023  Authorization Period Expiration: 04/04/2023  Plan of Care Expiration: 5/12/23  Visit # / Visits authorized: 1/1, 1/11   FOTO: 1/5  PTA Visit: 1/6     Time In: 12:01 PM  Time Out: 12:56 PM  Total Billable Time: 55 minutes (1 MT, 2 TE, 1 TA)      Precautions: Standard, Asthma, Thyroid disease  Subjective     Pt reports: No pain this day, primary c/o decreased ROM and lack of strength. .  She was not compliant with home exercise program.  Response to previous treatment: 1st after   Functional change: 1st after     Pain: 0/10  Location: left shoulder      Objective       Dorothy received therapeutic exercises to develop strength, endurance, ROM, and flexibility for 25 minutes including:    Pullies:   -Flexion 2'  -ABD 2'  -Horizontal ABD + ER 2'    Wand AAROM:  -Flexion 20x5"   -ABD 20x5"     Rows 3x10   Pull Down 3x10   TB ER 3x10 RTB   Ball on Wall CW/CCW 3x10     Prone T 2x10x5"  Prone Y : Next Visit.       Dorothy participated in dynamic functional therapeutic activities to improve functional performance for 15 minutes, including:    Wall Push ups: 3x10  Serratus press hands on wall 2x10   Ball Roll up wall 3x10   Wall Slide with Shrug: 2x10       Dorothy received the following manual therapy techniques: PROM with luis a stretching  were applied to the: L shoulder for 15 minutes, including:    Flexion  ABD  D2 flexion       Assessment     Initiated TE for strength  and ROM of shoulder, initiated TA for functional use of shoulder including overhead lifting and pushing , initiated MT to improve ROM/Mobility of shoulder. Patient tolerates tx well with out " provocation of pain.   Dorothy is a 68 y.o. female referred to outpatient Physical Therapy with a medical diagnosis of  S/P left rotator cuff repair presenting to PT at [facility].     Dorothy Is progressing well towards her goals.   Pt prognosis is Good.     Pt will continue to benefit from skilled outpatient physical therapy to address the deficits listed in the problem list box on initial evaluation, provide pt/family education and to maximize pt's level of independence in the home and community environment.     Pt's spiritual, cultural and educational needs considered and pt agreeable to plan of care and goals.    Anticipated barriers to physical therapy: Chronic Shoulder ROM limitations.     Goals: Short Term Goals: 4 weeks  1. Report decreased left shoulder pain </= 0/10 with chest level activities to increase tolerance for ADLs and increased QoL.  2. Increase left shoulder PROM to 140 degrees flexion for increased functional mobility.  3. Increased strength by 1/3 MMT grade in LUE to increase tolerance for ADL and work activities.  4. Pt to tolerate HEP to improve ROM and independence with ADL's.     Long Term Goals: 8 weeks  1. Report decreased left shoulder pain </= 2/10 with overhead activities to increase tolerance for ADLs and increased QoL.  2. Increase left shoulder AROM to 150 degrees flexion for increased functional mobility and independent ADLs with decreased pain.  3. Increase strength to >/= 4/5 in BUE to increase tolerance for ADL and work activities.  4. Pt goal: to get my shoulder stronger.  5. Pt will have improved to score of </= 31% limited on shoulder FOTO in order to demonstrate true functional improvement.    Plan     Plan of care Certification: 3/14/2023 to 5/12/23.    Maco Lopez PTA,

## 2023-03-20 ENCOUNTER — CLINICAL SUPPORT (OUTPATIENT)
Dept: REHABILITATION | Facility: HOSPITAL | Age: 68
End: 2023-03-20
Payer: MEDICARE

## 2023-03-20 DIAGNOSIS — G89.29 CHRONIC RIGHT-SIDED LOW BACK PAIN WITH RIGHT-SIDED SCIATICA: Primary | ICD-10-CM

## 2023-03-20 DIAGNOSIS — M54.9 UPPER BACK PAIN ON LEFT SIDE: ICD-10-CM

## 2023-03-20 DIAGNOSIS — R53.1 DECREASED STRENGTH: Primary | ICD-10-CM

## 2023-03-20 DIAGNOSIS — M25.612 DECREASED ROM OF LEFT SHOULDER: ICD-10-CM

## 2023-03-20 DIAGNOSIS — M54.41 CHRONIC RIGHT-SIDED LOW BACK PAIN WITH RIGHT-SIDED SCIATICA: Primary | ICD-10-CM

## 2023-03-20 PROCEDURE — 97140 MANUAL THERAPY 1/> REGIONS: CPT | Mod: PN,CQ

## 2023-03-20 PROCEDURE — 97530 THERAPEUTIC ACTIVITIES: CPT | Mod: PN,CQ

## 2023-03-20 PROCEDURE — 97811 ACUP 1/> W/O ESTIM EA ADD 15: CPT

## 2023-03-20 PROCEDURE — 97810 ACUP 1/> WO ESTIM 1ST 15 MIN: CPT

## 2023-03-20 PROCEDURE — 97110 THERAPEUTIC EXERCISES: CPT | Mod: PN,CQ

## 2023-03-20 NOTE — PROGRESS NOTES
Acupuncture Treatment Note     Name: Dorothy Burt  [facility] Number: [id number]    Traditional Chinese Medicine Diagnosis: Qi and Blood stagnation in Right lower Back and in Right thigh, Pain in Left Upper Back  Physician: Self, Aaareferral    Date of Service: 3/20/2023     Medical Diagnosis: Right Lower Back Pain with Sciatica, Pain in left upper back  Evaluation Date: 3/3/2023    Plan of Care Certification Period: 12  Visit #/Visits authorized: 3/ 12     Precautions: Standard    Subjective     Chief Complaint:  Right Lower Back Pain with Sciatica, Pain in left upper back    Response to Previous Treatment:  Good    Quality of Symptoms (Better/Worse):  Better    Other Condition/Symptoms:  None    Objective      New Findings:  None    Treatment Principles:  Increasing Qi and Blood in Right lower Back, in Right  thigh, and in Left upper back    Acupuncture Points:     Right: Butch Jose Guadalupe Reyna Ji + 2, UB 25, Tashi Kenny, UB 54, GB 30, Aimee on gluteus muscle + 2, GB 31, GB 32, GB 33, Aimee on thigh + 2    Left: Aimee on upper back + 2    NEEDLES W/O STIM  AT: 11: 05 AM    NEEDLES W/O STIM REMOVED AT: 11: 35 AM    #NEEDLES IN: 15    #NEEDLES OUT: 15    Other Traditional Chinese Medicine Modalities:  None    Recommendations:  PT    Education:  Patient is aware of cumulative effect of acupuncture      Assessment      Analysis of Treatment:  Patient felt good    Pt prognosis is Good.     Patient will continue to benefit from acupuncture treatment to address the deficits listed in the problem list box on initial evaluation, provide patient family education and to maximize pt's level of independence in the home and community environment.     Patient's spiritual, cultural and educational needs considered and pt agreeable to plan of care and goals.     Anticipated barriers to treatment: None    Plan     Recommend     1   /week for 12  treatments and re-assess.

## 2023-03-20 NOTE — PROGRESS NOTES
"                              Physical Therapy Daily Treatment Note     Name: Dorothy Burt  Clinic Number: [id number]    Therapy Diagnosis:   Encounter Diagnoses   Name Primary?    Decreased strength Yes    Decreased ROM of left shoulder      Physician: Louie Luna MD    Visit Date: 3/20/2023    Physician Orders: PT Eval and Treat  Medical Diagnosis from Referral: Z98.890 (ICD-10-CM) - S/P left rotator cuff repair  Evaluation Date: 3/14/2023  Authorization Period Expiration: 2023  Plan of Care Expiration: 23  Visit # / Visits authorized: ,    FOTO:   PTA Visit:      Time In: 12:01 PM  Time Out: 12:56 PM  Total Billable Time: 25 minutes (1 TE, 1 TA)      Precautions: Standard, Asthma, Thyroid disease  Subjective     Pt reports: Muscular soreness over the weekend in muscles, currently patient reports no joint pain with some soreness persisting from work out.   She was not compliant with home exercise program.  Response to previous treatment: 1st after   Functional change: 1st after     Pain: 0/10  Location: left shoulder      Objective       Dorothy received therapeutic exercises to develop strength, endurance, ROM, and flexibility for 10 minutes 1:1 and 25 minutes supervised.  minutes including:    Pullies:   -Flexion 2'  -ABD 2'  -Horizontal ABD + ER 2'    Wand AAROM:  -Flexion 20x5"   -ABD 20x5"     Rows x30  RTB   Pull Down 3x10 RTB  TB ER 3x10 RTB x5"   Ball on Wall CW/CCW 3x10 Resume next visit.     Prone T 2x10x5"  Prone Y : 2x10x3"   Side Lying open book 10x10"       Dorothy participated in dynamic functional therapeutic activities to improve functional performance for 15 minutes 1:1 , including:    Wall Push ups: 3x10- resume next visit.   Serratus press hands on wall 2x10   Ball Roll up wall 3x10   Wall Slide with Shrux LUE only       Dorothy received the following manual therapy techniques: PROM with luis a stretching  were applied to the: L shoulder for 5 minutes, " including:    Flexion  ABD  D2 flexion       Assessment     Tx emphasis on mobility this visit initiating open book stretch (hand son head) and prone Y. Patient tolerates tx well, plan to resume wall push ups next visit.    Dorothy is a 68 y.o. female referred to outpatient Physical Therapy with a medical diagnosis of  S/P left rotator cuff repair presenting to PT at [facility] and [facility].     Dorothy Is progressing well towards her goals.   Pt prognosis is Good.     Pt will continue to benefit from skilled outpatient physical therapy to address the deficits listed in the problem list box on initial evaluation, provide pt/family education and to maximize pt's level of independence in the home and community environment.     Pt's spiritual, cultural and educational needs considered and pt agreeable to plan of care and goals.    Anticipated barriers to physical therapy: Chronic Shoulder ROM limitations.     Goals: Short Term Goals: 4 weeks  1. Report decreased left shoulder pain </= 0/10 with chest level activities to increase tolerance for ADLs and increased QoL.  2. Increase left shoulder PROM to 140 degrees flexion for increased functional mobility.  3. Increased strength by 1/3 MMT grade in LUE to increase tolerance for ADL and work activities.  4. Pt to tolerate HEP to improve ROM and independence with ADL's.     Long Term Goals: 8 weeks  1. Report decreased left shoulder pain </= 2/10 with overhead activities to increase tolerance for ADLs and increased QoL.  2. Increase left shoulder AROM to 150 degrees flexion for increased functional mobility and independent ADLs with decreased pain.  3. Increase strength to >/= 4/5 in BUE to increase tolerance for ADL and work activities.  4. Pt goal: to get my shoulder stronger.  5. Pt will have improved to score of </= 31% limited on shoulder FOTO in order to demonstrate true functional improvement.    Plan     Plan of care Certification: 3/14/2023 to  5/12/23.    Maco Lopez, PTA,

## 2023-03-22 ENCOUNTER — CLINICAL SUPPORT (OUTPATIENT)
Dept: REHABILITATION | Facility: HOSPITAL | Age: 68
End: 2023-03-22
Payer: MEDICARE

## 2023-03-22 DIAGNOSIS — R53.1 DECREASED STRENGTH: Primary | ICD-10-CM

## 2023-03-22 DIAGNOSIS — M25.612 DECREASED ROM OF LEFT SHOULDER: ICD-10-CM

## 2023-03-22 PROCEDURE — 97530 THERAPEUTIC ACTIVITIES: CPT | Mod: PN,CQ

## 2023-03-22 PROCEDURE — 97110 THERAPEUTIC EXERCISES: CPT | Mod: PN,CQ

## 2023-03-22 PROCEDURE — 97140 MANUAL THERAPY 1/> REGIONS: CPT | Mod: PN,CQ

## 2023-03-22 NOTE — PROGRESS NOTES
"                              Physical Therapy Daily Treatment Note     Name: Dorothy Burt  Clinic Number: [id number]    Therapy Diagnosis:   Encounter Diagnoses   Name Primary?    Decreased strength Yes    Decreased ROM of left shoulder      Physician: Louie Luna MD    Visit Date: 3/22/2023    Physician Orders: PT Eval and Treat  Medical Diagnosis from Referral: Z98.890 (ICD-10-CM) - S/P left rotator cuff repair  Evaluation Date: 3/14/2023  Authorization Period Expiration: 2023  Plan of Care Expiration: 23  Visit # / Visits authorized: , 3/11   FOTO:   PTA Visit: 0/6     Time In: 12:00 PM  Time Out: 12:56 PM  Total Billable Time: 25 minutes (1 TE, 1 TA)      Precautions: Standard, Asthma, Thyroid disease  Subjective     Pt reports: Muscular soreness over the weekend in muscles, currently patient reports no joint pain with some soreness persisting from work out.   She was not compliant with home exercise program.  Response to previous treatment: 1st after   Functional change: 1st after     Pain: 0/10  Location: left shoulder      Objective       Dorothy received therapeutic exercises to develop strength, endurance, ROM, and flexibility for 10 minutes 1:1 and 25 minutes supervised.  minutes including:    Pullies:   -Flexion 2'  -ABD 2'  -Horizontal ABD + ER 2'    Wand AAROM:  -Flexion 20x5"   -ABD 20x5"     Rows x30  RTB   Pull Down 3x10 RTB  TB ER 3x10 RTB x5"   Ball on Wall CW/CCW 3x10 Resume next visit.     Prone T 2x10x5"  Prone Y : 2x10x3"   Side Lying open book 10x10"       Dorothy participated in dynamic functional therapeutic activities to improve functional performance for 15 minutes 1:1 , including:    Wall Push ups: 3x10- resume next visit.   Serratus press hands on wall 2x10   Ball Roll up wall 3x10   Wall Slide with Shrux LUE only       Dorothy received the following manual therapy techniques: PROM with luis a stretching  were applied to the: L shoulder for 5 minutes, " including:    Flexion  ABD  D2 flexion       Assessment     Tx emphasis on mobility this visit initiating open book stretch (hand on head) and prone Y. Patient tolerates tx well, plan to resume wall push ups next visit.    Dorothy is a 68 y.o. female referred to outpatient Physical Therapy with a medical diagnosis of  S/P left rotator cuff repair presenting to PT at [facility] and [facility].     Dorothy Is progressing well towards her goals.   Pt prognosis is Good.     Pt will continue to benefit from skilled outpatient physical therapy to address the deficits listed in the problem list box on initial evaluation, provide pt/family education and to maximize pt's level of independence in the home and community environment.     Pt's spiritual, cultural and educational needs considered and pt agreeable to plan of care and goals.    Anticipated barriers to physical therapy: Chronic Shoulder ROM limitations.     Goals: Short Term Goals: 4 weeks  1. Report decreased left shoulder pain </= 0/10 with chest level activities to increase tolerance for ADLs and increased QoL.  2. Increase left shoulder PROM to 140 degrees flexion for increased functional mobility.  3. Increased strength by 1/3 MMT grade in LUE to increase tolerance for ADL and work activities.  4. Pt to tolerate HEP to improve ROM and independence with ADL's.     Long Term Goals: 8 weeks  1. Report decreased left shoulder pain </= 2/10 with overhead activities to increase tolerance for ADLs and increased QoL.  2. Increase left shoulder AROM to 150 degrees flexion for increased functional mobility and independent ADLs with decreased pain.  3. Increase strength to >/= 4/5 in BUE to increase tolerance for ADL and work activities.  4. Pt goal: to get my shoulder stronger.  5. Pt will have improved to score of </= 31% limited on shoulder FOTO in order to demonstrate true functional improvement.    Plan     Plan of care Certification: 3/14/2023 to  5/12/23.    Louie Tobar, PT,

## 2023-03-22 NOTE — PROGRESS NOTES
"                                Physical Therapy Daily Treatment Note     Name: Dorothy Burt  Clinic Number: [id number]    Therapy Diagnosis:   Encounter Diagnoses   Name Primary?    Decreased strength Yes    Decreased ROM of left shoulder      Physician: Louie Luna MD    Visit Date: 3/22/2023    Physician Orders: PT Eval and Treat  Medical Diagnosis from Referral: Z98.890 (ICD-10-CM) - S/P left rotator cuff repair  Evaluation Date: 3/14/2023  Authorization Period Expiration: 2023  Plan of Care Expiration: 23  Visit # / Visits authorized: , 3/11   FOTO: 3/5  PTA Visit: 3/6     Time In: 12:03 PM  Time Out: 1:00 PM  Total Billable Time: 60 minutes (2TE, 1 MT, 1 TA)      Precautions: Standard, Asthma, Thyroid disease  Subjective     Pt reports: Soreness has reduced. Patient states she has been doing more around the house/packing getting ready for trip.     She was not compliant with home exercise program.  Response to previous treatment: 1st after   Functional change: 1st after     Pain: 0/10  Location: left shoulder      Objective       Dorothy received therapeutic exercises to develop strength, endurance, ROM, and flexibility for 30 minutes 1:1 and 00 minutes supervised.  minutes including:    Pullies:   -Flexion 2'  -ABD 2'  -Horizontal ABD + ER 2'    Wand AAROM Standing :  -Flexion 20x5"   -ABD 20x5"     Ball Roll Up wall x20     Rows x30  RTB   Pull Down 3x10 RTB  TB ER 3x10 x5" RTB   Ball on Wall CW/CCW 3x10     Prone T 2x10x5"    Side Lying open book 10x10"       Dorothy participated in dynamic functional therapeutic activities to improve functional performance for 15 minutes 1:1 , including:    Wall Push ups: 3x10- resume next visit.   Serratus press hands on wall 2x10   Ball Roll up wall 3x10   Wall Slide with Shrux LUE only       Dorothy received the following manual therapy techniques: PROM with luis a stretching  were applied to the: L shoulder for 10 minutes, " including:    Flexion  ABD  D2 flexion       Assessment     Progressed AAROM wand activities to standing to increase strength demand of shoulder. Patient tolerates tx well with out provocation of shoulderpain.     Dorothy is a 68 y.o. female referred to outpatient Physical Therapy with a medical diagnosis of  S/P left rotator cuff repair presenting to PT at [facility] and [facility].     Dorothy Is progressing well towards her goals.   Pt prognosis is Good.     Pt will continue to benefit from skilled outpatient physical therapy to address the deficits listed in the problem list box on initial evaluation, provide pt/family education and to maximize pt's level of independence in the home and community environment.     Pt's spiritual, cultural and educational needs considered and pt agreeable to plan of care and goals.    Anticipated barriers to physical therapy: Chronic Shoulder ROM limitations.     Goals: Short Term Goals: 4 weeks  1. Report decreased left shoulder pain </= 0/10 with chest level activities to increase tolerance for ADLs and increased QoL.  2. Increase left shoulder PROM to 140 degrees flexion for increased functional mobility.  3. Increased strength by 1/3 MMT grade in LUE to increase tolerance for ADL and work activities.  4. Pt to tolerate HEP to improve ROM and independence with ADL's.     Long Term Goals: 8 weeks  1. Report decreased left shoulder pain </= 2/10 with overhead activities to increase tolerance for ADLs and increased QoL.  2. Increase left shoulder AROM to 150 degrees flexion for increased functional mobility and independent ADLs with decreased pain.  3. Increase strength to >/= 4/5 in BUE to increase tolerance for ADL and work activities.  4. Pt goal: to get my shoulder stronger.  5. Pt will have improved to score of </= 31% limited on shoulder FOTO in order to demonstrate true functional improvement.    Plan     Plan of care Certification: 3/14/2023 to 5/12/23.    Maco  John, PTA,

## 2023-04-03 ENCOUNTER — CLINICAL SUPPORT (OUTPATIENT)
Dept: REHABILITATION | Facility: HOSPITAL | Age: 68
End: 2023-04-03
Payer: MEDICARE

## 2023-04-03 DIAGNOSIS — M25.612 DECREASED ROM OF LEFT SHOULDER: ICD-10-CM

## 2023-04-03 DIAGNOSIS — R53.1 DECREASED STRENGTH: Primary | ICD-10-CM

## 2023-04-03 PROCEDURE — 97530 THERAPEUTIC ACTIVITIES: CPT | Mod: PN

## 2023-04-03 PROCEDURE — 97110 THERAPEUTIC EXERCISES: CPT | Mod: PN

## 2023-04-03 NOTE — PROGRESS NOTES
"                            Physical Therapy Daily Treatment Note     Name: Dorothy Burt  Clinic Number: [id number]    Therapy Diagnosis:   Encounter Diagnoses   Name Primary?    Decreased strength Yes    Decreased ROM of left shoulder      Physician: Louie Luna MD    Visit Date: 4/3/2023    Physician Orders: PT Eval and Treat  Medical Diagnosis from Referral: Z98.890 (ICD-10-CM) - S/P left rotator cuff repair  Evaluation Date: 3/14/2023  Authorization Period Expiration: 2023  Plan of Care Expiration: 23  Visit # / Visits authorized: ,    FOTO:  - Next  PTA Visit: 0     Time In: 1:05 PM  Time Out: 2:00 PM  Total Billable Time: 55 minutes (2TE, 2 TA)      Precautions: Standard, Asthma, Thyroid disease    Subjective     Pt reports: returned from her cruise and shoulder did well with packing/unpacking.    She was not compliant with home exercise program.  Response to previous treatment: no shoulder pain  Functional change: using her arm more    Pain: 0-1/10  Location: left shoulder      Objective       Dorothy received therapeutic exercises to develop strength, endurance, ROM, and flexibility for 30 minutes 1:1 and 00 minutes supervised minutes including:    Pullies:   -Flexion 3'  -Horizontal ABD + ER 2'    Wand AAROM Standing: Not today  -Flexion 20x5"   -ABD 20x5"     Seated teres minor external rotation: 4x10, 2# left (3# next)  Theraband Rows x30 blue theraband   Pull Down 3x10 red theraband - not today  Theraband  external rotation: 3x10x5" red theraband    Ball on Wall CW/CCW 2x15 (3000 gram blue ball)    Prone T 2x10x5" with Minimal assist    Dorothy participated in dynamic functional therapeutic activities to improve functional performance for 25 minutes 1:1 including:    Ballet bar push ups: 2x10  Serratus  hold on ballet bar: 2x1' holds with LUE only  Ball Roll up wall 20x with 2# wrist weight   Wall Slide with Shrux LUE only      Dorothy received the following manual " therapy techniques: PROM with luis a stretching  were applied to the: L shoulder for 00 minutes, including:  Flexion  ABD  D2 flexion     Assessment     Dorothy is a 68 y.o. female referred to outpatient Physical Therapy with a medical diagnosis of  S/P left rotator cuff repair presenting to PT at [facility] and [facility]. Increased intensity of overhead strengthening and stretching, and good pain control throughout. Pt will assess her response to make sure there is no rotator cuff irritation after therapy.     Dorothy Is progressing well towards her goals.   Pt prognosis is Good.     Pt will continue to benefit from skilled outpatient physical therapy to address the deficits listed in the problem list box on initial evaluation, provide pt/family education and to maximize pt's level of independence in the home and community environment.     Pt's spiritual, cultural and educational needs considered and pt agreeable to plan of care and goals.    Anticipated barriers to physical therapy: Chronic Shoulder ROM limitations.     Goals: Short Term Goals: 4 weeks  1. Report decreased left shoulder pain </= 0/10 with chest level activities to increase tolerance for ADLs and increased QoL.  2. Increase left shoulder PROM to 140 degrees flexion for increased functional mobility.  3. Increased strength by 1/3 MMT grade in LUE to increase tolerance for ADL and work activities.  4. Pt to tolerate HEP to improve ROM and independence with ADL's.     Long Term Goals: 8 weeks  1. Report decreased left shoulder pain </= 2/10 with overhead activities to increase tolerance for ADLs and increased QoL.  2. Increase left shoulder AROM to 150 degrees flexion for increased functional mobility and independent ADLs with decreased pain.  3. Increase strength to >/= 4/5 in BUE to increase tolerance for ADL and work activities.  4. Pt goal: to get my shoulder stronger.  5. Pt will have improved to score of </= 31% limited on shoulder FOTO  in order to demonstrate true functional improvement.    Plan     Plan of care Certification: 3/14/2023 to 5/12/23.    Jose C Lutz PT,

## 2023-04-05 ENCOUNTER — CLINICAL SUPPORT (OUTPATIENT)
Dept: REHABILITATION | Facility: HOSPITAL | Age: 68
End: 2023-04-05
Payer: MEDICARE

## 2023-04-05 DIAGNOSIS — M25.612 DECREASED ROM OF LEFT SHOULDER: ICD-10-CM

## 2023-04-05 DIAGNOSIS — R53.1 DECREASED STRENGTH: Primary | ICD-10-CM

## 2023-04-05 PROCEDURE — 97530 THERAPEUTIC ACTIVITIES: CPT | Mod: PN

## 2023-04-05 PROCEDURE — 97110 THERAPEUTIC EXERCISES: CPT | Mod: PN

## 2023-04-05 NOTE — PROGRESS NOTES
"                            Physical Therapy Daily Treatment Note     Name: Dorothy Burt  Clinic Number: [id number]    Therapy Diagnosis:   Encounter Diagnoses   Name Primary?    Decreased strength Yes    Decreased ROM of left shoulder      Physician: Louie Luna MD    Visit Date: 2023    Physician Orders: PT Eval and Treat  Medical Diagnosis from Referral: Z98.890 (ICD-10-CM) - S/P left rotator cuff repair  Evaluation Date: 3/14/2023  Authorization Period Expiration: 2023  Plan of Care Expiration: 23  Visit # / Visits authorized: ,    FOTO:  - Next  PTA Visit:      Time In: 10:20 AM  Time Out: 11:00 AM  Total Billable Time: 40 minutes (2TE, 1 TA)      Precautions: Standard, Asthma, Thyroid disease    Subjective     Pt reports: arrived late due to traffic. Shoulder feeling ok and feels like it is getting a little stronger already     She was not compliant with home exercise program.  Response to previous treatment: no shoulder pain  Functional change: using her arm more    Pain: 0-1/10  Location: left shoulder      Objective     Dorothy received therapeutic exercises to develop strength, endurance, ROM, and flexibility for 25 minutes 1:1 and 00 minutes supervised minutes including:    Pullies:   -Flexion 3'  -Horizontal ABD + ER 2'    Seated teres minor external rotation: 4x10, 2# left (3# next)  Theraband Rows x30 blue theraband   Pull Down 3x10 red theraband - not today  Theraband  external rotation: 3x10x5" red theraband    Ball on Wall CW/CCW 2x15 (3000 gram blue ball)    Prone T 2x10x5" with Minimal assist    Dorothy participated in dynamic functional therapeutic activities to improve functional performance for 15 minutes 1:1 including:    Ballet bar push ups: 2x10  Serratus  hold on ballet bar: 2x1' holds with LUE only  Ball Roll up wall 20x with 2# wrist weight  - not today, resume next  Wall Slide with Shrux LUE only - not today, resume next     Dorothy received " the following manual therapy techniques: PROM with luis a stretching  were applied to the: L shoulder for 00 minutes, including:  Flexion  ABD  D2 flexion     Assessment     Dorothy is a 68 y.o. female referred to outpatient Physical Therapy with a medical diagnosis of  S/P left rotator cuff repair presenting to PT at [facility] and [facility]. Steadily increasing resistance and stretch capacity with good pain control. Pt denies any sharp pain and only some low grade aching at times that stays below a 3/10.    Dorothy Is progressing well towards her goals.   Pt prognosis is Good.     Pt will continue to benefit from skilled outpatient physical therapy to address the deficits listed in the problem list box on initial evaluation, provide pt/family education and to maximize pt's level of independence in the home and community environment.     Pt's spiritual, cultural and educational needs considered and pt agreeable to plan of care and goals.    Anticipated barriers to physical therapy: Chronic Shoulder ROM limitations.     Goals: Short Term Goals: 4 weeks  1. Report decreased left shoulder pain </= 0/10 with chest level activities to increase tolerance for ADLs and increased QoL.  2. Increase left shoulder PROM to 140 degrees flexion for increased functional mobility.  3. Increased strength by 1/3 MMT grade in LUE to increase tolerance for ADL and work activities.  4. Pt to tolerate HEP to improve ROM and independence with ADL's.     Long Term Goals: 8 weeks  1. Report decreased left shoulder pain </= 2/10 with overhead activities to increase tolerance for ADLs and increased QoL.  2. Increase left shoulder AROM to 150 degrees flexion for increased functional mobility and independent ADLs with decreased pain.  3. Increase strength to >/= 4/5 in BUE to increase tolerance for ADL and work activities.  4. Pt goal: to get my shoulder stronger.  5. Pt will have improved to score of </= 31% limited on shoulder FOTO  in order to demonstrate true functional improvement.    Plan     Plan of care Certification: 3/14/2023 to 5/12/23.    Jose C Lutz PT,

## 2023-04-10 ENCOUNTER — CLINICAL SUPPORT (OUTPATIENT)
Dept: REHABILITATION | Facility: HOSPITAL | Age: 68
End: 2023-04-10
Payer: MEDICARE

## 2023-04-10 DIAGNOSIS — M54.41 CHRONIC BILATERAL LOW BACK PAIN WITH BILATERAL SCIATICA: Primary | ICD-10-CM

## 2023-04-10 DIAGNOSIS — M25.612 DECREASED ROM OF LEFT SHOULDER: ICD-10-CM

## 2023-04-10 DIAGNOSIS — G89.29 CHRONIC BILATERAL LOW BACK PAIN WITH BILATERAL SCIATICA: Primary | ICD-10-CM

## 2023-04-10 DIAGNOSIS — R53.1 DECREASED STRENGTH: Primary | ICD-10-CM

## 2023-04-10 DIAGNOSIS — M54.42 CHRONIC BILATERAL LOW BACK PAIN WITH BILATERAL SCIATICA: Primary | ICD-10-CM

## 2023-04-10 PROCEDURE — 97811 ACUP 1/> W/O ESTIM EA ADD 15: CPT

## 2023-04-10 PROCEDURE — 97110 THERAPEUTIC EXERCISES: CPT | Mod: PN

## 2023-04-10 PROCEDURE — 97810 ACUP 1/> WO ESTIM 1ST 15 MIN: CPT

## 2023-04-10 PROCEDURE — 97530 THERAPEUTIC ACTIVITIES: CPT | Mod: PN

## 2023-04-10 NOTE — PROGRESS NOTES
"                            Physical Therapy Daily Treatment Note     Name: Dorothy Burt  Clinic Number: [id number]    Therapy Diagnosis:   Encounter Diagnoses   Name Primary?    Decreased strength Yes    Decreased ROM of left shoulder      Physician: Louie Luna MD    Visit Date: 4/10/2023    Physician Orders: PT Eval and Treat  Medical Diagnosis from Referral: Z98.890 (ICD-10-CM) - S/P left rotator cuff repair  Evaluation Date: 3/14/2023  Authorization Period Expiration: 04/04/2023  Plan of Care Expiration: 5/12/23  Visit # / Visits authorized: 1/1, 5/11   FOTO: 7/5 - Done today 4/10/23  PTA Visit: 0/6     Time In: 2:05 PM  Time Out: 3:00 PM  Total Billable Time: 25 minutes (1TE, 1TA)     Precautions: Standard, Asthma, Thyroid disease    Subjective     Pt reports: did well over the weekend. Shoulder was sore mildly for about 1.5 days after last session. No disturbed sleep, and doing exercises at home. Still having trouble reaching and lifting overhead.    She was not compliant with home exercise program.  Response to previous treatment: no shoulder pain  Functional change: using her arm more    Pain: 0-1/10  Location: left shoulder      Objective     Dorothy received therapeutic exercises to develop strength, endurance, ROM, and flexibility for 30 minutes including:    Pullies:   -Flexion 3'    Seated teres minor external rotation: 2x15, 4# left  Theraband Rows x30 blue theraband   Left arm Pull Down 3x10 red theraband  Theraband  external rotation: 3x10x5" red theraband    Ball on Wall CW/CCW 2x15 (3000 gram blue ball)    Prone T 10x10'' holds, left with minimal assist  Prone Y's: 2x10 left    Dorothy participated in dynamic functional therapeutic activities to improve functional performance for 15 minutes including:    Ballet bar push ups: 2x10  Serratus  hold on ballet bar: 2x1' holds with LUE only  Ball Roll up wall 20x with 2# wrist weight     Dorothy received the following manual therapy " techniques: PROM with luis a stretching  were applied to the: L shoulder for 10 minutes, including:  Flexion  ABD  D2 flexion     Assessment     Dorothy is a 68 y.o. female referred to outpatient Physical Therapy with a medical diagnosis of  S/P left rotator cuff repair presenting to PT at [facility] and [facility]. Loading left shoulder in standing and high level cuff strengthening. Her left shoulder flexion passively has reached 160 degrees in slight scaption. Watch for cuff irritation after sessions.    Dorothy Is progressing well towards her goals.   Pt prognosis is Good.     Pt will continue to benefit from skilled outpatient physical therapy to address the deficits listed in the problem list box on initial evaluation, provide pt/family education and to maximize pt's level of independence in the home and community environment.     Pt's spiritual, cultural and educational needs considered and pt agreeable to plan of care and goals.    Anticipated barriers to physical therapy: Chronic Shoulder ROM limitations.     Goals: Short Term Goals: 4 weeks  1. Report decreased left shoulder pain </= 0/10 with chest level activities to increase tolerance for ADLs and increased QoL.  2. Increase left shoulder PROM to 140 degrees flexion for increased functional mobility.  3. Increased strength by 1/3 MMT grade in LUE to increase tolerance for ADL and work activities.  4. Pt to tolerate HEP to improve ROM and independence with ADL's.     Long Term Goals: 8 weeks  1. Report decreased left shoulder pain </= 2/10 with overhead activities to increase tolerance for ADLs and increased QoL.  2. Increase left shoulder AROM to 150 degrees flexion for increased functional mobility and independent ADLs with decreased pain.  3. Increase strength to >/= 4/5 in BUE to increase tolerance for ADL and work activities.  4. Pt goal: to get my shoulder stronger.  5. Pt will have improved to score of </= 31% limited on shoulder FOTO in  order to demonstrate true functional improvement.    Plan     Plan of care Certification: 3/14/2023 to 5/12/23.    Jose C Lutz PT,

## 2023-04-10 NOTE — PROGRESS NOTES
Acupuncture Treatment Note     Name: Dorothy Burt  [facility] Number: [id number]    Traditional Chinese Medicine Diagnosis: Qi and Blood stagnation in lower Back and in both thighs     Physician: Self, Aaareferral    Date of Service: 4/10/2023     Medical Diagnosis: Lower Back Pain with Sciatica,   Evaluation Date: 3/3/2023    Plan of Care Certification Period: 12  Visit #/Visits authorized: 4/ 12     Precautions: Standard    Subjective     Chief Complaint:  Lower Back Pain radiating down to both thighs    Response to Previous Treatment:  Good    Quality of Symptoms (Better/Worse):  Better    Other Condition/Symptoms:  None    Objective      New Findings:  None    Treatment Principles:  Increasing Qi and Blood in lower Back, and in both  thighs,     Acupuncture Points:     Bilateral: Butch Teeo Maribell Ji + 2, UB 54, GB 30, Aimee on gluteus muscle + 4 GB 31, GB 32,  Aimee on thigh + 2    NEEDLES W/O STIM  AT: 11: 05 AM    NEEDLES W/O STIM REMOVED AT: 11: 35 AM    #NEEDLES IN: 24    #NEEDLES OUT: 24    Other Traditional Chinese Medicine Modalities:  None    Recommendations:  PT    Education:  Patient is aware of cumulative effect of acupuncture      Assessment      Analysis of Treatment:  Patient felt good    Pt prognosis is Good.     Patient will continue to benefit from acupuncture treatment to address the deficits listed in the problem list box on initial evaluation, provide patient family education and to maximize pt's level of independence in the home and community environment.     Patient's spiritual, cultural and educational needs considered and pt agreeable to plan of care and goals.     Anticipated barriers to treatment: None    Plan     Recommend     1   /week for 12  treatments and re-assess.

## 2023-04-12 ENCOUNTER — CLINICAL SUPPORT (OUTPATIENT)
Dept: REHABILITATION | Facility: HOSPITAL | Age: 68
End: 2023-04-12
Payer: MEDICARE

## 2023-04-12 DIAGNOSIS — M25.612 DECREASED ROM OF LEFT SHOULDER: ICD-10-CM

## 2023-04-12 DIAGNOSIS — R53.1 DECREASED STRENGTH: Primary | ICD-10-CM

## 2023-04-12 PROCEDURE — 97530 THERAPEUTIC ACTIVITIES: CPT | Mod: PN | Performed by: PHYSICAL THERAPIST

## 2023-04-12 PROCEDURE — 97110 THERAPEUTIC EXERCISES: CPT | Mod: PN | Performed by: PHYSICAL THERAPIST

## 2023-04-12 NOTE — PROGRESS NOTES
"                            Physical Therapy Daily Treatment Note     Name: Dorothy Burt  Clinic Number: [id number]    Therapy Diagnosis:   Encounter Diagnoses   Name Primary?    Decreased strength Yes    Decreased ROM of left shoulder      Physician: Louie Luna MD    Visit Date: 4/12/2023    Physician Orders: PT Eval and Treat  Medical Diagnosis from Referral: Z98.890 (ICD-10-CM) - S/P left rotator cuff repair  Evaluation Date: 3/14/2023  Authorization Period Expiration: 04/04/2023  Plan of Care Expiration: 5/12/23  Visit # / Visits authorized: 1/1, 7/11   FOTO: 1/5 - Done 4/10/23  PTA Visit: 0/6     Time In: 10:08 AM  Time Out: 11:00 AM  Total Billable Time: 40 1:1 minutes (1TE, 2TA)     Precautions: Standard, Asthma, Thyroid disease    Subjective     Pt reports: did well over the weekend. Shoulder was sore mildly for about 1.5 days after last session. No disturbed sleep, and doing exercises at home. Still having trouble reaching and lifting overhead.    She was not compliant with home exercise program.  Response to previous treatment: no shoulder pain  Functional change: using her arm more    Pain: 0-1/10  Location: left shoulder      Objective     Dorothy received therapeutic exercises to develop strength, endurance, ROM, and flexibility for 6 1:1 and 12 supervised minutes including:    Pullies:   -Flexion 3'    Theraband Rows x30 blue theraband   Left arm Pull Down 3x10 red theraband  Theraband  external rotation: 3x10x5" red theraband    Ball on Wall CW/CCW 2x15 (3000 gram blue ball)    Not today:  Prone T 10x10'' holds, left with minimal assist  Prone Y's: 2x10 left  Seated teres minor external rotation: 2x15, 4# left    Upper body ergometer: 3 minutes fwd/3 minutes reverse level 3 for improved cardiovascular endurance    Dorothy participated in dynamic functional therapeutic activities to improve functional performance for 34 minutes including:    Ballet bar push ups: 2x10 - NT  Serratus  hold " "on ballet bar: 2x1' holds with LUE only - NT  Ball Roll up wall 20x with 2# wrist weight    Wand overhead press: 3 pounds 2x10 double arm  Eccentric lowering from top of shelf: 2.5 pounds 8x (up with right, down with left)    Body blade:  External rotation/internal rotation at hip: 2x30"   @ shoulder height external rotation/internal rotation and flexion/extension: 2x20" each  Overhead flexion/extension: 2x10"        Dorothy received the following manual therapy techniques: PROM with luis a stretching  were applied to the: L shoulder for 00 minutes, including:  Flexion  ABD  D2 flexion     Assessment     Dorothy is a 68 y.o. female referred to outpatient Physical Therapy with a medical diagnosis of  S/P left rotator cuff repair presenting to PT at [facility]. Left shoulder flexion AROM 135 degree about longterm through visit. Main reported deficit is weakness with overhead activities. Expected fatigue present with body blade and overhead activities. Fair control with lowering 2.5 lb weight from overhead. Unable to control 5 lb weight.     Dorothy Is progressing well towards her goals.   Pt prognosis is Good.     Pt will continue to benefit from skilled outpatient physical therapy to address the deficits listed in the problem list box on initial evaluation, provide pt/family education and to maximize pt's level of independence in the home and community environment.     Pt's spiritual, cultural and educational needs considered and pt agreeable to plan of care and goals.    Anticipated barriers to physical therapy: Chronic Shoulder ROM limitations.     Goals: Short Term Goals: 4 weeks  1. Report decreased left shoulder pain </= 0/10 with chest level activities to increase tolerance for ADLs and increased QoL. MET  2. Increase left shoulder PROM to 140 degrees flexion for increased functional mobility. MET  3. Increased strength by 1/3 MMT grade in LUE to increase tolerance for ADL and work " activities. MET  4. Pt to tolerate HEP to improve ROM and independence with ADL's. MET     Long Term Goals: 8 weeks  1. Report decreased left shoulder pain </= 2/10 with overhead activities to increase tolerance for ADLs and increased QoL. PROGRESSING; NOT MET  2. Increase left shoulder AROM to 150 degrees flexion for increased functional mobility and independent ADLs with decreased pain. PROGRESSING; NOT MET  3. Increase strength to >/= 4/5 in BUE to increase tolerance for ADL and work activities. PROGRESSING; NOT MET  4. Pt goal: to get my shoulder stronger. PROGRESSING; NOT MET  5. Pt will have improved to score of </= 31% limited on shoulder FOTO in order to demonstrate true functional improvement. PROGRESSING; NOT MET    Plan     Cont to work on overhead strength and endurance.    Plan of care Certification: 3/14/2023 to 5/12/23.    Louie Tobar, PT,

## 2023-04-26 ENCOUNTER — CLINICAL SUPPORT (OUTPATIENT)
Dept: REHABILITATION | Facility: HOSPITAL | Age: 68
End: 2023-04-26
Payer: MEDICARE

## 2023-04-26 DIAGNOSIS — M25.612 DECREASED ROM OF LEFT SHOULDER: ICD-10-CM

## 2023-04-26 DIAGNOSIS — R53.1 DECREASED STRENGTH: Primary | ICD-10-CM

## 2023-04-26 PROCEDURE — 97530 THERAPEUTIC ACTIVITIES: CPT | Mod: PN | Performed by: PHYSICAL THERAPIST

## 2023-04-26 PROCEDURE — 97140 MANUAL THERAPY 1/> REGIONS: CPT | Mod: PN | Performed by: PHYSICAL THERAPIST

## 2023-04-26 NOTE — PROGRESS NOTES
"                            Physical Therapy Daily Treatment Note     Name: Dorothy Burt  Clinic Number: [id number]    Therapy Diagnosis:   Encounter Diagnoses   Name Primary?    Decreased strength Yes    Decreased ROM of left shoulder      Physician: Louie Luna MD    Visit Date: 4/26/2023    Physician Orders: PT Eval and Treat  Medical Diagnosis from Referral: Z98.890 (ICD-10-CM) - S/P left rotator cuff repair  Evaluation Date: 3/14/2023  Authorization Period Expiration: 04/04/2023  Plan of Care Expiration: 5/12/23  Visit # / Visits authorized: 1/1, 8/11   FOTO: 3/5 - Done 4/10/23  PTA Visit: 0/6     Time In: 8:07 AM  Time Out: 9:00 AM  Total Billable Time: 25 1:1 minutes (1MT, 1TA)     Precautions: Standard, Asthma, Thyroid disease    Subjective     Pt reports: she tried calling to cancel but she missed her previous appointments due to a hostile eviction and having to protect her property. She had to scrub her tub 3 times and could feel the weakness in her left compared to her right. She's sore all over today.    She was not compliant with home exercise program.  Response to previous treatment: no shoulder pain  Functional change: using her arm more    Pain: 0-1/10  Location: left shoulder      Objective     Dorothy received therapeutic exercises to develop strength, endurance, ROM, and flexibility for 5 1:1 and 20 supervised minutes including:    Upper body ergometer: 3 minutes fwd/3 minutes reverse level 3 for improved cardiovascular endurance    Theraband Rows x30 blue theraband   Theraband horizontal abduction: yellow theraband 20x (back against wall)  Left arm Pull Down 3x10 red theraband  Theraband  external rotation: 3x10x5" red theraband    Ball on Wall CW/CCW 2x15 (3000 gram blue ball)    Not today:  Prone T 10x10'' holds, left with minimal assist  Prone Y's: 2x10 left  Seated teres minor external rotation: 2x15, 4# left        Dorothy participated in dynamic functional therapeutic activities to " "improve functional performance for 12 1:1 and 5 supervised minutes including:      Wand overhead press: 3 pounds 2x10 double arm  Eccentric lowering from top of shelf: 2 pounds 8x (up with right, down with left)    Body blade:  External rotation/internal rotation at hip: 2x30"   @ shoulder height external rotation/internal rotation and flexion/extension: 2x20" each  Overhead flexion/extension: 2x10"      Not today:  Ballet bar push ups: 2x10 - NT  Serratus  hold on ballet bar: 2x1' holds with LUE only - NT  Ball Roll up wall 20x with 2# wrist weight    Dorothy received the following manual therapy techniques: PROM with luis a stretching  were applied to the: L shoulder for 8 minutes, including:  Grade I-IV posterior and inferior GHJ mobs with shoulder PROM at completion of visit    Assessment     Dorothy is a 68 y.o. female referred to outpatient Physical Therapy with a medical diagnosis of  S/P left rotator cuff repair presenting to PT at [facility]. She returns after missing several visits due to non health circumstances with general soreness. Main range of motion limitation is with internal rotation with anterior humeral head translation. Improvements made after joint mobs. She reported feeling better upon completion of visit.    Dorothy Is progressing well towards her goals.   Pt prognosis is Good.     Pt will continue to benefit from skilled outpatient physical therapy to address the deficits listed in the problem list box on initial evaluation, provide pt/family education and to maximize pt's level of independence in the home and community environment.     Pt's spiritual, cultural and educational needs considered and pt agreeable to plan of care and goals.    Anticipated barriers to physical therapy: Chronic Shoulder ROM limitations.     Goals: Short Term Goals: 4 weeks  1. Report decreased left shoulder pain </= 0/10 with chest level activities to increase tolerance for ADLs and " increased QoL. MET  2. Increase left shoulder PROM to 140 degrees flexion for increased functional mobility. MET  3. Increased strength by 1/3 MMT grade in LUE to increase tolerance for ADL and work activities. MET  4. Pt to tolerate HEP to improve ROM and independence with ADL's. MET     Long Term Goals: 8 weeks  1. Report decreased left shoulder pain </= 2/10 with overhead activities to increase tolerance for ADLs and increased QoL. PROGRESSING; NOT MET  2. Increase left shoulder AROM to 150 degrees flexion for increased functional mobility and independent ADLs with decreased pain. PROGRESSING; NOT MET  3. Increase strength to >/= 4/5 in BUE to increase tolerance for ADL and work activities. PROGRESSING; NOT MET  4. Pt goal: to get my shoulder stronger. PROGRESSING; NOT MET  5. Pt will have improved to score of </= 31% limited on shoulder FOTO in order to demonstrate true functional improvement. PROGRESSING; NOT MET    Plan     Cont to work on overhead strength and endurance.    Plan of care Certification: 3/14/2023 to 5/12/23.    Louie Tobar, PT,

## 2023-05-01 ENCOUNTER — CLINICAL SUPPORT (OUTPATIENT)
Dept: REHABILITATION | Facility: HOSPITAL | Age: 68
End: 2023-05-01
Payer: MEDICARE

## 2023-05-01 DIAGNOSIS — M25.612 DECREASED ROM OF LEFT SHOULDER: ICD-10-CM

## 2023-05-01 DIAGNOSIS — R53.1 DECREASED STRENGTH: Primary | ICD-10-CM

## 2023-05-01 PROCEDURE — 97140 MANUAL THERAPY 1/> REGIONS: CPT | Mod: PN,CQ

## 2023-05-01 PROCEDURE — 97530 THERAPEUTIC ACTIVITIES: CPT | Mod: PN,CQ

## 2023-05-01 PROCEDURE — 97110 THERAPEUTIC EXERCISES: CPT | Mod: PN,CQ

## 2023-05-01 NOTE — PROGRESS NOTES
"                            Physical Therapy Daily Treatment Note     Name: Dorothy Burt  Clinic Number: [id number]    Therapy Diagnosis:   Encounter Diagnoses   Name Primary?    Decreased strength Yes    Decreased ROM of left shoulder        Physician: Louie Luna MD    Visit Date: 5/1/2023    Physician Orders: PT Eval and Treat  Medical Diagnosis from Referral: Z98.890 (ICD-10-CM) - S/P left rotator cuff repair  Evaluation Date: 3/14/2023  Authorization Period Expiration: 04/04/2023  Plan of Care Expiration: 5/12/23  Visit # / Visits authorized: 1/1, 8/11   FOTO: 4/5 - Done 4/10/23  PTA Visit: 1/6     Time In: 11:07 AM  Time Out: 12:00 PM  Total Billable Time: 53 1:1 minutes (1MT, 2 TE, 1TA)     Precautions: Standard, Asthma, Thyroid disease    Subjective     Pt reports: Patient reports soreness persists due to physical therapy and from heavy cleaning tasks at home.     She was not compliant with home exercise program.  Response to previous treatment: no shoulder pain  Functional change: using her arm more    Pain: 0-1/10  Location: left shoulder      Objective     Dorothy received therapeutic exercises to develop strength, endurance, ROM, and flexibility for 26 1:1 and 0 supervised minutes including:    Upper body ergometer: 3 minutes fwd/3 minutes reverse level 3 for improved cardiovascular endurance    Prone Y's: 2x10 x3"  left     Theraband Rows x30 blue theraband   Theraband horizontal abduction: yellow theraband 20x (back against wall)  Left arm Pull Down 3x10 red theraband  Theraband  external rotation: 3x10x5" red theraband    Ball on Wall CW/CCW 2x15 (3000 gram blue ball)    Hands on top shelf: Down dog stretch 10x10"    Not today:  Prone T 10x10'' holds, left with minimal assist    Seated teres minor external rotation: 2x15, 4# left        Dorothy participated in dynamic functional therapeutic activities to improve functional performance for 12 1:1 and 5 supervised minutes including:      Wand " "overhead press: 3 pounds 2x10 double arm  Eccentric lowering from top of shelf: 2 pounds 8x (up with right, down with left)    Body blade:  External rotation/internal rotation at hip: 2x30"   @ shoulder height external rotation/internal rotation and flexion/extension: 2x20" each  Overhead flexion/extension: 2x10"      Not today:  Ballet bar push ups: 2x10 - NT  Serratus  hold on ballet bar: 2x1' holds with LUE only - NT  Ball Roll up wall 20x with 2# wrist weight    Dorothy received the following manual therapy techniques: PROM with luis a stretching  were applied to the: L shoulder for 15minutes, including:  Grade II-IV posterior and inferior GHJ mobs with shoulder PROM D2 flexion     Assessment     Dorothy is a 68 y.o. female referred to outpatient Physical Therapy with a medical diagnosis of  S/P left rotator cuff repair presenting to PT at [facility]. Initiated hand son shelf down dog stretch  for closed chain mobility of shoulder. Initiated PROM in D2 flexion to improve ROM of shoulder    Dorothy Is progressing well towards her goals.   Pt prognosis is Good.     Pt will continue to benefit from skilled outpatient physical therapy to address the deficits listed in the problem list box on initial evaluation, provide pt/family education and to maximize pt's level of independence in the home and community environment.     Pt's spiritual, cultural and educational needs considered and pt agreeable to plan of care and goals.    Anticipated barriers to physical therapy: Chronic Shoulder ROM limitations.     Goals: Short Term Goals: 4 weeks  1. Report decreased left shoulder pain </= 0/10 with chest level activities to increase tolerance for ADLs and increased QoL. MET  2. Increase left shoulder PROM to 140 degrees flexion for increased functional mobility. MET  3. Increased strength by 1/3 MMT grade in LUE to increase tolerance for ADL and work activities. MET  4. Pt to tolerate HEP to " improve ROM and independence with ADL's. MET     Long Term Goals: 8 weeks  1. Report decreased left shoulder pain </= 2/10 with overhead activities to increase tolerance for ADLs and increased QoL. PROGRESSING; NOT MET  2. Increase left shoulder AROM to 150 degrees flexion for increased functional mobility and independent ADLs with decreased pain. PROGRESSING; NOT MET  3. Increase strength to >/= 4/5 in BUE to increase tolerance for ADL and work activities. PROGRESSING; NOT MET  4. Pt goal: to get my shoulder stronger. PROGRESSING; NOT MET  5. Pt will have improved to score of </= 31% limited on shoulder FOTO in order to demonstrate true functional improvement. PROGRESSING; NOT MET    Plan     Cont to work on overhead strength and endurance.    Plan of care Certification: 3/14/2023 to 5/12/23.    Maco Lopez PTA,

## 2023-05-03 ENCOUNTER — CLINICAL SUPPORT (OUTPATIENT)
Dept: REHABILITATION | Facility: HOSPITAL | Age: 68
End: 2023-05-03
Payer: MEDICARE

## 2023-05-03 DIAGNOSIS — R53.1 DECREASED STRENGTH: Primary | ICD-10-CM

## 2023-05-03 DIAGNOSIS — M25.612 DECREASED ROM OF LEFT SHOULDER: ICD-10-CM

## 2023-05-03 PROCEDURE — 97530 THERAPEUTIC ACTIVITIES: CPT | Mod: PN,CQ

## 2023-05-03 PROCEDURE — 97140 MANUAL THERAPY 1/> REGIONS: CPT | Mod: PN,CQ

## 2023-05-03 PROCEDURE — 97110 THERAPEUTIC EXERCISES: CPT | Mod: PN,CQ

## 2023-05-03 NOTE — PROGRESS NOTES
"                            Physical Therapy Daily Treatment Note     Name: Dorothy Burt  Clinic Number: [id number]    Therapy Diagnosis:   Encounter Diagnoses   Name Primary?    Decreased strength Yes    Decreased ROM of left shoulder          Physician: Louie Luna MD    Visit Date: 5/3/2023    Physician Orders: PT Eval and Treat  Medical Diagnosis from Referral: Z98.890 (ICD-10-CM) - S/P left rotator cuff repair  Evaluation Date: 3/14/2023  Authorization Period Expiration: 04/04/2023  Plan of Care Expiration: 5/12/23  Visit # / Visits authorized: 1/1, 10/23  FOTO: 5/5 - Done 4/10/23  PTA Visit: 2/6     Time In: 12:03 PM  Time Out: 1:00 PM  Total Billable Time: 53 1:1 minutes (1MT, 2 TE, 1TA)     Precautions: Standard, Asthma, Thyroid disease    Subjective     Pt reports: No new complaints. Soreness persists but notes it is less than last week. Soreness occurs with lifting shoulder.     She was not compliant with home exercise program.  Response to previous treatment: no shoulder pain  Functional change: using her arm more    Pain: 0-1/10  Location: left shoulder      Objective     Dorothy received therapeutic exercises to develop strength, endurance, ROM, and flexibility for 26 1:1 and 0 supervised minutes including:    Upper body ergometer: 3 minutes fwd/3 minutes reverse level 3 for improved cardiovascular endurance    L Side Lying Sleeper stretch 3x30" (VC/TC to maintain horizontal ADDuction)   Prone Y's: 2x10 x3"  left     Theraband Rows x30 blue theraband   Theraband horizontal abduction: yellow theraband 2x15  (back against wall)  Left arm Pull Down 3x10 red theraband  Theraband  external rotation: 3x10x5" red theraband    Ball on Wall CW/CCW 2x15 (3000 gram blue ball)    Hands on top shelf: Down dog stretch 10x10"    Not today:  Prone T 10x10'' holds, left with minimal assist    Seated teres minor external rotation: 2x15, 4# left        Dorothy participated in dynamic functional therapeutic activities " "to improve functional performance for 15 1:1 and 0 supervised minutes including:      Wand overhead press: 3 pounds 2x10 double arm  Eccentric lowering from top of shelf: 2 pounds 10x (up with right, down with left)  Ballet bar push ups: 2x10 -     Body blade:  External rotation/internal rotation at hip: 2x30"   @ shoulder height external rotation/internal rotation and flexion/extension: 2x20" each  Overhead flexion/extension: 2x10"      Not today:    Serratus  hold on ballet bar: 2x1' holds with LUE only - NT  Ball Roll up wall 20x with 2# wrist weight    Dorothy received the following manual therapy techniques: PROM with luis a stretching  were applied to the: L shoulder for 15minutes, including:  Grade II-IV posterior and inferior GHJ mobs with shoulder PROM D2 flexion     Assessment     Dorothy is a 68 y.o. female referred to outpatient Physical Therapy with a medical diagnosis of  S/P left rotator cuff repair presenting to PT at [facility]. Initiated hand son shelf down dog stretch  for closed chain mobility of shoulder. Initiated sleeper stretch for posterior capsule stretching to improve posterior glide of GHJ in flexion. Patient reports increased ease in shoulder flexion     Dorothy Is progressing well towards her goals.   Pt prognosis is Good.     Pt will continue to benefit from skilled outpatient physical therapy to address the deficits listed in the problem list box on initial evaluation, provide pt/family education and to maximize pt's level of independence in the home and community environment.     Pt's spiritual, cultural and educational needs considered and pt agreeable to plan of care and goals.    Anticipated barriers to physical therapy: Chronic Shoulder ROM limitations.     Goals: Short Term Goals: 4 weeks  1. Report decreased left shoulder pain </= 0/10 with chest level activities to increase tolerance for ADLs and increased QoL. MET  2. Increase left shoulder PROM " to 140 degrees flexion for increased functional mobility. MET  3. Increased strength by 1/3 MMT grade in LUE to increase tolerance for ADL and work activities. MET  4. Pt to tolerate HEP to improve ROM and independence with ADL's. MET     Long Term Goals: 8 weeks  1. Report decreased left shoulder pain </= 2/10 with overhead activities to increase tolerance for ADLs and increased QoL. PROGRESSING; NOT MET  2. Increase left shoulder AROM to 150 degrees flexion for increased functional mobility and independent ADLs with decreased pain. PROGRESSING; NOT MET  3. Increase strength to >/= 4/5 in BUE to increase tolerance for ADL and work activities. PROGRESSING; NOT MET  4. Pt goal: to get my shoulder stronger. PROGRESSING; NOT MET  5. Pt will have improved to score of </= 31% limited on shoulder FOTO in order to demonstrate true functional improvement. PROGRESSING; NOT MET    Plan     Cont to work on overhead strength and endurance.    Plan of care Certification: 3/14/2023 to 5/12/23.    Maco Lopez PTA,

## 2023-05-08 ENCOUNTER — DOCUMENTATION ONLY (OUTPATIENT)
Dept: REHABILITATION | Facility: HOSPITAL | Age: 68
End: 2023-05-08
Payer: MEDICARE

## 2023-05-08 ENCOUNTER — CLINICAL SUPPORT (OUTPATIENT)
Dept: REHABILITATION | Facility: HOSPITAL | Age: 68
End: 2023-05-08
Payer: MEDICARE

## 2023-05-08 DIAGNOSIS — M25.612 DECREASED ROM OF LEFT SHOULDER: ICD-10-CM

## 2023-05-08 DIAGNOSIS — R53.1 DECREASED STRENGTH: Primary | ICD-10-CM

## 2023-05-08 PROCEDURE — 97530 THERAPEUTIC ACTIVITIES: CPT | Mod: PN | Performed by: PHYSICAL THERAPIST

## 2023-05-08 PROCEDURE — 97110 THERAPEUTIC EXERCISES: CPT | Mod: PN | Performed by: PHYSICAL THERAPIST

## 2023-05-08 NOTE — PROGRESS NOTES
PT met face to face with Maco Lopez PTA to discuss pt's treatment plan and progress towards established goals. Pt will be seen by a physical therapist minimally every 6th visit or every 30 days.    Maco Lopez PTA     Abdominal Pain, N/V/D

## 2023-05-08 NOTE — PROGRESS NOTES
"                            Physical Therapy Daily Treatment Note     Name: Dorothy Burt  Clinic Number: [id number]    Therapy Diagnosis:   Encounter Diagnoses   Name Primary?    Decreased strength Yes    Decreased ROM of left shoulder        Physician: Louie Luna MD    Visit Date: 5/8/2023    Physician Orders: PT Eval and Treat  Medical Diagnosis from Referral: Z98.890 (ICD-10-CM) - S/P left rotator cuff repair  Evaluation Date: 3/14/2023  Authorization Period Expiration: 05/05/2023  Plan of Care Expiration: 5/12/23  Visit # / Visits authorized: 1/1, 11/23  FOTO: 5/5 - Done 4/10/23  PTA Visit: 2/6     Time In: 10:01 AM  Time Out: 11:00 AM  Total Billable Time: 56 1:1 minutes (3 TE, 1TA)     Precautions: Standard, Asthma, Thyroid disease    Subjective     Pt reports: "It's just a little muscle achy"    She was not compliant with home exercise program.  Response to previous treatment: no shoulder pain  Functional change: using her arm more    Pain: 0-1/10  Location: left shoulder      Objective   See UPOC from 5/8/23    Dorothy received therapeutic exercises to develop strength, endurance, ROM, and flexibility for 40 1:1 and 0 supervised minutes including re-assessment:    Upper body ergometer: 3 minutes fwd/3 minutes reverse level 3 for improved cardiovascular endurance      Theraband horizontal abduction: yellow theraband 2x15  (back against wall)    Not today  Theraband  external rotation: 3x10x5" red theraband    Ball on Wall CW/CCW 2x15 (3000 gram blue ball)    Hands on top shelf: Down dog stretch 10x10"  Seated teres minor external rotation: 2x15, 4# left  Wall walk with lift  Ball bounce off wall overhead  Shoulder abduction wall walk      Dorothy participated in dynamic functional therapeutic activities to improve functional performance for 18 1:1 and 0 supervised minutes including:      Wand overhead press: 3 pounds 3x10 double arm  Wand overhead hold: 3 pounds 1 minutes   Statue of [place]: 1 minutes " "hold  Ballet bar push ups: 2x10 -   Mini dip push ups from chair: 10x    Resume next  Eccentric lowering from top of shelf: 2 pounds 10x (up with right, down with left)  Body blade:  External rotation/internal rotation at hip: 2x30"   @ shoulder height external rotation/internal rotation and flexion/extension: 2x20" each  Overhead flexion/extension: 2x10"     Dorothy received the following manual therapy techniques: PROM with luis a stretching  were applied to the: L shoulder for 0 minutes, including:  Grade II-IV posterior and inferior GHJ mobs with shoulder PROM D2 flexion     Assessment     Dorothy is a 68 y.o. female referred to outpatient Physical Therapy with a medical diagnosis of  S/P left rotator cuff repair presenting to PT at [facility]. See UPOC    Dorothy Is progressing well towards her goals.   Pt prognosis is Good.     Pt will continue to benefit from skilled outpatient physical therapy to address the deficits listed in the problem list box on initial evaluation, provide pt/family education and to maximize pt's level of independence in the home and community environment.     Pt's spiritual, cultural and educational needs considered and pt agreeable to plan of care and goals.    Anticipated barriers to physical therapy: Chronic Shoulder ROM limitations.     Goals: Short Term Goals: 4 weeks  1. Report decreased left shoulder pain </= 0/10 with chest level activities to increase tolerance for ADLs and increased QoL. MET  2. Increase left shoulder PROM to 140 degrees flexion for increased functional mobility. MET  3. Increased strength by 1/3 MMT grade in LUE to increase tolerance for ADL and work activities. MET  4. Pt to tolerate HEP to improve ROM and independence with ADL's. MET     Long Term Goals: 8 weeks  1. Report decreased left shoulder pain </= 2/10 with overhead activities to increase tolerance for ADLs and increased QoL. MET  2. Increase left shoulder AROM to 150 degrees " flexion for increased functional mobility and independent ADLs with decreased pain. PROGRESSING; NOT MET  3. Increase strength to >/= 4/5 in BUE to increase tolerance for ADL and work activities. PROGRESSING; NOT MET  4. Pt goal: to get my shoulder stronger. PROGRESSING; NOT MET  5. Pt will have improved to score of </= 31% limited on shoulder FOTO in order to demonstrate true functional improvement. PROGRESSING; NOT MET    Plan     Cont to work on overhead strength and endurance.    Plan of care Certification: 5/12/23 to 6/16/23    Louie Tobar, PT,

## 2023-05-10 ENCOUNTER — CLINICAL SUPPORT (OUTPATIENT)
Dept: REHABILITATION | Facility: HOSPITAL | Age: 68
End: 2023-05-10
Payer: MEDICARE

## 2023-05-10 DIAGNOSIS — R53.1 DECREASED STRENGTH: Primary | ICD-10-CM

## 2023-05-10 DIAGNOSIS — M25.612 DECREASED ROM OF LEFT SHOULDER: ICD-10-CM

## 2023-05-10 PROCEDURE — 97140 MANUAL THERAPY 1/> REGIONS: CPT | Mod: PN

## 2023-05-10 PROCEDURE — 97110 THERAPEUTIC EXERCISES: CPT | Mod: PN

## 2023-05-10 PROCEDURE — 97530 THERAPEUTIC ACTIVITIES: CPT | Mod: PN

## 2023-05-10 NOTE — PROGRESS NOTES
Physical Therapy Daily Treatment Note     Name: Dorothy Burt  Clinic Number: [id number]    Therapy Diagnosis:   Encounter Diagnoses   Name Primary?    Decreased strength Yes    Decreased ROM of left shoulder      Physician: Louie Luna MD    Visit Date: 5/10/2023    Physician Orders: PT Eval and Treat  Medical Diagnosis from Referral: Z98.890 (ICD-10-CM) - S/P left rotator cuff repair  Evaluation Date: 3/14/2023  Authorization Period Expiration: 05/05/2023  Plan of Care Expiration: 5/12/23  Visit # / Visits authorized: 1/1, 12/23  FOTO: 13/10 - Done today 5/10/23  PTA Visit: 0/6     Time In: 10:05 AM  Time Out: 11:15 AM  Total Billable Time: 70 1:1 minutes (3 TE, 1 MT, 1 TA)     Precautions: Standard, Asthma, Thyroid disease    Subjective     Pt reports: left shoulder was sore after last visit and took it easy for the past 2 days. Felt more like muscle sore, and denies any sharp or heavy aching pain. Pain was about 2-3/10 and is a 1/10 today    She was not compliant with home exercise program.  Response to previous treatment: no shoulder pain  Functional change: using her arm more    Pain: 0-1/10  Location: left shoulder      Objective   See UPOC from 5/8/23    Dorothy received therapeutic exercises to develop strength, endurance, ROM, and flexibility for 15 minutes including:    Upper body ergometer: 3 minutes fwd/3 minutes reverse level 3.5 for improved cardiovascular endurance  Theraband horizontal abduction: red theraband 2x15  (back against wall) - not today  Seated teres minor external rotation: 2x10, 2# left (3# next)    Dorothy participated in dynamic functional therapeutic activities to improve functional performance for 40 minutes including:    Wand overhead press: 4 pounds 2x15 double arm, back on wall  Wand overhead hold: 3 pounds 1 minute  Statue of [employer] walks: 50'x3 with 2# dumbbell  Ballet bar push ups: 3x10  Mini dip push ups from chair: 3x10 from airex  Standing  "scaption: 3x12 left, 1#    Resume next  Body blade:  External rotation/internal rotation @ hip height: 2x30"   External rotation/internal rotation and flexion/extension @ shoulder height : 2x20" each    Dorothy received the following manual therapy techniques: PROM with luis a stretching  were applied to the: L shoulder for 15 minutes, including:  Grade II-IV posterior and inferior GHJ mobs with shoulder PROM D2 flexion   Left shoulder distraction    Assessment     Dorothy is a 68 y.o. female referred to outpatient Physical Therapy with a medical diagnosis of  S/P left rotator cuff repair presenting to PT at [facility].   Advancing resistance and overhead activities within patient tolerance. Pt did well today and reported mainly shoulder fatigue rather than pain. Instructed pt to perform overhead holds or shoulder press ups with ~1# weight at home until shoulder fatigue.     Dorothy Is progressing well towards her goals.   Pt prognosis is Good.     Pt will continue to benefit from skilled outpatient physical therapy to address the deficits listed in the problem list box on initial evaluation, provide pt/family education and to maximize pt's level of independence in the home and community environment.     Pt's spiritual, cultural and educational needs considered and pt agreeable to plan of care and goals.    Anticipated barriers to physical therapy: Chronic Shoulder ROM limitations.     Goals: Short Term Goals: 4 weeks  1. Report decreased left shoulder pain </= 0/10 with chest level activities to increase tolerance for ADLs and increased QoL. MET  2. Increase left shoulder PROM to 140 degrees flexion for increased functional mobility. MET  3. Increased strength by 1/3 MMT grade in LUE to increase tolerance for ADL and work activities. MET  4. Pt to tolerate HEP to improve ROM and independence with ADL's. MET     Long Term Goals: 8 weeks  1. Report decreased left shoulder pain </= 2/10 with " overhead activities to increase tolerance for ADLs and increased QoL. MET  2. Increase left shoulder AROM to 150 degrees flexion for increased functional mobility and independent ADLs with decreased pain. PROGRESSING; NOT MET  3. Increase strength to >/= 4/5 in BUE to increase tolerance for ADL and work activities. PROGRESSING; NOT MET  4. Pt goal: to get my shoulder stronger. PROGRESSING; NOT MET  5. Pt will have improved to score of </= 31% limited on shoulder FOTO in order to demonstrate true functional improvement. PROGRESSING; NOT MET    Plan     Cont to work on overhead strength and endurance.    Plan of care Certification: 5/12/23 to 6/16/23    Jose C Lutz, PT,

## 2023-05-11 PROBLEM — E78.00 HIGH CHOLESTEROL: Status: ACTIVE | Noted: 2023-05-11

## 2023-05-14 ENCOUNTER — PATIENT MESSAGE (OUTPATIENT)
Dept: REHABILITATION | Facility: HOSPITAL | Age: 68
End: 2023-05-14
Payer: MEDICARE

## 2023-05-15 ENCOUNTER — CLINICAL SUPPORT (OUTPATIENT)
Dept: REHABILITATION | Facility: HOSPITAL | Age: 68
End: 2023-05-15
Payer: MEDICARE

## 2023-05-15 DIAGNOSIS — M25.612 DECREASED ROM OF LEFT SHOULDER: ICD-10-CM

## 2023-05-15 DIAGNOSIS — R53.1 DECREASED STRENGTH: Primary | ICD-10-CM

## 2023-05-15 PROCEDURE — 97530 THERAPEUTIC ACTIVITIES: CPT | Mod: PN | Performed by: PHYSICAL THERAPIST

## 2023-05-15 PROCEDURE — 97140 MANUAL THERAPY 1/> REGIONS: CPT | Mod: PN | Performed by: PHYSICAL THERAPIST

## 2023-05-15 NOTE — PROGRESS NOTES
"                            Physical Therapy Daily Treatment Note     Name: Dorothy Burt  Clinic Number: [id number]    Therapy Diagnosis:   Encounter Diagnoses   Name Primary?    Decreased strength Yes    Decreased ROM of left shoulder        Physician: Louie Luna MD    Visit Date: 5/15/2023    Physician Orders: PT Eval and Treat  Medical Diagnosis from Referral: Z98.890 (ICD-10-CM) - S/P left rotator cuff repair  Evaluation Date: 3/14/2023  Authorization Period Expiration: 05/05/2023  Plan of Care Expiration: 5/12/23  Visit # / Visits authorized: 1/1, 13/23  FOTO: Done 5/10/23  PTA Visit: 0/6     Time In: 9:03 AM  Time Out: 9:56 AM  Total Billable Time: 30 1:1 minutes (1 MT, 1 TA)     Precautions: Standard, Asthma, Thyroid disease    Subjective     Pt reports: "it's hurting today." She had to take a pain pill this morning    She was not compliant with home exercise program.  Response to previous treatment: no shoulder pain  Functional change: using her arm more    Pain: 2/10  Location: left shoulder      Objective   See UPOC from 5/8/23    Dorothy received therapeutic exercises to develop strength, endurance, ROM, and flexibility for 20 supervised minutes including:    Upper body ergometer: 3 minutes fwd/3 minutes reverse level 3.5 for improved cardiovascular endurance  Theraband horizontal abduction: red theraband 2x15  (back against wall)  Seated teres minor external rotation: 2x10, 3# left   Theraband standing abduction to 90 degree: yellow theraband 3x5 left   Fast theraband external rotation: 2x20" red theraband    RS: 3x20" manually supine (1x 90*, 1x overhead, 1x horizontal abd)    Dorothy participated in dynamic functional therapeutic activities to improve functional performance for 20 1:1 minutes including:    Wand overhead press: 4 pounds 2x15 double arm, back on wall  Wand overhead hold: 3 pounds 1 minute  Stat[place] walks: 80'x2 with 2# dumbbell  Ballet bar push ups: 3x10  Mini dip push ups " "from chair: 3x10 from airex  Standing scaption: 3x12 left, 1#    Not today:  Body blade:  External rotation/internal rotation @ hip height: 2x30"   External rotation/internal rotation and flexion/extension @ shoulder height : 2x20" each    Dorothy received the following manual therapy techniques: PROM with luis a stretching  were applied to the: L shoulder for 10 minutes, including:    Grade II-IV posterior and inferior GHJ mobs with shoulder PROM D2 flexion   Left shoulder distraction    Assessment     Dorothy is a 68 y.o. female referred to outpatient Physical Therapy with a medical diagnosis of  S/P left rotator cuff repair presenting to PT at [facility] and [facility].   Still not full compliance with home exercise program due to reports of being busy. Continue to concentrate on higher level strengthening and overhead strength/endurance. Only complaint of fatigue at completion of visit. Improving overhead endurance.    Dorothy Is progressing well towards her goals.   Pt prognosis is Good.     Pt will continue to benefit from skilled outpatient physical therapy to address the deficits listed in the problem list box on initial evaluation, provide pt/family education and to maximize pt's level of independence in the home and community environment.     Pt's spiritual, cultural and educational needs considered and pt agreeable to plan of care and goals.    Anticipated barriers to physical therapy: Chronic Shoulder ROM limitations.     Goals: Short Term Goals: 4 weeks  1. Report decreased left shoulder pain </= 0/10 with chest level activities to increase tolerance for ADLs and increased QoL. MET  2. Increase left shoulder PROM to 140 degrees flexion for increased functional mobility. MET  3. Increased strength by 1/3 MMT grade in LUE to increase tolerance for ADL and work activities. MET  4. Pt to tolerate HEP to improve ROM and independence with ADL's. MET     Long Term Goals: 8 weeks  1. Report decreased left " shoulder pain </= 2/10 with overhead activities to increase tolerance for ADLs and increased QoL. MET  2. Increase left shoulder AROM to 150 degrees flexion for increased functional mobility and independent ADLs with decreased pain. PROGRESSING; NOT MET  3. Increase strength to >/= 4/5 in BUE to increase tolerance for ADL and work activities. PROGRESSING; NOT MET  4. Pt goal: to get my shoulder stronger. PROGRESSING; NOT MET  5. Pt will have improved to score of </= 31% limited on shoulder FOTO in order to demonstrate true functional improvement. PROGRESSING; NOT MET    Plan     Cont to work on overhead strength and endurance.    Plan of care Certification: 5/12/23 to 6/16/23    Louie Tobar, PT,

## 2023-05-17 ENCOUNTER — CLINICAL SUPPORT (OUTPATIENT)
Dept: REHABILITATION | Facility: HOSPITAL | Age: 68
End: 2023-05-17
Payer: MEDICARE

## 2023-05-17 DIAGNOSIS — R53.1 DECREASED STRENGTH: Primary | ICD-10-CM

## 2023-05-17 DIAGNOSIS — M25.612 DECREASED ROM OF LEFT SHOULDER: ICD-10-CM

## 2023-05-17 PROCEDURE — 97530 THERAPEUTIC ACTIVITIES: CPT | Mod: PN | Performed by: PHYSICAL THERAPIST

## 2023-05-17 PROCEDURE — 97110 THERAPEUTIC EXERCISES: CPT | Mod: PN | Performed by: PHYSICAL THERAPIST

## 2023-05-17 NOTE — PROGRESS NOTES
"                            Physical Therapy Daily Treatment Note     Name: Dorothy Burt  Clinic Number: [id number]    Therapy Diagnosis:   Encounter Diagnoses   Name Primary?    Decreased strength Yes    Decreased ROM of left shoulder        Physician: Louie Luna MD    Visit Date: 5/17/2023    Physician Orders: PT Eval and Treat  Medical Diagnosis from Referral: Z98.890 (ICD-10-CM) - S/P left rotator cuff repair  Evaluation Date: 3/14/2023  Authorization Period Expiration: 06/06/2023  Plan of Care Expiration: 6/16/23  Visit # / Visits authorized: 1/1, 14/35  FOTO: Done 5/10/23  PTA Visit: 0/6     Time In: 8:13 AM  Time Out: 9:00 AM  Total Billable Time: 45 1:1 minutes (1 TE, 2 TA)     Precautions: Standard, Asthma, Thyroid disease    Subjective     Pt reports: "it was a little achy last night, but I'm doing ok"    She was compliant with home exercise program.  Response to previous treatment: no shoulder pain  Functional change: using her arm more    Pain: 2/10  Location: left shoulder      Objective   See UPOC from 5/8/23    Dorothy received therapeutic exercises to develop strength, endurance, ROM, and flexibility for 20 1:1 minutes including:    Upper body ergometer: 3 minutes fwd/3 minutes reverse level 3.5 for improved cardiovascular endurance  Wall walk with lift off: 15x  Theraband horizontal abduction: red theraband 2x15  (back against wall)  Seated teres minor external rotation: 3x10, 3# left   Theraband standing abduction to 90 degree: yellow theraband 3x5 left     Not today:  Fast theraband external rotation: 2x20" red theraband    RS: 3x20" manually supine (1x 90*, 1x overhead, 1x horizontal abd) - not today    Dorothy participated in dynamic functional therapeutic activities to improve functional performance for 25 1:1 minutes including:    Wand overhead press: 4 pounds 2x15 double arm, back on wall  Wand overhead hold: 3 pounds 1 minute  Stat[place] walks: 80'x2 with 2# dumbbell  Ballet bar " "push ups: 3x10  Mini dip push ups from chair: 3x10 from airex  Standing scaption: 3x12 left, 1#    Not today:  Body blade:  External rotation/internal rotation @ hip height: 2x30"   External rotation/internal rotation and flexion/extension @ shoulder height : 2x20" each    Dorothy received the following manual therapy techniques: PROM with luis a stretching  were applied to the: L shoulder for 00 minutes, including:    Grade II-IV posterior and inferior GHJ mobs with shoulder PROM D2 flexion   Left shoulder distraction    Assessment     Dorothy is a 68 y.o. female referred to outpatient Physical Therapy with a medical diagnosis of  S/P left rotator cuff repair presenting to PT at [facility].   Improving shoulder flexion/scaption AROM. Complaint of more fatigue/weakness today.     Dorothy Is progressing well towards her goals.   Pt prognosis is Good.     Pt will continue to benefit from skilled outpatient physical therapy to address the deficits listed in the problem list box on initial evaluation, provide pt/family education and to maximize pt's level of independence in the home and community environment.     Pt's spiritual, cultural and educational needs considered and pt agreeable to plan of care and goals.    Anticipated barriers to physical therapy: Chronic Shoulder ROM limitations.     Goals: Short Term Goals: 4 weeks  1. Report decreased left shoulder pain </= 0/10 with chest level activities to increase tolerance for ADLs and increased QoL. MET  2. Increase left shoulder PROM to 140 degrees flexion for increased functional mobility. MET  3. Increased strength by 1/3 MMT grade in LUE to increase tolerance for ADL and work activities. MET  4. Pt to tolerate HEP to improve ROM and independence with ADL's. MET     Long Term Goals: 8 weeks  1. Report decreased left shoulder pain </= 2/10 with overhead activities to increase tolerance for ADLs and increased QoL. MET  2. Increase left shoulder " AROM to 150 degrees flexion for increased functional mobility and independent ADLs with decreased pain. PROGRESSING; NOT MET  3. Increase strength to >/= 4/5 in BUE to increase tolerance for ADL and work activities. PROGRESSING; NOT MET  4. Pt goal: to get my shoulder stronger. PROGRESSING; NOT MET  5. Pt will have improved to score of </= 31% limited on shoulder FOTO in order to demonstrate true functional improvement. PROGRESSING; NOT MET    Plan     Cont to work on overhead strength and endurance.    Plan of care Certification: 5/12/23 to 6/16/23    Louie Tobar, PT,

## 2023-05-22 ENCOUNTER — CLINICAL SUPPORT (OUTPATIENT)
Dept: REHABILITATION | Facility: HOSPITAL | Age: 68
End: 2023-05-22
Payer: MEDICARE

## 2023-05-22 DIAGNOSIS — R53.1 DECREASED STRENGTH: Primary | ICD-10-CM

## 2023-05-22 DIAGNOSIS — M25.612 DECREASED ROM OF LEFT SHOULDER: ICD-10-CM

## 2023-05-22 PROCEDURE — 97530 THERAPEUTIC ACTIVITIES: CPT | Mod: PN

## 2023-05-22 PROCEDURE — 97110 THERAPEUTIC EXERCISES: CPT | Mod: PN

## 2023-05-22 NOTE — PROGRESS NOTES
Physical Therapy Daily Treatment Note     Name: Dorothy Burt  Clinic Number: [id number]    Therapy Diagnosis:   Encounter Diagnoses   Name Primary?    Decreased strength Yes    Decreased ROM of left shoulder      Physician: Louie Luna MD    Visit Date: 5/22/2023    Physician Orders: PT Eval and Treat  Medical Diagnosis from Referral: Z98.890 (ICD-10-CM) - S/P left rotator cuff repair  Evaluation Date: 3/14/2023  Authorization Period Expiration: 06/06/2023  Plan of Care Expiration: 6/16/23  Visit # / Visits authorized: 1/1, 15/35  FOTO: Done 5/10/23  PTA Visit: 0/6     Time In: 8:15 AM  Time Out: 9:00 AM  Total Billable Time: 45 1:1 minutes (1 TE, 1 TA)     Precautions: Standard, Asthma, Thyroid disease    Subjective     Pt reports: left arm is a little sore that started recently. Low back is irritated.     She was compliant with home exercise program.  Response to previous treatment: no shoulder pain  Functional change: using her arm more    Pain: 2/10  Location: left shoulder      Objective   See UPOC from 5/8/23    Dorothy received therapeutic exercises to develop strength, endurance, ROM, and flexibility for 20 1:1 minutes including:    Upper body ergometer: 3 minutes fwd/3 minutes reverse level 3.5 for improved cardiovascular endurance  Wall walk with lift off: 15x - not today  Theraband horizontal abduction: red theraband 2x15  (back against wall)  Seated teres minor external rotation: 3x10, 3# left   Theraband standing abduction to 90 degree: yellow theraband 3x5 left     Dorothy participated in dynamic functional therapeutic activities to improve functional performance for 25 1:1 minutes including:    Wand overhead press: 4 pounds 2x15 double arm, back on wall  Wand overhead hold: 3 pounds 1 minute  Stat[place] walks: 80'x3 with 2# dumbbell  Ballet bar push ups: 3x10  Mini dip push ups from chair: 3x10 from airex - not today  Standing scaption: 3x12 left, 1#  Body  "blade:  External rotation/internal rotation @ hip height: 4x30"     Not today:  External rotation/internal rotation and flexion/extension @ shoulder height : 2x20" each    Dorothy received the following manual therapy techniques: PROM with luis a stretching  were applied to the: L shoulder for 00 minutes, including:    Grade II-IV posterior and inferior GHJ mobs with shoulder PROM D2 flexion   Left shoulder distraction    Assessment     Dorothy is a 68 y.o. female referred to outpatient Physical Therapy with a medical diagnosis of  S/P left rotator cuff repair presenting to PT at [facility].   PROM greater than AROM, strength is primary limitation, decreased GHJ mobility compared to scapular, and mild overall joint/cuff irritability. Pt reported decreased left shoulder pain and more muscle fatigue at session end. Consider PNF and resume periscapular exercises.     Dorothy Is progressing well towards her goals.   Pt prognosis is Good.     Pt will continue to benefit from skilled outpatient physical therapy to address the deficits listed in the problem list box on initial evaluation, provide pt/family education and to maximize pt's level of independence in the home and community environment.     Pt's spiritual, cultural and educational needs considered and pt agreeable to plan of care and goals.    Anticipated barriers to physical therapy: Chronic Shoulder ROM limitations.     Goals: Short Term Goals: 4 weeks  1. Report decreased left shoulder pain </= 0/10 with chest level activities to increase tolerance for ADLs and increased QoL. MET  2. Increase left shoulder PROM to 140 degrees flexion for increased functional mobility. MET  3. Increased strength by 1/3 MMT grade in LUE to increase tolerance for ADL and work activities. MET  4. Pt to tolerate HEP to improve ROM and independence with ADL's. MET     Long Term Goals: 8 weeks  1. Report decreased left shoulder pain </= 2/10 with overhead " activities to increase tolerance for ADLs and increased QoL. MET  2. Increase left shoulder AROM to 150 degrees flexion for increased functional mobility and independent ADLs with decreased pain. PROGRESSING; NOT MET  3. Increase strength to >/= 4/5 in BUE to increase tolerance for ADL and work activities. PROGRESSING; NOT MET  4. Pt goal: to get my shoulder stronger. PROGRESSING; NOT MET  5. Pt will have improved to score of </= 31% limited on shoulder FOTO in order to demonstrate true functional improvement. PROGRESSING; NOT MET    Plan     Cont to work on overhead strength and endurance.    Plan of care Certification: 5/12/23 to 6/16/23    Jose C Lutz, PT,

## 2023-05-30 ENCOUNTER — TELEPHONE (OUTPATIENT)
Dept: SPORTS MEDICINE | Facility: CLINIC | Age: 68
End: 2023-05-30
Payer: MEDICARE

## 2023-05-30 ENCOUNTER — NUTRITION (OUTPATIENT)
Dept: NUTRITION | Facility: CLINIC | Age: 68
End: 2023-05-30
Payer: MEDICARE

## 2023-05-30 VITALS — WEIGHT: 186 LBS | BODY MASS INDEX: 31.76 KG/M2 | HEIGHT: 64 IN

## 2023-05-30 DIAGNOSIS — E66.09 CLASS 1 OBESITY DUE TO EXCESS CALORIES WITHOUT SERIOUS COMORBIDITY WITH BODY MASS INDEX (BMI) OF 32.0 TO 32.9 IN ADULT: Primary | ICD-10-CM

## 2023-05-30 DIAGNOSIS — Z71.3 NUTRITIONAL COUNSELING: Primary | ICD-10-CM

## 2023-05-30 DIAGNOSIS — E78.00 HIGH CHOLESTEROL: ICD-10-CM

## 2023-05-30 DIAGNOSIS — E03.9 HYPOTHYROIDISM, UNSPECIFIED TYPE: ICD-10-CM

## 2023-05-30 DIAGNOSIS — Z71.3 NUTRITIONAL COUNSELING: ICD-10-CM

## 2023-05-30 PROCEDURE — 97803 MED NUTRITION INDIV SUBSEQ: CPT | Mod: S$GLB,,, | Performed by: NUTRITIONIST

## 2023-05-30 PROCEDURE — 99999 PR PBB SHADOW E&M-EST. PATIENT-LVL I: CPT | Mod: PBBFAC,,, | Performed by: NUTRITIONIST

## 2023-05-30 PROCEDURE — 99999 PR PBB SHADOW E&M-EST. PATIENT-LVL I: ICD-10-PCS | Mod: PBBFAC,,, | Performed by: NUTRITIONIST

## 2023-05-30 PROCEDURE — 97803 PR MED NUTR THER, SUBSQ, INDIV, EA 15 MIN: ICD-10-PCS | Mod: S$GLB,,, | Performed by: NUTRITIONIST

## 2023-05-30 NOTE — PROGRESS NOTES
"Nutrition Assessment for Established Patient Medical Nutrition Therapy      Reason for MNT visit: Pt in for education and nutrition counseling regarding Obesity, chronic pain      ASSESSMENT    Age: 68 y.o.  Wt Readings from Last 1 Encounters:   05/30/23 84.4 kg (186 lb)     Ht Readings from Last 1 Encounters:   05/30/23 5' 4" (1.626 m)     BMI Readings from Last 1 Encounters:   05/30/23 31.93 kg/m²       Clinical Signs/Symptoms: Dorothy stated she has high cholesterol. She is down 7 pounds since our initial visit in November 2022. She has not exercised much at all, but is still doing physical therapy.     Medical History:   Past Medical History:   Diagnosis Date    Arthritis     Asthma     Back pain     Knee pain     Thyroid disease      Problem List             Resolved    Arthritis         Mild intermittent asthma (Chronic)         Sacroiliac joint pain         Chronic pain         Hypothyroidism         Gastritis due to nonsteroidal anti-inflammatory drug         Recurrent cold sores (Chronic)         History of pericarditis         Chondromalacia patellae         Osteoarthritis of knee         Stress incontinence         Tear of medial meniscus of knee         Fibromyalgia (Chronic)         Unspecified inflammatory spondylopathy, lumbar region         Venous insufficiency         Peroneal tendonitis, left         Decreased strength         Decreased range of motion of left shoulder         Left shoulder pain         Nontraumatic incomplete tear of left rotator cuff            Medications:   Current Outpatient Medications:     acyclovir (ZOVIRAX) 400 MG tablet, TAKE 1 BY MOUTH 5 TIMES DAILY FOR 5 DAYS, Disp: 25 tablet, Rfl: 2    albuterol (PROAIR HFA) 90 mcg/actuation inhaler, Inhale 2 puffs into the lungs every 6 (six) hours as needed for Wheezing or Shortness of Breath. Rescue, Disp: 18 g, Rfl: 11    albuterol (VENTOLIN HFA) 90 mcg/actuation inhaler, Inhale 2 puffs into the lungs every 6 (six) hours as needed for " Wheezing. Rescue. Ok to substitute based on availability: Proair, Proventil, ventolin, Disp: 18 g, Rfl: 0    budesonide-formoterol 160-4.5 mcg (SYMBICORT) 160-4.5 mcg/actuation HFAA, Inhale 2 puffs into the lungs every evening. Controller, Disp: , Rfl:     diclofenac (VOLTAREN) 50 MG EC tablet, Take 1 tablet (50 mg total) by mouth 2 (two) times daily as needed (Pain)., Disp: 60 tablet, Rfl: 1    diclofenac sodium (VOLTAREN) 1 % Gel, Apply 2 g topically 2 (two) times daily as needed (to knees)., Disp: 100 g, Rfl: 2    famotidine (PEPCID) 20 MG tablet, TAKE 1 TABLET BY MOUTH TWICE A DAY, Disp: 30 tablet, Rfl: 11    fluticasone propionate (FLONASE) 50 mcg/actuation nasal spray, 2 sprays (100 mcg total) by Each Nostril route once daily., Disp: 48 g, Rfl: 3    guaiFENesin-codeine 100-10 mg/5 ml (TUSSI-ORGANIDIN NR)  mg/5 mL syrup, TAKE 5 MLS BY MOUTH EVERY 6 HOURS AS NEEDED FOR COUGH, Disp: 200 mL, Rfl: 0    levocetirizine (XYZAL) 5 MG tablet, TAKE 1 TABLET BY MOUTH EVERY DAY AS NEEDED, Disp: 90 tablet, Rfl: 3    levothyroxine (SYNTHROID) 25 MCG tablet, TAKE 1.5 TABLET BY MOUTH DAILY, Disp: 135 tablet, Rfl: 3    loratadine (CLARITIN) 10 mg tablet, Take 1 tablet (10 mg total) by mouth once daily., Disp: 30 tablet, Rfl: 2    meloxicam (MOBIC) 15 MG tablet, TAKE 1 TABLET BY MOUTH EVERY DAY AS NEEDED FOR PAIN, Disp: 30 tablet, Rfl: 0    miSOPROStoL (CYTOTEC) 200 MCG Tab, Take 1 tablet (200 mcg total) by mouth 2 (two) times daily as needed (with diclofenac rx)., Disp: 60 tablet, Rfl: 1    omeprazole (PRILOSEC) 20 MG capsule, Take 1 capsule (20 mg total) by mouth every morning., Disp: 90 capsule, Rfl: 3    scopolamine (TRANSDERM-SCOP) 1.3-1.5 mg (1 mg over 3 days), Place 1 patch onto the skin every 72 hours., Disp: 4 patch, Rfl: 1    traMADoL (ULTRAM) 50 mg tablet, , Disp: , Rfl:     Food allergies  Intolerances: [facility]    Labs:  Reviewed and noted  Hemoglobin A1C   Date Value Ref Range Status   05/08/2023 5.4 <5.7 % of  total Hgb Final     Comment:     For the purpose of screening for the presence of  diabetes:     <5.7%       Consistent with the absence of diabetes  5.7-6.4%    Consistent with increased risk for diabetes              (prediabetes)  > or =6.5%  Consistent with diabetes     This assay result is consistent with a decreased risk  of diabetes.     Currently, no consensus exists regarding use of  hemoglobin A1c for diagnosis of diabetes in children.     According to American Diabetes Association (ADA)  guidelines, hemoglobin A1c <7.0% represents optimal  control in non-pregnant diabetic patients. Different  metrics may apply to specific patient populations.   Standards of Medical Care in Diabetes(ADA).         11/14/2022 5.6 4.5 - 5.7 % Final   04/19/2020 5.4 4.0 - 5.6 % Final     Comment:     ADA Screening Guidelines:  5.7-6.4%  Consistent with prediabetes  >or=6.5%  Consistent with diabetes  High levels of fetal hemoglobin interfere with the HbA1C  assay. Heterozygous hemoglobin variants (HbS, HgC, etc)do  not significantly interfere with this assay.   However, presence of multiple variants may affect accuracy.     09/27/2012 5.7 (H) % Final     Cholesterol   Date Value Ref Range Status   05/08/2023 224 (H) <200 mg/dL Final   11/14/2022 211 (H) 100 - 200 mg/dL Final   04/20/2020 177 120 - 199 mg/dL Final     Comment:     The National Cholesterol Education Program (NCEP) has set the  following guidelines (reference ranges) for Cholesterol:  Optimal.....................<200 mg/dL  Borderline High.............200-239 mg/dL  High........................> or = 240 mg/dL       Triglycerides   Date Value Ref Range Status   05/08/2023 80 <150 mg/dL Final   11/14/2022 119 30 - 150 mg/dL Final   04/20/2020 79 30 - 150 mg/dL Final     Comment:     The National Cholesterol Education Program (NCEP) has set the  following guidelines (reference values) for triglycerides:  Normal......................<150 mg/dL  Borderline  High.............150-199 mg/dL  High........................200-499 mg/dL       HDL   Date Value Ref Range Status   05/08/2023 74 > OR = 50 mg/dL Final   11/14/2022 73 40 - 75 mg/dL Final   04/20/2020 56 40 - 75 mg/dL Final     Comment:     The National Cholesterol Education Program (NCEP) has set the  following guidelines (reference values) for HDL Cholesterol:  Low...............<40 mg/dL  Optimal...........>60 mg/dL       LDL Calculated   Date Value Ref Range Status   11/14/2022 119 0 - 125 mg/dL Final     LDL Cholesterol   Date Value Ref Range Status   05/08/2023 133 (H) mg/dL (calc) Final     Comment:     Reference range: <100     Desirable range <100 mg/dL for primary prevention;    <70 mg/dL for patients with CHD or diabetic patients   with > or = 2 CHD risk factors.     LDL-C is now calculated using the Reagan-Mary   calculation, which is a validated novel method providing   better accuracy than the Friedewald equation in the   estimation of LDL-C.   Reagan SS et al. MIRIAN. 2013;310(19): 9968-4338   (http://education.[employer]Spring.[employer] Plus/faq/NAV499)     04/20/2020 105.2 63.0 - 159.0 mg/dL Final     Comment:     The National Cholesterol Education Program (NCEP) has set the  following guidelines (reference values) for LDL Cholesterol:  Optimal.......................<130 mg/dL  Borderline High...............130-159 mg/dL  High..........................160-189 mg/dL  Very High.....................>190 mg/dL         Typical day recall: Reviewed and noted during consult     Beverages: water: 3-4, 16.9 oz bottle water; coffee with cream and Stevia    Dining out: Regular, 1-2 times per week    Alcohol: nonsmoker; patient stated she can drink several crown and diet cokes depending on the occasion, but only drinks socially    Lifestyle Influences  Support System: self    Meal preparation/shopping: self    Current Activity Level: currently none. She is doing physical therapy for her shoulder 2x a week and has been  doing this since June 2022    Patient motivation, anticipated barriers, expected compliance: Patient is motivated and has verbalized understanding and intent to comply     DIAGNOSIS   Problem: Excessive Energy intake and inadequate physical activity   Etiology: RT eating high calorie foods and restaurants and not exercising  Signs/Symptoms: AEB 24 hour recall and BMI    INTERVENTION  Nutrition prescription: estimated energy requirements:   Calories: 1700  Protein: 125-145 grams  Carbohydrates: 120-140 grams  Fats: choose plant-based heart healthy fats  Total fluid: 95 oz + sweat loss  Limit added sugar to 20 grams or less per day (Note: 1 teaspoon of sugar = ~5 gram)      Recommendations & Goals:  Patient goals and recommendations are tailored to the specific patient's needs, readiness to change, lifestyle, culture, skills, resources, & abilities. Strategies to help achieve these nutrition-related goals were discussed which can include but are not limited to SMART goal setting & mindful eating.     Aim for a minimum of 7 hours sleep   Exercise 60 minutes most days  Eat breakfast within 1-2 hours of waking up  Try not to skip any meals or snacks, not going more than 3-4 hours without eating.   At each meal and snack, try to include a source of fiber + lean protein + healthy fat.       Written Materials Provided  These resources are intended to assist the patient in making it easier to choose recommended options when eating out & to identify better-for-you brands at the grocery store:    Meal Planning Guide with recommendations discussed along with portion sizes and a meal plan   Fueling Well On The Go Guide  Eat Fit Grocery Product list   RD contact information- for patient to contact regarding any questions, needs, and/or concerns that may arise     MONITORING & EVALUATION    Communicated with healthcare provider    Documented plan for referral to appropriate agency/healthcare provider as needed    Comprehension:  fair    Motivation to change: moderate    Follow-up: 3 months    Counseling time: 45 minutes

## 2023-05-30 NOTE — PATIENT INSTRUCTIONS
"-If making a side salad, use at most 1 tbsp honey mustard dressing (preferably a brand that has <7 grams added sugar)  -Always have a small amount of healthy fat when having fruit for a snack (such as 2 tablespoons nuts)  -If having a half cup cooked oatmeal or grits, be sure to have 2 whole eggs with that meal. You can also have a 1/2 cup fruit of your choice for breakfast   -If you want raisins in your oatmeal, do at most 1 tablespoon raisins  -If you don't want eggs with your oatmeal, you can try adding 1 tablespoon regular peanut butter in your oatmeal  -Focus on your lean protein page of your meal planning guide book to help decrease your bad "LDL" cholesterol   -When sauteing fish at home, use extra virgin olive oil instead of butter  -Continue to review your meal plan + principles in your meal plan  -Increase walking/movement. Aim for at least 30 minutes per day most days of the week.                Name: Dorothy Burt  Date: 11.15.2022    Daily Energy Requirements:  Calories: 1500  Protein: 125-145 grams  Carbohydrates: 120-140 grams  Fats: choose plant-based heart healthy fats  Total fluid: 95 oz + sweat loss  Limit added sugar to 20 grams or less per day (Note: 1 teaspoon of sugar = ~5 gram)    Goals:  Eat every 3-4 hours  Know at most how many carbohydrate servings are recommended for each meal and snack. The rest of your plate will consist of lean protein and non-starchy veggies  Start exercising/moving more throughout the day  Even a 10-minute daily walk adds up  For your coffee: Instead of using cream, use a Ready to drink protein shake such as Premier    Breakfast: 2 servings of carbs = 30-40 grams + at least 20 grams lean protein/heart healthy fat  ½ cup cooked oatmeal + 1 piece of fruit + 2 whole eggs  1 cup cooked grits + 2 whole eggs  Smoothie: See notes below how to make a healthy smoothie.    Snack: A mid-morning snack may be needed if going >3-4 hours between meals      Lunch: 4 oz lean protein + " Pt wants you to call her with her x-ray results. unlimited non-starchy veggies + 2 servings of carbs = 30-40 grams  Examples of 2 servings of carbohydrates = ~30-40 grams:  1 cup cooked pasta (see notes below for brand examples); 2/3 cup cooked brown rice; 1 medium potato or sweet potato (5-6 oz in weight); 1 medium size piece of fruit + 2 slices Tom's Killer thin sliced bread; 1 serving of whole grain chips (see notes) + 2 slices thin sliced whole grain bread; 2 slices 100-calorie bread; 1 serving of Beanito chips + ½ cup cooked whole grain starch; 1 cup cooked beans; ½ cup cooked beans + 1/3 cup cooked brown rice, etc  The rest of your plate will consist of 4 oz lean protein + unlimited non-starchy veggies  Meal Examples:  Salad: Unlimited non-starchy veggies + 4 oz lean protein + 2-3 salad add-ins (see notes below) + 1 cup fruit  [place]: 2 slices Tom's Killer 21 whole grains and seeds regular sliced bread + 4 oz freshly sliced turkey + 1 tablespoon regular ballesteros + 1 slice cheese + non-starchy veggies of your choice  Note: If you want a piece of fruit or 1 serving of whole grain chips with your sandwich, then do 2 slices Tom's Killer thin-sliced bread  Spaghetti & meat sauce: 1 cup cooked lentil pasta + 4 oz 93/7 lean ground beef + side of non-starchy veggies  Red beans & rice: ½ cup cooked beans + 1/3 cup cooked brown rice + additional 2 oz lean protein (ex: chicken sausage, ham, chicken, etc) + side of non-starchy veggies  If you don't want rice, then you can do 1 cup cooked beans over riced cauliflower + additional 2 oz lean protein (ex: chicken sausage, ham, chicken, etc) + side of non-starchy veggies  5 oz lean protein + non-starchy veggies + 1 medium potato (5-6 oz in weight)  Restaurants: See tips below, particularly the Pick 1 out of the 4 rule  Bring your own 100% whole wheat bread for your sandwiches. If splitting a salad with your sandwich, then still pick 2 salad additives (see below for options)  Fast Food  Convenient Options: See FUELING  WELL on-the-go guide     Snack: ~1 serving of carbs = 15-25 grams + at least 15 grams lean protein/heart healthy fat + unlimited amounts of non-starchy vegetables  ¾ cup 2% cottage cheese + 1 cup fruit or 2 satsumas'  Flavored Greek yogurt (See notes for brands) + 2 tablespoons nuts or low sugar granola  [place] balance break + piece of fruit  ¼ cup nuts + piece of fruit  Piece of fruit + 2 tablespoons regular nut butter  1 serving of Beanito chips + 1, 100-calorie wholly guacamole packet  1 serving of Beanito chips + ¼ cup shredded cheese melted on top (aka better-for-you emmie's)  Flapjacked protein mighty muffin (typically found on baking aisle of [employer] stores)  Protein bar  Granola bar (see below for brand examples)  Avocado toast: ½ small Gabriel avocado on 1 slice 100% whole grain toast      Dinner: Lower carb  Note: If you would like a carbohydrate for dinner, then have 1 serving of carbs = 15-20 grams = ~½ cup cooked whole grain starch   Focus on 4 oz lean protein + 1 cup or more non-starchy veggies + small amount of heart healthy fat  Carb swaps:  Hearts of palm-based 'linguini'  Brand example: Palmini   Lasagna made with hearts of palm lasagna sheets (Palmini = brand example)  'Riced' Cauliflower  'Riced' Broccoli  Cauliflower mash to mimic mashed potatoes  Spaghetti squash  Zoodles  Spiralized Beets   Low Carb Breads:  'Base Culture' 7 Nut + Seed and Original Paleo Bread  [place]aut: Seeded low carb keto bread (Whole Foods)  Unbuns: Low carb hamburger bun (Whole Foods)   Outer Aisle Plantpower à makes pre-made frozen cauliflower pizza crust & wraps as well as frozen sandwich thins  Great for making low carb pizzas, burgers and sandwiches  Cory'flour frozen flatbreads and pizza crustsà any flavor   Great for making low carb pizzas, burgers, and sandwiches    Think outside the box for low carb dishes:  Kabobs, stir garibay with riced cauliflower or riced broccoli, stuffed bell peppers with no rice,  lettuce wraps, eggplant lasagna, shrimp etouffee made with riced cauliflower, request a sushi roll that contains raw fish to be made without rice, etc      Optional post-dinner sweet: Anything up to 200 calories  'Fun size'                    Building A Better Smoothie  Choose your protein. Pick 1 from the followin oz 2% Plain Greek yogurt  5 oz 2% Cottage cheese  Plant-based protein powder  Orgain  Sunwarrior  Vu  Garden of Life RAW  100% whey protein powder  2 scoops Collagen Peptides (Vital Proteins is a brand favorite)  Make it sweet:  Choose 1 cup frozen or fresh fruit of your choice to mary lou up your smoothie  Make sure you are choosing frozen fruit with no sugar added; be sure to look at the ingredient list  Add your veggies:  Instead of ice, choose frozen cauliflower florets. Cauliflower helps with the detoxification process in our bodies, and it's virtually tasteless!  Greens: spinach, kale, or other leafy greens  Beets are a great addition to smoothies, especially paired with strawberries and lemon  Cucumbers and celery are refreshing ways to enhance nutrition and hydration within your smoothie  Add in a plant-based fat:   Nut Butter: 1 teaspoon   Natural peanut butter  [place] butter  Cashew butter  Nuts: ~2 tablespoons   Avocado (¼)  Avocado oil  EVOO: 1 teaspoon  Add a liquid to reach your desired consistency:  Unsweetened/No sugar added plant-based milk   Sparta milk, Hemp milk, Cashew milk, Coconut milk, Rice milk, Soy milk  Milk: 1% or 2%  Healthy add-ins for an extra nutritional boost:  1 tablespoon flaxseeds or kyree seeds        Restaurant Tips      Pick 1 out of the 4 Rule: Instead of eating bread/tortilla chips, an appetizer, alcoholic drink and dessert, choose just one to have with your entrée  Focus on lean proteins: Refer to lean meat/meat substitutes page in meal planning guidebook. Select items grilled, baked, broiled, braised, poached or roasted      For your heart health, avoid  crispy, crunchy, breaded, paneed or stuffed items and items that are cream based, au gratin or buttered      Order sauces, dressings, and gravies on the side. This way you can add 1-2 tablespoons yourself. This helps with portion control     Request extra non-starchy vegetables instead of a starchy side dish. If the starchy side is something you love, consider splitting it with someone else at the table     Beverages: Order water with lemon, sparkling water, or unsweetened tea. Avoid sugary soft drinks, juices and mixers    Dining Out     [facility]  Designed to take the guesswork out of dining out healthfully, [facility] makes the healthy choice the easy choice  Visit    Order the Eat Fit Cookbook to create restaurant quality dishes at home  Download the [facility] Eat [facility] eyad for free on your smartphone  All Eat Fit restaurants & dishes by location   Nutrition facts for every Eat Fit dish  200 + Eat TaxiForSure.com approved recipes  Grocery shopping guides + community wellness resources    - Ordering A Better-For-You Salad:  Look for lean protein, unlimited non-starchy vegetables, and plant-based fats  Order dressings on the side to better control portion sizes. Add 1-2 tablespoons yourself to lightly coat  Pick 2-3 salad add-ins, each being ~2 tablespoons:  Dressing  Cheese  Nuts  Rivera  Croutons  Dried Fruit  Avocado/Guacamole  Eggs        Additional Nutrition Tips    -How to Read a Nutrition Fact Label:  1. Ingredients first: ingredients are listed form most to least, what is at the top is most occurring  2. Serving size: this is the amount that the numbers represent. If you eat less, divide, if you eat more multiply the numbers  3. Added sugar: Added sugar will be listed under carbohydrates and total sugar. Per product/meal you want your added sugar to be below 7 grams, ideally not more than 20 grams for your whole day    -Rule of thumb for Aisle products: (Food products located in the center aisles of  grocery stores):   Mal #7 Rule  Look for products that have 7 grams or less added sugar, and at least 7 grams or more protein  -Frozen Meal Guidelines:   Aim for meals that contain 45 grams or less of carbohydrates and 20 grams or more protein  Note: If you find one you like that doesn't have 20 grams protein, you can add leftover lean protein to the frozen meal  See notes below for several brand examples  Refer to the Eat Fit Shopping Guide for additional brand specific recommendations    -Choose 100% whole wheat/whole grain products   -Note: 'wheat' bread and 'wheat' flour are white bread/white flour    -Unsweetened frozen fruits and veggies contain the same nutritional value as fresh fruit and veggies. I recommend keeping these as staples in your freezer     -I know you don't eat fried foods, but wanted to provide you with this info still: Taking the skin off fried chicken is basically the same as eating grilled chicken  Compromise: If the skin is your favorite part, then eat your first piece of fried chicken without skin, and your last piece with skin  Note: Fried foods shouldn't be consumed more than twice a month    -Training/retraining the body is key, and may take some time:   Whatever you are currently doing for your meals/snacks and exercise routine, your body is used to it  When changing a habit(s), it's normal for it to be a little hard at first. Your body gets used to new habits over time with consistency  Exercise/Movement: Aim for at least 60 minutes of moderate exercise 5 days a week.   Note: Start off with small goals if having a hard time getting back on an exercise routine. Even a daily 15-minute walk adds up initially. Increase the length of time you exercise gradually until you're able to do 60 minutes most days of the week    -Water is not only important for hydration, but also for feeling alert and more energized. If our body is even slightly dehydrated, that could make us feel more  fatigued throughout the day  Aim for all daily fluids being half your body weight in oz + sweat loss (see under Energy Requirements at the top of page 1 for your recommended daily total fluid intake)    -I recommend measuring everything in cooked portions to see what each recommended portion looks like on your plate and bowls; Then eyeball after you feel comfortable with recommended portions    -What potentially increases inflammation/flare-ups in our body?  White/refined carbohydrates  Sugar  Alcohol  Fried foods    -If you're having trouble finding a specific food product, try looking on the brands website. Most companies have a 'store /find a store' on their website        Favorite Food Brands    - Whole Grain Breads:  Tom's Killer Bread - 21 Whole Grains and Seeds (green label), Good Seed (yellow label), Power Seed (red label)   Thin sliced -or- regular slice  Any 100% whole wheat  100% whole grain option  'Base Culture' 7 Nut + Seed and Original Paleo Bread (GF and found in freezer section)  Per slice: 110 calories and 4 grams net carbs  Great to use for dinner since low carb    -Whole Grain Tortillas  Wraps:  Corn   Coconut wrap - (Typically found in refrigerated section by cheese)  CAMERON ADELINA - 100% whole wheat   [place] - Whole wheat tortillas + whole wheat carb balance    -Whole Grain Frozen Waffles:  Kashi GO Protein Waffles  [place] Cakes Gluten Free and Whole Grain Protein Waffles  Van's Power Grains Original    -Frozen Meals: Single Serving:  Healthy Choice: MAX (this brand will have the most protein)  Healthy Choice: POWER Bowls  Happi Foodi: Carb Wise (Keto meal)  'Life Cuisine' Keto pizza  Quest: Low Carb Frozen Pizza  Realgood Co: Real Enchilada's  Eating Well (Rouses)    -Frozen Meals: Bulk Low Carb Options:  Cory'flour Foods: Keto Lasagna  Realgood: Low carb Lightly breaded chicken strips/nuggets  Harman's Natural Foods: Heat and Eat Entrees   NOTE: these come refrigerated, but you can  freeze them for up to 6 months)    -High Protein Pasta's:  'Banza' chickpea products:   Mac-n-cheese  Pasta's - all varieties   Rice   Red lentil  Yellow lentil pasta  Black bean pasta (aka squid ink pasta)  Edamame-based pasta    - Red Sauce (In A Jar):  Mitch & Rosario's Heart Smart Sauce (available flavors: Roasted Garlic, Gentry or Original)  Samidarrick's Finest Gourmet Foods Pasta Sauce  Classico Original  Engine 2 Plant-Strong    -Cold Cereals:  Special K PROTEIN  Premier Protein   Kashi Go Grain Free  Dark Cocoa + Cinnamon Vanilla  Makeda Crunch Keto-Friendly Cereals  Iwon organic protein crunchies  Three Wishes  Magic Spoon Grain-Free Cereals (online only)    - Hot Cereals- Grab & Go Oatmeal Cups:  Powerful Overnight Oats  Jhonatan's Red Mill: Gluten Free Oatmeal with Flax and Nigel  Wild Friends: Oats & Nut Butter  Think Thin: Protein & Fiber Hot Oatmeal    -Hot Cereal- Oatmeal Packets:  Kashi Go Lean Creamy All-Natural Vanilla  Restorationism - Low Sugar - Any Flavor    -Whole Grain Chips:  SunChips  Beanito's  Beanfields Bean Chips  Popcorners- FLEX Protein Crisps (10 grams protein per serving)    -High Protein Chips:  iwon organics protein stix  protein puffs  Quest  Protes    -Whole Grain Crackers:  Triscuits - Regular + thin crisps  Wheat Thins  Blue Asiya almond nut thins  Elana's Gone Cracker  Crunchmaster protein sea salt cracker    -Better-For-You Ice Cream  Ice Cream Bars:  Halo Top high protein ice cream pints - regular and keto series  KETO pint ice cream bars  Halo Top Pops  Realgood ice cream  Enlightened ice cream bar  Enlightened 'Light' ice cream  Yasso Frozen Greek yogurt bar    -Protein  Granola Bars:  Nature Valley Protein Chewy   Kashi Go Protein Bar   KIND Protein + KIND Bars (with 7 grams sugar or less)   Rx Bar   Rx Layers Bars   Kashi Grain Free Coconut [place]  Oatmega Bars    -BBQ Sauce:   DIDIER all natural   Primal Kitchen Classic BBQ sauce    -Salad Dressings:  James's Salad Dressing:  Sensation, Balsamic, Strawberry, Avocado, Caesar, Creole Ranch, Sweet Creole Mustard, Garlic & Red Wine   Primal Kitchen- all varieties    Lopez's Own - Balsamic Vinaigrette, Classic Oil & Vinegar     -Ready-to-Drink Protein Drinks:  Iconic Grass-Fed Protein Drink  Orgain Clean- grass-fed + plant based  OWYN Plant-Based Protein Shake  Fairlife 30-gram Protein Drink  [facility] CORE POWER Protein Drink    -Granola: [Note: granola should be used as a topper for a crunch, and not as a main meal]  ProGranola by CrowdSYNC  Engine 2 Plant Strong  Good Granoly Health Nut    -Flavored Greek Yogurt:  Chobani Less Sugar  Chobani Zero Sugar  Oikos Triple Zero  Two Good - All varieties   :ratio PROTEIN coconut Greek yogurt (25 grams protein  3 grams sugar)    - 'Vital Proteins' Collagen Peptides, unflavored:   Great to have as a staple in your pantry. You can add to soups, hummus, coffee, etc to make higher protein    -Supplements:  Vitamin D3: 1,000 - 2,000 i.u per day   Fish Oil: Look for at least 1200 mg EPA + DHA per 2 capsules  My favorite brand is [employer]'s Ultimate Omega  One-A-Day MVI  Brand example: [facility]'s Women's Multivitamin Ultra Samuel    -If you need to reschedule a nutrition appointment, please call [facility] at [phone]     Suturegard Intro: Intraoperative tissue expansion was performed, utilizing the SUTUREGARD device, in order to reduce wound tension.

## 2023-05-30 NOTE — TELEPHONE ENCOUNTER
----- Message from Reynold Licona RD sent at 5/30/2023  3:08 PM CDT -----  Regarding: nutrition referral  Good Afternoon    Can you please enter a Nutrition Consult referral ([id number]) for Dorothy Burt for Nutritional Counseling.  Thank you!    Reynold Licona RD

## 2023-05-31 NOTE — TELEPHONE ENCOUNTER
Pt does not have billable diagnosis for nutrition counseling referral requested. Informed KAYLYN Flaherty, recommended PCP involvement.

## 2023-06-07 ENCOUNTER — CLINICAL SUPPORT (OUTPATIENT)
Dept: REHABILITATION | Facility: HOSPITAL | Age: 68
End: 2023-06-07
Payer: MEDICARE

## 2023-06-07 DIAGNOSIS — M25.612 DECREASED ROM OF LEFT SHOULDER: ICD-10-CM

## 2023-06-07 DIAGNOSIS — R53.1 DECREASED STRENGTH: Primary | ICD-10-CM

## 2023-06-07 PROCEDURE — 97530 THERAPEUTIC ACTIVITIES: CPT | Mod: PN

## 2023-06-07 PROCEDURE — 97110 THERAPEUTIC EXERCISES: CPT | Mod: PN

## 2023-06-07 NOTE — PLAN OF CARE
[facility]  Physical Therapy Plan of Care Note     Name: Dorothy Burt  Clinic Number: [id number]    Therapy Diagnosis:   Encounter Diagnoses   Name Primary?    Decreased strength Yes    Decreased ROM of left shoulder      Physician: Louie Luna MD    Visit Date: 6/7/2023    Physician Orders: PT Eval and Treat  Medical Diagnosis from Referral: Z98.890 (ICD-10-CM) - S/P left rotator cuff repair  Evaluation Date: 3/14/2023  Plan of Care Expiration: 6/16/23  Visit # / Visits authorized: 16/35    Precautions: Standard  Functional Level Prior to Evaluation:  limited left shoulder mobility and poor overhead shoulder strength.    SUBJECTIVE     Update: no pain in left shoulder, difficulty with overhead lifting/reaching remains largest complaint.    OBJECTIVE     Update:   Left shoulder A/PROM: 6/7/23  Supine:  Flexion: 160/165 degrees  Abduction: 130/175 degrees  External rotation at 30 degrees: 63/65 degrees  External rotation at 90 degrees: 80/85 degrees  Internal rotation at 90 degrees: 85/90 degrees     Sitting:   Flexion: 130 degrees  Abduction: 105 degrees  Reach under back: left @L1, right @T10  Reach over back: left @T3, right @T5    ASSESSMENT     Update: Dorothy is a 68 y.o. female referred to outpatient Physical Therapy with a medical diagnosis of  S/P left rotator cuff repair presenting to PT at [facility].   PROM normalized, shoulder AROM and strength is primary limitation, decreased GHJ mobility compared to scapular, and minimal overall joint/cuff irritability. Improved left shoulder AROM since re-initiation of therapy and she continued to gradually tolerate more overhead strength. Will finish up scheduled visits and pt will be appropriate for discharge with updated home exercise program focusing on overhead strength.    Previous Short /Long Term Goals Status:    GOALS  Goals: Short Term Goals: 4 weeks  1. Report decreased left shoulder pain </=  0/10 with chest level activities to increase tolerance for ADLs and increased QoL. MET  2. Increase left shoulder PROM to 140 degrees flexion for increased functional mobility. MET  3. Increased strength by 1/3 MMT grade in LUE to increase tolerance for ADL and work activities. MET  4. Pt to tolerate HEP to improve ROM and independence with ADL's. MET     Long Term Goals: 8 weeks  1. Report decreased left shoulder pain </= 2/10 with overhead activities to increase tolerance for ADLs and increased QoL. MET  2. Increase left shoulder AROM to 150 degrees flexion for increased functional mobility and independent ADLs with decreased pain. PROGRESSING; NOT MET  3. Increase strength to >/= 4/5 in BUE to increase tolerance for ADL and work activities. PROGRESSING; NOT MET  4. Pt goal: to get my shoulder stronger. PROGRESSING; NOT MET  5. Pt will have improved to score of </= 31% limited on shoulder FOTO in order to demonstrate true functional improvement. PROGRESSING; NOT MET  Long Term Goal Status: continue per initial plan of care.  Reasons for Recertification of Therapy:   UPOC    PLAN     Updated Certification Period: 6/7/23 to 6/30/23   Recommended Treatment Plan: 2 times per week for 4 weeks:  Aquatic Therapy, Cervical/Lumbar Traction, Electrical Stimulation IFC, Gait Training, Manual Therapy, Moist Heat/ Ice, Neuromuscular Re-ed, Orthotic Management and Training, Patient Education, Self Care, Therapeutic Activities, and Therapeutic Exercise  Other Recommendations: None    Jose C Lutz, PT    I certify the need for these services furnished under this plan of treatment and while under my care.        Louie Luna MD, FAAOS  , Orthopaedic Sports Medicine  Residency   [facility] Department of Orthopaedic Surgery  Assistant Orthopaedic Surgeon, [facility]  Head Team Physician, [facility]

## 2023-06-07 NOTE — PROGRESS NOTES
Physical Therapy Daily Treatment Note     Name: Dorothy Burt  Clinic Number: [id number]    Therapy Diagnosis:   Encounter Diagnoses   Name Primary?    Decreased strength Yes    Decreased ROM of left shoulder      Physician: Louie Luna MD    Visit Date: 6/7/2023    Physician Orders: PT Eval and Treat  Medical Diagnosis from Referral: Z98.890 (ICD-10-CM) - S/P left rotator cuff repair  Evaluation Date: 3/14/2023 - UPOC done today  Authorization Period Expiration: 06/06/2023  Plan of Care Expiration: 6/16/23, 7/7/23  Visit # / Visits authorized: 1/1, 16/35  FOTO: next visit  PTA Visit: 0/6     Time In: 10:05 AM  Time Out: 11:00 AM  Total Billable Time: 25 1:1 minutes (1 TE, 1 TA)     Precautions: Standard, Asthma, Thyroid disease    Subjective     Pt reports: no pain in left shoulder, difficulty with overhead lifting/reaching remains largest complaint.    She was compliant with home exercise program.  Response to previous treatment: no shoulder pain  Functional change: using her arm more    Pain: 0/10  Location: left shoulder      Objective     Left shoulder A/PROM: 6/7/23  Supine:  Flexion: 160/165 degrees  Abduction: 130/175 degrees  External rotation at 30 degrees: 63/65 degrees  External rotation at 90 degrees: 80/85 degrees  Internal rotation at 90 degrees: 85/90 degrees    Sitting:   Flexion: 130 degrees  Abduction: 105 degrees  Reach under back: left @L1, right @T10  Reach over back: left @T3, right @T5    Dorothy received the following manual therapy techniques: PROM with luis a stretching  were applied to the: L shoulder for 10 minutes, including:    Grade II-IV posterior and inferior GHJ mobs with shoulder PROM D2 flexion   Left shoulder distraction    Dorothy received therapeutic exercises to develop strength, endurance, ROM, and flexibility for 20 1:1 minutes including: reassessment    Not today: reassessment done for UPOC  Upper body ergometer: 3 minutes fwd/3 minutes  "reverse level 3.5 for improved cardiovascular endurance  Wall walk with lift off: 15x - not today  Theraband horizontal abduction: red theraband 2x15  (back against wall)  Seated teres minor external rotation: 3x10, 4# left   Theraband standing abduction to 90 degree: yellow theraband 3x5 left     Dorothy participated in dynamic functional therapeutic activities to improve functional performance for 25 1:1 minutes including:    Wand overhead press: 4 pounds 2x15 double arm, back on wall  Wand overhead hold: 3 pounds 1 minute  Mat push ups: 2x12  Mini dip push ups from chair: 3x10 from airex - not today  Statue of liberty walks: 80'x3 with 2# dumbbell - not today  Standing scaption: 3x12 left, 1# - not today  Body blade:  External rotation/internal rotation @ hip height: 4x30"   External rotation/internal rotation @ shoulder height: 2x30"     Not today:  External rotation/internal rotation and flexion/extension @ shoulder height : 2x20" each    Assessment     Dorothy is a 68 y.o. female referred to outpatient Physical Therapy with a medical diagnosis of  S/P left rotator cuff repair [place] [facility]   PROM greater than AROM, strength is primary limitation, decreased GHJ mobility compared to scapular, and mild overall joint/cuff irritability. UPOC done today.    Dorothy Is progressing well towards her goals.   Pt prognosis is Good.     Pt will continue to benefit from skilled outpatient physical therapy to address the deficits listed in the problem list box on initial evaluation, provide pt/family education and to maximize pt's level of independence in the home and community environment.     Pt's spiritual, cultural and educational needs considered and pt agreeable to plan of care and goals.    Anticipated barriers to physical therapy: Chronic Shoulder ROM limitations.     Goals: Short Term Goals: 4 weeks  1. Report decreased left shoulder pain </= 0/10 with chest level activities to " increase tolerance for ADLs and increased QoL. MET  2. Increase left shoulder PROM to 140 degrees flexion for increased functional mobility. MET  3. Increased strength by 1/3 MMT grade in LUE to increase tolerance for ADL and work activities. MET  4. Pt to tolerate HEP to improve ROM and independence with ADL's. MET     Long Term Goals: 8 weeks  1. Report decreased left shoulder pain </= 2/10 with overhead activities to increase tolerance for ADLs and increased QoL. MET  2. Increase left shoulder AROM to 150 degrees flexion for increased functional mobility and independent ADLs with decreased pain. PROGRESSING; NOT MET  3. Increase strength to >/= 4/5 in BUE to increase tolerance for ADL and work activities. PROGRESSING; NOT MET  4. Pt goal: to get my shoulder stronger. PROGRESSING; NOT MET  5. Pt will have improved to score of </= 31% limited on shoulder FOTO in order to demonstrate true functional improvement. PROGRESSING; NOT MET    Plan     Cont to work on overhead strength and endurance.    Plan of care Certification: 5/12/23 to 6/16/23    Jose C Lutz, PT,

## 2023-06-09 ENCOUNTER — CLINICAL SUPPORT (OUTPATIENT)
Dept: REHABILITATION | Facility: HOSPITAL | Age: 68
End: 2023-06-09
Payer: MEDICARE

## 2023-06-09 DIAGNOSIS — M25.612 DECREASED ROM OF LEFT SHOULDER: ICD-10-CM

## 2023-06-09 DIAGNOSIS — R53.1 DECREASED STRENGTH: Primary | ICD-10-CM

## 2023-06-09 PROCEDURE — 97110 THERAPEUTIC EXERCISES: CPT | Mod: PN | Performed by: PHYSICAL THERAPIST

## 2023-06-09 PROCEDURE — 97530 THERAPEUTIC ACTIVITIES: CPT | Mod: PN | Performed by: PHYSICAL THERAPIST

## 2023-06-09 NOTE — PROGRESS NOTES
Physical Therapy Daily Treatment Note     Name: Dorothy Shultz [facility]  Clinic Number: [id number]    Therapy Diagnosis:   Encounter Diagnoses   Name Primary?    Decreased strength Yes    Decreased ROM of left shoulder      Physician: Louie Luna MD    Visit Date: 6/9/2023    Physician Orders: PT Eval and Treat  Medical Diagnosis from Referral: Z98.890 (ICD-10-CM) - S/P left rotator cuff repair  Evaluation Date: 3/14/2023 - UPOC done today  Authorization Period Expiration: 06/06/2023  Plan of Care Expiration: 6/16/23, 7/7/23  Visit # / Visits authorized: 1/1, 17/35  FOTO: next visit  PTA Visit: 0/6     Time In: 9:04 AM  Time Out: 9:52 AM  Total Billable Time: 25 1:1 minutes (1 TE, 1 TA)     Precautions: Standard, Asthma, Thyroid disease    Subjective     Pt reports: she didn't realize she had visits last week until it was too late and she couldn't make it.    She was compliant with home exercise program.  Response to previous treatment: no shoulder pain  Functional change: using her arm more    Pain: 0/10  Location: left shoulder      Objective     Left shoulder A/PROM: 6/7/23  Supine:  Flexion: 160/165 degrees  Abduction: 130/175 degrees  External rotation at 30 degrees: 63/65 degrees  External rotation at 90 degrees: 80/85 degrees  Internal rotation at 90 degrees: 85/90 degrees    Sitting:   Flexion: 130 degrees  Abduction: 105 degrees  Reach under back: left @L1, right @T10  Reach over back: left @T3, right @T5    Dorothy received the following manual therapy techniques: PROM with luis a stretching  were applied to the: L shoulder for 00 minutes, including:    Grade II-IV posterior and inferior GHJ mobs with shoulder PROM D2 flexion   Left shoulder distraction    Dorothy received therapeutic exercises to develop strength, endurance, ROM, and flexibility for 13 1:1 and 5 supervised minutes including: reassessment    Upper body ergometer: 3 minutes fwd/3 minutes reverse level 4 for improved  "cardiovascular endurance  Wall walk with lift off: 15x  Theraband horizontal abduction: red theraband 2x15  (back against wall)  Seated teres minor external rotation: 3x10, 4# left   Theraband standing abduction to 90 degree: yellow theraband 3x5 left     Dorothy participated in dynamic functional therapeutic activities to improve functional performance for 12 1:1 and 18 supervised minutes including:    Wand overhead press: 4 pounds 2x15 double arm, back on wall  Wand overhead hold: 3 pounds 1 minute  Mat push ups: 2x12  Mini dip push ups from chair: 3x10   Statue of [place] walks: 80'x4 with 2# dumbbell  Standing scaption: 3x12 left, 1#  D2 flexion: 2x5 left   Body blade:  External rotation/internal rotation @ hip height: 4x30"   External rotation/internal rotation @ shoulder height: 2x30"       Not today:  External rotation/internal rotation and flexion/extension @ shoulder height : 2x20" each    Assessment     Dorothy is a 68 y.o. female referred to outpatient Physical Therapy with a medical diagnosis of  S/P left rotator cuff repair presenting to PT at [facility].   Shoulder hike compensation still present with most overhead activities. Improved D2 PNF AROM today compared to previous visit. Also improved tricep dip today indicating improved tricep activation.     Dorothy Is progressing well towards her goals.   Pt prognosis is Good.     Pt will continue to benefit from skilled outpatient physical therapy to address the deficits listed in the problem list box on initial evaluation, provide pt/family education and to maximize pt's level of independence in the home and community environment.     Pt's spiritual, cultural and educational needs considered and pt agreeable to plan of care and goals.    Anticipated barriers to physical therapy: Chronic Shoulder ROM limitations.     Goals: Short Term Goals: 4 weeks  1. Report decreased left shoulder pain </= 0/10 with chest level activities to increase " tolerance for ADLs and increased QoL. MET  2. Increase left shoulder PROM to 140 degrees flexion for increased functional mobility. MET  3. Increased strength by 1/3 MMT grade in LUE to increase tolerance for ADL and work activities. MET  4. Pt to tolerate HEP to improve ROM and independence with ADL's. MET     Long Term Goals: 8 weeks  1. Report decreased left shoulder pain </= 2/10 with overhead activities to increase tolerance for ADLs and increased QoL. MET  2. Increase left shoulder AROM to 150 degrees flexion for increased functional mobility and independent ADLs with decreased pain. PROGRESSING; NOT MET  3. Increase strength to >/= 4/5 in BUE to increase tolerance for ADL and work activities. PROGRESSING; NOT MET  4. Pt goal: to get my shoulder stronger. PROGRESSING; NOT MET  5. Pt will have improved to score of </= 31% limited on shoulder FOTO in order to demonstrate true functional improvement. PROGRESSING; NOT MET    Plan     Cont to work on overhead strength and endurance.    Plan of care Certification: 5/12/23 to 6/16/23    Louie Tobar, PT,

## 2023-06-12 ENCOUNTER — CLINICAL SUPPORT (OUTPATIENT)
Dept: REHABILITATION | Facility: HOSPITAL | Age: 68
End: 2023-06-12
Payer: MEDICARE

## 2023-06-12 DIAGNOSIS — R53.1 DECREASED STRENGTH: Primary | ICD-10-CM

## 2023-06-12 DIAGNOSIS — M25.612 DECREASED ROM OF LEFT SHOULDER: ICD-10-CM

## 2023-06-12 PROCEDURE — 97110 THERAPEUTIC EXERCISES: CPT | Mod: PN

## 2023-06-12 PROCEDURE — 97530 THERAPEUTIC ACTIVITIES: CPT | Mod: PN

## 2023-06-12 NOTE — PROGRESS NOTES
Physical Therapy Daily Treatment Note     Name: Dorothy Burt  Clinic Number: [id number]    Therapy Diagnosis:   Encounter Diagnoses   Name Primary?    Decreased strength Yes    Decreased ROM of left shoulder      Physician: Louie Luna MD    Visit Date: 6/12/2023    Physician Orders: PT Eval and Treat  Medical Diagnosis from Referral: Z98.890 (ICD-10-CM) - S/P left rotator cuff repair  Evaluation Date: 3/14/2023 - UPOC done today  Authorization Period Expiration: 06/06/2023  Plan of Care Expiration: 6/16/23, 7/7/23  Visit # / Visits authorized: 1/1, 18/35  FOTO: done today 6/12/23  PTA Visit: 0/6     Time In: 10:25 AM  Time Out: 11:00 AM  Total Billable Time: 30 1:1 minutes (1 TE, 1 TA)     Precautions: Standard, Asthma, Thyroid disease    Subjective     Pt reports: late today. Right shoulder has been bothering her. No new complaints on left shoulder, minimal soreness after last visit that did not surpass a day.    She was compliant with home exercise program.  Response to previous treatment: no shoulder pain  Functional change: using her arm more    Pain: 0/10  Location: left shoulder      Objective     Left shoulder A/PROM: 6/7/23  Supine:  Flexion: 160/165 degrees  Abduction: 130/175 degrees  External rotation at 30 degrees: 63/65 degrees  External rotation at 90 degrees: 80/85 degrees  Internal rotation at 90 degrees: 85/90 degrees    Sitting:   Flexion: 130 degrees  Abduction: 105 degrees  Reach under back: left @L1, right @T10  Reach over back: left @T3, right @T5    Dorothy received the following manual therapy techniques: PROM with luis a stretching  were applied to the: L shoulder for 00 minutes, including:    Grade II-IV posterior and inferior GHJ mobs with shoulder PROM D2 flexion   Left shoulder distraction    Dorothy received therapeutic exercises to develop strength, endurance, ROM, and flexibility for 10 minutes including:     Upper body ergometer: 4 minutes fwd/4  "minutes reverse level 4 for improved cardiovascular endurance  Sidelying shoulder abduction: 2x15 left, 3#    Not done today:  Wall walk with lift off: 15x  Seated teres minor external rotation: 3x10, 4# left     Dorothy participated in dynamic functional therapeutic activities to improve functional performance for 25 minutes including:    D1 shoulder flexion: 2x12, yellow theraband   Wand overhead press: 4 pounds 2x10 double arm, back on wall  Statue of [place] walks: 80'x4 with 3# dumbbell  Standing scaption: 2x10 left, yellow theraband     Not done today:  Body blade:  External rotation/internal rotation @ hip height: 4x30"   External rotation/internal rotation @ shoulder height: 2x30"   Wand overhead hold: 3 pounds 1 minute  Mat push ups: 2x12  Mini dip push ups from chair: 3x10     Assessment     Dorothy is a 68 y.o. female referred to outpatient Physical Therapy with a medical diagnosis of  S/P left rotator cuff repair presenting to PT at [facility].   Pain only at end range, decreased GHJ mobility rather than scapular mobility, new right shoulder pain that limited overhead activities today, and muscle fatigue present after session. Pt progressing well and nearing discharge overall.     Dorothy Is progressing well towards her goals.   Pt prognosis is Good.     Pt will continue to benefit from skilled outpatient physical therapy to address the deficits listed in the problem list box on initial evaluation, provide pt/family education and to maximize pt's level of independence in the home and community environment.     Pt's spiritual, cultural and educational needs considered and pt agreeable to plan of care and goals.    Anticipated barriers to physical therapy: Chronic Shoulder ROM limitations.     Goals: Short Term Goals: 4 weeks  1. Report decreased left shoulder pain </= 0/10 with chest level activities to increase tolerance for ADLs and increased QoL. MET  2. Increase left shoulder PROM " to 140 degrees flexion for increased functional mobility. MET  3. Increased strength by 1/3 MMT grade in LUE to increase tolerance for ADL and work activities. MET  4. Pt to tolerate HEP to improve ROM and independence with ADL's. MET     Long Term Goals: 8 weeks  1. Report decreased left shoulder pain </= 2/10 with overhead activities to increase tolerance for ADLs and increased QoL. MET  2. Increase left shoulder AROM to 150 degrees flexion for increased functional mobility and independent ADLs with decreased pain. PROGRESSING; NOT MET  3. Increase strength to >/= 4/5 in BUE to increase tolerance for ADL and work activities. PROGRESSING; NOT MET  4. Pt goal: to get my shoulder stronger. PROGRESSING; NOT MET  5. Pt will have improved to score of </= 31% limited on shoulder FOTO in order to demonstrate true functional improvement. PROGRESSING; NOT MET    Plan     Cont to work on overhead strength and endurance.    Plan of care Certification: 5/12/23 to 6/16/23    Jose C Lutz, PT,

## 2023-06-14 ENCOUNTER — OFFICE VISIT (OUTPATIENT)
Dept: ORTHOPEDICS | Facility: CLINIC | Age: 68
End: 2023-06-14
Payer: MEDICARE

## 2023-06-14 VITALS
BODY MASS INDEX: 31.77 KG/M2 | HEART RATE: 85 BPM | DIASTOLIC BLOOD PRESSURE: 71 MMHG | HEIGHT: 64 IN | WEIGHT: 186.06 LBS | SYSTOLIC BLOOD PRESSURE: 111 MMHG

## 2023-06-14 DIAGNOSIS — Z98.890 S/P LEFT ROTATOR CUFF REPAIR: Primary | ICD-10-CM

## 2023-06-14 PROCEDURE — 3288F PR FALLS RISK ASSESSMENT DOCUMENTED: ICD-10-PCS | Mod: CPTII,S$GLB,, | Performed by: ORTHOPAEDIC SURGERY

## 2023-06-14 PROCEDURE — 99999 PR PBB SHADOW E&M-EST. PATIENT-LVL III: ICD-10-PCS | Mod: PBBFAC,,, | Performed by: ORTHOPAEDIC SURGERY

## 2023-06-14 PROCEDURE — 3074F PR MOST RECENT SYSTOLIC BLOOD PRESSURE < 130 MM HG: ICD-10-PCS | Mod: CPTII,S$GLB,, | Performed by: ORTHOPAEDIC SURGERY

## 2023-06-14 PROCEDURE — 1160F PR REVIEW ALL MEDS BY PRESCRIBER/CLIN PHARMACIST DOCUMENTED: ICD-10-PCS | Mod: CPTII,S$GLB,, | Performed by: ORTHOPAEDIC SURGERY

## 2023-06-14 PROCEDURE — 1126F AMNT PAIN NOTED NONE PRSNT: CPT | Mod: CPTII,S$GLB,, | Performed by: ORTHOPAEDIC SURGERY

## 2023-06-14 PROCEDURE — 3044F HG A1C LEVEL LT 7.0%: CPT | Mod: CPTII,S$GLB,, | Performed by: ORTHOPAEDIC SURGERY

## 2023-06-14 PROCEDURE — 3074F SYST BP LT 130 MM HG: CPT | Mod: CPTII,S$GLB,, | Performed by: ORTHOPAEDIC SURGERY

## 2023-06-14 PROCEDURE — 1159F PR MEDICATION LIST DOCUMENTED IN MEDICAL RECORD: ICD-10-PCS | Mod: CPTII,S$GLB,, | Performed by: ORTHOPAEDIC SURGERY

## 2023-06-14 PROCEDURE — 1159F MED LIST DOCD IN RCRD: CPT | Mod: CPTII,S$GLB,, | Performed by: ORTHOPAEDIC SURGERY

## 2023-06-14 PROCEDURE — 3008F BODY MASS INDEX DOCD: CPT | Mod: CPTII,S$GLB,, | Performed by: ORTHOPAEDIC SURGERY

## 2023-06-14 PROCEDURE — 1101F PT FALLS ASSESS-DOCD LE1/YR: CPT | Mod: CPTII,S$GLB,, | Performed by: ORTHOPAEDIC SURGERY

## 2023-06-14 PROCEDURE — 1160F RVW MEDS BY RX/DR IN RCRD: CPT | Mod: CPTII,S$GLB,, | Performed by: ORTHOPAEDIC SURGERY

## 2023-06-14 PROCEDURE — 1126F PR PAIN SEVERITY QUANTIFIED, NO PAIN PRESENT: ICD-10-PCS | Mod: CPTII,S$GLB,, | Performed by: ORTHOPAEDIC SURGERY

## 2023-06-14 PROCEDURE — 3078F DIAST BP <80 MM HG: CPT | Mod: CPTII,S$GLB,, | Performed by: ORTHOPAEDIC SURGERY

## 2023-06-14 PROCEDURE — 3288F FALL RISK ASSESSMENT DOCD: CPT | Mod: CPTII,S$GLB,, | Performed by: ORTHOPAEDIC SURGERY

## 2023-06-14 PROCEDURE — 99999 PR PBB SHADOW E&M-EST. PATIENT-LVL III: CPT | Mod: PBBFAC,,, | Performed by: ORTHOPAEDIC SURGERY

## 2023-06-14 PROCEDURE — 99213 PR OFFICE/OUTPT VISIT, EST, LEVL III, 20-29 MIN: ICD-10-PCS | Mod: S$GLB,,, | Performed by: ORTHOPAEDIC SURGERY

## 2023-06-14 PROCEDURE — 3044F PR MOST RECENT HEMOGLOBIN A1C LEVEL <7.0%: ICD-10-PCS | Mod: CPTII,S$GLB,, | Performed by: ORTHOPAEDIC SURGERY

## 2023-06-14 PROCEDURE — 3008F PR BODY MASS INDEX (BMI) DOCUMENTED: ICD-10-PCS | Mod: CPTII,S$GLB,, | Performed by: ORTHOPAEDIC SURGERY

## 2023-06-14 PROCEDURE — 1101F PR PT FALLS ASSESS DOC 0-1 FALLS W/OUT INJ PAST YR: ICD-10-PCS | Mod: CPTII,S$GLB,, | Performed by: ORTHOPAEDIC SURGERY

## 2023-06-14 PROCEDURE — 3078F PR MOST RECENT DIASTOLIC BLOOD PRESSURE < 80 MM HG: ICD-10-PCS | Mod: CPTII,S$GLB,, | Performed by: ORTHOPAEDIC SURGERY

## 2023-06-14 PROCEDURE — 99213 OFFICE O/P EST LOW 20 MIN: CPT | Mod: S$GLB,,, | Performed by: ORTHOPAEDIC SURGERY

## 2023-06-14 NOTE — PROGRESS NOTES
Patient ID:   Dorothy Burt is a 68 y.o. female.    Chief Complaint:   12m 5d s/p L RCR, extensive debridement    HPI:   Patient returns over a year out from her surgery.  Overall, I think she is doing pretty well.  Pain level 0/10.  She does report some achiness in both of her shoulders.  She continues with physical therapy to try to rebuild some strength.  She still feels like she is a little bit weak on the left side.  She has 4 more visits of physical therapy left.    Medications:    Current Outpatient Medications:     acyclovir (ZOVIRAX) 400 MG tablet, TAKE 1 BY MOUTH 5 TIMES DAILY FOR 5 DAYS, Disp: 25 tablet, Rfl: 2    albuterol (PROAIR HFA) 90 mcg/actuation inhaler, Inhale 2 puffs into the lungs every 6 (six) hours as needed for Wheezing or Shortness of Breath. Rescue, Disp: 18 g, Rfl: 11    albuterol (VENTOLIN HFA) 90 mcg/actuation inhaler, Inhale 2 puffs into the lungs every 6 (six) hours as needed for Wheezing. Rescue. Ok to substitute based on availability: Proair, Proventil, ventolin, Disp: 18 g, Rfl: 0    budesonide-formoterol 160-4.5 mcg (SYMBICORT) 160-4.5 mcg/actuation HFAA, Inhale 2 puffs into the lungs every evening. Controller, Disp: , Rfl:     diclofenac (VOLTAREN) 50 MG EC tablet, Take 1 tablet (50 mg total) by mouth 2 (two) times daily as needed (Pain)., Disp: 60 tablet, Rfl: 1    diclofenac sodium (VOLTAREN) 1 % Gel, Apply 2 g topically 2 (two) times daily as needed (to knees)., Disp: 100 g, Rfl: 2    famotidine (PEPCID) 20 MG tablet, TAKE 1 TABLET BY MOUTH TWICE A DAY, Disp: 30 tablet, Rfl: 11    fluticasone propionate (FLONASE) 50 mcg/actuation nasal spray, 2 sprays (100 mcg total) by Each Nostril route once daily., Disp: 48 g, Rfl: 3    guaiFENesin-codeine 100-10 mg/5 ml (TUSSI-ORGANIDIN NR)  mg/5 mL syrup, TAKE 5 MLS BY MOUTH EVERY 6 HOURS AS NEEDED FOR COUGH, Disp: 200 mL, Rfl: 0    levocetirizine (XYZAL) 5 MG tablet, TAKE 1 TABLET BY MOUTH EVERY DAY AS NEEDED, Disp: 90 tablet,  Rfl: 3    levothyroxine (SYNTHROID) 25 MCG tablet, TAKE 1 AND 1/2 TABLETS BY MOUTH DAILY, Disp: 135 tablet, Rfl: 3    loratadine (CLARITIN) 10 mg tablet, Take 1 tablet (10 mg total) by mouth once daily., Disp: 30 tablet, Rfl: 2    meloxicam (MOBIC) 15 MG tablet, TAKE 1 TABLET BY MOUTH EVERY DAY AS NEEDED FOR PAIN, Disp: 30 tablet, Rfl: 0    miSOPROStoL (CYTOTEC) 200 MCG Tab, Take 1 tablet (200 mcg total) by mouth 2 (two) times daily as needed (with diclofenac rx)., Disp: 60 tablet, Rfl: 1    omeprazole (PRILOSEC) 20 MG capsule, Take 1 capsule (20 mg total) by mouth every morning., Disp: 90 capsule, Rfl: 3    scopolamine (TRANSDERM-SCOP) 1.3-1.5 mg (1 mg over 3 days), Place 1 patch onto the skin every 72 hours., Disp: 4 patch, Rfl: 1    traMADoL (ULTRAM) 50 mg tablet, , Disp: , Rfl:     Allergies:  Review of patient's allergies indicates:   Allergen Reactions    House dust        Past Medical History:  Past Medical History:   Diagnosis Date    Arthritis     Asthma     Back pain     Knee pain     Thyroid disease         Past Surgical History:  Past Surgical History:   Procedure Laterality Date    ACROMIOPLASTY  6/9/2022    Procedure: ACROMIOPLASTY;  Surgeon: Louie Luna MD;  Location: [facility] OR;  Service: Orthopedics;;    ARTHROSCOPIC REPAIR OF ROTATOR CUFF OF SHOULDER Left 6/9/2022    Procedure: REPAIR, ROTATOR CUFF, ARTHROSCOPIC, extensive arthroscopic debridement;  Surgeon: Louie Luna MD;  Location: [facility] OR;  Service: Orthopedics;  Laterality: Left;  Smith-Nephew     ARTHROSCOPIC TENOTOMY OF BICEPS TENDON  6/9/2022    Procedure: TENOTOMY, BICEPS, ARTHROSCOPIC;  Surgeon: Louie Luna MD;  Location: [facility] OR;  Service: Orthopedics;;    ARTHROSCOPY OF SHOULDER WITH DECOMPRESSION OF SUBACROMIAL SPACE  6/9/2022    Procedure: ARTHROSCOPY, SHOULDER, WITH SUBACROMIAL SPACE DECOMPRESSION;  Surgeon: Louie Luna MD;  Location: [facility] OR;  Service: Orthopedics;;    CHOLECYSTECTOMY      CORONARY  "ANGIOGRAPHY N/A 4/19/2020    Procedure: ANGIOGRAM, CORONARY ARTERY;  Surgeon: Zachariah Goldman MD;  Location: [facility] CATH LAB;  Service: Cardiology;  Laterality: N/A;    LEFT HEART CATHETERIZATION Right 4/19/2020    Procedure: Left heart cath;  Surgeon: Zachariah Goldman MD;  Location: [facility] CATH LAB;  Service: Cardiology;  Laterality: Right;    SYNOVECTOMY OF SHOULDER  6/9/2022    Procedure: SYNOVECTOMY, SHOULDER;  Surgeon: Louie Luna MD;  Location: [facility] OR;  Service: Orthopedics;;    VASCULAR SURGERY         Social History:  Social History     Occupational History    Not on file   Tobacco Use    Smoking status: Never    Smokeless tobacco: Never   Substance and Sexual Activity    Alcohol use: Yes     Alcohol/week: 1.0 standard drink     Types: 1 Shots of liquor per week     Comment: occ    Drug use: No    Sexual activity: Not on file       Family History:  Family History   Problem Relation Age of Onset    Stroke Mother         ROS:  Review of Systems   Musculoskeletal:  Positive for myalgias.   All other systems reviewed and are negative.    Vitals:  /71   Pulse 85   Ht 5' 4" (1.626 m)   Wt 84.4 kg (186 lb 1.1 oz)   LMP 08/15/2005   BMI 31.94 kg/m²     Physical Examination:  Comprehensive Orthopaedic Musculoskeletal Exam    General      Constitutional: appears stated age, mildly obese, well-developed and well-nourished    Scleral icterus: no    Labored breathing: no    Psychiatric: normal mood and affect and no acute distress    Neurological: alert and oriented x3    Skin: intact    Lymphadenopathy: none   Ortho Exam   Left shoulder exam:  Range of motion reveals active elevation to 160°, external rotation 20, internal rotation L1.  Rotator cuff strength exam is 4+ out of 5 in elevation, 5/5 internal and external rotation.    Assessment:  1. S/P left rotator cuff repair      Plan:  The patient was encouraged to go ahead and complete the rest of her physical therapy and then convert over to a " maintenance program at home.  At this point, she will return as needed.     No follow-ups on file.

## 2023-06-16 ENCOUNTER — CLINICAL SUPPORT (OUTPATIENT)
Dept: REHABILITATION | Facility: HOSPITAL | Age: 68
End: 2023-06-16
Payer: MEDICARE

## 2023-06-16 DIAGNOSIS — M25.612 DECREASED ROM OF LEFT SHOULDER: ICD-10-CM

## 2023-06-16 DIAGNOSIS — R53.1 DECREASED STRENGTH: Primary | ICD-10-CM

## 2023-06-16 PROCEDURE — 97530 THERAPEUTIC ACTIVITIES: CPT | Mod: PN,CQ

## 2023-06-16 PROCEDURE — 97110 THERAPEUTIC EXERCISES: CPT | Mod: PN,CQ

## 2023-06-16 NOTE — PROGRESS NOTES
Physical Therapy Daily Treatment Note     Name: Dorothy Shultz [facility]  Clinic Number: [id number]    Therapy Diagnosis:   Encounter Diagnoses   Name Primary?    Decreased strength Yes    Decreased ROM of left shoulder        Physician: Louie Luna MD    Visit Date: 6/16/2023    Physician Orders: PT Eval and Treat  Medical Diagnosis from Referral: Z98.890 (ICD-10-CM) - S/P left rotator cuff repair  Evaluation Date: 3/14/2023 - UPOC done today  Authorization Period Expiration: 06/06/2023  Plan of Care Expiration: 6/16/23, 7/7/23  Visit # / Visits authorized: 1/1, 19/35  FOTO: done today 6/12/23  PTA Visit: 1/6     Time In: 901AM  Time Out: 10:00 AM  Total Billable Time: 58 1:1 minutes (2TE, 2 TA)     Precautions: Standard, Asthma, Thyroid disease    Subjective     Pt reports:No new complaints on left shoulder, minimal soreness after last visit that did not surpass a day.    She was compliant with home exercise program.  Response to previous treatment: no shoulder pain  Functional change: using her arm more    Pain: 0/10  Location: left shoulder      Objective     Left shoulder A/PROM: 6/7/23  Supine:  Flexion: 160/165 degrees  Abduction: 130/175 degrees  External rotation at 30 degrees: 63/65 degrees  External rotation at 90 degrees: 80/85 degrees  Internal rotation at 90 degrees: 85/90 degrees    Sitting:   Flexion: 130 degrees  Abduction: 105 degrees  Reach under back: left @L1, right @T10  Reach over back: left @T3, right @T5    Dorothy received the following manual therapy techniques: PROM with luis a stretching  were applied to the: L shoulder for 00 minutes, including:    Grade II-IV posterior and inferior GHJ mobs with shoulder PROM D2 flexion   Left shoulder distraction    Dorothy received therapeutic exercises to develop strength, endurance, ROM, and flexibility for 23 minutes including:     Upper body ergometer: 4 minutes fwd/4 minutes reverse level 4 for improved cardiovascular  "endurance  Sidelying shoulder abduction: 2x15 left, 3#  Side Lying ER 2x12 3#  Prone T with ER 2x12 x3"   Wall walk with lift off: 2x12 (@ finger ladder)   [facility] shoulder flexion stretch elbow flexed yoga block between hands. 10x10"     Not done today:  Seated teres minor external rotation: 3x10, 4# left     Dorothy participated in dynamic functional therapeutic activities to improve functional performance for 25 minutes including:    D1 shoulder flexion: 2x12, yellow theraband   Wand overhead press: 4 pounds 2x10 double arm, back on wall  [place] walks: 80'x4 with 3# dumbbell  Standing scaption: 2x10 left, yellow theraband   Mat push ups: 2x12  Mini dip push ups from chair: 3x10     Not done today:  Body blade:  External rotation/internal rotation @ hip height: 4x30"   External rotation/internal rotation @ shoulder height: 2x30"   Wand overhead hold: 3 pounds 1 minute      Assessment     Dorothy is a 68 y.o. female referred to outpatient Physical Therapy with a medical diagnosis of  S/P left rotator cuff repair presenting to PT at [facility].   Pain only at end range, decreased GHJ mobility rather than scapular mobility. Patient tolerates tx well with report of fatigue/soreness but denies joint pain. Initiated prone T with ER for GHJ mobility into ER and Abduction against gravity. Initiated Closed chain Shoulder flexion stretch with yoga block between     Dorothy Is progressing well towards her goals.   Pt prognosis is Good.     Pt will continue to benefit from skilled outpatient physical therapy to address the deficits listed in the problem list box on initial evaluation, provide pt/family education and to maximize pt's level of independence in the home and community environment.     Pt's spiritual, cultural and educational needs considered and pt agreeable to plan of care and goals.    Anticipated barriers to physical therapy: Chronic Shoulder ROM limitations.     Goals: Short Term " Goals: 4 weeks  1. Report decreased left shoulder pain </= 0/10 with chest level activities to increase tolerance for ADLs and increased QoL. MET  2. Increase left shoulder PROM to 140 degrees flexion for increased functional mobility. MET  3. Increased strength by 1/3 MMT grade in LUE to increase tolerance for ADL and work activities. MET  4. Pt to tolerate HEP to improve ROM and independence with ADL's. MET     Long Term Goals: 8 weeks  1. Report decreased left shoulder pain </= 2/10 with overhead activities to increase tolerance for ADLs and increased QoL. MET  2. Increase left shoulder AROM to 150 degrees flexion for increased functional mobility and independent ADLs with decreased pain. PROGRESSING; NOT MET  3. Increase strength to >/= 4/5 in BUE to increase tolerance for ADL and work activities. PROGRESSING; NOT MET  4. Pt goal: to get my shoulder stronger. PROGRESSING; NOT MET  5. Pt will have improved to score of </= 31% limited on shoulder FOTO in order to demonstrate true functional improvement. PROGRESSING; NOT MET    Plan     Cont to work on overhead strength and endurance.    Plan of care Certification: 5/12/23 to 6/16/23    Maco Lopez PTA,

## 2023-06-21 ENCOUNTER — CLINICAL SUPPORT (OUTPATIENT)
Dept: REHABILITATION | Facility: HOSPITAL | Age: 68
End: 2023-06-21
Payer: MEDICARE

## 2023-06-21 DIAGNOSIS — R53.1 DECREASED STRENGTH: Primary | ICD-10-CM

## 2023-06-21 DIAGNOSIS — M25.612 DECREASED ROM OF LEFT SHOULDER: ICD-10-CM

## 2023-06-21 PROCEDURE — 97530 THERAPEUTIC ACTIVITIES: CPT | Mod: PN | Performed by: PHYSICAL THERAPIST

## 2023-06-21 PROCEDURE — 97110 THERAPEUTIC EXERCISES: CPT | Mod: PN | Performed by: PHYSICAL THERAPIST

## 2023-06-21 NOTE — PROGRESS NOTES
Physical Therapy Daily Treatment Note     Name: Dorothy Burt  Clinic Number: [id number]    Therapy Diagnosis:   Encounter Diagnoses   Name Primary?    Decreased strength Yes    Decreased ROM of left shoulder        Physician: Louie Luna MD    Visit Date: 6/21/2023    Physician Orders: PT Eval and Treat  Medical Diagnosis from Referral: Z98.890 (ICD-10-CM) - S/P left rotator cuff repair  Evaluation Date: 3/14/2023 - UPOC done today  Authorization Period Expiration: 06/06/2023  Plan of Care Expiration: 6/30/23  Visit # / Visits authorized: 1/1, 20/35  FOTO: done today 6/12/23  PTA Visit: 0/6     Time In: 8:15 AM  Time Out: 9:10 AM  Total Billable Time: 55 1:1 minutes (2TE, 2 TA)     Precautions: Standard, Asthma, Thyroid disease    Subjective     Pt reports: sometimes it feels a little weaker    She was compliant with home exercise program.  Response to previous treatment: no shoulder pain  Functional change: using her arm more    Pain: 0/10  Location: left shoulder      Objective     Left shoulder A/PROM: 6/7/23  Supine:  Flexion: 160/165 degrees  Abduction: 130/175 degrees  External rotation at 30 degrees: 63/65 degrees  External rotation at 90 degrees: 80/85 degrees  Internal rotation at 90 degrees: 85/90 degrees    Sitting:   Flexion: 130 degrees  Abduction: 105 degrees  Reach under back: left @L1, right @T10  Reach over back: left @T3, right @T5    Dorothy received the following manual therapy techniques: PROM with luis a stretching  were applied to the: L shoulder for 00 minutes, including:    Grade II-IV posterior and inferior GHJ mobs with shoulder PROM D2 flexion   Left shoulder distraction    Dorothy received therapeutic exercises to develop strength, endurance, ROM, and flexibility for 30 minutes including:     Upper body ergometer: 4 minutes fwd/4 minutes reverse level 4 for improved cardiovascular endurance  Sidelying shoulder abduction: 2x15 left, 3#  Side Lying ER 2x15  "3#  Prone T with ER 2x12 x3"   Seated teres minor external rotation: 2x15, 4# left  Wall walk with lift off: 2x12 (@ finger ladder)   [facility] shoulder flexion stretch elbow flexed yoga block between hands. 10x10"     Not done today:       Dorothy participated in dynamic functional therapeutic activities to improve functional performance for 25 minutes including:    D1 shoulder flexion: 2x12, no theraband   Dumbbell overhead press: 3 pounds 2x10 seated alternating  [place] walks: 80'x4 with 3# dumbbell  Standing scaption: 2x10 left, yellow theraband   Mat push ups: 2x12  Mini dip push ups from chair: 3x10     Not done today:  Body blade:  External rotation/internal rotation @ hip height: 4x30"   External rotation/internal rotation @ shoulder height: 2x30"         Assessment     Dorothy is a 68 y.o. female referred to outpatient Physical Therapy with a medical diagnosis of  S/P left rotator cuff repair presenting to PT at [facility].   She's progressing well and nearing discharge. Updated home exercise program today. Focusing on overhead and higher level strengthening.    Dorothy Is progressing well towards her goals.   Pt prognosis is Good.     Pt will continue to benefit from skilled outpatient physical therapy to address the deficits listed in the problem list box on initial evaluation, provide pt/family education and to maximize pt's level of independence in the home and community environment.     Pt's spiritual, cultural and educational needs considered and pt agreeable to plan of care and goals.    Anticipated barriers to physical therapy: Chronic Shoulder ROM limitations.     Goals: Short Term Goals: 4 weeks  1. Report decreased left shoulder pain </= 0/10 with chest level activities to increase tolerance for ADLs and increased QoL. MET  2. Increase left shoulder PROM to 140 degrees flexion for increased functional mobility. MET  3. Increased strength by 1/3 MMT grade in LUE to increase " tolerance for ADL and work activities. MET  4. Pt to tolerate HEP to improve ROM and independence with ADL's. MET     Long Term Goals: 8 weeks  1. Report decreased left shoulder pain </= 2/10 with overhead activities to increase tolerance for ADLs and increased QoL. MET  2. Increase left shoulder AROM to 150 degrees flexion for increased functional mobility and independent ADLs with decreased pain. PROGRESSING; NOT MET  3. Increase strength to >/= 4/5 in BUE to increase tolerance for ADL and work activities. PROGRESSING; NOT MET  4. Pt goal: to get my shoulder stronger. PROGRESSING; NOT MET  5. Pt will have improved to score of </= 31% limited on shoulder FOTO in order to demonstrate true functional improvement. PROGRESSING; NOT MET    Plan     Cont to work on overhead strength and endurance.    Plan of care Certification: 5/12/23 to 6/16/23    Louie Tobar, PT,

## 2023-06-23 ENCOUNTER — CLINICAL SUPPORT (OUTPATIENT)
Dept: REHABILITATION | Facility: HOSPITAL | Age: 68
End: 2023-06-23
Payer: MEDICARE

## 2023-06-23 DIAGNOSIS — R53.1 DECREASED STRENGTH: Primary | ICD-10-CM

## 2023-06-23 DIAGNOSIS — M25.612 DECREASED ROM OF LEFT SHOULDER: ICD-10-CM

## 2023-06-23 PROCEDURE — 97110 THERAPEUTIC EXERCISES: CPT | Mod: PN | Performed by: PHYSICAL THERAPIST

## 2023-06-23 PROCEDURE — 97530 THERAPEUTIC ACTIVITIES: CPT | Mod: PN | Performed by: PHYSICAL THERAPIST

## 2023-06-23 NOTE — PROGRESS NOTES
Physical Therapy Daily Treatment Note     Name: Dorothy Shultz [facility]  Clinic Number: [id number]    Therapy Diagnosis:   Encounter Diagnoses   Name Primary?    Decreased strength Yes    Decreased ROM of left shoulder        Physician: Louie Luna MD    Visit Date: 6/23/2023    Physician Orders: PT Eval and Treat  Medical Diagnosis from Referral: Z98.890 (ICD-10-CM) - S/P left rotator cuff repair  Evaluation Date: 3/14/2023 - UPOC done today  Authorization Period Expiration: 06/06/2023  Plan of Care Expiration: 6/30/23  Visit # / Visits authorized: 1/1, 21/35  FOTO: done today 6/12/23  PTA Visit: 0/6     Time In: 8:58 AM  Time Out: 9:52 AM  Total Billable Time: 54 1:1 minutes (2TE, 2 TA)     Precautions: Standard, Asthma, Thyroid disease    Subjective     Pt reports: the leg of her chair at home broke while she was sitting on it and she fell on her left side. She landed on her left hip at first, then her shoulder. Shoulder feels ok, but her low back (mainly right side) was bothering her last night. She used an ice pack and took a pain pill last night and feels better this morning.    She was compliant with home exercise program.  Response to previous treatment: no shoulder pain  Functional change: using her arm more    Pain: 2/10  Location: left shoulder      Objective     Left shoulder A/PROM: 6/7/23  Supine:  Flexion: 160/165 degrees  Abduction: 130/175 degrees  External rotation at 30 degrees: 63/65 degrees  External rotation at 90 degrees: 80/85 degrees  Internal rotation at 90 degrees: 85/90 degrees    Sitting:   Flexion: 130 degrees  Abduction: 105 degrees  Reach under back: left @L1, right @T10  Reach over back: left @T3, right @T5    Dorothy received the following manual therapy techniques: PROM with luis a stretching  were applied to the: L shoulder for 00 minutes, including:    Grade II-IV posterior and inferior GHJ mobs with shoulder PROM D2 flexion   Left shoulder  "distraction    Dorothy received therapeutic exercises to develop strength, endurance, ROM, and flexibility for 30 minutes including:     Upper body ergometer: 4 minutes fwd/4 minutes reverse level 4 for improved cardiovascular endurance  Sidelying shoulder abduction: 2x15 left, 3#  Side Lying ER 2x15 3#  Prone T with ER 2x12 x3"   Seated teres minor external rotation: 2x15, 4# left  Wall walk with lift off: 2x12 (@ finger ladder)   [facility] shoulder flexion stretch elbow flexed yoga block between hands. 10x10"     Not done today:       Dorothy participated in dynamic functional therapeutic activities to improve functional performance for 24 minutes including:    D2 shoulder flexion: 2x10  Dumbbell overhead press: 3 pounds 2x10 seated alternating  [place] walks: 80'x4 with 3# dumbbell  Standing scaption: 2x10 left, yellow theraband   Mat push ups: 2x12  Mini dip push ups from chair: 2x15    Not done today:  Body blade:  External rotation/internal rotation @ hip height: 4x30"   External rotation/internal rotation @ shoulder height: 2x30"         Assessment     Dorothy is a 68 y.o. female referred to outpatient Physical Therapy with a medical diagnosis of  S/P left rotator cuff repair presenting to PT at [facility].   No apparent injury of shoulder after falling on left side off of broken chair. Able to complete all exercises without pain. Overall progressing well with strengthening and appears ready for discharge next wk at end of POC.    Dorothy Is progressing well towards her goals.   Pt prognosis is Good.     Pt will continue to benefit from skilled outpatient physical therapy to address the deficits listed in the problem list box on initial evaluation, provide pt/family education and to maximize pt's level of independence in the home and community environment.     Pt's spiritual, cultural and educational needs considered and pt agreeable to plan of care and goals.    Anticipated barriers to " physical therapy: Chronic Shoulder ROM limitations.     Goals: Short Term Goals: 4 weeks  1. Report decreased left shoulder pain </= 0/10 with chest level activities to increase tolerance for ADLs and increased QoL. MET  2. Increase left shoulder PROM to 140 degrees flexion for increased functional mobility. MET  3. Increased strength by 1/3 MMT grade in LUE to increase tolerance for ADL and work activities. MET  4. Pt to tolerate HEP to improve ROM and independence with ADL's. MET     Long Term Goals: 8 weeks  1. Report decreased left shoulder pain </= 2/10 with overhead activities to increase tolerance for ADLs and increased QoL. MET  2. Increase left shoulder AROM to 150 degrees flexion for increased functional mobility and independent ADLs with decreased pain. PROGRESSING; NOT MET  3. Increase strength to >/= 4/5 in BUE to increase tolerance for ADL and work activities. PROGRESSING; NOT MET  4. Pt goal: to get my shoulder stronger. PROGRESSING; NOT MET  5. Pt will have improved to score of </= 31% limited on shoulder FOTO in order to demonstrate true functional improvement. PROGRESSING; NOT MET    Plan     Cont to work on overhead strength and endurance.    Plan of care Certification: 5/12/23 to 6/16/23    Louie Tobar, PT,

## 2023-06-28 ENCOUNTER — CLINICAL SUPPORT (OUTPATIENT)
Dept: REHABILITATION | Facility: HOSPITAL | Age: 68
End: 2023-06-28
Payer: MEDICARE

## 2023-06-28 DIAGNOSIS — M25.612 DECREASED ROM OF LEFT SHOULDER: ICD-10-CM

## 2023-06-28 DIAGNOSIS — R53.1 DECREASED STRENGTH: Primary | ICD-10-CM

## 2023-06-28 PROCEDURE — 97110 THERAPEUTIC EXERCISES: CPT | Mod: PN | Performed by: PHYSICAL THERAPIST

## 2023-06-28 NOTE — PROGRESS NOTES
Physical Therapy Daily [facility]CAHR[facility]ote     Name: Dorothy Burt  Clinic Number: [id number]    Therapy Diagnosis:   Encounter Diagnoses   Name Primary?    Decreased strength Yes    Decreased ROM of left shoulder        Physician: Louie Luna MD    Visit Date: 6/28/2023    Physician Orders: PT Eval and Treat  Medical Diagnosis from Referral: Z98.890 (ICD-10-CM) - S/P left rotator cuff repair  Evaluation Date: 3/14/2023 - UPOC done today  Authorization Period Expiration: 06/06/2023  Plan of Care Expiration: 6/30/23  Visit # / Visits authorized: 1/1, 22/35  FOTO: done today 6/12/23  PTA Visit: 0/6     Time In: 9:05 AM  Time Out: 9:24 AM  Total Billable Time: 19 1:1 minutes (1TE)     Precautions: Standard, Asthma, Thyroid disease    Subjective     Pt reports: she feels like she's ready for discharge. Her shoulder is mostly feeling good and she understands her home exercise program.    She was compliant with home exercise program.  Response to previous treatment: no shoulder pain  Functional change: using her arm more    Pain: 2/10  Location: left shoulder      Objective     Left shoulder A/PROM: 6/28/23  Supine:  Flexion: 160/165 degrees  Abduction: 140/155 degrees  External rotation at 30 degrees: 63/74 degrees  External rotation at 90 degrees: 68/74 degrees  Internal rotation at 90 degrees: 85/90 degrees    Sitting:   Flexion: 140 degrees  Abduction: 95 degrees  Reach under back: left @T11, right @T10  Reach over back: left @T3, right @T5    All tests taken in supine position  *=pain    Left (kg) 6/28/23 Right (kg) Notes (%)   Shoulder flexion 6.7 7.9 12.7 62%   Shoulder abduction 5.2 7.2 9 80%   Shoulder external rotation (infraspinatus) 5.3 6.8 7.3 93%   Shoulder external rotation (teres minor) 4.8 6.4 7.7 83%   Shoulder internal rotation  8.2 7.4* 10.2 73%   Elbow flexion 12.3 12.1 14.9 81%   Elbow extension 7.3 8.2 10.8 76%       Dorothy received therapeutic exercises to develop  strength, endurance, ROM, and flexibility for 19 minutes including:     Objective measurements         Assessment     Dorothy is a 68 y.o. female referred to outpatient Physical Therapy with a medical diagnosis of  S/P left rotator cuff repair presenting to PT at [facility].   Left rotator cuff and periscapular strength have continued to improve (other than pain with subscapularis testing) as seen per microFET testing. Main weakness is with shoulder flexion strength. Range of motion has plateaued with main limitation in abduction > flexion. She has reached maximum rehab potential at this time and is appropriate for discharge to home exercise program.     Dorothy Is progressing well towards her goals.   Pt prognosis is Good.     Pt will continue to benefit from skilled outpatient physical therapy to address the deficits listed in the problem list box on initial evaluation, provide pt/family education and to maximize pt's level of independence in the home and community environment.     Pt's spiritual, cultural and educational needs considered and pt agreeable to plan of care and goals.    Anticipated barriers to physical therapy: Chronic Shoulder ROM limitations.     Goals: Short Term Goals: 4 weeks  1. Report decreased left shoulder pain </= 0/10 with chest level activities to increase tolerance for ADLs and increased QoL. MET  2. Increase left shoulder PROM to 140 degrees flexion for increased functional mobility. MET  3. Increased strength by 1/3 MMT grade in LUE to increase tolerance for ADL and work activities. MET  4. Pt to tolerate HEP to improve ROM and independence with ADL's. MET     Long Term Goals: 8 weeks  1. Report decreased left shoulder pain </= 2/10 with overhead activities to increase tolerance for ADLs and increased QoL. MET  2. Increase left shoulder AROM to 150 degrees flexion for increased functional mobility and independent ADLs with decreased pain. NOT MET  3. Increase  strength to >/= 4/5 in BUE to increase tolerance for ADL and work activities. MET  4. Pt goal: to get my shoulder stronger. MET  5. Pt will have improved to score of </= 31% limited on shoulder FOTO in order to demonstrate true functional improvement. NOT MET    Plan     DC to HANNAH Tobar, PT,

## 2023-08-01 ENCOUNTER — LAB VISIT (OUTPATIENT)
Dept: LAB | Facility: HOSPITAL | Age: 68
End: 2023-08-01
Attending: FAMILY MEDICINE
Payer: MEDICARE

## 2023-08-01 DIAGNOSIS — R35.0 URINARY FREQUENCY: ICD-10-CM

## 2023-08-01 DIAGNOSIS — Z79.899 ENCOUNTER FOR LONG-TERM CURRENT USE OF MEDICATION: ICD-10-CM

## 2023-08-01 DIAGNOSIS — E03.9 HYPOTHYROIDISM, UNSPECIFIED TYPE: ICD-10-CM

## 2023-08-01 DIAGNOSIS — R53.83 FATIGUE, UNSPECIFIED TYPE: ICD-10-CM

## 2023-08-01 LAB
ALBUMIN SERPL BCP-MCNC: 3.9 G/DL (ref 3.5–5.2)
ALP SERPL-CCNC: 82 U/L (ref 55–135)
ALT SERPL W/O P-5'-P-CCNC: 22 U/L (ref 10–44)
ANION GAP SERPL CALC-SCNC: 9 MMOL/L (ref 8–16)
AST SERPL-CCNC: 18 U/L (ref 10–40)
BASOPHILS # BLD AUTO: 0.06 K/UL (ref 0–0.2)
BASOPHILS NFR BLD: 1 % (ref 0–1.9)
BILIRUB SERPL-MCNC: 0.3 MG/DL (ref 0.1–1)
BILIRUB UR QL STRIP: NEGATIVE
BUN SERPL-MCNC: 20 MG/DL (ref 8–23)
CALCIUM SERPL-MCNC: 9.3 MG/DL (ref 8.7–10.5)
CHLORIDE SERPL-SCNC: 105 MMOL/L (ref 95–110)
CLARITY UR: CLEAR
CO2 SERPL-SCNC: 23 MMOL/L (ref 23–29)
COLOR UR: COLORLESS
CREAT SERPL-MCNC: 0.8 MG/DL (ref 0.5–1.4)
DIFFERENTIAL METHOD: ABNORMAL
EOSINOPHIL # BLD AUTO: 0.2 K/UL (ref 0–0.5)
EOSINOPHIL NFR BLD: 2.4 % (ref 0–8)
ERYTHROCYTE [DISTWIDTH] IN BLOOD BY AUTOMATED COUNT: 12.5 % (ref 11.5–14.5)
EST. GFR  (NO RACE VARIABLE): >60 ML/MIN/1.73 M^2
ESTIMATED AVG GLUCOSE: 108 MG/DL (ref 68–131)
GLUCOSE SERPL-MCNC: 86 MG/DL (ref 70–110)
GLUCOSE UR QL STRIP: NEGATIVE
HBA1C MFR BLD: 5.4 % (ref 4–5.6)
HCT VFR BLD AUTO: 34.7 % (ref 37–48.5)
HGB BLD-MCNC: 11.3 G/DL (ref 12–16)
HGB UR QL STRIP: ABNORMAL
IMM GRANULOCYTES # BLD AUTO: 0.02 K/UL (ref 0–0.04)
IMM GRANULOCYTES NFR BLD AUTO: 0.3 % (ref 0–0.5)
KETONES UR QL STRIP: NEGATIVE
LEUKOCYTE ESTERASE UR QL STRIP: NEGATIVE
LYMPHOCYTES # BLD AUTO: 2.3 K/UL (ref 1–4.8)
LYMPHOCYTES NFR BLD: 36.1 % (ref 18–48)
MCH RBC QN AUTO: 30 PG (ref 27–31)
MCHC RBC AUTO-ENTMCNC: 32.6 G/DL (ref 32–36)
MCV RBC AUTO: 92 FL (ref 82–98)
MICROSCOPIC COMMENT: NORMAL
MONOCYTES # BLD AUTO: 0.6 K/UL (ref 0.3–1)
MONOCYTES NFR BLD: 9.6 % (ref 4–15)
NEUTROPHILS # BLD AUTO: 3.2 K/UL (ref 1.8–7.7)
NEUTROPHILS NFR BLD: 50.6 % (ref 38–73)
NITRITE UR QL STRIP: NEGATIVE
NRBC BLD-RTO: 0 /100 WBC
PH UR STRIP: 5 [PH] (ref 5–8)
PLATELET # BLD AUTO: 300 K/UL (ref 150–450)
PMV BLD AUTO: 9.8 FL (ref 9.2–12.9)
POTASSIUM SERPL-SCNC: 4 MMOL/L (ref 3.5–5.1)
PROT SERPL-MCNC: 7.4 G/DL (ref 6–8.4)
PROT UR QL STRIP: NEGATIVE
RBC # BLD AUTO: 3.77 M/UL (ref 4–5.4)
RBC #/AREA URNS HPF: 1 /HPF (ref 0–4)
SODIUM SERPL-SCNC: 137 MMOL/L (ref 136–145)
SP GR UR STRIP: 1.01 (ref 1–1.03)
SQUAMOUS #/AREA URNS HPF: 1 /HPF
TSH SERPL DL<=0.005 MIU/L-ACNC: 2.69 UIU/ML (ref 0.4–4)
URN SPEC COLLECT METH UR: ABNORMAL
UROBILINOGEN UR STRIP-ACNC: NEGATIVE EU/DL
VIT B12 SERPL-MCNC: 564 PG/ML (ref 210–950)
WBC # BLD AUTO: 6.23 K/UL (ref 3.9–12.7)
WBC #/AREA URNS HPF: 1 /HPF (ref 0–5)

## 2023-08-01 PROCEDURE — 84443 ASSAY THYROID STIM HORMONE: CPT | Performed by: FAMILY MEDICINE

## 2023-08-01 PROCEDURE — 80053 COMPREHEN METABOLIC PANEL: CPT | Performed by: FAMILY MEDICINE

## 2023-08-01 PROCEDURE — 85025 COMPLETE CBC W/AUTO DIFF WBC: CPT | Performed by: FAMILY MEDICINE

## 2023-08-01 PROCEDURE — 81000 URINALYSIS NONAUTO W/SCOPE: CPT | Performed by: FAMILY MEDICINE

## 2023-08-01 PROCEDURE — 83036 HEMOGLOBIN GLYCOSYLATED A1C: CPT | Performed by: FAMILY MEDICINE

## 2023-08-01 PROCEDURE — 82607 VITAMIN B-12: CPT | Performed by: FAMILY MEDICINE

## 2023-08-01 PROCEDURE — 36415 COLL VENOUS BLD VENIPUNCTURE: CPT | Performed by: FAMILY MEDICINE

## 2023-09-07 NOTE — PROGRESS NOTES
"[facility]   Physical Therapy Treatment Note     Name: Dorothy Burt  Clinic Number: [id number]    Therapy Diagnosis:   Encounter Diagnoses   Name Primary?    Decreased strength Yes    Decreased range of motion of left shoulder      Physician: Louie Luna MD    Visit Date: 7/21/2022    Physician Orders: PT Eval and Treat   2-3 x per week x 6 weeks   Gentle passive elevation and ER, pendulums. No RTC strengthening     Medical Diagnosis from Referral:M75.112 (ICD-10-CM) - Nontraumatic incomplete tear of left rotator cuff S46.212A (ICD-10-CM) - Biceps tendon rupture, proximal, left, initial encounter M75.42 (ICD-10-CM) - Subacromial impingement, left   Evaluation Date: 6/17/2022  Authorization Period Expiration: 7/15/22  Plan of Care Expiration: 8/12/22  Progress Note Due: 8/12/22 (or by 12th visit)  Visit # / Visits authorized: 11/12   FOTO: 11/10 - Next Visit  PTA Visit: 1/5     Precautions: Standard (post-op protocol)    Time In: 11:00 am  Time Out: 12:05 pm  Total Billable Time: 55 minutes (TEx2, MTx2)     6/9/22 Procedure: Procedure(s):  1. Diagnostic arthroscopy left shoulder  2. Extensive synovectomy  3. Rotator interval release   4. Arthroscopic capsular release  5. Biceps tenotomy  6. Subacromial decompression  7. Acromioplasty   8. Rotator cuff repair     SUBJECTIVE     Pt reports: her pain is "about the same, no change to speak of."  Patient reports she was having more neck pain after the sling was adjusted last visit.  She was compliant with home exercise program.  Response to previous treatment: sore.  Functional change: not at this time.    Pain: 3/10  Location: left shoulder      OBJECTIVE     7/5/22:  left elbow extension PROM: improved from 7 degree to 3 degree from zero after manuals  left elbow extension AROM: improved from 12 degree to 6 degree from zero after manuals  left external rotation: 43 degree      Treatment     Dorothy received the treatments listed " "below:      Dorothy received therapeutic exercises to develop strength, endurance, ROM, flexibility, posture and core stabilization for 30 minutes including:    : ~15 kg 3 minutes   Wrist extension AROM: 2x10 1 lb  Wrist flexion AROM: 2x10 1 lb   Supination: 2x10 1#   LLLD bicep stretch: 5 minutes (distal humerus on 1/2 bolster)    Scapular retraction: 20x5"  Elbow extension AAROM: 20x  AAROM unilateral left shoulder serratus punches: 3x10 with PT assist  Wand shoulder external rotation: 15x5" holds  Supine shoulder dowel flexion: 2x10, PVC pipe  Table slides flexion: x20 on inclined mat table     Dorothy received the following manual therapy techniques: Joint mobilizations, Myofacial release and Soft tissue Mobilization were applied to the: left shoulder/elbow for 25 minutes, including:     Left elbow extension stretch passively: 4 rounds  left elbow PROM, left shoulder flexion, external rotation PROM  Grade I-III left GHJ oscillations/mobilizations  left Lat and subscapularis trigger point release - not today    cold pack for left shoulder for 10 minutes at completion of visit.     Patient Education and Home Exercises     Home Exercises Provided and Patient Education Provided   Education provided:   - anatomy and expectations with pain and symptoms    Written Home Exercises Provided: Patient instructed to cont prior HEP. Exercises were reviewed and Dorothy was able to demonstrate them prior to the end of the session.  Dorothy demonstrated good  understanding of the education provided. See EMR under Patient Instructions for exercises provided during therapy sessions    ASSESSMENT     Dorothy is a 67 y.o. female referred to outpatient Physical Therapy with a medical diagnosis of noncomplete rotator cuff tear, biceps tendon rupture and subacromial impingement. Pt presents with status post left rotator cuff repair (supraspinatus), acromioplasty, biceps tenotomy, subchondral decompression, synovectomy, capsular release on 6/9/22. "  Patient completed her therapy along with today's progressions, noted in bold, with no increase in symptoms prior to leaving the clinic.     Dorothy Is progressing well towards her goals.   Pt prognosis is Good.     Pt will continue to benefit from skilled outpatient physical therapy to address the deficits listed in the problem list box on initial evaluation, provide pt/family education and to maximize pt's level of independence in the home and community environment.     Pt's spiritual, cultural and educational needs considered and pt agreeable to plan of care and goals.     Anticipated Barriers for therapy: failed conservative treatment     Goals:   Short Term Goals: 4 weeks   1.  Patient will report < 6/10 shoulder pain at worst for improved ADL performance. PROGRESSING; NOT MET  2.  Patient will demonstrate L shoulder flexion PROM > 140 deg to improve overhead reach. PROGRESSING; NOT MET  3.  Patient will demonstrate left shoulder external rotation PROM > 40 degree. MET 7/7/22  4.  Patient will demonstrate left  strength > 45 pounds.  PROGRESSING; NOT MET     Long Term Goals: 12 weeks   1. Patient will report ability to wash her hair without restriction. PROGRESSING; NOT MET  2. Patient will demonstrate ability to reach overhead and remove a 2 lb object 5 times to simulate removing objects from an overhead shelf w/o an increase in symptoms. PROGRESSING; NOT MET  3. Patient will demonstrate all left shoulder AROM within 85% of right for improved ADL performance. PROGRESSING; NOT MET  4. Patient will improve FOTO to < 38% to improve ADL performance. PROGRESSING; NOT MET    PLAN     Cont with POC with phase 2 of rehab protocol     Plan of care Certification: 6/17/2022 to 8/12/22.       Ant Cohen, PTA      General Sunscreen Counseling: I recommended a broad spectrum sunscreen with a SPF of 30 or higher.  I explained that SPF 30 sunscreens block approximately 97 percent of the sun's harmful rays.  Sunscreens should be applied at least 15 minutes prior to expected sun exposure and then every 2 hours after that as long as sun exposure continues. If swimming or exercising sunscreen should be reapplied every 45 minutes to an hour after getting wet or sweating.  One ounce, or the equivalent of a shot glass full of sunscreen, is adequate to protect the skin not covered by a bathing suit. I also recommended a lip balm with a sunscreen as well. Sun protective clothing can be used in lieu of sunscreen but must be worn the entire time you are exposed to the sun's rays. Detail Level: Detailed

## 2023-12-12 ENCOUNTER — PATIENT MESSAGE (OUTPATIENT)
Dept: RADIOLOGY | Facility: HOSPITAL | Age: 68
End: 2023-12-12
Payer: MEDICARE

## 2023-12-22 ENCOUNTER — HOSPITAL ENCOUNTER (EMERGENCY)
Facility: HOSPITAL | Age: 68
Discharge: HOME | End: 2023-12-22
Attending: EMERGENCY MEDICINE
Payer: MEDICARE

## 2023-12-22 VITALS
TEMPERATURE: 98 F | DIASTOLIC BLOOD PRESSURE: 75 MMHG | HEART RATE: 87 BPM | OXYGEN SATURATION: 98 % | BODY MASS INDEX: 32.44 KG/M2 | HEIGHT: 64 IN | SYSTOLIC BLOOD PRESSURE: 126 MMHG | RESPIRATION RATE: 16 BRPM | WEIGHT: 190 LBS

## 2023-12-22 DIAGNOSIS — S09.90XA TRAUMATIC INJURY OF HEAD, INITIAL ENCOUNTER: Primary | ICD-10-CM

## 2023-12-22 DIAGNOSIS — T14.8XXA HEMATOMA: ICD-10-CM

## 2023-12-22 PROCEDURE — 99284 EMERGENCY DEPT VISIT MOD MDM: CPT | Mod: 25

## 2023-12-22 PROCEDURE — 25000003 PHARM REV CODE 250: Performed by: PHYSICIAN ASSISTANT

## 2023-12-22 RX ORDER — ACETAMINOPHEN 500 MG
1000 TABLET ORAL
Status: COMPLETED | OUTPATIENT
Start: 2023-12-22 | End: 2023-12-22

## 2023-12-22 RX ADMIN — ACETAMINOPHEN 1000 MG: 500 TABLET ORAL at 07:12

## 2023-12-23 NOTE — DISCHARGE INSTRUCTIONS
Continue taking Tylenol for pain at home. Hydrate by drinking 8-10 glasses of water per day.  Follow-up with our concussion clinic using the below contact information.     Follow-up with your primary care provider for further evaluation.  Return to the emergency room for new, worsening, or concerning symptoms.     Future Appointments   Date Time Provider Department Center   12/26/2023  9:00 AM PAMELA Lamas [facility]   5/13/2024 10:30 AM Justen Jernigan MD [facility]
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
## 2024-01-01 ENCOUNTER — OFFICE VISIT (OUTPATIENT)
Dept: URGENT CARE | Facility: CLINIC | Age: 69
End: 2024-01-01
Payer: MEDICARE

## 2024-01-01 VITALS
SYSTOLIC BLOOD PRESSURE: 140 MMHG | HEART RATE: 91 BPM | TEMPERATURE: 98 F | DIASTOLIC BLOOD PRESSURE: 90 MMHG | HEIGHT: 64 IN | OXYGEN SATURATION: 95 % | WEIGHT: 189.63 LBS | BODY MASS INDEX: 32.37 KG/M2 | RESPIRATION RATE: 18 BRPM

## 2024-01-01 DIAGNOSIS — J06.9 URI WITH COUGH AND CONGESTION: Primary | ICD-10-CM

## 2024-01-01 DIAGNOSIS — J45.909 MILD ASTHMA, UNSPECIFIED WHETHER COMPLICATED, UNSPECIFIED WHETHER PERSISTENT: ICD-10-CM

## 2024-01-01 DIAGNOSIS — Z87.09 HISTORY OF ASTHMA: ICD-10-CM

## 2024-01-01 LAB
CTP QC/QA: YES
SARS-COV-2 AG RESP QL IA.RAPID: NEGATIVE

## 2024-01-01 PROCEDURE — 99214 OFFICE O/P EST MOD 30 MIN: CPT | Mod: S$GLB,,, | Performed by: NURSE PRACTITIONER

## 2024-01-01 PROCEDURE — 87811 SARS-COV-2 COVID19 W/OPTIC: CPT | Mod: QW,S$GLB,, | Performed by: NURSE PRACTITIONER

## 2024-01-01 RX ORDER — PROMETHAZINE HYDROCHLORIDE AND DEXTROMETHORPHAN HYDROBROMIDE 6.25; 15 MG/5ML; MG/5ML
5 SYRUP ORAL EVERY 8 HOURS PRN
Qty: 118 ML | Refills: 0 | Status: SHIPPED | OUTPATIENT
Start: 2024-01-01

## 2024-01-01 RX ORDER — ALBUTEROL SULFATE 90 UG/1
2 AEROSOL, METERED RESPIRATORY (INHALATION) EVERY 6 HOURS PRN
Qty: 18 G | Refills: 0 | Status: SHIPPED | OUTPATIENT
Start: 2024-01-01

## 2024-01-01 RX ORDER — PREDNISONE 20 MG/1
20 TABLET ORAL DAILY
Qty: 5 TABLET | Refills: 0 | Status: SHIPPED | OUTPATIENT
Start: 2024-01-01 | End: 2024-01-03

## 2024-01-01 NOTE — PROGRESS NOTES
"Subjective:      Patient ID: Dorothy Burt is a 68 y.o. female.    Vitals:  height is 5' 4" (1.626 m) and weight is 86 kg (189 lb 9.5 oz). Her oral temperature is 98.4 °F (36.9 °C). Her blood pressure is 140/90 (abnormal) and her pulse is 91. Her respiration is 18 and oxygen saturation is 95%.     Chief Complaint: Cough    This is a 68 y.o. female who presents today with a chief complaint of  runny nose, cough, chest congestion, hs hx of Asthma. Sx started started 3 days ago, Tx include OTC cold and flu with no relief.  Patient reports her cough is dry, patient reports she is using her inhaler that was prescribed for asthma, denies fever, body aches or chills, denies wheezing or shortness of breath, denies nausea, vomiting, diarrhea or abdominal pain, denies chest pain or dizziness positional lightheadedness, denies sore throat or trouble swallowing, denies loss of taste or smell, or any other symptoms       Cough  This is a new problem. The current episode started yesterday. The problem has been gradually worsening. The problem occurs constantly. The cough is Non-productive. Associated symptoms include nasal congestion, postnasal drip and wheezing. The symptoms are aggravated by lying down. She has tried OTC cough suppressant for the symptoms. The treatment provided no relief. Her past medical history is significant for asthma.       HENT:  Positive for postnasal drip.    Respiratory:  Positive for cough and wheezing.       Past Medical History:   Diagnosis Date    Arthritis     Asthma     Back pain     Knee pain     Thyroid disease        Objective:     Physical Exam   Constitutional: She is oriented to person, place, and time. She appears well-developed. She is cooperative.  Non-toxic appearance. She does not appear ill. No distress.   HENT:   Head: Normocephalic and atraumatic.   Ears:   Right Ear: Hearing, tympanic membrane, external ear and ear canal normal.   Left Ear: Hearing, tympanic membrane, external " ear and ear canal normal.   Nose: Nose normal. No mucosal edema, rhinorrhea or nasal deformity. No epistaxis. Right sinus exhibits no maxillary sinus tenderness and no frontal sinus tenderness. Left sinus exhibits no maxillary sinus tenderness and no frontal sinus tenderness.   Mouth/Throat: Uvula is midline, oropharynx is clear and moist and mucous membranes are normal. No trismus in the jaw. Normal dentition. No uvula swelling. No oropharyngeal exudate, posterior oropharyngeal edema, posterior oropharyngeal erythema, tonsillar abscesses or cobblestoning.   Eyes: Conjunctivae and lids are normal. No scleral icterus.   Neck: Trachea normal and phonation normal. Neck supple. No edema present. No erythema present. No neck rigidity present.   Cardiovascular: Normal rate, regular rhythm, normal heart sounds and normal pulses.   Pulmonary/Chest: Effort normal. No stridor. No respiratory distress. She has no decreased breath sounds. She has wheezes (mild exp) in the right upper field. She has no rhonchi.   Abdominal: Normal appearance.   Musculoskeletal: Normal range of motion.         General: No deformity. Normal range of motion.   Neurological: She is alert and oriented to person, place, and time. She exhibits normal muscle tone. Coordination normal.   Skin: Skin is warm, dry, intact, not diaphoretic and not pale.   Psychiatric: Her speech is normal and behavior is normal. Judgment and thought content normal.   Nursing note and vitals reviewed.    Results for orders placed or performed in visit on 01/01/24   SARS Coronavirus 2 Antigen, POCT Manual Read   Result Value Ref Range    SARS Coronavirus 2 Antigen Negative Negative     Acceptable Yes         Patient in no acute distress.  Vitals reassuring.  Discussed results/diagnosis/plan in depth with patient in clinic. Strict precautions given to patient to monitor for worsening signs and symptoms. Advised to follow up with primary.All questions answered.  Strict ER precautions given. If your symptoms worsens or fail to improve you should go to the Emergency Room. Discharge and follow-up instructions given verbally/printed. Discharge and follow-up instructions discussed with the patient who expressed understanding and willingness to comply with my recommendations.Patient voiced understanding and in agreement with current treatment plan.     Please be advised this text was dictated with [employer] software and may contain errors due to translation.     Assessment:     1. URI with cough and congestion    2. History of asthma    3. Mild asthma, unspecified whether complicated, unspecified whether persistent        Plan:       URI with cough and congestion  -     SARS Coronavirus 2 Antigen, POCT Manual Read  -     promethazine-dextromethorphan (PROMETHAZINE-DM) 6.25-15 mg/5 mL Syrp; Take 5 mLs by mouth every 8 (eight) hours as needed (cough). maximum: 30 mL in 24 hours  Dispense: 118 mL; Refill: 0    History of asthma  -     albuterol (VENTOLIN HFA) 90 mcg/actuation inhaler; Inhale 2 puffs into the lungs every 6 (six) hours as needed for Wheezing. Rescue  Dispense: 18 g; Refill: 0  -     predniSONE (DELTASONE) 20 MG tablet; Take 1 tablet (20 mg total) by mouth once daily. for 5 days  Dispense: 5 tablet; Refill: 0    Mild asthma, unspecified whether complicated, unspecified whether persistent  -     albuterol (VENTOLIN HFA) 90 mcg/actuation inhaler; Inhale 2 puffs into the lungs every 6 (six) hours as needed for Wheezing. Rescue  Dispense: 18 g; Refill: 0  -     predniSONE (DELTASONE) 20 MG tablet; Take 1 tablet (20 mg total) by mouth once daily. for 5 days  Dispense: 5 tablet; Refill: 0          Medical Decision Making:   Clinical Tests:   Lab Tests: Reviewed  Urgent Care Management:  Patient in no acute distress.  Vitals reassuring.  On exam, patient is nontoxic appearing and afebrile.  Physical examination consistent with mild expiratory wheezing noted.  Patient using her  "inhaler as prescribed.  Will prescribe albuterol inhaler along with cough medication.  Patient reported that Tessalon Perles does not work for her and would prefer the cough syrup.  Will prescribe promethazine DM with detailed education provided about side effects.  Will prescribe low-dose short course of steroids secondary to patient history of asthma.  Steroid side effects and recommendations discussed with patient in detail..  Medication prescribed and over-the-counter medication discussed with patient at length.  Proper hydration advised.  I reiterated the importance of further evaluation if no improvement symptoms and follow-up with primary. Patient voiced understanding and in agreement with current treatment plan.               Patient Instructions   Asthma Discharge   If your condition worsens or fails to improve we recommend that you receive another evaluation at the ER immediately or contact your PCP to discuss your concerns or return here. You must understand that you've received an urgent care treatment only and that you may be released before all your medical problems are known or treated. You the patient will arrange for follouwp care as instructed.     Keep the scheduled appointment with your specialist as Risks vs benefits of steroid use discussed with pt. Pt understands and would like to proceed with oral steroids.     Rest and fluids are important  Take inhaler as prescribed and needed for wheezing  -  Flonase (fluticasone) is a nasal spray which is available over the counter and may help with your symptoms.   -  If you have hypertension avoid using the "D" which is the decongestant.  Instead you can use Coricidin HBP for cold and cough symptoms.    -  If you just have drainage you can take plain Zyrtec, Claritin or Allegra   -  If you just have a congested feeling you can take pseudoephedrine (unless you have high blood pressure) which you have to sign for behind the counter. Do not buy the " phenylephrine which is on the shelf as it is not effective   -  Rest and fluids are also important.   -  Tylenol or ibuprofen can also be used as directed for pain unless you have an allergy to them or medical condition such as stomach ulcers, kidney or liver disease or blood thinners etc for which you should not be taking these type of medications.   Please follow up with your primary care doctor or specialist in the next 48-72hrs as needed and if no improvement  If you  smoke, please stop smoking.     none

## 2024-01-01 NOTE — PATIENT INSTRUCTIONS
"Asthma Discharge   If your condition worsens or fails to improve we recommend that you receive another evaluation at the ER immediately or contact your PCP to discuss your concerns or return here. You must understand that you've received an urgent care treatment only and that you may be released before all your medical problems are known or treated. You the patient will arrange for follouwp care as instructed.     Do not drive while taking the cough syrup - best to take it at night before going to sleep. However, you can take it during the day (every 4-6 hours) if you do not have to drive or operate machinery. This medication will make you drowsy. Try taking half a dose first to see how it affects you.        Keep the scheduled appointment with your specialist as Risks vs benefits of steroid use discussed with pt. Pt understands and would like to proceed with oral steroids.     Rest and fluids are important  Take inhaler as prescribed and needed for wheezing  -  Flonase (fluticasone) is a nasal spray which is available over the counter and may help with your symptoms.   -  If you have hypertension avoid using the "D" which is the decongestant.  Instead you can use Coricidin HBP for cold and cough symptoms.    -  If you just have drainage you can take plain Zyrtec, Claritin or Allegra   -  If you just have a congested feeling you can take pseudoephedrine (unless you have high blood pressure) which you have to sign for behind the counter. Do not buy the phenylephrine which is on the shelf as it is not effective   -  Rest and fluids are also important.   -  Tylenol or ibuprofen can also be used as directed for pain unless you have an allergy to them or medical condition such as stomach ulcers, kidney or liver disease or blood thinners etc for which you should not be taking these type of medications.   Please follow up with your primary care doctor or specialist in the next 48-72hrs as needed and if no improvement  If " you  smoke, please stop smoking.

## 2024-01-22 NOTE — TELEPHONE ENCOUNTER
23-Jan-2024 Spoke with pt in regards to her message in the portal. Pt was advise that Dr. Cash doesn't have any appointment available for Monday here at [place]. Pt didn't want to travel to the [facility] location. I offered pt an appointment for Wednesday here at [place] with Dr. Cash pt decline that appointment. Pt accept going to a different location to see a different provider at an earlier time. No further concerns was expressed. Call ended-----Right heel underneath can I put pressure on it try to make an appointment with the portal but they got me next Wednesday I cannot wait till next Wednesday when I cant hardly walk please see if you could get me in earlier I would appreciate it.           Please help and return my call one way or the other thank you [phone].            Thanks so much

## 2024-03-26 ENCOUNTER — HOSPITAL ENCOUNTER (OUTPATIENT)
Dept: RADIOLOGY | Facility: HOSPITAL | Age: 69
Discharge: HOME | End: 2024-03-26
Attending: FAMILY MEDICINE
Payer: MEDICARE

## 2024-03-26 PROCEDURE — 77067 SCR MAMMO BI INCL CAD: CPT | Mod: 26,,, | Performed by: RADIOLOGY

## 2024-03-26 PROCEDURE — 77063 BREAST TOMOSYNTHESIS BI: CPT | Mod: 26,,, | Performed by: RADIOLOGY

## 2024-03-26 PROCEDURE — 77067 SCR MAMMO BI INCL CAD: CPT | Mod: TC

## 2024-04-17 NOTE — HOSPITAL COURSE
Called Mr Umana due to [employer] home monitor isn't reading.  Left message for a return call.    Ms Shultz admitted for unknown etiology for pericarditis. Patient started on Colchicine which will continue for 3 months. 600mg taper Motrin for two weeks. Patient will need to meet with cardiology in two weeks via telemedicine for follow up of pericarditis and repeat ECHO in two weeks.

## 2024-05-28 NOTE — TELEPHONE ENCOUNTER
Spoke with patient and reviewed MRI findings revealing partial tear of the peroneus longus tendon and arthritis of the midfoot and 1 MTP. Advised patient to wear her orthopedic boot as much as possible to treat it as a cast and allow the foot to heal. Recommend RICEN prn and follow up in 6 weeks.   [Nl] : Endocrine [Frequent URIs] : no frequent upper respiratory infections [Snoring] : no snoring [Apnea] : no apnea [Restlessness] : no restlessness [Daytime Sleepiness] : no daytime sleepiness [Daytime Hyperactivity] : no daytime hyperactivity [Voice Changes] : no voice changes [Frequent Croup] : no frequent croup [Chronic Hoarseness] : no chronic hoarseness [Rhinorrhea] : no rhinorrhea [Nasal Congestion] : no nasal congestion [Sinus Problems] : no sinus problems [Postnasl Drip] : no postnasal drip [Epistaxis] : no epistaxis [Recurrent Ear Infections] : no recurrent ear infections [Recurrent Sinus Infections] : no recurrent sinus infections [Recurrent Throat Infections] : no recurrent throat infections [Tachypnea] : not tachypneic [Wheezing] : no wheezing [Cough] : no cough [Shortness of Breath] : no shortness of breath [Bronchiolitis] : no bronchiolitis [Pneumonia] : no pneumonia [Hemoptysis] : no hemoptysis [Sputum] : no sputum [Chest Tightness] : no chest tightness [Chronically Infected with ___] : no chronic infections [Spitting Up] : not spitting up [Problems Swallowing] : no problems swallowing [Abdominal Pain] : no abdominal pain [Diarrhea] : no diarrhea [Constipation] : no constipation [Foul Smelling Stool] : no foul smelling stool [Oily Stool] : no oily stool [Reflux] : no reflux [Vomiting] : no vomiting [Food Intolerance] : food intolerance [Abdomen Distention] : abdomen not distended [Rectal Prolapse] : no rectal prolapse [Urgency] : no feelings of urinary urgency [Dysuria] : no dysuria [Muscle Weakness] : no muscle weakness [Seizure] : no seizures [Brain Hemorrhage] : no brain hemorrhage [Developmental Delay] : no developmental delay [Head Injury] : no head injury [Urticaria] : no urticaria [Laryngeal Edema] : no laryngeal edema [Allergy Shiners] : no allergy shiners [Immunocompromised] : not immunocompromised [Angioedema] : no angioedema

## 2024-12-13 ENCOUNTER — TELEPHONE (OUTPATIENT)
Dept: GASTROENTEROLOGY | Facility: CLINIC | Age: 69
End: 2024-12-13
Payer: MEDICARE

## 2024-12-13 NOTE — TELEPHONE ENCOUNTER
----- Message from Mindy sent at 12/13/2024  2:49 PM CST -----  Regarding: Appointment  Contact: pt [phone]  Type:  Sooner Apoointment Request    Caller is requesting a sooner appointment.  Caller declined first available appointment listed below.  Caller will not accept being placed on the waitlist and is requesting a message be sent to doctor.  Name of Caller:Dorothy   When is the first available appointment? In [facility] earlier appt is in Jan  Symptoms: heart burn and acid reflux   Would the patient rather a call back or a response via [facility]? Call back  Best Call Back Number:pt [phone]   Additional Information:

## 2024-12-18 ENCOUNTER — TELEPHONE (OUTPATIENT)
Dept: GASTROENTEROLOGY | Facility: CLINIC | Age: 69
End: 2024-12-18
Payer: MEDICARE

## 2024-12-18 NOTE — TELEPHONE ENCOUNTER
----- Message from Arianna sent at 12/18/2024 12:38 PM CST -----  Type:  Patient Returning Call    Who Called:Ms De La Cruz  Who Left Message for Patient:Brook  Does the patient know what this is regarding?:yes  Would the patient rather a call back or a response via [facility]? call  Best Call Back Number: [phone]  Additional Information: none

## 2025-01-06 ENCOUNTER — OFFICE VISIT (OUTPATIENT)
Dept: GASTROENTEROLOGY | Facility: CLINIC | Age: 70
End: 2025-01-06
Payer: MEDICARE

## 2025-01-06 VITALS
WEIGHT: 201.5 LBS | HEIGHT: 64 IN | DIASTOLIC BLOOD PRESSURE: 94 MMHG | HEART RATE: 78 BPM | BODY MASS INDEX: 34.4 KG/M2 | SYSTOLIC BLOOD PRESSURE: 150 MMHG

## 2025-01-06 DIAGNOSIS — Z12.11 ENCOUNTER FOR SCREENING COLONOSCOPY: ICD-10-CM

## 2025-01-06 DIAGNOSIS — R05.3 CHRONIC COUGH: ICD-10-CM

## 2025-01-06 DIAGNOSIS — D13.1 FUNDIC GLAND POLYPS OF STOMACH, BENIGN: ICD-10-CM

## 2025-01-06 DIAGNOSIS — T39.395A GASTRITIS DUE TO NONSTEROIDAL ANTI-INFLAMMATORY DRUG: ICD-10-CM

## 2025-01-06 DIAGNOSIS — K29.60 GASTRITIS DUE TO NONSTEROIDAL ANTI-INFLAMMATORY DRUG: ICD-10-CM

## 2025-01-06 DIAGNOSIS — K44.9 HIATAL HERNIA: ICD-10-CM

## 2025-01-06 DIAGNOSIS — E66.811 OBESITY (BMI 30.0-34.9): ICD-10-CM

## 2025-01-06 DIAGNOSIS — K21.9 GASTROESOPHAGEAL REFLUX DISEASE, UNSPECIFIED WHETHER ESOPHAGITIS PRESENT: Primary | ICD-10-CM

## 2025-01-06 DIAGNOSIS — K21.9 GASTROESOPHAGEAL REFLUX DISEASE, UNSPECIFIED WHETHER ESOPHAGITIS PRESENT: ICD-10-CM

## 2025-01-06 DIAGNOSIS — Z79.899 LONG-TERM CURRENT USE OF PROTON PUMP INHIBITOR THERAPY: ICD-10-CM

## 2025-01-06 DIAGNOSIS — K57.90 DIVERTICULOSIS: ICD-10-CM

## 2025-01-06 PROCEDURE — 1159F MED LIST DOCD IN RCRD: CPT | Mod: CPTII,S$GLB,,

## 2025-01-06 PROCEDURE — 1160F RVW MEDS BY RX/DR IN RCRD: CPT | Mod: CPTII,S$GLB,,

## 2025-01-06 PROCEDURE — 3080F DIAST BP >= 90 MM HG: CPT | Mod: CPTII,S$GLB,,

## 2025-01-06 PROCEDURE — 3008F BODY MASS INDEX DOCD: CPT | Mod: CPTII,S$GLB,,

## 2025-01-06 PROCEDURE — 3077F SYST BP >= 140 MM HG: CPT | Mod: CPTII,S$GLB,,

## 2025-01-06 PROCEDURE — 99204 OFFICE O/P NEW MOD 45 MIN: CPT | Mod: S$GLB,,,

## 2025-01-06 PROCEDURE — 99999 PR PBB SHADOW E&M-EST. PATIENT-LVL III: CPT | Mod: PBBFAC,,,

## 2025-01-06 PROCEDURE — 1126F AMNT PAIN NOTED NONE PRSNT: CPT | Mod: CPTII,S$GLB,,

## 2025-01-06 RX ORDER — OMEPRAZOLE 20 MG/1
20 CAPSULE, DELAYED RELEASE ORAL DAILY
Qty: 30 CAPSULE | Refills: 11 | Status: SHIPPED | OUTPATIENT
Start: 2025-01-06 | End: 2026-01-06

## 2025-01-06 NOTE — PROGRESS NOTES
Gastroenterology Clinic Consultation Note    Reason for Visit:  The primary encounter diagnosis was Gastroesophageal reflux disease, unspecified whether esophagitis present. Diagnoses of Chronic cough, Fundic gland polyps of stomach, benign, Diverticulosis, Hiatal hernia, Gastritis due to nonsteroidal anti-inflammatory drug, Long-term current use of proton pump inhibitor therapy, Obesity (BMI 30.0-34.9), Encounter for screening colonoscopy, and Gastroesophageal reflux were also pertinent to this visit.    PCP:   Justen Jernigan   [address] / [place] [id number]      Initial HPI   This is a 70 y.o. female presenting for reflux  Patient in clinic with above complaints. This is not a new problem. She reports she has been on PPI therapy for 10-15 years. Her last EGD was in 2019 showed fundic gland polyps, hiatal hernia, gastritis d/t NSAID and last colonoscopy 2019 showed diverticulosis. She reports rebound reflux at times and will experience acid regurgitation when bending forward. She also reports chronic cough which has been worked up for pulmonary etiology and continues with treatment. Denies unintentional weight loss, fever, chills, nausea, vomiting, constipation, diarrhea, hematemesis, difficulties swallowing, changes in bowel habits, changes in stool caliber, blood in stool, and abdominal pain.        ROS:  Review of Systems   Constitutional:  Negative for chills, fever, malaise/fatigue and weight loss.   Respiratory:  Negative for shortness of breath.    Cardiovascular:  Negative for chest pain.   Gastrointestinal:  Positive for heartburn. Negative for abdominal pain, blood in stool, constipation, diarrhea, melena, nausea and vomiting.   Genitourinary:  Negative for hematuria.   Neurological:  Negative for dizziness and weakness.        Medical History:  has a past medical history of Arthritis, Asthma, Back pain, Knee pain, and Thyroid disease.    Surgical History:  has a past surgical  history that includes Cholecystectomy; Vascular surgery; Left heart catheterization (Right, 4/19/2020); Coronary angiography (N/A, 4/19/2020); Arthroscopic repair of rotator cuff of shoulder (Left, 6/9/2022); Acromioplasty (6/9/2022); Arthroscopy of shoulder with decompression of subacromial space (6/9/2022); Arthroscopic tenotomy of biceps tendon (6/9/2022); and Synovectomy of shoulder (6/9/2022).    Family History: family history includes Stroke in her mother..       Review of patient's allergies indicates:  No Known Allergies    Current Outpatient Medications on File Prior to Visit   Medication Sig Dispense Refill    acyclovir (ZOVIRAX) 400 MG tablet Take 1 tablet (400 mg total) by mouth 5 (five) times daily. 25 tablet 2    albuterol (PROAIR HFA) 90 mcg/actuation inhaler Inhale 2 puffs into the lungs every 6 (six) hours as needed for Wheezing or Shortness of Breath. Rescue 18 g 11    albuterol (VENTOLIN HFA) 90 mcg/actuation inhaler Inhale 2 puffs into the lungs every 6 (six) hours as needed for Wheezing. Rescue 18 g 0    budesonide-glycopyr-formoterol (BREZTRI AEROSPHERE) 160-9-4.8 mcg/actuation HFAA Inhale 2 puffs into the lungs 2 (two) times a day. 10.7 g 11    diclofenac (VOLTAREN) 50 MG EC tablet TAKE 1 TABLET (50 MG TOTAL) BY MOUTH 2 (TWO) TIMES DAILY AS NEEDED (PAIN). 60 tablet 1    diclofenac sodium (VOLTAREN) 1 % Gel Apply 2 g topically 2 (two) times daily as needed (to knees). 100 g 2    famotidine (PEPCID) 20 MG tablet TAKE 1 TABLET BY MOUTH TWICE A  tablet 3    fluticasone propionate (FLONASE) 50 mcg/actuation nasal spray 2 sprays (100 mcg total) by Each Nostril route once daily. 48 g 3    levocetirizine (XYZAL) 5 MG tablet TAKE 1 TABLET BY MOUTH EVERY DAY AS NEEDED 90 tablet 3    levothyroxine (SYNTHROID) 25 MCG tablet TAKE 1 AND 1/2 TABLETS DAILY BY MOUTH 135 tablet 3    miSOPROStoL (CYTOTEC) 200 MCG Tab TAKE 1 TABLET (200 MCG TOTAL) BY MOUTH 2 (TWO) TIMES DAILY AS NEEDED (WITH DICLOFENAC  "RX). 60 tablet 1    [DISCONTINUED] omeprazole (PRILOSEC) 20 MG capsule TAKE 1 CAPSULE BY MOUTH EVERY DAY IN THE MORNING 90 capsule 3    [DISCONTINUED] traMADoL (ULTRAM) 50 mg tablet TAKE 2 TABLETS BY MOUTH EVERY 8 HOURS AS NEEDED FOR PAIN. 120 tablet 0     No current facility-administered medications on file prior to visit.         Objective Findings:    Vital Signs:  BP (!) 150/94   Pulse 78   Ht 5' 4" (1.626 m)   Wt 91.4 kg (201 lb 8 oz)   LMP 08/15/2005   BMI 34.59 kg/m²   Body mass index is 34.59 kg/m².    Physical Exam:  Physical Exam  Vitals and nursing note reviewed.   Constitutional:       General: She is not in acute distress.     Appearance: Normal appearance. She is obese. She is not ill-appearing.   HENT:      Head: Normocephalic and atraumatic.      Right Ear: External ear normal.      Left Ear: External ear normal.      Nose: Nose normal.   Eyes:      General: No scleral icterus.     Extraocular Movements: Extraocular movements intact.   Cardiovascular:      Rate and Rhythm: Normal rate.   Pulmonary:      Effort: Pulmonary effort is normal. No respiratory distress.   Abdominal:      General: Bowel sounds are normal. There is no distension.      Palpations: Abdomen is soft.      Tenderness: There is no guarding.   Musculoskeletal:         General: Normal range of motion.      Cervical back: Normal range of motion.   Skin:     General: Skin is warm.   Neurological:      Mental Status: She is alert and oriented to person, place, and time.   Psychiatric:         Mood and Affect: Mood normal.         Behavior: Behavior is cooperative.         Thought Content: Thought content normal.             Labs:  Lab Results   Component Value Date    WBC 5.77 10/25/2024    HGB 12.6 10/25/2024    HCT 38.7 10/25/2024     10/25/2024    CRP 1.9 07/29/2020    CHOL 201 (H) 05/09/2024    TRIG 77 05/09/2024    HDL 71 05/09/2024    ALKPHOS 77 10/25/2024    LIPASE 12 09/13/2011    ALT 17 10/25/2024    AST 20 " 10/25/2024     10/25/2024    K 4.6 10/25/2024     10/25/2024    CREATININE 0.9 10/25/2024    BUN 14 10/25/2024    CO2 26 10/25/2024    TSH 3.12 05/09/2024    INR 1.0 04/19/2020    HGBA1C 5.6 10/25/2024       Imaging reviewed:   US ABDOMEN LIMITED     CLINICAL HISTORY:  Elevated LFTs;     TECHNIQUE:  Limited ultrasound of the right upper quadrant of the abdomen (including pancreas, liver, gallbladder, common bile duct, and spleen) was performed.     COMPARISON:  09/13/2011     FINDINGS:  The visualized portions of the pancreas are unremarkable.  The visualized aorta and IVC are unremarkable.  The common duct is not dilated measuring 0.3 cm.  The gallbladder is absent.  The hepatic parenchyma is homogeneous, noting the liver is prominent measuring 19 cm.  There may be slight increased echogenicity of the hepatic parenchyma versus increased echogenicity related to technique and habitus.  The spleen is unremarkable.  No ascites.  The visualized portions of the right kidney are unremarkable.     Impression:     Hepatomegaly noting slightly echogenic hepatic echotexture.  Correlation with LFTs recommended as this could reflect sequela of steatosis.     Status post cholecystectomy.        Electronically signed by:Dat Brice MD  Date:                                            04/19/2020    Endoscopy reviewed: [facility] 2019 EGD/colonoscopy       Assessment:  1. Gastroesophageal reflux disease, unspecified whether esophagitis present    2. Chronic cough    3. Fundic gland polyps of stomach, benign    4. Diverticulosis    5. Hiatal hernia    6. Gastritis due to nonsteroidal anti-inflammatory drug    7. Long-term current use of proton pump inhibitor therapy    8. Obesity (BMI 30.0-34.9)    9. Encounter for screening colonoscopy    10. Gastroesophageal reflux      Orders Placed This Encounter    omeprazole (PRILOSEC) 20 MG capsule     Plan:  EGD/colonoscopy - chronic GERD, screening colonoscopy  Continue Omperazole  40 mg QD, Pepcid as needed     For GERD/Reflux:  Take your PPI 30-45 minutes before your first protein containing meal (breakfast) every day. If symptoms improve ok discontinue an use PPI as needed for symptoms.   Take Pepcid 20 mg every evening before bedtime to help with nocturnal symptoms, as needed.  Remain upright for at least 3 hours after eating.   Elevate the head of the bed for nighttime.   Avoid foods that you have noticed make your symptoms worse (possible triggers include: peppermint, alcohol, chocolate, caffeine, spicy foods, greasy/fried foods, acidic foods-citrus).   Lose weight if you are overweight -- of all of the lifestyle changes you can make, this one is the most effective.    Thank you for allowing me to participate in this patient's care.    Sincerely,     YANG PEREZ-C  Gastroenterology Department  [facility] [address] Office [phone]

## 2025-01-14 ENCOUNTER — LAB VISIT (OUTPATIENT)
Dept: LAB | Facility: HOSPITAL | Age: 70
End: 2025-01-14
Attending: FAMILY MEDICINE
Payer: MEDICARE

## 2025-01-14 DIAGNOSIS — Z79.899 OTHER LONG TERM (CURRENT) DRUG THERAPY: ICD-10-CM

## 2025-01-14 DIAGNOSIS — E03.9 HYPOTHYROIDISM, UNSPECIFIED TYPE: ICD-10-CM

## 2025-01-14 LAB
ALBUMIN SERPL BCP-MCNC: 4.1 G/DL (ref 3.5–5.2)
ALP SERPL-CCNC: 81 U/L (ref 40–150)
ALT SERPL W/O P-5'-P-CCNC: 20 U/L (ref 10–44)
ANION GAP SERPL CALC-SCNC: 11 MMOL/L (ref 8–16)
AST SERPL-CCNC: 17 U/L (ref 10–40)
BASOPHILS # BLD AUTO: 0.09 K/UL (ref 0–0.2)
BASOPHILS NFR BLD: 1.2 % (ref 0–1.9)
BILIRUB SERPL-MCNC: 0.4 MG/DL (ref 0.1–1)
BUN SERPL-MCNC: 24 MG/DL (ref 8–23)
CALCIUM SERPL-MCNC: 10 MG/DL (ref 8.7–10.5)
CHLORIDE SERPL-SCNC: 103 MMOL/L (ref 95–110)
CO2 SERPL-SCNC: 25 MMOL/L (ref 23–29)
CREAT SERPL-MCNC: 0.9 MG/DL (ref 0.5–1.4)
DIFFERENTIAL METHOD BLD: NORMAL
EOSINOPHIL # BLD AUTO: 0.2 K/UL (ref 0–0.5)
EOSINOPHIL NFR BLD: 2.6 % (ref 0–8)
ERYTHROCYTE [DISTWIDTH] IN BLOOD BY AUTOMATED COUNT: 12.8 % (ref 11.5–14.5)
EST. GFR  (NO RACE VARIABLE): >60 ML/MIN/1.73 M^2
ESTIMATED AVG GLUCOSE: 111 MG/DL (ref 68–131)
GLUCOSE SERPL-MCNC: 60 MG/DL (ref 70–110)
HBA1C MFR BLD: 5.5 % (ref 4–5.6)
HCT VFR BLD AUTO: 41.3 % (ref 37–48.5)
HGB BLD-MCNC: 13.2 G/DL (ref 12–16)
IMM GRANULOCYTES # BLD AUTO: 0.01 K/UL (ref 0–0.04)
IMM GRANULOCYTES NFR BLD AUTO: 0.1 % (ref 0–0.5)
LYMPHOCYTES # BLD AUTO: 2.7 K/UL (ref 1–4.8)
LYMPHOCYTES NFR BLD: 36.1 % (ref 18–48)
MCH RBC QN AUTO: 29.8 PG (ref 27–31)
MCHC RBC AUTO-ENTMCNC: 32 G/DL (ref 32–36)
MCV RBC AUTO: 93 FL (ref 82–98)
MONOCYTES # BLD AUTO: 0.8 K/UL (ref 0.3–1)
MONOCYTES NFR BLD: 11 % (ref 4–15)
NEUTROPHILS # BLD AUTO: 3.6 K/UL (ref 1.8–7.7)
NEUTROPHILS NFR BLD: 49 % (ref 38–73)
NRBC BLD-RTO: 0 /100 WBC
PLATELET # BLD AUTO: 338 K/UL (ref 150–450)
PMV BLD AUTO: 9.8 FL (ref 9.2–12.9)
POTASSIUM SERPL-SCNC: 4.5 MMOL/L (ref 3.5–5.1)
PROT SERPL-MCNC: 8 G/DL (ref 6–8.4)
RBC # BLD AUTO: 4.43 M/UL (ref 4–5.4)
SODIUM SERPL-SCNC: 139 MMOL/L (ref 136–145)
T4 FREE SERPL-MCNC: 1.03 NG/DL (ref 0.71–1.51)
TSH SERPL DL<=0.005 MIU/L-ACNC: 4.48 UIU/ML (ref 0.4–4)
WBC # BLD AUTO: 7.39 K/UL (ref 3.9–12.7)

## 2025-01-14 PROCEDURE — 84439 ASSAY OF FREE THYROXINE: CPT | Performed by: FAMILY MEDICINE

## 2025-01-14 PROCEDURE — 84443 ASSAY THYROID STIM HORMONE: CPT | Performed by: FAMILY MEDICINE

## 2025-01-14 PROCEDURE — 83036 HEMOGLOBIN GLYCOSYLATED A1C: CPT | Performed by: FAMILY MEDICINE

## 2025-01-14 PROCEDURE — 85025 COMPLETE CBC W/AUTO DIFF WBC: CPT | Performed by: FAMILY MEDICINE

## 2025-01-14 PROCEDURE — 80053 COMPREHEN METABOLIC PANEL: CPT | Performed by: FAMILY MEDICINE

## 2025-01-22 ENCOUNTER — PATIENT MESSAGE (OUTPATIENT)
Dept: GASTROENTEROLOGY | Facility: CLINIC | Age: 70
End: 2025-01-22
Payer: MEDICARE

## 2025-04-14 NOTE — PROGRESS NOTES
Patient ID:   Dorothy Burt is a 67 y.o. female.    Chief Complaint:   Proximally 7.5 weeks status post left rotator cuff repair and extensive arthroscopic debridement    HPI:   Patient is returning today for evaluation of the left shoulder.  She reports improvement in her pain.  She is currently wearing her sling.    Medications:    Current Outpatient Medications:     acyclovir (ZOVIRAX) 400 MG tablet, TAKE 1 BY MOUTH 5 TIMES DAILY FOR 5 DAYS, Disp: 25 tablet, Rfl: 2    albuterol (PROAIR HFA) 90 mcg/actuation inhaler, Inhale 2 puffs into the lungs every 6 (six) hours as needed for Wheezing or Shortness of Breath. Rescue, Disp: 18 g, Rfl: 11    budesonide-formoterol 160-4.5 mcg (SYMBICORT) 160-4.5 mcg/actuation HFAA, Inhale 2 puffs into the lungs every evening. Controller, Disp: , Rfl:     diazePAM (VALIUM) 5 MG tablet, Take 2 tablets 30 minutes prior to the MRI scan, Disp: 2 tablet, Rfl: 0    diclofenac sodium (VOLTAREN) 1 % Gel, Apply 2 g topically 2 (two) times daily as needed (to knees)., Disp: 100 g, Rfl: 2    famotidine (PEPCID) 20 MG tablet, TAKE 1 TABLET BY MOUTH TWICE A DAY, Disp: 30 tablet, Rfl: 11    ibuprofen (ADVIL,MOTRIN) 600 MG tablet, Take 1 tablet (600 mg total) by mouth every 6 (six) hours as needed for Pain., Disp: 30 tablet, Rfl: 1    levocetirizine (XYZAL) 5 MG tablet, TAKE 1 TABLET BY MOUTH EVERY DAY AS NEEDED, Disp: 90 tablet, Rfl: 3    levothyroxine (SYNTHROID) 25 MCG tablet, TAKE 1.5 TABLET BY MOUTH DAILY, Disp: 135 tablet, Rfl: 3    naproxen (NAPROSYN) 500 MG tablet, TAKE 1 TABLET BY MOUTH TWICE A DAY AS NEEDED, Disp: 60 tablet, Rfl: 0    omeprazole (PRILOSEC) 20 MG capsule, Take 20 mg by mouth daily as needed., Disp: , Rfl:     ondansetron (ZOFRAN) 8 MG tablet, Take 1 tablet (8 mg total) by mouth every 8 (eight) hours as needed for Nausea., Disp: 15 tablet, Rfl: 1    oxyCODONE-acetaminophen (PERCOCET) 5-325 mg per tablet, Take 1 tablet by mouth every 4 (four) hours as  needed for Pain., Disp: 28 tablet, Rfl: 0    temazepam (RESTORIL) 15 mg Cap, Take 1 capsule (15 mg total) by mouth nightly as needed (Sleep)., Disp: 30 capsule, Rfl: 5    triamcinolone acetonide 0.1% (KENALOG) 0.1 % cream, Apply topically 2 (two) times daily., Disp: 15 g, Rfl: 2    ZANAFLEX 4 mg tablet, Take 4 mg by mouth every 8 (eight) hours as needed., Disp: , Rfl:     Allergies:  Review of patient's allergies indicates:  No Known Allergies    Past Medical History:  Past Medical History:   Diagnosis Date    Arthritis     Asthma     Back pain     Knee pain     Thyroid disease         Past Surgical History:  Past Surgical History:   Procedure Laterality Date    ACROMIOPLASTY  6/9/2022    Procedure: ACROMIOPLASTY;  Surgeon: Louie Luna MD;  Location: [facility] OR;  Service: Orthopedics;;    ARTHROSCOPIC REPAIR OF ROTATOR CUFF OF SHOULDER Left 6/9/2022    Procedure: REPAIR, ROTATOR CUFF, ARTHROSCOPIC, extensive arthroscopic debridement;  Surgeon: Louie Luna MD;  Location: [facility] OR;  Service: Orthopedics;  Laterality: Left;  [place]ephew     ARTHROSCOPIC TENOTOMY OF BICEPS TENDON  6/9/2022    Procedure: TENOTOMY, BICEPS, ARTHROSCOPIC;  Surgeon: Louie Luna MD;  Location: [facility] OR;  Service: Orthopedics;;    ARTHROSCOPY OF SHOULDER WITH DECOMPRESSION OF SUBACROMIAL SPACE  6/9/2022    Procedure: ARTHROSCOPY, SHOULDER, WITH SUBACROMIAL SPACE DECOMPRESSION;  Surgeon: Louie Luna MD;  Location: [facility] OR;  Service: Orthopedics;;    CHOLECYSTECTOMY      CORONARY ANGIOGRAPHY N/A 4/19/2020    Procedure: ANGIOGRAM, CORONARY ARTERY;  Surgeon: Zachariah Goldman MD;  Location: [facility] CATH LAB;  Service: Cardiology;  Laterality: N/A;    LEFT HEART CATHETERIZATION Right 4/19/2020    Procedure: Left heart cath;  Surgeon: Zachariah Goldman MD;  Location: [facility] CATH LAB;  Service: Cardiology;  Laterality: Right;    SYNOVECTOMY OF SHOULDER  6/9/2022    Procedure: SYNOVECTOMY, SHOULDER;   "Surgeon: Louie Luna MD;  Location: [facility] OR;  Service: Orthopedics;;    VASCULAR SURGERY         Social History:  Social History     Occupational History    Not on file   Tobacco Use    Smoking status: Never Smoker    Smokeless tobacco: Never Used   Substance and Sexual Activity    Alcohol use: Yes     Alcohol/week: 1.0 standard drink     Types: 1 Shots of liquor per week     Comment: occ    Drug use: No    Sexual activity: Not on file       Family History:  Family History   Problem Relation Age of Onset    Stroke Mother       Vitals:  /84 (BP Location: Left arm, Patient Position: Sitting, BP Method: Large (Automatic))   Pulse 82   Ht 5' 4" (1.626 m)   Wt 88.6 kg (195 lb 5.2 oz)   LMP 08/15/2005   BMI 33.53 kg/m²     Physical Examination:  Ortho Exam     Left shoulder exam:  Well-healed surgical incisions.  Range of motion reveals passive elevation 170 external rotation of 30. Good resistance with rotator cuff strength testing.    Assessment:  1. S/P left rotator cuff repair      Plan:  The patient will discontinue use of her sling.  She should continue with range of motion exercises to include active assisted and passive range of motion.  I recommended no strengthening of the rotator cuff until 3 months postop.  Follow-up evaluation for the left shoulder in 6 weeks.     No follow-ups on file.        " No

## 2025-05-09 ENCOUNTER — LAB VISIT (OUTPATIENT)
Dept: LAB | Facility: HOSPITAL | Age: 70
End: 2025-05-09
Payer: MEDICARE

## 2025-05-09 DIAGNOSIS — E78.00 HIGH CHOLESTEROL: ICD-10-CM

## 2025-05-09 DIAGNOSIS — Z79.899 ENCOUNTER FOR LONG-TERM (CURRENT) USE OF MEDICATIONS: ICD-10-CM

## 2025-05-09 DIAGNOSIS — E03.9 HYPOTHYROIDISM, UNSPECIFIED TYPE: ICD-10-CM

## 2025-05-09 DIAGNOSIS — D69.2 NONTHROMBOCYTOPENIC PURPURA: ICD-10-CM

## 2025-05-09 LAB
ABSOLUTE EOSINOPHIL (OHS): 0.16 K/UL
ABSOLUTE MONOCYTE (OHS): 0.58 K/UL (ref 0.3–1)
ABSOLUTE NEUTROPHIL COUNT (OHS): 2.29 K/UL (ref 1.8–7.7)
ALBUMIN SERPL BCP-MCNC: 3.7 G/DL (ref 3.5–5.2)
ALP SERPL-CCNC: 73 UNIT/L (ref 40–150)
ALT SERPL W/O P-5'-P-CCNC: 13 UNIT/L (ref 10–44)
ANION GAP (OHS): 8 MMOL/L (ref 8–16)
AST SERPL-CCNC: 16 UNIT/L (ref 11–45)
BASOPHILS # BLD AUTO: 0.06 K/UL
BASOPHILS NFR BLD AUTO: 1.2 %
BILIRUB SERPL-MCNC: 0.6 MG/DL (ref 0.1–1)
BUN SERPL-MCNC: 21 MG/DL (ref 8–23)
CALCIUM SERPL-MCNC: 9.4 MG/DL (ref 8.7–10.5)
CHLORIDE SERPL-SCNC: 103 MMOL/L (ref 95–110)
CO2 SERPL-SCNC: 27 MMOL/L (ref 23–29)
CREAT SERPL-MCNC: 0.8 MG/DL (ref 0.5–1.4)
EAG (OHS): 111 MG/DL (ref 68–131)
ERYTHROCYTE [DISTWIDTH] IN BLOOD BY AUTOMATED COUNT: 12.5 % (ref 11.5–14.5)
GFR SERPLBLD CREATININE-BSD FMLA CKD-EPI: >60 ML/MIN/1.73/M2
GLUCOSE SERPL-MCNC: 93 MG/DL (ref 70–110)
HBA1C MFR BLD: 5.5 % (ref 4–5.6)
HCT VFR BLD AUTO: 38.5 % (ref 37–48.5)
HGB BLD-MCNC: 12.3 GM/DL (ref 12–16)
IMM GRANULOCYTES # BLD AUTO: 0.01 K/UL (ref 0–0.04)
IMM GRANULOCYTES NFR BLD AUTO: 0.2 % (ref 0–0.5)
LYMPHOCYTES # BLD AUTO: 1.9 K/UL (ref 1–4.8)
MCH RBC QN AUTO: 29.4 PG (ref 27–31)
MCHC RBC AUTO-ENTMCNC: 31.9 G/DL (ref 32–36)
MCV RBC AUTO: 92 FL (ref 82–98)
NUCLEATED RBC (/100WBC) (OHS): 0 /100 WBC
PLATELET # BLD AUTO: 270 K/UL (ref 150–450)
PMV BLD AUTO: 10 FL (ref 9.2–12.9)
POTASSIUM SERPL-SCNC: 5 MMOL/L (ref 3.5–5.1)
PROT SERPL-MCNC: 7.4 GM/DL (ref 6–8.4)
RBC # BLD AUTO: 4.18 M/UL (ref 4–5.4)
RELATIVE EOSINOPHIL (OHS): 3.2 %
RELATIVE LYMPHOCYTE (OHS): 38 % (ref 18–48)
RELATIVE MONOCYTE (OHS): 11.6 % (ref 4–15)
RELATIVE NEUTROPHIL (OHS): 45.8 % (ref 38–73)
SODIUM SERPL-SCNC: 138 MMOL/L (ref 136–145)
TSH SERPL-ACNC: 3.5 UIU/ML (ref 0.4–4)
WBC # BLD AUTO: 5 K/UL (ref 3.9–12.7)

## 2025-05-09 PROCEDURE — 80053 COMPREHEN METABOLIC PANEL: CPT

## 2025-05-09 PROCEDURE — 83036 HEMOGLOBIN GLYCOSYLATED A1C: CPT

## 2025-05-09 PROCEDURE — 84443 ASSAY THYROID STIM HORMONE: CPT

## 2025-05-09 PROCEDURE — 36415 COLL VENOUS BLD VENIPUNCTURE: CPT

## 2025-05-09 PROCEDURE — 85025 COMPLETE CBC W/AUTO DIFF WBC: CPT

## 2025-05-28 ENCOUNTER — OFFICE VISIT (OUTPATIENT)
Dept: GASTROENTEROLOGY | Facility: CLINIC | Age: 70
End: 2025-05-28
Payer: MEDICARE

## 2025-05-28 VITALS
HEART RATE: 94 BPM | WEIGHT: 197.75 LBS | BODY MASS INDEX: 33.76 KG/M2 | SYSTOLIC BLOOD PRESSURE: 119 MMHG | HEIGHT: 64 IN | DIASTOLIC BLOOD PRESSURE: 80 MMHG

## 2025-05-28 DIAGNOSIS — K21.9 GASTROESOPHAGEAL REFLUX DISEASE, UNSPECIFIED WHETHER ESOPHAGITIS PRESENT: Primary | ICD-10-CM

## 2025-05-28 PROCEDURE — 3079F DIAST BP 80-89 MM HG: CPT | Mod: CPTII,S$GLB,, | Performed by: INTERNAL MEDICINE

## 2025-05-28 PROCEDURE — 3044F HG A1C LEVEL LT 7.0%: CPT | Mod: CPTII,S$GLB,, | Performed by: INTERNAL MEDICINE

## 2025-05-28 PROCEDURE — 3008F BODY MASS INDEX DOCD: CPT | Mod: CPTII,S$GLB,, | Performed by: INTERNAL MEDICINE

## 2025-05-28 PROCEDURE — 99214 OFFICE O/P EST MOD 30 MIN: CPT | Mod: S$GLB,,, | Performed by: INTERNAL MEDICINE

## 2025-05-28 PROCEDURE — 3288F FALL RISK ASSESSMENT DOCD: CPT | Mod: CPTII,S$GLB,, | Performed by: INTERNAL MEDICINE

## 2025-05-28 PROCEDURE — 99999 PR PBB SHADOW E&M-EST. PATIENT-LVL III: CPT | Mod: PBBFAC,,, | Performed by: INTERNAL MEDICINE

## 2025-05-28 PROCEDURE — 1125F AMNT PAIN NOTED PAIN PRSNT: CPT | Mod: CPTII,S$GLB,, | Performed by: INTERNAL MEDICINE

## 2025-05-28 PROCEDURE — 3074F SYST BP LT 130 MM HG: CPT | Mod: CPTII,S$GLB,, | Performed by: INTERNAL MEDICINE

## 2025-05-28 PROCEDURE — 1101F PT FALLS ASSESS-DOCD LE1/YR: CPT | Mod: CPTII,S$GLB,, | Performed by: INTERNAL MEDICINE

## 2025-05-28 PROCEDURE — 1159F MED LIST DOCD IN RCRD: CPT | Mod: CPTII,S$GLB,, | Performed by: INTERNAL MEDICINE

## 2025-05-28 NOTE — PROGRESS NOTES
"[facility]    CC: GERD    HPI 70 y.o. female with a long standing history of GERD. She reports severe heartburn, epigastric pain, typically worse when lying down at night. Symptoms progressed a early this year when she was off of PPI. She resumed this every other day and has some improvement  (50%) in heartburn and complete resolution of dysphagia. She has also altered her diet to include more leafy green vegetables and limit spicy and red based sauces which has also provided some relief. She previously has struggled with chronic constipation but this is controlled with dietary modifications as well.      Past Medical History  Hypothyroidism   Fibromyalgia   Asthma     Physical Examination  /80 (BP Location: Left arm, Patient Position: Sitting)   Pulse 94   Ht 5' 4" (1.626 m)   Wt 89.7 kg (197 lb 12 oz)   LMP 08/15/2005   BMI 33.94 kg/m²   General appearance: alert, cooperative, no distress  Lungs: clear to auscultation bilaterally, no dullness to percussion bilaterally  Heart: regular rate and rhythm without rub; no displacement of the PMI   Abdomen: soft, non-tender; bowel sounds normoactive; no organomegaly    Endoscopic Hx:   EGD 10/2019  - hiatal hernia   - erosions at GE junction  - fundic gland polyps     Colonoscopy 10/2019  - Diverticular disease in colon  - Significant looping     Assessment:   70 y.o. female with a long standing history of GERD with progression of symptoms while not taking PPI therapy appropriately. She has altered her diet to limt triggering foods and is now only taking PPI every other day and reports 80% improvement in heartburn and complete resolution of dysphagia with PPI Her chronic constipation is controlled with dietary modifications. Previous endoscopic reports reviewed which are revealing for a small HH.     Plan:  - Resume daily PPI indefinitely given severe symptoms while off this medication and risk of peptic esophageal stricture as well as risk of " development of esophageal cancer related to uncontrolled GERD   - Recommend daily Miralax if worsening constipation  - Up to date on screening colonoscopy (2019)     Flaco Vincent MD   [address], [address]  [place] [address] [phone]

## 2025-05-28 NOTE — PATIENT INSTRUCTIONS
Resume daily PPI indefinitely given severe symptoms while off this medication   Recommend daily Miralax if worsening constipation  Up to date on screening colonoscopy (2019)

## 2025-06-05 ENCOUNTER — HOSPITAL ENCOUNTER (OUTPATIENT)
Dept: RADIOLOGY | Facility: HOSPITAL | Age: 70
Discharge: HOME | End: 2025-06-05
Attending: FAMILY MEDICINE
Payer: MEDICARE

## 2025-06-05 DIAGNOSIS — Z12.31 ENCOUNTER FOR SCREENING MAMMOGRAM FOR BREAST CANCER: ICD-10-CM

## 2025-06-05 PROCEDURE — 77063 BREAST TOMOSYNTHESIS BI: CPT | Mod: TC

## 2025-06-19 ENCOUNTER — CLINICAL SUPPORT (OUTPATIENT)
Dept: REHABILITATION | Facility: HOSPITAL | Age: 70
End: 2025-06-19
Attending: FAMILY MEDICINE
Payer: MEDICARE

## 2025-06-19 DIAGNOSIS — R53.1 DECREASED STRENGTH: Primary | ICD-10-CM

## 2025-06-19 DIAGNOSIS — R29.898 DECREASED ROM OF NECK: ICD-10-CM

## 2025-06-19 PROCEDURE — 97140 MANUAL THERAPY 1/> REGIONS: CPT | Mod: PN

## 2025-06-19 PROCEDURE — 97530 THERAPEUTIC ACTIVITIES: CPT | Mod: PN

## 2025-06-19 PROCEDURE — 97161 PT EVAL LOW COMPLEX 20 MIN: CPT | Mod: PN

## 2025-06-20 PROBLEM — R29.898 DECREASED ROM OF NECK: Status: ACTIVE | Noted: 2025-06-20

## 2025-06-20 PROBLEM — R53.1 DECREASED STRENGTH: Status: ACTIVE | Noted: 2025-06-20

## 2025-06-20 NOTE — PROGRESS NOTES
Outpatient Rehab    Physical Therapy Evaluation    Patient Name: Dorothy Burt  MRN: [id number]  YOB: 1955  Encounter Date: 6/19/2025    Therapy Diagnosis:   Encounter Diagnoses   Name Primary?    Decreased strength Yes    Decreased ROM of neck      Physician: Justen Jernigan,*    Physician Orders: Eval and Treat  Medical Diagnosis: Neck pain  Surgical Diagnosis: Not applicable for this Episode   Surgical Date: Not applicable for this Episode  Days Since Last Surgery: Not applicable for this Episode    Visit # / Visits Authorized:  1 / 1  Insurance Authorization Period: 6/18/2025 to 6/18/2026  Date of Evaluation: 6/19/2025  Plan of Care Certification: 6/19/2025 to 8/25/25    Time In: 1310   Time Out: 1400  Total Time (in minutes): 50   Total Billable Time (in minutes): 50    Intake Outcome Measure for FOTO Survey    Therapist reviewed FOTO scores for Dorothy Burt on 6/19/2025.   FOTO report - see Media section or FOTO account episode details.     Intake Score: 43%    Precautions:       Subjective   History of Present Illness  Dorothy is a 70 y.o. female who reports to physical therapy with a chief concern of right arm and neck pain.     The patient reports a medical diagnosis of M54.2 (ICD-10-CM) - Neck pain.    Diagnostic tests related to this condition: CT scan.   CT Scan Details: Cervical spine alignment appears within normal limits.  Cervical vertebral body heights are adequately maintained.  No definite acute displaced fracture identified.  Intervertebral disc space heights are relatively well maintained.  There is osseous fusion of the left C3-C4 facet articulation.  There is bilateral facet arthropathy with mild multilevel bilateral neural foraminal narrowing.  No significant bony spinal canal stenosis appreciated.    Dominant Hand: Right  History of Present Condition/Illness: Right sided neck, upper trap and forearm pain for the past 6 weeks ago. Does remember does a lot of hedge  clipping for about 3 hours 2 days before, usually doesn't do much of that type of work but stays busy at home. Denies any numbness or tingling. Disturbed sleep but unsure if it is from her neck or not; sleeps with lots of pillows and tried other devices to help. Pain skips the right upper arm, PMH of left rotator cuff repair and this pain is different from that. Morning pain is worse than throughout the day, right arm lifting activities will aggravate her neck/forearm.    Activities of Daily Living  Social history was obtained from Patient.          Patient Responsibilities: Driving, Personal ADL, Health management, Home management, Community mobility    Previously independent with activities of daily living? Yes     Currently independent with activities of daily living? Yes              Pain     Patient reports a current pain level of 4/10. Pain at best is reported as 3/10. Pain at worst is reported as 8/10.   Location: right upper trap and right forearm  Clinical Progression (since onset): Stable  Pain Qualities: Aching, Dull, Tightness  Pain-Relieving Factors: Change in position, Massage, Medications - over-the-counter  Pain-Aggravating Factors: Driving, Reaching, Stretching, Twisting, Rotation, Lifting, Holding objects         Living Arrangements  Living Situation  Housing: Home independently  Support Systems: Family members    Home Setup  Type of Structure: House        Employment  Patient does not report that: Does the patient's condition impact their ability to work?  Employment Status: Retired   Manages some properties for work.       Past Medical History/Physical Systems Review:   Dorothy Burt  has a past medical history of Asthma, Fibromyalgia, and Hypothyroidism.    Dorothy Burt  has a past surgical history that includes Cholecystectomy; Vascular surgery; Left heart catheterization (Right, 4/19/2020); Coronary angiography (N/A, 4/19/2020); Arthroscopic repair of rotator cuff of shoulder (Left,  6/9/2022); Acromioplasty (6/9/2022); Arthroscopy of shoulder with decompression of subacromial space (6/9/2022); Arthroscopic tenotomy of biceps tendon (6/9/2022); and Synovectomy of shoulder (6/9/2022).    Dorothy has a current medication list which includes the following prescription(s): acyclovir, albuterol, breztri aerosphere, diclofenac sodium, fluticasone propionate, levocetirizine, levothyroxine, omeprazole, and tramadol.    Review of patient's allergies indicates:  No Known Allergies     Objective   Posture  Patient presents with a Forward head position.                   Cervical Thoracic Sensation  General Cervical/Thoracic Sensation  Intact: Right and Left  Right Cervical/Thoracic Sensation  Intact: Light Touch, Static Two Point Discrimination, Dynamic Two Point Discrimination, Sharp/Dull Discrimination, Kinesthesia, and Proprioception       Left Cervical/Thoracic Sensation  Intact: Light Touch, Static Two Point Discrimination, Dynamic Two Point Discrimination, Sharp/Dull Discrimination, Kinesthesia, and Proprioception                Spinal Mobility  Hypomobile: Cervical           Subcranial Range of Motion   Active Restricted? Passive Restricted? Pain   Flexion         Protraction         Retraction           Cervical Range of Motion   Active (deg) Passive (deg) Pain   Flexion 45   Yes   Extension 20   Yes   Right Lateral Flexion 19   Yes   Right Rotation         Left Lateral Flexion 16   Yes   Left Rotation                Shoulder Range of Motion  Right Shoulder   Active (deg) Passive (deg) Pain   Flexion 130   Yes   Extension         Scaption         ABduction 100       ADduction         Horizontal ABduction         Horizontal ADduction         External Rotation (Shoulder ABducted 0 degrees)         External Rotation (Shoulder ABducted 45 degrees) 45       External Rotation (Shoulder ABducted 90 degrees)         Internal Rotation (Shoulder ABducted 0 degrees)         Internal Rotation (Shoulder ABducted  45 degrees)         Internal Rotation (Shoulder ABducted 90 degrees)           Left Shoulder   Active (deg) Passive (deg) Pain   Flexion 135       Extension         Scaption         ABduction 95       ADduction         Horizontal ABduction         Horizontal ADduction         External Rotation (Shoulder ABducted 0 degrees)         External Rotation (Shoulder ABducted 45 degrees) 60       External Rotation (Shoulder ABducted 90 degrees)         Internal Rotation (Shoulder ABducted 0 degrees)         Internal Rotation (Shoulder ABducted 45 degrees)         Internal Rotation (Shoulder ABducted 90 degrees)                       Cervical/Thoracic Strength Details  Pain with resisted pronation >> supination    Shoulder Strength - Planes of Motion   Right Strength Right Pain Left Strength Left  Pain   Flexion 4 Yes 4+     Extension 4+   4+     ABduction 4+ Yes 4+     ADduction 4+   4+     Horizontal ABduction           Horizontal ADduction           Internal Rotation 0° 5   5     Internal Rotation 90°           External Rotation 0° 4   4     External Rotation 90°               Elbow Strength   Right Strength Right Pain Left Strength Left  Pain   Flexion (C6) 5   5     Extension (C7) 5   5            Wrist Strength - Planes of Motion   Right Strength Right Pain Left Strength Left  Pain   Flexion 5   5     Extension 5   5     Radial Deviation           Ulnar Deviation (C8)                         Cervical Screen  Tests  Positive: Right Spurling's A, Left Spurling's A, Right Spurling's B, Left Spurling's B, Right Distraction, and Left Distraction  Cervical Range of Motion           Thoracic Range of Motion             Cervical/Thoracic Special Tests            Cervical Tests  Positive: Right Cervical Compression, Right Distraction, Left Distraction, Right Spurling's A, and Left Spurling's A  Negative: Right Cervical Rotation and Lateral Flexion and Left Cervical Rotation and Lateral Flexion  Positive: Right Spurling's B and  Left Spurling's B               Transfers Assessment  Sit to Stand Assistance: Independent  Chair to Bed Assistance: Independent  Bed to Chair Assistance: Independent  Car Transfer Assistance: Independent    Bed Mobility Assessment  Rolling Assistance: Independent  Sidelying to Sit Assistance: Independent  Sit to Sidelying Assistance: Independent  Scooting to Edge of Bed Assistance: Independent      Ambulation Assistance Required  Surface With  Assistive Device Without Assistive Device Details   Level   Independent      Uneven   Independent     Curb           Gait Analysis  Base of Support: Normal                   Treatment:  Manual Therapy  MT 1: Cervical PROM  MT 2: Left shoulder PROM into flexion and abduction  MT 3: cervical distraction, 2 rounds  Therapeutic Activity  TA 1: Education  TA 2: isometric right cervical SB/Rot: 10x5'' holds  TA 3: chicken wings: 10x5'' holds  TA 4: cervical rotations: 10x3'' holds B    Time Entry(in minutes):  PT Evaluation (Low) Time Entry: 35  Manual Therapy Time Entry: 10  Therapeutic Activity Time Entry: 15    Assessment & Plan   Assessment  Dorothy presents with a condition of Low complexity.   Presentation of Symptoms: Stable  Will Comorbidities Impact Care: Yes       Functional Limitations: Activity tolerance, Transfers, Participating in leisure activities, Pain with ADLs/IADLs, Participating in sports, Performing household chores, Range of motion, Functional mobility, Disrupted sleep pattern, Carrying objects, Completing work/school activities, Decreased ambulation distance/endurance, Gait limitations, Getting off the floor  Impairments: Abnormal gait, Abnormal or restricted range of motion, Activity intolerance, Impaired balance, Impaired physical strength, Lack of appropriate home exercise program, Pain with functional activity    Patient Goal for Therapy (PT): To no longer have neck pain  Prognosis: Good  Assessment Details: Pt is a 70 year old female diagnosed with Neck  pain presenting to OPPT. Initial pain started 2-3 days after doing yard work for 3 hours cutting tall hedges with clippers. Pt reports right forearm pain that is separate from her right upper trap and neck pain. Pt displays decreased cervical range of motion, decreased RUE and cervical strength, + Spurling's test for right sided joint pain only - no radicular symptoms, + distraction test for joint pain, and pain with resisted right forearm pronation. Pt presents with right forearm pronator strain, right upper trap and neck strain, and right sided mid-cervical arthropathy.    Plan  From a physical therapy perspective, the patient would benefit from: Skilled Rehab Services    Planned therapy interventions include: Therapeutic exercise, Therapeutic activities, Neuromuscular re-education, Manual therapy, ADLs/IADLs, and Orthotic management and training.    Planned modalities to include: Electrical stimulation - attended, Electrical stimulation - passive/unattended, Mechanical traction, Cryotherapy (cold pack), and Thermotherapy (hot pack).        Visit Frequency: 2 times Per Week for 8 Weeks.       This plan was discussed with Patient.   Discussion participants: Agreed Upon Plan of Care             The patient's spiritual, cultural, and educational needs were considered, and the patient is agreeable to the plan of care and goals.     Education  Education was done with Patient. The patient's learning style includes Listening. The patient Demonstrates understanding.         Rehab process and prognosis, Importance of home exercise program, Avoidance of concordant pain(s), and Rest as needed       Goals:   Active       Functional outcome       Patient will show a significant change in FOTO patient-reported outcome tool to demonstrate subjective improvement       Start:  06/20/25    Expected End:  08/25/25            Patient stated goal: To no longer have neck pain        Start:  06/20/25    Expected End:  08/25/25             Patient will demonstrate independence in home program for support of progression       Start:  06/20/25    Expected End:  08/25/25               Lifting & carrying objects       Patient will lift and carry with minimal to no neck and right arm pain.        Start:  06/20/25    Expected End:  08/25/25               Pain       Patient will report pain of 1/10 demonstrating a reduction of overall pain       Start:  06/20/25    Expected End:  08/25/25               Range of Motion       Patient will achieve bilateral cervical side bending and rotation to WNL       Start:  06/20/25    Expected End:  08/25/25               Strength       Patient will demonstrate good cervical strength by chin tuck + lift for at least 20''.        Start:  06/20/25    Expected End:  08/25/25            Patient will achieve bilateral shoulder strength of 4+/5       Start:  06/20/25    Expected End:  08/25/25                Jose C Lutz, PT, DPT

## 2025-06-23 ENCOUNTER — CLINICAL SUPPORT (OUTPATIENT)
Dept: REHABILITATION | Facility: HOSPITAL | Age: 70
End: 2025-06-23
Payer: MEDICARE

## 2025-06-23 DIAGNOSIS — R29.898 DECREASED ROM OF NECK: ICD-10-CM

## 2025-06-23 DIAGNOSIS — R53.1 DECREASED STRENGTH: Primary | ICD-10-CM

## 2025-06-23 PROCEDURE — 97112 NEUROMUSCULAR REEDUCATION: CPT | Mod: PN

## 2025-06-23 PROCEDURE — 97110 THERAPEUTIC EXERCISES: CPT | Mod: PN

## 2025-06-25 ENCOUNTER — CLINICAL SUPPORT (OUTPATIENT)
Dept: REHABILITATION | Facility: HOSPITAL | Age: 70
End: 2025-06-25
Payer: MEDICARE

## 2025-06-25 DIAGNOSIS — R29.898 DECREASED ROM OF NECK: ICD-10-CM

## 2025-06-25 DIAGNOSIS — R53.1 DECREASED STRENGTH: Primary | ICD-10-CM

## 2025-06-25 PROCEDURE — 97110 THERAPEUTIC EXERCISES: CPT | Mod: PN

## 2025-06-25 PROCEDURE — 97140 MANUAL THERAPY 1/> REGIONS: CPT | Mod: PN

## 2025-06-25 PROCEDURE — 97530 THERAPEUTIC ACTIVITIES: CPT | Mod: PN

## 2025-06-25 NOTE — PROGRESS NOTES
Outpatient Rehab    Physical Therapy Visit    Patient Name: Dorothy Burt  MRN: [id number]  YOB: 1955  Encounter Date: 6/23/2025    Therapy Diagnosis:   Encounter Diagnoses   Name Primary?    Decreased strength Yes    Decreased ROM of neck      Physician: Justen Jernigan,*    Physician Orders: Eval and Treat  Medical Diagnosis: Neck pain  Surgical Diagnosis: Not applicable for this Episode   Surgical Date: Not applicable for this Episode  Days Since Last Surgery: Not applicable for this Episode    Visit # / Visits Authorized:  2 / 20  Insurance Authorization Period: 6/19/2025 to 12/31/2025  Date of Evaluation: 6/19/2025  Plan of Care Certification: 6/20/2025 to 8/25/2025      PT/PTA: PT   Number of PTA visits since last PT visit:0  Time In: 1305   Time Out: 1400  Total Time (in minutes): 55   Total Billable Time (in minutes): 30    FOTO:  Intake Score:  %  Survey Score 2:  %  Survey Score 3:  %    Precautions:       Subjective   Exercises at home going well..  Pain reported as 4/10. right>left side of neck    Objective            Treatment:  Therapeutic Exercise  TE 1: cervical rotations: 15x B with 2 pillows  TE 2: dowel shoulder flexion: 30x5'' holds, 2# dowel  TE 3: upper thoracic open books: 10x10'' holds B  Manual Therapy  MT 1: Cervical PROM  MT 2: Left shoulder PROM into flexion and abduction  MT 3: cervical distraction, 2 rounds  MT 4: sideglides right to left C4-6, grade II-III  Balance/Neuromuscular Re-Education  NMR 1: chin tucks: 20x5'' holds  NMR 2: isometric right cervical SB/Rot: 10x5'' holds  NMR 3: chicken wings: 20x5'' holds    Time Entry(in minutes):  Manual Therapy Time Entry: 15  Neuromuscular Re-Education Time Entry: 15  Therapeutic Exercise Time Entry: 25    Assessment & Plan   Assessment: Right sided mid-cervical closing restriction, pain with SB more than rotation, and working on completing rotation and better extension before progressing sidebending. Mild irritability  level today.       The patient will continue to benefit from skilled outpatient physical therapy in order to address the deficits listed in the problem list on the initial evaluation, provide patient and family education, and maximize the patients level of independence in the home and community environments.     The patient's spiritual, cultural, and educational needs were considered, and the patient is agreeable to the plan of care and goals.           Plan:      Goals:   Active       Functional outcome       Patient will show a significant change in FOTO patient-reported outcome tool to demonstrate subjective improvement       Start:  06/20/25    Expected End:  08/25/25            Patient stated goal: To no longer have neck pain        Start:  06/20/25    Expected End:  08/25/25            Patient will demonstrate independence in home program for support of progression       Start:  06/20/25    Expected End:  08/25/25               Lifting & carrying objects       Patient will lift and carry with minimal to no neck and right arm pain.        Start:  06/20/25    Expected End:  08/25/25               Pain       Patient will report pain of 1/10 demonstrating a reduction of overall pain       Start:  06/20/25    Expected End:  08/25/25               Range of Motion       Patient will achieve bilateral cervical side bending and rotation to WNL       Start:  06/20/25    Expected End:  08/25/25               Strength       Patient will demonstrate good cervical strength by chin tuck + lift for at least 20''.        Start:  06/20/25    Expected End:  08/25/25            Patient will achieve bilateral shoulder strength of 4+/5       Start:  06/20/25    Expected End:  08/25/25                Jose C Lutz, PT, DPT

## 2025-06-30 NOTE — PROGRESS NOTES
Outpatient Rehab    Physical Therapy Visit    Patient Name: Dorothy Burt  MRN: [id number]  YOB: 1955  Encounter Date: 6/25/2025    Therapy Diagnosis:   Encounter Diagnoses   Name Primary?    Decreased strength Yes    Decreased ROM of neck      Physician: Justen Jernigan,*    Physician Orders: Eval and Treat  Medical Diagnosis: Neck pain  Surgical Diagnosis: Not applicable for this Episode   Surgical Date: Not applicable for this Episode  Days Since Last Surgery: Not applicable for this Episode    Visit # / Visits Authorized:  2 / 20  Insurance Authorization Period: 6/19/2025 to 12/31/2025  Date of Evaluation: 6/19/2025  Plan of Care Certification: 6/20/2025 to 8/25/2025      PT/PTA: PT   Number of PTA visits since last PT visit:0  Time In: 1105   Time Out: 1200  Total Time (in minutes): 55   Total Billable Time (in minutes): 40    FOTO:  Intake Score:  %  Survey Score 2:  %  Survey Score 3:  %    Precautions:       Subjective   No new complaints patient doing well..  Pain reported as 3/10. right>left side of neck    Objective            Treatment:  Therapeutic Exercise  TE 1: cervical rotations: 15x B with 2 pillows  TE 2: dowel shoulder flexion: 30x5'' holds, 2# dowel  TE 3: upper thoracic open books: 10x10'' holds B  Manual Therapy  MT 1: Cervical PROM  MT 2: Left shoulder PROM into flexion and abduction  MT 3: cervical distraction, 2 rounds  MT 4: sideglides right to left C4-6, grade II-III  Balance/Neuromuscular Re-Education  NMR 1: chin tucks: 20x5'' holds  NMR 2: isometric right cervical SB/Rot: 10x5'' holds  NMR 3: chicken wings: 20x5'' holds    Time Entry(in minutes):  Manual Therapy Time Entry: 15  Neuromuscular Re-Education Time Entry: 15  Therapeutic Exercise Time Entry: 25    Assessment & Plan   Assessment: Right sided mid-cervical closing restriction, a little improved today with end range motions, but needs continue range of motion activities as priority. Fair pain control.         The patient will continue to benefit from skilled outpatient physical therapy in order to address the deficits listed in the problem list on the initial evaluation, provide patient and family education, and maximize the patients level of independence in the home and community environments.     The patient's spiritual, cultural, and educational needs were considered, and the patient is agreeable to the plan of care and goals.           Plan:      Goals:   Active       Functional outcome       Patient will show a significant change in FOTO patient-reported outcome tool to demonstrate subjective improvement       Start:  06/20/25    Expected End:  08/25/25            Patient stated goal: To no longer have neck pain        Start:  06/20/25    Expected End:  08/25/25            Patient will demonstrate independence in home program for support of progression       Start:  06/20/25    Expected End:  08/25/25               Lifting & carrying objects       Patient will lift and carry with minimal to no neck and right arm pain.        Start:  06/20/25    Expected End:  08/25/25               Pain       Patient will report pain of 1/10 demonstrating a reduction of overall pain       Start:  06/20/25    Expected End:  08/25/25               Range of Motion       Patient will achieve bilateral cervical side bending and rotation to WNL       Start:  06/20/25    Expected End:  08/25/25               Strength       Patient will demonstrate good cervical strength by chin tuck + lift for at least 20''.        Start:  06/20/25    Expected End:  08/25/25            Patient will achieve bilateral shoulder strength of 4+/5       Start:  06/20/25    Expected End:  08/25/25                Jose C Lutz, PT, DPT

## 2025-07-01 ENCOUNTER — CLINICAL SUPPORT (OUTPATIENT)
Dept: REHABILITATION | Facility: HOSPITAL | Age: 70
End: 2025-07-01
Payer: MEDICARE

## 2025-07-01 DIAGNOSIS — R29.898 DECREASED ROM OF NECK: ICD-10-CM

## 2025-07-01 DIAGNOSIS — R53.1 DECREASED STRENGTH: Primary | ICD-10-CM

## 2025-07-01 PROCEDURE — 97530 THERAPEUTIC ACTIVITIES: CPT | Mod: PN

## 2025-07-01 PROCEDURE — 97112 NEUROMUSCULAR REEDUCATION: CPT | Mod: PN

## 2025-07-01 PROCEDURE — 97110 THERAPEUTIC EXERCISES: CPT | Mod: PN

## 2025-07-01 PROCEDURE — 97140 MANUAL THERAPY 1/> REGIONS: CPT | Mod: PN

## 2025-07-01 NOTE — PROGRESS NOTES
Outpatient Rehab    Physical Therapy Visit    Patient Name: Dorothy Burt  MRN: [id number]  YOB: 1955  Encounter Date: 7/1/2025    Therapy Diagnosis:   Encounter Diagnoses   Name Primary?    Decreased strength Yes    Decreased ROM of neck      Physician: Justen Jernigan,*    Physician Orders: Eval and Treat  Medical Diagnosis: Neck pain  Surgical Diagnosis: Not applicable for this Episode   Surgical Date: Not applicable for this Episode  Days Since Last Surgery: Not applicable for this Episode    Visit # / Visits Authorized:  3 / 20  Insurance Authorization Period: 6/19/2025 to 12/31/2025  Date of Evaluation: 6/19/2025  Plan of Care Certification: 6/20/2025 to 8/25/2025      PT/PTA: PT   Number of PTA visits since last PT visit:0  Time In: 0905   Time Out: 1000  Total Time (in minutes): 55   Total Billable Time (in minutes): 55    FOTO:  Intake Score:  %  Survey Score 2:  %  Survey Score 3:  %    Precautions:       Subjective   Neck was sore yesterday, slept well last night, and actually doing well today..  Pain reported as 2/10. right>left side of neck    Objective            Treatment:  Therapeutic Exercise  TE 1: seated cervical rotations SNAGs: 15x B  TE 2: cervical extension SNAGs: 10x3'' holds  TE 3: upper thoracic open books: 8x10'' holds B  Manual Therapy  MT 1: Cervical PROM  MT 2: Left shoulder PROM into flexion and abduction  MT 3: cervical distraction, 2 rounds  MT 4: sideglides right to left C4-6, grade II-III  Balance/Neuromuscular Re-Education  NMR 1: chin tucks: 20x5'' holds  NMR 2: isometric right cervical SB/Rot: 10x5'' holds  NMR 3: chicken wings: 20x5'' holds  NMR 4: seated B shoulder ER: 2x15 B red TB  Therapeutic Activity  TA 1: dowel shoulder flexion: 30x5'' holds, 2# dowel  TA 2: Wall slides: 15x5'' holds    Time Entry(in minutes):  Manual Therapy Time Entry: 15  Neuromuscular Re-Education Time Entry: 15  Therapeutic Activity Time Entry: 10  Therapeutic Exercise Time  Entry: 15    Assessment & Plan   Assessment: Patient demonstrated decreased cervical irritability today, much improved neck rotation and cervical extension as well, and able to progress range of motion activities.  Incorporated more periscapular strengthening without issue.       The patient will continue to benefit from skilled outpatient physical therapy in order to address the deficits listed in the problem list on the initial evaluation, provide patient and family education, and maximize the patients level of independence in the home and community environments.     The patient's spiritual, cultural, and educational needs were considered, and the patient is agreeable to the plan of care and goals.           Plan:      Goals:   Active       Functional outcome       Patient will show a significant change in FOTO patient-reported outcome tool to demonstrate subjective improvement       Start:  06/20/25    Expected End:  08/25/25            Patient stated goal: To no longer have neck pain        Start:  06/20/25    Expected End:  08/25/25            Patient will demonstrate independence in home program for support of progression       Start:  06/20/25    Expected End:  08/25/25               Lifting & carrying objects       Patient will lift and carry with minimal to no neck and right arm pain.        Start:  06/20/25    Expected End:  08/25/25               Pain       Patient will report pain of 1/10 demonstrating a reduction of overall pain       Start:  06/20/25    Expected End:  08/25/25               Range of Motion       Patient will achieve bilateral cervical side bending and rotation to WNL       Start:  06/20/25    Expected End:  08/25/25               Strength       Patient will demonstrate good cervical strength by chin tuck + lift for at least 20''.        Start:  06/20/25    Expected End:  08/25/25            Patient will achieve bilateral shoulder strength of 4+/5       Start:  06/20/25    Expected  End:  08/25/25                Jose C Lutz, PT, DPT

## 2025-07-03 ENCOUNTER — CLINICAL SUPPORT (OUTPATIENT)
Dept: REHABILITATION | Facility: HOSPITAL | Age: 70
End: 2025-07-03
Payer: MEDICARE

## 2025-07-03 DIAGNOSIS — R29.898 DECREASED ROM OF NECK: ICD-10-CM

## 2025-07-03 DIAGNOSIS — R53.1 DECREASED STRENGTH: Primary | ICD-10-CM

## 2025-07-03 PROCEDURE — 97110 THERAPEUTIC EXERCISES: CPT | Mod: PN

## 2025-07-03 PROCEDURE — 97140 MANUAL THERAPY 1/> REGIONS: CPT | Mod: PN

## 2025-07-03 PROCEDURE — 97112 NEUROMUSCULAR REEDUCATION: CPT | Mod: PN

## 2025-07-03 NOTE — PROGRESS NOTES
Outpatient Rehab    Physical Therapy Visit    Patient Name: Dorothy Burt  MRN: [id number]  YOB: 1955  Encounter Date: 7/3/2025    Therapy Diagnosis:   Encounter Diagnoses   Name Primary?    Decreased strength Yes    Decreased ROM of neck      Physician: Justen Jernigan,*    Physician Orders: Eval and Treat  Medical Diagnosis: Neck pain  Surgical Diagnosis: Not applicable for this Episode   Surgical Date: Not applicable for this Episode  Days Since Last Surgery: Not applicable for this Episode    Visit # / Visits Authorized:  4 / 20  Insurance Authorization Period: 6/19/2025 to 12/31/2025  Date of Evaluation: 6/19/2025  Plan of Care Certification: 6/20/2025 to 8/25/2025      PT/PTA: PT   Number of PTA visits since last PT visit:0  Time In: 0905   Time Out: 1000  Total Time (in minutes): 55   Total Billable Time (in minutes): 40    FOTO:  Intake Score:  %  Survey Score 2:  %  Survey Score 3:  %    Precautions:       Subjective   Patient reports increased neck irritability over the past day and a half started half a day after our session completed this week..  Pain reported as 2/10. right>left side of neck    Objective            Treatment:  Therapeutic Exercise  TE 1: cervical rotations: 15x B with 1 pillows  TE 2: cervical extension SNAGs: 10x3'' holds - NT  TE 3: upper thoracic open books: 8x10'' holds B - NT  TE 4: Seated shoulder ER: 30x3'' holds, red TB  TE 5: Wall slides: 20x5'' holds  Manual Therapy  MT 1: Cervical PROM  MT 2: Left shoulder PROM into flexion and abduction  MT 3: cervical distraction, 2 rounds  MT 4: sideglides right to left C4-6, grade II-III  Balance/Neuromuscular Re-Education  NMR 1: chin tucks: 20x5'' holds  NMR 2: isometric right cervical SB/Rot: 10x5'' holds - NT  NMR 3: chicken wings: 20x5'' holds  Therapeutic Activity  TA 1: dowel shoulder flexion: 30x5'' holds, 2# dowel - NT  Modalities  Moist Heat (min): 10 minutes in supine with MHP on cervical spine.    Time  Entry(in minutes):  Hot/Cold Pack Time Entry: 10  Manual Therapy Time Entry: 15  Neuromuscular Re-Education Time Entry: 10  Therapeutic Exercise Time Entry: 20    Assessment & Plan   Assessment: Decreased end range cervical range of motion activities, decreased isometric cervical strengthening activities and opted for for pain management today.  Patient displayed increased left cervical rotation and side-bending stiffness today, proved with manual, and likely can return back to end range cervical range of activities.       The patient will continue to benefit from skilled outpatient physical therapy in order to address the deficits listed in the problem list on the initial evaluation, provide patient and family education, and maximize the patients level of independence in the home and community environments.     The patient's spiritual, cultural, and educational needs were considered, and the patient is agreeable to the plan of care and goals.           Plan:      Goals:   Active       Functional outcome       Patient will show a significant change in FOTO patient-reported outcome tool to demonstrate subjective improvement       Start:  06/20/25    Expected End:  08/25/25            Patient stated goal: To no longer have neck pain        Start:  06/20/25    Expected End:  08/25/25            Patient will demonstrate independence in home program for support of progression       Start:  06/20/25    Expected End:  08/25/25               Lifting & carrying objects       Patient will lift and carry with minimal to no neck and right arm pain.        Start:  06/20/25    Expected End:  08/25/25               Pain       Patient will report pain of 1/10 demonstrating a reduction of overall pain       Start:  06/20/25    Expected End:  08/25/25               Range of Motion       Patient will achieve bilateral cervical side bending and rotation to WNL       Start:  06/20/25    Expected End:  08/25/25               Strength        Patient will demonstrate good cervical strength by chin tuck + lift for at least 20''.        Start:  06/20/25    Expected End:  08/25/25            Patient will achieve bilateral shoulder strength of 4+/5       Start:  06/20/25    Expected End:  08/25/25                Jose C Lutz, PT, DPT

## 2025-07-08 ENCOUNTER — CLINICAL SUPPORT (OUTPATIENT)
Dept: REHABILITATION | Facility: HOSPITAL | Age: 70
End: 2025-07-08
Payer: MEDICARE

## 2025-07-08 DIAGNOSIS — R29.898 DECREASED ROM OF NECK: ICD-10-CM

## 2025-07-08 DIAGNOSIS — R53.1 DECREASED STRENGTH: Primary | ICD-10-CM

## 2025-07-08 PROCEDURE — 97140 MANUAL THERAPY 1/> REGIONS: CPT | Mod: PN,CQ

## 2025-07-08 PROCEDURE — 97112 NEUROMUSCULAR REEDUCATION: CPT | Mod: PN,CQ

## 2025-07-08 PROCEDURE — 97110 THERAPEUTIC EXERCISES: CPT | Mod: PN,CQ

## 2025-07-08 PROCEDURE — 97530 THERAPEUTIC ACTIVITIES: CPT | Mod: PN,CQ

## 2025-07-08 NOTE — PROGRESS NOTES
Outpatient Rehab    Physical Therapy Visit    Patient Name: Dorothy Burt  MRN: [id number]  YOB: 1955  Encounter Date: 7/8/2025    Therapy Diagnosis:   Encounter Diagnoses   Name Primary?    Decreased strength Yes    Decreased ROM of neck      Physician: Justen Jernigan,*    Physician Orders: Eval and Treat  Medical Diagnosis: Neck pain  Surgical Diagnosis: Not applicable for this Episode   Surgical Date: Not applicable for this Episode  Days Since Last Surgery: Not applicable for this Episode    Visit # / Visits Authorized:  5 / 20  Insurance Authorization Period: 6/19/2025 to 12/31/2025  Date of Evaluation: 6/19/2025  Plan of Care Certification: 6/20/2025 to 8/25/2025      PT/PTA: PTA   Number of PTA visits since last PT visit:1  Time In: 1300   Time Out: 1400  Total Time (in minutes): 60   Total Billable Time (in minutes): 55    FOTO:  Intake Score:  %  Survey Score 2:  %  Survey Score 3:  %    Precautions:       Subjective   sleeping with a pillow roll for pain, was hurting last evening and she took pain meds to control pain..  Pain reported as 2/10. left>right side of neck    Objective            Treatment:  Therapeutic Exercise  TE 1: cervical rotations: 15x B with 1 pillows  TE 2: cervical extension SNAGs: 10x3'' holds - NT  TE 3: upper thoracic open books: 8x10'' holds B - NT  TE 4: Seated shoulder ER: 30x3'' holds, red TB  TE 5: Wall slides: 20x5'' holds  Manual Therapy  MT 1: Cervical PROM  MT 2: Left shoulder PROM into flexion and abduction  MT 3: cervical distraction, 2 rounds  MT 4: sideglides right to left C4-6, grade II-III  Balance/Neuromuscular Re-Education  NMR 1: chin tucks: 20x5'' holds  NMR 2: isometric right cervical SB/Rot: 10x5'' holds - NT  NMR 3: chicken wings: 20x5'' holds  NMR 4: seated B shoulder ER: 2x15 B red TB  Therapeutic Activity  TA 1: dowel shoulder flexion: 30x5'' holds, 2# dowel - NT  TA 2: Wall slides: 15x5'' holds  TA 3: chicken wings: 10x5'' holds  TA  4: cervical rotations: 10x3'' holds B    Time Entry(in minutes):  Manual Therapy Time Entry: 5  Neuromuscular Re-Education Time Entry: 20  Therapeutic Activity Time Entry: 10  Therapeutic Exercise Time Entry: 20    Assessment & Plan   Assessment: Pt completed cervical range of motion activities, coupled with cervical isometric activities. Patient responded well to manual therapy, with reports of improved cervical rotation bilaterally. Will continue to progress activities as appropriate.  Evaluation/Treatment Tolerance: Patient tolerated treatment well    The patient will continue to benefit from skilled outpatient physical therapy in order to address the deficits listed in the problem list on the initial evaluation, provide patient and family education, and maximize the patients level of independence in the home and community environments.     The patient's spiritual, cultural, and educational needs were considered, and the patient is agreeable to the plan of care and goals.           Plan: continue to progress cervical ROM/ strengthening as appropriate. Monitor response to session    Goals:   Active       Functional outcome       Patient will show a significant change in FOTO patient-reported outcome tool to demonstrate subjective improvement       Start:  06/20/25    Expected End:  08/25/25            Patient stated goal: To no longer have neck pain        Start:  06/20/25    Expected End:  08/25/25            Patient will demonstrate independence in home program for support of progression       Start:  06/20/25    Expected End:  08/25/25               Lifting & carrying objects       Patient will lift and carry with minimal to no neck and right arm pain.        Start:  06/20/25    Expected End:  08/25/25               Pain       Patient will report pain of 1/10 demonstrating a reduction of overall pain       Start:  06/20/25    Expected End:  08/25/25               Range of Motion       Patient will achieve  bilateral cervical side bending and rotation to WNL       Start:  06/20/25    Expected End:  08/25/25               Strength       Patient will demonstrate good cervical strength by chin tuck + lift for at least 20''.        Start:  06/20/25    Expected End:  08/25/25            Patient will achieve bilateral shoulder strength of 4+/5       Start:  06/20/25    Expected End:  08/25/25                Delta Childs PTA

## 2025-07-22 ENCOUNTER — CLINICAL SUPPORT (OUTPATIENT)
Dept: REHABILITATION | Facility: HOSPITAL | Age: 70
End: 2025-07-22
Payer: MEDICARE

## 2025-07-22 DIAGNOSIS — R29.898 DECREASED ROM OF NECK: ICD-10-CM

## 2025-07-22 DIAGNOSIS — R53.1 DECREASED STRENGTH: Primary | ICD-10-CM

## 2025-07-22 PROCEDURE — 97112 NEUROMUSCULAR REEDUCATION: CPT | Mod: PN,CQ

## 2025-07-22 PROCEDURE — 97110 THERAPEUTIC EXERCISES: CPT | Mod: PN,CQ

## 2025-07-22 NOTE — PROGRESS NOTES
Outpatient Rehab    Physical Therapy Visit    Patient Name: Dorothy Burt  MRN: [id number]  YOB: 1955  Encounter Date: 7/22/2025    Therapy Diagnosis:   Encounter Diagnoses   Name Primary?    Decreased strength Yes    Decreased ROM of neck      Physician: Justen Jernigan,*    Physician Orders: Eval and Treat  Medical Diagnosis: Neck pain  Surgical Diagnosis: Not applicable for this Episode   Surgical Date: Not applicable for this Episode  Days Since Last Surgery: Not applicable for this Episode    Visit # / Visits Authorized:  6 / 20  Insurance Authorization Period: 6/19/2025 to 12/31/2025  Date of Evaluation: 6/19/2025  Plan of Care Certification: 6/20/2025 to 8/25/2025      PT/PTA: PTA   Number of PTA visits since last PT visit:2  Time In: 0900   Time Out: 1005  Total Time (in minutes): 65   Total Billable Time (in minutes): 38    FOTO:  Intake Score (%): 43  Survey Score 2 (%): Not applicable for this Episode  Survey Score 3 (%): Not applicable for this Episode    Precautions:         Subjective   reports no pain upon arrival today. She was sick last week  and had congesion / nasal issues..         Objective            Treatment:  Therapeutic Exercise  TE 1: cervical rotations: 15x B with 1 pillows  TE 2: cervical extension SNAGs: 10x3'' holds  TE 3: upper thoracic open books: 8x10'' holds B  TE 4: Seated shoulder ER: 30x3'' holds, red TB  TE 5: Wall slides: 20x5'' holds  Balance/Neuromuscular Re-Education  NMR 1: chin tucks: 20x5'' holds  NMR 2: isometric right cervical SB/Rot: 10x5'' holds  NMR 3: chicken wings: 20x5'' holds  NMR 4: seated B shoulder ER: 2x15 B red TB  NMR 5: SAPD RTB 2x10 B  NMR 6: Tband Rows 2x10 B  Therapeutic Activity  TA 1: dowel shoulder flexion: 30x5'' holds, 2# dowel    Time Entry(in minutes):  Neuromuscular Re-Education Time Entry: 20  Therapeutic Exercise Time Entry: 18    Assessment & Plan   Assessment: Pt arrived to session with reports of improved cervical  pain / ROM. Resumed ROM activities and isometric exercises with no adverse reactions. Reviewed postural awareness strategies in sitting / standing to reduce forward head/ rounded shoulders. Added more Band resistive activities today.   Evaluation/Treatment Tolerance: Patient tolerated treatment well    The patient will continue to benefit from skilled outpatient physical therapy in order to address the deficits listed in the problem list on the initial evaluation, provide patient and family education, and maximize the patients level of independence in the home and community environments.     The patient's spiritual, cultural, and educational needs were considered, and the patient is agreeable to the plan of care and goals.           Plan: continue to progress cervical ROM/ strengthening as appropriate.    Goals:   Active       Functional outcome       Patient will show a significant change in FOTO patient-reported outcome tool to demonstrate subjective improvement       Start:  06/20/25    Expected End:  08/25/25            Patient stated goal: To no longer have neck pain        Start:  06/20/25    Expected End:  08/25/25            Patient will demonstrate independence in home program for support of progression       Start:  06/20/25    Expected End:  08/25/25               Lifting & carrying objects       Patient will lift and carry with minimal to no neck and right arm pain.        Start:  06/20/25    Expected End:  08/25/25               Pain       Patient will report pain of 1/10 demonstrating a reduction of overall pain       Start:  06/20/25    Expected End:  08/25/25               Range of Motion       Patient will achieve bilateral cervical side bending and rotation to WNL       Start:  06/20/25    Expected End:  08/25/25               Strength       Patient will demonstrate good cervical strength by chin tuck + lift for at least 20''.        Start:  06/20/25    Expected End:  08/25/25            Patient  will achieve bilateral shoulder strength of 4+/5       Start:  06/20/25    Expected End:  08/25/25                Delta Childs PTA

## 2025-07-24 ENCOUNTER — CLINICAL SUPPORT (OUTPATIENT)
Dept: REHABILITATION | Facility: HOSPITAL | Age: 70
End: 2025-07-24
Payer: MEDICARE

## 2025-07-24 DIAGNOSIS — R53.1 DECREASED STRENGTH: Primary | ICD-10-CM

## 2025-07-24 DIAGNOSIS — R29.898 DECREASED ROM OF NECK: ICD-10-CM

## 2025-07-24 PROCEDURE — 97112 NEUROMUSCULAR REEDUCATION: CPT | Mod: PN,CQ

## 2025-07-24 PROCEDURE — 97530 THERAPEUTIC ACTIVITIES: CPT | Mod: PN,CQ

## 2025-07-24 PROCEDURE — 97140 MANUAL THERAPY 1/> REGIONS: CPT | Mod: PN,CQ

## 2025-07-24 PROCEDURE — 97110 THERAPEUTIC EXERCISES: CPT | Mod: PN,CQ

## 2025-07-24 NOTE — PROGRESS NOTES
"  Outpatient Rehab    Physical Therapy Visit    Patient Name: Dorothy Burt  MRN: [id number]  YOB: 1955  Encounter Date: 7/24/2025    Therapy Diagnosis:   Encounter Diagnoses   Name Primary?    Decreased strength Yes    Decreased ROM of neck      Physician: Justen Jernigan,*    Physician Orders: Eval and Treat  Medical Diagnosis: Neck pain  Surgical Diagnosis: Not applicable for this Episode   Surgical Date: Not applicable for this Episode  Days Since Last Surgery: Not applicable for this Episode    Visit # / Visits Authorized:  7 / 20  Insurance Authorization Period: 6/19/2025 to 12/31/2025  Date of Evaluation: 6/19/2025  Plan of Care Certification: 6/20/2025 to 8/25/2025      PT/PTA: PTA   Number of PTA visits since last PT visit:3  Time In: 0915   Time Out: 1008  Total Time (in minutes): 53   Total Billable Time (in minutes): 53    FOTO:  Intake Score (%): 43  Survey Score 2 (%): Not applicable for this Episode  Survey Score 3 (%): Not applicable for this Episode    Precautions:         Subjective   "It just feels a little tight today". pt agreeable to PT session..  Pain reported as 3/10. left>right side of neck    Objective            Treatment:  Therapeutic Exercise  TE 1: cervical rotations: 15x B with 1 pillows  TE 2: cervical extension SNAGs: 10x3'' holds  TE 3: upper thoracic open books: 8x10'' holds B  TE 4: Seated shoulder ER: 30x3'' holds, red TB  TE 5: Wall slides: 20x5'' holds  Manual Therapy  MT 1: Cervical PROM  MT 3: cervical distraction, 2 rounds  Balance/Neuromuscular Re-Education  NMR 1: chin tucks: 20x5'' holds  NMR 2: isometric right cervical SB/Rot: 10x5'' holds  NMR 3: chicken wings: 20x5'' holds  NMR 4: seated B shoulder ER: 2x15 B red TB  Therapeutic Activity  TA 1: dowel shoulder flexion: 30x5'' holds, 2# dowel  TA 3: chicken wings: 10x5'' holds  TA 4: cervical rotations: 10x3'' holds B    Time Entry(in minutes):  Manual Therapy Time Entry: 8  Neuromuscular " Re-Education Time Entry: 22  Therapeutic Activity Time Entry: 10  Therapeutic Exercise Time Entry: 13    Assessment & Plan   Assessment: Pt arrived to session with reports of improved cervical pain / ROM. She completed ROM activities and isometric exercises with no adverse reactions. Encouraged postural awareness strategies in sitting / standing to reduce forward head/ rounded shoulders.   Evaluation/Treatment Tolerance: Patient tolerated treatment well    The patient will continue to benefit from skilled outpatient physical therapy in order to address the deficits listed in the problem list on the initial evaluation, provide patient and family education, and maximize the patients level of independence in the home and community environments.     The patient's spiritual, cultural, and educational needs were considered, and the patient is agreeable to the plan of care and goals.           Plan: continue to progress cervical ROM/ strengthening as appropriate.    Goals:   Active       Functional outcome       Patient will show a significant change in FOTO patient-reported outcome tool to demonstrate subjective improvement       Start:  06/20/25    Expected End:  08/25/25            Patient stated goal: To no longer have neck pain        Start:  06/20/25    Expected End:  08/25/25            Patient will demonstrate independence in home program for support of progression       Start:  06/20/25    Expected End:  08/25/25               Lifting & carrying objects       Patient will lift and carry with minimal to no neck and right arm pain.        Start:  06/20/25    Expected End:  08/25/25               Pain       Patient will report pain of 1/10 demonstrating a reduction of overall pain       Start:  06/20/25    Expected End:  08/25/25               Range of Motion       Patient will achieve bilateral cervical side bending and rotation to WNL       Start:  06/20/25    Expected End:  08/25/25               Strength        Patient will demonstrate good cervical strength by chin tuck + lift for at least 20''.        Start:  06/20/25    Expected End:  08/25/25            Patient will achieve bilateral shoulder strength of 4+/5       Start:  06/20/25    Expected End:  08/25/25                Delta Childs PTA

## 2025-07-29 ENCOUNTER — CLINICAL SUPPORT (OUTPATIENT)
Dept: REHABILITATION | Facility: HOSPITAL | Age: 70
End: 2025-07-29
Payer: MEDICARE

## 2025-07-29 DIAGNOSIS — R29.898 DECREASED ROM OF NECK: ICD-10-CM

## 2025-07-29 DIAGNOSIS — R53.1 DECREASED STRENGTH: Primary | ICD-10-CM

## 2025-07-29 PROCEDURE — 97112 NEUROMUSCULAR REEDUCATION: CPT | Mod: PN

## 2025-07-29 PROCEDURE — 97530 THERAPEUTIC ACTIVITIES: CPT | Mod: PN

## 2025-07-29 NOTE — PROGRESS NOTES
Outpatient Rehab    Physical Therapy Visit    Patient Name: Dorothy Burt  MRN: [id number]  YOB: 1955  Encounter Date: 7/29/2025    Therapy Diagnosis:   Encounter Diagnoses   Name Primary?    Decreased strength Yes    Decreased ROM of neck      Physician: Justen Jernigan,*    Physician Orders: Eval and Treat  Medical Diagnosis: Neck pain  Surgical Diagnosis: Not applicable for this Episode   Surgical Date: Not applicable for this Episode  Days Since Last Surgery: Not applicable for this Episode    Visit # / Visits Authorized:  8 / 20  Insurance Authorization Period: 6/19/2025 to 12/31/2025  Date of Evaluation: 6/19/2025  Plan of Care Certification: 6/20/2025 to 8/25/2025      PT/PTA: PT   Number of PTA visits since last PT visit:0  Time In: 0905   Time Out: 1000  Total Time (in minutes): 55   Total Billable Time (in minutes): 30    FOTO:  Intake Score (%): 43  Survey Score 2 (%): Not applicable for this Episode  Survey Score 3 (%): Not applicable for this Episode    Precautions:         Subjective   Neck felt a lot better after last session and feels her loosen with minimal pain today.  Pain reported as 1/10. left>right side of neck    Objective            Treatment:  Therapeutic Exercise  TE 1: seated cervical rotation SNAGs: 15x5'' holds  TE 2: seated cervical extension SNAGs: 15x5'' holds  TE 3: seated cervical sidebending: 10x5'' holds B  TE 4: cervical rotations: 15x B with 0 pillows - Next  Manual Therapy  MT 1: Cervical PROM  MT 2: cervical distraction, 2 rounds  MT 3: isometric left C2-4 sideglides  MT 4: B UT trigger point release  Balance/Neuromuscular Re-Education  NMR 1: chin tucks: 20x5'' holds  NMR 2: sidelying cervical sidebending (1 pillow): 2x8 B  Therapeutic Activity  TA 1: dowel shoulder flexion: 30x5'' holds, 2# dowel  TA 2: Wall slides: 15x5'' holds  TA 3: FOTO    Time Entry(in minutes):  Manual Therapy Time Entry: 15  Neuromuscular Re-Education Time Entry: 10  Therapeutic  Activity Time Entry: 10  Therapeutic Exercise Time Entry: 20    Assessment & Plan   Assessment: The screening in right cervical hypomobility primarily in rotation, positive with C1-C3 left cervical rotation hypomobility, midcervical spine today, and improved range of motion in all planes at the manual. FOTO given today, reached goal, and progressing well overall.        The patient will continue to benefit from skilled outpatient physical therapy in order to address the deficits listed in the problem list on the initial evaluation, provide patient and family education, and maximize the patients level of independence in the home and community environments.     The patient's spiritual, cultural, and educational needs were considered, and the patient is agreeable to the plan of care and goals.           Plan:      Goals:   Active       Functional outcome       Patient will show a significant change in FOTO patient-reported outcome tool to demonstrate subjective improvement       Start:  06/20/25    Expected End:  08/25/25            Patient stated goal: To no longer have neck pain        Start:  06/20/25    Expected End:  08/25/25            Patient will demonstrate independence in home program for support of progression       Start:  06/20/25    Expected End:  08/25/25               Lifting & carrying objects       Patient will lift and carry with minimal to no neck and right arm pain.        Start:  06/20/25    Expected End:  08/25/25               Pain       Patient will report pain of 1/10 demonstrating a reduction of overall pain       Start:  06/20/25    Expected End:  08/25/25               Range of Motion       Patient will achieve bilateral cervical side bending and rotation to WNL       Start:  06/20/25    Expected End:  08/25/25               Strength       Patient will demonstrate good cervical strength by chin tuck + lift for at least 20''.        Start:  06/20/25    Expected End:  08/25/25             Patient will achieve bilateral shoulder strength of 4+/5       Start:  06/20/25    Expected End:  08/25/25                Jose C Lutz, PT, DPT

## 2025-07-31 ENCOUNTER — CLINICAL SUPPORT (OUTPATIENT)
Dept: REHABILITATION | Facility: HOSPITAL | Age: 70
End: 2025-07-31
Payer: MEDICARE

## 2025-07-31 DIAGNOSIS — R29.898 DECREASED ROM OF NECK: ICD-10-CM

## 2025-07-31 DIAGNOSIS — R53.1 DECREASED STRENGTH: Primary | ICD-10-CM

## 2025-07-31 PROCEDURE — 97112 NEUROMUSCULAR REEDUCATION: CPT | Mod: PN

## 2025-07-31 PROCEDURE — 97110 THERAPEUTIC EXERCISES: CPT | Mod: PN

## 2025-07-31 PROCEDURE — 97530 THERAPEUTIC ACTIVITIES: CPT | Mod: PN

## 2025-07-31 PROCEDURE — 97140 MANUAL THERAPY 1/> REGIONS: CPT | Mod: PN

## 2025-07-31 NOTE — PROGRESS NOTES
Outpatient Rehab    Physical Therapy Visit    Patient Name: Dorothy Burt  MRN: [id number]  YOB: 1955  Encounter Date: 7/31/2025    Therapy Diagnosis:   Encounter Diagnoses   Name Primary?    Decreased strength Yes    Decreased ROM of neck      Physician: Justen Jernigan,*    Physician Orders: Eval and Treat  Medical Diagnosis: Neck pain  Surgical Diagnosis: Not applicable for this Episode   Surgical Date: Not applicable for this Episode  Days Since Last Surgery: Not applicable for this Episode    Visit # / Visits Authorized:  9 / 20  Insurance Authorization Period: 6/19/2025 to 12/31/2025  Date of Evaluation: 6/19/2025  Plan of Care Certification: 6/20/2025 to 8/25/2025      PT/PTA: PT   Number of PTA visits since last PT visit:0  Time In: 1305   Time Out: 1400  Total Time (in minutes): 55   Total Billable Time (in minutes): 55    FOTO:  Intake Score (%): 43  Survey Score 2 (%): Not applicable for this Episode  Survey Score 3 (%): Not applicable for this Episode    Precautions:         Subjective   Neck was sore after last session, and rested it more for that day. Would like to continue therapy if possible..  Pain reported as 1/10. left>right side of neck    Objective            Treatment:  Therapeutic Exercise  TE 1: seated cervical rotation SNAGs: 15x5'' holds  TE 2: seated cervical extension SNAGs: 15x5'' holds  TE 3: cervical rotations: 15x B with 0 pillows - Next  Manual Therapy  MT 1: Cervical PROM  MT 2: cervical distraction, 2 rounds  MT 3: isometric left C2-4 sideglides  MT 4: B UT trigger point release  Balance/Neuromuscular Re-Education  NMR 1: chin tucks: 20x5'' holds  NMR 2: sidelying cervical sidebending (1 pillow): 2x8 B  NMR 3: prone shoulder extensions: 2x10x5'' holds B  Therapeutic Activity  TA 1: dowel shoulder flexion with towel roll on lower CTJ: 15x10'' holds, 2# dowel  TA 2: Wall slides: 15x5'' holds    Time Entry(in minutes):  Manual Therapy Time Entry:  15  Neuromuscular Re-Education Time Entry: 15  Therapeutic Activity Time Entry: 10  Therapeutic Exercise Time Entry: 15    Assessment & Plan   Assessment: Decreased end range cervical range of motion stretching today, and goal to keep pain low overall.  Did well with more strengthening based cervical exercises and working on more periscapular strengthening currently.  She was scheduled for another 2 weeks of therapy.  She did meet FOTO goal.       The patient will continue to benefit from skilled outpatient physical therapy in order to address the deficits listed in the problem list on the initial evaluation, provide patient and family education, and maximize the patients level of independence in the home and community environments.     The patient's spiritual, cultural, and educational needs were considered, and the patient is agreeable to the plan of care and goals.           Plan: continue to progress cervical ROM/ strengthening as appropriate.    Goals:   Active       Strength       Patient will demonstrate good cervical strength by chin tuck + lift for at least 20''.  (Met)       Start:  06/20/25    Expected End:  08/25/25    Resolved:  07/31/25         Patient will achieve bilateral shoulder strength of 4+/5 (Met)       Start:  06/20/25    Expected End:  08/25/25    Resolved:  07/31/25           Resolved       Functional outcome       Patient will show a significant change in FOTO patient-reported outcome tool to demonstrate subjective improvement (Met)       Start:  06/20/25    Expected End:  08/25/25    Resolved:  07/31/25         Patient stated goal: To no longer have neck pain  (Met)       Start:  06/20/25    Expected End:  08/25/25    Resolved:  07/31/25         Patient will demonstrate independence in home program for support of progression (Met)       Start:  06/20/25    Expected End:  08/25/25    Resolved:  07/31/25            Lifting & carrying objects       Patient will lift and carry with minimal  to no neck and right arm pain.  (Met)       Start:  06/20/25    Expected End:  08/25/25    Resolved:  07/31/25            Pain       Patient will report pain of 1/10 demonstrating a reduction of overall pain (Met)       Start:  06/20/25    Expected End:  08/25/25    Resolved:  07/31/25            Range of Motion       Patient will achieve bilateral cervical side bending and rotation to WNL (Met)       Start:  06/20/25    Expected End:  08/25/25    Resolved:  07/31/25             Jose C Lutz, PT, DPT

## 2025-08-04 ENCOUNTER — CLINICAL SUPPORT (OUTPATIENT)
Dept: REHABILITATION | Facility: HOSPITAL | Age: 70
End: 2025-08-04
Payer: MEDICARE

## 2025-08-04 DIAGNOSIS — R29.898 DECREASED ROM OF NECK: Primary | ICD-10-CM

## 2025-08-04 PROBLEM — R53.1 DECREASED STRENGTH: Status: RESOLVED | Noted: 2025-06-20 | Resolved: 2025-08-04

## 2025-08-04 PROCEDURE — 97530 THERAPEUTIC ACTIVITIES: CPT | Mod: PN

## 2025-08-04 PROCEDURE — 97112 NEUROMUSCULAR REEDUCATION: CPT | Mod: PN

## 2025-08-04 PROCEDURE — 97110 THERAPEUTIC EXERCISES: CPT | Mod: PN

## 2025-08-05 ENCOUNTER — TELEPHONE (OUTPATIENT)
Dept: DERMATOLOGY | Facility: CLINIC | Age: 70
End: 2025-08-05
Payer: MEDICARE

## 2025-08-05 NOTE — TELEPHONE ENCOUNTER
Copied from CRM #[id number]. Topic: Appointments - Appointment Scheduling  >> Aug 2, 2025 11:56 AM Yuli wrote:  Type:  Sooner Apoointment Request    Caller is requesting a sooner appointment.  Caller declined first available appointment listed below.  Caller will not accept being placed on the waitlist and is requesting a message be sent to doctor.  Name of Caller:Patient  When is the first available appointment?August 6  Symptom: Blisters  Outcome: Schedule an urgent appointment (within 4 hours) or talk to a nurse or provider within 30 minutes.  Reason: More than 5 blisters (fluid or pus-filled bubbles) on the skin     Would the patient rather a call back or a response via [facility]? Call back  Best Call Back Number:[phone]   Additional Information: Denied the symptom screening

## 2025-08-07 ENCOUNTER — CLINICAL SUPPORT (OUTPATIENT)
Dept: REHABILITATION | Facility: HOSPITAL | Age: 70
End: 2025-08-07
Payer: MEDICARE

## 2025-08-07 DIAGNOSIS — R29.898 DECREASED ROM OF NECK: Primary | ICD-10-CM

## 2025-08-07 PROCEDURE — 97112 NEUROMUSCULAR REEDUCATION: CPT | Mod: PN,CQ

## 2025-08-07 PROCEDURE — 97140 MANUAL THERAPY 1/> REGIONS: CPT | Mod: PN,CQ

## 2025-08-07 PROCEDURE — 97530 THERAPEUTIC ACTIVITIES: CPT | Mod: PN,CQ

## 2025-08-07 PROCEDURE — 97110 THERAPEUTIC EXERCISES: CPT | Mod: PN,CQ

## 2025-08-12 ENCOUNTER — CLINICAL SUPPORT (OUTPATIENT)
Dept: REHABILITATION | Facility: HOSPITAL | Age: 70
End: 2025-08-12
Payer: MEDICARE

## 2025-08-12 ENCOUNTER — OFFICE VISIT (OUTPATIENT)
Dept: DERMATOLOGY | Facility: CLINIC | Age: 70
End: 2025-08-12
Payer: MEDICARE

## 2025-08-12 DIAGNOSIS — L30.9 DERMATITIS, UNSPECIFIED: Primary | ICD-10-CM

## 2025-08-12 DIAGNOSIS — L90.5 SCAR: ICD-10-CM

## 2025-08-12 DIAGNOSIS — R29.898 DECREASED ROM OF NECK: Primary | ICD-10-CM

## 2025-08-12 PROCEDURE — 3288F FALL RISK ASSESSMENT DOCD: CPT | Mod: CPTII,S$GLB,, | Performed by: STUDENT IN AN ORGANIZED HEALTH CARE EDUCATION/TRAINING PROGRAM

## 2025-08-12 PROCEDURE — 99213 OFFICE O/P EST LOW 20 MIN: CPT | Mod: S$GLB,,, | Performed by: STUDENT IN AN ORGANIZED HEALTH CARE EDUCATION/TRAINING PROGRAM

## 2025-08-12 PROCEDURE — 1101F PT FALLS ASSESS-DOCD LE1/YR: CPT | Mod: CPTII,S$GLB,, | Performed by: STUDENT IN AN ORGANIZED HEALTH CARE EDUCATION/TRAINING PROGRAM

## 2025-08-12 PROCEDURE — 99999 PR PBB SHADOW E&M-EST. PATIENT-LVL II: CPT | Mod: PBBFAC,,, | Performed by: STUDENT IN AN ORGANIZED HEALTH CARE EDUCATION/TRAINING PROGRAM

## 2025-08-12 PROCEDURE — 97112 NEUROMUSCULAR REEDUCATION: CPT | Mod: PN

## 2025-08-12 PROCEDURE — 97110 THERAPEUTIC EXERCISES: CPT | Mod: PN

## 2025-08-12 PROCEDURE — 97530 THERAPEUTIC ACTIVITIES: CPT | Mod: PN

## 2025-08-12 PROCEDURE — 3044F HG A1C LEVEL LT 7.0%: CPT | Mod: CPTII,S$GLB,, | Performed by: STUDENT IN AN ORGANIZED HEALTH CARE EDUCATION/TRAINING PROGRAM

## 2025-08-12 PROCEDURE — 1160F RVW MEDS BY RX/DR IN RCRD: CPT | Mod: CPTII,S$GLB,, | Performed by: STUDENT IN AN ORGANIZED HEALTH CARE EDUCATION/TRAINING PROGRAM

## 2025-08-12 PROCEDURE — 1159F MED LIST DOCD IN RCRD: CPT | Mod: CPTII,S$GLB,, | Performed by: STUDENT IN AN ORGANIZED HEALTH CARE EDUCATION/TRAINING PROGRAM

## 2025-08-12 PROCEDURE — 1126F AMNT PAIN NOTED NONE PRSNT: CPT | Mod: CPTII,S$GLB,, | Performed by: STUDENT IN AN ORGANIZED HEALTH CARE EDUCATION/TRAINING PROGRAM

## 2025-08-12 RX ORDER — MOMETASONE FUROATE 1 MG/G
OINTMENT TOPICAL
Qty: 60 G | Refills: 1 | Status: SHIPPED | OUTPATIENT
Start: 2025-08-12

## 2025-08-15 ENCOUNTER — CLINICAL SUPPORT (OUTPATIENT)
Dept: REHABILITATION | Facility: HOSPITAL | Age: 70
End: 2025-08-15
Payer: MEDICARE

## 2025-08-15 DIAGNOSIS — R29.898 DECREASED ROM OF NECK: Primary | ICD-10-CM

## 2025-08-15 PROCEDURE — 97530 THERAPEUTIC ACTIVITIES: CPT | Mod: PN,CQ

## 2025-08-15 PROCEDURE — 97112 NEUROMUSCULAR REEDUCATION: CPT | Mod: PN,CQ

## 2025-08-15 PROCEDURE — 97110 THERAPEUTIC EXERCISES: CPT | Mod: PN,CQ

## (undated) DEVICE — PAD PREP 50/CA

## (undated) DEVICE — SUPPORT SLING SHOT II MEDIUM

## (undated) DEVICE — SEE L#159833

## (undated) DEVICE — MANIFOLD 4 PORT

## (undated) DEVICE — PAD ABD 8X10 STERILE

## (undated) DEVICE — GLOVE BIOGEL PI MICRO INDIC 8

## (undated) DEVICE — KIT GLIDESHEATH SLEND 6FR 10CM

## (undated) DEVICE — SEE MEDLINE ITEM 157187

## (undated) DEVICE — BLADE SURG CARBON STEEL SZ11

## (undated) DEVICE — DRAPE STERI U-SHAPED 47X51IN

## (undated) DEVICE — SEE MEDLINE ITEM 157117

## (undated) DEVICE — SEE MEDLINE ITEM 157131

## (undated) DEVICE — GAUZE SPONGE 4X4 12PLY

## (undated) DEVICE — FIRSTPASS ST SUTURE PASSER

## (undated) DEVICE — APPLICATOR CHLORAPREP ORN 26ML

## (undated) DEVICE — NDL SPINAL 18GX3.5 SPINOCAN

## (undated) DEVICE — DRAPE PLASTIC U 60X72

## (undated) DEVICE — GAUZE SPONGE 4X4 12 PLY NS

## (undated) DEVICE — GOWN SURGICAL XX LARGE X LONG

## (undated) DEVICE — TOWEL OR DISP STRL BLUE 4/PK

## (undated) DEVICE — PACK FLUID CONTROL SHOULDER

## (undated) DEVICE — SEE MEDLINE ITEM 146292

## (undated) DEVICE — GUIDE WIRE BMW 014 X190

## (undated) DEVICE — KIT CUSTOM MANIFOLD

## (undated) DEVICE — BANDAGE ADHESIVE

## (undated) DEVICE — CANNULA THREADED 8.5 X 72MM

## (undated) DEVICE — HEMOSTAT VASC BAND REG 24CM

## (undated) DEVICE — SYR 50CC LL

## (undated) DEVICE — GUIDE LAUNCHER 6FR EBU 3.5

## (undated) DEVICE — SUPPORT ULNA NERVE PROTECTOR

## (undated) DEVICE — PACK BASIC

## (undated) DEVICE — WIRE GUIDE SAFE-T-J .035 260CM

## (undated) DEVICE — CONNECTOR TUBING STR 5 IN 1

## (undated) DEVICE — SEE MEDLINE ITEM 157116

## (undated) DEVICE — SEE MEDLINE ITEM 156955

## (undated) DEVICE — ABLATOR EXTENDED LENGTH

## (undated) DEVICE — DRESSING XEROFORM FOIL PK 1X8

## (undated) DEVICE — CATH JACKY RADIAL 5FR 100CM

## (undated) DEVICE — MAT SUCTION PUDDLEVAC ORANGE

## (undated) DEVICE — DRESSING AQUACEL SACRAL 9 X 9

## (undated) DEVICE — KIT CO-PILOT

## (undated) DEVICE — WAND COBLATION TURBOVAC 90

## (undated) DEVICE — OMNIPAQUE 350 200ML

## (undated) DEVICE — CATH EAGLE EYE PLATINUM

## (undated) DEVICE — GLOVE BIOGEL ORTHOPEDIC 8

## (undated) DEVICE — SUT 2/0 36IN COATED VICRYL

## (undated) DEVICE — CATH ANG PIGTAIL 4FR INFINITY

## (undated) DEVICE — TUBING SUC UNIV W/CONN 12FT

## (undated) DEVICE — SEE MEDLINE ITEM 156894

## (undated) DEVICE — SYR MED RAD 150ML

## (undated) DEVICE — TUBE SET INFLOW/OUTFLOW

## (undated) DEVICE — PAD ABDOMINAL 5X9 STERILE

## (undated) DEVICE — TAPE ADH MEDIPORE 4 X 10YDS

## (undated) DEVICE — DRAPE STERI-DRAPE 1000 17X11IN

## (undated) DEVICE — ELECTRODE REM PLYHSV RETURN 9

## (undated) DEVICE — SPIKE CONTRAST CONTROLLER

## (undated) DEVICE — INSTRAMOD SHOULDER TRACTION

## (undated) DEVICE — DRESSING XEROFORM 5X9IN

## (undated) DEVICE — SEE MEDLINE ITEM 157125

## (undated) DEVICE — COVER OVERHEAD SURG LT BLUE